# Patient Record
Sex: MALE | Race: ASIAN | NOT HISPANIC OR LATINO | Employment: OTHER | URBAN - METROPOLITAN AREA
[De-identification: names, ages, dates, MRNs, and addresses within clinical notes are randomized per-mention and may not be internally consistent; named-entity substitution may affect disease eponyms.]

---

## 2021-01-06 ENCOUNTER — HOSPITAL ENCOUNTER (OUTPATIENT)
Facility: HOSPITAL | Age: 56
Setting detail: OBSERVATION
Discharge: HOME | End: 2021-01-07
Attending: EMERGENCY MEDICINE | Admitting: INTERNAL MEDICINE
Payer: COMMERCIAL

## 2021-01-06 DIAGNOSIS — E13.10 DIABETIC KETOSIS (HCC): ICD-10-CM

## 2021-01-06 DIAGNOSIS — E11.9 NEW ONSET TYPE 2 DIABETES MELLITUS (HCC): ICD-10-CM

## 2021-01-06 DIAGNOSIS — E11.9 DIABETES MELLITUS, NEW ONSET (HCC): Primary | ICD-10-CM

## 2021-01-06 PROBLEM — R35.0 FREQUENCY OF URINATION AND POLYURIA: Status: ACTIVE | Noted: 2021-01-06

## 2021-01-06 PROBLEM — R35.89 FREQUENCY OF URINATION AND POLYURIA: Status: ACTIVE | Noted: 2021-01-06

## 2021-01-06 PROBLEM — F17.210 CIGARETTE NICOTINE DEPENDENCE WITHOUT COMPLICATION: Status: ACTIVE | Noted: 2021-01-06

## 2021-01-06 LAB
ALBUMIN SERPL BCP-MCNC: 4.3 G/DL (ref 3.4–4.8)
ALP SERPL-CCNC: 88.7 U/L (ref 10–129)
ALT SERPL W P-5'-P-CCNC: 23 U/L (ref 5–63)
ANION GAP SERPL CALCULATED.3IONS-SCNC: 7 MMOL/L (ref 4–13)
AST SERPL W P-5'-P-CCNC: 18 U/L (ref 15–41)
BASE EXCESS BLDA CALC-SCNC: 2 MMOL/L (ref -2–3)
BASOPHILS # BLD AUTO: 0.04 THOUSANDS/ΜL (ref 0–0.1)
BASOPHILS NFR BLD AUTO: 0 % (ref 0–1)
BETA-HYDROXYBUTYRATE: 0.6 MMOL/L
BILIRUB SERPL-MCNC: 0.89 MG/DL (ref 0.3–1.2)
BILIRUB UR QL STRIP: NEGATIVE
BUN SERPL-MCNC: 19 MG/DL (ref 6–20)
CA-I BLD-SCNC: 1.17 MMOL/L (ref 1.12–1.32)
CALCIUM SERPL-MCNC: 9.2 MG/DL (ref 8.4–10.2)
CHLORIDE SERPL-SCNC: 99 MMOL/L (ref 96–108)
CK MB SERPL-MCNC: 2.2 % (ref 0–2.5)
CK MB SERPL-MCNC: 4.4 NG/ML (ref 0.1–5.9)
CK SERPL-CCNC: 196.8 U/L (ref 49–397)
CLARITY UR: CLEAR
CO2 SERPL-SCNC: 29 MMOL/L (ref 22–33)
COLOR UR: YELLOW
CREAT SERPL-MCNC: 1.15 MG/DL (ref 0.5–1.2)
EOSINOPHIL # BLD AUTO: 0.19 THOUSAND/ΜL (ref 0–0.61)
EOSINOPHIL NFR BLD AUTO: 2 % (ref 0–6)
ERYTHROCYTE [DISTWIDTH] IN BLOOD BY AUTOMATED COUNT: 12.8 % (ref 11.6–15.1)
GFR SERPL CREATININE-BSD FRML MDRD: 71 ML/MIN/1.73SQ M
GLUCOSE SERPL-MCNC: 162 MG/DL (ref 65–140)
GLUCOSE SERPL-MCNC: 240 MG/DL (ref 65–140)
GLUCOSE SERPL-MCNC: 363 MG/DL (ref 65–140)
GLUCOSE SERPL-MCNC: 383 MG/DL (ref 65–140)
GLUCOSE UR STRIP-MCNC: ABNORMAL MG/DL
HCO3 BLDA-SCNC: 28.2 MMOL/L (ref 24–30)
HCT VFR BLD AUTO: 47.4 % (ref 36.5–49.3)
HCT VFR BLD CALC: 49 % (ref 36.5–49.3)
HGB BLD-MCNC: 16.6 G/DL (ref 12–17)
HGB BLDA-MCNC: 16.7 G/DL (ref 12–17)
HGB UR QL STRIP.AUTO: NEGATIVE
IMM GRANULOCYTES # BLD AUTO: 0.01 THOUSAND/UL (ref 0–0.2)
IMM GRANULOCYTES NFR BLD AUTO: 0 % (ref 0–2)
KETONES UR STRIP-MCNC: ABNORMAL MG/DL
LEUKOCYTE ESTERASE UR QL STRIP: NEGATIVE
LYMPHOCYTES # BLD AUTO: 3.32 THOUSANDS/ΜL (ref 0.6–4.47)
LYMPHOCYTES NFR BLD AUTO: 34 % (ref 14–44)
MCH RBC QN AUTO: 30.9 PG (ref 26.8–34.3)
MCHC RBC AUTO-ENTMCNC: 35 G/DL (ref 31.4–37.4)
MCV RBC AUTO: 88 FL (ref 82–98)
MONOCYTES # BLD AUTO: 0.94 THOUSAND/ΜL (ref 0.17–1.22)
MONOCYTES NFR BLD AUTO: 10 % (ref 4–12)
NEUTROPHILS # BLD AUTO: 5.34 THOUSANDS/ΜL (ref 1.85–7.62)
NEUTS SEG NFR BLD AUTO: 54 % (ref 43–75)
NITRITE UR QL STRIP: NEGATIVE
PCO2 BLD: 30 MMOL/L (ref 21–32)
PCO2 BLD: 49.2 MM HG (ref 42–50)
PH BLD: 7.37 [PH] (ref 7.3–7.4)
PH UR STRIP.AUTO: 6 [PH]
PLATELET # BLD AUTO: 295 THOUSANDS/UL (ref 149–390)
PMV BLD AUTO: 9.7 FL (ref 8.9–12.7)
PO2 BLD: 30 MM HG (ref 35–45)
POTASSIUM BLD-SCNC: 4 MMOL/L (ref 3.5–5.3)
POTASSIUM SERPL-SCNC: 4 MMOL/L (ref 3.5–5)
PROT SERPL-MCNC: 7.1 G/DL (ref 6.4–8.3)
PROT UR STRIP-MCNC: NEGATIVE MG/DL
RBC # BLD AUTO: 5.37 MILLION/UL (ref 3.88–5.62)
SAO2 % BLD FROM PO2: 55 % (ref 60–85)
SODIUM BLD-SCNC: 136 MMOL/L (ref 136–145)
SODIUM SERPL-SCNC: 135 MMOL/L (ref 133–145)
SP GR UR STRIP.AUTO: 1.01 (ref 1–1.03)
SPECIMEN SOURCE: ABNORMAL
TROPONIN I SERPL-MCNC: <0.03 NG/ML (ref 0–0.07)
UROBILINOGEN UR QL STRIP.AUTO: 0.2 E.U./DL
WBC # BLD AUTO: 9.84 THOUSAND/UL (ref 4.31–10.16)

## 2021-01-06 PROCEDURE — 96372 THER/PROPH/DIAG INJ SC/IM: CPT

## 2021-01-06 PROCEDURE — 36415 COLL VENOUS BLD VENIPUNCTURE: CPT | Performed by: EMERGENCY MEDICINE

## 2021-01-06 PROCEDURE — 82330 ASSAY OF CALCIUM: CPT

## 2021-01-06 PROCEDURE — 85014 HEMATOCRIT: CPT

## 2021-01-06 PROCEDURE — 84295 ASSAY OF SERUM SODIUM: CPT

## 2021-01-06 PROCEDURE — 99285 EMERGENCY DEPT VISIT HI MDM: CPT | Performed by: EMERGENCY MEDICINE

## 2021-01-06 PROCEDURE — 82010 KETONE BODYS QUAN: CPT | Performed by: EMERGENCY MEDICINE

## 2021-01-06 PROCEDURE — 99220 PR INITIAL OBSERVATION CARE/DAY 70 MINUTES: CPT | Performed by: INTERNAL MEDICINE

## 2021-01-06 PROCEDURE — 93005 ELECTROCARDIOGRAM TRACING: CPT

## 2021-01-06 PROCEDURE — 82803 BLOOD GASES ANY COMBINATION: CPT

## 2021-01-06 PROCEDURE — 82550 ASSAY OF CK (CPK): CPT | Performed by: EMERGENCY MEDICINE

## 2021-01-06 PROCEDURE — 84484 ASSAY OF TROPONIN QUANT: CPT | Performed by: EMERGENCY MEDICINE

## 2021-01-06 PROCEDURE — 80053 COMPREHEN METABOLIC PANEL: CPT | Performed by: EMERGENCY MEDICINE

## 2021-01-06 PROCEDURE — 82553 CREATINE MB FRACTION: CPT | Performed by: EMERGENCY MEDICINE

## 2021-01-06 PROCEDURE — 81003 URINALYSIS AUTO W/O SCOPE: CPT | Performed by: EMERGENCY MEDICINE

## 2021-01-06 PROCEDURE — 96360 HYDRATION IV INFUSION INIT: CPT

## 2021-01-06 PROCEDURE — 84132 ASSAY OF SERUM POTASSIUM: CPT

## 2021-01-06 PROCEDURE — 83036 HEMOGLOBIN GLYCOSYLATED A1C: CPT | Performed by: EMERGENCY MEDICINE

## 2021-01-06 PROCEDURE — 99284 EMERGENCY DEPT VISIT MOD MDM: CPT

## 2021-01-06 PROCEDURE — 82948 REAGENT STRIP/BLOOD GLUCOSE: CPT

## 2021-01-06 PROCEDURE — 82947 ASSAY GLUCOSE BLOOD QUANT: CPT

## 2021-01-06 PROCEDURE — 85025 COMPLETE CBC W/AUTO DIFF WBC: CPT | Performed by: EMERGENCY MEDICINE

## 2021-01-06 RX ORDER — SODIUM CHLORIDE 9 MG/ML
125 INJECTION, SOLUTION INTRAVENOUS CONTINUOUS
Status: DISCONTINUED | OUTPATIENT
Start: 2021-01-06 | End: 2021-01-07 | Stop reason: HOSPADM

## 2021-01-06 RX ORDER — INSULIN GLARGINE 100 [IU]/ML
10 INJECTION, SOLUTION SUBCUTANEOUS EVERY MORNING
Status: DISCONTINUED | OUTPATIENT
Start: 2021-01-06 | End: 2021-01-07 | Stop reason: HOSPADM

## 2021-01-06 RX ADMIN — INSULIN GLARGINE 10 UNITS: 100 INJECTION, SOLUTION SUBCUTANEOUS at 13:17

## 2021-01-06 RX ADMIN — SODIUM CHLORIDE 125 ML/HR: 0.9 INJECTION, SOLUTION INTRAVENOUS at 13:22

## 2021-01-06 RX ADMIN — INSULIN HUMAN 6 UNITS: 100 INJECTION, SOLUTION PARENTERAL at 11:08

## 2021-01-06 RX ADMIN — SODIUM CHLORIDE 1000 ML: 0.9 INJECTION, SOLUTION INTRAVENOUS at 11:57

## 2021-01-06 RX ADMIN — ENOXAPARIN SODIUM 40 MG: 40 INJECTION SUBCUTANEOUS at 13:18

## 2021-01-06 RX ADMIN — INSULIN LISPRO 1 UNITS: 100 INJECTION, SOLUTION INTRAVENOUS; SUBCUTANEOUS at 21:03

## 2021-01-06 RX ADMIN — SODIUM CHLORIDE 1000 ML: 0.9 INJECTION, SOLUTION INTRAVENOUS at 11:03

## 2021-01-06 RX ADMIN — INSULIN LISPRO 2 UNITS: 100 INJECTION, SOLUTION INTRAVENOUS; SUBCUTANEOUS at 16:14

## 2021-01-06 NOTE — H&P
H&P- Migdalia Aus 1965, 54 y o  male MRN: [id number]    Unit/Bed#: ED 09 Encounter: [id number]    Primary Care Provider: Pamela Hidalgo MD   Date and time admitted to hospital: 1/6/2021 10:36 AM        Cigarette nicotine dependence without complication  Assessment & Plan  Consult to quit smoking explained risks and benefits nicotine patch ordered    Frequency of urination and polyuria  Assessment & Plan  Likely secondary to uncontrolled diabetes mellitus    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[facility] )  Assessment & Plan  No results found for: HGBA1C    No results for input(s): POCGLU in the last 72 hours  Blood Sugar Average: Last 72 hrs:    Should improve with IV fluids, and insulin    * New onset type 2 diabetes mellitus ([facility] )  Assessment & Plan  No results found for: HGBA1C    No results for input(s): POCGLU in the last 72 hours  Blood Sugar Average: Last 72 hrs:    Patient diagnosed of new onset diabetes mellitus, check A1c which is pending  Received IV fluids in the emergency department, continue with the same  Anion gap is normal   Patient has some ketones in the urine likely due to ketosis  Will start the patient on subcu insulin, discussed with the patient regarding options of oral and subcu  Patient might be a good candidate for metformin 500 mg b i d  And Lantus 10 units at bedtime from tomorrow up on discharge  Continue diabetic Education, patient would require outpatient diabetic Education  Patient states he has upcoming PCP appointment end of January  Recommended to monitor Accu-Cheks can document and low carb diet                  VTE Prophylaxis: Enoxaparin (Lovenox)  / sequential compression device   Code Status:  Full code  POLST: There is no POLST form on file for this patient (pre-hospital)  Discussion with family:  Discussed with the patient's niece who was doing translation  from [facility] to [place]    Anticipated Length of Stay:  Patient will be admitted on an Observation basis with an anticipated length of stay of  less than 2 midnights  Justification for Hospital Stay:  New onset diabetes, hyperglycemia    Total Time for Visit, including Counseling / Coordination of Care: 1 hour  Greater than 50% of this total time spent on direct patient counseling and coordination of care  Chief Complaint:   Polyuria, polydipsia    History of Present Illness:    Rekha Ro is a 54 y o  male who presents with complaints of polyuria, polydipsia for about 2-3 weeks, gradually getting worse, complains of generalized weakness  Also noticed some change in vision  No prior history of diabetes mellitus  Patient went to urgent care where he was noticed to have hyperglycemia and sent to the emergency department here  Patient is awake alert and oriented, denies any headache, dizziness, chest pain, shortness of breath  Denies any nausea vomiting or abdominal pain  Denies any dysuria  Complains of frequency  Complains of  some white colored stuff on his glans penis  No pain, erythema    Patient's knees was at the bedside who was able to translate from [facility] to [place]    Review of Systems:    More than 10 system review of systems was performed, pertinent positive and negative findings mentioned as per history of present illness  Past Medical and Surgical History:     History reviewed  No pertinent past medical history  History reviewed  No pertinent surgical history  Meds/Allergies:    Prior to Admission medications    Not on File     I have reviewed home medications with patient personally  Patient currently does not take any medications at home      Allergies: No Known Allergies    Social History:       Social History     Substance and Sexual Activity   Alcohol Use Not Currently     Social History     Tobacco Use   Smoking Status Current Every Day Smoker    Packs/day: 0 50    Types: Cigarettes   Smokeless Tobacco Never Used     Social History     Substance and Sexual Activity   Drug Use Never       Family History:    non-contributory   Reviewed with the patient    Physical Exam:     Vitals:   Blood Pressure: 149/92 (01/06/21 1038)  Pulse: (!) 54 (01/06/21 1038)  Temperature: (!) 97 °F (36 1 °C) (01/06/21 1038)  Temp Source: Tympanic (01/06/21 1038)  Respirations: 20 (01/06/21 1038)  Height: 5' 6" (167 6 cm) (01/06/21 1038)  Weight - Scale: 72 1 kg (159 lb) (01/06/21 1038)  SpO2: 99 % (01/06/21 1038)    Physical Exam    (  General appearance:  Patient not in acute distress  Eyes:  Pupils equal reacting to light  ENT:  Moist oral mucous membranes  CVS:  S1-S2 heard, regular rate and rhythm, no pedal edema  Chest:  Bilateral air entry present, clear to auscultation  Abdomen:  Soft, nontender, bowel sounds present  CNS:  No focal neurological deficits  Genitourinary: deferred  Skin:  No acute rash   psychiatric:  No psychosis  Musculoskeletal:  No joint deformitie      Additional Data:     Lab Results: I have personally reviewed pertinent reports  Results from last 7 days   Lab Units 01/06/21  1123 01/06/21  1103   WBC Thousand/uL  --  9 84   HEMOGLOBIN g/dL  --  16 6   I STAT HEMOGLOBIN g/dl 16 7  --    HEMATOCRIT %  --  47 4   HEMATOCRIT, ISTAT % 49  --    PLATELETS Thousands/uL  --  295   NEUTROS PCT %  --  54   LYMPHS PCT %  --  34   MONOS PCT %  --  10   EOS PCT %  --  2     Results from last 7 days   Lab Units 01/06/21  1123 01/06/21  1103   SODIUM mmol/L  --  135   POTASSIUM mmol/L  --  4 0   CHLORIDE mmol/L  --  99   CO2 mmol/L  --  29   CO2, I-STAT mmol/L 30  --    BUN mg/dL  --  19   CREATININE mg/dL  --  1 15   ANION GAP mmol/L  --  7   CALCIUM mg/dL  --  9 2   ALBUMIN g/dL  --  4 3   TOTAL BILIRUBIN mg/dL  --  0 89   ALK PHOS U/L  --  88 7   ALT U/L  --  23   AST U/L  --  18   GLUCOSE RANDOM mg/dL  --  383*                           No orders to display         [employer] Records Reviewed: Yes     ** Please Note: This note has been constructed using a voice recognition system  **

## 2021-01-06 NOTE — ASSESSMENT & PLAN NOTE
No results found for: HGBA1C    No results for input(s): POCGLU in the last 72 hours  Blood Sugar Average: Last 72 hrs:    Patient diagnosed of new onset diabetes mellitus, check A1c which is pending  Received IV fluids in the emergency department, continue with the same  Anion gap is normal   Patient has some ketones in the urine likely due to ketosis  Will start the patient on subcu insulin, discussed with the patient regarding options of oral and subcu  Patient might be a good candidate for metformin 500 mg b i d  And Lantus 10 units at bedtime from tomorrow up on discharge  Continue diabetic Education, patient would require outpatient diabetic Education  Patient states he has upcoming PCP appointment end of January  Recommended to monitor Accu-Cheks can document and low carb diet

## 2021-01-06 NOTE — LETTER
[address] [employer]  [address] [place] [id number]  Dept: [phone]    January 7, 2021     Patient: Laura Rosario   YOB: 1965   Date of Visit: 1/6/2021       To Whom it May Concern:    Laura Rosario is under my professional care  He was seen in the hospital from 1/6/2021   to 01/07/21  He may return to work on 1/8/2021 without limitations  If you have any questions or concerns, please don't hesitate to call           Sincerely,          Selma Holt MD

## 2021-01-06 NOTE — ASSESSMENT & PLAN NOTE
No results found for: HGBA1C    No results for input(s): POCGLU in the last 72 hours      Blood Sugar Average: Last 72 hrs:    Should improve with IV fluids, and insulin

## 2021-01-06 NOTE — ED PROVIDER NOTES
History  Chief Complaint   Patient presents with    Abnormal Lab     [facility]     This is a 54-year-old male [facility] patient presenting with his niece for evaluation of new onset diabetic symptoms for the past 3 weeks  He admits to significant weight loss, polydipsia and polyuria  He admits to some blurred vision at times and just does not feel well  He went to a local [facility] and had some lab work done there which showed his blood sugar was over 400, concerning for new diabetes  He does not currently have a primary doctor and just recently made an appointment for visit in 3 weeks  He has no known past medical history and no prior surgeries  He does smoke cigarettes, denies alcohol  Hyperglycemia - no symptoms  Chronicity:  New  Diabetes status:  Non-diabetic  Context: new diabetes diagnosis    Relieved by:  Nothing  Ineffective treatments:  None tried  Associated symptoms: blurred vision, dehydration, fatigue, increased appetite, increased thirst, malaise and polyuria        None       History reviewed  No pertinent past medical history  History reviewed  No pertinent surgical history  History reviewed  No pertinent family history  I have reviewed and agree with the history as documented  E-Cigarette/Vaping    E-Cigarette Use Never User      E-Cigarette/Vaping Substances     Social History     Tobacco Use    Smoking status: Current Every Day Smoker     Packs/day: 0 50     Types: Cigarettes    Smokeless tobacco: Never Used   Substance Use Topics    Alcohol use: Not Currently    Drug use: Never       Review of Systems   Constitutional: Positive for fatigue  Eyes: Positive for blurred vision  Endocrine: Positive for polydipsia, polyphagia and polyuria  All other systems reviewed and are negative  Physical Exam  Physical Exam  Vitals signs and nursing note reviewed     Constitutional:       General: He is not in acute distress  Appearance: Normal appearance  He is normal weight  HENT:      Head: Normocephalic and atraumatic  Right Ear: External ear normal       Left Ear: External ear normal       Nose: Nose normal  No congestion or rhinorrhea  Mouth/Throat:      Mouth: Mucous membranes are dry  Pharynx: Oropharynx is clear  Eyes:      General: No scleral icterus  Right eye: No discharge  Left eye: No discharge  Extraocular Movements: Extraocular movements intact  Conjunctiva/sclera: Conjunctivae normal       Pupils: Pupils are equal, round, and reactive to light  Neck:      Musculoskeletal: Normal range of motion and neck supple  Cardiovascular:      Rate and Rhythm: Regular rhythm  Bradycardia present  Pulses: Normal pulses  Heart sounds: Normal heart sounds  Pulmonary:      Effort: Pulmonary effort is normal  No respiratory distress  Breath sounds: Normal breath sounds  Abdominal:      General: Abdomen is flat  Palpations: Abdomen is soft  Tenderness: There is no abdominal tenderness  Musculoskeletal: Normal range of motion  General: No swelling  Skin:     General: Skin is warm and dry  Capillary Refill: Capillary refill takes less than 2 seconds  Neurological:      General: No focal deficit present  Mental Status: He is alert and oriented to person, place, and time  Mental status is at baseline     Psychiatric:         Mood and Affect: Mood normal          Behavior: Behavior normal          Vital Signs  ED Triage Vitals   Temperature Pulse Respirations Blood Pressure SpO2   01/06/21 1038 01/06/21 1038 01/06/21 1038 01/06/21 1038 01/06/21 1038   (!) 97 °F (36 1 °C) (!) 54 20 149/92 99 %      Temp Source Heart Rate Source Patient Position - Orthostatic VS BP Location FiO2 (%)   01/06/21 1038 01/06/21 1841 01/06/21 1841 01/06/21 1841 --   Tympanic Monitor Sitting Right arm       Pain Score       --                  Vitals: 01/06/21 1038 01/06/21 1841   BP: 149/92 159/86   Pulse: (!) 54 105   Patient Position - Orthostatic VS:  Sitting         Visual Acuity      ED Medications  Medications   sodium chloride 0 9 % infusion (125 mL/hr Intravenous New Bag 1/6/21 1322)   nicotine (NICODERM CQ) 7 mg/24hr TD 24 hr patch 1 patch (1 patch Transdermal Not Given 1/6/21 1328)   enoxaparin (LOVENOX) subcutaneous injection 40 mg (40 mg Subcutaneous Given 1/6/21 1318)   insulin lispro (HumaLOG) 100 units/mL subcutaneous injection 1-5 Units (2 Units Subcutaneous Given 1/6/21 1614)   insulin lispro (HumaLOG) 100 units/mL subcutaneous injection 1-5 Units (has no administration in time range)   insulin glargine (LANTUS) subcutaneous injection 10 Units 0 1 mL (10 Units Subcutaneous Given 1/6/21 1317)   insulin lispro (HumaLOG) 100 units/mL subcutaneous injection 3 Units (3 Units Subcutaneous Not Given 1/6/21 1818)   sodium chloride 0 9 % bolus 1,000 mL (0 mL Intravenous Stopped 1/6/21 1149)   insulin regular (HumuLIN R,NovoLIN R) injection 6 Units (6 Units Subcutaneous Given 1/6/21 1108)   sodium chloride 0 9 % bolus 1,000 mL (0 mL Intravenous Stopped 1/6/21 1310)       Diagnostic Studies  Results Reviewed     Procedure Component Value Units Date/Time    Fingerstick Glucose (POCT) [[id number]]  (Abnormal) Collected: 01/06/21 1612    Lab Status: Final result Updated: 01/06/21 1613     POC Glucose 240 mg/dl     CKMB [[id number]]  (Normal) Collected: 01/06/21 1103    Lab Status: Final result Specimen: Blood from Arm, Left Updated: 01/06/21 1233     CK-MB Index 2 2 %      CK-MB 4 4 ng/mL     Hemoglobin A1C [[id number]] Collected: 01/06/21 1103    Lab Status:  In process Specimen: Blood from Arm, Left Updated: 01/06/21 1211    UA w Reflex to Microscopic w Reflex to Culture [[id number]]  (Abnormal) Collected: 01/06/21 1155    Lab Status: Final result Specimen: Urine, Clean Catch Updated: 01/06/21 1201     Color, UA Yellow     Clarity, UA Clear     Specific [place], UA 1 015     pH, UA 6 0     Leukocytes, UA Negative     Nitrite, UA Negative     Protein, UA Negative mg/dl      Glucose, UA 3+ mg/dl      Ketones, UA 15 (1+) mg/dl      Urobilinogen, UA 0 2 E U /dl      Bilirubin, UA Negative     Blood, UA Negative    Troponin I [[id number]]  (Normal) Collected: 01/06/21 1103    Lab Status: Final result Specimen: Blood from Arm, Left Updated: 01/06/21 1133     Troponin I <0 03 ng/mL     Comprehensive metabolic panel [[id number]]  (Abnormal) Collected: 01/06/21 1103    Lab Status: Final result Specimen: Blood from Arm, Left Updated: 01/06/21 1131     Sodium 135 mmol/L      Potassium 4 0 mmol/L      Chloride 99 mmol/L      CO2 29 mmol/L      ANION GAP 7 mmol/L      BUN 19 mg/dL      Creatinine 1 15 mg/dL      Glucose 383 mg/dL      Calcium 9 2 mg/dL      AST 18 U/L      ALT 23 U/L      Alkaline Phosphatase 88 7 U/L      Total Protein 7 1 g/dL      Albumin 4 3 g/dL      Total Bilirubin 0 89 mg/dL      eGFR 71 ml/min/1 73sq m     Narrative:      [facility] guidelines for Chronic Kidney Disease (CKD):     Stage 1 with normal or high GFR (GFR > 90 mL/min/1 73 square meters)    Stage 2 Mild CKD (GFR = 60-89 mL/min/1 73 square meters)    Stage 3A Moderate CKD (GFR = 45-59 mL/min/1 73 square meters)    Stage 3B Moderate CKD (GFR = 30-44 mL/min/1 73 square meters)    Stage 4 Severe CKD (GFR = 15-29 mL/min/1 73 square meters)    Stage 5 End Stage CKD (GFR <15 mL/min/1 73 square meters)  Note: GFR calculation is accurate only with a steady state creatinine    CK Total with Reflex CKMB [[id number]]  (Normal) Collected: 01/06/21 1103    Lab Status: Final result Specimen: Blood from Arm, Left Updated: 01/06/21 1131     Total  8 U/L     POCT Blood Gas (CG8+) [[id number]]  (Abnormal) Collected: 01/06/21 1123    Lab Status: Final result Specimen: Venous Updated: 01/06/21 1127     ph, Paresh ISTAT 7 366     pCO2, Paresh i-STAT 49 2 mm HG      pO2, Paresh i-STAT 30 0 mm HG BE, i-STAT 2 mmol/L      HCO3, Paresh i-STAT 28 2 mmol/L      CO2, i-STAT 30 mmol/L      O2 Sat, i-STAT 55 %      SODIUM, I-STAT 136 mmol/l      Potassium, i-STAT 4 0 mmol/L      Calcium, Ionized i-STAT 1 17 mmol/L      Hct, i-STAT 49 %      Hgb, i-STAT 16 7 g/dl      Glucose, i-STAT 363 mg/dl      Specimen Type VENOUS    Beta Hydroxybutyrate [[id number]]  (Abnormal) Collected: 01/06/21 1103    Lab Status: Final result Specimen: Blood from Arm, Left Updated: 01/06/21 1126     BETA-HYDROXYBUTYRATE 0 6 mmol/L     CBC and differential [[id number]]  (Normal) Collected: 01/06/21 1103    Lab Status: Final result Specimen: Blood from Arm, Left Updated: 01/06/21 1112     WBC 9 84 Thousand/uL      RBC 5 37 Million/uL      Hemoglobin 16 6 g/dL      Hematocrit 47 4 %      MCV 88 fL      MCH 30 9 pg      MCHC 35 0 g/dL      RDW 12 8 %      MPV 9 7 fL      Platelets 094 Thousands/uL      Neutrophils Relative 54 %      Immat GRANS % 0 %      Lymphocytes Relative 34 %      Monocytes Relative 10 %      Eosinophils Relative 2 %      Basophils Relative 0 %      Neutrophils Absolute 5 34 Thousands/µL      Immature Grans Absolute 0 01 Thousand/uL      Lymphocytes Absolute 3 32 Thousands/µL      Monocytes Absolute 0 94 Thousand/µL      Eosinophils Absolute 0 19 Thousand/µL      Basophils Absolute 0 04 Thousands/µL                  No orders to display              Procedures  ECG 12 Lead Documentation Only    Date/Time: 1/6/2021 11:29 AM  Performed by: Jeff Ceron DO  Authorized by: Jeff Ceron DO     Indications / Diagnosis:  Hyperglycemia  ECG reviewed by me, the ED Provider: yes    Patient location:  ED  Interpretation:     Interpretation: normal    Rate:     ECG rate:  58    ECG rate assessment: bradycardic    Rhythm:     Rhythm: sinus bradycardia    Ectopy:     Ectopy: none    QRS:     QRS axis:  Normal    QRS intervals:  Normal  Conduction:     Conduction: normal    ST segments:     ST segments:  Normal  T waves:     T waves: normal               ED Course  ED Course as of Jan 06 2044 Wed Jan 06, 2021   1206 D/w Dr Claudette Little, accepts to service  Observation, med surg  New onset diabetes, not in DKA - blood sugars in upper 300s here  Symptomatic w/ polydypsia, polyuria, weight loss, blurred vision  Will need to get him on a med regimen in the hospital and close outpt f/u  SBIRT 20yo+      Most Recent Value   SBIRT (22 yo +)   In order to provide better care to our patients, we are screening all of our patients for alcohol and drug use  Would it be okay to ask you these screening questions? Unable to answer at this time Filed at: 01/06/2021 1310                    MDM  Number of Diagnoses or Management Options  Diabetes mellitus, new onset [facility]): new and requires workup     Amount and/or Complexity of Data Reviewed  Clinical lab tests: ordered and reviewed  Tests in the radiology section of CPT®: ordered and reviewed  Discuss the patient with other providers: yes    Risk of Complications, Morbidity, and/or Mortality  Presenting problems: moderate  Diagnostic procedures: moderate  Management options: moderate    Patient Progress  Patient progress: stable      Disposition  Final diagnoses:   Diabetes mellitus, new onset ([facility] )     Time reflects when diagnosis was documented in both MDM as applicable and the Disposition within this note     Time User Action Codes Description Comment    1/6/2021 11:43 AM Jesus Felix Add [E11 [id number]] Diabetes mellitus, new onset [facility])       ED Disposition     ED Disposition Condition Date/Time Comment    Admit Stable Wed Jan 6, 2021 11:43 AM Case was discussed with Dr Claudette Little and the patient's admission status was agreed to be Admission Status: observation status to the service of Dr Claudette Little   Follow-up Information    None         Patient's Medications    No medications on file     No discharge procedures on file      PDMP Review     None          ED Provider  Electronically Signed by           Kj Welch DO  01/06/21 2048

## 2021-01-07 VITALS
WEIGHT: 159 LBS | OXYGEN SATURATION: 98 % | TEMPERATURE: 97.8 F | RESPIRATION RATE: 16 BRPM | BODY MASS INDEX: 25.55 KG/M2 | HEART RATE: 69 BPM | HEIGHT: 66 IN | DIASTOLIC BLOOD PRESSURE: 94 MMHG | SYSTOLIC BLOOD PRESSURE: 159 MMHG

## 2021-01-07 LAB
ANION GAP SERPL CALCULATED.3IONS-SCNC: 6 MMOL/L (ref 4–13)
BASOPHILS # BLD AUTO: 0.05 THOUSANDS/ΜL (ref 0–0.1)
BASOPHILS NFR BLD AUTO: 1 % (ref 0–1)
BUN SERPL-MCNC: 14 MG/DL (ref 6–20)
CALCIUM SERPL-MCNC: 8.2 MG/DL (ref 8.4–10.2)
CHLORIDE SERPL-SCNC: 107 MMOL/L (ref 96–108)
CHOLEST SERPL-MCNC: 107 MG/DL
CO2 SERPL-SCNC: 26 MMOL/L (ref 22–33)
CREAT SERPL-MCNC: 0.79 MG/DL (ref 0.5–1.2)
EOSINOPHIL # BLD AUTO: 0.2 THOUSAND/ΜL (ref 0–0.61)
EOSINOPHIL NFR BLD AUTO: 2 % (ref 0–6)
ERYTHROCYTE [DISTWIDTH] IN BLOOD BY AUTOMATED COUNT: 12.6 % (ref 11.6–15.1)
EST. AVERAGE GLUCOSE BLD GHB EST-MCNC: 278 MG/DL
GFR SERPL CREATININE-BSD FRML MDRD: 101 ML/MIN/1.73SQ M
GLUCOSE SERPL-MCNC: 85 MG/DL (ref 65–140)
GLUCOSE SERPL-MCNC: 87 MG/DL (ref 65–140)
HBA1C MFR BLD: 11.3 %
HCT VFR BLD AUTO: 41 % (ref 36.5–49.3)
HDLC SERPL-MCNC: 41 MG/DL
HGB BLD-MCNC: 14.4 G/DL (ref 12–17)
IMM GRANULOCYTES # BLD AUTO: 0.03 THOUSAND/UL (ref 0–0.2)
IMM GRANULOCYTES NFR BLD AUTO: 0 % (ref 0–2)
LDLC SERPL CALC-MCNC: 51 MG/DL (ref 0–100)
LYMPHOCYTES # BLD AUTO: 3.64 THOUSANDS/ΜL (ref 0.6–4.47)
LYMPHOCYTES NFR BLD AUTO: 33 % (ref 14–44)
MCH RBC QN AUTO: 31.4 PG (ref 26.8–34.3)
MCHC RBC AUTO-ENTMCNC: 35.1 G/DL (ref 31.4–37.4)
MCV RBC AUTO: 89 FL (ref 82–98)
MONOCYTES # BLD AUTO: 1.07 THOUSAND/ΜL (ref 0.17–1.22)
MONOCYTES NFR BLD AUTO: 10 % (ref 4–12)
NEUTROPHILS # BLD AUTO: 6.06 THOUSANDS/ΜL (ref 1.85–7.62)
NEUTS SEG NFR BLD AUTO: 54 % (ref 43–75)
NONHDLC SERPL-MCNC: 66 MG/DL
PLATELET # BLD AUTO: 272 THOUSANDS/UL (ref 149–390)
PMV BLD AUTO: 10.2 FL (ref 8.9–12.7)
POTASSIUM SERPL-SCNC: 3.3 MMOL/L (ref 3.5–5)
RBC # BLD AUTO: 4.59 MILLION/UL (ref 3.88–5.62)
SODIUM SERPL-SCNC: 139 MMOL/L (ref 133–145)
TRIGL SERPL-MCNC: 77.2 MG/DL
WBC # BLD AUTO: 11.05 THOUSAND/UL (ref 4.31–10.16)

## 2021-01-07 PROCEDURE — 80061 LIPID PANEL: CPT | Performed by: INTERNAL MEDICINE

## 2021-01-07 PROCEDURE — 36415 COLL VENOUS BLD VENIPUNCTURE: CPT | Performed by: INTERNAL MEDICINE

## 2021-01-07 PROCEDURE — 3046F HEMOGLOBIN A1C LEVEL >9.0%: CPT

## 2021-01-07 PROCEDURE — 82948 REAGENT STRIP/BLOOD GLUCOSE: CPT

## 2021-01-07 PROCEDURE — 85025 COMPLETE CBC W/AUTO DIFF WBC: CPT | Performed by: INTERNAL MEDICINE

## 2021-01-07 PROCEDURE — 99217 PR OBSERVATION CARE DISCHARGE MANAGEMENT: CPT | Performed by: INTERNAL MEDICINE

## 2021-01-07 PROCEDURE — 80048 BASIC METABOLIC PNL TOTAL CA: CPT | Performed by: INTERNAL MEDICINE

## 2021-01-07 RX ORDER — POTASSIUM CHLORIDE 20 MEQ/1
40 TABLET, EXTENDED RELEASE ORAL ONCE
Status: COMPLETED | OUTPATIENT
Start: 2021-01-07 | End: 2021-01-07

## 2021-01-07 RX ORDER — INSULIN GLARGINE 100 [IU]/ML
10 INJECTION, SOLUTION SUBCUTANEOUS EVERY MORNING
Qty: 10 ML | Refills: 0 | Status: SHIPPED | OUTPATIENT
Start: 2021-01-07 | End: 2021-11-17

## 2021-01-07 RX ADMIN — ENOXAPARIN SODIUM 40 MG: 40 INJECTION SUBCUTANEOUS at 09:32

## 2021-01-07 RX ADMIN — SODIUM CHLORIDE 125 ML/HR: 0.9 INJECTION, SOLUTION INTRAVENOUS at 04:28

## 2021-01-07 RX ADMIN — POTASSIUM CHLORIDE 40 MEQ: 1500 TABLET, EXTENDED RELEASE ORAL at 07:58

## 2021-01-07 RX ADMIN — INSULIN GLARGINE 10 UNITS: 100 INJECTION, SOLUTION SUBCUTANEOUS at 09:28

## 2021-01-07 NOTE — DISCHARGE SUMMARY
Discharge- Shanelle Levo 1965, 54 y o  male MRN: [id number]    Unit/Bed#: [facility] Encounter: [id number]    Primary Care Provider: Brennon Parrish MD   Date and time admitted to hospital: 1/6/2021 10:36 AM        Cigarette nicotine dependence without complication  Assessment & Plan  Consult to quit smoking explained risks and benefits nicotine patch ordered    Frequency of urination and polyuria  Assessment & Plan  Likely secondary to uncontrolled diabetes mellitus  Resolved  Diabetic ketosis [facility])  Assessment & Plan  Lab Results   Component Value Date    HGBA1C 11 3 (H) 01/06/2021       Recent Labs     01/06/21  1612 01/06/21  2100 01/07/21  0800   POCGLU 240* 162* 87       Blood Sugar Average: Last 72 hrs:  (P) 163  Treated with IV fluids, and insulin    * New onset type 2 diabetes mellitus ([facility] [address] )  Assessment & Plan  Lab Results   Component Value Date    HGBA1C 11 3 (H) 01/06/2021       Recent Labs     01/06/21  1612 01/06/21  2100 01/07/21  0800   POCGLU 240* 162* 87       Blood Sugar Average: Last 72 hrs:  (P) 163  Patient diagnosed of new onset diabetes mellitus,   HbA1C is 11 3  Received IV fluids in the emergency department, continue with the same  Anion gap is normal   Patient has some ketones in the urine likely due to ketosis  Will start the patient on subcu insulin, discussed with the patient regarding options of oral and subcu  Continue diabetic Education, patient would require outpatient diabetic Education  And low carb diet  Blood glucose is under controlled   Patient denies polyuria or polydipsia  Patient is stable for discharge today  Discharge patient with metformin 500 mg p o  B i d  and Lantus 10 units SC at bedtime  Medicated patient with insulin injection  Advised patient to follow-up with PCP in a week  Recommended to monitor Accu-Cheks               Discharging Resident Physician: Bossman Bruce MD  Attending: No att  providers found  PCP: Brennon Parrish MD  Admission Date: 1/6/2021  Discharge Date: 01/07/21    Disposition:     Home    Reason for Admission:  Polyuria and polydipsia    Consultations During Hospital Stay:  · None    Procedures Performed:     · None    Significant Findings / Test Results:     · HbA1C 11 3    Incidental Findings:   · none    Test Results Pending at Discharge (will require follow up):   none     Outpatient Tests Requested:  · none    Complications:  none    Hospital Course:     Vicente Burris is a 54 y o  male patient who originally presented to the hospital on 1/6/2021 due to polydipsia and polyuria  At presentation, blood glucose 383, no anion gap, UA showed ketones  Patient was diagnosed with new onset diabetic mellitus Patient was treated with IV fluids and insulin  Diabetic Education and insert knee injection at occasion Current patient blood glucose back to normal   Patient denies polydispsia or polyuria  No abdominal pain, nausea or vomiting  Patient is stable for discharge today  Will discharge with metformin 500 mg p o  B i d  And Lantus 10 units subcutaneously at bedtime  Advised patient to monitor blood glucose with Accu-checks  Advised patient to follow-up with PCP  Condition at Discharge: good     Discharge Day Visit / Exam:     Subjective:  Patient denies polydipsia or polyuria  No known abdominal pain, nausea or vomiting  No chest pain, fever or chills  Vitals: Blood Pressure: 159/94 (01/07/21 1130)  Pulse: 69 (01/07/21 1130)  Temperature: 97 8 °F (36 6 °C) (01/07/21 0424)  Temp Source: Tympanic (01/06/21 1841)  Respirations: 16 (01/07/21 1130)  Height: 5' 6" (167 6 cm) (01/06/21 1038)  Weight - Scale: 72 1 kg (159 lb) (01/06/21 1038)  SpO2: 98 % (01/07/21 1130)  Exam:   Physical Exam  Constitutional:       General: He is not in acute distress  Appearance: He is well-developed  He is not diaphoretic  HENT:      Head: Normocephalic  Mouth/Throat:      Pharynx: No oropharyngeal exudate     Eyes:      Pupils: Pupils are equal, round, and reactive to light  Neck:      Musculoskeletal: Normal range of motion  Vascular: No JVD  Cardiovascular:      Rate and Rhythm: Normal rate and regular rhythm  Heart sounds: No murmur  Pulmonary:      Effort: Pulmonary effort is normal  No respiratory distress  Breath sounds: No wheezing or rales  Abdominal:      General: Bowel sounds are normal       Palpations: Abdomen is soft  Tenderness: There is no abdominal tenderness  Musculoskeletal: Normal range of motion  General: No tenderness  Skin:     General: Skin is warm  Neurological:      Mental Status: He is alert and oriented to person, place, and time  Discussion with Family: no    Discharge instructions/Information to patient and family:   See after visit summary for information provided to patient and family  Provisions for Follow-Up Care:  See after visit summary for information related to follow-up care and any pertinent home health orders  Planned Readmission: no     Discharge Medications:  See after visit summary for reconciled discharge medications provided to patient and family        ** Please Note: This note has been constructed using a voice recognition system **

## 2021-01-07 NOTE — UTILIZATION REVIEW
Initial Clinical Review    Admission: Date/Time/Statement:   Admission Orders (From admission, onward)     Ordered        01/06/21 1144  Place in Observation  Once                Orders Placed This Encounter   Procedures    Place in Observation     Standing Status:   Standing     Number of Occurrences:   1     Order Specific Question:   Admitting Physician     Answer:   Susy Kerr     Order Specific Question:   Level of Care     Answer:   Med Surg [16]     ED Arrival Information     Expected Arrival Acuity Means of Arrival Escorted By Service Admission Type    1/6/2021 1/6/2021 10:25 Urgent Walk-In Family Member General Medicine Urgent    Arrival Complaint    abnormal labs - elevated Blood Sugar     Chief Complaint   Patient presents with    Abnormal Lab     came from [facility] because glucose is 421     History of Present Illness:  54year old male cigarette smoker  Presented to [facility] ED on 1/6/21 2nd 2-3 week history of worsening polyuria, polydipsia, generalized weakness and has noticed some change in vision  No prior history of diabetes mellitus  Patient went to urgent care where he was noticed to have hyperglycemia and sent to the emergency department here  Patient is awake alert and oriented, denies any headache, dizziness, chest pain, shortness of breath  Denies any nausea vomiting or abdominal pain  Denies any dysuria  Complains of frequency    Complains of  some white colored stuff on his glans penis        Patient's niece at bedside and able to translate from [facility] to [place]    Assessment/Plan:   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[facility] [address] )  Assessment & Plan  No results found for: HGBA1C     No results for input(s): POCGLU in the last 72 hours      Blood Sugar Average: Last 72 hrs:   Should improve with IV fluids, and insulin     * New onset type 2 diabetes mellitus ([facility] [address] )  Assessment & Plan  No results found for: HGBA1C     No results for input(s): POCGLU in the last 72 hours      Blood Sugar Average: Last 72 hrs:   Patient diagnosed of new onset diabetes mellitus, check A1c which is pending  Received IV fluids in the emergency department, continue with the same  Anion gap is normal   Patient has some ketones in the urine likely due to ketosis  Will start the patient on subcu insulin, discussed with the patient regarding options of oral and subcu  Patient might be a good candidate for metformin 500 mg b i d  And Lantus 10 units at bedtime from tomorrow up on discharge  Continue diabetic Education, patient would require outpatient diabetic Education  Patient states he has upcoming PCP appointment end of January  Recommended to monitor Accu-Cheks can document and low carb diet       Frequency of urination and polyuria  Assessment & Plan  Likely secondary to uncontrolled diabetes mellitus      Cigarette nicotine dependence without complication  Assessment & Plan  Consult to quit smoking explained risks and benefits nicotine patch ordered      VTE Prophylaxis: Enoxaparin (Lovenox)  / sequential compression device     Anticipated Length of Stay:  Patient will be admitted on an Observation basis with an anticipated length of stay of  less than 2 midnights     Justification for Hospital Stay:  New onset diabetes, hyperglycemia     ED Triage Vitals   Temperature Pulse Respirations Blood Pressure SpO2   01/06/21 1038 01/06/21 1038 01/06/21 1038 01/06/21 1038 01/06/21 1038   (!) 97 °F (36 1 °C) (!) 54 20 149/92 99 %      Temp Source Heart Rate Source Patient Position - Orthostatic VS BP Location FiO2 (%)   01/06/21 1038 01/06/21 1841 01/06/21 1841 01/06/21 1841 --   Tympanic Monitor Sitting Right arm       Wt Readings from Last 1 Encounters:   01/06/21 72 1 kg (159 lb)     Additional Vital Signs:   01/07/21 0424  97 8 °F (36 6 °C)  47Abnormal   16  116/76  98 %  None (Room air)  Lying   01/06/21 2249    95  18  137/80  98 %  None (Room air)  Lying   01/06/21 1841  98 6 °F (37 °C)  105 20  159/86  97 %  None (Room air       I/O 01/06 0701   01/07 0700   I V  (mL/kg) 1000 (13 9)   Total Intake(mL/kg) 1000 (13 9)   Net +1000     Pertinent Labs/Diagnostic Test Results:     Results from last 7 days   Lab Units 01/07/21  0423 01/06/21  1123 01/06/21  1103   WBC Thousand/uL 11 05*  --  9 84   HEMOGLOBIN g/dL 14 4  --  16 6   I STAT HEMOGLOBIN g/dl  --  16 7  --    HEMATOCRIT % 41 0  --  47 4   HEMATOCRIT, ISTAT %  --  49  --    PLATELETS Thousands/uL 272  --  295   NEUTROS ABS Thousands/µL 6 06  --  5 34     Results from last 7 days   Lab Units 01/07/21  0423 01/06/21  1123 01/06/21  1103   SODIUM mmol/L 139  --  135   POTASSIUM mmol/L 3 3*  --  4 0   CHLORIDE mmol/L 107  --  99   CO2 mmol/L 26  --  29   CO2, I-STAT mmol/L  --  30  --    ANION GAP mmol/L 6  --  7   BUN mg/dL 14  --  19   CREATININE mg/dL 0 79  --  1 15   EGFR ml/min/1 73sq m 101  --  71   CALCIUM mg/dL 8 2*  --  9 2   CALCIUM, IONIZED, ISTAT mmol/L  --  1 17  --      Results from last 7 days   Lab Units 01/06/21  1103   AST U/L 18   ALT U/L 23   ALK PHOS U/L 88 7   TOTAL PROTEIN g/dL 7 1   ALBUMIN g/dL 4 3   TOTAL BILIRUBIN mg/dL 0 89     Results from last 7 days   Lab Units 01/07/21  0800 01/06/21  2100 01/06/21  1612   POC GLUCOSE mg/dl 87 162* 240*     Results from last 7 days   Lab Units 01/07/21  0423 01/06/21  1103   GLUCOSE RANDOM mg/dL 85 383*     Results from last 7 days   Lab Units 01/06/21  1103   HEMOGLOBIN A1C % 11 3*   EAG mg/dl 278     BETA-HYDROXYBUTYRATE   Date Value Ref Range Status   01/06/2021 0 6 (H) <0 6 mmol/L Final      Results from last 7 days   Lab Units 01/06/21  1123   PH, JUDITH I-STAT  7 366   PCO2, JUDITH ISTAT mm HG 49 2   PO2, JUDITH ISTAT mm HG 30 0*   HCO3, JUDITH ISTAT mmol/L 28 2   I STAT BASE EXC mmol/L 2   I STAT O2 SAT % 55*     Results from last 7 days   Lab Units 01/06/21  1103   CK TOTAL U/L 196 8   CK MB INDEX % 2 2   CK MB ng/mL 4 4     Results from last 7 days   Lab Units 01/06/21  1103   TROPONIN I ng/mL <0 03     Results from last 7 days   Lab Units 01/06/21  1155   CLARITY UA  Clear   COLOR UA  Yellow   SPEC GRAV UA  1 015   PH UA  6 0   GLUCOSE UA mg/dl 3+*   KETONES UA mg/dl 15 (1+)*   BLOOD UA  Negative   PROTEIN UA mg/dl Negative   NITRITE UA  Negative   BILIRUBIN UA  Negative   UROBILINOGEN UA E U /dl 0 2   LEUKOCYTES UA  Negative     ED Treatment:   Medication Administration from 01/06/2021 1020 to 01/07/2021 1046       Date/Time Order Dose Route Action     01/06/2021 1149 sodium chloride 0 9 % bolus 1,000 mL 0 mL Intravenous Stopped     01/06/2021 1103 sodium chloride 0 9 % bolus 1,000 mL 1,000 mL Intravenous New Bag     01/06/2021 1108 insulin regular (HumuLIN R,NovoLIN R) injection 6 Units 6 Units Subcutaneous Given     01/06/2021 1310 sodium chloride 0 9 % bolus 1,000 mL 0 mL Intravenous Stopped     01/06/2021 1157 sodium chloride 0 9 % bolus 1,000 mL 1,000 mL Intravenous New Bag     01/06/2021 1322 sodium chloride 0 9 % infusion 125 mL/hr Intravenous New Bag     01/06/2021 1328 nicotine (NICODERM CQ) 7 mg/24hr TD 24 hr patch 1 patch 1 patch Transdermal Not Given     01/07/2021 0932 enoxaparin (LOVENOX) subcutaneous injection 40 mg 40 mg Subcutaneous Given     01/06/2021 1318 enoxaparin (LOVENOX) subcutaneous injection 40 mg 40 mg Subcutaneous Given     01/06/2021 1614 insulin lispro (HumaLOG) 100 units/mL subcutaneous injection 1-5 Units 2 Units Subcutaneous Given     01/06/2021 2103 insulin lispro (HumaLOG) 100 units/mL subcutaneous injection 1-5 Units 1 Units Subcutaneous Given     01/06/2021 1317 insulin glargine (LANTUS) subcutaneous injection 10 Units 0 1 mL 10 Units Subcutaneous Given     01/06/2021 1331 insulin lispro (HumaLOG) 100 units/mL subcutaneous injection 3 Units 0 Units Subcutaneous Hold     History reviewed  No pertinent past medical history      Admitting Diagnosis: Abnormal laboratory test result [R89 9]    Age/Sex: 54 y o  male    Admission Orders:  VS q4hrs  Up + OOB as tolerated  Diet Sukumar/CHO Controlled; Consistent Carbohydrate Diet Level 2 (5 carb servings/75 grams CHO/meal)   BBG qid before meals + BT    Scheduled Medications:  enoxaparin, 40 mg, Subcutaneous, Daily  insulin glargine, 10 Units, Subcutaneous, QAM  insulin lispro, 1-5 Units, Subcutaneous, TID AC  insulin lispro, 1-5 Units, Subcutaneous, HS  insulin lispro, 3 Units, Subcutaneous, TID With Meals  nicotine, 1 patch, Transdermal, Daily    Continuous IV Infusions:  IVF sodium chloride, 125 mL/hr, Intravenous, Continuous    PRN Meds:     IP CONSULT TO NUTRITION SERVICES    Network Utilization Review Department  ATTENTION: Please call with any questions or concerns to [phone] and carefully listen to the prompts so that you are directed to the right person  All voicemails are confidential   Latisha Muss all requests for admission clinical reviews, approved or denied determinations and any other requests to dedicated fax number below belonging to the campus where the patient is receiving treatment   List of dedicated fax numbers for the Facilities:  [address] DENIALS (Administrative/Medical Necessity) [phone]   [address] (Maternity/NICU/Pediatrics) [phone]   [address] [place] [address] Dr Charles Jean 3447 (Bassem Antoine "Giovanna" 103) [phone] [place] [address] [address]ult[address] [place] [address] [phone]

## 2021-01-07 NOTE — DISCHARGE INSTRUCTIONS
Tiê? u ? ? ??ng Marco?p 2 ?? Ng???i l??n: Ch?n ?oán M?i   ? I?U TR? REID? I TRÚ:   Ti?u ? ??ng tuýp 2 là bê?nh a?nh h? ??ng ?ê?n ca? ch c? th? s?? du?ng glucose (????ng)  Thông th??ng, khi l??ng ???ng елена?t t?ng, marco?n t?y s? s?n sinh ra tha?u insulin h?n  Insulin giúp v?n marybel?n ? ??ng ra kh?i máu ?? t? ?ó t?ng h?p thành n?ng l??ng  Tiê?u ? ? ??ng tuýp 2 phát tri?n do c? th? không có ?? insulin ho?c c? th? không th? s? d?ng insulin ? úng cách  Có th? ki?m soát ti?u ? ??ng tuýp 2 ?? ng?n ng?a t?n th??ng cho eddie, m?ch máu và các c? kory khác  Các tri?u ch?ng ph? bi?n philly g?m nh? madhuri:  · Ca?m shelia?c tê ? các ngón parker ho?c ngón chân    · M? m?t    · ?i ti?u th??ng xuyên    · ? ói ho?c khát h?n bình th??ng    Nh? ng??i khác g?i ??n s? ?i?n tho?i kh?n c?p t?i ?? a ph??ng ([address]?) n?u:  · B?n có b?t k? d?u hi?u ??t qu? nào madhuri ? ây:      ? Tê ho?c m?t bên m?t b? x? pretty?ng    ? Ca? nh parker ho??c c??ng chân bi? yê?u    ? Lú l?n ho?c khó nói    ? Chóng m?t, ?au ??u tr?m tr?ng ho?c m?t th? l?c    · B?n có b?t k? d?u hi?u ?au eddie nào madhuri ? ây:      ? ?au qu?n, t?c ng?c ho?c ?au ambrose ng?c    ? B?n c?ng có th?  g?p bâ?t ky? tr???ng h??p na?o madhuri ? â[place] ch?u ho?c ?au l?ng, c?, hàm, d? dày ho?c cánh parker    § Th? d?c    § Bu?n nôn ho?c nôn    § ??u choáng váng ho?c ?? m? hôi l?nh ??t ng?t    · B?n b? khó th?     G?i cho bác s? ho?c ?? i ng? ch?m sóc c?a b?n n?u:  · B?n ?au b?ng d? d?i     · B?n nôn ambrose h?n 2 gi??     · B?n g?p khó kh?n ambrose vi?c gi? t?nh táo ho?c t?p sandrita  · Ba?n run ho??c ?ô? mô? hôi     · B?n c?m th?y ?m y?u ho?c m?t m?i h?n bình th??ng     · M? ? t ba?n bi? m?? ho??c ba?n bi? ch??ng song thi?     · H?i th? c?a b?n có mùi ng?t, mùi jesus qu?      · Parker chân b?n b? s?ng  · B?n ?au b?ng và không th? ?n th?c ?n ambrose k? ho?ch ?n u?ng c?a mình  · Ba?n thâ?y cho?ng m??t, ?au ?â?u ho??c dê? nô?i giâ?n  · Da c?a b?n ??, ?m, khô ho?c s?ng t?y     · B?n có v?t th??ng không th? lành      · Ba?n bi? tê parker ho??c chân     · B?n g?p khó kh?n khi ??i phó v?i b?nh t?t ho?c b?n c?m th?y lo l?ng hay tr?m c?m     · B?n có th?c m?c ho?c lo ng?i v? b?nh lý ho?c vi?c ch?m sóc     ?iê?u tri? tiê?u ? ? ??ng sawyer?p 2 giúp ng?n ng?a ho?c trì hoãn các bi?n ch?ng, ambrose ?ó có b?nh eddie và b?nh th?n  B?n ph?i ?n u?ng lành m?nh và luciano shelia ho?t ??ng th? ch?t th??ng xuyên  B?n có th?  c?n m?t ambrose nh?ng thu?c madhuri ? ây:  · Thu?c ?i?u tr? ti?u ? ??ng ho?c insulin có th? ???c kê ?? gi?m l??ng ???ng ambrose máu  · Thuô?c ?i?u tr? huyê?t a?p có th? dùng ?? h? елена?t áp  · Thu?c gi?m cholesterol có th? ???c kê ?? ng?n ng?a b?nh eddie  · Thu?c h?y ti?u c?u , nh? aspirin, giúp ng?n ch?n ma?u bi? ?ông la?i lakia?nh cu?c  U?ng thu?c h?y ti?u c?u chính xác debbie h??ng d?n  Nh?ng thu?c này la?m cho ba?n dê? bi? esteban?y ma?u ho??c thâm ti?m h?n  N?u b?n ???c yêu c?u u?ng aspirin, không u?ng acetaminophen hay ibuprofen thay th?      · Dùng thuô?c debbie chi? dâ?n  Liên h? v?i nhà cung c?p d?ch v? ch?m sóc s?c kh?e c?a b?n n?u b?n cho r?ng thu?c không có tác d?ng ho?c n?u b?n có ph?n ?ng ph?   Cho h? bi?t n?u b?n b? d? ?ng v?i b?t k? thu?c nào  Gi? l?i tawana cleaning thu?c, vitamin và th?o d??c mà b?n dùng  Jose g?m c? s? l??ng và th?i haja c?ng nh? lý do vì isaiah b?n ph?i kristinag chúng  Maulik debbie tawana langley ho?c l? ??ng thu?c khi ??n cu?c h?n tái khám  Maulik debbie tawana cleaning thu?c phòng tr??ng h?p c?p c?u  Giáo d?c v? b?nh ti?u ? ??ng: B?n có th? yêu c?u nhà cung c?p ??n nhà và h? ?ng d?n thêm cho b?n v? b?nh ti?u ???ng  Ngoài ra, b?n có th? luciano shelia l?p h?c v?i nh?ng ng??i m?c b?nh ti?u ? ??ng khác  B?n s? ???c h??ng d?n nh?ng ?i?u madhuri:  · V? chung d??ng: Chuyên shelia chung d??ng s? giúp b?n l?p k? ho?ch ?n u?ng ? ? gi? ?n ?? nh m?c ? ??ng елена?t  B?n s? hi?u ? ??c tác ??ng c?a th?c ?n ? ?n m?c ? ??ng елена?t c?a mình  B?n c?ng s? h?c cách debbie dõi th?c ?n có ???ng và fátima b?t (hy?rat cacbon)  Không lucila? b??a  M?c ???ng елена?t c?a b?n có th? pretty?ng quá th?p n?u b?n u?ng thu?c ti?u ? ??ng và không ?n  B?n có th? ???c h??ng d?n s? d?ng ph??ng pháp ? ?a th?c ?n khi ?n  Ph??ng pháp này s? giúp b?n ki?m soát kh?u ph?n  V?i ph??ng pháp ? ?a th?c ?n, ½ ??a c?a b?n s? là nina  N?a còn l?i s? ???c nicholas ra làm rashid ph?n, m?t ph?n là protein ho?c th?t, ph?n mary s? là fátima b?t, ch?ng h?n nh? khoai tây  · Ho?t ??ng th? ch?t và b?nh ti?u ? ??ng: B?n s? tìm hi?u vì isaiah ho?t ??ng th? ch?t, nh? ?i b?, Othelia Bye tr?ng  B?n và nhà cung c?p ? ?i ng? ch?m sóc s? l?p k? ho?ch ho?t ??ng cho b?n  Nhà cung c?p ? ?i ng? ch?m sóc s? cho b?n bi?t m?c cân n?ng phù h?p v?i b?n  Nhà cung c?p s? giúp b?n l?p k? ho?ch ??t ???c và franklyn trì m?c cân n?ng ?ó  Franklyn trì cân n?ng h?p lý ?? giúp trì hoãn ho?c ng?n ng?a các bi?n ch?ng c?a b?nh ti?u ???ng     · Cách ki?m tra m?c ? ??ng елена?t c?a b?n: B?n s? bi?t ng??ng ch?p nh?n cho m?c ? ??ng елена?t c?a mình  B?n s? ???c cung c?p thông tin v? th?i ?i?m ki?m tra m?c ? ??ng елена?t  B?n s? bi?t ???c nh?ng vi?c c?n làm n?u m?c ? ??ng елена?t quá bower ho?c quá th?p  Ghi l?i th?i ?i?m ki?m tra và m?c ? ??ng елена?t c?a b?n  Maulik ghi chép này ? ?n t?t c? các cu?c h?n tái khám  · N?u b?n c?n insulin: B?n và các thành viên shelia ? ình s? ???c h??ng d?n cách rút insulin vào ?ng tiêm và tiêm  B?n s? bi?t ???c li?u l??ng insulin mình c?n và th?i ?i?m tiêm insulin  B?n s? ????c h??ng d?n v? th?i ?i?m không nên tiêm insulin  Nhóm h??ng d?n còn ch? cho b?n cách v?t b? chucho tiêm và ?ng tiêm  Các cách khác ?? giúp ki?m soát b?nh ti?u ? ??ng tuýp 2:  · Ki?m tra chân hàng ngày xem cho b? ?au không  ?i gi?y và t?t v?a c? chân  Không c?t móng chân  H?i nhà cung c?p ? ?i ng? ch?m sóc c?a b?n ?? bi?t thêm thông tin v? ch?m sóc chân          · Hi?u rõ các nguy c? n?u b?n ch?n u?ng r??u  R??u có th? khi?n l??ng ???ng ambrose máu h? th?p n?u b?n dùng insulin  R??u có th? khi?n l??ng ???ng ambrose máu t?ng bower và t?ng cân n?u b?n u?ng quá tha?u  Ph? n? 21 tu?i tr? lên và nam gi?i 65 tu?i tr? lên ch? nên u?ng 1 c?c r??u/chelsey Garrett gi?i t? 21 ??n 64 tu?i chi? nên uô?ng 2 cô?c r??u/imelda?y  M?t c?c r??u b?ng 12 ounce moshe, 5 ounce r??u au ho?c 1½ ounce r??u m?nh     · Không hu?t thuô?c  Nicôtin và các ch?t khác ambrose thu?c lá và xì gà có th? làm t?n th??ng ph?i và khi?n vi?c ki?m soát ti?u ? ??ng khó kh?n h?n  H?i nhà cung c?p ? ?i ng? ch?m sóc c?a b?n ?? bi?t thông tin n?u hi?n t?i b?n ?ang hút thu?c và c?n tr? giúp b? thu?c  Thu?c lá ?i?n t? ho?c thu?c lá không khói v?n ch?a nicôtin  Trao ??i v?i nhà cung c?p ? ?i ng? ch?m sóc c?a b?n tr??c khi s? d?ng nh?ng s?n ph?m này  · Ki?m tra prashant?t áp c?a b?n debbie ch? d?n  B?n có th? b? prashant?t áp bower khi m?c ti?u ? ??ng tuýp 2  Trao ??i v?i nhà cung c?p ? ?i ng? ch?m sóc v? m?c tiêu prashant?t áp c?a b?n  ??ng th?i b?n có th? l?p k? ho?ch gi?m prashant?t áp n?u c?n và gi? prashant?t áp ? bong?ng có l?i cho s?c kh?e  K? ho?ch có th? philly g?m vi?c thay ??i l?i s?ng ho?c thu?c  M?c prashant?t áp bình th??ng là t? 119/79 tr? pretty?ng  Prashant?t áp bình th??ng có th? giúp ng?n ng?a ho?c trì hoãn m?t s? bi?n ch?ng nh?t ?? nh c?a b?nh ti?u ???ng  Các ví d? philly g?m b?nh võng m?c (t?n th??ng m?t) và t?n th??ng th?n          · ?eo ? ? nh?n d?ng cho bi?t tình tr?ng s?c kh?e  Hãy ?eo ? ? frantz s?c cho bi?t tình tr?ng s?c kh?e ho?c ?eo th? cho bi?t b?n b? ti?u ???ng  H?i nhà cung c?p ? ?i ng? ch?m sóc c?a b?n n?i boris nh?ng món ?? này  · H?i v? các v?c yulia  B?n có nguy c? m?c b?nh nghiêm tr?ng bower h?n n?u b?n b? cúm, viêm ph?i ho?c viêm renetta  Ho?i nhà cung c?p ? ?i ng? ch?m sóc c?a b?n xem li?u ba?n có nên tiêm v?c yulia arnoldo?ng bê?nh cu?m, viêm phô?i ho?c viêm renetta B không và th?i ?i?m tiêm v?c yulia  Tái khám v?i nhà cung c?p ? ?i ng? ch?m sóc b?nh ti?u ? ??ng c?a b?n debbie ch? d?n: B?n có th? c?n tr? l?i ?? ki?m tra A1c 3 tháng m?t l?n  B?n s? c?n estevan l?i ít nh?t m?i n?m m?t l?n ?? ki?m tra chân  B?n s? c?n ki?m tra m?t m?i n?m m?t l?n ?? phát hi?n b?nh võng m?c  B?n c?ng s? c?n xét jolie?m n??c ti?u hàng n?m ?? ki?m tra các v?n ?? v? th?n   B?n có th? s? c?n th?c hi?n các ki?m tra ?? debbie dõi b?nh eddie nh? EKG, ki?m tra s? c?ng th?ng, debbie dõi елена?t áp và xét jolie?m máu  Ghi l?i các câu h?i ?? b?n nh? h?i ambrose cu?c th?m abdoulaye  © Copyright [address] Information is for End User's use only and may not be sold, redistributed or otherwise used for commercial purposes  All illustrations and images included in CareNotes® are the copyrighted property of [employer]  or [address] [place], [place] [address]sridhar   The above information is an  only  It is not intended as medical advice for individual conditions or treatments  Talk to your doctor, nurse or pharmacist before following any medical regimen to see if it is safe and effective for you

## 2021-01-07 NOTE — ASSESSMENT & PLAN NOTE
Lab Results   Component Value Date    HGBA1C 11 3 (H) 01/06/2021       Recent Labs     01/06/21  1612 01/06/21  2100 01/07/21  0800   POCGLU 240* 162* 87       Blood Sugar Average: Last 72 hrs:  (P) 163  Treated with IV fluids, and insulin

## 2021-01-07 NOTE — DISCHARGE INSTR - AVS FIRST PAGE
Please follow up your PCP for management of Blood sugar    Please check your blood glucose level at bedtime  If blood sugar level more than 150, please subcutaneous injection 10 units of Lantus at bedtime  Please take metformin 500 mg orally 2 times a day

## 2021-01-07 NOTE — ASSESSMENT & PLAN NOTE
Lab Results   Component Value Date    HGBA1C 11 3 (H) 01/06/2021       Recent Labs     01/06/21  1612 01/06/21  2100 01/07/21  0800   POCGLU 240* 162* 87       Blood Sugar Average: Last 72 hrs:  (P) 163  Patient diagnosed of new onset diabetes mellitus,   HbA1C is 11 3  Received IV fluids in the emergency department, continue with the same  Anion gap is normal   Patient has some ketones in the urine likely due to ketosis  Will start the patient on subcu insulin, discussed with the patient regarding options of oral and subcu  Continue diabetic Education, patient would require outpatient diabetic Education  And low carb diet  Blood glucose is under controlled   Patient denies polyuria or polydipsia  Patient is stable for discharge today  Discharge patient with metformin 500 mg p o  B i d  and Lantus 10 units SC at bedtime  Medicated patient with insulin injection  Advised patient to follow-up with PCP in a week  Recommended to monitor Accu-Cheks

## 2021-01-08 ENCOUNTER — TRANSITIONAL CARE MANAGEMENT (OUTPATIENT)
Dept: FAMILY MEDICINE CLINIC | Facility: CLINIC | Age: 56
End: 2021-01-08

## 2021-01-08 LAB
ATRIAL RATE: 56 BPM
P AXIS: 54 DEGREES
PR INTERVAL: 163 MS
QRS AXIS: 3 DEGREES
QRSD INTERVAL: 106 MS
QT INTERVAL: 445 MS
QTC INTERVAL: 438 MS
T WAVE AXIS: 35 DEGREES
VENTRICULAR RATE: 58 BPM

## 2021-01-08 PROCEDURE — 93010 ELECTROCARDIOGRAM REPORT: CPT | Performed by: INTERNAL MEDICINE

## 2021-01-28 ENCOUNTER — OFFICE VISIT (OUTPATIENT)
Dept: FAMILY MEDICINE CLINIC | Facility: CLINIC | Age: 56
End: 2021-01-28
Payer: COMMERCIAL

## 2021-01-28 VITALS
SYSTOLIC BLOOD PRESSURE: 120 MMHG | OXYGEN SATURATION: 98 % | BODY MASS INDEX: 25.49 KG/M2 | DIASTOLIC BLOOD PRESSURE: 84 MMHG | WEIGHT: 158.6 LBS | TEMPERATURE: 97.2 F | HEIGHT: 66 IN | RESPIRATION RATE: 14 BRPM | HEART RATE: 69 BPM

## 2021-01-28 DIAGNOSIS — E11.9 NEW ONSET TYPE 2 DIABETES MELLITUS (HCC): Primary | ICD-10-CM

## 2021-01-28 DIAGNOSIS — E53.8 CYANOCOBALAMIN DEFICIENCY: ICD-10-CM

## 2021-01-28 DIAGNOSIS — F17.210 CIGARETTE NICOTINE DEPENDENCE WITHOUT COMPLICATION: ICD-10-CM

## 2021-01-28 DIAGNOSIS — E55.9 VITAMIN D DEFICIENCY: ICD-10-CM

## 2021-01-28 DIAGNOSIS — R10.13 EPIGASTRIC DISCOMFORT: ICD-10-CM

## 2021-01-28 DIAGNOSIS — E54 ASCORBIC ACID DEFICIENCY: ICD-10-CM

## 2021-01-28 DIAGNOSIS — Z12.5 SCREENING PSA (PROSTATE SPECIFIC ANTIGEN): ICD-10-CM

## 2021-01-28 DIAGNOSIS — Z11.59 NEED FOR HEPATITIS C SCREENING TEST: ICD-10-CM

## 2021-01-28 DIAGNOSIS — R63.8 INCREASED BMI: ICD-10-CM

## 2021-01-28 DIAGNOSIS — Z28.39 IMMUNIZATION DEFICIENCY: ICD-10-CM

## 2021-01-28 DIAGNOSIS — Z11.4 SCREENING FOR HIV (HUMAN IMMUNODEFICIENCY VIRUS): ICD-10-CM

## 2021-01-28 PROCEDURE — 3008F BODY MASS INDEX DOCD: CPT

## 2021-01-28 PROCEDURE — 90472 IMMUNIZATION ADMIN EACH ADD: CPT

## 2021-01-28 PROCEDURE — 3725F SCREEN DEPRESSION PERFORMED: CPT

## 2021-01-28 PROCEDURE — 90682 RIV4 VACC RECOMBINANT DNA IM: CPT

## 2021-01-28 PROCEDURE — 90732 PPSV23 VACC 2 YRS+ SUBQ/IM: CPT

## 2021-01-28 PROCEDURE — 90471 IMMUNIZATION ADMIN: CPT

## 2021-01-28 PROCEDURE — 4004F PT TOBACCO SCREEN RCVD TLK: CPT

## 2021-01-28 PROCEDURE — 99205 OFFICE O/P NEW HI 60 MIN: CPT

## 2021-01-28 RX ORDER — METFORMIN HYDROCHLORIDE 500 MG/1
1000 TABLET, EXTENDED RELEASE ORAL
Qty: 180 TABLET | Refills: 1 | Status: SHIPPED | OUTPATIENT
Start: 2021-01-28 | End: 2021-05-13 | Stop reason: SDUPTHER

## 2021-01-28 RX ORDER — BLOOD-GLUCOSE METER
KIT MISCELLANEOUS
COMMUNITY
Start: 2021-01-08 | End: 2021-01-28 | Stop reason: SDUPTHER

## 2021-01-28 RX ORDER — BLOOD-GLUCOSE METER
KIT MISCELLANEOUS
Qty: 200 EACH | Refills: 2 | Status: SHIPPED | OUTPATIENT
Start: 2021-01-28 | End: 2021-11-17 | Stop reason: SDUPTHER

## 2021-01-28 RX ORDER — LANCETS 28 GAUGE
EACH MISCELLANEOUS
COMMUNITY
Start: 2021-01-07 | End: 2021-11-17 | Stop reason: SDUPTHER

## 2021-01-28 RX ORDER — BLOOD-GLUCOSE METER
KIT MISCELLANEOUS
Status: ON HOLD | COMMUNITY
Start: 2021-01-07

## 2021-01-28 RX ORDER — PEN NEEDLE, DIABETIC 32GX 5/32"
NEEDLE, DISPOSABLE MISCELLANEOUS
Status: ON HOLD | COMMUNITY
Start: 2021-01-07

## 2021-01-29 NOTE — PROGRESS NOTES
BMI Counseling: Body mass index is 25 6 kg/m²  The BMI is above normal  Nutrition recommendations include decreasing portion sizes, encouraging healthy choices of fruits and vegetables, limiting drinks that contain sugar and reducing intake of cholesterol  Exercise recommendations include exercising 3-5 times per week  No pharmacotherapy was ordered  Tobacco Cessation Counseling: Tobacco cessation counseling was provided  The patient is sincerely urged to quit consumption of tobacco  He is not ready to quit tobacco  Medication options and side effects of medication discussed  Patient refused medication  Assessment/Plan:    Flu and pneumonia vaccine given to him today  Advised for diabetic eye exam   Diabetic foot exam done today  Review blood sugar log with patient in great detail  Patient must be of glucose toxic but this time is much better  Will increase metformin to 1000 mg twice a day will stop Lantus  Recommendation for patient to do diabetic teaching but he refused due to language barrier  And also time constraint  Will have him follow-up in 6 weeks  He does complain of increased appetite with metformin he often found him some snacking next drug of choice will be Rybelsus  versus GLP1 to help suppress his appetite  Will need baseline blood work follow-up  Next visit will consider Shingrix vaccine for him  He can get close vaccine 1 supply available  I have spent 40 minutes with Patient  today in which greater than 50% of this time was spent in counseling/coordination of care regarding Risks and benefits of tx options    Reviewed hosptalizations records in details and discussed w pt    Problem List Items Addressed This Visit        Endocrine    New onset type 2 diabetes mellitus ([address] ) - Primary    Relevant Medications    FREESTYLE LITE test strip    metFORMIN (GLUCOPHAGE-XR) 500 mg 24 hr tablet    Other Relevant Orders    Lipid Panel with Direct LDL reflex    CBC and differential    UA w Reflex to Microscopic w Reflex to Culture    Microalbumin,Urine    TSH, 3rd generation with Free T4 reflex    Comprehensive metabolic panel    US abdomen complete    HEMOGLOBIN A1C W/ EAG ESTIMATION    PNEUMOCOCCAL POLYSACCHARIDE VACCINE 23-VALENT =>1YO SQ IM (Completed)    influenza vaccine, quadrivalent, recombinant, PF, 0 5 mL, for patients 18 yr+ (FLUBLOK) (Completed)       Other    Cigarette nicotine dependence without complication      Other Visit Diagnoses     Need for hepatitis C screening test        Relevant Orders    Hepatitis C Ab W/Refl To HCV RNA, Qn, PCR ([facility])    Cyanocobalamin deficiency        Relevant Orders    Vitamin B12    Immunization deficiency        Relevant Orders    Hepatitis A antibody, total    Hepatitis B surface antibody    Measles/Mumps/Rubella Immunity    Vitamin D deficiency        Relevant Orders    Vitamin D 25 hydroxy    Ascorbic acid deficiency        Relevant Orders    Vitamin C    Epigastric discomfort        Relevant Orders    US abdomen complete    Screening PSA (prostate specific antigen)        Relevant Orders    PSA, Total Screen    Screening for HIV (human immunodeficiency virus)        Relevant Orders    Rapid HIV 1/2 AB-AG Combo    Increased BMI                Subjective:      Patient ID: Royal Mckenzie is a 54 y o  male  78-year-old male for establish care  He last saw his PCP was 10 years ago  He had no medical problem then however 3 weeks before hospital evaluation in January 6 he developed excessive thirst craving for ice water and excessive urination  He then notice white substance powder  in his underwear after he urinates and that brought  Concerns, [facility] at that time his blood sugar was so high that he was referred to emergency room for evaluation  He was admitted for 2 days in the hospital from January 6 to January 8th for significant high blood sugar in the 400 with ketosis    He was started on metformin and Lantus in the hospital   Since he has been home he has been documented his blood sugar and if his blood sugar more than 150 fasting then he to take Lantus 10 units daily for the past 3 weeks he has been using Lantus only 3 times  He has been watching his diet and drinking lot of water and his blood sugar maintain less than 150  He had no side effect with metformin  He works night shift  8:00 p m  To 2:00 a m  In the morning  He knows lot of people with diabetes and he learn from them what not to eat  His father have significant  cardiac issues had a get bypass graft at 61years old and had to get gallbladder surgery removed subsequently he  from complication just 2 months ago  His mom has high blood pressure  His son also had gallbladder removed last year  Patient continued smokes and used to drink heavily but quit 5 years ago  He immigrated from [facility] in [facility]note 3[facility]  Currently he works at [employer]  He has a note to get COVID vaccine because he has essential worker  His last tetanus shot was 8 years ago  He has not get a flu shot or pneumonia shot yet  He is running out a freestyle Lite test strips  The following portions of the patient's history were reviewed and updated as appropriate:   Past Medical History:  He has no past medical history on file ,  _______________________________________________________________________  Medical Problems:  does not have any pertinent problems on file ,  _______________________________________________________________________  Past Surgical History:   has no past surgical history on file ,  _______________________________________________________________________  Family History:  family history is not on file ,  _______________________________________________________________________  Social History:   reports that he has been smoking cigarettes  He has been smoking about 0 50 packs per day   He has never used smokeless tobacco  He reports previous alcohol use  He reports that he does not use drugs  ,  _______________________________________________________________________  Allergies:  has No Known Allergies     _______________________________________________________________________  Current Outpatient Medications   Medication Sig Dispense Refill    BD Pen Needle Jeaneth 2nd Gen 32G X 4 MM MISC USE ONCE DAILY WITH LANTUS      Blood Glucose Monitoring Suppl (FreeStyle Lite) FIDELIA       FREESTYLE LITE test strip Check blood sugar twice daily 200 each 2    insulin glargine (LANTUS) 100 units/mL subcutaneous injection Inject 10 Units under the skin every morning 10 mL 0    Lancets (freestyle) lancets       metFORMIN (GLUCOPHAGE-XR) 500 mg 24 hr tablet Take 2 tablets (1,000 mg total) by mouth daily with dinner 180 tablet 1     No current facility-administered medications for this visit       _______________________________________________________________________  Review of Systems   Constitutional: Negative for activity change, appetite change, fatigue, fever and unexpected weight change  HENT: Negative for dental problem and trouble swallowing  Eyes: Negative for photophobia and visual disturbance  Respiratory: Negative for cough and chest tightness  Cardiovascular: Negative for chest pain, palpitations and leg swelling  Gastrointestinal: Negative for abdominal pain, constipation and vomiting  Endocrine: Negative for cold intolerance, polydipsia and polyuria  Genitourinary: Negative for difficulty urinating, frequency and urgency  Musculoskeletal: Negative for arthralgias, joint swelling, myalgias and neck pain  Skin: Negative for color change, rash and wound  Allergic/Immunologic: Negative for environmental allergies  Neurological: Negative for dizziness, weakness and numbness  Hematological: Does not bruise/bleed easily     Psychiatric/Behavioral: Negative for decreased concentration, dysphoric mood, self-injury, sleep disturbance and suicidal ideas          Objective:  Vitals:    01/28/21 1047   BP: 120/84   BP Location: Left arm   Patient Position: Sitting   Cuff Size: Standard   Pulse: 69   Resp: 14   Temp: (!) 97 2 °F (36 2 °C)   TempSrc: Temporal   SpO2: 98%   Weight: 71 9 kg (158 lb 9 6 oz)   Height: 5' 6" (1 676 m)     Body mass index is 25 6 kg/m²  Physical Exam  Vitals signs and nursing note reviewed  Constitutional:       Appearance: Normal appearance  He is well-developed and normal weight  HENT:      Head: Normocephalic and atraumatic  Right Ear: Tympanic membrane normal       Left Ear: Tympanic membrane normal       Nose: Nose normal       Mouth/Throat:      Mouth: Mucous membranes are dry  Eyes:      Pupils: Pupils are equal, round, and reactive to light  Neck:      Musculoskeletal: Normal range of motion and neck supple  Cardiovascular:      Rate and Rhythm: Normal rate and regular rhythm  Pulses:           Dorsalis pedis pulses are 2+ on the right side and 2+ on the left side  Posterior tibial pulses are 1+ on the right side and 1+ on the left side  Heart sounds: Normal heart sounds  Pulmonary:      Effort: Pulmonary effort is normal       Breath sounds: Normal breath sounds  Abdominal:      General: Bowel sounds are normal       Palpations: Abdomen is soft  Musculoskeletal: Normal range of motion  Feet:      Right foot:      Skin integrity: Callus present  No ulcer, skin breakdown, erythema, warmth or dry skin  Left foot:      Skin integrity: Callus present  No ulcer, skin breakdown, erythema, warmth or dry skin  Skin:     General: Skin is warm and dry  Capillary Refill: Capillary refill takes less than 2 seconds  Neurological:      General: No focal deficit present  Mental Status: He is alert and oriented to person, place, and time  Mental status is at baseline     Psychiatric:         Mood and Affect: Mood normal          Behavior: Behavior normal          Thought Content: Thought content normal          Judgment: Judgment normal        Patient's shoes and socks removed  Right Foot/Ankle   Right Foot Inspection  Skin Exam: skin normal, skin intact, callus and callus no dry skin, no warmth, no erythema, no maceration, no abnormal color, no pre-ulcer and no ulcer                          Toe Exam: ROM and strength within normal limits  Sensory   Vibration: intact    Monofilament testing: intact  Vascular  Capillary refills: < 3 seconds  The right DP pulse is 2+  The right PT pulse is 1+  Right Toe  - Comprehensive Exam  Ecchymosis: none  Arch: normal  Hammertoes: absent  Claw Toes: absent  Swelling: none   Tenderness: none         Left Foot/Ankle  Left Foot Inspection  Skin Exam: skin normal, skin intact and callusno dry skin, no warmth, no erythema, no maceration, normal color, no pre-ulcer and no ulcer                         Toe Exam: ROM and strength within normal limits                   Sensory   Vibration: intact    Monofilament: intact  Vascular  Capillary refills: < 3 seconds  The left DP pulse is 2+  The left PT pulse is 1+  Left Toe  - Comprehensive Exam  Ecchymosis: none  Arch: normal  Hammertoes: absent  Claw toes: absent  Swelling: none   Tenderness: none       Assign Risk Category:  No deformity present;  No loss of protective sensation;        Risk: 0

## 2021-02-03 LAB
25(OH)D3 SERPL-MCNC: 14 NG/ML (ref 30–100)
ALBUMIN SERPL-MCNC: 4.5 G/DL (ref 3.6–5.1)
ALBUMIN/GLOB SERPL: 1.8 (CALC) (ref 1–2.5)
ALP SERPL-CCNC: 66 U/L (ref 35–144)
ALT SERPL-CCNC: 30 U/L (ref 9–46)
APPEARANCE UR: CLEAR
AST SERPL-CCNC: 22 U/L (ref 10–35)
BACTERIA UR QL AUTO: ABNORMAL /HPF
BASOPHILS # BLD AUTO: 48 CELLS/UL (ref 0–200)
BASOPHILS NFR BLD AUTO: 0.7 %
BILIRUB SERPL-MCNC: 1.1 MG/DL (ref 0.2–1.2)
BILIRUB UR QL STRIP: NEGATIVE
BUN SERPL-MCNC: 21 MG/DL (ref 7–25)
BUN/CREAT SERPL: ABNORMAL (CALC) (ref 6–22)
CALCIUM SERPL-MCNC: 9.9 MG/DL (ref 8.6–10.3)
CHLORIDE SERPL-SCNC: 105 MMOL/L (ref 98–110)
CHOLEST SERPL-MCNC: 154 MG/DL
CHOLEST/HDLC SERPL: 2.3 (CALC)
CO2 SERPL-SCNC: 29 MMOL/L (ref 20–32)
COLOR UR: YELLOW
CREAT SERPL-MCNC: 1.03 MG/DL (ref 0.7–1.33)
EOSINOPHIL # BLD AUTO: 138 CELLS/UL (ref 15–500)
EOSINOPHIL NFR BLD AUTO: 2 %
ERYTHROCYTE [DISTWIDTH] IN BLOOD BY AUTOMATED COUNT: 12.6 % (ref 11–15)
EST. AVERAGE GLUCOSE BLD GHB EST-MCNC: 258 (CALC)
EST. AVERAGE GLUCOSE BLD GHB EST-SCNC: 14.3 (CALC)
GLOBULIN SER CALC-MCNC: 2.5 G/DL (CALC) (ref 1.9–3.7)
GLUCOSE SERPL-MCNC: 109 MG/DL (ref 65–99)
GLUCOSE UR QL STRIP: NEGATIVE
HAV AB SER QL IA: REACTIVE
HBA1C MFR BLD: 10.6 % OF TOTAL HGB
HBV SURFACE AB SERPL IA-ACNC: <5 MIU/ML
HCT VFR BLD AUTO: 49.8 % (ref 38.5–50)
HCV AB S/CO SERPL IA: 0.05
HCV AB SERPL QL IA: NORMAL
HDLC SERPL-MCNC: 68 MG/DL
HGB BLD-MCNC: 16.9 G/DL (ref 13.2–17.1)
HGB UR QL STRIP: NEGATIVE
HIV 1+2 AB+HIV1 P24 AG SERPL QL IA: NORMAL
HYALINE CASTS #/AREA URNS LPF: ABNORMAL /LPF
KETONES UR QL STRIP: ABNORMAL
LDLC SERPL CALC-MCNC: 73 MG/DL (CALC)
LEUKOCYTE ESTERASE UR QL STRIP: NEGATIVE
LYMPHOCYTES # BLD AUTO: 2132 CELLS/UL (ref 850–3900)
LYMPHOCYTES NFR BLD AUTO: 30.9 %
MCH RBC QN AUTO: 30.8 PG (ref 27–33)
MCHC RBC AUTO-ENTMCNC: 33.9 G/DL (ref 32–36)
MCV RBC AUTO: 90.9 FL (ref 80–100)
MEV IGG SER IA-ACNC: <13.5 AU/ML
MONOCYTES # BLD AUTO: 628 CELLS/UL (ref 200–950)
MONOCYTES NFR BLD AUTO: 9.1 %
MUV IGG SER IA-ACNC: 87.8 AU/ML
NEUTROPHILS # BLD AUTO: 3954 CELLS/UL (ref 1500–7800)
NEUTROPHILS NFR BLD AUTO: 57.3 %
NITRITE UR QL STRIP: NEGATIVE
NONHDLC SERPL-MCNC: 86 MG/DL (CALC)
PH UR STRIP: 5.5 [PH] (ref 5–8)
PLATELET # BLD AUTO: 322 THOUSAND/UL (ref 140–400)
PMV BLD REES-ECKER: 9.9 FL (ref 7.5–12.5)
POTASSIUM SERPL-SCNC: 4.4 MMOL/L (ref 3.5–5.3)
PROT SERPL-MCNC: 7 G/DL (ref 6.1–8.1)
PROT UR QL STRIP: NEGATIVE
PSA SERPL-MCNC: 4.4 NG/ML
RBC # BLD AUTO: 5.48 MILLION/UL (ref 4.2–5.8)
RBC #/AREA URNS HPF: ABNORMAL /HPF
RUBV IGG SERPL IA-ACNC: 2.11 INDEX
SL AMB EGFR AFRICAN AMERICAN: 94 ML/MIN/1.73M2
SL AMB EGFR NON AFRICAN AMERICAN: 81 ML/MIN/1.73M2
SODIUM SERPL-SCNC: 142 MMOL/L (ref 135–146)
SP GR UR STRIP: 1.02 (ref 1–1.03)
SQUAMOUS #/AREA URNS HPF: ABNORMAL /HPF
TRIGL SERPL-MCNC: 52 MG/DL
TSH SERPL-ACNC: 0.98 MIU/L (ref 0.4–4.5)
VIT B12 SERPL-MCNC: 422 PG/ML (ref 200–1100)
VIT C SERPL-MCNC: 0.8 MG/DL (ref 0.2–2.1)
WBC # BLD AUTO: 6.9 THOUSAND/UL (ref 3.8–10.8)
WBC #/AREA URNS HPF: ABNORMAL /HPF

## 2021-02-03 PROCEDURE — 3046F HEMOGLOBIN A1C LEVEL >9.0%: CPT | Performed by: FAMILY MEDICINE

## 2021-02-05 ENCOUNTER — HOSPITAL ENCOUNTER (OUTPATIENT)
Dept: ULTRASOUND IMAGING | Facility: HOSPITAL | Age: 56
Discharge: HOME | End: 2021-02-05
Attending: FAMILY MEDICINE
Payer: COMMERCIAL

## 2021-02-05 DIAGNOSIS — R10.13 EPIGASTRIC DISCOMFORT: ICD-10-CM

## 2021-02-05 DIAGNOSIS — E11.9 NEW ONSET TYPE 2 DIABETES MELLITUS (HCC): ICD-10-CM

## 2021-02-05 PROCEDURE — 76700 US EXAM ABDOM COMPLETE: CPT

## 2021-03-10 DIAGNOSIS — Z23 ENCOUNTER FOR IMMUNIZATION: ICD-10-CM

## 2021-03-11 ENCOUNTER — OFFICE VISIT (OUTPATIENT)
Dept: FAMILY MEDICINE CLINIC | Facility: CLINIC | Age: 56
End: 2021-03-11
Payer: COMMERCIAL

## 2021-03-11 VITALS
BODY MASS INDEX: 24.49 KG/M2 | OXYGEN SATURATION: 95 % | SYSTOLIC BLOOD PRESSURE: 136 MMHG | DIASTOLIC BLOOD PRESSURE: 82 MMHG | HEIGHT: 66 IN | WEIGHT: 152.4 LBS | HEART RATE: 55 BPM | TEMPERATURE: 96 F

## 2021-03-11 DIAGNOSIS — F17.210 CIGARETTE NICOTINE DEPENDENCE WITHOUT COMPLICATION: ICD-10-CM

## 2021-03-11 DIAGNOSIS — R35.89 FREQUENCY OF URINATION AND POLYURIA: ICD-10-CM

## 2021-03-11 DIAGNOSIS — R97.20 ELEVATED PSA: ICD-10-CM

## 2021-03-11 DIAGNOSIS — E11.9 NEW ONSET TYPE 2 DIABETES MELLITUS (HCC): Primary | ICD-10-CM

## 2021-03-11 DIAGNOSIS — Z78.9 NONIMMUNE TO HEPATITIS B VIRUS: ICD-10-CM

## 2021-03-11 DIAGNOSIS — R35.0 FREQUENCY OF URINATION AND POLYURIA: ICD-10-CM

## 2021-03-11 DIAGNOSIS — K76.0 FATTY LIVER: ICD-10-CM

## 2021-03-11 DIAGNOSIS — K82.4 GALL BLADDER POLYP: ICD-10-CM

## 2021-03-11 DIAGNOSIS — Z28.39 IMMUNIZATION DEFICIENCY: ICD-10-CM

## 2021-03-11 PROCEDURE — 90472 IMMUNIZATION ADMIN EACH ADD: CPT | Performed by: FAMILY MEDICINE

## 2021-03-11 PROCEDURE — 90707 MMR VACCINE SC: CPT | Performed by: FAMILY MEDICINE

## 2021-03-11 PROCEDURE — 99214 OFFICE O/P EST MOD 30 MIN: CPT | Performed by: FAMILY MEDICINE

## 2021-03-11 PROCEDURE — 4004F PT TOBACCO SCREEN RCVD TLK: CPT | Performed by: FAMILY MEDICINE

## 2021-03-11 PROCEDURE — 3008F BODY MASS INDEX DOCD: CPT | Performed by: FAMILY MEDICINE

## 2021-03-11 PROCEDURE — 90471 IMMUNIZATION ADMIN: CPT | Performed by: FAMILY MEDICINE

## 2021-03-11 PROCEDURE — 90746 HEPB VACCINE 3 DOSE ADULT IM: CPT | Performed by: FAMILY MEDICINE

## 2021-03-11 NOTE — PROGRESS NOTES
Assessment/Plan:    Patient has significant anxiety due to newly diagnosed diabetes  He is doing very well controlling his blood sugar with diet exercise and watching his sugar  At this point will continue him on metformin extended release 1000 mg once a day  Next option to add on Januvia or   Actos will be a good option for him 2  Will follow him up in 2 months with his blood sugar  Today gave him hep B in MMR vaccine  Next visit in 2 months when he had a 2nd MMR and 2nd hep B  Refer patient to Urology for elevated PSA level  Replace B12 and vitamin-D level  Close monitor needed  Consider evaluation for his mood is anxious due to his medical condition  He will need Tdap and shingles vaccine    I have spent 25 minutes with Patient  today in which greater than 50% of this time was spent in counseling/coordination of care regarding Diagnostic results  Problem List Items Addressed This Visit        Digestive    Fatty liver    Gall bladder polyp       Endocrine    New onset type 2 diabetes mellitus ([facility] [address] ) - Primary    Relevant Orders    Hemoglobin J5Z    Basic metabolic panel       Other    Frequency of urination and polyuria    Cigarette nicotine dependence without complication    Nonimmune to hepatitis B virus    Relevant Orders    HEPATITIS B VACCINE ADULT IM (Completed)    Elevated PSA    Relevant Orders    Ambulatory referral to Urology    Immunization deficiency    Relevant Orders    MMR VACCINE SQ (Completed)            Subjective:      Patient ID: Nancy Walden is a 54 y o  male  22-year-old male follow-up type 2 diabetes  Patient has newly diagnosed  Discussed blood test results with patient hemoglobin A1c 10 9  He has been on metformin 1000 mg once a day extended release has not been using Lantus because his blood sugar was range 80 to 120  He checked his blood sugar twice a day  Tolerated metformin well with no side effect    Discussed the rest of blood test the patient he is not immune to Larisa [place] and hepatitis B infection  B12 low at 422 vitamin-D is low at 14 prostate level is high at 4 4  Lipid is normal TSH is normal hepatitis C, HIV  normal   Ultrasound abdomen showed gallbladder polyps otherwise normal pancreas normal kidneys  It showed moderate fatty liver  The following portions of the patient's history were reviewed and updated as appropriate:   Past Medical History:  He has a past medical history of Elevated PSA (3/11/2021), Fatty liver (3/11/2021), Gall bladder polyp (3/11/2021), and Nonimmune to hepatitis B virus (3/11/2021)  ,  _______________________________________________________________________  Medical Problems:  does not have any pertinent problems on file ,  _______________________________________________________________________  Past Surgical History:   has no past surgical history on file ,  _______________________________________________________________________  Family History:  family history is not on file ,  _______________________________________________________________________  Social History:   reports that he has been smoking cigarettes  He has been smoking about 0 50 packs per day  He has never used smokeless tobacco  He reports previous alcohol use  He reports that he does not use drugs  ,  _______________________________________________________________________  Allergies:  has No Known Allergies     _______________________________________________________________________  Current Outpatient Medications   Medication Sig Dispense Refill    BD Pen Needle Jeaneth 2nd Gen 32G X 4 MM MISC USE ONCE DAILY WITH LANTUS      Blood Glucose Monitoring Suppl (FreeStyle Lite) FIDELIA       FREESTYLE LITE test strip Check blood sugar twice daily 200 each 2    insulin glargine (LANTUS) 100 units/mL subcutaneous injection Inject 10 Units under the skin every morning (Patient taking differently: Inject 10 Units under the skin every morning Only if he tests over 150 ) 10 mL 0  Lancets (freestyle) lancets       metFORMIN (GLUCOPHAGE-XR) 500 mg 24 hr tablet Take 2 tablets (1,000 mg total) by mouth daily with dinner 180 tablet 1     No current facility-administered medications for this visit       _______________________________________________________________________  Review of Systems   Constitutional: Negative for activity change, appetite change, fatigue, fever and unexpected weight change  HENT: Negative for dental problem and trouble swallowing  Eyes: Negative for photophobia and visual disturbance  Respiratory: Negative for cough and chest tightness  Cardiovascular: Negative for chest pain, palpitations and leg swelling  Gastrointestinal: Negative for abdominal pain, constipation and vomiting  Endocrine: Negative for cold intolerance, polydipsia and polyuria  Genitourinary: Negative for difficulty urinating, frequency and urgency  Musculoskeletal: Negative for arthralgias, joint swelling, myalgias and neck pain  Skin: Negative for color change, rash and wound  Allergic/Immunologic: Negative for environmental allergies  Neurological: Negative for dizziness, weakness and numbness  Hematological: Does not bruise/bleed easily  Psychiatric/Behavioral: Negative for decreased concentration, dysphoric mood, self-injury, sleep disturbance and suicidal ideas  The patient is nervous/anxious  Objective:  Vitals:    03/11/21 0856   BP: 136/82   BP Location: Left arm   Patient Position: Sitting   Cuff Size: Standard   Pulse: 55   Temp: (!) 96 °F (35 6 °C)   TempSrc: Tympanic   SpO2: 95%   Weight: 69 1 kg (152 lb 6 4 oz)   Height: 5' 6" (1 676 m)     Body mass index is 24 6 kg/m²  Physical Exam  Vitals signs and nursing note reviewed  Constitutional:       Appearance: Normal appearance  He is normal weight  Pulmonary:      Effort: Pulmonary effort is normal    Neurological:      General: No focal deficit present        Mental Status: He is alert and oriented to person, place, and time  Mental status is at baseline  Psychiatric:         Mood and Affect: Mood normal          Behavior: Behavior normal          Thought Content:  Thought content normal          Judgment: Judgment normal

## 2021-03-19 ENCOUNTER — IMMUNIZATIONS (OUTPATIENT)
Dept: FAMILY MEDICINE CLINIC | Facility: HOSPITAL | Age: 56
End: 2021-03-19

## 2021-03-19 DIAGNOSIS — Z23 ENCOUNTER FOR IMMUNIZATION: Primary | ICD-10-CM

## 2021-03-19 PROCEDURE — 91300 SARS-COV-2 / COVID-19 MRNA VACCINE (PFIZER-BIONTECH) 30 MCG: CPT

## 2021-03-19 PROCEDURE — 0001A SARS-COV-2 / COVID-19 MRNA VACCINE (PFIZER-BIONTECH) 30 MCG: CPT

## 2021-04-11 ENCOUNTER — IMMUNIZATIONS (OUTPATIENT)
Dept: FAMILY MEDICINE CLINIC | Facility: HOSPITAL | Age: 56
End: 2021-04-11

## 2021-04-11 DIAGNOSIS — Z23 ENCOUNTER FOR IMMUNIZATION: Primary | ICD-10-CM

## 2021-04-11 PROCEDURE — 0002A SARS-COV-2 / COVID-19 MRNA VACCINE (PFIZER-BIONTECH) 30 MCG: CPT

## 2021-04-11 PROCEDURE — 91300 SARS-COV-2 / COVID-19 MRNA VACCINE (PFIZER-BIONTECH) 30 MCG: CPT

## 2021-05-03 ENCOUNTER — APPOINTMENT (OUTPATIENT)
Dept: LAB | Facility: CLINIC | Age: 56
End: 2021-05-03
Payer: COMMERCIAL

## 2021-05-03 ENCOUNTER — TRANSCRIBE ORDERS (OUTPATIENT)
Dept: LAB | Facility: CLINIC | Age: 56
End: 2021-05-03

## 2021-05-03 DIAGNOSIS — E11.9 NEW ONSET TYPE 2 DIABETES MELLITUS (HCC): ICD-10-CM

## 2021-05-03 LAB
ANION GAP SERPL CALCULATED.3IONS-SCNC: 9 MMOL/L (ref 4–13)
BUN SERPL-MCNC: 25 MG/DL (ref 5–25)
CALCIUM SERPL-MCNC: 9.3 MG/DL (ref 8.3–10.1)
CHLORIDE SERPL-SCNC: 106 MMOL/L (ref 100–108)
CO2 SERPL-SCNC: 28 MMOL/L (ref 21–32)
CREAT SERPL-MCNC: 1.09 MG/DL (ref 0.6–1.3)
GFR SERPL CREATININE-BSD FRML MDRD: 76 ML/MIN/1.73SQ M
GLUCOSE P FAST SERPL-MCNC: 97 MG/DL (ref 65–99)
POTASSIUM SERPL-SCNC: 4 MMOL/L (ref 3.5–5.3)
SODIUM SERPL-SCNC: 143 MMOL/L (ref 136–145)

## 2021-05-03 PROCEDURE — 80048 BASIC METABOLIC PNL TOTAL CA: CPT

## 2021-05-03 PROCEDURE — 83036 HEMOGLOBIN GLYCOSYLATED A1C: CPT

## 2021-05-03 PROCEDURE — 36415 COLL VENOUS BLD VENIPUNCTURE: CPT

## 2021-05-04 LAB
EST. AVERAGE GLUCOSE BLD GHB EST-MCNC: 120 MG/DL
HBA1C MFR BLD: 5.8 %

## 2021-05-04 PROCEDURE — 3044F HG A1C LEVEL LT 7.0%: CPT | Performed by: PHYSICIAN ASSISTANT

## 2021-05-06 ENCOUNTER — RA CDI HCC (OUTPATIENT)
Dept: OTHER | Facility: HOSPITAL | Age: 56
End: 2021-05-06

## 2021-05-06 NOTE — PROGRESS NOTES
[facility]  coding opportunities          Chart reviewed, no opportunity found: CHART REVIEWED, NO OPPORTUNITY FOUND              Patients insurance company:  [email or web address] [facility] (Medicare Advantage and Commercial)

## 2021-05-13 ENCOUNTER — OFFICE VISIT (OUTPATIENT)
Dept: FAMILY MEDICINE CLINIC | Facility: CLINIC | Age: 56
End: 2021-05-13
Payer: COMMERCIAL

## 2021-05-13 VITALS
HEIGHT: 66 IN | WEIGHT: 145 LBS | SYSTOLIC BLOOD PRESSURE: 126 MMHG | BODY MASS INDEX: 23.3 KG/M2 | DIASTOLIC BLOOD PRESSURE: 84 MMHG | TEMPERATURE: 97.2 F | HEART RATE: 70 BPM | OXYGEN SATURATION: 97 %

## 2021-05-13 DIAGNOSIS — R97.20 ELEVATED PSA: ICD-10-CM

## 2021-05-13 DIAGNOSIS — Z78.9 NONIMMUNE TO HEPATITIS B VIRUS: Primary | ICD-10-CM

## 2021-05-13 DIAGNOSIS — E11.9 NEW ONSET TYPE 2 DIABETES MELLITUS (HCC): ICD-10-CM

## 2021-05-13 DIAGNOSIS — Z23 NEED FOR VACCINATION: ICD-10-CM

## 2021-05-13 DIAGNOSIS — F17.210 CIGARETTE NICOTINE DEPENDENCE WITHOUT COMPLICATION: ICD-10-CM

## 2021-05-13 DIAGNOSIS — E55.9 VITAMIN D DEFICIENCY: ICD-10-CM

## 2021-05-13 DIAGNOSIS — E53.8 CYANOCOBALAMIN DEFICIENCY: ICD-10-CM

## 2021-05-13 DIAGNOSIS — Z28.39 IMMUNIZATION DEFICIENCY: ICD-10-CM

## 2021-05-13 PROCEDURE — 90707 MMR VACCINE SC: CPT

## 2021-05-13 PROCEDURE — 99214 OFFICE O/P EST MOD 30 MIN: CPT | Performed by: FAMILY MEDICINE

## 2021-05-13 PROCEDURE — 90472 IMMUNIZATION ADMIN EACH ADD: CPT

## 2021-05-13 PROCEDURE — 90746 HEPB VACCINE 3 DOSE ADULT IM: CPT

## 2021-05-13 PROCEDURE — 90471 IMMUNIZATION ADMIN: CPT

## 2021-05-13 RX ORDER — METFORMIN HYDROCHLORIDE 500 MG/1
1000 TABLET, EXTENDED RELEASE ORAL
Qty: 180 TABLET | Refills: 1 | Status: SHIPPED | OUTPATIENT
Start: 2021-05-13 | End: 2021-11-10

## 2021-05-13 NOTE — PROGRESS NOTES
Assessment/Plan:    Patient's blood sugars at goal   Continue metformin  Stop Lantus  MMR hep B vaccine given to him today  He will need 3rd hep B in 6 months  That time with him Tdap and Shingrix  Next visit will be physical will recommend for colonoscopy and Pneumovax  Will advised for diabetic eye exam     I have spent 25 minutes with Patient  today in which greater than 50% of this time was spent in counseling/coordination of care regarding Diagnostic results  Problem List Items Addressed This Visit        Endocrine    New onset type 2 diabetes mellitus ([address] )    Relevant Medications    metFORMIN (GLUCOPHAGE-XR) 500 mg 24 hr tablet    Other Relevant Orders    CBC and differential    Comprehensive metabolic panel    Hemoglobin A1C       Other    Cigarette nicotine dependence without complication    Nonimmune to hepatitis B virus - Primary    Elevated PSA    Immunization deficiency    Vitamin D deficiency    Relevant Orders    Vitamin D 25 hydroxy    Cyanocobalamin deficiency    Relevant Orders    Vitamin B12    H  pylori antigen, stool            Subjective:      Patient ID: Anjana Longo is a 54 y o  male  55-year-old male follow-up type 2 diabetes  He is on metformin 1000 mg daily Lantus 10 units as needed for blood sugar more than 200  He tolerated medication well no side effects  Discussed blood test results with patient hemoglobin A1c is 5 8  Upon diagnosis it used to be more than 13  He has blood sugar check daily always range at goal   He is watching his diet very closely  He exercise every day  He has appointment with urologist upcoming for elevated PSA  He is not immune to MMR hep B  He needs 2nd round vaccine  He also due for tetanus shot and Shingrix vaccine  He does have his COVID vaccine done          The following portions of the patient's history were reviewed and updated as appropriate:   Past Medical History:  He has a past medical history of Elevated PSA (3/11/2021), Fatty liver (3/11/2021), Gall bladder polyp (3/11/2021), and Nonimmune to hepatitis B virus (3/11/2021)  ,  _______________________________________________________________________  Medical Problems:  does not have any pertinent problems on file ,  _______________________________________________________________________  Past Surgical History:   has no past surgical history on file ,  _______________________________________________________________________  Family History:  family history is not on file ,  _______________________________________________________________________  Social History:   reports that he has been smoking cigarettes  He has been smoking about 0 50 packs per day  He has never used smokeless tobacco  He reports previous alcohol use  He reports that he does not use drugs  ,  _______________________________________________________________________  Allergies:  has No Known Allergies     _______________________________________________________________________  Current Outpatient Medications   Medication Sig Dispense Refill    BD Pen Needle Jeaneth 2nd Gen 32G X 4 MM MISC USE ONCE DAILY WITH LANTUS      Blood Glucose Monitoring Suppl (FreeStyle Lite) FIDELIA       FREESTYLE LITE test strip Check blood sugar twice daily 200 each 2    insulin glargine (LANTUS) 100 units/mL subcutaneous injection Inject 10 Units under the skin every morning (Patient taking differently: Inject 10 Units under the skin every morning Only if he tests over 150 ) 10 mL 0    Lancets (freestyle) lancets       metFORMIN (GLUCOPHAGE-XR) 500 mg 24 hr tablet Take 2 tablets (1,000 mg total) by mouth daily with dinner 180 tablet 1     No current facility-administered medications for this visit       _______________________________________________________________________  Review of Systems   Constitutional: Negative for activity change, appetite change, fatigue, fever and unexpected weight change     HENT: Negative for dental problem and trouble swallowing  Eyes: Negative for photophobia and visual disturbance  Respiratory: Negative for cough and chest tightness  Cardiovascular: Negative for chest pain, palpitations and leg swelling  Gastrointestinal: Negative for abdominal pain, constipation and vomiting  Endocrine: Negative for cold intolerance, polydipsia and polyuria  Genitourinary: Negative for difficulty urinating, frequency and urgency  Musculoskeletal: Negative for arthralgias, joint swelling, myalgias and neck pain  Skin: Negative for color change, rash and wound  Allergic/Immunologic: Negative for environmental allergies  Neurological: Negative for dizziness, weakness and numbness  Hematological: Does not bruise/bleed easily  Psychiatric/Behavioral: Negative for decreased concentration, dysphoric mood, self-injury, sleep disturbance and suicidal ideas  Objective:  Vitals:    05/13/21 0826   BP: 126/84   BP Location: Left arm   Patient Position: Sitting   Cuff Size: Standard   Pulse: 70   Temp: (!) 97 2 °F (36 2 °C)   TempSrc: Temporal   SpO2: 97%   Weight: 65 8 kg (145 lb)   Height: 5' 6" (1 676 m)     Body mass index is 23 4 kg/m²  Physical Exam  Vitals signs and nursing note reviewed  Constitutional:       Appearance: Normal appearance  He is normal weight  Pulmonary:      Effort: Pulmonary effort is normal       Breath sounds: Normal breath sounds  Neurological:      Mental Status: He is alert and oriented to person, place, and time  Mental status is at baseline  Psychiatric:         Mood and Affect: Mood normal          Behavior: Behavior normal          Thought Content:  Thought content normal          Judgment: Judgment normal

## 2021-05-17 ENCOUNTER — TELEPHONE (OUTPATIENT)
Dept: UROLOGY | Facility: CLINIC | Age: 56
End: 2021-05-17

## 2021-05-17 ENCOUNTER — CONSULT (OUTPATIENT)
Dept: UROLOGY | Facility: CLINIC | Age: 56
End: 2021-05-17
Payer: COMMERCIAL

## 2021-05-17 VITALS
DIASTOLIC BLOOD PRESSURE: 76 MMHG | HEART RATE: 69 BPM | WEIGHT: 144.4 LBS | BODY MASS INDEX: 23.21 KG/M2 | SYSTOLIC BLOOD PRESSURE: 120 MMHG | HEIGHT: 66 IN

## 2021-05-17 DIAGNOSIS — R97.20 ELEVATED PSA: Primary | ICD-10-CM

## 2021-05-17 DIAGNOSIS — R97.20 ELEVATED PSA: ICD-10-CM

## 2021-05-17 LAB
POST-VOID RESIDUAL VOLUME, ML POC: 11 ML
SL AMB  POCT GLUCOSE, UA: NORMAL
SL AMB LEUKOCYTE ESTERASE,UA: NORMAL
SL AMB POCT BILIRUBIN,UA: NORMAL
SL AMB POCT BLOOD,UA: NORMAL
SL AMB POCT CLARITY,UA: CLEAR
SL AMB POCT COLOR,UA: YELLOW
SL AMB POCT KETONES,UA: NORMAL
SL AMB POCT NITRITE,UA: NORMAL
SL AMB POCT PH,UA: 5
SL AMB POCT SPECIFIC GRAVITY,UA: 1.03
SL AMB POCT URINE PROTEIN: NORMAL
SL AMB POCT UROBILINOGEN: NORMAL

## 2021-05-17 PROCEDURE — 3008F BODY MASS INDEX DOCD: CPT | Performed by: PHYSICIAN ASSISTANT

## 2021-05-17 PROCEDURE — 4004F PT TOBACCO SCREEN RCVD TLK: CPT | Performed by: PHYSICIAN ASSISTANT

## 2021-05-17 PROCEDURE — 81002 URINALYSIS NONAUTO W/O SCOPE: CPT | Performed by: PHYSICIAN ASSISTANT

## 2021-05-17 PROCEDURE — 51798 US URINE CAPACITY MEASURE: CPT | Performed by: PHYSICIAN ASSISTANT

## 2021-05-17 PROCEDURE — 3061F NEG MICROALBUMINURIA REV: CPT | Performed by: PHYSICIAN ASSISTANT

## 2021-05-17 PROCEDURE — 99203 OFFICE O/P NEW LOW 30 MIN: CPT | Performed by: PHYSICIAN ASSISTANT

## 2021-05-17 NOTE — PROGRESS NOTES
1  Elevated PSA  Ambulatory referral to Urology    POCT urine dip    POCT Measure PVR    PSA, total and free         Assessment and plan:       1  Elevated PSA  - patient's PSA was obtained approximately 2 weeks after admission for diabetic ketosis  He has since been initiated on a diabetes regimen with excellent response   -   Normal digital rectal examination in the office today  -  Patient will go for PSA in approximately 2 weeks  Should this remain elevated greater than 4, recommend proceeding with a transrectal ultrasound-guided prostate biopsy  If his PSA  Is less than 4, would recommend repeat diagnostic PSA and digital rectal examination in 6 months  Jarvis Vallecillo PA-C      Chief Complaint       Elevated PSA    History of Present Illness     Zandra Villegas is a 64 y o  Male presenting today as a new patient for elevated PSA  Patient was found to be have diabetes in January of 2020  Was admitted for ketosis  His PSA was obtained approximately 2 weeks after his hospital admission  He has since been initiated on an appropriate diabetic regimen with excellent response  His urinary symptoms have since improved  Denies any dysuria, gross hematuria, frequency, urgency, or incontinence         Patient denies any previous history of  surgical manipulation or family history of  malignancies  PSA 4 4 (1/29/2021)  UA with microscopy (1/29/2021) showed some ketones, otherwise leukocyte, nitrite, and blood negative  0-2 RBC/hof  Medical comorbidities include diabetes, tobacco use, hepatic steatosis  Patient was recently diagnosed with diabetes in January 2021 secondary to weight loss, blurred vision, polydipsia and polyuria, initial hgba1c 11 3  He was admitted for diabetic  Ketosis at that time  PSA was obtained 1-2 weeks thereafter  Patient immigrated from [place] in 200  Urine dip leukocyte, nitrite, and blood negative    PVR 11mL    AUA SYMPTOM SCORE      Most Recent Value   AUA SYMPTOM SCORE   How often have you had a sensation of not emptying your bladder completely after you finished urinating? 0   How often have you had to urinate again less than two hours after you finished urinating? 0   How often have you found you stopped and started again several times when you urinate?  0   How often have you found it difficult to postpone urination? 0   How often have you had a weak urinary stream?  0   How often have you had to push or strain to begin urination? 0   How many times did you most typically get up to urinate from the time you went to bed at night until the time you got up in the morning? 1   Quality of Life: If you were to spend the rest of your life with your urinary condition just the way it is now, how would you feel about that?  0   AUA SYMPTOM SCORE  1            Laboratory     Lab Results   Component Value Date    CREATININE 1 09 05/03/2021       Lab Results   Component Value Date    PSA 4 4 (H) 01/29/2021       Review of Systems     Review of Systems   Constitutional: Negative for activity change, appetite change, chills, diaphoresis, fatigue, fever and unexpected weight change  Respiratory: Negative for chest tightness and shortness of breath  Cardiovascular: Negative for chest pain, palpitations and leg swelling  Gastrointestinal: Negative for abdominal distention, abdominal pain, constipation, diarrhea, nausea and vomiting  Genitourinary: Negative for decreased urine volume, difficulty urinating, dysuria, enuresis, flank pain, frequency, genital sores, hematuria and urgency  Musculoskeletal: Negative for back pain, gait problem and myalgias  Skin: Negative for color change, pallor, rash and wound  Psychiatric/Behavioral: Negative for behavioral problems  The patient is not nervous/anxious  Allergies     No Known Allergies    Physical Exam     Physical Exam  Constitutional:       General: He is not in acute distress  Appearance: Normal appearance  He is normal weight  He is not ill-appearing, toxic-appearing or diaphoretic  HENT:      Head: Normocephalic and atraumatic  Eyes:      General:         Right eye: No discharge  Left eye: No discharge  Conjunctiva/sclera: Conjunctivae normal    Pulmonary:      Effort: Pulmonary effort is normal  No respiratory distress  Genitourinary:     Comments: Good rectal tone  Prostate 30g,  Smooth, symmetric, no palpable nodules  Musculoskeletal: Normal range of motion  Skin:     General: Skin is warm and dry  Coloration: Skin is not jaundiced or pale  Neurological:      General: No focal deficit present  Mental Status: He is alert and oriented to person, place, and time  Psychiatric:         Mood and Affect: Mood normal          Behavior: Behavior normal          Thought Content: Thought content normal            Vital Signs     There were no vitals filed for this visit        Current Medications       Current Outpatient Medications:     BD Pen Needle Jeaneth 2nd Gen 32G X 4 MM MISC, USE ONCE DAILY WITH LANTUS, Disp: , Rfl:     Blood Glucose Monitoring Suppl (FreeStyle Lite) FIDELIA, , Disp: , Rfl:     FREESTYLE LITE test strip, Check blood sugar twice daily, Disp: 200 each, Rfl: 2    insulin glargine (LANTUS) 100 units/mL subcutaneous injection, Inject 10 Units under the skin every morning (Patient taking differently: Inject 10 Units under the skin every morning Only if he tests over 150 ), Disp: 10 mL, Rfl: 0    Lancets (freestyle) lancets, , Disp: , Rfl:     metFORMIN (GLUCOPHAGE-XR) 500 mg 24 hr tablet, Take 2 tablets (1,000 mg total) by mouth daily with dinner, Disp: 180 tablet, Rfl: 1      Active Problems     Patient Active Problem List   Diagnosis    New onset type 2 diabetes mellitus ([facility] [address] )    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HCC)    Frequency of urination and polyuria    Cigarette nicotine dependence without complication    Nonimmune to hepatitis B virus    Elevated PSA    Immunization deficiency    Fatty liver    Gall bladder polyp    Vitamin D deficiency    Cyanocobalamin deficiency         Past Medical History     Past Medical History:   Diagnosis Date    Elevated PSA 3/11/2021    Fatty liver 3/11/2021    Rocky Alvarez bladder polyp 3/11/2021    Nonimmune to hepatitis B virus 3/11/2021         Surgical History     No past surgical history on file  Family History     No family history on file        Social History     Social History       Radiology

## 2021-05-17 NOTE — TELEPHONE ENCOUNTER
Patient will go for PSA in approximately 2 weeks  Should this remain elevated greater than 4, recommend proceeding with a transrectal ultrasound-guided prostate biopsy  If his PSA  Is less than 4, would recommend repeat diagnostic PSA and digital rectal examination in 6 months  Please monitor for PSA and schedule accordingly  Thank you

## 2021-05-29 ENCOUNTER — APPOINTMENT (OUTPATIENT)
Dept: LAB | Facility: CLINIC | Age: 56
End: 2021-05-29
Payer: COMMERCIAL

## 2021-05-29 DIAGNOSIS — E11.9 NEW ONSET TYPE 2 DIABETES MELLITUS (HCC): Primary | ICD-10-CM

## 2021-05-29 DIAGNOSIS — R97.20 ELEVATED PSA: ICD-10-CM

## 2021-05-29 PROCEDURE — 36415 COLL VENOUS BLD VENIPUNCTURE: CPT

## 2021-05-29 PROCEDURE — 84154 ASSAY OF PSA FREE: CPT

## 2021-05-29 PROCEDURE — 84153 ASSAY OF PSA TOTAL: CPT

## 2021-06-02 LAB
PSA FREE MFR SERPL: 11.3 %
PSA FREE SERPL-MCNC: 0.36 NG/ML
PSA SERPL-MCNC: 3.2 NG/ML (ref 0–4)

## 2021-06-03 NOTE — TELEPHONE ENCOUNTER
Called and spoke to patient at this time advised PSA has gone down to 3 2  advised per provider recommendations is for patient to have repeat diagnostic PSA and 6 month fu     Patient scheduled 12/2/2021 at 8am patient to have PSA done ptv    Patient verbalized understanding and is thankful for call

## 2021-11-07 ENCOUNTER — APPOINTMENT (OUTPATIENT)
Dept: LAB | Facility: CLINIC | Age: 56
End: 2021-11-07
Payer: COMMERCIAL

## 2021-11-07 DIAGNOSIS — E11.9 NEW ONSET TYPE 2 DIABETES MELLITUS (HCC): ICD-10-CM

## 2021-11-07 DIAGNOSIS — E53.8 CYANOCOBALAMIN DEFICIENCY: ICD-10-CM

## 2021-11-07 DIAGNOSIS — E53.8 CYANOCOBALAMINE DEFICIENCY (NON ANEMIC): ICD-10-CM

## 2021-11-07 DIAGNOSIS — E55.9 VITAMIN D DEFICIENCY: ICD-10-CM

## 2021-11-07 LAB
25(OH)D3 SERPL-MCNC: 24.5 NG/ML (ref 30–100)
ALBUMIN SERPL BCP-MCNC: 3.8 G/DL (ref 3.5–5)
ALP SERPL-CCNC: 63 U/L (ref 46–116)
ALT SERPL W P-5'-P-CCNC: 29 U/L (ref 12–78)
ANION GAP SERPL CALCULATED.3IONS-SCNC: 5 MMOL/L (ref 4–13)
AST SERPL W P-5'-P-CCNC: 17 U/L (ref 5–45)
BASOPHILS # BLD AUTO: 0.05 THOUSANDS/ΜL (ref 0–0.1)
BASOPHILS NFR BLD AUTO: 1 % (ref 0–1)
BILIRUB SERPL-MCNC: 0.69 MG/DL (ref 0.2–1)
BUN SERPL-MCNC: 20 MG/DL (ref 5–25)
CALCIUM SERPL-MCNC: 9.2 MG/DL (ref 8.3–10.1)
CHLORIDE SERPL-SCNC: 106 MMOL/L (ref 100–108)
CO2 SERPL-SCNC: 30 MMOL/L (ref 21–32)
CREAT SERPL-MCNC: 1.05 MG/DL (ref 0.6–1.3)
EOSINOPHIL # BLD AUTO: 0.18 THOUSAND/ΜL (ref 0–0.61)
EOSINOPHIL NFR BLD AUTO: 3 % (ref 0–6)
ERYTHROCYTE [DISTWIDTH] IN BLOOD BY AUTOMATED COUNT: 13.9 % (ref 11.6–15.1)
EST. AVERAGE GLUCOSE BLD GHB EST-MCNC: 117 MG/DL
GFR SERPL CREATININE-BSD FRML MDRD: 79 ML/MIN/1.73SQ M
GLUCOSE P FAST SERPL-MCNC: 97 MG/DL (ref 65–99)
HBA1C MFR BLD: 5.7 %
HCT VFR BLD AUTO: 51.3 % (ref 36.5–49.3)
HGB BLD-MCNC: 16.1 G/DL (ref 12–17)
IMM GRANULOCYTES # BLD AUTO: 0.01 THOUSAND/UL (ref 0–0.2)
IMM GRANULOCYTES NFR BLD AUTO: 0 % (ref 0–2)
LYMPHOCYTES # BLD AUTO: 2.43 THOUSANDS/ΜL (ref 0.6–4.47)
LYMPHOCYTES NFR BLD AUTO: 34 % (ref 14–44)
MCH RBC QN AUTO: 30.5 PG (ref 26.8–34.3)
MCHC RBC AUTO-ENTMCNC: 31.4 G/DL (ref 31.4–37.4)
MCV RBC AUTO: 97 FL (ref 82–98)
MONOCYTES # BLD AUTO: 0.56 THOUSAND/ΜL (ref 0.17–1.22)
MONOCYTES NFR BLD AUTO: 8 % (ref 4–12)
NEUTROPHILS # BLD AUTO: 3.98 THOUSANDS/ΜL (ref 1.85–7.62)
NEUTS SEG NFR BLD AUTO: 54 % (ref 43–75)
NRBC BLD AUTO-RTO: 0 /100 WBCS
PLATELET # BLD AUTO: 246 THOUSANDS/UL (ref 149–390)
PMV BLD AUTO: 10.4 FL (ref 8.9–12.7)
POTASSIUM SERPL-SCNC: 4.6 MMOL/L (ref 3.5–5.3)
PROT SERPL-MCNC: 7.3 G/DL (ref 6.4–8.2)
RBC # BLD AUTO: 5.28 MILLION/UL (ref 3.88–5.62)
SODIUM SERPL-SCNC: 141 MMOL/L (ref 136–145)
VIT B12 SERPL-MCNC: 768 PG/ML (ref 100–900)
WBC # BLD AUTO: 7.21 THOUSAND/UL (ref 4.31–10.16)

## 2021-11-07 PROCEDURE — 36415 COLL VENOUS BLD VENIPUNCTURE: CPT

## 2021-11-07 PROCEDURE — 87338 HPYLORI STOOL AG IA: CPT

## 2021-11-07 PROCEDURE — 80053 COMPREHEN METABOLIC PANEL: CPT

## 2021-11-07 PROCEDURE — 3044F HG A1C LEVEL LT 7.0%: CPT

## 2021-11-07 PROCEDURE — 85025 COMPLETE CBC W/AUTO DIFF WBC: CPT

## 2021-11-07 PROCEDURE — 83036 HEMOGLOBIN GLYCOSYLATED A1C: CPT

## 2021-11-07 PROCEDURE — 82607 VITAMIN B-12: CPT

## 2021-11-07 PROCEDURE — 82306 VITAMIN D 25 HYDROXY: CPT

## 2021-11-09 LAB — H PYLORI AG STL QL IA: NEGATIVE

## 2021-11-10 DIAGNOSIS — E11.9 NEW ONSET TYPE 2 DIABETES MELLITUS (HCC): ICD-10-CM

## 2021-11-10 RX ORDER — METFORMIN HYDROCHLORIDE 500 MG/1
TABLET, EXTENDED RELEASE ORAL
Qty: 180 TABLET | Refills: 1 | Status: SHIPPED | OUTPATIENT
Start: 2021-11-10 | End: 2021-11-17 | Stop reason: SDUPTHER

## 2021-11-17 ENCOUNTER — OFFICE VISIT (OUTPATIENT)
Dept: FAMILY MEDICINE CLINIC | Facility: CLINIC | Age: 56
End: 2021-11-17
Payer: COMMERCIAL

## 2021-11-17 VITALS
BODY MASS INDEX: 22.53 KG/M2 | OXYGEN SATURATION: 98 % | HEART RATE: 62 BPM | SYSTOLIC BLOOD PRESSURE: 122 MMHG | WEIGHT: 140.2 LBS | DIASTOLIC BLOOD PRESSURE: 76 MMHG | HEIGHT: 66 IN | TEMPERATURE: 97.6 F

## 2021-11-17 DIAGNOSIS — Z00.00 ANNUAL PHYSICAL EXAM: ICD-10-CM

## 2021-11-17 DIAGNOSIS — Z78.9 NONIMMUNE TO HEPATITIS B VIRUS: ICD-10-CM

## 2021-11-17 DIAGNOSIS — Z12.2 ENCOUNTER FOR SCREENING FOR LUNG CANCER: ICD-10-CM

## 2021-11-17 DIAGNOSIS — R97.20 ELEVATED PSA: ICD-10-CM

## 2021-11-17 DIAGNOSIS — E11.9 NEW ONSET TYPE 2 DIABETES MELLITUS (HCC): ICD-10-CM

## 2021-11-17 DIAGNOSIS — Z23 NEED FOR INFLUENZA VACCINATION: ICD-10-CM

## 2021-11-17 DIAGNOSIS — Z23 ENCOUNTER FOR IMMUNIZATION: ICD-10-CM

## 2021-11-17 DIAGNOSIS — F17.210 CIGARETTE NICOTINE DEPENDENCE WITHOUT COMPLICATION: Primary | ICD-10-CM

## 2021-11-17 DIAGNOSIS — R35.0 FREQUENCY OF URINATION AND POLYURIA: ICD-10-CM

## 2021-11-17 DIAGNOSIS — Z12.11 SCREENING FOR MALIGNANT NEOPLASM OF COLON: ICD-10-CM

## 2021-11-17 DIAGNOSIS — Z23 NEED FOR TDAP VACCINATION: ICD-10-CM

## 2021-11-17 DIAGNOSIS — R35.89 FREQUENCY OF URINATION AND POLYURIA: ICD-10-CM

## 2021-11-17 PROCEDURE — 99396 PREV VISIT EST AGE 40-64: CPT

## 2021-11-17 PROCEDURE — 3008F BODY MASS INDEX DOCD: CPT

## 2021-11-17 PROCEDURE — 90682 RIV4 VACC RECOMBINANT DNA IM: CPT

## 2021-11-17 PROCEDURE — 90471 IMMUNIZATION ADMIN: CPT

## 2021-11-17 PROCEDURE — 90715 TDAP VACCINE 7 YRS/> IM: CPT

## 2021-11-17 PROCEDURE — 90746 HEPB VACCINE 3 DOSE ADULT IM: CPT

## 2021-11-17 PROCEDURE — 4004F PT TOBACCO SCREEN RCVD TLK: CPT

## 2021-11-17 PROCEDURE — 90472 IMMUNIZATION ADMIN EACH ADD: CPT

## 2021-11-17 RX ORDER — LANCETS 28 GAUGE
EACH MISCELLANEOUS
Qty: 100 EACH | Refills: 3 | Status: ON HOLD | OUTPATIENT
Start: 2021-11-17

## 2021-11-17 RX ORDER — BLOOD-GLUCOSE METER
KIT MISCELLANEOUS
Qty: 100 EACH | Refills: 3 | Status: ON HOLD | OUTPATIENT
Start: 2021-11-17

## 2021-11-17 RX ORDER — METFORMIN HYDROCHLORIDE 500 MG/1
1000 TABLET, EXTENDED RELEASE ORAL
Qty: 180 TABLET | Refills: 2 | Status: SHIPPED | OUTPATIENT
Start: 2021-11-17 | End: 2022-03-28 | Stop reason: SDUPTHER

## 2021-11-17 RX ORDER — MAG HYDROX/ALUMINUM HYD/SIMETH 400-400-40
1 SUSPENSION, ORAL (FINAL DOSE FORM) ORAL DAILY
Qty: 90 CAPSULE | Refills: 2 | Status: ON HOLD | OUTPATIENT
Start: 2021-11-17 | End: 2022-08-05

## 2021-11-22 ENCOUNTER — VBI (OUTPATIENT)
Dept: ADMINISTRATIVE | Facility: OTHER | Age: 56
End: 2021-11-22

## 2021-12-02 ENCOUNTER — TELEPHONE (OUTPATIENT)
Dept: UROLOGY | Facility: CLINIC | Age: 56
End: 2021-12-02

## 2021-12-02 ENCOUNTER — OFFICE VISIT (OUTPATIENT)
Dept: UROLOGY | Facility: CLINIC | Age: 56
End: 2021-12-02
Payer: COMMERCIAL

## 2021-12-02 ENCOUNTER — APPOINTMENT (OUTPATIENT)
Dept: LAB | Facility: AMBULARY SURGERY CENTER | Age: 56
End: 2021-12-02
Payer: COMMERCIAL

## 2021-12-02 VITALS
BODY MASS INDEX: 22.98 KG/M2 | SYSTOLIC BLOOD PRESSURE: 122 MMHG | WEIGHT: 143 LBS | HEIGHT: 66 IN | DIASTOLIC BLOOD PRESSURE: 72 MMHG

## 2021-12-02 DIAGNOSIS — R97.20 ELEVATED PSA: ICD-10-CM

## 2021-12-02 DIAGNOSIS — R97.20 ELEVATED PSA: Primary | ICD-10-CM

## 2021-12-02 LAB — PSA SERPL-MCNC: 3.2 NG/ML (ref 0–4)

## 2021-12-02 PROCEDURE — 3008F BODY MASS INDEX DOCD: CPT | Performed by: PHYSICIAN ASSISTANT

## 2021-12-02 PROCEDURE — 84153 ASSAY OF PSA TOTAL: CPT

## 2021-12-02 PROCEDURE — 36415 COLL VENOUS BLD VENIPUNCTURE: CPT

## 2021-12-02 PROCEDURE — 99213 OFFICE O/P EST LOW 20 MIN: CPT | Performed by: PHYSICIAN ASSISTANT

## 2021-12-02 PROCEDURE — 4004F PT TOBACCO SCREEN RCVD TLK: CPT | Performed by: PHYSICIAN ASSISTANT

## 2021-12-06 LAB — COLOGUARD RESULT REPORTABLE: POSITIVE

## 2021-12-08 DIAGNOSIS — R19.5 POSITIVE COLORECTAL CANCER SCREENING USING COLOGUARD TEST: Primary | ICD-10-CM

## 2022-01-01 ENCOUNTER — HOME CARE VISIT (OUTPATIENT)
Dept: HOME HEALTH SERVICES | Facility: HOME HEALTHCARE | Age: 57
End: 2022-01-01
Payer: COMMERCIAL

## 2022-01-01 ENCOUNTER — HOSPITAL ENCOUNTER (OUTPATIENT)
Dept: INFUSION CENTER | Facility: CLINIC | Age: 57
Discharge: HOME | End: 2022-06-29
Payer: COMMERCIAL

## 2022-01-01 ENCOUNTER — TRANSITIONAL CARE MANAGEMENT (OUTPATIENT)
Dept: FAMILY MEDICINE CLINIC | Facility: CLINIC | Age: 57
End: 2022-01-01

## 2022-01-01 ENCOUNTER — APPOINTMENT (EMERGENCY)
Dept: RADIOLOGY | Facility: HOSPITAL | Age: 57
DRG: 177 | End: 2022-01-01
Payer: COMMERCIAL

## 2022-01-01 ENCOUNTER — TELEPHONE (OUTPATIENT)
Dept: SURGICAL ONCOLOGY | Facility: CLINIC | Age: 57
End: 2022-01-01

## 2022-01-01 ENCOUNTER — APPOINTMENT (EMERGENCY)
Dept: RADIOLOGY | Facility: HOSPITAL | Age: 57
End: 2022-01-01
Payer: COMMERCIAL

## 2022-01-01 ENCOUNTER — OFFICE VISIT (OUTPATIENT)
Dept: FAMILY MEDICINE CLINIC | Facility: CLINIC | Age: 57
End: 2022-01-01
Payer: COMMERCIAL

## 2022-01-01 ENCOUNTER — OFFICE VISIT (OUTPATIENT)
Dept: HEMATOLOGY ONCOLOGY | Facility: CLINIC | Age: 57
End: 2022-01-01
Payer: COMMERCIAL

## 2022-01-01 ENCOUNTER — APPOINTMENT (OUTPATIENT)
Dept: RADIATION ONCOLOGY | Facility: HOSPITAL | Age: 57
End: 2022-01-01
Attending: RADIOLOGY
Payer: COMMERCIAL

## 2022-01-01 ENCOUNTER — HOSPITAL ENCOUNTER (EMERGENCY)
Facility: HOSPITAL | Age: 57
End: 2022-08-10
Attending: EMERGENCY MEDICINE
Payer: COMMERCIAL

## 2022-01-01 ENCOUNTER — HOSPITAL ENCOUNTER (INPATIENT)
Facility: HOSPITAL | Age: 57
LOS: 2 days | Discharge: HOME | DRG: 177 | End: 2022-07-27
Attending: EMERGENCY MEDICINE | Admitting: INTERNAL MEDICINE
Payer: COMMERCIAL

## 2022-01-01 ENCOUNTER — HOSPITAL ENCOUNTER (OUTPATIENT)
Dept: INFUSION CENTER | Facility: CLINIC | Age: 57
Discharge: HOME | DRG: 177 | End: 2022-07-22
Payer: COMMERCIAL

## 2022-01-01 ENCOUNTER — TELEPHONE (OUTPATIENT)
Dept: HEMATOLOGY ONCOLOGY | Facility: MEDICAL CENTER | Age: 57
End: 2022-01-01

## 2022-01-01 ENCOUNTER — APPOINTMENT (OUTPATIENT)
Dept: LAB | Facility: CLINIC | Age: 57
End: 2022-01-01
Payer: COMMERCIAL

## 2022-01-01 ENCOUNTER — HOME CARE VISIT (OUTPATIENT)
Dept: HOME HOSPICE | Facility: HOSPICE | Age: 57
End: 2022-01-01
Payer: COMMERCIAL

## 2022-01-01 ENCOUNTER — APPOINTMENT (EMERGENCY)
Dept: CT IMAGING | Facility: HOSPITAL | Age: 57
DRG: 871 | End: 2022-01-01
Payer: COMMERCIAL

## 2022-01-01 ENCOUNTER — TELEPHONE (OUTPATIENT)
Dept: HEMATOLOGY ONCOLOGY | Facility: CLINIC | Age: 57
End: 2022-01-01

## 2022-01-01 ENCOUNTER — OFFICE VISIT (OUTPATIENT)
Dept: PALLIATIVE MEDICINE | Facility: CLINIC | Age: 57
End: 2022-01-01
Payer: COMMERCIAL

## 2022-01-01 ENCOUNTER — HOSPITAL ENCOUNTER (OUTPATIENT)
Dept: RADIOLOGY | Facility: HOSPITAL | Age: 57
Discharge: HOME | End: 2022-06-30
Attending: FAMILY MEDICINE
Payer: COMMERCIAL

## 2022-01-01 ENCOUNTER — NUTRITION (OUTPATIENT)
Dept: NUTRITION | Facility: CLINIC | Age: 57
End: 2022-01-01

## 2022-01-01 ENCOUNTER — HOSPICE ADMISSION (OUTPATIENT)
Dept: HOME HOSPICE | Facility: HOSPICE | Age: 57
End: 2022-01-01
Payer: COMMERCIAL

## 2022-01-01 ENCOUNTER — HOSPITAL ENCOUNTER (OUTPATIENT)
Dept: INFUSION CENTER | Facility: CLINIC | Age: 57
Discharge: HOME | End: 2022-06-30
Payer: COMMERCIAL

## 2022-01-01 ENCOUNTER — HOSPITAL ENCOUNTER (OUTPATIENT)
Dept: INFUSION CENTER | Facility: CLINIC | Age: 57
Discharge: HOME | End: 2022-07-21
Payer: COMMERCIAL

## 2022-01-01 ENCOUNTER — HOSPITAL ENCOUNTER (INPATIENT)
Facility: HOSPITAL | Age: 57
LOS: 2 days | Discharge: HOME | DRG: 871 | End: 2022-08-03
Attending: EMERGENCY MEDICINE | Admitting: INTERNAL MEDICINE
Payer: COMMERCIAL

## 2022-01-01 ENCOUNTER — HOSPITAL ENCOUNTER (INPATIENT)
Facility: HOSPITAL | Age: 57
LOS: 3 days | Discharge: HOSPICE | DRG: 377 | End: 2022-08-13
Attending: INTERNAL MEDICINE | Admitting: INTERNAL MEDICINE
Payer: COMMERCIAL

## 2022-01-01 ENCOUNTER — APPOINTMENT (EMERGENCY)
Dept: RADIOLOGY | Facility: HOSPITAL | Age: 57
DRG: 871 | End: 2022-01-01
Payer: COMMERCIAL

## 2022-01-01 ENCOUNTER — TELEPHONE (OUTPATIENT)
Dept: FAMILY MEDICINE CLINIC | Facility: CLINIC | Age: 57
End: 2022-01-01

## 2022-01-01 ENCOUNTER — APPOINTMENT (EMERGENCY)
Dept: CT IMAGING | Facility: HOSPITAL | Age: 57
End: 2022-01-01
Payer: COMMERCIAL

## 2022-01-01 VITALS
WEIGHT: 142 LBS | BODY MASS INDEX: 22.24 KG/M2 | TEMPERATURE: 97.3 F | OXYGEN SATURATION: 96 % | DIASTOLIC BLOOD PRESSURE: 70 MMHG | SYSTOLIC BLOOD PRESSURE: 120 MMHG | HEART RATE: 93 BPM

## 2022-01-01 VITALS
HEART RATE: 69 BPM | TEMPERATURE: 97.5 F | WEIGHT: 136 LBS | OXYGEN SATURATION: 93 % | BODY MASS INDEX: 21.35 KG/M2 | RESPIRATION RATE: 20 BRPM | DIASTOLIC BLOOD PRESSURE: 78 MMHG | SYSTOLIC BLOOD PRESSURE: 114 MMHG | HEIGHT: 67 IN

## 2022-01-01 VITALS
DIASTOLIC BLOOD PRESSURE: 64 MMHG | HEART RATE: 114 BPM | RESPIRATION RATE: 18 BRPM | SYSTOLIC BLOOD PRESSURE: 90 MMHG | TEMPERATURE: 99.1 F

## 2022-01-01 VITALS
WEIGHT: 137.6 LBS | OXYGEN SATURATION: 97 % | BODY MASS INDEX: 21.6 KG/M2 | HEART RATE: 72 BPM | SYSTOLIC BLOOD PRESSURE: 110 MMHG | RESPIRATION RATE: 16 BRPM | HEIGHT: 67 IN | DIASTOLIC BLOOD PRESSURE: 70 MMHG | TEMPERATURE: 96.9 F

## 2022-01-01 VITALS
BODY MASS INDEX: 22.76 KG/M2 | WEIGHT: 145 LBS | OXYGEN SATURATION: 97 % | TEMPERATURE: 98.3 F | SYSTOLIC BLOOD PRESSURE: 115 MMHG | HEIGHT: 67 IN | DIASTOLIC BLOOD PRESSURE: 75 MMHG | HEART RATE: 70 BPM | RESPIRATION RATE: 16 BRPM

## 2022-01-01 VITALS
OXYGEN SATURATION: 99 % | SYSTOLIC BLOOD PRESSURE: 120 MMHG | HEART RATE: 86 BPM | DIASTOLIC BLOOD PRESSURE: 78 MMHG | BODY MASS INDEX: 21.45 KG/M2 | WEIGHT: 136.69 LBS | RESPIRATION RATE: 18 BRPM | TEMPERATURE: 97.9 F | HEIGHT: 67 IN

## 2022-01-01 VITALS
TEMPERATURE: 98 F | HEART RATE: 78 BPM | HEIGHT: 67 IN | BODY MASS INDEX: 22.18 KG/M2 | SYSTOLIC BLOOD PRESSURE: 98 MMHG | DIASTOLIC BLOOD PRESSURE: 64 MMHG | RESPIRATION RATE: 16 BRPM | WEIGHT: 141.31 LBS | OXYGEN SATURATION: 95 %

## 2022-01-01 VITALS
BODY MASS INDEX: 21.38 KG/M2 | OXYGEN SATURATION: 98 % | HEART RATE: 90 BPM | WEIGHT: 136.2 LBS | SYSTOLIC BLOOD PRESSURE: 110 MMHG | RESPIRATION RATE: 16 BRPM | HEIGHT: 67 IN | DIASTOLIC BLOOD PRESSURE: 80 MMHG | TEMPERATURE: 97.8 F

## 2022-01-01 VITALS
TEMPERATURE: 96.4 F | WEIGHT: 138 LBS | RESPIRATION RATE: 16 BRPM | BODY MASS INDEX: 21.66 KG/M2 | HEART RATE: 63 BPM | DIASTOLIC BLOOD PRESSURE: 70 MMHG | HEIGHT: 67 IN | SYSTOLIC BLOOD PRESSURE: 120 MMHG | OXYGEN SATURATION: 97 %

## 2022-01-01 VITALS
BODY MASS INDEX: 22.79 KG/M2 | HEART RATE: 80 BPM | DIASTOLIC BLOOD PRESSURE: 77 MMHG | TEMPERATURE: 98.4 F | WEIGHT: 145.5 LBS | SYSTOLIC BLOOD PRESSURE: 121 MMHG | OXYGEN SATURATION: 96 % | RESPIRATION RATE: 17 BRPM

## 2022-01-01 VITALS
RESPIRATION RATE: 16 BRPM | OXYGEN SATURATION: 96 % | HEART RATE: 70 BPM | DIASTOLIC BLOOD PRESSURE: 62 MMHG | WEIGHT: 139 LBS | BODY MASS INDEX: 21.82 KG/M2 | HEIGHT: 67 IN | SYSTOLIC BLOOD PRESSURE: 112 MMHG | TEMPERATURE: 97.2 F

## 2022-01-01 VITALS
OXYGEN SATURATION: 95 % | SYSTOLIC BLOOD PRESSURE: 136 MMHG | HEART RATE: 90 BPM | RESPIRATION RATE: 18 BRPM | TEMPERATURE: 98.8 F | DIASTOLIC BLOOD PRESSURE: 88 MMHG

## 2022-01-01 VITALS
DIASTOLIC BLOOD PRESSURE: 74 MMHG | TEMPERATURE: 97.2 F | RESPIRATION RATE: 18 BRPM | OXYGEN SATURATION: 98 % | HEART RATE: 83 BPM | SYSTOLIC BLOOD PRESSURE: 117 MMHG

## 2022-01-01 DIAGNOSIS — R42 DIZZINESS: ICD-10-CM

## 2022-01-01 DIAGNOSIS — C34.90 LUNG CANCER METASTATIC TO BRAIN (HCC): ICD-10-CM

## 2022-01-01 DIAGNOSIS — F51.02 ADJUSTMENT INSOMNIA: ICD-10-CM

## 2022-01-01 DIAGNOSIS — C79.31 LUNG CANCER METASTATIC TO BRAIN (HCC): ICD-10-CM

## 2022-01-01 DIAGNOSIS — I82.C11 INTERNAL JUGULAR (IJ) VEIN THROMBOEMBOLISM, ACUTE, RIGHT (HCC): Primary | ICD-10-CM

## 2022-01-01 DIAGNOSIS — J15.0 PNEUMONIA DUE TO KLEBSIELLA PNEUMONIAE, UNSPECIFIED LATERALITY, UNSPECIFIED PART OF LUNG (HCC): ICD-10-CM

## 2022-01-01 DIAGNOSIS — C34.90 ADENOCARCINOMA OF LUNG, UNSPECIFIED LATERALITY (HCC): ICD-10-CM

## 2022-01-01 DIAGNOSIS — C34.91 ADENOCARCINOMA OF RIGHT LUNG (HCC): Primary | ICD-10-CM

## 2022-01-01 DIAGNOSIS — I82.409 DVT (DEEP VENOUS THROMBOSIS) (HCC): ICD-10-CM

## 2022-01-01 DIAGNOSIS — C34.91 ADENOCARCINOMA OF RIGHT LUNG (HCC): ICD-10-CM

## 2022-01-01 DIAGNOSIS — K92.2 GI BLEED: ICD-10-CM

## 2022-01-01 DIAGNOSIS — K92.0 HEMATEMESIS, UNSPECIFIED WHETHER NAUSEA PRESENT: Primary | ICD-10-CM

## 2022-01-01 DIAGNOSIS — R65.10 SIRS (SYSTEMIC INFLAMMATORY RESPONSE SYNDROME) (HCC): ICD-10-CM

## 2022-01-01 DIAGNOSIS — K59.03 THERAPEUTIC OPIOID-INDUCED CONSTIPATION (OIC): ICD-10-CM

## 2022-01-01 DIAGNOSIS — J18.9 PNEUMONIA DUE TO INFECTIOUS ORGANISM, UNSPECIFIED LATERALITY, UNSPECIFIED PART OF LUNG: ICD-10-CM

## 2022-01-01 DIAGNOSIS — T45.1X5A ANEMIA DUE TO ANTINEOPLASTIC CHEMOTHERAPY: ICD-10-CM

## 2022-01-01 DIAGNOSIS — C79.31 METASTASIS TO BRAIN (HCC): Primary | ICD-10-CM

## 2022-01-01 DIAGNOSIS — C78.7 LIVER METASTASES (HCC): ICD-10-CM

## 2022-01-01 DIAGNOSIS — Z51.5 PALLIATIVE CARE PATIENT: ICD-10-CM

## 2022-01-01 DIAGNOSIS — J18.9 PNEUMONIA OF RIGHT UPPER LOBE DUE TO INFECTIOUS ORGANISM: ICD-10-CM

## 2022-01-01 DIAGNOSIS — Z79.4 TYPE 2 DIABETES MELLITUS WITHOUT COMPLICATION, WITH LONG-TERM CURRENT USE OF INSULIN (HCC): Primary | ICD-10-CM

## 2022-01-01 DIAGNOSIS — F11.20 CONTINUOUS OPIOID DEPENDENCE (HCC): ICD-10-CM

## 2022-01-01 DIAGNOSIS — E53.8 CYANOCOBALAMIN DEFICIENCY: ICD-10-CM

## 2022-01-01 DIAGNOSIS — E11.9 TYPE 2 DIABETES MELLITUS WITHOUT COMPLICATION, WITH LONG-TERM CURRENT USE OF INSULIN (HCC): Primary | ICD-10-CM

## 2022-01-01 DIAGNOSIS — G89.3 CANCER ASSOCIATED PAIN: ICD-10-CM

## 2022-01-01 DIAGNOSIS — T45.1X5A CHEMOTHERAPY INDUCED NEUTROPENIA (HCC): ICD-10-CM

## 2022-01-01 DIAGNOSIS — A41.9 SEPSIS WITHOUT ACUTE ORGAN DYSFUNCTION, DUE TO UNSPECIFIED ORGANISM (HCC): ICD-10-CM

## 2022-01-01 DIAGNOSIS — T40.2X5A THERAPEUTIC OPIOID-INDUCED CONSTIPATION (OIC): ICD-10-CM

## 2022-01-01 DIAGNOSIS — J44.9 CHRONIC OBSTRUCTIVE PULMONARY DISEASE, UNSPECIFIED COPD TYPE (HCC): ICD-10-CM

## 2022-01-01 DIAGNOSIS — D70.1 CHEMOTHERAPY INDUCED NEUTROPENIA (HCC): ICD-10-CM

## 2022-01-01 DIAGNOSIS — E87.1 HYPONATREMIA: ICD-10-CM

## 2022-01-01 DIAGNOSIS — G93.6 CEREBRAL EDEMA (HCC): ICD-10-CM

## 2022-01-01 DIAGNOSIS — Z51.5 HOSPICE CARE: ICD-10-CM

## 2022-01-01 DIAGNOSIS — D64.81 ANEMIA DUE TO ANTINEOPLASTIC CHEMOTHERAPY: ICD-10-CM

## 2022-01-01 DIAGNOSIS — C79.51 BONE METASTASES (HCC): ICD-10-CM

## 2022-01-01 DIAGNOSIS — Z71.3 NUTRITIONAL COUNSELING: Primary | ICD-10-CM

## 2022-01-01 DIAGNOSIS — T45.1X5A ANEMIA DUE TO ANTINEOPLASTIC CHEMOTHERAPY: Chronic | ICD-10-CM

## 2022-01-01 DIAGNOSIS — C79.31 BRAIN METASTASIS (HCC): ICD-10-CM

## 2022-01-01 DIAGNOSIS — G93.89 BRAIN MASS: ICD-10-CM

## 2022-01-01 DIAGNOSIS — D64.81 ANEMIA DUE TO ANTINEOPLASTIC CHEMOTHERAPY: Chronic | ICD-10-CM

## 2022-01-01 DIAGNOSIS — J18.9 PNEUMONIA: Primary | ICD-10-CM

## 2022-01-01 DIAGNOSIS — D72.829 LEUKOCYTOSIS: ICD-10-CM

## 2022-01-01 DIAGNOSIS — R09.3 EXCESSIVE SPUTUM: ICD-10-CM

## 2022-01-01 DIAGNOSIS — G93.89 BRAIN MASS: Primary | ICD-10-CM

## 2022-01-01 DIAGNOSIS — I82.C11 INTERNAL JUGULAR (IJ) VEIN THROMBOEMBOLISM, ACUTE, RIGHT (HCC): ICD-10-CM

## 2022-01-01 DIAGNOSIS — C79.31 METASTASIS TO BRAIN (HCC): ICD-10-CM

## 2022-01-01 LAB
2HR DELTA HS TROPONIN: -1 NG/L
2HR DELTA HS TROPONIN: -1 NG/L
2HR DELTA HS TROPONIN: 0 NG/L
4HR DELTA HS TROPONIN: -2 NG/L
4HR DELTA HS TROPONIN: 3 NG/L
ABO GROUP BLD: NORMAL
ABO GROUP BLD: NORMAL
ALBUMIN SERPL BCP-MCNC: 3.3 G/DL (ref 3.5–5)
ALBUMIN SERPL BCP-MCNC: 3.4 G/DL (ref 3.5–5)
ALBUMIN SERPL BCP-MCNC: 3.8 G/DL (ref 3.5–5)
ALP SERPL-CCNC: 136 U/L (ref 34–104)
ALP SERPL-CCNC: 149 U/L (ref 34–104)
ALP SERPL-CCNC: 215 U/L (ref 34–104)
ALP SERPL-CCNC: 231 U/L (ref 34–104)
ALP SERPL-CCNC: 95 U/L (ref 34–104)
ALT SERPL W P-5'-P-CCNC: 10 U/L (ref 7–52)
ALT SERPL W P-5'-P-CCNC: 10 U/L (ref 7–52)
ALT SERPL W P-5'-P-CCNC: 11 U/L (ref 7–52)
ALT SERPL W P-5'-P-CCNC: 11 U/L (ref 7–52)
ALT SERPL W P-5'-P-CCNC: 12 U/L (ref 7–52)
ANION GAP SERPL CALCULATED.3IONS-SCNC: 10 MMOL/L (ref 4–13)
ANION GAP SERPL CALCULATED.3IONS-SCNC: 14 MMOL/L (ref 4–13)
ANION GAP SERPL CALCULATED.3IONS-SCNC: 6 MMOL/L (ref 4–13)
ANION GAP SERPL CALCULATED.3IONS-SCNC: 7 MMOL/L (ref 4–13)
ANION GAP SERPL CALCULATED.3IONS-SCNC: 7 MMOL/L (ref 4–13)
ANION GAP SERPL CALCULATED.3IONS-SCNC: 8 MMOL/L (ref 4–13)
ANION GAP SERPL CALCULATED.3IONS-SCNC: 9 MMOL/L (ref 4–13)
ANISOCYTOSIS BLD QL SMEAR: PRESENT
APTT PPP: 136 SECONDS (ref 23–37)
APTT PPP: 32 SECONDS (ref 23–37)
APTT PPP: 35 SECONDS (ref 23–37)
APTT PPP: 36 SECONDS (ref 23–37)
APTT PPP: 61 SECONDS (ref 23–37)
APTT PPP: 89 SECONDS (ref 23–37)
APTT PPP: 89 SECONDS (ref 23–37)
AST SERPL W P-5'-P-CCNC: 12 U/L (ref 13–39)
AST SERPL W P-5'-P-CCNC: 12 U/L (ref 13–39)
AST SERPL W P-5'-P-CCNC: 13 U/L (ref 13–39)
AST SERPL W P-5'-P-CCNC: 14 U/L (ref 13–39)
AST SERPL W P-5'-P-CCNC: 19 U/L (ref 13–39)
ATRIAL RATE: 75 BPM
ATRIAL RATE: 89 BPM
ATRIAL RATE: 92 BPM
BACTERIA BLD CULT: NORMAL
BACTERIA SPT RESP CULT: ABNORMAL
BACTERIA UR CULT: NORMAL
BASOPHILS # BLD AUTO: 0.08 THOUSANDS/ΜL (ref 0–0.1)
BASOPHILS # BLD AUTO: 0.15 THOUSANDS/ΜL (ref 0–0.1)
BASOPHILS # BLD MANUAL: 0 THOUSAND/UL (ref 0–0.1)
BASOPHILS NFR BLD AUTO: 0 % (ref 0–1)
BASOPHILS NFR BLD AUTO: 0 % (ref 0–1)
BASOPHILS NFR MAR MANUAL: 0 % (ref 0–1)
BILIRUB SERPL-MCNC: 0.31 MG/DL (ref 0.2–1)
BILIRUB SERPL-MCNC: 0.33 MG/DL (ref 0.2–1)
BILIRUB SERPL-MCNC: 0.44 MG/DL (ref 0.2–1)
BILIRUB SERPL-MCNC: 0.5 MG/DL (ref 0.2–1)
BILIRUB SERPL-MCNC: 0.65 MG/DL (ref 0.2–1)
BILIRUB UR QL STRIP: NEGATIVE
BILIRUB UR QL STRIP: NEGATIVE
BLD GP AB SCN SERPL QL: NEGATIVE
BLD GP AB SCN SERPL QL: NEGATIVE
BNP SERPL-MCNC: 139 PG/ML (ref 0–100)
BUN SERPL-MCNC: 12 MG/DL (ref 5–25)
BUN SERPL-MCNC: 13 MG/DL (ref 5–25)
BUN SERPL-MCNC: 14 MG/DL (ref 5–25)
BUN SERPL-MCNC: 16 MG/DL (ref 5–25)
BUN SERPL-MCNC: 20 MG/DL (ref 5–25)
BUN SERPL-MCNC: 23 MG/DL (ref 5–25)
BUN SERPL-MCNC: 24 MG/DL (ref 5–25)
BUN SERPL-MCNC: 24 MG/DL (ref 5–25)
BUN SERPL-MCNC: 25 MG/DL (ref 5–25)
BUN SERPL-MCNC: 36 MG/DL (ref 5–25)
CA-I BLD-SCNC: 1.15 MMOL/L (ref 1.12–1.32)
CALCIUM ALBUM COR SERPL-MCNC: 10.2 MG/DL (ref 8.3–10.1)
CALCIUM ALBUM COR SERPL-MCNC: 9.9 MG/DL (ref 8.3–10.1)
CALCIUM SERPL-MCNC: 9.3 MG/DL (ref 8.3–10.1)
CALCIUM SERPL-MCNC: 9.3 MG/DL (ref 8.4–10.2)
CALCIUM SERPL-MCNC: 9.3 MG/DL (ref 8.4–10.2)
CALCIUM SERPL-MCNC: 9.4 MG/DL (ref 8.4–10.2)
CALCIUM SERPL-MCNC: 9.6 MG/DL (ref 8.4–10.2)
CALCIUM SERPL-MCNC: 9.7 MG/DL (ref 8.4–10.2)
CALCIUM SERPL-MCNC: 9.7 MG/DL (ref 8.4–10.2)
CALCIUM SERPL-MCNC: 9.8 MG/DL (ref 8.4–10.2)
CARDIAC TROPONIN I PNL SERPL HS: 10 NG/L
CARDIAC TROPONIN I PNL SERPL HS: 10 NG/L
CARDIAC TROPONIN I PNL SERPL HS: 11 NG/L
CARDIAC TROPONIN I PNL SERPL HS: 13 NG/L
CARDIAC TROPONIN I PNL SERPL HS: 13 NG/L
CARDIAC TROPONIN I PNL SERPL HS: 6 NG/L
CARDIAC TROPONIN I PNL SERPL HS: 7 NG/L
CARDIAC TROPONIN I PNL SERPL HS: 9 NG/L
CHLORIDE SERPL-SCNC: 100 MMOL/L (ref 96–108)
CHLORIDE SERPL-SCNC: 93 MMOL/L (ref 96–108)
CHLORIDE SERPL-SCNC: 95 MMOL/L (ref 96–108)
CHLORIDE SERPL-SCNC: 96 MMOL/L (ref 96–108)
CHLORIDE SERPL-SCNC: 97 MMOL/L (ref 96–108)
CHLORIDE SERPL-SCNC: 98 MMOL/L (ref 96–108)
CHLORIDE SERPL-SCNC: 99 MMOL/L (ref 96–108)
CLARITY UR: CLEAR
CLARITY UR: CLEAR
CO2 SERPL-SCNC: 21 MMOL/L (ref 21–32)
CO2 SERPL-SCNC: 25 MMOL/L (ref 21–32)
CO2 SERPL-SCNC: 27 MMOL/L (ref 21–32)
CO2 SERPL-SCNC: 28 MMOL/L (ref 21–32)
CO2 SERPL-SCNC: 29 MMOL/L (ref 21–32)
CO2 SERPL-SCNC: 29 MMOL/L (ref 21–32)
CO2 SERPL-SCNC: 30 MMOL/L (ref 21–32)
CO2 SERPL-SCNC: 31 MMOL/L (ref 21–32)
COLOR UR: ABNORMAL
COLOR UR: NORMAL
CREAT SERPL-MCNC: 0.69 MG/DL (ref 0.6–1.3)
CREAT SERPL-MCNC: 0.73 MG/DL (ref 0.6–1.3)
CREAT SERPL-MCNC: 0.77 MG/DL (ref 0.6–1.3)
CREAT SERPL-MCNC: 0.82 MG/DL (ref 0.6–1.3)
CREAT SERPL-MCNC: 0.85 MG/DL (ref 0.6–1.3)
CREAT SERPL-MCNC: 0.86 MG/DL (ref 0.6–1.3)
CREAT SERPL-MCNC: 0.87 MG/DL (ref 0.6–1.3)
CREAT SERPL-MCNC: 0.92 MG/DL (ref 0.6–1.3)
CREAT SERPL-MCNC: 0.94 MG/DL (ref 0.6–1.3)
CREAT SERPL-MCNC: 1.32 MG/DL (ref 0.6–1.3)
CREAT UR-MCNC: 59.7 MG/DL
CREAT UR-MCNC: 61.9 MG/DL
EOSINOPHIL # BLD AUTO: 0.06 THOUSAND/ΜL (ref 0–0.61)
EOSINOPHIL # BLD AUTO: 0.15 THOUSAND/ΜL (ref 0–0.61)
EOSINOPHIL # BLD MANUAL: 0 THOUSAND/UL (ref 0–0.4)
EOSINOPHIL # BLD MANUAL: 0.41 THOUSAND/UL (ref 0–0.4)
EOSINOPHIL NFR BLD AUTO: 0 % (ref 0–6)
EOSINOPHIL NFR BLD AUTO: 0 % (ref 0–6)
EOSINOPHIL NFR BLD MANUAL: 0 % (ref 0–6)
EOSINOPHIL NFR BLD MANUAL: 1 % (ref 0–6)
ERYTHROCYTE [DISTWIDTH] IN BLOOD BY AUTOMATED COUNT: 15.2 % (ref 11.6–15.1)
ERYTHROCYTE [DISTWIDTH] IN BLOOD BY AUTOMATED COUNT: 15.4 % (ref 11.6–15.1)
ERYTHROCYTE [DISTWIDTH] IN BLOOD BY AUTOMATED COUNT: 15.6 % (ref 11.6–15.1)
ERYTHROCYTE [DISTWIDTH] IN BLOOD BY AUTOMATED COUNT: 15.7 % (ref 11.6–15.1)
ERYTHROCYTE [DISTWIDTH] IN BLOOD BY AUTOMATED COUNT: 16 % (ref 11.6–15.1)
ERYTHROCYTE [DISTWIDTH] IN BLOOD BY AUTOMATED COUNT: 16.7 % (ref 11.6–15.1)
FLUAV RNA RESP QL NAA+PROBE: NEGATIVE
FLUBV RNA RESP QL NAA+PROBE: NEGATIVE
GFR SERPL CREATININE-BSD FRML MDRD: 100 ML/MIN/1.73SQ M
GFR SERPL CREATININE-BSD FRML MDRD: 102 ML/MIN/1.73SQ M
GFR SERPL CREATININE-BSD FRML MDRD: 105 ML/MIN/1.73SQ M
GFR SERPL CREATININE-BSD FRML MDRD: 59 ML/MIN/1.73SQ M
GFR SERPL CREATININE-BSD FRML MDRD: 89 ML/MIN/1.73SQ M
GFR SERPL CREATININE-BSD FRML MDRD: 91 ML/MIN/1.73SQ M
GFR SERPL CREATININE-BSD FRML MDRD: 95 ML/MIN/1.73SQ M
GFR SERPL CREATININE-BSD FRML MDRD: 96 ML/MIN/1.73SQ M
GFR SERPL CREATININE-BSD FRML MDRD: 96 ML/MIN/1.73SQ M
GFR SERPL CREATININE-BSD FRML MDRD: 98 ML/MIN/1.73SQ M
GLUCOSE P FAST SERPL-MCNC: 101 MG/DL (ref 65–99)
GLUCOSE SERPL-MCNC: 100 MG/DL (ref 65–140)
GLUCOSE SERPL-MCNC: 101 MG/DL (ref 65–140)
GLUCOSE SERPL-MCNC: 102 MG/DL (ref 65–140)
GLUCOSE SERPL-MCNC: 110 MG/DL (ref 65–140)
GLUCOSE SERPL-MCNC: 111 MG/DL (ref 65–140)
GLUCOSE SERPL-MCNC: 113 MG/DL (ref 65–140)
GLUCOSE SERPL-MCNC: 114 MG/DL (ref 65–140)
GLUCOSE SERPL-MCNC: 120 MG/DL (ref 65–140)
GLUCOSE SERPL-MCNC: 120 MG/DL (ref 65–140)
GLUCOSE SERPL-MCNC: 127 MG/DL (ref 65–140)
GLUCOSE SERPL-MCNC: 133 MG/DL (ref 65–140)
GLUCOSE SERPL-MCNC: 134 MG/DL (ref 65–140)
GLUCOSE SERPL-MCNC: 136 MG/DL (ref 65–140)
GLUCOSE SERPL-MCNC: 138 MG/DL (ref 65–140)
GLUCOSE SERPL-MCNC: 148 MG/DL (ref 65–140)
GLUCOSE SERPL-MCNC: 155 MG/DL (ref 65–140)
GLUCOSE SERPL-MCNC: 160 MG/DL (ref 65–140)
GLUCOSE SERPL-MCNC: 161 MG/DL (ref 65–140)
GLUCOSE SERPL-MCNC: 213 MG/DL (ref 65–140)
GLUCOSE SERPL-MCNC: 82 MG/DL (ref 65–140)
GLUCOSE SERPL-MCNC: 83 MG/DL (ref 65–140)
GLUCOSE SERPL-MCNC: 86 MG/DL (ref 65–140)
GLUCOSE SERPL-MCNC: 90 MG/DL (ref 65–140)
GLUCOSE SERPL-MCNC: 98 MG/DL (ref 65–140)
GLUCOSE SERPL-MCNC: 99 MG/DL (ref 65–140)
GLUCOSE UR STRIP-MCNC: NEGATIVE MG/DL
GLUCOSE UR STRIP-MCNC: NEGATIVE MG/DL
GRAM STN SPEC: ABNORMAL
HCT VFR BLD AUTO: 26.7 % (ref 36.5–49.3)
HCT VFR BLD AUTO: 26.8 % (ref 36.5–49.3)
HCT VFR BLD AUTO: 28.4 % (ref 36.5–49.3)
HCT VFR BLD AUTO: 29.9 % (ref 36.5–49.3)
HCT VFR BLD AUTO: 30 % (ref 36.5–49.3)
HCT VFR BLD AUTO: 31.3 % (ref 36.5–49.3)
HCT VFR BLD AUTO: 31.5 % (ref 36.5–49.3)
HCT VFR BLD AUTO: 31.6 % (ref 36.5–49.3)
HCT VFR BLD AUTO: 31.8 % (ref 36.5–49.3)
HCT VFR BLD AUTO: 32.3 % (ref 36.5–49.3)
HCT VFR BLD AUTO: 34.4 % (ref 36.5–49.3)
HGB BLD-MCNC: 10.1 G/DL (ref 12–17)
HGB BLD-MCNC: 10.3 G/DL (ref 12–17)
HGB BLD-MCNC: 10.7 G/DL (ref 12–17)
HGB BLD-MCNC: 11.1 G/DL (ref 12–17)
HGB BLD-MCNC: 8.5 G/DL (ref 12–17)
HGB BLD-MCNC: 8.6 G/DL (ref 12–17)
HGB BLD-MCNC: 9.2 G/DL (ref 12–17)
HGB BLD-MCNC: 9.6 G/DL (ref 12–17)
HGB BLD-MCNC: 9.7 G/DL (ref 12–17)
HGB UR QL STRIP.AUTO: NEGATIVE
HGB UR QL STRIP.AUTO: NEGATIVE
IMM GRANULOCYTES # BLD AUTO: 0.4 THOUSAND/UL (ref 0–0.2)
IMM GRANULOCYTES # BLD AUTO: >0.5 THOUSAND/UL (ref 0–0.2)
IMM GRANULOCYTES NFR BLD AUTO: 10 % (ref 0–2)
IMM GRANULOCYTES NFR BLD AUTO: 2 % (ref 0–2)
INR PPP: 1.04 (ref 0.84–1.19)
INR PPP: 1.26 (ref 0.84–1.19)
INR PPP: 1.29 (ref 0.84–1.19)
INR PPP: 1.66 (ref 0.84–1.19)
KETONES UR STRIP-MCNC: ABNORMAL MG/DL
KETONES UR STRIP-MCNC: NEGATIVE MG/DL
L PNEUMO1 AG UR QL IA.RAPID: NEGATIVE
LACTATE SERPL-SCNC: 1 MMOL/L (ref 0.5–2)
LACTATE SERPL-SCNC: 1.2 MMOL/L (ref 0.5–2)
LACTATE SERPL-SCNC: 1.4 MMOL/L (ref 0.5–2)
LEUKOCYTE ESTERASE UR QL STRIP: NEGATIVE
LEUKOCYTE ESTERASE UR QL STRIP: NEGATIVE
LIPASE SERPL-CCNC: <6 U/L (ref 11–82)
LYMPHOCYTES # BLD AUTO: 1.09 THOUSAND/UL (ref 0.6–4.47)
LYMPHOCYTES # BLD AUTO: 1.41 THOUSAND/UL (ref 0.6–4.47)
LYMPHOCYTES # BLD AUTO: 10 % (ref 14–44)
LYMPHOCYTES # BLD AUTO: 10 % (ref 14–44)
LYMPHOCYTES # BLD AUTO: 18 % (ref 14–44)
LYMPHOCYTES # BLD AUTO: 2.13 THOUSAND/UL (ref 0.6–4.47)
LYMPHOCYTES # BLD AUTO: 2.27 THOUSANDS/ΜL (ref 0.6–4.47)
LYMPHOCYTES # BLD AUTO: 2.48 THOUSAND/UL (ref 0.6–4.47)
LYMPHOCYTES # BLD AUTO: 2.77 THOUSAND/UL (ref 0.6–4.47)
LYMPHOCYTES # BLD AUTO: 3.17 THOUSAND/UL (ref 0.6–4.47)
LYMPHOCYTES # BLD AUTO: 3.27 THOUSAND/UL (ref 0.6–4.47)
LYMPHOCYTES # BLD AUTO: 3.99 THOUSANDS/ΜL (ref 0.6–4.47)
LYMPHOCYTES # BLD AUTO: 4 % (ref 14–44)
LYMPHOCYTES # BLD AUTO: 5 % (ref 14–44)
LYMPHOCYTES # BLD AUTO: 8 % (ref 14–44)
LYMPHOCYTES # BLD AUTO: 9 % (ref 14–44)
LYMPHOCYTES NFR BLD AUTO: 12 % (ref 14–44)
LYMPHOCYTES NFR BLD AUTO: 9 % (ref 14–44)
MCH RBC QN AUTO: 30.8 PG (ref 26.8–34.3)
MCH RBC QN AUTO: 31.1 PG (ref 26.8–34.3)
MCH RBC QN AUTO: 31.3 PG (ref 26.8–34.3)
MCH RBC QN AUTO: 31.4 PG (ref 26.8–34.3)
MCH RBC QN AUTO: 31.7 PG (ref 26.8–34.3)
MCH RBC QN AUTO: 31.8 PG (ref 26.8–34.3)
MCH RBC QN AUTO: 32 PG (ref 26.8–34.3)
MCHC RBC AUTO-ENTMCNC: 31.8 G/DL (ref 31.4–37.4)
MCHC RBC AUTO-ENTMCNC: 31.8 G/DL (ref 31.4–37.4)
MCHC RBC AUTO-ENTMCNC: 32 G/DL (ref 31.4–37.4)
MCHC RBC AUTO-ENTMCNC: 32.1 G/DL (ref 31.4–37.4)
MCHC RBC AUTO-ENTMCNC: 32.3 G/DL (ref 31.4–37.4)
MCHC RBC AUTO-ENTMCNC: 32.4 G/DL (ref 31.4–37.4)
MCHC RBC AUTO-ENTMCNC: 32.7 G/DL (ref 31.4–37.4)
MCHC RBC AUTO-ENTMCNC: 33.1 G/DL (ref 31.4–37.4)
MCV RBC AUTO: 96 FL (ref 82–98)
MCV RBC AUTO: 97 FL (ref 82–98)
MCV RBC AUTO: 98 FL (ref 82–98)
MCV RBC AUTO: 98 FL (ref 82–98)
MCV RBC AUTO: 99 FL (ref 82–98)
METAMYELOCYTES NFR BLD MANUAL: 1 % (ref 0–1)
METAMYELOCYTES NFR BLD MANUAL: 4 % (ref 0–1)
METAMYELOCYTES NFR BLD MANUAL: 4 % (ref 0–1)
MICROCYTES BLD QL AUTO: PRESENT
MONOCYTES # BLD AUTO: 0 THOUSAND/UL (ref 0–1.22)
MONOCYTES # BLD AUTO: 0 THOUSAND/UL (ref 0–1.22)
MONOCYTES # BLD AUTO: 0.69 THOUSAND/UL (ref 0–1.22)
MONOCYTES # BLD AUTO: 1.54 THOUSAND/UL (ref 0–1.22)
MONOCYTES # BLD AUTO: 1.9 THOUSAND/UL (ref 0–1.22)
MONOCYTES # BLD AUTO: 2.24 THOUSAND/ΜL (ref 0.17–1.22)
MONOCYTES # BLD AUTO: 3.1 THOUSAND/UL (ref 0–1.22)
MONOCYTES # BLD AUTO: 3.33 THOUSAND/ΜL (ref 0.17–1.22)
MONOCYTES # BLD AUTO: 3.68 THOUSAND/UL (ref 0–1.22)
MONOCYTES NFR BLD AUTO: 10 % (ref 4–12)
MONOCYTES NFR BLD AUTO: 9 % (ref 4–12)
MONOCYTES NFR BLD: 0 % (ref 4–12)
MONOCYTES NFR BLD: 0 % (ref 4–12)
MONOCYTES NFR BLD: 11 % (ref 4–12)
MONOCYTES NFR BLD: 5 % (ref 4–12)
MONOCYTES NFR BLD: 5 % (ref 4–12)
MONOCYTES NFR BLD: 6 % (ref 4–12)
MONOCYTES NFR BLD: 9 % (ref 4–12)
MYELOCYTES NFR BLD MANUAL: 2 % (ref 0–1)
MYELOCYTES NFR BLD MANUAL: 3 % (ref 0–1)
MYELOCYTES NFR BLD MANUAL: 4 % (ref 0–1)
NEUTROPHILS # BLD AUTO: 19.46 THOUSANDS/ΜL (ref 1.85–7.62)
NEUTROPHILS # BLD AUTO: 23.46 THOUSANDS/ΜL (ref 1.85–7.62)
NEUTROPHILS # BLD MANUAL: 10.6 THOUSAND/UL (ref 1.85–7.62)
NEUTROPHILS # BLD MANUAL: 19.21 THOUSAND/UL (ref 1.85–7.62)
NEUTROPHILS # BLD MANUAL: 20.28 THOUSAND/UL (ref 1.85–7.62)
NEUTROPHILS # BLD MANUAL: 24.42 THOUSAND/UL (ref 1.85–7.62)
NEUTROPHILS # BLD MANUAL: 26.18 THOUSAND/UL (ref 1.85–7.62)
NEUTROPHILS # BLD MANUAL: 26.22 THOUSAND/UL (ref 1.85–7.62)
NEUTROPHILS # BLD MANUAL: 31.11 THOUSAND/UL (ref 1.85–7.62)
NEUTS BAND NFR BLD MANUAL: 11 % (ref 0–8)
NEUTS BAND NFR BLD MANUAL: 16 % (ref 0–8)
NEUTS BAND NFR BLD MANUAL: 3 % (ref 0–8)
NEUTS BAND NFR BLD MANUAL: 4 % (ref 0–8)
NEUTS BAND NFR BLD MANUAL: 4 % (ref 0–8)
NEUTS BAND NFR BLD MANUAL: 6 % (ref 0–8)
NEUTS BAND NFR BLD MANUAL: 9 % (ref 0–8)
NEUTS SEG NFR BLD AUTO: 61 % (ref 43–75)
NEUTS SEG NFR BLD AUTO: 65 % (ref 43–75)
NEUTS SEG NFR BLD AUTO: 68 % (ref 43–75)
NEUTS SEG NFR BLD AUTO: 69 % (ref 43–75)
NEUTS SEG NFR BLD AUTO: 71 % (ref 43–75)
NEUTS SEG NFR BLD AUTO: 80 % (ref 43–75)
NEUTS SEG NFR BLD AUTO: 81 % (ref 43–75)
NEUTS SEG NFR BLD AUTO: 86 % (ref 43–75)
NEUTS SEG NFR BLD AUTO: 87 % (ref 43–75)
NITRITE UR QL STRIP: NEGATIVE
NITRITE UR QL STRIP: NEGATIVE
NRBC BLD AUTO-RTO: 0 /100 WBCS
NRBC BLD AUTO-RTO: 0 /100 WBCS
NRBC BLD AUTO-RTO: 1 /100 WBC (ref 0–2)
P AXIS: 63 DEGREES
P AXIS: 63 DEGREES
P AXIS: 76 DEGREES
PH UR STRIP.AUTO: 5 [PH]
PH UR STRIP.AUTO: 7 [PH]
PHOSPHATE SERPL-MCNC: 4.2 MG/DL (ref 2.7–4.5)
PLATELET # BLD AUTO: 261 THOUSANDS/UL (ref 149–390)
PLATELET # BLD AUTO: 290 THOUSANDS/UL (ref 149–390)
PLATELET # BLD AUTO: 292 THOUSANDS/UL (ref 149–390)
PLATELET # BLD AUTO: 296 THOUSANDS/UL (ref 149–390)
PLATELET # BLD AUTO: 302 THOUSANDS/UL (ref 149–390)
PLATELET # BLD AUTO: 307 THOUSANDS/UL (ref 149–390)
PLATELET # BLD AUTO: 321 THOUSANDS/UL (ref 149–390)
PLATELET # BLD AUTO: 392 THOUSANDS/UL (ref 149–390)
PLATELET # BLD AUTO: 406 THOUSANDS/UL (ref 149–390)
PLATELET # BLD AUTO: 434 THOUSANDS/UL (ref 149–390)
PLATELET # BLD AUTO: 494 THOUSANDS/UL (ref 149–390)
PLATELET BLD QL SMEAR: ABNORMAL
PLATELET BLD QL SMEAR: ADEQUATE
PMV BLD AUTO: 7.8 FL (ref 8.9–12.7)
PMV BLD AUTO: 7.9 FL (ref 8.9–12.7)
PMV BLD AUTO: 8.1 FL (ref 8.9–12.7)
PMV BLD AUTO: 8.1 FL (ref 8.9–12.7)
PMV BLD AUTO: 8.2 FL (ref 8.9–12.7)
PMV BLD AUTO: 8.3 FL (ref 8.9–12.7)
PMV BLD AUTO: 8.6 FL (ref 8.9–12.7)
PMV BLD AUTO: 8.7 FL (ref 8.9–12.7)
PMV BLD AUTO: 8.9 FL (ref 8.9–12.7)
PMV BLD AUTO: 9.1 FL (ref 8.9–12.7)
PMV BLD AUTO: 9.1 FL (ref 8.9–12.7)
POIKILOCYTOSIS BLD QL SMEAR: PRESENT
POLYCHROMASIA BLD QL SMEAR: PRESENT
POTASSIUM SERPL-SCNC: 4 MMOL/L (ref 3.5–5.3)
POTASSIUM SERPL-SCNC: 4.1 MMOL/L (ref 3.5–5.3)
POTASSIUM SERPL-SCNC: 4.3 MMOL/L (ref 3.5–5.3)
POTASSIUM SERPL-SCNC: 4.3 MMOL/L (ref 3.5–5.3)
POTASSIUM SERPL-SCNC: 4.4 MMOL/L (ref 3.5–5.3)
POTASSIUM SERPL-SCNC: 4.4 MMOL/L (ref 3.5–5.3)
POTASSIUM SERPL-SCNC: 4.5 MMOL/L (ref 3.5–5.3)
POTASSIUM SERPL-SCNC: 4.9 MMOL/L (ref 3.5–5.3)
PR INTERVAL: 148 MS
PR INTERVAL: 162 MS
PR INTERVAL: 200 MS
PROCALCITONIN SERPL-MCNC: 0.32 NG/ML
PROCALCITONIN SERPL-MCNC: 0.38 NG/ML
PROCALCITONIN SERPL-MCNC: 0.56 NG/ML
PROCALCITONIN SERPL-MCNC: 0.68 NG/ML
PROCALCITONIN SERPL-MCNC: 0.8 NG/ML
PROT SERPL-MCNC: 6.6 G/DL (ref 6.4–8.4)
PROT SERPL-MCNC: 6.8 G/DL (ref 6.4–8.4)
PROT SERPL-MCNC: 7 G/DL (ref 6.4–8.4)
PROT SERPL-MCNC: 7.4 G/DL (ref 6.4–8.4)
PROT SERPL-MCNC: 7.4 G/DL (ref 6.4–8.4)
PROT UR STRIP-MCNC: NEGATIVE MG/DL
PROT UR STRIP-MCNC: NEGATIVE MG/DL
PROT UR-MCNC: 6 MG/DL
PROT UR-MCNC: 7 MG/DL
PROT/CREAT UR: 0.1 MG/G{CREAT} (ref 0–0.1)
PROT/CREAT UR: 0.11 MG/G{CREAT} (ref 0–0.1)
PROTHROMBIN TIME: 13.8 SECONDS (ref 11.6–14.5)
PROTHROMBIN TIME: 16 SECONDS (ref 11.6–14.5)
PROTHROMBIN TIME: 16.4 SECONDS (ref 11.6–14.5)
PROTHROMBIN TIME: 19.9 SECONDS (ref 11.6–14.5)
QRS AXIS: 59 DEGREES
QRS AXIS: 60 DEGREES
QRS AXIS: 76 DEGREES
QRSD INTERVAL: 88 MS
QRSD INTERVAL: 88 MS
QRSD INTERVAL: 98 MS
QT INTERVAL: 340 MS
QT INTERVAL: 344 MS
QT INTERVAL: 362 MS
QTC INTERVAL: 404 MS
QTC INTERVAL: 412 MS
QTC INTERVAL: 418 MS
RBC # BLD AUTO: 2.76 MILLION/UL (ref 3.88–5.62)
RBC # BLD AUTO: 2.94 MILLION/UL (ref 3.88–5.62)
RBC # BLD AUTO: 3.07 MILLION/UL (ref 3.88–5.62)
RBC # BLD AUTO: 3.1 MILLION/UL (ref 3.88–5.62)
RBC # BLD AUTO: 3.22 MILLION/UL (ref 3.88–5.62)
RBC # BLD AUTO: 3.23 MILLION/UL (ref 3.88–5.62)
RBC # BLD AUTO: 3.25 MILLION/UL (ref 3.88–5.62)
RBC # BLD AUTO: 3.25 MILLION/UL (ref 3.88–5.62)
RBC # BLD AUTO: 3.37 MILLION/UL (ref 3.88–5.62)
RBC # BLD AUTO: 3.47 MILLION/UL (ref 3.88–5.62)
RBC MORPH BLD: PRESENT
RH BLD: POSITIVE
RH BLD: POSITIVE
RSV RNA RESP QL NAA+PROBE: NEGATIVE
S PNEUM AG UR QL: NEGATIVE
SARS-COV-2 RNA RESP QL NAA+PROBE: NEGATIVE
SMUDGE CELLS BLD QL SMEAR: PRESENT
SODIUM SERPL-SCNC: 129 MMOL/L (ref 135–147)
SODIUM SERPL-SCNC: 130 MMOL/L (ref 135–147)
SODIUM SERPL-SCNC: 131 MMOL/L (ref 135–147)
SODIUM SERPL-SCNC: 133 MMOL/L (ref 135–147)
SODIUM SERPL-SCNC: 134 MMOL/L (ref 135–147)
SODIUM SERPL-SCNC: 134 MMOL/L (ref 135–147)
SODIUM SERPL-SCNC: 137 MMOL/L (ref 135–147)
SP GR UR STRIP.AUTO: 1.01 (ref 1–1.03)
SP GR UR STRIP.AUTO: 1.04 (ref 1–1.03)
SPECIMEN EXPIRATION DATE: NORMAL
SPECIMEN EXPIRATION DATE: NORMAL
T WAVE AXIS: 69 DEGREES
T WAVE AXIS: 69 DEGREES
T WAVE AXIS: 77 DEGREES
TSH SERPL DL<=0.05 MIU/L-ACNC: 1.17 UIU/ML (ref 0.45–4.5)
UROBILINOGEN UR STRIP-ACNC: <2 MG/DL
UROBILINOGEN UR STRIP-ACNC: <2 MG/DL
VARIANT LYMPHS # BLD AUTO: 1 %
VARIANT LYMPHS # BLD AUTO: 7 %
VENTRICULAR RATE: 75 BPM
VENTRICULAR RATE: 88 BPM
VENTRICULAR RATE: 89 BPM
WBC # BLD AUTO: 13.77 THOUSAND/UL (ref 4.31–10.16)
WBC # BLD AUTO: 21.34 THOUSAND/UL (ref 4.31–10.16)
WBC # BLD AUTO: 24.51 THOUSAND/UL (ref 4.31–10.16)
WBC # BLD AUTO: 27.06 THOUSAND/UL (ref 4.31–10.16)
WBC # BLD AUTO: 27.31 THOUSAND/UL (ref 4.31–10.16)
WBC # BLD AUTO: 28.17 THOUSAND/UL (ref 4.31–10.16)
WBC # BLD AUTO: 30.8 THOUSAND/UL (ref 4.31–10.16)
WBC # BLD AUTO: 31.72 THOUSAND/UL (ref 4.31–10.16)
WBC # BLD AUTO: 34.45 THOUSAND/UL (ref 4.31–10.16)
WBC # BLD AUTO: 40.93 THOUSAND/UL (ref 4.31–10.16)

## 2022-01-01 PROCEDURE — 84100 ASSAY OF PHOSPHORUS: CPT | Performed by: PHYSICIAN ASSISTANT

## 2022-01-01 PROCEDURE — 87205 SMEAR GRAM STAIN: CPT | Performed by: INTERNAL MEDICINE

## 2022-01-01 PROCEDURE — 99232 SBSQ HOSP IP/OBS MODERATE 35: CPT | Performed by: NURSE PRACTITIONER

## 2022-01-01 PROCEDURE — 85025 COMPLETE CBC W/AUTO DIFF WBC: CPT

## 2022-01-01 PROCEDURE — 96365 THER/PROPH/DIAG IV INF INIT: CPT

## 2022-01-01 PROCEDURE — 85007 BL SMEAR W/DIFF WBC COUNT: CPT | Performed by: PHYSICIAN ASSISTANT

## 2022-01-01 PROCEDURE — 96360 HYDRATION IV INFUSION INIT: CPT

## 2022-01-01 PROCEDURE — 85014 HEMATOCRIT: CPT | Performed by: PHYSICIAN ASSISTANT

## 2022-01-01 PROCEDURE — 96372 THER/PROPH/DIAG INJ SC/IM: CPT

## 2022-01-01 PROCEDURE — 99291 CRITICAL CARE FIRST HOUR: CPT | Performed by: EMERGENCY MEDICINE

## 2022-01-01 PROCEDURE — G0156 HHCP-SVS OF AIDE,EA 15 MIN: HCPCS

## 2022-01-01 PROCEDURE — 80048 BASIC METABOLIC PNL TOTAL CA: CPT | Performed by: PHYSICIAN ASSISTANT

## 2022-01-01 PROCEDURE — 10330064 UNDERPAD, REUSE 36X36 1DZ     BECKCL

## 2022-01-01 PROCEDURE — C9113 INJ PANTOPRAZOLE SODIUM, VIA: HCPCS | Performed by: PHYSICIAN ASSISTANT

## 2022-01-01 PROCEDURE — T2042 HOSPICE ROUTINE HOME CARE: HCPCS

## 2022-01-01 PROCEDURE — 96417 CHEMO IV INFUS EACH ADDL SEQ: CPT

## 2022-01-01 PROCEDURE — 85610 PROTHROMBIN TIME: CPT | Performed by: PHYSICIAN ASSISTANT

## 2022-01-01 PROCEDURE — 92610 EVALUATE SWALLOWING FUNCTION: CPT

## 2022-01-01 PROCEDURE — 83880 ASSAY OF NATRIURETIC PEPTIDE: CPT | Performed by: EMERGENCY MEDICINE

## 2022-01-01 PROCEDURE — 80048 BASIC METABOLIC PNL TOTAL CA: CPT

## 2022-01-01 PROCEDURE — 87186 SC STD MICRODIL/AGAR DIL: CPT | Performed by: INTERNAL MEDICINE

## 2022-01-01 PROCEDURE — 10330057 MEDICATION, GENERAL

## 2022-01-01 PROCEDURE — 99223 1ST HOSP IP/OBS HIGH 75: CPT | Performed by: INTERNAL MEDICINE

## 2022-01-01 PROCEDURE — 77336 RADIATION PHYSICS CONSULT: CPT | Performed by: RADIOLOGY

## 2022-01-01 PROCEDURE — 99285 EMERGENCY DEPT VISIT HI MDM: CPT

## 2022-01-01 PROCEDURE — 96375 TX/PRO/DX INJ NEW DRUG ADDON: CPT

## 2022-01-01 PROCEDURE — 77373 STRTCTC BDY RAD THER TX DLVR: CPT | Performed by: RADIOLOGY

## 2022-01-01 PROCEDURE — 84145 PROCALCITONIN (PCT): CPT | Performed by: FAMILY MEDICINE

## 2022-01-01 PROCEDURE — 85730 THROMBOPLASTIN TIME PARTIAL: CPT

## 2022-01-01 PROCEDURE — 77470 SPECIAL RADIATION TREATMENT: CPT | Performed by: RADIOLOGY

## 2022-01-01 PROCEDURE — 83605 ASSAY OF LACTIC ACID: CPT

## 2022-01-01 PROCEDURE — 71260 CT THORAX DX C+: CPT

## 2022-01-01 PROCEDURE — NC001 PR NO CHARGE: Performed by: EMERGENCY MEDICINE

## 2022-01-01 PROCEDURE — 10330064 BEDPAN, PONTOON GRAPHITE 55OZ (20/CS)

## 2022-01-01 PROCEDURE — 77338 DESIGN MLC DEVICE FOR IMRT: CPT | Performed by: STUDENT IN AN ORGANIZED HEALTH CARE EDUCATION/TRAINING PROGRAM

## 2022-01-01 PROCEDURE — 94640 AIRWAY INHALATION TREATMENT: CPT

## 2022-01-01 PROCEDURE — 84156 ASSAY OF PROTEIN URINE: CPT

## 2022-01-01 PROCEDURE — G0299 HHS/HOSPICE OF RN EA 15 MIN: HCPCS

## 2022-01-01 PROCEDURE — 84443 ASSAY THYROID STIM HORMONE: CPT | Performed by: PHYSICIAN ASSISTANT

## 2022-01-01 PROCEDURE — 80048 BASIC METABOLIC PNL TOTAL CA: CPT | Performed by: FAMILY MEDICINE

## 2022-01-01 PROCEDURE — G1004 CDSM NDSC: HCPCS

## 2022-01-01 PROCEDURE — 36415 COLL VENOUS BLD VENIPUNCTURE: CPT

## 2022-01-01 PROCEDURE — 99214 OFFICE O/P EST MOD 30 MIN: CPT | Performed by: INTERNAL MEDICINE

## 2022-01-01 PROCEDURE — 82330 ASSAY OF CALCIUM: CPT | Performed by: PHYSICIAN ASSISTANT

## 2022-01-01 PROCEDURE — 94760 N-INVAS EAR/PLS OXIMETRY 1: CPT

## 2022-01-01 PROCEDURE — 85007 BL SMEAR W/DIFF WBC COUNT: CPT | Performed by: INTERNAL MEDICINE

## 2022-01-01 PROCEDURE — 86850 RBC ANTIBODY SCREEN: CPT | Performed by: PHYSICIAN ASSISTANT

## 2022-01-01 PROCEDURE — 87040 BLOOD CULTURE FOR BACTERIA: CPT | Performed by: EMERGENCY MEDICINE

## 2022-01-01 PROCEDURE — 86901 BLOOD TYPING SEROLOGIC RH(D): CPT | Performed by: PHYSICIAN ASSISTANT

## 2022-01-01 PROCEDURE — 85027 COMPLETE CBC AUTOMATED: CPT | Performed by: PHYSICIAN ASSISTANT

## 2022-01-01 PROCEDURE — 0241U HB NFCT DS VIR RESP RNA 4 TRGT: CPT | Performed by: EMERGENCY MEDICINE

## 2022-01-01 PROCEDURE — 85027 COMPLETE CBC AUTOMATED: CPT

## 2022-01-01 PROCEDURE — 93005 ELECTROCARDIOGRAM TRACING: CPT

## 2022-01-01 PROCEDURE — 85730 THROMBOPLASTIN TIME PARTIAL: CPT | Performed by: NURSE PRACTITIONER

## 2022-01-01 PROCEDURE — 87040 BLOOD CULTURE FOR BACTERIA: CPT | Performed by: PHYSICIAN ASSISTANT

## 2022-01-01 PROCEDURE — 77334 RADIATION TREATMENT AID(S): CPT | Performed by: RADIOLOGY

## 2022-01-01 PROCEDURE — 93010 ELECTROCARDIOGRAM REPORT: CPT | Performed by: INTERNAL MEDICINE

## 2022-01-01 PROCEDURE — 97116 GAIT TRAINING THERAPY: CPT

## 2022-01-01 PROCEDURE — 77334 RADIATION TREATMENT AID(S): CPT | Performed by: STUDENT IN AN ORGANIZED HEALTH CARE EDUCATION/TRAINING PROGRAM

## 2022-01-01 PROCEDURE — 99285 EMERGENCY DEPT VISIT HI MDM: CPT | Performed by: EMERGENCY MEDICINE

## 2022-01-01 PROCEDURE — 97163 PT EVAL HIGH COMPLEX 45 MIN: CPT

## 2022-01-01 PROCEDURE — 84145 PROCALCITONIN (PCT): CPT

## 2022-01-01 PROCEDURE — 94664 DEMO&/EVAL PT USE INHALER: CPT

## 2022-01-01 PROCEDURE — 77301 RADIOTHERAPY DOSE PLAN IMRT: CPT | Performed by: STUDENT IN AN ORGANIZED HEALTH CARE EDUCATION/TRAINING PROGRAM

## 2022-01-01 PROCEDURE — 87086 URINE CULTURE/COLONY COUNT: CPT | Performed by: PHYSICIAN ASSISTANT

## 2022-01-01 PROCEDURE — 77300 RADIATION THERAPY DOSE PLAN: CPT | Performed by: STUDENT IN AN ORGANIZED HEALTH CARE EDUCATION/TRAINING PROGRAM

## 2022-01-01 PROCEDURE — 99233 SBSQ HOSP IP/OBS HIGH 50: CPT | Performed by: INTERNAL MEDICINE

## 2022-01-01 PROCEDURE — 80053 COMPREHEN METABOLIC PANEL: CPT

## 2022-01-01 PROCEDURE — 82948 REAGENT STRIP/BLOOD GLUCOSE: CPT

## 2022-01-01 PROCEDURE — 87077 CULTURE AEROBIC IDENTIFY: CPT | Performed by: INTERNAL MEDICINE

## 2022-01-01 PROCEDURE — 87449 NOS EACH ORGANISM AG IA: CPT | Performed by: INTERNAL MEDICINE

## 2022-01-01 PROCEDURE — 85007 BL SMEAR W/DIFF WBC COUNT: CPT

## 2022-01-01 PROCEDURE — 84484 ASSAY OF TROPONIN QUANT: CPT | Performed by: EMERGENCY MEDICINE

## 2022-01-01 PROCEDURE — 85610 PROTHROMBIN TIME: CPT | Performed by: FAMILY MEDICINE

## 2022-01-01 PROCEDURE — 71045 X-RAY EXAM CHEST 1 VIEW: CPT

## 2022-01-01 PROCEDURE — G0155 HHCP-SVS OF CSW,EA 15 MIN: HCPCS

## 2022-01-01 PROCEDURE — 97167 OT EVAL HIGH COMPLEX 60 MIN: CPT

## 2022-01-01 PROCEDURE — 99239 HOSP IP/OBS DSCHRG MGMT >30: CPT | Performed by: INTERNAL MEDICINE

## 2022-01-01 PROCEDURE — ND001 PR NO DOCUMENTATION: Performed by: NURSE PRACTITIONER

## 2022-01-01 PROCEDURE — 85018 HEMOGLOBIN: CPT | Performed by: PHYSICIAN ASSISTANT

## 2022-01-01 PROCEDURE — 85007 BL SMEAR W/DIFF WBC COUNT: CPT | Performed by: EMERGENCY MEDICINE

## 2022-01-01 PROCEDURE — 87040 BLOOD CULTURE FOR BACTERIA: CPT

## 2022-01-01 PROCEDURE — 74177 CT ABD & PELVIS W/CONTRAST: CPT

## 2022-01-01 PROCEDURE — 96366 THER/PROPH/DIAG IV INF ADDON: CPT

## 2022-01-01 PROCEDURE — 99232 SBSQ HOSP IP/OBS MODERATE 35: CPT | Performed by: PHYSICIAN ASSISTANT

## 2022-01-01 PROCEDURE — 99024 POSTOP FOLLOW-UP VISIT: CPT | Performed by: PHYSICIAN ASSISTANT

## 2022-01-01 PROCEDURE — 70491 CT SOFT TISSUE NECK W/DYE: CPT

## 2022-01-01 PROCEDURE — 99222 1ST HOSP IP/OBS MODERATE 55: CPT | Performed by: PHYSICIAN ASSISTANT

## 2022-01-01 PROCEDURE — 84484 ASSAY OF TROPONIN QUANT: CPT | Performed by: PHYSICIAN ASSISTANT

## 2022-01-01 PROCEDURE — 83605 ASSAY OF LACTIC ACID: CPT | Performed by: EMERGENCY MEDICINE

## 2022-01-01 PROCEDURE — 70450 CT HEAD/BRAIN W/O DYE: CPT

## 2022-01-01 PROCEDURE — 80048 BASIC METABOLIC PNL TOTAL CA: CPT | Performed by: INTERNAL MEDICINE

## 2022-01-01 PROCEDURE — 85730 THROMBOPLASTIN TIME PARTIAL: CPT | Performed by: PHYSICIAN ASSISTANT

## 2022-01-01 PROCEDURE — 99284 EMERGENCY DEPT VISIT MOD MDM: CPT

## 2022-01-01 PROCEDURE — 77399 UNLISTED PX MED RADJ PHYSICS: CPT | Performed by: RADIOLOGY

## 2022-01-01 PROCEDURE — 82570 ASSAY OF URINE CREATININE: CPT

## 2022-01-01 PROCEDURE — 84145 PROCALCITONIN (PCT): CPT | Performed by: INTERNAL MEDICINE

## 2022-01-01 PROCEDURE — 99496 TRANSJ CARE MGMT HIGH F2F 7D: CPT

## 2022-01-01 PROCEDURE — 99223 1ST HOSP IP/OBS HIGH 75: CPT | Performed by: NURSE PRACTITIONER

## 2022-01-01 PROCEDURE — 71046 X-RAY EXAM CHEST 2 VIEWS: CPT

## 2022-01-01 PROCEDURE — 85730 THROMBOPLASTIN TIME PARTIAL: CPT | Performed by: INTERNAL MEDICINE

## 2022-01-01 PROCEDURE — 85027 COMPLETE CBC AUTOMATED: CPT | Performed by: EMERGENCY MEDICINE

## 2022-01-01 PROCEDURE — 3061F NEG MICROALBUMINURIA REV: CPT | Performed by: INTERNAL MEDICINE

## 2022-01-01 PROCEDURE — 87070 CULTURE OTHR SPECIMN AEROBIC: CPT | Performed by: INTERNAL MEDICINE

## 2022-01-01 PROCEDURE — 83690 ASSAY OF LIPASE: CPT | Performed by: PHYSICIAN ASSISTANT

## 2022-01-01 PROCEDURE — 85610 PROTHROMBIN TIME: CPT

## 2022-01-01 PROCEDURE — 85027 COMPLETE CBC AUTOMATED: CPT | Performed by: INTERNAL MEDICINE

## 2022-01-01 PROCEDURE — 83605 ASSAY OF LACTIC ACID: CPT | Performed by: PHYSICIAN ASSISTANT

## 2022-01-01 PROCEDURE — 96367 TX/PROPH/DG ADDL SEQ IV INF: CPT

## 2022-01-01 PROCEDURE — 80053 COMPREHEN METABOLIC PANEL: CPT | Performed by: PHYSICIAN ASSISTANT

## 2022-01-01 PROCEDURE — 99215 OFFICE O/P EST HI 40 MIN: CPT

## 2022-01-01 PROCEDURE — 96415 CHEMO IV INFUSION ADDL HR: CPT

## 2022-01-01 PROCEDURE — 96413 CHEMO IV INFUSION 1 HR: CPT

## 2022-01-01 PROCEDURE — 81003 URINALYSIS AUTO W/O SCOPE: CPT | Performed by: PHYSICIAN ASSISTANT

## 2022-01-01 PROCEDURE — 80053 COMPREHEN METABOLIC PANEL: CPT | Performed by: EMERGENCY MEDICINE

## 2022-01-01 PROCEDURE — 86900 BLOOD TYPING SEROLOGIC ABO: CPT | Performed by: PHYSICIAN ASSISTANT

## 2022-01-01 PROCEDURE — 10330064 BRIEF, WINGS CHOICE+ QUILTED LG (18/BG 4

## 2022-01-01 PROCEDURE — 85049 AUTOMATED PLATELET COUNT: CPT | Performed by: INTERNAL MEDICINE

## 2022-01-01 PROCEDURE — 99222 1ST HOSP IP/OBS MODERATE 55: CPT | Performed by: INTERNAL MEDICINE

## 2022-01-01 PROCEDURE — 10330064 URINAL, W/TRANSPARENT LID (50/CS)

## 2022-01-01 PROCEDURE — 85025 COMPLETE CBC W/AUTO DIFF WBC: CPT | Performed by: FAMILY MEDICINE

## 2022-01-01 PROCEDURE — 77373 STRTCTC BDY RAD THER TX DLVR: CPT | Performed by: STUDENT IN AN ORGANIZED HEALTH CARE EDUCATION/TRAINING PROGRAM

## 2022-01-01 PROCEDURE — 99291 CRITICAL CARE FIRST HOUR: CPT | Performed by: PHYSICIAN ASSISTANT

## 2022-01-01 PROCEDURE — 81003 URINALYSIS AUTO W/O SCOPE: CPT | Performed by: EMERGENCY MEDICINE

## 2022-01-01 PROCEDURE — 84484 ASSAY OF TROPONIN QUANT: CPT | Performed by: FAMILY MEDICINE

## 2022-01-01 PROCEDURE — 36415 COLL VENOUS BLD VENIPUNCTURE: CPT | Performed by: PHYSICIAN ASSISTANT

## 2022-01-01 PROCEDURE — 36415 COLL VENOUS BLD VENIPUNCTURE: CPT | Performed by: EMERGENCY MEDICINE

## 2022-01-01 PROCEDURE — 99239 HOSP IP/OBS DSCHRG MGMT >30: CPT | Performed by: PHYSICIAN ASSISTANT

## 2022-01-01 RX ORDER — MUSCLE RUB CREAM 100; 150 MG/G; MG/G
CREAM TOPICAL 4 TIMES DAILY PRN
Status: DISCONTINUED | OUTPATIENT
Start: 2022-01-01 | End: 2022-01-01 | Stop reason: HOSPADM

## 2022-01-01 RX ORDER — INSULIN LISPRO 100 [IU]/ML
1-5 INJECTION, SOLUTION INTRAVENOUS; SUBCUTANEOUS EVERY 6 HOURS SCHEDULED
Status: DISCONTINUED | OUTPATIENT
Start: 2022-01-01 | End: 2022-01-01

## 2022-01-01 RX ORDER — SODIUM CHLORIDE 9 MG/ML
20 INJECTION, SOLUTION INTRAVENOUS ONCE
Status: COMPLETED | OUTPATIENT
Start: 2022-01-01 | End: 2022-01-01

## 2022-01-01 RX ORDER — LORAZEPAM 0.5 MG/1
0.5 TABLET ORAL DAILY PRN
Status: DISCONTINUED | OUTPATIENT
Start: 2022-01-01 | End: 2022-01-01 | Stop reason: HOSPADM

## 2022-01-01 RX ORDER — ENOXAPARIN SODIUM 100 MG/ML
40 INJECTION SUBCUTANEOUS DAILY
Status: DISCONTINUED | OUTPATIENT
Start: 2022-01-01 | End: 2022-01-01 | Stop reason: HOSPADM

## 2022-01-01 RX ORDER — POLYETHYLENE GLYCOL 3350 17 G/17G
17 POWDER, FOR SOLUTION ORAL DAILY
Status: DISCONTINUED | OUTPATIENT
Start: 2022-01-01 | End: 2022-01-01 | Stop reason: HOSPADM

## 2022-01-01 RX ORDER — OXYCODONE HYDROCHLORIDE 10 MG/1
10 TABLET ORAL EVERY 6 HOURS PRN
Status: DISCONTINUED | OUTPATIENT
Start: 2022-01-01 | End: 2022-01-01

## 2022-01-01 RX ORDER — HEPARIN SODIUM 1000 [USP'U]/ML
2600 INJECTION, SOLUTION INTRAVENOUS; SUBCUTANEOUS
Status: DISCONTINUED | OUTPATIENT
Start: 2022-01-01 | End: 2022-01-01

## 2022-01-01 RX ORDER — SODIUM CHLORIDE 9 MG/ML
75 INJECTION, SOLUTION INTRAVENOUS CONTINUOUS
Status: DISCONTINUED | OUTPATIENT
Start: 2022-01-01 | End: 2022-01-01

## 2022-01-01 RX ORDER — CYANOCOBALAMIN 1000 UG/ML
1000 INJECTION, SOLUTION INTRAMUSCULAR; SUBCUTANEOUS
Status: SHIPPED | OUTPATIENT
Start: 2022-01-01

## 2022-01-01 RX ORDER — GUAIFENESIN/DEXTROMETHORPHAN 100-10MG/5
10 SYRUP ORAL EVERY 4 HOURS PRN
Status: DISCONTINUED | OUTPATIENT
Start: 2022-01-01 | End: 2022-01-01 | Stop reason: HOSPADM

## 2022-01-01 RX ORDER — GLYCOPYRROLATE 0.2 MG/ML
0.1 INJECTION INTRAMUSCULAR; INTRAVENOUS EVERY 4 HOURS PRN
Status: DISCONTINUED | OUTPATIENT
Start: 2022-01-01 | End: 2022-01-01 | Stop reason: HOSPADM

## 2022-01-01 RX ORDER — IPRATROPIUM BROMIDE AND ALBUTEROL SULFATE 2.5; .5 MG/3ML; MG/3ML
3 SOLUTION RESPIRATORY (INHALATION) EVERY 6 HOURS PRN
Refills: 1 | Status: DISCONTINUED | OUTPATIENT
Start: 2022-01-01 | End: 2022-01-01

## 2022-01-01 RX ORDER — LORAZEPAM 0.5 MG/1
TABLET ORAL
Qty: 60 TABLET | Refills: 2 | Status: SHIPPED | OUTPATIENT
Start: 2022-01-01 | End: 2022-01-01

## 2022-01-01 RX ORDER — IPRATROPIUM BROMIDE AND ALBUTEROL SULFATE 2.5; .5 MG/3ML; MG/3ML
3 SOLUTION RESPIRATORY (INHALATION)
Refills: 1 | Status: DISCONTINUED | OUTPATIENT
Start: 2022-01-01 | End: 2022-01-01

## 2022-01-01 RX ORDER — HALOPERIDOL 2 MG/ML
2 SOLUTION ORAL EVERY 6 HOURS PRN
Qty: 30 ML | Refills: 0 | Status: SHIPPED | OUTPATIENT
Start: 2022-01-01

## 2022-01-01 RX ORDER — ONDANSETRON 8 MG/1
8 TABLET, ORALLY DISINTEGRATING ORAL EVERY 8 HOURS PRN
Qty: 40 TABLET | Refills: 3 | Status: SHIPPED | OUTPATIENT
Start: 2022-01-01 | End: 2022-01-01

## 2022-01-01 RX ORDER — NALOXONE HYDROCHLORIDE 4 MG/.1ML
4 SPRAY NASAL
Status: DISCONTINUED | OUTPATIENT
Start: 2022-01-01 | End: 2022-01-01

## 2022-01-01 RX ORDER — LEVETIRACETAM 750 MG/1
750 TABLET ORAL EVERY 12 HOURS SCHEDULED
Qty: 60 TABLET | Refills: 0 | Status: SHIPPED | OUTPATIENT
Start: 2022-01-01

## 2022-01-01 RX ORDER — ONDANSETRON 2 MG/ML
4 INJECTION INTRAMUSCULAR; INTRAVENOUS EVERY 6 HOURS PRN
Status: DISCONTINUED | OUTPATIENT
Start: 2022-01-01 | End: 2022-01-01 | Stop reason: HOSPADM

## 2022-01-01 RX ORDER — LORAZEPAM 2 MG/ML
1 INJECTION INTRAMUSCULAR
Status: DISCONTINUED | OUTPATIENT
Start: 2022-01-01 | End: 2022-01-01 | Stop reason: HOSPADM

## 2022-01-01 RX ORDER — HEPARIN SODIUM 1000 [USP'U]/ML
5200 INJECTION, SOLUTION INTRAVENOUS; SUBCUTANEOUS
Status: DISCONTINUED | OUTPATIENT
Start: 2022-01-01 | End: 2022-01-01

## 2022-01-01 RX ORDER — OXYCODONE HCL 20 MG/ML
6 CONCENTRATE, ORAL ORAL EVERY 2 HOUR PRN
Qty: 30 ML | Refills: 0 | Status: SHIPPED | OUTPATIENT
Start: 2022-01-01 | End: 2022-08-21

## 2022-01-01 RX ORDER — LEVETIRACETAM 500 MG/1
500 TABLET ORAL EVERY 12 HOURS SCHEDULED
Status: DISCONTINUED | OUTPATIENT
Start: 2022-01-01 | End: 2022-01-01 | Stop reason: HOSPADM

## 2022-01-01 RX ORDER — LEVETIRACETAM 250 MG/1
500 TABLET ORAL ONCE
Status: COMPLETED | OUTPATIENT
Start: 2022-01-01 | End: 2022-01-01

## 2022-01-01 RX ORDER — MUSCLE RUB CREAM 100; 150 MG/G; MG/G
CREAM TOPICAL 4 TIMES DAILY PRN
Qty: 35 G | Refills: 0 | Status: SHIPPED | OUTPATIENT
Start: 2022-01-01 | End: 2022-01-01

## 2022-01-01 RX ORDER — SODIUM CHLORIDE 9 MG/ML
20 INJECTION, SOLUTION INTRAVENOUS ONCE
Status: CANCELLED | OUTPATIENT
Start: 2022-01-01

## 2022-01-01 RX ORDER — HYDROMORPHONE HCL IN WATER/PF 6 MG/30 ML
0.2 PATIENT CONTROLLED ANALGESIA SYRINGE INTRAVENOUS EVERY 4 HOURS PRN
Status: DISCONTINUED | OUTPATIENT
Start: 2022-01-01 | End: 2022-01-01 | Stop reason: HOSPADM

## 2022-01-01 RX ORDER — DEXAMETHASONE 1 MG
TABLET ORAL
Qty: 28 TABLET | Refills: 0 | Status: SHIPPED | OUTPATIENT
Start: 2022-01-01 | End: 2022-01-01 | Stop reason: DRUGHIGH

## 2022-01-01 RX ORDER — PANTOPRAZOLE SODIUM 40 MG/1
40 TABLET, DELAYED RELEASE ORAL
Status: DISCONTINUED | OUTPATIENT
Start: 2022-01-01 | End: 2022-01-01 | Stop reason: HOSPADM

## 2022-01-01 RX ORDER — SENNOSIDES 8.6 MG
1 TABLET ORAL
Status: DISCONTINUED | OUTPATIENT
Start: 2022-01-01 | End: 2022-01-01 | Stop reason: HOSPADM

## 2022-01-01 RX ORDER — MECLIZINE HYDROCHLORIDE 25 MG/1
25 TABLET ORAL EVERY 8 HOURS PRN
Status: DISCONTINUED | OUTPATIENT
Start: 2022-01-01 | End: 2022-01-01 | Stop reason: HOSPADM

## 2022-01-01 RX ORDER — ACETAMINOPHEN 325 MG/1
975 TABLET ORAL EVERY 8 HOURS PRN
Status: DISCONTINUED | OUTPATIENT
Start: 2022-01-01 | End: 2022-01-01 | Stop reason: HOSPADM

## 2022-01-01 RX ORDER — DEXAMETHASONE SODIUM PHOSPHATE 4 MG/ML
2 INJECTION, SOLUTION INTRA-ARTICULAR; INTRALESIONAL; INTRAMUSCULAR; INTRAVENOUS; SOFT TISSUE EVERY 6 HOURS SCHEDULED
Status: DISCONTINUED | OUTPATIENT
Start: 2022-01-01 | End: 2022-01-01

## 2022-01-01 RX ORDER — BENZONATATE 100 MG/1
100 CAPSULE ORAL 3 TIMES DAILY PRN
Status: DISCONTINUED | OUTPATIENT
Start: 2022-01-01 | End: 2022-01-01

## 2022-01-01 RX ORDER — LANOLIN ALCOHOL/MO/W.PET/CERES
6 CREAM (GRAM) TOPICAL
Status: DISCONTINUED | OUTPATIENT
Start: 2022-01-01 | End: 2022-01-01 | Stop reason: HOSPADM

## 2022-01-01 RX ORDER — DEXAMETHASONE SODIUM PHOSPHATE 4 MG/ML
4 INJECTION, SOLUTION INTRA-ARTICULAR; INTRALESIONAL; INTRAMUSCULAR; INTRAVENOUS; SOFT TISSUE EVERY 6 HOURS SCHEDULED
Status: DISCONTINUED | OUTPATIENT
Start: 2022-01-01 | End: 2022-01-01

## 2022-01-01 RX ORDER — ACETAMINOPHEN 325 MG/1
650 TABLET ORAL EVERY 6 HOURS PRN
Status: DISCONTINUED | OUTPATIENT
Start: 2022-01-01 | End: 2022-01-01 | Stop reason: HOSPADM

## 2022-01-01 RX ORDER — INSULIN LISPRO 100 [IU]/ML
1-5 INJECTION, SOLUTION INTRAVENOUS; SUBCUTANEOUS
Status: DISCONTINUED | OUTPATIENT
Start: 2022-01-01 | End: 2022-01-01 | Stop reason: HOSPADM

## 2022-01-01 RX ORDER — SODIUM CHLORIDE 9 MG/ML
125 INJECTION, SOLUTION INTRAVENOUS CONTINUOUS
Status: DISCONTINUED | OUTPATIENT
Start: 2022-01-01 | End: 2022-01-01

## 2022-01-01 RX ORDER — PANTOPRAZOLE SODIUM 40 MG/10ML
40 INJECTION, POWDER, LYOPHILIZED, FOR SOLUTION INTRAVENOUS 2 TIMES DAILY
Status: DISCONTINUED | OUTPATIENT
Start: 2022-01-01 | End: 2022-01-01

## 2022-01-01 RX ORDER — LEVETIRACETAM 750 MG/1
750 TABLET ORAL EVERY 12 HOURS SCHEDULED
Status: DISCONTINUED | OUTPATIENT
Start: 2022-01-01 | End: 2022-01-01 | Stop reason: HOSPADM

## 2022-01-01 RX ORDER — OXYCODONE HYDROCHLORIDE 10 MG/1
10 TABLET ORAL EVERY 6 HOURS
Status: DISCONTINUED | OUTPATIENT
Start: 2022-01-01 | End: 2022-01-01 | Stop reason: HOSPADM

## 2022-01-01 RX ORDER — OXYCODONE HYDROCHLORIDE 10 MG/1
10 TABLET ORAL
Status: DISCONTINUED | OUTPATIENT
Start: 2022-01-01 | End: 2022-01-01 | Stop reason: HOSPADM

## 2022-01-01 RX ORDER — CYANOCOBALAMIN 1000 UG/ML
1000 INJECTION, SOLUTION INTRAMUSCULAR; SUBCUTANEOUS
Status: DISCONTINUED | OUTPATIENT
Start: 2022-01-01 | End: 2022-01-01

## 2022-01-01 RX ORDER — UREA 10 %
500 LOTION (ML) TOPICAL DAILY
Status: DISCONTINUED | OUTPATIENT
Start: 2022-01-01 | End: 2022-01-01 | Stop reason: HOSPADM

## 2022-01-01 RX ORDER — GUAIFENESIN/DEXTROMETHORPHAN 100-10MG/5
10 SYRUP ORAL EVERY 4 HOURS PRN
Qty: 354 ML | Refills: 0 | Status: SHIPPED | OUTPATIENT
Start: 2022-01-01 | End: 2022-01-01

## 2022-01-01 RX ORDER — IPRATROPIUM BROMIDE AND ALBUTEROL SULFATE 2.5; .5 MG/3ML; MG/3ML
3 SOLUTION RESPIRATORY (INHALATION) EVERY 4 HOURS PRN
Status: DISCONTINUED | OUTPATIENT
Start: 2022-01-01 | End: 2022-01-01 | Stop reason: HOSPADM

## 2022-01-01 RX ORDER — CEFTRIAXONE 1 G/50ML
1000 INJECTION, SOLUTION INTRAVENOUS ONCE
Status: COMPLETED | OUTPATIENT
Start: 2022-01-01 | End: 2022-01-01

## 2022-01-01 RX ORDER — HEPARIN SODIUM 1000 [USP'U]/ML
5200 INJECTION, SOLUTION INTRAVENOUS; SUBCUTANEOUS ONCE
Status: COMPLETED | OUTPATIENT
Start: 2022-01-01 | End: 2022-01-01

## 2022-01-01 RX ORDER — ALBUTEROL SULFATE 90 UG/1
2 AEROSOL, METERED RESPIRATORY (INHALATION) EVERY 4 HOURS PRN
Status: DISCONTINUED | OUTPATIENT
Start: 2022-01-01 | End: 2022-01-01 | Stop reason: HOSPADM

## 2022-01-01 RX ORDER — LEVETIRACETAM 100 MG/ML
750 SOLUTION ORAL EVERY 12 HOURS SCHEDULED
Status: DISCONTINUED | OUTPATIENT
Start: 2022-01-01 | End: 2022-01-01

## 2022-01-01 RX ORDER — CEPHALEXIN 500 MG/1
500 CAPSULE ORAL EVERY 6 HOURS SCHEDULED
Status: DISCONTINUED | OUTPATIENT
Start: 2022-01-01 | End: 2022-01-01 | Stop reason: HOSPADM

## 2022-01-01 RX ORDER — CEPHALEXIN 250 MG/1
250 CAPSULE ORAL EVERY 12 HOURS SCHEDULED
Qty: 60 CAPSULE | Refills: 2 | Status: ON HOLD | OUTPATIENT
Start: 2022-01-01 | End: 2022-01-01

## 2022-01-01 RX ORDER — MELATONIN
2000 DAILY
Refills: 2 | Status: DISCONTINUED | OUTPATIENT
Start: 2022-01-01 | End: 2022-01-01 | Stop reason: HOSPADM

## 2022-01-01 RX ORDER — LORAZEPAM 2 MG/ML
1 CONCENTRATE ORAL EVERY 2 HOUR PRN
Qty: 30 ML | Refills: 0 | Status: SHIPPED | OUTPATIENT
Start: 2022-01-01 | End: 2022-08-21

## 2022-01-01 RX ORDER — OXYCODONE HYDROCHLORIDE 10 MG/1
10 TABLET ORAL EVERY 6 HOURS PRN
Status: DISCONTINUED | OUTPATIENT
Start: 2022-01-01 | End: 2022-01-01 | Stop reason: HOSPADM

## 2022-01-01 RX ORDER — LANOLIN ALCOHOL/MO/W.PET/CERES
3 CREAM (GRAM) TOPICAL
Status: DISCONTINUED | OUTPATIENT
Start: 2022-01-01 | End: 2022-01-01 | Stop reason: HOSPADM

## 2022-01-01 RX ORDER — SODIUM CHLORIDE 9 MG/ML
75 INJECTION, SOLUTION INTRAVENOUS CONTINUOUS
Status: DISPENSED | OUTPATIENT
Start: 2022-01-01 | End: 2022-01-01

## 2022-01-01 RX ORDER — IPRATROPIUM BROMIDE AND ALBUTEROL SULFATE 2.5; .5 MG/3ML; MG/3ML
3 SOLUTION RESPIRATORY (INHALATION)
Status: DISCONTINUED | OUTPATIENT
Start: 2022-01-01 | End: 2022-01-01

## 2022-01-01 RX ORDER — FENTANYL CITRATE 50 UG/ML
50 INJECTION, SOLUTION INTRAMUSCULAR; INTRAVENOUS
Status: DISCONTINUED | OUTPATIENT
Start: 2022-01-01 | End: 2022-01-01

## 2022-01-01 RX ORDER — LEVALBUTEROL 1.25 MG/.5ML
1.25 SOLUTION, CONCENTRATE RESPIRATORY (INHALATION)
Status: DISCONTINUED | OUTPATIENT
Start: 2022-01-01 | End: 2022-01-01 | Stop reason: HOSPADM

## 2022-01-01 RX ORDER — OXYCODONE HYDROCHLORIDE 5 MG/1
5 TABLET ORAL EVERY 6 HOURS PRN
Status: DISCONTINUED | OUTPATIENT
Start: 2022-01-01 | End: 2022-01-01

## 2022-01-01 RX ORDER — CEPHALEXIN 500 MG/1
500 CAPSULE ORAL EVERY 6 HOURS SCHEDULED
Qty: 28 CAPSULE | Refills: 0 | Status: SHIPPED | OUTPATIENT
Start: 2022-01-01 | End: 2022-01-01

## 2022-01-01 RX ORDER — BISACODYL 10 MG
10 SUPPOSITORY, RECTAL RECTAL DAILY PRN
Qty: 12 SUPPOSITORY | Refills: 0 | Status: SHIPPED | OUTPATIENT
Start: 2022-01-01

## 2022-01-01 RX ORDER — CEFTRIAXONE 1 G/50ML
1000 INJECTION, SOLUTION INTRAVENOUS EVERY 24 HOURS
Status: DISCONTINUED | OUTPATIENT
Start: 2022-01-01 | End: 2022-01-01 | Stop reason: HOSPADM

## 2022-01-01 RX ORDER — HEPARIN SODIUM 10000 [USP'U]/100ML
3-30 INJECTION, SOLUTION INTRAVENOUS
Status: DISCONTINUED | OUTPATIENT
Start: 2022-01-01 | End: 2022-01-01

## 2022-01-01 RX ORDER — LORAZEPAM 0.5 MG/1
0.5 TABLET ORAL
Status: DISCONTINUED | OUTPATIENT
Start: 2022-01-01 | End: 2022-01-01 | Stop reason: HOSPADM

## 2022-01-01 RX ORDER — ONDANSETRON 4 MG/1
8 TABLET, ORALLY DISINTEGRATING ORAL EVERY 8 HOURS PRN
Status: DISCONTINUED | OUTPATIENT
Start: 2022-01-01 | End: 2022-01-01 | Stop reason: HOSPADM

## 2022-01-01 RX ORDER — HALOPERIDOL 5 MG/ML
0.5 INJECTION INTRAMUSCULAR EVERY 2 HOUR PRN
Status: DISCONTINUED | OUTPATIENT
Start: 2022-01-01 | End: 2022-01-01 | Stop reason: HOSPADM

## 2022-01-01 RX ORDER — GUAIFENESIN 600 MG/1
1200 TABLET, EXTENDED RELEASE ORAL EVERY 12 HOURS SCHEDULED
Status: DISCONTINUED | OUTPATIENT
Start: 2022-01-01 | End: 2022-01-01 | Stop reason: HOSPADM

## 2022-01-01 RX ORDER — OXYCODONE HYDROCHLORIDE 5 MG/1
5 TABLET ORAL
Status: DISCONTINUED | OUTPATIENT
Start: 2022-01-01 | End: 2022-01-01 | Stop reason: HOSPADM

## 2022-01-01 RX ORDER — BISACODYL 10 MG
10 SUPPOSITORY, RECTAL RECTAL DAILY PRN
Status: DISCONTINUED | OUTPATIENT
Start: 2022-01-01 | End: 2022-01-01 | Stop reason: HOSPADM

## 2022-01-01 RX ORDER — DEXAMETHASONE 2 MG/1
1 TABLET ORAL DAILY
Status: COMPLETED | OUTPATIENT
Start: 2022-01-01 | End: 2022-01-01

## 2022-01-01 RX ADMIN — Medication 10 MG: at 09:47

## 2022-01-01 RX ADMIN — GUAIFENESIN 1200 MG: 600 TABLET ORAL at 13:57

## 2022-01-01 RX ADMIN — PEGFILGRASTIM 3 MG: 6 INJECTION SUBCUTANEOUS at 14:07

## 2022-01-01 RX ADMIN — CEPHALEXIN 500 MG: 500 CAPSULE ORAL at 17:59

## 2022-01-01 RX ADMIN — MENTHOL, METHYL SALICYLATE: 10; 15 CREAM TOPICAL at 05:38

## 2022-01-01 RX ADMIN — UMECLIDINIUM 1 PUFF: 62.5 AEROSOL, POWDER ORAL at 09:20

## 2022-01-01 RX ADMIN — OXYCODONE HYDROCHLORIDE 10 MG: 10 TABLET ORAL at 03:18

## 2022-01-01 RX ADMIN — Medication 10 MG: at 22:07

## 2022-01-01 RX ADMIN — CEFTRIAXONE 1000 MG: 1 INJECTION, SOLUTION INTRAVENOUS at 08:37

## 2022-01-01 RX ADMIN — SODIUM CHLORIDE 20 ML/HR: 0.9 INJECTION, SOLUTION INTRAVENOUS at 08:25

## 2022-01-01 RX ADMIN — UMECLIDINIUM 1 PUFF: 62.5 AEROSOL, POWDER ORAL at 12:38

## 2022-01-01 RX ADMIN — CEFTRIAXONE 1000 MG: 10 INJECTION, POWDER, FOR SOLUTION INTRAVENOUS at 03:30

## 2022-01-01 RX ADMIN — Medication 500 MG: at 09:18

## 2022-01-01 RX ADMIN — Medication 6 MG: at 21:18

## 2022-01-01 RX ADMIN — FAMOTIDINE 20 MG: 10 INJECTION INTRAVENOUS at 09:12

## 2022-01-01 RX ADMIN — PANTOPRAZOLE SODIUM 40 MG: 40 TABLET, DELAYED RELEASE ORAL at 05:30

## 2022-01-01 RX ADMIN — MECLIZINE HYDROCHLORIDE 25 MG: 25 TABLET ORAL at 22:07

## 2022-01-01 RX ADMIN — SODIUM CHLORIDE 20 ML/HR: 0.9 INJECTION, SOLUTION INTRAVENOUS at 08:17

## 2022-01-01 RX ADMIN — GUAIFENESIN 1200 MG: 600 TABLET ORAL at 08:29

## 2022-01-01 RX ADMIN — OXYCODONE HYDROCHLORIDE 10 MG: 10 TABLET ORAL at 20:29

## 2022-01-01 RX ADMIN — ENOXAPARIN SODIUM 40 MG: 40 INJECTION SUBCUTANEOUS at 08:29

## 2022-01-01 RX ADMIN — DEXAMETHASONE SODIUM PHOSPHATE 6 MG: 10 INJECTION, SOLUTION INTRAMUSCULAR; INTRAVENOUS at 08:28

## 2022-01-01 RX ADMIN — CEPHALEXIN 500 MG: 500 CAPSULE ORAL at 05:26

## 2022-01-01 RX ADMIN — UMECLIDINIUM 1 PUFF: 62.5 AEROSOL, POWDER ORAL at 08:28

## 2022-01-01 RX ADMIN — DEXAMETHASONE SODIUM PHOSPHATE 6 MG: 10 INJECTION, SOLUTION INTRAMUSCULAR; INTRAVENOUS at 09:50

## 2022-01-01 RX ADMIN — DEXAMETHASONE SODIUM PHOSPHATE 2 MG: 4 INJECTION INTRA-ARTICULAR; INTRALESIONAL; INTRAMUSCULAR; INTRAVENOUS; SOFT TISSUE at 05:27

## 2022-01-01 RX ADMIN — PACLITAXEL 299.4 MG: 6 INJECTION, SOLUTION, CONCENTRATE INTRAVENOUS at 10:16

## 2022-01-01 RX ADMIN — LEVETIRACETAM 750 MG: 100 INJECTION, SOLUTION INTRAVENOUS at 15:26

## 2022-01-01 RX ADMIN — Medication 500 MG: at 08:29

## 2022-01-01 RX ADMIN — OXYCODONE HYDROCHLORIDE 10 MG: 10 TABLET ORAL at 00:24

## 2022-01-01 RX ADMIN — OXYCODONE HYDROCHLORIDE 10 MG: 10 TABLET ORAL at 10:21

## 2022-01-01 RX ADMIN — Medication 2000 UNITS: at 09:18

## 2022-01-01 RX ADMIN — FOSAPREPITANT 150 MG: 150 INJECTION, POWDER, LYOPHILIZED, FOR SOLUTION INTRAVENOUS at 09:24

## 2022-01-01 RX ADMIN — IOHEXOL 70 ML: 350 INJECTION, SOLUTION INTRAVENOUS at 20:40

## 2022-01-01 RX ADMIN — MORPHINE SULFATE 2 MG: 2 INJECTION, SOLUTION INTRAMUSCULAR; INTRAVENOUS at 04:38

## 2022-01-01 RX ADMIN — DEXAMETHASONE 1 MG: 2 TABLET ORAL at 13:58

## 2022-01-01 RX ADMIN — LEVETIRACETAM 500 MG: 500 TABLET, FILM COATED ORAL at 09:19

## 2022-01-01 RX ADMIN — OXYCODONE HYDROCHLORIDE 10 MG: 10 TABLET ORAL at 09:49

## 2022-01-01 RX ADMIN — Medication 2000 UNITS: at 08:28

## 2022-01-01 RX ADMIN — LEVALBUTEROL HYDROCHLORIDE 1.25 MG: 1.25 SOLUTION, CONCENTRATE RESPIRATORY (INHALATION) at 13:46

## 2022-01-01 RX ADMIN — OXYCODONE HYDROCHLORIDE 10 MG: 10 TABLET ORAL at 21:14

## 2022-01-01 RX ADMIN — ENOXAPARIN SODIUM 40 MG: 40 INJECTION SUBCUTANEOUS at 09:19

## 2022-01-01 RX ADMIN — DEXAMETHASONE SODIUM PHOSPHATE 20 MG: 10 INJECTION, SOLUTION INTRAMUSCULAR; INTRAVENOUS at 08:30

## 2022-01-01 RX ADMIN — Medication 10 MG: at 08:25

## 2022-01-01 RX ADMIN — OXYCODONE HYDROCHLORIDE 10 MG: 10 TABLET ORAL at 21:20

## 2022-01-01 RX ADMIN — ACETAMINOPHEN 975 MG: 325 TABLET ORAL at 08:31

## 2022-01-01 RX ADMIN — Medication 2000 UNITS: at 22:46

## 2022-01-01 RX ADMIN — LEVETIRACETAM 750 MG: 750 TABLET, FILM COATED ORAL at 22:07

## 2022-01-01 RX ADMIN — PANTOPRAZOLE SODIUM 40 MG: 40 TABLET, DELAYED RELEASE ORAL at 05:31

## 2022-01-01 RX ADMIN — OXYCODONE HYDROCHLORIDE 10 MG: 10 TABLET ORAL at 20:58

## 2022-01-01 RX ADMIN — UMECLIDINIUM 1 PUFF: 62.5 AEROSOL, POWDER ORAL at 10:15

## 2022-01-01 RX ADMIN — GUAIFENESIN 1200 MG: 600 TABLET ORAL at 20:58

## 2022-01-01 RX ADMIN — LEVETIRACETAM 500 MG: 500 TABLET, FILM COATED ORAL at 21:04

## 2022-01-01 RX ADMIN — DEXAMETHASONE 1 MG: 2 TABLET ORAL at 09:18

## 2022-01-01 RX ADMIN — OXYCODONE HYDROCHLORIDE 10 MG: 10 TABLET ORAL at 08:31

## 2022-01-01 RX ADMIN — OXYCODONE HYDROCHLORIDE 10 MG: 10 TABLET ORAL at 09:18

## 2022-01-01 RX ADMIN — CARBOPLATIN 438.35 MG: 10 INJECTION, SOLUTION INTRAVENOUS at 13:16

## 2022-01-01 RX ADMIN — PANTOPRAZOLE SODIUM 40 MG: 40 INJECTION, POWDER, FOR SOLUTION INTRAVENOUS at 02:30

## 2022-01-01 RX ADMIN — CYANOCOBALAMIN 1000 MCG: 1000 INJECTION, SOLUTION INTRAMUSCULAR; SUBCUTANEOUS at 09:20

## 2022-01-01 RX ADMIN — IPRATROPIUM BROMIDE 0.5 MG: 0.5 SOLUTION RESPIRATORY (INHALATION) at 13:46

## 2022-01-01 RX ADMIN — PANTOPRAZOLE SODIUM 40 MG: 40 TABLET, DELAYED RELEASE ORAL at 05:12

## 2022-01-01 RX ADMIN — SODIUM CHLORIDE 75 ML/HR: 0.9 INJECTION, SOLUTION INTRAVENOUS at 05:26

## 2022-01-01 RX ADMIN — DEXAMETHASONE SODIUM PHOSPHATE 4 MG: 4 INJECTION INTRA-ARTICULAR; INTRALESIONAL; INTRAMUSCULAR; INTRAVENOUS; SOFT TISSUE at 02:30

## 2022-01-01 RX ADMIN — OXYCODONE HYDROCHLORIDE 10 MG: 10 TABLET ORAL at 06:23

## 2022-01-01 RX ADMIN — LEVALBUTEROL HYDROCHLORIDE 1.25 MG: 1.25 SOLUTION, CONCENTRATE RESPIRATORY (INHALATION) at 19:53

## 2022-01-01 RX ADMIN — UMECLIDINIUM 1 PUFF: 62.5 AEROSOL, POWDER ORAL at 08:32

## 2022-01-01 RX ADMIN — LEVETIRACETAM 750 MG: 750 TABLET, FILM COATED ORAL at 10:11

## 2022-01-01 RX ADMIN — FAMOTIDINE 20 MG: 10 INJECTION INTRAVENOUS at 09:02

## 2022-01-01 RX ADMIN — ACETAMINOPHEN 975 MG: 325 TABLET ORAL at 21:04

## 2022-01-01 RX ADMIN — DEXAMETHASONE SODIUM PHOSPHATE 2 MG: 4 INJECTION INTRA-ARTICULAR; INTRALESIONAL; INTRAMUSCULAR; INTRAVENOUS; SOFT TISSUE at 12:38

## 2022-01-01 RX ADMIN — LEVETIRACETAM 750 MG: 750 TABLET, FILM COATED ORAL at 09:47

## 2022-01-01 RX ADMIN — ENOXAPARIN SODIUM 40 MG: 40 INJECTION SUBCUTANEOUS at 13:58

## 2022-01-01 RX ADMIN — UMECLIDINIUM 1 PUFF: 62.5 AEROSOL, POWDER ORAL at 17:03

## 2022-01-01 RX ADMIN — SODIUM CHLORIDE 8 MG/HR: 9 INJECTION, SOLUTION INTRAVENOUS at 21:07

## 2022-01-01 RX ADMIN — OXYCODONE HYDROCHLORIDE 10 MG: 10 TABLET ORAL at 14:49

## 2022-01-01 RX ADMIN — HYDROMORPHONE HYDROCHLORIDE 0.2 MG: 0.2 INJECTION, SOLUTION INTRAMUSCULAR; INTRAVENOUS; SUBCUTANEOUS at 14:21

## 2022-01-01 RX ADMIN — Medication 10 MG: at 00:45

## 2022-01-01 RX ADMIN — AZITHROMYCIN MONOHYDRATE 500 MG: 500 INJECTION, POWDER, LYOPHILIZED, FOR SOLUTION INTRAVENOUS at 10:09

## 2022-01-01 RX ADMIN — CEPHALEXIN 500 MG: 500 CAPSULE ORAL at 05:30

## 2022-01-01 RX ADMIN — OXYCODONE HYDROCHLORIDE 10 MG: 10 TABLET ORAL at 12:21

## 2022-01-01 RX ADMIN — OXYCODONE HYDROCHLORIDE 10 MG: 10 TABLET ORAL at 08:32

## 2022-01-01 RX ADMIN — SODIUM CHLORIDE 1000 ML: 0.9 INJECTION, SOLUTION INTRAVENOUS at 13:05

## 2022-01-01 RX ADMIN — LEVETIRACETAM 750 MG: 100 SOLUTION ORAL at 08:28

## 2022-01-01 RX ADMIN — DIPHENHYDRAMINE HYDROCHLORIDE 25 MG: 50 INJECTION, SOLUTION INTRAMUSCULAR; INTRAVENOUS at 08:52

## 2022-01-01 RX ADMIN — PANTOPRAZOLE SODIUM 40 MG: 40 TABLET, DELAYED RELEASE ORAL at 05:26

## 2022-01-01 RX ADMIN — HEPARIN SODIUM 5200 UNITS: 1000 INJECTION INTRAVENOUS; SUBCUTANEOUS at 22:34

## 2022-01-01 RX ADMIN — DEXAMETHASONE SODIUM PHOSPHATE 6 MG: 10 INJECTION, SOLUTION INTRAMUSCULAR; INTRAVENOUS at 10:11

## 2022-01-01 RX ADMIN — IPRATROPIUM BROMIDE 0.5 MG: 0.5 SOLUTION RESPIRATORY (INHALATION) at 07:30

## 2022-01-01 RX ADMIN — OXYCODONE HYDROCHLORIDE 10 MG: 10 TABLET ORAL at 06:28

## 2022-01-01 RX ADMIN — MORPHINE SULFATE 2 MG: 2 INJECTION, SOLUTION INTRAMUSCULAR; INTRAVENOUS at 22:07

## 2022-01-01 RX ADMIN — PEGFILGRASTIM 3 MG: 6 INJECTION SUBCUTANEOUS at 15:05

## 2022-01-01 RX ADMIN — BEVACIZUMAB 900 MG: 400 INJECTION, SOLUTION INTRAVENOUS at 14:14

## 2022-01-01 RX ADMIN — OXYCODONE HYDROCHLORIDE 5 MG: 5 TABLET ORAL at 08:25

## 2022-01-01 RX ADMIN — Medication 3 MG: at 21:20

## 2022-01-01 RX ADMIN — IOHEXOL 70 ML: 350 INJECTION, SOLUTION INTRAVENOUS at 21:22

## 2022-01-01 RX ADMIN — IPRATROPIUM BROMIDE 0.5 MG: 0.5 SOLUTION RESPIRATORY (INHALATION) at 14:07

## 2022-01-01 RX ADMIN — IPRATROPIUM BROMIDE 0.5 MG: 0.5 SOLUTION RESPIRATORY (INHALATION) at 07:26

## 2022-01-01 RX ADMIN — CEPHALEXIN 500 MG: 500 CAPSULE ORAL at 00:03

## 2022-01-01 RX ADMIN — LEVETIRACETAM 750 MG: 100 INJECTION, SOLUTION INTRAVENOUS at 04:00

## 2022-01-01 RX ADMIN — LEVALBUTEROL HYDROCHLORIDE 1.25 MG: 1.25 SOLUTION, CONCENTRATE RESPIRATORY (INHALATION) at 07:30

## 2022-01-01 RX ADMIN — SODIUM CHLORIDE 1000 ML: 0.9 INJECTION, SOLUTION INTRAVENOUS at 14:22

## 2022-01-01 RX ADMIN — SODIUM CHLORIDE 125 ML/HR: 0.9 INJECTION, SOLUTION INTRAVENOUS at 03:12

## 2022-01-01 RX ADMIN — OXYCODONE HYDROCHLORIDE 5 MG: 5 TABLET ORAL at 00:25

## 2022-01-01 RX ADMIN — Medication 3 MG: at 00:24

## 2022-01-01 RX ADMIN — PIPERACILLIN AND TAZOBACTAM 3.38 G: 36; 4.5 INJECTION, POWDER, FOR SOLUTION INTRAVENOUS at 21:19

## 2022-01-01 RX ADMIN — CARBOPLATIN 593.45 MG: 10 INJECTION, SOLUTION INTRAVENOUS at 13:10

## 2022-01-01 RX ADMIN — IPRATROPIUM BROMIDE 0.5 MG: 0.5 SOLUTION RESPIRATORY (INHALATION) at 19:53

## 2022-01-01 RX ADMIN — BEVACIZUMAB 900 MG: 400 INJECTION, SOLUTION INTRAVENOUS at 14:19

## 2022-01-01 RX ADMIN — DEXAMETHASONE 1 MG: 2 TABLET ORAL at 08:29

## 2022-01-01 RX ADMIN — INSULIN LISPRO 1 UNITS: 100 INJECTION, SOLUTION INTRAVENOUS; SUBCUTANEOUS at 16:51

## 2022-01-01 RX ADMIN — Medication 3 MG: at 21:46

## 2022-01-01 RX ADMIN — OXYCODONE HYDROCHLORIDE 10 MG: 10 TABLET ORAL at 13:58

## 2022-01-01 RX ADMIN — DEXAMETHASONE SODIUM PHOSPHATE 20 MG: 10 INJECTION, SOLUTION INTRAMUSCULAR; INTRAVENOUS at 08:18

## 2022-01-01 RX ADMIN — SODIUM CHLORIDE, SODIUM LACTATE, POTASSIUM CHLORIDE, AND CALCIUM CHLORIDE 1000 ML: .6; .31; .03; .02 INJECTION, SOLUTION INTRAVENOUS at 08:41

## 2022-01-01 RX ADMIN — LEVETIRACETAM 500 MG: 250 TABLET, FILM COATED ORAL at 21:20

## 2022-01-01 RX ADMIN — CEFTRIAXONE 1000 MG: 1 INJECTION, SOLUTION INTRAVENOUS at 12:31

## 2022-01-01 RX ADMIN — CEFTRIAXONE 1000 MG: 1 INJECTION, SOLUTION INTRAVENOUS at 09:20

## 2022-01-01 RX ADMIN — LEVETIRACETAM 500 MG: 500 TABLET, FILM COATED ORAL at 08:31

## 2022-01-01 RX ADMIN — LEVETIRACETAM 500 MG: 500 TABLET, FILM COATED ORAL at 21:20

## 2022-01-01 RX ADMIN — HEPARIN SODIUM 18 UNITS/KG/HR: 10000 INJECTION, SOLUTION INTRAVENOUS at 22:33

## 2022-01-01 RX ADMIN — INSULIN LISPRO 1 UNITS: 100 INJECTION, SOLUTION INTRAVENOUS; SUBCUTANEOUS at 17:03

## 2022-01-01 RX ADMIN — GUAIFENESIN 1200 MG: 600 TABLET ORAL at 09:19

## 2022-01-01 RX ADMIN — FOSAPREPITANT 150 MG: 150 INJECTION, POWDER, LYOPHILIZED, FOR SOLUTION INTRAVENOUS at 09:35

## 2022-01-01 RX ADMIN — CEPHALEXIN 500 MG: 500 CAPSULE ORAL at 11:56

## 2022-01-01 RX ADMIN — MENTHOL, METHYL SALICYLATE 1 APPLICATION: 10; 15 CREAM TOPICAL at 06:28

## 2022-01-01 RX ADMIN — HYDROMORPHONE HYDROCHLORIDE 0.2 MG: 0.2 INJECTION, SOLUTION INTRAMUSCULAR; INTRAVENOUS; SUBCUTANEOUS at 00:04

## 2022-01-01 RX ADMIN — LEVETIRACETAM 750 MG: 100 SOLUTION ORAL at 20:29

## 2022-01-01 RX ADMIN — LEVALBUTEROL HYDROCHLORIDE 1.25 MG: 1.25 SOLUTION, CONCENTRATE RESPIRATORY (INHALATION) at 14:07

## 2022-01-01 RX ADMIN — Medication 3 MG: at 21:04

## 2022-01-01 RX ADMIN — LEVETIRACETAM 500 MG: 500 TABLET, FILM COATED ORAL at 09:04

## 2022-01-01 RX ADMIN — LEVETIRACETAM 500 MG: 500 TABLET, FILM COATED ORAL at 08:29

## 2022-01-01 RX ADMIN — HYDROMORPHONE HYDROCHLORIDE 0.2 MG: 0.2 INJECTION, SOLUTION INTRAMUSCULAR; INTRAVENOUS; SUBCUTANEOUS at 11:48

## 2022-01-01 RX ADMIN — DIPHENHYDRAMINE HYDROCHLORIDE 25 MG: 50 INJECTION, SOLUTION INTRAMUSCULAR; INTRAVENOUS at 08:39

## 2022-01-01 RX ADMIN — CEPHALEXIN 500 MG: 500 CAPSULE ORAL at 12:21

## 2022-01-01 RX ADMIN — LEVETIRACETAM 500 MG: 500 TABLET, FILM COATED ORAL at 13:58

## 2022-01-01 RX ADMIN — HEPARIN SODIUM 15 UNITS/KG/HR: 10000 INJECTION, SOLUTION INTRAVENOUS at 07:15

## 2022-01-01 RX ADMIN — PACLITAXEL 299.4 MG: 6 INJECTION, SOLUTION, CONCENTRATE INTRAVENOUS at 10:06

## 2022-01-01 RX ADMIN — HEPARIN SODIUM 15 UNITS/KG/HR: 10000 INJECTION, SOLUTION INTRAVENOUS at 21:10

## 2022-01-01 RX ADMIN — HYDROMORPHONE HYDROCHLORIDE 0.2 MG: 0.2 INJECTION, SOLUTION INTRAMUSCULAR; INTRAVENOUS; SUBCUTANEOUS at 10:03

## 2022-01-01 RX ADMIN — GUAIFENESIN 1200 MG: 600 TABLET ORAL at 21:20

## 2022-01-01 RX ADMIN — Medication 500 MG: at 13:58

## 2022-01-01 RX ADMIN — LEVALBUTEROL HYDROCHLORIDE 1.25 MG: 1.25 SOLUTION, CONCENTRATE RESPIRATORY (INHALATION) at 07:26

## 2022-01-01 RX ADMIN — SODIUM CHLORIDE, SODIUM LACTATE, POTASSIUM CHLORIDE, AND CALCIUM CHLORIDE 1000 ML: .6; .31; .03; .02 INJECTION, SOLUTION INTRAVENOUS at 09:50

## 2022-01-01 RX ADMIN — INSULIN LISPRO 2 UNITS: 100 INJECTION, SOLUTION INTRAVENOUS; SUBCUTANEOUS at 12:50

## 2022-01-01 RX ADMIN — LEVETIRACETAM 500 MG: 500 TABLET, FILM COATED ORAL at 20:58

## 2022-01-01 RX ADMIN — CYANOCOBALAMIN 1000 MCG: 1000 INJECTION, SOLUTION INTRAMUSCULAR; SUBCUTANEOUS at 15:24

## 2022-01-01 RX ADMIN — Medication 6 MG: at 22:06

## 2022-01-01 RX ADMIN — Medication 10 MG: at 20:29

## 2022-01-01 RX ADMIN — SODIUM CHLORIDE 80 MG: 9 INJECTION, SOLUTION INTRAVENOUS at 20:49

## 2022-01-01 RX ADMIN — LEVETIRACETAM 750 MG: 100 SOLUTION ORAL at 00:45

## 2022-01-21 ENCOUNTER — VBI (OUTPATIENT)
Dept: ADMINISTRATIVE | Facility: OTHER | Age: 57
End: 2022-01-21

## 2022-01-30 ENCOUNTER — HOSPITAL ENCOUNTER (OUTPATIENT)
Dept: CT IMAGING | Facility: HOSPITAL | Age: 57
Discharge: HOME | End: 2022-01-30
Attending: FAMILY MEDICINE
Payer: COMMERCIAL

## 2022-01-30 DIAGNOSIS — Z12.2 ENCOUNTER FOR SCREENING FOR LUNG CANCER: ICD-10-CM

## 2022-01-30 PROCEDURE — 71271 CT THORAX LUNG CANCER SCR C-: CPT

## 2022-03-14 ENCOUNTER — HOSPITAL ENCOUNTER (INPATIENT)
Facility: HOSPITAL | Age: 57
LOS: 5 days | Discharge: HOME | DRG: 054 | End: 2022-03-19
Attending: INTERNAL MEDICINE | Admitting: FAMILY MEDICINE
Payer: COMMERCIAL

## 2022-03-14 ENCOUNTER — APPOINTMENT (EMERGENCY)
Dept: CT IMAGING | Facility: HOSPITAL | Age: 57
End: 2022-03-14
Payer: COMMERCIAL

## 2022-03-14 ENCOUNTER — HOSPITAL ENCOUNTER (EMERGENCY)
Facility: HOSPITAL | Age: 57
End: 2022-03-14
Attending: EMERGENCY MEDICINE | Admitting: EMERGENCY MEDICINE
Payer: COMMERCIAL

## 2022-03-14 ENCOUNTER — APPOINTMENT (EMERGENCY)
Dept: RADIOLOGY | Facility: HOSPITAL | Age: 57
End: 2022-03-14
Payer: COMMERCIAL

## 2022-03-14 VITALS
DIASTOLIC BLOOD PRESSURE: 80 MMHG | TEMPERATURE: 97.6 F | RESPIRATION RATE: 16 BRPM | OXYGEN SATURATION: 95 % | HEART RATE: 74 BPM | SYSTOLIC BLOOD PRESSURE: 127 MMHG

## 2022-03-14 DIAGNOSIS — G93.89 PNEUMOCEPHALUS: ICD-10-CM

## 2022-03-14 DIAGNOSIS — R91.8 LUNG MASS: Primary | ICD-10-CM

## 2022-03-14 DIAGNOSIS — G93.89 BRAIN MASS: Primary | ICD-10-CM

## 2022-03-14 DIAGNOSIS — G93.89 MASS OF BRAIN: ICD-10-CM

## 2022-03-14 DIAGNOSIS — H53.8 BLURRY VISION, BILATERAL: ICD-10-CM

## 2022-03-14 DIAGNOSIS — R91.8 MASS OF UPPER LOBE OF RIGHT LUNG: ICD-10-CM

## 2022-03-14 DIAGNOSIS — R51.9 HEADACHE: ICD-10-CM

## 2022-03-14 PROBLEM — G93.5 BRAIN COMPRESSION (HCC): Status: ACTIVE | Noted: 2022-01-01

## 2022-03-14 PROBLEM — G93.6 CEREBRAL EDEMA (HCC): Status: ACTIVE | Noted: 2022-01-01

## 2022-03-14 PROBLEM — Z87.891 HISTORY OF TOBACCO USE: Status: ACTIVE | Noted: 2022-03-14

## 2022-03-14 LAB
2HR DELTA HS TROPONIN: -1 NG/L
ALBUMIN SERPL BCP-MCNC: 3.7 G/DL (ref 3.5–5)
ALP SERPL-CCNC: 63 U/L (ref 46–116)
ALT SERPL W P-5'-P-CCNC: 20 U/L (ref 12–78)
ANION GAP SERPL CALCULATED.3IONS-SCNC: 8 MMOL/L (ref 4–13)
APTT PPP: 33 SECONDS (ref 23–37)
AST SERPL W P-5'-P-CCNC: 18 U/L (ref 5–45)
BASOPHILS # BLD AUTO: 0.07 THOUSANDS/ΜL (ref 0–0.1)
BASOPHILS NFR BLD AUTO: 1 % (ref 0–1)
BILIRUB SERPL-MCNC: 0.61 MG/DL (ref 0.2–1)
BILIRUB UR QL STRIP: NEGATIVE
BUN SERPL-MCNC: 20 MG/DL (ref 5–25)
CALCIUM SERPL-MCNC: 9.6 MG/DL (ref 8.3–10.1)
CARDIAC TROPONIN I PNL SERPL HS: 4 NG/L
CARDIAC TROPONIN I PNL SERPL HS: 5 NG/L
CHLORIDE SERPL-SCNC: 104 MMOL/L (ref 100–108)
CLARITY UR: CLEAR
CO2 SERPL-SCNC: 28 MMOL/L (ref 21–32)
COLOR UR: YELLOW
CREAT SERPL-MCNC: 1.07 MG/DL (ref 0.6–1.3)
EOSINOPHIL # BLD AUTO: 0.15 THOUSAND/ΜL (ref 0–0.61)
EOSINOPHIL NFR BLD AUTO: 1 % (ref 0–6)
ERYTHROCYTE [DISTWIDTH] IN BLOOD BY AUTOMATED COUNT: 13.6 % (ref 11.6–15.1)
GFR SERPL CREATININE-BSD FRML MDRD: 77 ML/MIN/1.73SQ M
GLUCOSE SERPL-MCNC: 108 MG/DL (ref 65–140)
GLUCOSE SERPL-MCNC: 117 MG/DL (ref 65–140)
GLUCOSE SERPL-MCNC: 138 MG/DL (ref 65–140)
GLUCOSE SERPL-MCNC: 185 MG/DL (ref 65–140)
GLUCOSE UR STRIP-MCNC: NEGATIVE MG/DL
HCT VFR BLD AUTO: 47.7 % (ref 36.5–49.3)
HGB BLD-MCNC: 16.2 G/DL (ref 12–17)
HGB UR QL STRIP.AUTO: NEGATIVE
IMM GRANULOCYTES # BLD AUTO: 0.05 THOUSAND/UL (ref 0–0.2)
IMM GRANULOCYTES NFR BLD AUTO: 0 % (ref 0–2)
INR PPP: 0.86 (ref 0.84–1.19)
KETONES UR STRIP-MCNC: NEGATIVE MG/DL
LEUKOCYTE ESTERASE UR QL STRIP: NEGATIVE
LIPASE SERPL-CCNC: 64 U/L (ref 73–393)
LYMPHOCYTES # BLD AUTO: 2.59 THOUSANDS/ΜL (ref 0.6–4.47)
LYMPHOCYTES NFR BLD AUTO: 23 % (ref 14–44)
MAGNESIUM SERPL-MCNC: 2 MG/DL (ref 1.6–2.6)
MCH RBC QN AUTO: 30.2 PG (ref 26.8–34.3)
MCHC RBC AUTO-ENTMCNC: 34 G/DL (ref 31.4–37.4)
MCV RBC AUTO: 89 FL (ref 82–98)
MONOCYTES # BLD AUTO: 0.9 THOUSAND/ΜL (ref 0.17–1.22)
MONOCYTES NFR BLD AUTO: 8 % (ref 4–12)
NEUTROPHILS # BLD AUTO: 7.48 THOUSANDS/ΜL (ref 1.85–7.62)
NEUTS SEG NFR BLD AUTO: 67 % (ref 43–75)
NITRITE UR QL STRIP: NEGATIVE
NRBC BLD AUTO-RTO: 0 /100 WBCS
PH UR STRIP.AUTO: 6 [PH]
PLATELET # BLD AUTO: 361 THOUSANDS/UL (ref 149–390)
PMV BLD AUTO: 8.9 FL (ref 8.9–12.7)
POTASSIUM SERPL-SCNC: 4.1 MMOL/L (ref 3.5–5.3)
PROT SERPL-MCNC: 7.7 G/DL (ref 6.4–8.2)
PROT UR STRIP-MCNC: NEGATIVE MG/DL
PROTHROMBIN TIME: 11.8 SECONDS (ref 11.6–14.5)
RBC # BLD AUTO: 5.36 MILLION/UL (ref 3.88–5.62)
SODIUM SERPL-SCNC: 140 MMOL/L (ref 136–145)
SP GR UR STRIP.AUTO: 1.01 (ref 1–1.03)
TSH SERPL DL<=0.05 MIU/L-ACNC: 1.71 UIU/ML (ref 0.36–3.74)
UROBILINOGEN UR QL STRIP.AUTO: 0.2 E.U./DL
WBC # BLD AUTO: 11.24 THOUSAND/UL (ref 4.31–10.16)

## 2022-03-14 PROCEDURE — 99285 EMERGENCY DEPT VISIT HI MDM: CPT

## 2022-03-14 PROCEDURE — 99223 1ST HOSP IP/OBS HIGH 75: CPT | Performed by: FAMILY MEDICINE

## 2022-03-14 PROCEDURE — 85610 PROTHROMBIN TIME: CPT | Performed by: PHYSICIAN ASSISTANT

## 2022-03-14 PROCEDURE — 71045 X-RAY EXAM CHEST 1 VIEW: CPT

## 2022-03-14 PROCEDURE — 96361 HYDRATE IV INFUSION ADD-ON: CPT

## 2022-03-14 PROCEDURE — 99285 EMERGENCY DEPT VISIT HI MDM: CPT | Performed by: PHYSICIAN ASSISTANT

## 2022-03-14 PROCEDURE — 96375 TX/PRO/DX INJ NEW DRUG ADDON: CPT

## 2022-03-14 PROCEDURE — 85025 COMPLETE CBC W/AUTO DIFF WBC: CPT | Performed by: PHYSICIAN ASSISTANT

## 2022-03-14 PROCEDURE — 80053 COMPREHEN METABOLIC PANEL: CPT | Performed by: PHYSICIAN ASSISTANT

## 2022-03-14 PROCEDURE — 96374 THER/PROPH/DIAG INJ IV PUSH: CPT

## 2022-03-14 PROCEDURE — 82948 REAGENT STRIP/BLOOD GLUCOSE: CPT

## 2022-03-14 PROCEDURE — 70450 CT HEAD/BRAIN W/O DYE: CPT

## 2022-03-14 PROCEDURE — G1004 CDSM NDSC: HCPCS

## 2022-03-14 PROCEDURE — 85730 THROMBOPLASTIN TIME PARTIAL: CPT | Performed by: PHYSICIAN ASSISTANT

## 2022-03-14 PROCEDURE — 84443 ASSAY THYROID STIM HORMONE: CPT | Performed by: PHYSICIAN ASSISTANT

## 2022-03-14 PROCEDURE — 99223 1ST HOSP IP/OBS HIGH 75: CPT | Performed by: INTERNAL MEDICINE

## 2022-03-14 PROCEDURE — 71260 CT THORAX DX C+: CPT

## 2022-03-14 PROCEDURE — 36415 COLL VENOUS BLD VENIPUNCTURE: CPT | Performed by: PHYSICIAN ASSISTANT

## 2022-03-14 PROCEDURE — 99223 1ST HOSP IP/OBS HIGH 75: CPT | Performed by: NEUROLOGICAL SURGERY

## 2022-03-14 PROCEDURE — 93005 ELECTROCARDIOGRAM TRACING: CPT

## 2022-03-14 PROCEDURE — 84484 ASSAY OF TROPONIN QUANT: CPT | Performed by: PHYSICIAN ASSISTANT

## 2022-03-14 PROCEDURE — 81003 URINALYSIS AUTO W/O SCOPE: CPT | Performed by: PHYSICIAN ASSISTANT

## 2022-03-14 PROCEDURE — 83735 ASSAY OF MAGNESIUM: CPT | Performed by: PHYSICIAN ASSISTANT

## 2022-03-14 PROCEDURE — 74177 CT ABD & PELVIS W/CONTRAST: CPT

## 2022-03-14 PROCEDURE — 83690 ASSAY OF LIPASE: CPT | Performed by: PHYSICIAN ASSISTANT

## 2022-03-14 RX ORDER — MAGNESIUM HYDROXIDE/ALUMINUM HYDROXICE/SIMETHICONE 120; 1200; 1200 MG/30ML; MG/30ML; MG/30ML
30 SUSPENSION ORAL EVERY 6 HOURS PRN
Status: DISCONTINUED | OUTPATIENT
Start: 2022-03-14 | End: 2022-03-19 | Stop reason: HOSPADM

## 2022-03-14 RX ORDER — MELATONIN
1000 DAILY
Status: DISCONTINUED | OUTPATIENT
Start: 2022-03-14 | End: 2022-03-19 | Stop reason: HOSPADM

## 2022-03-14 RX ORDER — DEXAMETHASONE SODIUM PHOSPHATE 4 MG/ML
4 INJECTION, SOLUTION INTRA-ARTICULAR; INTRALESIONAL; INTRAMUSCULAR; INTRAVENOUS; SOFT TISSUE EVERY 6 HOURS SCHEDULED
Status: DISCONTINUED | OUTPATIENT
Start: 2022-03-14 | End: 2022-03-19 | Stop reason: HOSPADM

## 2022-03-14 RX ORDER — ONDANSETRON 2 MG/ML
4 INJECTION INTRAMUSCULAR; INTRAVENOUS EVERY 6 HOURS PRN
Status: DISCONTINUED | OUTPATIENT
Start: 2022-03-14 | End: 2022-03-19 | Stop reason: HOSPADM

## 2022-03-14 RX ORDER — DEXAMETHASONE SODIUM PHOSPHATE 4 MG/ML
10 INJECTION, SOLUTION INTRA-ARTICULAR; INTRALESIONAL; INTRAMUSCULAR; INTRAVENOUS; SOFT TISSUE ONCE
Status: COMPLETED | OUTPATIENT
Start: 2022-03-14 | End: 2022-03-14

## 2022-03-14 RX ORDER — DOCUSATE SODIUM 100 MG/1
100 CAPSULE, LIQUID FILLED ORAL 2 TIMES DAILY
Status: DISCONTINUED | OUTPATIENT
Start: 2022-03-14 | End: 2022-03-19 | Stop reason: HOSPADM

## 2022-03-14 RX ORDER — ACETAMINOPHEN 325 MG/1
650 TABLET ORAL EVERY 6 HOURS PRN
Status: DISCONTINUED | OUTPATIENT
Start: 2022-03-14 | End: 2022-03-19 | Stop reason: HOSPADM

## 2022-03-14 RX ORDER — KETOROLAC TROMETHAMINE 30 MG/ML
15 INJECTION, SOLUTION INTRAMUSCULAR; INTRAVENOUS ONCE
Status: COMPLETED | OUTPATIENT
Start: 2022-03-14 | End: 2022-03-14

## 2022-03-14 RX ADMIN — LEVETIRACETAM 500 MG: 100 INJECTION INTRAVENOUS at 20:03

## 2022-03-14 RX ADMIN — Medication 1000 UNITS: at 15:38

## 2022-03-14 RX ADMIN — INSULIN LISPRO 1 UNITS: 100 INJECTION, SOLUTION INTRAVENOUS; SUBCUTANEOUS at 17:43

## 2022-03-14 RX ADMIN — DEXAMETHASONE SODIUM PHOSPHATE 10 MG: 4 INJECTION INTRA-ARTICULAR; INTRALESIONAL; INTRAMUSCULAR; INTRAVENOUS; SOFT TISSUE at 07:19

## 2022-03-14 RX ADMIN — SODIUM CHLORIDE 1000 ML: 0.9 INJECTION, SOLUTION INTRAVENOUS at 05:33

## 2022-03-14 RX ADMIN — KETOROLAC TROMETHAMINE 15 MG: 30 INJECTION, SOLUTION INTRAMUSCULAR at 05:32

## 2022-03-14 RX ADMIN — DOCUSATE SODIUM 100 MG: 100 CAPSULE ORAL at 17:43

## 2022-03-14 RX ADMIN — DEXAMETHASONE SODIUM PHOSPHATE 4 MG: 4 INJECTION INTRA-ARTICULAR; INTRALESIONAL; INTRAMUSCULAR; INTRAVENOUS; SOFT TISSUE at 17:43

## 2022-03-14 RX ADMIN — IOHEXOL 100 ML: 350 INJECTION, SOLUTION INTRAVENOUS at 07:04

## 2022-03-14 RX ADMIN — LEVETIRACETAM 750 MG: 100 INJECTION, SOLUTION INTRAVENOUS at 07:19

## 2022-03-14 NOTE — DISCHARGE INSTRUCTIONS
Estuardo Salinas MD  [phone] 3/14/2022 STAT     Narrative & Impression  CT BRAIN - WITHOUT CONTRAST     INDICATION:   Dull aching nonradiating frontal head pain and blurry vision  COMPARISON:  None  TECHNIQUE:  CT examination of the brain was performed  In addition to axial images, sagittal and coronal 2D reformatted images were created and submitted for interpretation  Radiation dose length product (DLP) for this visit:  938 mGy-cm   This examination, like all CT scans performed in the [facility], was performed utilizing techniques to minimize radiation dose exposure, including the use of iterative   reconstruction and automated exposure control  IMAGE QUALITY:  Diagnostic  FINDINGS:     PARENCHYMA:  There is a 2 9 cm hyperdense mass at the medial right occipital lobe with marked surrounding vasogenic edema  There is a hypodense lesion at the inferior left frontal lobe measuring 11 mm with moderate surrounding vasogenic edema  The   findings are suspicious for metastatic disease  No CT signs of acute infarction  No acute parenchymal hemorrhage  VENTRICLES AND EXTRA-AXIAL SPACES:  Normal for the patient's age  VISUALIZED ORBITS AND PARANASAL SINUSES:  Unremarkable  CALVARIUM AND EXTRACRANIAL SOFT TISSUES:  Normal      IMPRESSION:     Hyperdense masses noted at the right occipital lobe and inferior left frontal lobes with surrounding vasogenic edema, suspicious for hemorrhagic metastases  An enhanced MRI of the brain is recommended for further evaluation       Workstation performed: [id number]

## 2022-03-14 NOTE — ED PROVIDER NOTES
History  Chief Complaint   Patient presents with    Hypertension     Pt states he has had increased BP  blurry vision and a headache x1 week  Pt denies any CP / SOB or other sx at this time   Blurred Vision     Patient is a 59-year-old male [place] speaking only, history of diabetes mellitus currently on metformin 500 mg daily the presents of metformin with elevated blood pressure readings for 1 week  Patient has associated nausea total aching nonradiating frontal head pain and blurry vision began with representation of elevated blood pressures  Patient reports systolic blood pressure as high as 150s within the past week  Patient presents with his wife this evening that provides per patient history  Patient denies any medications  Patient has history of stroke, seizures, concussion  Patient states that he is compliant with all medications  The patient denies pain at this time  Patient denies palliative or provocative factors  Patient identification a profession as peers, chills, nausea, vomiting, diarrhea, constipation, urinary symptoms  Patient's recent fall or recent trauma  Patient denies vision loss  Patient denies no eye glasses or corrective lenses  Patient denies tinnitus, dizziness, meningeal, and vertiginous symptoms  Patient denies chest pain, shortness of breath, and abdominal pain  History provided by:  Patient   used: Yes (Cornelio)        Prior to Admission Medications   Prescriptions Last Dose Informant Patient Reported? Taking?    BD Pen Needle Jeaneth 2nd Gen 32G X 4 MM MISC  Child Yes No   Sig: USE ONCE DAILY WITH LANTUS   Blood Glucose Monitoring Suppl (FreeStyle Lite) FIDELIA  Child Yes No   Cholecalciferol (Vitamin D3) 125 MCG (5000 UT) CAPS  Child No No   Sig: Take 1 capsule (5,000 Units total) by mouth daily   FREESTYLE LITE test strip  Child No No   Sig: Check blood sugar  daily   Lancets (freestyle) lancets  Child No No   Sig: Check blood sugar daily metFORMIN (GLUCOPHAGE-XR) 500 mg 24 hr tablet  Child No No   Sig: Take 2 tablets (1,000 mg total) by mouth daily with dinner      Facility-Administered Medications: None       Past Medical History:   Diagnosis Date    Elevated PSA 3/11/2021    Fatty liver 3/11/2021    Gall bladder polyp 3/11/2021    Nonimmune to hepatitis B virus 3/11/2021       History reviewed  No pertinent surgical history  History reviewed  No pertinent family history  I have reviewed and agree with the history as documented  E-Cigarette/Vaping    E-Cigarette Use Never User      E-Cigarette/Vaping Substances     Social History     Tobacco Use    Smoking status: Former Smoker     Packs/day: 0 50     Types: Cigarettes     Quit date: 2022     Years since quittin 1    Smokeless tobacco: Never Used   Vaping Use    Vaping Use: Never used   Substance Use Topics    Alcohol use: Not Currently    Drug use: Never       Review of Systems   Constitutional: Negative for activity change, appetite change, chills and fever  HENT: Negative for congestion, postnasal drip, rhinorrhea, sinus pressure, sinus pain, sore throat and tinnitus  Eyes: Negative for photophobia and visual disturbance  Respiratory: Negative for cough, chest tightness and shortness of breath  Cardiovascular: Negative for chest pain and palpitations  Elevated blood pressure    Gastrointestinal: Negative for abdominal pain, constipation, diarrhea, nausea and vomiting  Genitourinary: Negative for difficulty urinating, dysuria, flank pain, frequency and urgency  Musculoskeletal: Negative for back pain, gait problem, neck pain and neck stiffness  Skin: Negative for pallor and rash  Allergic/Immunologic: Negative for environmental allergies and food allergies  Neurological: Positive for headaches  Negative for dizziness, weakness and numbness  Psychiatric/Behavioral: Negative for confusion     All other systems reviewed and are negative  Physical Exam  Physical Exam  Vitals and nursing note reviewed  Constitutional:       General: He is awake  Appearance: Normal appearance  He is well-developed  He is not ill-appearing, toxic-appearing or diaphoretic  Comments: /89 (BP Location: Right arm)   Pulse 64   Resp 18   SpO2 96%      HENT:      Head: Normocephalic and atraumatic  Right Ear: Hearing, tympanic membrane, ear canal and external ear normal  No decreased hearing noted  No drainage, swelling or tenderness  No mastoid tenderness  Left Ear: Hearing, tympanic membrane, ear canal and external ear normal  No decreased hearing noted  No drainage, swelling or tenderness  No mastoid tenderness  Nose: Nose normal       Mouth/Throat:      Lips: Pink  Mouth: Mucous membranes are moist       Pharynx: Oropharynx is clear  Uvula midline  Eyes:      General: Lids are normal  Vision grossly intact  Right eye: No discharge  Left eye: No discharge  Extraocular Movements: Extraocular movements intact  Conjunctiva/sclera: Conjunctivae normal       Pupils: Pupils are equal, round, and reactive to light  Neck:      Thyroid: No thyroid mass  Vascular: No JVD  Trachea: Trachea and phonation normal  No tracheal tenderness or tracheal deviation  Cardiovascular:      Rate and Rhythm: Normal rate and regular rhythm  Pulses: Normal pulses  Radial pulses are 2+ on the right side and 2+ on the left side  Posterior tibial pulses are 2+ on the right side and 2+ on the left side  Heart sounds: Normal heart sounds  Pulmonary:      Effort: Pulmonary effort is normal       Breath sounds: Normal breath sounds  No stridor  No decreased breath sounds, wheezing, rhonchi or rales  Chest:      Chest wall: No tenderness  Abdominal:      General: Abdomen is flat  Bowel sounds are normal  There is no distension  Palpations: Abdomen is soft   Abdomen is not rigid  Tenderness: There is no abdominal tenderness  There is no guarding or rebound  Musculoskeletal:         General: Normal range of motion  Cervical back: Full passive range of motion without pain, normal range of motion and neck supple  No rigidity  No spinous process tenderness or muscular tenderness  Normal range of motion  Comments: Passive ROM intact  Upper and lower extremity 4/5 bilaterally  Neurovascularly intact  No grinding or clicking of joints       Lymphadenopathy:      Head:      Right side of head: No submental, submandibular, tonsillar, preauricular, posterior auricular or occipital adenopathy  Left side of head: No submental, submandibular, tonsillar, preauricular, posterior auricular or occipital adenopathy  Cervical: No cervical adenopathy  Right cervical: No superficial, deep or posterior cervical adenopathy  Left cervical: No superficial, deep or posterior cervical adenopathy  Skin:     General: Skin is warm  Capillary Refill: Capillary refill takes less than 2 seconds  Neurological:      General: No focal deficit present  Mental Status: He is alert and oriented to person, place, and time  GCS: GCS eye subscore is 4  GCS verbal subscore is 5  GCS motor subscore is 6  Cranial Nerves: Cranial nerves are intact  Sensory: Sensation is intact  No sensory deficit  Motor: Motor function is intact  Coordination: Coordination is intact  Gait: Gait is intact  Deep Tendon Reflexes: Reflexes are normal and symmetric  Reflex Scores:       Patellar reflexes are 2+ on the right side and 2+ on the left side  Psychiatric:         Mood and Affect: Mood normal          Speech: Speech normal          Behavior: Behavior normal  Behavior is cooperative  Thought Content:  Thought content normal          Judgment: Judgment normal          Vital Signs  ED Triage Vitals   Temperature Pulse Respirations Blood Pressure SpO2   03/14/22 0531 03/14/22 0458 03/14/22 0458 03/14/22 0458 03/14/22 0458   97 6 °F (36 4 °C) 64 18 164/86 98 %      Temp Source Heart Rate Source Patient Position - Orthostatic VS BP Location FiO2 (%)   03/14/22 0531 03/14/22 0458 03/14/22 0458 03/14/22 0458 --   Oral Monitor Lying Right arm       Pain Score       03/14/22 0740       3           Vitals:    03/14/22 0600 03/14/22 0630 03/14/22 0730 03/14/22 0800   BP: 133/81 144/89 150/86 126/84   Pulse: 58 68 72 66   Patient Position - Orthostatic VS: Lying Lying           Visual Acuity      ED Medications  Medications   sodium chloride 0 9 % bolus 1,000 mL (0 mL Intravenous Stopped 3/14/22 0637)   ketorolac (TORADOL) injection 15 mg (15 mg Intravenous Given 3/14/22 0532)   dexamethasone (DECADRON) injection 10 mg (10 mg Intravenous Given 3/14/22 0719)   levETIRAcetam (KEPPRA) 750 mg in sodium chloride 0 9 % 100 mL IVPB (0 mg Intravenous Stopped 3/14/22 0734)   iohexol (OMNIPAQUE) 350 MG/ML injection (SINGLE-DOSE) 100 mL (100 mL Intravenous Given 3/14/22 0704)       Diagnostic Studies  Results Reviewed     Procedure Component Value Units Date/Time    HS Troponin I 2hr [[id number]]  (Normal) Collected: 03/14/22 0722    Lab Status: Final result Specimen: Blood from Arm, Left Updated: 03/14/22 0802     hs TnI 2hr 4 ng/L      Delta 2hr hsTnI -1 ng/L     HS Troponin I 4hr [[id number]]     Lab Status: No result Specimen: Blood     UA w Reflex to Microscopic w Reflex to Culture [[id number]] Collected: 03/14/22 0640    Lab Status: Final result Specimen: Urine, Clean Catch Updated: 03/14/22 0649     Color, UA Yellow     Clarity, UA Clear     Specific Gravity, UA 1 015     pH, UA 6 0     Leukocytes, UA Negative     Nitrite, UA Negative     Protein, UA Negative mg/dl      Glucose, UA Negative mg/dl      Ketones, UA Negative mg/dl      Urobilinogen, UA 0 2 E U /dl      Bilirubin, UA Negative     Blood, UA Negative    Magnesium [[id number]]  (Normal) Collected: 03/14/22 0542 Lab Status: Final result Specimen: Blood from Arm, Left Updated: 03/14/22 0613     Magnesium 2 0 mg/dL     Lipase [[id number]]  (Abnormal) Collected: 03/14/22 0529    Lab Status: Final result Specimen: Blood from Arm, Left Updated: 03/14/22 0613     Lipase 64 u/L     TSH, 3rd generation with Free T4 reflex [[id number]]  (Normal) Collected: 03/14/22 0529    Lab Status: Final result Specimen: Blood from Arm, Left Updated: 03/14/22 0613     TSH 3RD GENERATON 1 714 uIU/mL     Narrative:      Patients undergoing fluorescein dye angiography may retain small amounts of fluorescein in the body for 48-72 hours post procedure  Samples containing fluorescein can produce falsely depressed TSH values  If the patient had this procedure,a specimen should be resubmitted post fluorescein clearance        Protime-INR [[id number]]  (Normal) Collected: 03/14/22 0529    Lab Status: Final result Specimen: Blood from Arm, Left Updated: 03/14/22 0611     Protime 11 8 seconds      INR 0 86    APTT [[id number]]  (Normal) Collected: 03/14/22 0529    Lab Status: Final result Specimen: Blood from Arm, Left Updated: 03/14/22 0611     PTT 33 seconds     HS Troponin 0hr (reflex protocol) [[id number]]  (Normal) Collected: 03/14/22 0529    Lab Status: Final result Specimen: Blood from Arm, Left Updated: 03/14/22 0609     hs TnI 0hr 5 ng/L     Comprehensive metabolic panel [[id number]] Collected: 03/14/22 0529    Lab Status: Final result Specimen: Blood from Arm, Left Updated: 03/14/22 0604     Sodium 140 mmol/L      Potassium 4 1 mmol/L      Chloride 104 mmol/L      CO2 28 mmol/L      ANION GAP 8 mmol/L      BUN 20 mg/dL      Creatinine 1 07 mg/dL      Glucose 117 mg/dL      Calcium 9 6 mg/dL      AST 18 U/L      ALT 20 U/L      Alkaline Phosphatase 63 U/L      Total Protein 7 7 g/dL      Albumin 3 7 g/dL      Total Bilirubin 0 61 mg/dL      eGFR 77 ml/min/1 73sq m     Narrative:      [place] guidelines for Chronic Kidney Disease (CKD):   Stage 1 with normal or high GFR (GFR > 90 mL/min/1 73 square meters)    Stage 2 Mild CKD (GFR = 60-89 mL/min/1 73 square meters)    Stage 3A Moderate CKD (GFR = 45-59 mL/min/1 73 square meters)    Stage 3B Moderate CKD (GFR = 30-44 mL/min/1 73 square meters)    Stage 4 Severe CKD (GFR = 15-29 mL/min/1 73 square meters)    Stage 5 End Stage CKD (GFR <15 mL/min/1 73 square meters)  Note: GFR calculation is accurate only with a steady state creatinine    CBC and differential [[id number]]  (Abnormal) Collected: 03/14/22 0529    Lab Status: Final result Specimen: Blood from Arm, Left Updated: 03/14/22 0554     WBC 11 24 Thousand/uL      RBC 5 36 Million/uL      Hemoglobin 16 2 g/dL      Hematocrit 47 7 %      MCV 89 fL      MCH 30 2 pg      MCHC 34 0 g/dL      RDW 13 6 %      MPV 8 9 fL      Platelets 823 Thousands/uL      nRBC 0 /100 WBCs      Neutrophils Relative 67 %      Immat GRANS % 0 %      Lymphocytes Relative 23 %      Monocytes Relative 8 %      Eosinophils Relative 1 %      Basophils Relative 1 %      Neutrophils Absolute 7 48 Thousands/µL      Immature Grans Absolute 0 05 Thousand/uL      Lymphocytes Absolute 2 59 Thousands/µL      Monocytes Absolute 0 90 Thousand/µL      Eosinophils Absolute 0 15 Thousand/µL      Basophils Absolute 0 07 Thousands/µL     Fingerstick Glucose (POCT) [[id number]]  (Normal) Collected: 03/14/22 0532    Lab Status: Final result Updated: 03/14/22 0536     POC Glucose 108 mg/dl                  CT head without contrast   ED Interpretation by Earlis Sandhoff, PA-C (03/14 0559)   Arturo Murillo MD  [phone] 3/14/2022 STAT    Narrative & Impression  CT BRAIN - WITHOUT CONTRAST     INDICATION:   Dull aching nonradiating frontal head pain and blurry vision      COMPARISON:  None      TECHNIQUE:  CT examination of the brain was performed    In addition to axial images, sagittal and coronal 2D reformatted images were created and submitted for interpretation      Radiation dose length product (DLP) for this visit:  938 mGy-cm   This examination, like all CT scans performed in the [facility], was performed utilizing techniques to minimize radiation dose exposure, including the use of iterative   reconstruction and automated exposure control        IMAGE QUALITY:  Diagnostic      FINDINGS:     PARENCHYMA:  There is a 2 9 cm hyperdense mass at the medial right occipital lobe with marked surrounding vasogenic edema  There is a hypodense lesion at the inferior left frontal lobe measuring 11 mm with moderate surrounding vasogenic edema  The   f   indings are suspicious for metastatic disease  No CT signs of acute infarction  No acute parenchymal hemorrhage      VENTRICLES AND EXTRA-AXIAL SPACES:  Normal for the patient's age      VISUALIZED ORBITS AND PARANASAL SINUSES:  Unremarkable      CALVARIUM AND EXTRACRANIAL SOFT TISSUES:  Normal      IMPRESSION:     Hyperdense masses noted at the right occipital lobe and inferior left frontal lobes with surrounding vasogenic edema, suspicious for hemorrhagic metastases  An enhanced MRI of the brain is recommended for further evaluation                  Workstation performed: [id number]           Final Result by Deshawn Grady MD (06/32 3277)      Hyperdense masses noted at the right occipital lobe and inferior left frontal lobes with surrounding vasogenic edema, suspicious for hemorrhagic metastases  An enhanced MRI of the brain is recommended for further evaluation                    Workstation performed: [id number]         XR chest 1 view portable   ED Interpretation by Elissa Montague PA-C (03/14 0813)   Caridad Matias MD  [phone] 3/14/2022     Narrative & Impression  CHEST      INDICATION:   high blood pressure      COMPARISON:  Chest CT from 1/30/2022 and 3/14/2022      EXAM PERFORMED/VIEWS:  XR CHEST PORTABLE        FINDINGS:     Cardiomediastinal silhouette appears unremarkable      Redemonstration of 9 cm right upper lobe mass  No acute disease  No effusion or pneumothorax      Osseous structures appear within normal limits for patient age      IMPRESSION:     No acute cardiopulmonary disease      Redemonstration of 9 cm right upper lobe mass                  Workstation performed: [id number]           Final Result by Matthew Lee MD (03/14 6744)      No acute cardiopulmonary disease  Redemonstration of 9 cm right upper lobe mass  Workstation performed: [id number]         CT chest abdomen pelvis w contrast    (Results Pending)              Procedures  ECG 12 Lead Documentation Only    Date/Time: 3/14/2022 5:02 AM  Performed by: Avni Colon PA-C  Authorized by: Avni Colon PA-C     Indications / Diagnosis:  Elevated blood pressure; blurry vision, and dull achy nonradiating frontal head pain  ECG reviewed by me, the ED Provider: yes    Patient location:  ED  Previous ECG:     Previous ECG:  Compared to current    Comparison ECG info: When compared with ECG of January 6, 2021 no significant changes are noted    Similarity:  No change    Comparison to cardiac monitor: Yes    Interpretation:     Interpretation: normal    Rate:     ECG rate:  66    ECG rate assessment: normal    Rhythm:     Rhythm: sinus rhythm    Ectopy:     Ectopy: none    QRS:     QRS axis:  Normal    QRS intervals:  Normal  Conduction:     Conduction: normal    ST segments:     ST segments:  Normal  T waves:     T waves: normal               ED Course  ED Course as of 03/14/22 0824   Mon Mar 14, 2022   0544 Chest CT 01/20/2022-impressions -"IMPRESSION: 6 2 x 5 8 x 6 5 cm right upper lobe lobulated mass  Few scattered bilateral pulmonary nodules, 3 mm and smaller  Right paratracheal adenopathy "  With right upper lung mass noted again on chest x-ray  [facility] with Dr Vinod Modi, Neuro ICU with indication to be transferred to stepdown 2; IV Keppra 750 mg, Decadron 10 mg  PACS reported that [facility] service shanna Baltazar will contact me via land line phone with accepting physician    [id number] Re-evaluation of patient with patient's baseline mental status, alert and oriented x3, no neurological deficits, neurologically intact, no neurological decompensation noted                                             MDM  Number of Diagnoses or Management Options  Brain mass: new and requires workup     Amount and/or Complexity of Data Reviewed  Clinical lab tests: ordered and reviewed  Tests in the radiology section of CPT®: ordered and reviewed  Review and summarize past medical records: yes  Independent visualization of images, tracings, or specimens: yes    Risk of Complications, Morbidity, and/or Mortality  Presenting problems: moderate  Diagnostic procedures: moderate  Management options: moderate    Patient Progress  Patient progress: stable     Patient is a 80-year-old male [place] speaking only, history of diabetes mellitus currently on metformin 500 mg daily the presents of metformin with elevated blood pressure readings for 1 week  Patient has associated nausea total aching nonradiating frontal head pain and blurry vision began with representation of elevated blood pressures  Patient with rambling stable and afebrile  Chest x-ray with right upper lobe lung mass-CT lung screening program 01/30/2022-impression-6 2 x 5 8 x 6 5 cm right upper lobe lobulated mass  Few scattered bilateral pulmonary nodules, 3 mm and smaller   Right paratracheal adenopathy "  CT head without contrast-impression-Hyperdense masses noted at the right occipital lobe and inferior left frontal lobes with surrounding vasogenic edema, suspicious for hemorrhagic metastases   An enhanced MRI of the brain is recommended for further evaluation "  Discussed the patient case with Dr Bishop Osuna, Neuro ICU with indication to deliver 750 mg IV Keppra, 10 mg IV decadron, and perform the chest/abdomen/pelvis CT with contrast  Normal electrolytes, normal kidney function, normal glucose  Normal TSH  Slight leukocytosis, normal H&H, normal platelets, normal PT INR  Discussed patient's case with Dr Kuldeep Joyce, Internal Medicine  Discussed the case with PACS; patient to be transferred [facility] documentation completed and on chart  Patient verbal understanding the clinical laboratory imaging findings, transfer instructions and verbalized agreement with the patient current treatment plan with Cornelio to anguish translation    Disposition  Final diagnoses:   Brain mass   Headache   Blurry vision, bilateral   Mass of upper lobe of right lung     Time reflects when diagnosis was documented in both MDM as applicable and the Disposition within this note     Time User Action Codes Description Comment    3/14/2022  6:02 AM Mariela Pilling Add [G93 89] Brain mass     3/14/2022  7:05 AM Mariela Pilling Add [R51 9] Headache     3/14/2022  7:05 AM Mariela Pilling Add [H53 8] Blurry vision, bilateral     3/14/2022  7:07 AM Mariela Pilling Add [R91 8] Mass of upper lobe of right lung       ED Disposition     ED Disposition Condition Date/Time Comment    Transfer to Another Facility-In Network  Mon Mar 14, 2022  7:08 AM Zackery Geronimo should be transferred out to [facility] under the care of Dr Kuldeep Joyce, Internal Medicine  Follow-up Information    None         Patient's Medications   Discharge Prescriptions    No medications on file       No discharge procedures on file      PDMP Review       Value Time User    PDMP Reviewed  Yes 3/14/2022  4:44 AM Kia Mitchell MD          ED Provider  Electronically Signed by           David Arriaga PA-C  [phone]

## 2022-03-14 NOTE — CONSULTS
Consultation - Pulmonary Medicine   [facility] 64 y o  male MRN: [id number]  Unit/Bed#: [facility] 603-93 Encounter: [id number]      Physician Requesting Consult: Dr Mari Smith  Reason for Consult: lung mass    Assessment/Plan:    1  RUL lung mass - 9 cm, suspect malignancy, consider small cell  2  Multiple brain masses with vasogenic edema - suspected malignancy  3  Blurred vision  4  Nicotine dependence, cigarettes, uncomplicated  5  HTN  6  Type II DM    The patient likely has lung cancer with brain mets  - If neurosurgery does not plan to intervene, then can consider lung mass biopsy  Would start with bronchoscopy for airway inspection as tumor appears to block RUL airway and may have endobronchial lesion    - also can consider EBUS or IR biopsy of mass  All of these will likely be diagnostic  Will need to discuss with neurosurgery   - Recommend NPO after midnight for procedure tomorrow  - Decadron for vasogenic edema  - Keppra for new maude masses  - DVT Px - recommend but defer to neurosurgery if OK to use  Discussed with neurosurgery AP, planning for MRI brain and then consider intervention  Thank you for this consult, we will follow along with you!    ______________________________________________________________________    Chief complaint:  Blurry vision    HPI:    [facility] is a 64 y o  male PMH DM,  nicotine dependence, elevated PSA, who presented to ER with blurred vision  Also complains of head pain and nausea  Symptoms for the past week  In the ER, mild hypertension  Labs unremarkable  CT head showed hyperdense masses right occipital lobe and left frontal lobe with vasogenic edema  CT chest showed large RUL mass  Pt given Keppra, Decadron and admitted to floors  We are consulted for lung mass  Pt initially found to have lung mass in January  PCP informed pt of mass and recommended o/p pulmonary evaluation   Pt states he was scared and did not want something to be wrong, did not want to worry his wife  States he had no pain or SOB and was able to be active, so was hoping the problem went away  Hoped that if he stopped smoking it would go away  Found ambulating in room with wife at bedside  Pt reports he recently lost weight when he was started on Metformin and watched his diet  His weight loss stopped and was attributed to low sugar diet  States that for two weeks, he has been waking up at 4AM with a headache and pulsating in his right eye  Takes ASA or Motrin and it resolves  Then, it come back during the day and resolves with medications  Finally, today, it was bothering him more so he decided to come to the hospital     Chart and records reviewed  Patient's son provided additional history and interpretation  Pt and wife were able to understand English and provides most answers to my questions  Review of Systems:  Positive as above and negative otherwise      Historical Information   Past Medical History:   Diagnosis Date    Diabetes mellitus ([facility] [address] )     Elevated PSA 3/11/2021    Fatty liver 3/11/2021    Gall bladder polyp 3/11/2021    Nonimmune to hepatitis B virus 3/11/2021     PSH  Never had surgery      Social History   Social History     Substance and Sexual Activity   Alcohol Use Not Currently    Comment: quit drinking 7 years ago     Social History     Tobacco Use   Smoking Status Former Smoker    Packs/day: 0 50    Types: Cigarettes    Quit date: 2022    Years since quittin 1   Smokeless Tobacco Never Used   Tobacco Comment    quit 3 months ago     Smoked 1/2 ppd x 40 years, last cigarette 2022  Occupational history:  Works at [employer]    Family History:   Johana Danielle, lived in [place]  of lung cancer    Medications: The patient's active and prehospital medications were reviewed    Current Facility-Administered Medications   Medication Dose Route Frequency Provider Last Rate    acetaminophen  650 mg Oral Q6H PRN MD Camelia Jones aluminum-magnesium hydroxide-simethicone  30 mL Oral Q6H PRN Sunday MD Magy      cholecalciferol  1,000 Units Oral Daily Sunday MD Magy      dexamethasone  4 mg Intravenous [facility] Sunday MD Magy      docusate sodium  100 mg Oral BID Sunday MD Magy      insulin lispro  1-5 Units Subcutaneous TID [facility] Maynorgarrett Bhatti MD      insulin lispro  1-5 Units Subcutaneous HS Sunday MD Magy      levETIRAcetam  500 mg Intravenous Q12H [address] Sunday MD Magy      ondansetron  4 mg Intravenous Q6H PRN Sunday MD Magy           PhysicalExamination:  Vitals:   Vitals:    03/14/22 1411 03/14/22 1411   BP:  139/86   Pulse:  82   Resp:  19   Temp:  98 5 °F (36 9 °C)   SpO2:  94%   Weight: 64 2 kg (141 lb 8 6 oz)    Height: 5' 6" (1 676 m)      Body mass index is 22 84 kg/m²    Temp  Min: 97 6 °F (36 4 °C)  Max: 98 5 °F (36 9 °C)  IBW (Ideal Body Weight): 63 8 kg    SpO2: 94 %,    ,   O2 Device: None (Room air)    Gen: thin, no distress  HEENT: NCAT; EOMI; anicteric sclerra  Neck: supple  Chest: clear b/l bs  Cardiac: regular, no m/r/g  Abd: soft, nd, nt  Ext: no c/c/e  Neuro: nonfocal  Skin: warm, dry  Psych: cooperative    Diagnostic Data:  CBC:   Results from last 7 days   Lab Units 03/14/22  0529   WBC Thousand/uL 11 24*   HEMOGLOBIN g/dL 16 2   HEMATOCRIT % 47 7   PLATELETS Thousands/uL 361       CMP:   Results from last 7 days   Lab Units 03/14/22  0529   SODIUM mmol/L 140   POTASSIUM mmol/L 4 1   CHLORIDE mmol/L 104   CO2 mmol/L 28   BUN mg/dL 20   CREATININE mg/dL 1 07   CALCIUM mg/dL 9 6   ALK PHOS U/L 63   ALT U/L 20   AST U/L 18       Labs per my review reveal normal CMP, leukocytosis        Imaging:  CT chest per my review shows a 6 9x7 7x8 3 cm RUL mass with small RUL 0 7cm nodule; +mediastinal adenopathy        Cinthia Burleson, DO

## 2022-03-14 NOTE — PLAN OF CARE
Problem: Nutrition/Hydration-ADULT  Goal: Nutrient/Hydration intake appropriate for improving, restoring or maintaining nutritional needs  Description: Monitor and assess patient's nutrition/hydration status for malnutrition  Collaborate with interdisciplinary team and initiate plan and interventions as ordered  Monitor patient's weight and dietary intake as ordered or per policy  Utilize nutrition screening tool and intervene as necessary  Determine patient's food preferences and provide high-protein, high-caloric foods as appropriate       INTERVENTIONS:  - Monitor oral intake, urinary output, labs, and treatment plans  - Assess nutrition and hydration status and recommend course of action  - Evaluate amount of meals eaten  - Assist patient with eating if necessary   - Allow adequate time for meals  - Recommend/ encourage appropriate diets, oral nutritional supplements, and vitamin/mineral supplements  - Order, calculate, and assess calorie counts as needed  - Recommend, monitor, and adjust tube feedings and TPN/PPN based on assessed needs  - Assess need for intravenous fluids  - Provide specific nutrition/hydration education as appropriate  - Include patient/family/caregiver in decisions related to nutrition  3/14/2022 1630 by Mary Roberts RN  Outcome: Progressing  3/14/2022 1630 by Mary Roberts RN  Outcome: Progressing     Problem: Potential for Falls  Goal: Patient will remain free of falls  Description: INTERVENTIONS:  - Educate patient/family on patient safety including physical limitations  - Instruct patient to call for assistance with activity   - Consult OT/PT to assist with strengthening/mobility   - Keep Call bell within reach  - Keep bed low and locked with side rails adjusted as appropriate  - Keep care items and personal belongings within reach  - Initiate and maintain comfort rounds  - Make Fall Risk Sign visible to staff  - Offer Toileting every 2 Hours, in advance of need  - Apply yellow socks and bracelet for high fall risk patients  - Consider moving patient to room near nurses station  3/14/2022 1630 by Flaco Garcia RN  Outcome: Progressing  3/14/2022 1630 by Flaco Garcia RN  Outcome: Progressing     Problem: NEUROSENSORY - ADULT  Goal: Achieves stable or improved neurological status  Description: INTERVENTIONS  - Monitor and report changes in neurological status  - Monitor vital signs such as temperature, blood pressure, glucose, and any other labs ordered   - Initiate measures to prevent increased intracranial pressure  - Monitor for seizure activity and implement precautions if appropriate      3/14/2022 1630 by Flaco Garcia RN  Outcome: Progressing  3/14/2022 1630 by Flaco Garcia RN  Outcome: Progressing  Goal: Remains free of injury related to seizures activity  Description: INTERVENTIONS  - Maintain airway, patient safety  and administer oxygen as ordered  - Monitor patient for seizure activity, document and report duration and description of seizure to physician/advanced practitioner  - If seizure occurs,  ensure patient safety during seizure  - Reorient patient post seizure  - Seizure pads on all 4 side rails  - Instruct patient/family to notify RN of any seizure activity including if an aura is experienced  - Instruct patient/family to call for assistance with activity based on nursing assessment  - Administer anti-seizure medications if ordered    3/14/2022 1630 by Flaco Garcia RN  Outcome: Progressing  3/14/2022 1630 by Flaco Garcia RN  Outcome: Progressing  Goal: Achieves maximal functionality and self care  Description: INTERVENTIONS  - Monitor swallowing and airway patency with patient fatigue and changes in neurological status  - Encourage and assist patient to increase activity and self care     - Encourage visually impaired, hearing impaired and aphasic patients to use assistive/communication devices  3/14/2022 1630 by Flaco Garcia RN  Outcome: Progressing  3/14/2022 1630 by Darryl Samuel RN  Outcome: Progressing     Problem: RESPIRATORY - ADULT  Goal: Achieves optimal ventilation and oxygenation  Description: INTERVENTIONS:  - Assess for changes in respiratory status  - Assess for changes in mentation and behavior  - Position to facilitate oxygenation and minimize respiratory effort  - Oxygen administered by appropriate delivery if ordered  - Initiate smoking cessation education as indicated  - Encourage broncho-pulmonary hygiene including cough, deep breathe, Incentive Spirometry  - Assess the need for suctioning and aspirate as needed  - Assess and instruct to report SOB or any respiratory difficulty  - Respiratory Therapy support as indicated  3/14/2022 1630 by Darryl Samuel RN  Outcome: Progressing  3/14/2022 1630 by Darryl Samuel RN  Outcome: Progressing     Problem: PAIN - ADULT  Goal: Verbalizes/displays adequate comfort level or baseline comfort level  Description: Interventions:  - Encourage patient to monitor pain and request assistance  - Assess pain using appropriate pain scale  - Administer analgesics based on type and severity of pain and evaluate response  - Implement non-pharmacological measures as appropriate and evaluate response  - Consider cultural and social influences on pain and pain management  - Notify physician/advanced practitioner if interventions unsuccessful or patient reports new pain  3/14/2022 1630 by Darryl Samuel RN  Outcome: Progressing  3/14/2022 1630 by Darryl Samuel RN  Outcome: Progressing     Problem: SAFETY ADULT  Goal: Maintains/Returns to pre admission functional level  Description: INTERVENTIONS:  - Perform BMAT or MOVE assessment daily    - Set and communicate daily mobility goal to care team and patient/family/caregiver  - Collaborate with rehabilitation services on mobility goals if consulted  - Perform Range of Motion 3 times a day  - Reposition patient every 2 hours    - Dangle patient 3 times a day  - Stand patient 3 times a day  - Ambulate patient 3 times a day  - Out of bed to chair 3 times a day   - Out of bed for meals 3 times a day  - Out of bed for toileting  - Record patient progress and toleration of activity level   3/14/2022 1630 by Flako Combs RN  Outcome: Progressing  3/14/2022 1630 by Flako Combs RN  Outcome: Progressing     Problem: DISCHARGE PLANNING  Goal: Discharge to home or other facility with appropriate resources  Description: INTERVENTIONS:  - Identify barriers to discharge w/patient and caregiver  - Arrange for needed discharge resources and transportation as appropriate  - Identify discharge learning needs (meds, wound care, etc )  - Arrange for interpretive services to assist at discharge as needed  - Refer to Case Management Department for coordinating discharge planning if the patient needs post-hospital services based on physician/advanced practitioner order or complex needs related to functional status, cognitive ability, or social support system  3/14/2022 1630 by Flako Combs RN  Outcome: Progressing  3/14/2022 1630 by Flako Combs RN  Outcome: Progressing     Problem: Knowledge Deficit  Goal: Patient/family/caregiver demonstrates understanding of disease process, treatment plan, medications, and discharge instructions  Description: Complete learning assessment and assess knowledge base    Interventions:  - Provide teaching at level of understanding  - Provide teaching via preferred learning methods  3/14/2022 1630 by Flako Combs RN  Outcome: Progressing  3/14/2022 1630 by Flako Combs RN  Outcome: Progressing

## 2022-03-14 NOTE — Clinical Note
Kermit Svetlana should be transferred out to [facility][note 3] under the care of Dr Wilmer James, Internal Medicine

## 2022-03-14 NOTE — ASSESSMENT & PLAN NOTE
· Patient had a screening CT scan in January of this year and noted to have 6 cm pulmonary mass and was supposed to see a pulmonologist-also noted to have right paratracheal adenopathy and pulmonary nodules  · Concerning for lung cancer given his history of tobacco abuse  · Consult pulmonology  · Today on the CT chest abdomen and pelvis the mass is again redemonstrated but enlarging in size-since there is enlargement in size in a in a short time    Concerning for malignancy  · Will consult pulmonology for possible bronchoscopy and biopsy

## 2022-03-14 NOTE — EMTALA/ACUTE CARE TRANSFER
[address] [place] [address]  Dept: [phone]      EMTALA TRANSFER CONSENT    NAME Enrique Romero                                         1965                              MRN [id number]    I have been informed of my rights regarding examination, treatment, and transfer   by Dr Maile Phoenix, MD    Benefits:      Risks:        Transfer Request   I acknowledge that my medical condition has been evaluated and explained to me by the emergency department physician or other qualified medical person and/or my attending physician who has recommended and offered to me further medical examination and treatment  I understand the Hospital's obligation with respect to the treatment and stabilization of my emergency medical condition  I nevertheless request to be transferred  I release the Hospital, the doctor, and any other persons caring for me from all responsibility or liability for any injury or ill effects that may result from my transfer and agree to accept all responsibility for the consequences of my choice to transfer, rather than receive stabilizing treatment at the Hospital  I understand that because the transfer is my request, my insurance may not provide reimbursement for the services  The Hospital will assist and direct me and my family in how to make arrangements for transfer, but the hospital is not liable for any fees charged by the transport service  In spite of this understanding, I refuse to consent to further medical examination and treatment which has been offered to me, and request transfer to    I authorize the performance of emergency medical procedures and treatments upon me in both transit and upon arrival at the receiving facility  Additionally, I authorize the release of any and all medical records to the receiving facility and request they be transported with me, if possible      I authorize the performance of emergency medical procedures and treatments upon me in both transit and upon arrival at the receiving facility  Additionally, I authorize the release of any and all medical records to the receiving facility and request they be transported with me, if possible  I understand that the safest mode of transportation during a medical emergency is an ambulance and that the Hospital advocates the use of this mode of transport  Risks of traveling to the receiving facility by car, including absence of medical control, life sustaining equipment, such as oxygen, and medical personnel has been explained to me and I fully understand them  (FARHAT CORRECT BOX BELOW)  [  ]  I consent to the stated transfer and to be transported by ambulance/helicopter  [  ]  I consent to the stated transfer, but refuse transportation by ambulance and accept full responsibility for my transportation by car  I understand the risks of non-ambulance transfers and I exonerate the Hospital and its staff from any deterioration in my condition that results from this refusal     X___________________________________________    DATE  22  TIME________  Signature of patient or legally responsible individual signing on patient behalf           RELATIONSHIP TO PATIENT_________________________          Provider Certification    NAME Ingrid Mcduffie                                        [facility] 1965                              MRN [id number]    A medical screening exam was performed on the above named patient  Based on the examination:    Condition Necessitating Transfer The primary encounter diagnosis was Brain mass  Diagnoses of Headache, Blurry vision, bilateral, and Mass of upper lobe of right lung were also pertinent to this visit      Patient Condition:      Reason for Transfer:      Transfer Requirements: Facility     · Space available and qualified personnel available for treatment as acknowledged by    · Agreed to accept transfer and to provide appropriate medical treatment as acknowledged by          · Appropriate medical records of the examination and treatment of the patient are provided at the time of transfer   [address] [facility], [address] _______  · Transfer will be performed by qualified personnel from    and appropriate transfer equipment as required, including the use of necessary and appropriate life support measures  Provider Certification: I have examined the patient and explained the following risks and benefits of being transferred/refusing transfer to the patient/family:         Based on these reasonable risks and benefits to the patient and/or the unborn child(wilbert), and based upon the information available at the time of the patients examination, I certify that the medical benefits reasonably to be expected from the provision of appropriate medical treatments at another medical facility outweigh the increasing risks, if any, to the individuals medical condition, and in the case of labor to the unborn child, from effecting the transfer      X____________________________________________ DATE 03/14/22        TIME_______      ORIGINAL - SEND TO MEDICAL RECORDS   COPY - SEND WITH PATIENT DURING TRANSFER

## 2022-03-14 NOTE — ASSESSMENT & PLAN NOTE
· Patient reported that he has not used tobacco for the past 2 months    Gives history of 30 year tobacco use smokes around 1/2 -1 per day  · Patient noted to have lung mass on routine screening and was supposed to be seen by pulmonologist as outpatient

## 2022-03-14 NOTE — ASSESSMENT & PLAN NOTE
Lab Results   Component Value Date    HGBA1C 5 7 (H) 11/07/2021       Recent Labs     03/14/22  0532 03/14/22  1609   POCGLU 108 185*       Blood Sugar Average: Last 72 hrs:  (P) 185     · Continue medical management  · On ISS  Has been diet controlled in the outpatient setting

## 2022-03-14 NOTE — ASSESSMENT & PLAN NOTE
· Patient presented to the emergency room with headache and vision changes for about the past 1 week or so  · Patient reported that whenever he gets the headache he takes Aleve or Tylenol with improvement in his headache but it comes back  Sometimes he wakes up with a headache in the morning  · Also noted to have change in his eyesight-he reports it as blurry  · Had a CT scan-Hyperdense masses noted at the right occipital lobe and inferior left frontal lobes with surrounding vasogenic edema, suspicious for hemorrhagic metastases    · Consult neurosurgery  · Obtain MRI of the brain  · Oncology evaluation-may need radiation oncology evaluation but will wait for Heme-Onc and neurosurgery evaluation  · Keppra and Decadron for now  · No pharmacological DVT prophylaxis since the concern for hemorrhagic metastasis

## 2022-03-14 NOTE — ASSESSMENT & PLAN NOTE
Lab Results   Component Value Date    HGBA1C 5 7 (H) 11/07/2021       Recent Labs     03/14/22  0532   POCGLU 108       Blood Sugar Average: Last 72 hrs:   patient is maintained on metformin as an outpatient with last hemoglobin A1c of 5 7    Patient reported that he lost more than 40 lb since he was diagnosed with diabetes mellitus  Hold metformin  Insulin sliding scale for now  Patient is going to be on Decadron which may increase the blood sugar

## 2022-03-14 NOTE — H&P
[address] [facility] 1965, 64 y o  male MRN: [id number]  Unit/Bed#: [facility] 844-58 Encounter: [id number]  Primary Care Provider: Eda Hirsch MD   Date and time admitted to hospital: 3/14/2022  1:59 PM    * Brain mass  Assessment & Plan  · Patient presented to the emergency room with headache and vision changes for about the past 1 week or so  · Patient reported that whenever he gets the headache he takes Aleve or Tylenol with improvement in his headache but it comes back  Sometimes he wakes up with a headache in the morning  · Also noted to have change in his eyesight-he reports it as blurry  · Had a CT scan-Hyperdense masses noted at the right occipital lobe and inferior left frontal lobes with surrounding vasogenic edema, suspicious for hemorrhagic metastases  · Consult neurosurgery  · Obtain MRI of the brain  · Oncology evaluation-may need radiation oncology evaluation but will wait for Heme-Onc and neurosurgery evaluation  · Keppra and Decadron for now  · No pharmacological DVT prophylaxis since the concern for hemorrhagic metastasis    Lung mass  Assessment & Plan  · Patient had a screening CT scan in January of this year and noted to have 6 cm pulmonary mass and was supposed to see a pulmonologist-also noted to have right paratracheal adenopathy and pulmonary nodules  · Concerning for lung cancer given his history of tobacco abuse  · Consult pulmonology  · Today on the CT chest abdomen and pelvis the mass is again redemonstrated but enlarging in size-since there is enlargement in size in a in a short time  Concerning for malignancy  · Will consult pulmonology for possible bronchoscopy and biopsy      History of tobacco use  Assessment & Plan  · Patient reported that he has not used tobacco for the past 2 months    Gives history of 30 year tobacco use smokes around 1/2 -1 per day  · Patient noted to have lung mass on routine screening and was supposed to be seen by pulmonologist as outpatient    Vitamin D deficiency  Assessment & Plan  · Continue with the supplementation    Type 2 diabetes mellitus without complication, without long-term current use of insulin ([facility] [address] )  Assessment & Plan  Lab Results   Component Value Date    HGBA1C 5 7 (H) 11/07/2021       Recent Labs     03/14/22  0532   POCGLU 108       Blood Sugar Average: Last 72 hrs:   patient is maintained on metformin as an outpatient with last hemoglobin A1c of 5 7  Patient reported that he lost more than 40 lb since he was diagnosed with diabetes mellitus  Hold metformin  Insulin sliding scale for now  Patient is going to be on Decadron which may increase the blood sugar      VTE Prophylaxis:  Pharmacological DVT prophylaxis contraindicated due to hemorrhagic brain mass  / sequential compression device   Code Status:  Full code  POLST: POLST form is not discussed and not completed at this time  Discussion with family: wife    Anticipated Length of Stay:  Patient will be admitted on an Inpatient basis with an anticipated length of stay of  > 2 midnights  Justification for Hospital Stay:     Total Time for Visit, including Counseling / Coordination of Care: 70 minutes  Greater than 50% of this total time spent on direct patient counseling and coordination of care  Chief Complaint:   Headache and blurry vision    History of Present Illness:    Anjana Longo is a 64 y o  male who presents a transfer from [address] [place] [address] for neurosurgery evaluation  Patient initially presented to [address] [place] [address] with headache and blurry vision which has been going on for about a week  Patient also reported that his blood pressure was running high and that actually prompted the ED visit  Patient reported that his systolic blood pressure was in 150s felt that his symptoms is related to his blood pressure   Patient is [place] speaking and but was able to provide a reasonably good history  Patient was diagnosed with type 2 diabetes mellitus approximately an year ago and is on metformin  Patient reported that he lost about 40 lb in the last 1 year  Patient reported that his symptoms started approximately a week ago he and he woke up with the headache  Patient took Aleve and Tylenol with improvement in his headache  But patient continued to have persistent headache and also noted that his eyesight is blurry  Denies any nausea vomiting diarrhea  Denies any weakness of arms or legs  Denies any numbness or dizziness or lightheadedness  Denies any cough or shortness of breath  Patient with history of smoking reported that he stopped smoking 2 weeks ago  Patient reported that he has been smoking about 30 years of he used to smoke half to a pack per day  His only other review of system was weight loss 40 lb since the diagnosis of type 2 diabetes mellitus last year  Review of Systems:    Review of Systems   Constitutional: Positive for unexpected weight change  HENT: Negative  Eyes: Positive for visual disturbance  Respiratory: Negative  Cardiovascular: Negative  Gastrointestinal: Negative  Endocrine: Negative  Genitourinary: Negative  Musculoskeletal: Negative  Skin: Negative  Allergic/Immunologic: Negative  Neurological: Positive for headaches  Hematological: Negative  Psychiatric/Behavioral: Negative  Past Medical and Surgical History:     Past Medical History:   Diagnosis Date    Diabetes mellitus ([facility] [address] )     Elevated PSA 3/11/2021    Fatty liver 3/11/2021    Gall bladder polyp 3/11/2021    Nonimmune to hepatitis B virus 3/11/2021       No past surgical history on file  Meds/Allergies:    Prior to Admission medications    Medication Sig Start Date End Date Taking?  Authorizing Provider   BD Pen Needle Jeaneth 2nd Gen 32G X 4 MM MISC USE ONCE DAILY WITH LANTUS 1/7/21   Historical Provider, MD   Blood Glucose Monitoring Suppl (FreeStyle Lite) FIDELIA  21   Historical Provider, MD   Cholecalciferol (Vitamin D3) 125 MCG (5000 UT) CAPS Take 1 capsule (5,000 Units total) by mouth daily 11/17/21 2/15/22  Nilsa Iniguez MD   FREESTYLE LITE test strip Check blood sugar  daily 21   Nilsa Iniguez MD   Lancets (freestyle) lancets Check blood sugar daily 21   Nilsa Iniguez MD   metFORMIN (GLUCOPHAGE-XR) 500 mg 24 hr tablet Take 2 tablets (1,000 mg total) by mouth daily with dinner 11/17/21 2/15/22  Nilsa Iniguez MD     I have reviewed home medications with patient personally  Allergies: No Known Allergies    Social History:     Marital Status: /Civil Union   Occupation:  Works in [employer] products  Patient Pre-hospital Living Situation:  Lives at home with family  Patient Pre-hospital Level of Mobility:   Patient Pre-hospital Diet Restrictions:   Substance Use History:   Social History     Substance and Sexual Activity   Alcohol Use Not Currently    Comment: quit drinking 7 years ago     Social History     Tobacco Use   Smoking Status Former Smoker    Packs/day: 0 50    Types: Cigarettes    Quit date: 2022    Years since quittin 1   Smokeless Tobacco Never Used   Tobacco Comment    quit 3 months ago     Social History     Substance and Sexual Activity   Drug Use Never       Family History:     No family history on file  Physical Exam:     Vitals:   Blood Pressure: 139/86 (22 1411)  Pulse: 82 (22 141)  Temperature: 98 5 °F (36 9 °C) (22 141)  Respirations: 19 (22 1411)  Height: 5' 6" (167 6 cm) (22 141)  Weight - Scale: 64 2 kg (141 lb 8 6 oz) (22 141)  SpO2: 94 % (22 141)    Physical Exam  Constitutional:       General: He is not in acute distress  Appearance: Normal appearance  HENT:      Head: Normocephalic  Nose: Nose normal    Eyes:      General: No scleral icterus  Cardiovascular:      Rate and Rhythm: Normal rate and regular rhythm        Pulses: Normal pulses  Pulmonary:      Effort: Pulmonary effort is normal       Breath sounds: Normal breath sounds  Abdominal:      General: Abdomen is flat  There is no distension  Musculoskeletal:         General: Normal range of motion  Cervical back: Normal range of motion and neck supple  Skin:     General: Skin is warm  Neurological:      General: No focal deficit present  Mental Status: He is alert  Additional Data:     Lab Results: I have personally reviewed pertinent films in PACS    Results from last 7 days   Lab Units 03/14/22  0529   WBC Thousand/uL 11 24*   HEMOGLOBIN g/dL 16 2   HEMATOCRIT % 47 7   PLATELETS Thousands/uL 361   NEUTROS PCT % 67   LYMPHS PCT % 23   MONOS PCT % 8   EOS PCT % 1     Results from last 7 days   Lab Units 03/14/22  0529   SODIUM mmol/L 140   POTASSIUM mmol/L 4 1   CHLORIDE mmol/L 104   CO2 mmol/L 28   BUN mg/dL 20   CREATININE mg/dL 1 07   ANION GAP mmol/L 8   CALCIUM mg/dL 9 6   ALBUMIN g/dL 3 7   TOTAL BILIRUBIN mg/dL 0 61   ALK PHOS U/L 63   ALT U/L 20   AST U/L 18   GLUCOSE RANDOM mg/dL 117     Results from last 7 days   Lab Units 03/14/22  0529   INR  0 86     Results from last 7 days   Lab Units 03/14/22  0532   POC GLUCOSE mg/dl 108               Imaging: I have personally reviewed pertinent reports  MRI inpatient order    (Results Pending)       EKG, Pathology, and Other Studies Reviewed on Admission:   · EKG:     Allscripts / Epic Records Reviewed: Yes     ** Please Note: This note has been constructed using a voice recognition system   **

## 2022-03-14 NOTE — ASSESSMENT & PLAN NOTE
Imaging:  · CT chest abdomen pelvis w contrast 3/14/2022:  Increased size of a large right upper lobe mass compared to 1/30/2022  Chronic small satellite nodule versus intrapulmonary lymph node  Increased mediastinal lymphadenopathy  Tiny focus of abnormal attenuation in the adjacent superior vena cava as described above, questionable early vascular involvement  This could be volume averaging artifact  Indeterminate splenic nodule  Typically splenic nodules are benign, but metastasis cannot be excluded given the chest findings  No other findings of abdominopelvic metastases  No acute pathology  Other nonemergent findings above      · Plan  Pulmonology following, tentative plan for bronchoscopy tomorrow

## 2022-03-14 NOTE — LETTER
[address] [facility] [address]  [address] [facility] [address]  Dept: [phone]    March 19, 2022     Patient: Alan Officer   YOB: 1965   Date of Visit: 3/14/2022       To Whom it May Concern:    Alan Officer is under my professional care  He was seen in the hospital from 3/14/2022   to 03/19/22  He will need to be cleared by his primary care doctor/oncologist prior to returning to work  If you have any questions or concerns, please don't hesitate to call           Sincerely,          YOLI [facility] Internal [facility] Hospitalist Service

## 2022-03-14 NOTE — ASSESSMENT & PLAN NOTE
Few week history of headaches, visual changes, noted to have right occiptal brain mass in the setting of lung mass concerning for metastatic process  · History of smoking  · +family history of uncle who had lung cancer    Imaging reviewed personally, results are as follows:   CT head wo contrast 3/14/2022:  Hyperdense masses noted at the right occipital lobe and inferior left frontal lobes with surrounding vasogenic edema, suspicious for hemorrhagic metastases  An enhanced MRI of the brain is recommended for further evaluation  Plan:  · Ongoing discussion regarding plan of care with [address] [place] [address] team, may need surgical intervention however there is risk he may have permanent visual issues  · Continue to monitor neuro exam, GCS 15, left temporal VF cut  · MRI brain w wo contrast ordered and pending  · Continue decadron 4mg [id number] for cerebral edema  · Continue keppra 500mg BID for seizure ppx  · SBP < 160  · Hold full dose AC / AP therapy, no documented use   Medical management and pain control per primary team   DVT ppx:  SCDs, okay for pharm dvt ppx   Mobilize as tolerated with assistance, PT / OT pending surgical plan    Neurosurgery will continue to follow  Please call with questions or concerns

## 2022-03-14 NOTE — ED NOTES
Transfer Information:    Ambulance Squad: [id number]   Pickup Time: 1330   Destination: [facility]   Accepting Physician: Dr Jaycob Hammonds   Report Number: [id number] Bethany Singh RN  03/14/22 6934
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
## 2022-03-14 NOTE — ASSESSMENT & PLAN NOTE
· History of about 1/2 PPD for several years  · Quit recently when he was told he had mass on his lung

## 2022-03-15 ENCOUNTER — APPOINTMENT (INPATIENT)
Dept: RADIOLOGY | Facility: HOSPITAL | Age: 57
DRG: 054 | End: 2022-03-15
Payer: COMMERCIAL

## 2022-03-15 LAB
ANION GAP SERPL CALCULATED.3IONS-SCNC: 6 MMOL/L (ref 4–13)
ATRIAL RATE: 66 BPM
BUN SERPL-MCNC: 26 MG/DL (ref 5–25)
CALCIUM SERPL-MCNC: 9.1 MG/DL (ref 8.3–10.1)
CHLORIDE SERPL-SCNC: 107 MMOL/L (ref 100–108)
CO2 SERPL-SCNC: 26 MMOL/L (ref 21–32)
CREAT SERPL-MCNC: 0.98 MG/DL (ref 0.6–1.3)
ERYTHROCYTE [DISTWIDTH] IN BLOOD BY AUTOMATED COUNT: 13.3 % (ref 11.6–15.1)
GFR SERPL CREATININE-BSD FRML MDRD: 85 ML/MIN/1.73SQ M
GLUCOSE SERPL-MCNC: 128 MG/DL (ref 65–140)
GLUCOSE SERPL-MCNC: 136 MG/DL (ref 65–140)
GLUCOSE SERPL-MCNC: 138 MG/DL (ref 65–140)
GLUCOSE SERPL-MCNC: 153 MG/DL (ref 65–140)
GLUCOSE SERPL-MCNC: 165 MG/DL (ref 65–140)
HCT VFR BLD AUTO: 44.1 % (ref 36.5–49.3)
HGB BLD-MCNC: 15.3 G/DL (ref 12–17)
MCH RBC QN AUTO: 30.4 PG (ref 26.8–34.3)
MCHC RBC AUTO-ENTMCNC: 34.7 G/DL (ref 31.4–37.4)
MCV RBC AUTO: 88 FL (ref 82–98)
P AXIS: 82 DEGREES
PLATELET # BLD AUTO: 342 THOUSANDS/UL (ref 149–390)
PMV BLD AUTO: 8.7 FL (ref 8.9–12.7)
POTASSIUM SERPL-SCNC: 4.1 MMOL/L (ref 3.5–5.3)
PR INTERVAL: 166 MS
QRS AXIS: 73 DEGREES
QRSD INTERVAL: 88 MS
QT INTERVAL: 390 MS
QTC INTERVAL: 408 MS
RBC # BLD AUTO: 5.04 MILLION/UL (ref 3.88–5.62)
SODIUM SERPL-SCNC: 139 MMOL/L (ref 136–145)
T WAVE AXIS: 53 DEGREES
VENTRICULAR RATE: 66 BPM
WBC # BLD AUTO: 17.06 THOUSAND/UL (ref 4.31–10.16)

## 2022-03-15 PROCEDURE — 93010 ELECTROCARDIOGRAM REPORT: CPT | Performed by: INTERNAL MEDICINE

## 2022-03-15 PROCEDURE — 70553 MRI BRAIN STEM W/O & W/DYE: CPT

## 2022-03-15 PROCEDURE — 99233 SBSQ HOSP IP/OBS HIGH 50: CPT | Performed by: STUDENT IN AN ORGANIZED HEALTH CARE EDUCATION/TRAINING PROGRAM

## 2022-03-15 PROCEDURE — 80048 BASIC METABOLIC PNL TOTAL CA: CPT | Performed by: FAMILY MEDICINE

## 2022-03-15 PROCEDURE — 99221 1ST HOSP IP/OBS SF/LOW 40: CPT | Performed by: INTERNAL MEDICINE

## 2022-03-15 PROCEDURE — G1004 CDSM NDSC: HCPCS

## 2022-03-15 PROCEDURE — 82948 REAGENT STRIP/BLOOD GLUCOSE: CPT

## 2022-03-15 PROCEDURE — 85027 COMPLETE CBC AUTOMATED: CPT | Performed by: FAMILY MEDICINE

## 2022-03-15 PROCEDURE — A9585 GADOBUTROL INJECTION: HCPCS | Performed by: FAMILY MEDICINE

## 2022-03-15 PROCEDURE — 99232 SBSQ HOSP IP/OBS MODERATE 35: CPT | Performed by: NURSE PRACTITIONER

## 2022-03-15 PROCEDURE — 99232 SBSQ HOSP IP/OBS MODERATE 35: CPT | Performed by: INTERNAL MEDICINE

## 2022-03-15 RX ORDER — PANTOPRAZOLE SODIUM 40 MG/1
40 TABLET, DELAYED RELEASE ORAL
Status: DISCONTINUED | OUTPATIENT
Start: 2022-03-15 | End: 2022-03-19 | Stop reason: HOSPADM

## 2022-03-15 RX ORDER — LEVETIRACETAM 500 MG/1
500 TABLET ORAL EVERY 12 HOURS SCHEDULED
Status: DISCONTINUED | OUTPATIENT
Start: 2022-03-15 | End: 2022-03-19 | Stop reason: HOSPADM

## 2022-03-15 RX ADMIN — DOCUSATE SODIUM 100 MG: 100 CAPSULE ORAL at 09:32

## 2022-03-15 RX ADMIN — Medication 1000 UNITS: at 09:32

## 2022-03-15 RX ADMIN — DEXAMETHASONE SODIUM PHOSPHATE 4 MG: 4 INJECTION INTRA-ARTICULAR; INTRALESIONAL; INTRAMUSCULAR; INTRAVENOUS; SOFT TISSUE at 00:32

## 2022-03-15 RX ADMIN — DEXAMETHASONE SODIUM PHOSPHATE 4 MG: 4 INJECTION INTRA-ARTICULAR; INTRALESIONAL; INTRAMUSCULAR; INTRAVENOUS; SOFT TISSUE at 05:42

## 2022-03-15 RX ADMIN — DEXAMETHASONE SODIUM PHOSPHATE 4 MG: 4 INJECTION INTRA-ARTICULAR; INTRALESIONAL; INTRAMUSCULAR; INTRAVENOUS; SOFT TISSUE at 12:57

## 2022-03-15 RX ADMIN — DOCUSATE SODIUM 100 MG: 100 CAPSULE ORAL at 18:24

## 2022-03-15 RX ADMIN — LEVETIRACETAM 500 MG: 500 TABLET, FILM COATED ORAL at 22:15

## 2022-03-15 RX ADMIN — GADOBUTROL 7 ML: 604.72 INJECTION INTRAVENOUS at 08:46

## 2022-03-15 RX ADMIN — PANTOPRAZOLE SODIUM 40 MG: 40 TABLET, DELAYED RELEASE ORAL at 12:57

## 2022-03-15 RX ADMIN — LEVETIRACETAM 500 MG: 100 INJECTION INTRAVENOUS at 09:32

## 2022-03-15 RX ADMIN — DEXAMETHASONE SODIUM PHOSPHATE 4 MG: 4 INJECTION INTRA-ARTICULAR; INTRALESIONAL; INTRAMUSCULAR; INTRAVENOUS; SOFT TISSUE at 18:24

## 2022-03-15 RX ADMIN — INSULIN LISPRO 1 UNITS: 100 INJECTION, SOLUTION INTRAVENOUS; SUBCUTANEOUS at 22:15

## 2022-03-15 NOTE — UTILIZATION REVIEW
Initial Clinical Review    Admission: Date/Time/Statement:   Admission Orders (From admission, onward)     Ordered        03/14/22 1430  Inpatient Admission  Once                      Orders Placed This Encounter   Procedures    Inpatient Admission     Standing Status:   Standing     Number of Occurrences:   1     Order Specific Question:   Level of Care     Answer:   Level 2 Stepdown / HOT [14]     Order Specific Question:   Estimated length of stay     Answer:   More than 2 Midnights     Order Specific Question:   Certification     Answer:   I certify that inpatient services are medically necessary for this patient for a duration of greater than two midnights  See H&P and MD Progress Notes for additional information about the patient's course of treatment  ED Arrival Information     Patient not seen in ED                     Initial Presentation:   57y Male, transfer from [facility] ED for Neurosurgery evaluation  Initially presented with headache and blurry vision x1 wk with elevated BP  Noted his BP in the 150s and feels symptoms related to his elevated BP  States he had lost approx  40 lbs in 1 yrs  Headache was relieved with Aleve and Tylenol but persistent with blurry vision  PMH for T2DM on Metformin, Vit D deficiency on supplement and Tobacco use - last used 2 months ago  Admit Inpatient level of care for Brain mass and Lung mass  Neurosurgery consult  MRI Brain  Start Iv Steriod and Iv Keppra  Oncology evaluation-may need radiation oncology evaluation but will wait for Heme-Onc and neurosurgery evaluation  Pulmonology consult for concern of lung cancer  CT showed Hyperdense masses noted at the right occipital lobe and inferior left frontal lobes with surrounding vasogenic edema, suspicious for hemorrhagic metastases  3/14  Pulmonology cons; RUL, lung mass - 9 cm, suspect malignancy, consider small cell  Multiple brain masses with vasogenic edema - suspected malignancy   Pt likely with lung cancer with brain mets  If neurosurgery does not plan to intervene, then can consider lung mass biopsy  Would start with bronchoscopy for airway inspection as tumor appears to block RUL airway and may have endobronchial lesion  Also can consider EBUS or IR biopsy of mass  All of these will likely be diagnostic  NPO at [facility] for procedure tomorrow 3/15  Continue Iv Steriod and Iv Keppra  Neurosurgery cons; Few week history of headaches, visual changes, noted to have right occiptal brain mass in the setting of lung mass concerning for metastatic process  May need surgical intervention however there is risk he may have permanent visual issues  Continue to monitor neuro exam, GCS 15, left temporal VF cut  MRI Brain peindg  Continue Iv Steriod for cerebral edema and Iv Keppra  SBP <160  Hold full dose AC/ AP therapy  Date: 3/15   Day 2:   Progress notes; Continue neuro checks  Continue Iv steriod  Pt c/o blurred vision  Radiation Oncology consult  Per Neurosurgery; Plan for Bronchoscopy today per Pulmonology  Continue neuro exam closely  Continue Iv Steriod and Iv Keppra  SBP <160  Ongoing discussion regarding timing for craniotomy for mass resection - Tentatively 3/17  Medical Oncology cons; If patient has brain resection he wont need lung biopsy  He is being evaluated by Radiation Oncology to hear his options to make the final decision about brain surgery  Continue Iv Steriod q6h  Options for treatment surgery vs whole brain radiation  Radiation Oncology consult pending  MRI confirmed two lesions in the brain with peripheral edema  Radiation Oncology cons; Currently he is being considered for craniotomy later this week with Neurosurgery  Discussed that if this is pursued then would consider him for post-operative RT in the weeks ahead with either an SRT/SRS approach or HA-WBRT (pending histology)   If surgery is deferred, then would consider upfront SRT to both lesions if his pathology shows NSCLC or HA-WBRT if his pathology returns SCLC   With regard to his lung disease, he is currently asymptomatic and would therefore not anticipate need for palliative RT at this time  RT may be considered to this area as consolidation after initial systemic therapy or symptomatic progression  Plan to discuss tomorrow morning at [facility] to  arrive at a multidisciplinary consensus  If no surgical resection is planned, he should undergo prompt lung biopsy (as planned) to direct further therapy  Need pathology to guide further RT planning (stereotactic RT vs  HA-WBRT)  No role for thoracic RT at present; consider if symptomatic progression or as consolidation after systemic therapy  Vitals   Temperature Pulse Respirations Blood Pressure SpO2   03/14/22 1411 03/14/22 1411 03/14/22 1411 03/14/22 1411 03/14/22 1411   98 5 °F (36 9 °C) 82 19 139/86 94 %      Temp src Heart Rate Source Patient Position - Orthostatic VS BP Location FiO2 (%)   -- -- -- -- --             Pain Score       03/14/22 1430       No Pain          Wt Readings from Last 1 Encounters:   03/14/22 64 2 kg (141 lb 8 6 oz)     Additional Vital Signs:   03/15/22 1005 98 2 °F (36 8 °C) 74 18 105/70 -- 97 % --   03/15/22 07:05:45 98 °F (36 7 °C) 67 18 103/66 78 97 % --   03/15/22 02:32:56 97 8 °F (36 6 °C) 73 16 102/65 77 96 % --   03/14/22 22:41:11 98 2 °F (36 8 °C) 76 18 139/90 106 95 % --   03/14/22 19:01:49 98 °F (36 7 °C) 75 18 142/88 106 95 % None (Room air)       Pertinent Labs/Diagnostic Test Results:   MRI brain w wo contrast   Final Result by Vishal Blanco DO (03/15 4063)      2 separate hemorrhagic enhancing lesions are identified within the brain parenchyma, right parafalcine occipital lobe and left posterior inferior frontal lobe  Surrounding vasogenic edema and localized mass effect  Findings are consistent with    intracranial metastasis in this patient with a known right upper lobe mass        Workstation performed: [id number] 3/14  PCXR - No acute cardiopulmonary disease  Redemonstration of 9 cm right upper lobe mass  CT Head - Hyperdense masses noted at the right occipital lobe and inferior left frontal lobes with surrounding vasogenic edema, suspicious for hemorrhagic metastases   An enhanced MRI of the brain is recommended for further evaluation  CT chest/abd/pelvis -   Chest:  Increased size of a large right upper lobe mass compared to 1/30/2022  Chronic small satellite nodule versus intrapulmonary lymph node  Increased mediastinal lymphadenopathy  Tiny focus of abnormal attenuation in the adjacent superior vena cava as described above, questionable early vascular involvement  This could be volume averaging artifact  Abdomen and pelvis:  Indeterminate splenic nodule  Typically splenic nodules are benign, but metastasis cannot be excluded given the chest findings  No other findings of abdominopelvic metastases  No acute pathology    Other nonemergent findings             Results from last 7 days   Lab Units 03/15/22  0526 03/14/22  0529   WBC Thousand/uL 17 06* 11 24*   HEMOGLOBIN g/dL 15 3 16 2   HEMATOCRIT % 44 1 47 7   PLATELETS Thousands/uL 342 361   NEUTROS ABS Thousands/µL  --  7 48         Results from last 7 days   Lab Units 03/15/22  0526 03/14/22  0529   SODIUM mmol/L 139 140   POTASSIUM mmol/L 4 1 4 1   CHLORIDE mmol/L 107 104   CO2 mmol/L 26 28   ANION GAP mmol/L 6 8   BUN mg/dL 26* 20   CREATININE mg/dL 0 98 1 07   EGFR ml/min/1 73sq m 85 77   CALCIUM mg/dL 9 1 9 6   MAGNESIUM mg/dL  --  2 0     Results from last 7 days   Lab Units 03/14/22  0529   AST U/L 18   ALT U/L 20   ALK PHOS U/L 63   TOTAL PROTEIN g/dL 7 7   ALBUMIN g/dL 3 7   TOTAL BILIRUBIN mg/dL 0 61     Results from last 7 days   Lab Units 03/15/22  0706 03/14/22  2044 03/14/22  1609 03/14/22  0532   POC GLUCOSE mg/dl 128 138 185* 108     Results from last 7 days   Lab Units 03/15/22  0526 03/14/22  0529   GLUCOSE RANDOM mg/dL 153* 117 BETA-HYDROXYBUTYRATE   Date Value Ref Range Status   01/06/2021 0 6 (H) <0 6 mmol/L Final                      Results from last 7 days   Lab Units 03/14/22  0722 03/14/22  0529   HS TNI 0HR ng/L  --  5   HS TNI 2HR ng/L 4  --    HSTNI D2 ng/L -1  --          Results from last 7 days   Lab Units 03/14/22  0529   PROTIME seconds 11 8   INR  0 86   PTT seconds 33     Results from last 7 days   Lab Units 03/14/22  0529   TSH 3RD GENERATON uIU/mL 1 714       Results from last 7 days   Lab Units 03/14/22  0529   LIPASE u/L 64*       Results from last 7 days   Lab Units 03/14/22  0640   CLARITY UA  Clear   COLOR UA  Yellow   SPEC GRAV UA  1 015   PH UA  6 0   GLUCOSE UA mg/dl Negative   KETONES UA mg/dl Negative   BLOOD UA  Negative   PROTEIN UA mg/dl Negative   NITRITE UA  Negative   BILIRUBIN UA  Negative   UROBILINOGEN UA E U /dl 0 2   LEUKOCYTES UA  Negative       Past Medical History:   Diagnosis Date    Diabetes mellitus ([facility] [address] )     Elevated PSA 3/11/2021    Fatty liver 3/11/2021    Gall bladder polyp 3/11/2021    Nonimmune to hepatitis B virus 3/11/2021     Present on Admission:   Type 2 diabetes mellitus without complication, without long-term current use of insulin (HCC)   Vitamin D deficiency      Admitting Diagnosis: Brain mass [G93 89]  Age/Sex: 64 y o  male     Admission Orders:  Scheduled Medications:  cholecalciferol, 1,000 Units, Oral, Daily  dexamethasone, 4 mg, Intravenous, Q6H LISY  docusate sodium, 100 mg, Oral, BID  insulin lispro, 1-5 Units, Subcutaneous, TID AC  insulin lispro, 1-5 Units, Subcutaneous, HS  levETIRAcetam, 500 mg, Intravenous, Q12H LISY      Continuous IV Infusions:     PRN Meds:  acetaminophen, 650 mg, Oral, Q6H PRN  aluminum-magnesium hydroxide-simethicone, 30 mL, Oral, Q6H PRN  ondansetron, 4 mg, Intravenous, Q6H PRN      Neuro checks q4h  IP CONSULT TO PULMONOLOGY  IP CONSULT TO NEUROSURGERY  IP CONSULT TO ONCOLOGY    Network Utilization Review Department  ATTENTION: Please call with any questions or concerns to [phone] and carefully listen to the prompts so that you are directed to the right person  All voicemails are confidential   Donnie Armenta all requests for admission clinical reviews, approved or denied determinations and any other requests to dedicated fax number below belonging to the campus where the patient is receiving treatment   List of dedicated fax numbers for the Facilities:  [address] DENIALS (Administrative/Medical Necessity) [phone]   [address] (Maternity/NICU/Pediatrics) [phone]   [address]  [address] [facility] [place] [phone] [id number] [place] [address] [address] [place] [address] [phone]

## 2022-03-15 NOTE — ASSESSMENT & PLAN NOTE
Few week history of headaches, visual changes, noted to have right occiptal brain mass in the setting of lung mass concerning for metastatic process  · History of smoking, quit 2 months ago  · +family history of uncle who had lung cancer  · On current exam, complaining of occasional blurred vision  Left temporal visual field cut  Imaging reviewed personally, results are as follows:  · MRI brain w/wo, 3/14/2022: 2 separate hemorrhagic enhancing lesions are identified within the brain parenchyma, right parafalcine occipital lobe and left posterior inferior frontal lobe     Surrounding vasogenic edema and localized mass effect  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 in this patient with a known right upper lobe mass   CT head wo contrast 3/14/2022:  Hyperdense masses noted at the right occipital lobe and inferior left frontal lobes with surrounding vasogenic edema, suspicious for hemorrhagic metastases  An enhanced MRI of the brain is recommended for further evaluation  Plan:  · Continue to monitor neuro exam closely  · Continue decadron 4mg [id number] for cerebral edema  · Continue keppra 500mg BID for seizure ppx  · SBP < 160  · Hold full dose AC / AP therapy, no documented use   Medical management and pain control per primary team   Ongoing discussion regarding timing for craniotomy for mass resection - tentatively 3/17 with Dr De La Paz Life  Discussed extensively with patient's son Sam Guerrero and sister this morning   DVT ppx:  BERNARDOsrichy for pharm dvt ppx   Mobilize as tolerated with assistance, PT / OT pending surgical plan    Neurosurgery will continue to follow  Please call with questions or concerns

## 2022-03-15 NOTE — ASSESSMENT & PLAN NOTE
Patient presented to the ED with headache and vision changes for about 1 week  Also reported blurred vision  · CT scan-Hyperdense masses noted at the right occipital lobe and inferior left frontal lobes with surrounding vasogenic edema, suspicious for hemorrhagic metastases  Likely lung cancer with brain metastasis  · MRI brain shows 2 separate hemorrhagic enhancing lesions are identified within the brain parenchyma, right parafalcine occipital lobe and left posterior inferior frontal lobe     Surrounding vasogenic edema and localized mass effect  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 in this patient with a known right upper lobe mass  · Neurosurgery following, recommending resection  Ongoing discussions with patient/family, timing TBD     · Oncology and pulmonology following  · Continue neuro checks

## 2022-03-15 NOTE — PROGRESS NOTES
[address] [facility]  Progress Note Riddhi Covarrubias 1965, 64 y o  male MRN: [id number]  Unit/Bed#: [facility] 916-53 Encounter: [id number]  Primary Care Provider: Toribio Bird MD   Date and time admitted to hospital: 3/14/2022  1:59 PM    * Brain mass  Assessment & Plan  Patient presented to the ED with headache and vision changes for about 1 week  Also reported blurred vision  · CT scan-Hyperdense masses noted at the right occipital lobe and inferior left frontal lobes with surrounding vasogenic edema, suspicious for hemorrhagic metastases  Likely lung cancer with brain metastasis  · MRI brain shows 2 separate hemorrhagic enhancing lesions are identified within the brain parenchyma, right parafalcine occipital lobe and left posterior inferior frontal lobe     Surrounding vasogenic edema and localized mass effect  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 in this patient with a known right upper lobe mass  · Neurosurgery following, recommending resection  Ongoing discussions with patient/family, timing TBD  · Oncology and pulmonology following  · Continue neuro checks    Cerebral edema [facility])  Assessment & Plan  · Continue Decadron    Lung mass  Assessment & Plan  Patient had a screening CT scan in January of this year and noted to have 6 cm pulmonary mass and was supposed to see a pulmonologist-also noted to have right paratracheal adenopathy and pulmonary nodules  · CT chest abdomen and pelvis the mass is again redemonstrated but enlarging in size-since there is enlargement in size in a in a short time  Concerning for malignancy  · Pulmonary following, considering bronchoscopy versus EBUS versus IR biopsy if no plans for surgical resection        Type 2 diabetes mellitus without complication, without long-term current use of insulin [facility])  Assessment & Plan  Lab Results   Component Value Date    HGBA1C 5 7 (H) 11/07/2021       Recent Labs     03/14/22  0554 22  1609 22  2044 03/15/22  0706   POCGLU 108 185* 138 128       · Patient is maintained on metformin as an outpatient with last hemoglobin A1c of 5 7  Patient reported that he lost more than 40 lb since he was diagnosed with diabetes mellitus  · Hold metformin  · Sliding scale insulin for now  · Monitor Accu-Cheks and adjust regimen as needed    History of tobacco use  Assessment & Plan  · Patient reported that he has not used tobacco for the past 2 months  Gives history of 30 year tobacco use smokes around 1/2 -1 per day  · Patient noted to have lung mass on routine screening and was supposed to be seen by pulmonologist as outpatient    Vitamin D deficiency  Assessment & Plan  · Continue with the supplementation      VTE Pharmacologic Prophylaxis: VTE Score: 1 Moderate Risk (Score 3-4) - Pharmacological DVT Prophylaxis Contraindicated  Sequential Compression Devices Ordered  Patient Centered Rounds: I performed bedside rounds with nursing staff today  Discussions with Specialists or Other Care Team Provider: nursing, pulm, neurosurgery, case management     Education and Discussions with Family / Patient: Updated  (wife) at bedside  Time Spent for Care: 30 minutes  More than 50% of total time spent on counseling and coordination of care as described above  Current Length of Stay: 1 day(s)  Current Patient Status: Inpatient   Certification Statement: The patient will continue to require additional inpatient hospital stay due to Brain resection  Discharge Plan: Anticipate discharge in >72 hrs to discharge location to be determined pending rehab evaluations  Code Status: Level 1 - Full Code    Subjective:   Patient offers no new complaints  Continues to complain blurred vision      Objective:     Vitals:   Temp (24hrs), Av 1 °F (36 7 °C), Min:97 8 °F (36 6 °C), Max:98 5 °F (36 9 °C)    Temp:  [97 8 °F (36 6 °C)-98 5 °F (36 9 °C)] 98 2 °F (36 8 °C)  HR:  [67-82] 74  Resp: [16-19] 18  BP: (102-142)/(65-90) 105/70  SpO2:  [94 %-97 %] 97 %  Body mass index is 22 84 kg/m²  Input and Output Summary (last 24 hours): Intake/Output Summary (Last 24 hours) at 3/15/2022 1102  Last data filed at 3/15/2022 1005  Gross per 24 hour   Intake 340 ml   Output --   Net 340 ml       Physical Exam:   Physical Exam  Vitals and nursing note reviewed  Constitutional:       General: He is not in acute distress  Cardiovascular:      Rate and Rhythm: Normal rate  Pulmonary:      Breath sounds: Normal breath sounds  Abdominal:      Tenderness: There is no abdominal tenderness  Musculoskeletal:         General: No swelling  Skin:     General: Skin is warm  Neurological:      Mental Status: He is alert and oriented to person, place, and time  Mental status is at baseline  Psychiatric:         Mood and Affect: Mood normal           Additional Data:     Labs:  Results from last 7 days   Lab Units 03/15/22  0526 03/14/22  0529 03/14/22  0529   WBC Thousand/uL 17 06*   < > 11 24*   HEMOGLOBIN g/dL 15 3   < > 16 2   HEMATOCRIT % 44 1   < > 47 7   PLATELETS Thousands/uL 342   < > 361   NEUTROS PCT %  --   --  67   LYMPHS PCT %  --   --  23   MONOS PCT %  --   --  8   EOS PCT %  --   --  1    < > = values in this interval not displayed  Results from last 7 days   Lab Units 03/15/22  0526 03/14/22  0529 03/14/22  0529   SODIUM mmol/L 139   < > 140   POTASSIUM mmol/L 4 1   < > 4 1   CHLORIDE mmol/L 107   < > 104   CO2 mmol/L 26   < > 28   BUN mg/dL 26*   < > 20   CREATININE mg/dL 0 98   < > 1 07   ANION GAP mmol/L 6   < > 8   CALCIUM mg/dL 9 1   < > 9 6   ALBUMIN g/dL  --   --  3 7   TOTAL BILIRUBIN mg/dL  --   --  0 61   ALK PHOS U/L  --   --  63   ALT U/L  --   --  20   AST U/L  --   --  18   GLUCOSE RANDOM mg/dL 153*   < > 117    < > = values in this interval not displayed       Results from last 7 days   Lab Units 03/14/22  0529   INR  0 86     Results from last 7 days   Lab Units 03/15/22  0706 03/14/22  2044 03/14/22  1609 03/14/22  0532   POC GLUCOSE mg/dl 128 138 185* 108               Lines/Drains:  Invasive Devices  Report    Peripheral Intravenous Line            Peripheral IV 03/14/22 Left Antecubital 1 day                      Imaging: No pertinent imaging reviewed  Recent Cultures (last 7 days):         Last 24 Hours Medication List:   Current Facility-Administered Medications   Medication Dose Route Frequency Provider Last Rate    acetaminophen  650 mg Oral Q6H PRN Sade Bowman MD      aluminum-magnesium hydroxide-simethicone  30 mL Oral Q6H PRN Sade Bowman MD      cholecalciferol  1,000 Units Oral Daily Sade Bowman MD      dexamethasone  4 mg Intravenous Q6H Carol Shea MD      docusate sodium  100 mg Oral BID Sade Bowman MD      insulin lispro  1-5 Units Subcutaneous TID [facility] Sade Bowman MD      insulin lispro  1-5 Units Subcutaneous HS Sade Bowman MD      levETIRAcetam  500 mg Oral Q12H [facility] JONATHAN Quesada      ondansetron  4 mg Intravenous Q6H PRN Sade Bowman MD      pantoprazole  40 mg Oral Early Morning JONATHAN Quesada          Today, Patient Was Seen By: JONATHAN Zhou    **Please Note: This note may have been constructed using a voice recognition system  **

## 2022-03-15 NOTE — CONSULTS
Consultation - Jasbir [age] y o  male MRN: [id number]  [facility] Encounter: [id number]  03/15/22  8:45 AM    Assessment/ Plan:  1  Lung mass  CT chest 3/14/22 shows  large right upper lobe mass increasing in size from CT 1/30/22  Cut off of the right upper lobe bronchus  Increased in size 1 2->1 7cm periaortic hilar lymph node  Stable1 7 cm  right hilar lymph node  2 4 paraaortic lymph node   Chronic small satellite nodule, focus attenuation in SVC  Small nodule in spleen of unclear ethyology, otherwise no evidence of metastatic process in abdomen and pelvis  Pulmonology is on board     If patient has brain resection he wont need lung biopsy  Patient is being evaluated by radiation oncology to hear his options to make the final decision about brain surgery    - NPO after midnight    2  Brain masses  Ct head 3/14 showed hyperdence mass in the R occipital lobe and inferior left frontal lobe  Masses with surrounding vasogenic edema suspitious of hemorrhagic mets  Initially received Decadron 10 mg low dose  - continue decadrone 4mg q6h   - options for treatment surgery vs whole brain radiation   - neurosurgery is on board   - follow radiation oncology consult  MRI confirmed two lesions in the brain with peripheral edema  3  History of smoking  Patient smoked 1/2 pack per day for more then 10 years  Patient has been abstinent for 2 month   - encourage continue abstinence      History of Present Illness   Physician Requesting Consult: Judge Mani MD  Reason for Consult / Principal Problem: brain mass, lung mass  HPI: Ingrid Mcduffie is a 64y o  year old male with history of lung mass,  DM type 2 who initially presented to [facility] complaining of recurrent headache over the last 1 week  The headache was progressively getting worse, it was making him up night  Patient also complained of blurred vision    Patient has history of smoking more than 30 pack years he quit 2 months ago  He has family history of lung cancer in his uncle  Patient lost 40 lb of weight over the last year and attributed this to watching his diet after he was diagnosed with DM type 2  Of note he underwent lung screening on 01/30 that showed 6 cm lung masses in the right upper lobe  On admission his vital signs were stable, blood work showed hyperglycemia, was otherwise unremarkable  CT head showed masses in frontal and occipital areas surrounded by vasogenic edema  He was started on decadron  Patient was urgently transferred to [facility] for neurosurgery evaluation  Inpatient consult to Oncology     Performed by  Jorden Sanchez MD     Authorized by Antonia Venegas MD                  Review of Systems   Constitutional: Negative for activity change, appetite change, chills, fatigue, fever and unexpected weight change  HENT: Negative  Eyes: Negative  Respiratory: Negative for apnea, cough, choking, chest tightness, shortness of breath and wheezing  Cardiovascular: Negative for chest pain, palpitations and leg swelling  Genitourinary: Negative for difficulty urinating and dysuria  Skin: Negative for rash  Neurological: Positive for headaches  Negative for dizziness, syncope, light-headedness and numbness  Hematological: Negative for adenopathy  Psychiatric/Behavioral: Negative  Historical Information   Past Medical History:   Diagnosis Date    Diabetes mellitus ([facility] [address] )     Elevated PSA 3/11/2021    Fatty liver 3/11/2021    Gall bladder polyp 3/11/2021    Nonimmune to hepatitis B virus 3/11/2021     No past surgical history on file    Social History     Substance and Sexual Activity   Alcohol Use Not Currently    Comment: quit drinking 7 years ago     Social History     Substance and Sexual Activity   Drug Use Never     Social History     Tobacco Use   Smoking Status Former Smoker    Packs/day: 0 50    Types: Cigarettes    Quit date: 1/14/2022    Years since quittin 1   Smokeless Tobacco Never Used   Tobacco Comment    quit 3 months ago       Family History: No family history on file  Meds/Allergies   all current active meds have been reviewed  No Known Allergies    Objective   Vitals: Blood pressure 103/66, pulse 67, temperature 98 °F (36 7 °C), resp  rate 18, height 5' 6" (1 676 m), weight 64 2 kg (141 lb 8 6 oz), SpO2 97 %  , Body mass index is 22 84 kg/m² ,   Orthostatic Blood Pressures      Most Recent Value   Blood Pressure 103/66 filed at 03/15/2022 9128          Systolic (63OLD), JVO:454 , Min:102 , TIT:425     Diastolic (04PXH), NQN:76, Min:65, Max:90          Invasive Devices  Report    Peripheral Intravenous Line            Peripheral IV 22 Left Antecubital 1 day                Physical Exam  Constitutional:       General: He is not in acute distress  Appearance: He is not ill-appearing or toxic-appearing  HENT:      Mouth/Throat:      Mouth: Mucous membranes are moist    Cardiovascular:      Rate and Rhythm: Normal rate  Heart sounds: No murmur heard  No friction rub  No gallop  Pulmonary:      Effort: No respiratory distress  Breath sounds: No wheezing, rhonchi or rales  Comments: Decreased breath sound on the right  Musculoskeletal:      Right lower leg: No edema  Left lower leg: No edema  Skin:     General: Skin is dry  Neurological:      Mental Status: He is oriented to person, place, and time  Mental status is at baseline     Psychiatric:         Mood and Affect: Mood normal        Lab Results:     Troponins:       CBC with diff:   Results from last 7 days   Lab Units 03/15/22  0526 03/14/22  0529   WBC Thousand/uL 17 06* 11 24*   HEMOGLOBIN g/dL 15 3 16 2   HEMATOCRIT % 44 1 47 7   MCV fL 88 89   PLATELETS Thousands/uL 342 361   MCH pg 30 4 30 2   MCHC g/dL 34 7 34 0   RDW % 13 3 13 6   MPV fL 8 7* 8 9   NRBC AUTO /100 WBCs  --  0         CMP:   Results from last 7 days   Lab Units 03/15/22  0526 03/14/22  0529   POTASSIUM mmol/L 4 1 4 1   CHLORIDE mmol/L 107 104   CO2 mmol/L 26 28   BUN mg/dL 26* 20   CREATININE mg/dL 0 98 1 07   CALCIUM mg/dL 9 1 9 6   AST U/L  --  18   ALT U/L  --  20   ALK PHOS U/L  --  63   EGFR ml/min/1 73sq m 85 77       CT chest abdomen pelvis w contrast      Impression Chest: Increased size of a large right upper lobe mass compared to 1/30/2022  Chronic small satellite nodule versus intrapulmonary lymph node  Increased mediastinal lymphadenopathy  Tiny focus of abnormal attenuation in the adjacent superior vena cava as described above, questionable early vascular involvement  This could be volume averaging artifact  Abdomen and pelvis: Indeterminate splenic nodule  Typically splenic nodules are benign, but metastasis cannot be excluded given the chest findings  No other findings of abdominopelvic metastases  No acute pathology  Other nonemergent findings above  MRI brain 3/15  2 separate hemorrhagic enhancing lesions are identified within the brain parenchyma, right parafalcine occipital lobe and left posterior inferior frontal lobe     Surrounding vasogenic edema and localized mass effect  Findings are consistent with   intracranial metastasis in this patient with a known right upper lobe mass

## 2022-03-15 NOTE — PROGRESS NOTES
[facility]  Progress Note Elijah Prince 1965, 64 y o  male MRN: [id number]  Unit/Bed#: [facility] 841-21 Encounter: [id number]  Primary Care Provider: Israel Arciniega MD   Date and time admitted to hospital: 3/14/2022  1:59 PM    Cerebral edema [facility])  Assessment & Plan  See plan above    Lung mass  Assessment & Plan  Imaging:  · CT chest abdomen pelvis w contrast 3/14/2022:  Increased size of a large right upper lobe mass compared to 1/30/2022  Chronic small satellite nodule versus intrapulmonary lymph node  Increased mediastinal lymphadenopathy  Tiny focus of abnormal attenuation in the adjacent superior vena cava as described above, questionable early vascular involvement  This could be volume averaging artifact  Indeterminate splenic nodule  Typically splenic nodules are benign, but metastasis cannot be excluded given the chest findings  No other findings of abdominopelvic metastases  No acute pathology  Other nonemergent findings above      · Plan  Pulmonology following, plan for bronchoscopy today    History of tobacco use  Assessment & Plan  · History of about 1/2 PPD for several years  · Quit 2 months ago when told he had lung mass    Type 2 diabetes mellitus without complication, without long-term current use of insulin [facility])  Assessment & Plan  Lab Results   Component Value Date    HGBA1C 5 7 (H) 11/07/2021       Recent Labs     03/14/22  1609 03/14/22  2044 03/15/22  0706 03/15/22  1146   POCGLU 185* 138 128 138       Blood Sugar Average: Last 72 hrs:  (P) 147 25     · Continue medical management  · On ISS  Has been diet controlled in the outpatient setting    * Brain mass  Assessment & Plan  Few week history of headaches, visual changes, noted to have right occiptal brain mass in the setting of lung mass concerning for metastatic process  · History of smoking, quit 2 months ago  · +family history of uncle who had lung cancer  · On current exam, complaining of occasional blurred vision  Left temporal visual field cut  Imaging reviewed personally, results are as follows:  · MRI brain w/wo, 3/14/2022: 2 separate hemorrhagic enhancing lesions are identified within the brain parenchyma, right parafalcine occipital lobe and left posterior inferior frontal lobe     Surrounding vasogenic edema and localized mass effect  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 in this patient with a known right upper lobe mass   CT head wo contrast 3/14/2022:  Hyperdense masses noted at the right occipital lobe and inferior left frontal lobes with surrounding vasogenic edema, suspicious for hemorrhagic metastases  An enhanced MRI of the brain is recommended for further evaluation  Plan:  · Continue to monitor neuro exam closely  · Continue decadron 4mg [id number] for cerebral edema  · Continue keppra 500mg BID for seizure ppx  · SBP < 160  · Hold full dose AC / AP therapy, no documented use   Medical management and pain control per primary team   Ongoing discussion regarding timing for craniotomy for mass resection - tentatively 3/17 with Dr Fernando Marin  Discussed extensively with patient's son Shakir Wilkinson and sister this morning   DVT ppx:  SCDs, okay for pharm dvt ppx   Mobilize as tolerated with assistance, PT / OT pending surgical plan    Neurosurgery will continue to follow  Please call with questions or concerns  Subjective/Objective   Chief Complaint: "I'm ok "    Subjective: Complains of persistent blurred vision but states this resolves with medications  Denies headache although complained of HA yesterday  Denies numbness or weakness  Objective: NAD  Left temporal visual field cut  I/O       03/13 0701  03/14 0700 03/14 0701  03/15 0700 03/15 0701  03/16 0700    P  O   240 0    IV Piggyback  100     Total Intake(mL/kg)  340 (5 3) 0 (0)    Net  +340 0           Unmeasured Urine Occurrence  3 x           Invasive Devices  Report    Peripheral Intravenous Line Peripheral IV 03/14/22 Left Antecubital 1 day                Physical Exam:  Vitals: Blood pressure 105/70, pulse 74, temperature 98 2 °F (36 8 °C), resp  rate 18, height 5' 6" (1 676 m), weight 64 2 kg (141 lb 8 6 oz), SpO2 97 %  ,Body mass index is 22 84 kg/m²  General appearance: alert, appears stated age, cooperative and no distress  Head: Normocephalic, without obvious abnormality, atraumatic  Eyes: EOMI, PERRL  Neck: supple, symmetrical, trachea midline and NT  Back: no kyphosis present, no tenderness to percussion or palpation  Lungs: non labored breathing  Heart: regular heart rate  Neurologic:   Mental status: Alert, oriented x3, thought content appropriate  Cranial nerves: grossly intact (Cranial nerves II-XII), left visual field cut  Sensory: normal to LT  Motor: moving all extremities without focal weakness  Coordination: finger to nose normal bilaterally, no drift bilaterally      Lab Results:  Results from last 7 days   Lab Units 03/15/22  0526 03/14/22  0529   WBC Thousand/uL 17 06* 11 24*   HEMOGLOBIN g/dL 15 3 16 2   HEMATOCRIT % 44 1 47 7   PLATELETS Thousands/uL 342 361   NEUTROS PCT %  --  67   MONOS PCT %  --  8     Results from last 7 days   Lab Units 03/15/22  0526 03/14/22  0529   POTASSIUM mmol/L 4 1 4 1   CHLORIDE mmol/L 107 104   CO2 mmol/L 26 28   BUN mg/dL 26* 20   CREATININE mg/dL 0 98 1 07   CALCIUM mg/dL 9 1 9 6   ALK PHOS U/L  --  63   ALT U/L  --  20   AST U/L  --  18     Results from last 7 days   Lab Units 03/14/22  0529   MAGNESIUM mg/dL 2 0         Results from last 7 days   Lab Units 03/14/22  0529   INR  0 86   PTT seconds 33     No results found for: TROPONINT  ABG:No results found for: PHART, ANG4MHL, PO2ART, PXJ6FJJ, D8DVBAWU, BEART, SOURCE    Imaging Studies: I have personally reviewed pertinent reports  and I have personally reviewed pertinent films in PACS    XR chest 1 view portable    Result Date: 3/14/2022  Impression: No acute cardiopulmonary disease  Redemonstration of 9 cm right upper lobe mass  Workstation performed: [id number]     CT head without contrast    Result Date: 3/14/2022  Impression: Hyperdense masses noted at the right occipital lobe and inferior left frontal lobes with surrounding vasogenic edema, suspicious for hemorrhagic metastases  An enhanced MRI of the brain is recommended for further evaluation  Workstation performed: [id number]     MRI brain w wo contrast    Result Date: 3/15/2022  Impression: 2 separate hemorrhagic enhancing lesions are identified within the brain parenchyma, right parafalcine occipital lobe and left posterior inferior frontal lobe     Surrounding vasogenic edema and localized mass effect  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 in this patient with a known right upper lobe mass  Workstation performed: [id number]     CT chest abdomen pelvis w contrast    Result Date: 3/14/2022  Impression: Chest: Increased size of a large right upper lobe mass compared to 1/30/2022  Chronic small satellite nodule versus intrapulmonary lymph node  Increased mediastinal lymphadenopathy  Tiny focus of abnormal attenuation in the adjacent superior vena cava as described above, questionable early vascular involvement  This could be volume averaging artifact  Abdomen and pelvis: Indeterminate splenic nodule  Typically splenic nodules are benign, but metastasis cannot be excluded given the chest findings  No other findings of abdominopelvic metastases  No acute pathology  Other nonemergent findings above  Workstation performed: [id number]       EKG, Pathology, and Other Studies: I have personally reviewed pertinent reports        VTE Pharmacologic Prophylaxis: Sequential compression device (Venodyne)     VTE Mechanical Prophylaxis: sequential compression device

## 2022-03-15 NOTE — CASE MANAGEMENT
Case Management Assessment & Discharge Planning Note    Patient name Bertha Dominguez  Location [address] [place] [address] 618/PPHP [id number] MRN [id number]  : 1965 Date 3/15/2022       Current Admission Date: 3/14/2022  Current Admission Diagnosis:Brain mass   Patient Active Problem List    Diagnosis Date Noted    History of tobacco use 2022    Lung mass 2022    Brain mass 2022    Cerebral edema ([address] ) 2022    Brain compression ([address] ) 2022    Vitamin D deficiency 2021    Cyanocobalamin deficiency 2021    Nonimmune to hepatitis B virus 2021    Elevated PSA 2021    Immunization deficiency 2021    Fatty liver 2021    Gall bladder polyp 2021    Type 2 diabetes mellitus without complication, without long-term current use of insulin ([facility] ) 2021    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[facility] ) 2021    Frequency of urination and polyuria 2021    Cigarette nicotine dependence without complication       LOS (days): 1  Geometric Mean LOS (GMLOS) (days):   Days to GMLOS:     OBJECTIVE:    Risk of Unplanned Readmission Score: 9         Current admission status: Inpatient       Preferred Pharmacy:   [address] [place] [address], [address] [facility] [address]  [address] [facility] [address]  Phone: [phone] Fax: [phone]    Primary Care Provider: Tessa Oneal MD    Primary Insurance: [facility]  Secondary Insurance:     ASSESSMENT:  Active Health Care Proxies     [address] Representative - Son   Primary Phone: [phone] (Mobile)               Advance Directives  Does patient have a [address] [facility] [address]?: No  Was patient offered paperwork?: Yes  Does patient currently have a Health Care decision maker?: No  Does patient have Advance Directives?: No  Was patient offered paperwork?: Yes  Primary Contact: Peter Benedict       Patient Information  Admitted from[de-identified] Home  Mental Status: Alert  During Assessment patient was accompanied by: Not accompanied during assessment  Assessment information provided by[de-identified] Son  Primary Caregiver: Self  Support Systems: Spouse/significant other,Son  Home entry access options   Select all that apply : Stairs  Number of steps to enter home : One Flight  Do the steps have railings?: Yes  Type of Current Residence: 3 story home  Upon entering residence, is there a bedroom on the main floor (no further steps)?: No  A bedroom is located on the following floor levels of residence (select all that apply):: 2nd Floor  Upon entering residence, is there a bathroom on the main floor (no further steps)?: No  Indicate which floors of current residence have a bathroom (select all the apply):: 2nd Floor  Number of steps to 2nd floor from main floor: One Flight  In the last 12 months, was there a time when you were not able to pay the mortgage or rent on time?: No  In the last 12 months, how many places have you lived?: 1  In the last 12 months, was there a time when you did not have a steady place to sleep or slept in a shelter (including now)?: No  Homeless/housing insecurity resource given?: N/A  Living Arrangements: Lives w/ Spouse/significant other,Lives w/ Son  Is patient a ?: No    Activities of Daily Living Prior to Admission  Functional Status: Independent  Completes ADLs independently?: Yes  Ambulates independently?: Yes  Does patient use assisted devices?: No  Does patient currently own DME?: No  Does patient have a history of Outpatient Therapy (PT/OT)?: No  Does the patient have a history of Short-Term Rehab?: No  Does patient have a history of HHC?: No  Does patient currently have [facility]?: No         Patient Information Continued  Income Source: Employed  Does patient have prescription coverage?: Yes  Within the past 12 months, you worried that your food would run out before you got the money to buy more : Never true  Within the past 12 months, the food you bought just didnt last and you didnt have money to get more : Never true  Food insecurity resource given?: N/A  Does patient receive dialysis treatments?: No  Does patient have a history of substance abuse?: No  Does patient have a history of Mental Health Diagnosis?: No    PHQ 2/9 Screening   Reviewed PHQ 2/9 Depression Screening Score?: No    Means of Transportation  Means of Transport to Appts[de-identified] Drives Self  In the past 12 months, has lack of transportation kept you from medical appointments or from getting medications?: No  In the past 12 months, has lack of transportation kept you from meetings, work, or from getting things needed for daily living?: No  Was application for public transport provided?: N/A        DISCHARGE DETAILS:    Discharge planning discussed with[de-identified] Jayme Marion Fermín of Choice: Yes  Comments - Freedom of Choice: Discussed FOC  CM contacted family/caregiver?: Yes     Contacts  Patient Contacts: Srinivasa Benedict  Relationship to Patient[de-identified] Family  Contact Method: Phone  Phone Number: [phone]  Reason/Outcome: Continuity of [address] [place] [address]         Is the patient interested in [place] [address] at discharge?: No    DME Referral Provided  Referral made for DME?: No    CM reviewed d/c planning process including the following: identifying help at home, patient preference for d/c planning needs, Discharge L[facility], [facility] Meds to Bed program, availability of treatment team to discuss questions or concerns patient and/or family may have regarding understanding medications and recognizing signs and symptoms once discharged  CM also encouraged patient to follow up with all recommended appointments after discharge  Patient advised of importance for patient and family to participate in managing patients medical well being  Information obtained from sonMyriam  Patient had 2 Pfizer vaccines for COVID  Patient is currently employed by [employer]    Family enters home through the basement, 12 steps to get to level with patient's bathroom and BR are located  Owns no assistive devices

## 2022-03-15 NOTE — PLAN OF CARE
Problem: Nutrition/Hydration-ADULT  Goal: Nutrient/Hydration intake appropriate for improving, restoring or maintaining nutritional needs  Description: Monitor and assess patient's nutrition/hydration status for malnutrition  Collaborate with interdisciplinary team and initiate plan and interventions as ordered  Monitor patient's weight and dietary intake as ordered or per policy  Utilize nutrition screening tool and intervene as necessary  Determine patient's food preferences and provide high-protein, high-caloric foods as appropriate       INTERVENTIONS:  - Monitor oral intake, urinary output, labs, and treatment plans  - Assess nutrition and hydration status and recommend course of action  - Evaluate amount of meals eaten  - Assist patient with eating if necessary   - Allow adequate time for meals  - Recommend/ encourage appropriate diets, oral nutritional supplements, and vitamin/mineral supplements  - Order, calculate, and assess calorie counts as needed  - Recommend, monitor, and adjust tube feedings and TPN/PPN based on assessed needs  - Assess need for intravenous fluids  - Provide specific nutrition/hydration education as appropriate  - Include patient/family/caregiver in decisions related to nutrition  Outcome: Progressing     Problem: Potential for Falls  Goal: Patient will remain free of falls  Description: INTERVENTIONS:  - Educate patient/family on patient safety including physical limitations  - Instruct patient to call for assistance with activity   - Consult OT/PT to assist with strengthening/mobility   - Keep Call bell within reach  - Keep bed low and locked with side rails adjusted as appropriate  - Keep care items and personal belongings within reach  - Initiate and maintain comfort rounds  - Make Fall Risk Sign visible to staff  - Offer Toileting every 2 Hours, in advance of need  - Apply yellow socks and bracelet for high fall risk patients  - Consider moving patient to room near nurses station  Outcome: Progressing     Problem: NEUROSENSORY - ADULT  Goal: Achieves stable or improved neurological status  Description: INTERVENTIONS  - Monitor and report changes in neurological status  - Monitor vital signs such as temperature, blood pressure, glucose, and any other labs ordered   - Initiate measures to prevent increased intracranial pressure  - Monitor for seizure activity and implement precautions if appropriate      Outcome: Progressing  Goal: Remains free of injury related to seizures activity  Description: INTERVENTIONS  - Maintain airway, patient safety  and administer oxygen as ordered  - Monitor patient for seizure activity, document and report duration and description of seizure to physician/advanced practitioner  - If seizure occurs,  ensure patient safety during seizure  - Reorient patient post seizure  - Seizure pads on all 4 side rails  - Instruct patient/family to notify RN of any seizure activity including if an aura is experienced  - Instruct patient/family to call for assistance with activity based on nursing assessment  - Administer anti-seizure medications if ordered    Outcome: Progressing  Goal: Achieves maximal functionality and self care  Description: INTERVENTIONS  - Monitor swallowing and airway patency with patient fatigue and changes in neurological status  - Encourage and assist patient to increase activity and self care     - Encourage visually impaired, hearing impaired and aphasic patients to use assistive/communication devices  Outcome: Progressing     Problem: RESPIRATORY - ADULT  Goal: Achieves optimal ventilation and oxygenation  Description: INTERVENTIONS:  - Assess for changes in respiratory status  - Assess for changes in mentation and behavior  - Position to facilitate oxygenation and minimize respiratory effort  - Oxygen administered by appropriate delivery if ordered  - Initiate smoking cessation education as indicated  - Encourage broncho-pulmonary hygiene including cough, deep breathe, Incentive Spirometry  - Assess the need for suctioning and aspirate as needed  - Assess and instruct to report SOB or any respiratory difficulty  - Respiratory Therapy support as indicated  Outcome: Progressing     Problem: PAIN - ADULT  Goal: Verbalizes/displays adequate comfort level or baseline comfort level  Description: Interventions:  - Encourage patient to monitor pain and request assistance  - Assess pain using appropriate pain scale  - Administer analgesics based on type and severity of pain and evaluate response  - Implement non-pharmacological measures as appropriate and evaluate response  - Consider cultural and social influences on pain and pain management  - Notify physician/advanced practitioner if interventions unsuccessful or patient reports new pain  Outcome: Progressing     Problem: SAFETY ADULT  Goal: Maintains/Returns to pre admission functional level  Description: INTERVENTIONS:  - Perform BMAT or MOVE assessment daily    - Set and communicate daily mobility goal to care team and patient/family/caregiver  - Collaborate with rehabilitation services on mobility goals if consulted  - Perform Range of Motion 3 times a day  - Reposition patient every 2 hours    - Dangle patient 3 times a day  - Stand patient 3 times a day  - Ambulate patient 3 times a day  - Out of bed to chair 3 times a day   - Out of bed for meals 3 times a day  - Out of bed for toileting  - Record patient progress and toleration of activity level   Outcome: Progressing     Problem: DISCHARGE PLANNING  Goal: Discharge to home or other facility with appropriate resources  Description: INTERVENTIONS:  - Identify barriers to discharge w/patient and caregiver  - Arrange for needed discharge resources and transportation as appropriate  - Identify discharge learning needs (meds, wound care, etc )  - Arrange for interpretive services to assist at discharge as needed  - Refer to Case Management Department for coordinating discharge planning if the patient needs post-hospital services based on physician/advanced practitioner order or complex needs related to functional status, cognitive ability, or social support system  Outcome: Progressing     Problem: Knowledge Deficit  Goal: Patient/family/caregiver demonstrates understanding of disease process, treatment plan, medications, and discharge instructions  Description: Complete learning assessment and assess knowledge base    Interventions:  - Provide teaching at level of understanding  - Provide teaching via preferred learning methods  Outcome: Progressing

## 2022-03-15 NOTE — ASSESSMENT & PLAN NOTE
Patient had a screening CT scan in January of this year and noted to have 6 cm pulmonary mass and was supposed to see a pulmonologist-also noted to have right paratracheal adenopathy and pulmonary nodules  · CT chest abdomen and pelvis the mass is again redemonstrated but enlarging in size-since there is enlargement in size in a in a short time  Concerning for malignancy  · Pulmonary following, considering bronchoscopy versus EBUS versus IR biopsy if no plans for surgical resection

## 2022-03-15 NOTE — ASSESSMENT & PLAN NOTE
Lab Results   Component Value Date    HGBA1C 5 7 (H) 11/07/2021       Recent Labs     03/14/22  0532 03/14/22  1609 03/14/22 2044 03/15/22  0706   POCGLU 108 185* 138 128       · Patient is maintained on metformin as an outpatient with last hemoglobin A1c of 5 7    Patient reported that he lost more than 40 lb since he was diagnosed with diabetes mellitus  · Hold metformin  · Sliding scale insulin for now  · Monitor Accu-Cheks and adjust regimen as needed

## 2022-03-15 NOTE — PROGRESS NOTES
Progress Note - Pulmonary   Deejay Odom 64 y o  male MRN: [id number]  Unit/Bed#: [facility] [place] 618-01 Encounter: [id number]      Assessment and Plan:  RUL lung mass - 9 cm, suspect malignancy, consider small cell  Multiple brain masses with vasogenic edema - suspected malignancy  Blurred vision  Nicotine dependence, cigarettes, uncomplicated  HTN  Type II DM    The patient's pulmonary status is stable  - pt needs tissue diagnosis  Neurosurgery planning on surgical intervention later this week if pt and family are agreeable  Will need oncology input if this will be adequate tissue for diagnosis or do we need lung tissue biopsy as well?  - if lung tissue is needed, pt is on schedule for bronchoscopy with biopsy tomorrow  - Keep NPO after midnight tomorrow until plan is finalized  - Decadron for vasogenic edema  - Keppra for brain masses per neurosurgery  - Start Heparin prophylaxis, OK with neurosurgery    Plan of care discussed with pt, wife, and son who was on phone during my visit  Questions addressed  Chief complaint:  I'm okay  Subjective:   Found lying in bed  Wife at bedside  Pt no distress or complaints, asking appropriate questions about procedures and plans  Wants his brother and sister updated as they know more about medicine than he does  Objective:   Afebrile  Vitals: Blood pressure 105/70, pulse 74, temperature 98 2 °F (36 8 °C), resp  rate 18, height 5' 6" (1 676 m), weight 64 2 kg (141 lb 8 6 oz), SpO2 97 % , RA, Body mass index is 22 84 kg/m²  Intake/Output Summary (Last 24 hours) at 3/15/2022 1408  Last data filed at 3/15/2022 1301  Gross per 24 hour   Intake 580 ml   Output --   Net 580 ml       Physical Exam  GEN: thin, no distress  HEENT: NCAT, EOMI  CVS: Regular, no m/r/g  LUNGS: CTA b/l  ABD: soft  EXT: No c/c/e  NEURO: No focal deficits  MS: Moving all extremities  SKIN: warm, dry  PSYCH: calm, cooperative      Labs: I have personally reviewed pertinent lab results    Results from last 7 days   Lab Units 03/15/22  0526 03/14/22  0529   WBC Thousand/uL 17 06* 11 24*   HEMOGLOBIN g/dL 15 3 16 2   HEMATOCRIT % 44 1 47 7   PLATELETS Thousands/uL 342 361   NEUTROS PCT %  --  67   MONOS PCT %  --  8      Results from last 7 days   Lab Units 03/15/22  0526 03/14/22  0529   POTASSIUM mmol/L 4 1 4 1   CHLORIDE mmol/L 107 104   CO2 mmol/L 26 28   BUN mg/dL 26* 20   CREATININE mg/dL 0 98 1 07   CALCIUM mg/dL 9 1 9 6   ALK PHOS U/L  --  63   ALT U/L  --  20   AST U/L  --  18     Results from last 7 days   Lab Units 03/14/22  0529   MAGNESIUM mg/dL 2 0          Results from last 7 days   Lab Units 03/14/22  0529   INR  0 86   PTT seconds 33         0   Lab Value Date/Time    TROPONINI <0 03 01/06/2021 1103       Labs per my review reveal normal renal function; leukocytosis worse;     Imaging and other studies: I have personally reviewed pertinent reports  MRI brain 2 separate hemorrhagic lesions in the parenchyma - right parafalcine occipital lobe and left posterior inferior frontal lobe with vasogenic edema    KEILA Severino [facility]

## 2022-03-15 NOTE — CONSULTS
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  Will plan on SRT to both lesions to start on 3/30/22  Patient's family informed  Assessment and Plan:     [facility] is a 64year old male recent smoker who was found to have a RUL lung mass concerning for primary lung neoplasm and two intracranial lesions consistent with metastases  We reviewed his recent clinical history and imaging findings  While these are highly consistent with a primary lung malignancy, it is unclear at present whether this represents NSCLC or SCLC  Obtaining pathology confirmation will help determine this and guide further therapy  Currently he is being considered for craniotomy later this week under the care of Dr Darnell Aguilar  We discussed that if this is pursued we would consider him for post-operative RT in the weeks ahead with either an SRT/SRS approach or HA-WBRT (pending histology)  If surgery is deferred, we would consider upfront SRT to both lesions if his pathology shows NSCLC or HA-WBRT if his pathology returns SCLC  I discussed the risks and benefits of each approach including the anticipated side effects with the patient as well as his wife and son (who were present at bedside)  With regard to his lung disease, he is currently asymptomatic and I would therefore not anticipate need for palliative RT at this time  RT may be considered to this area as consolidation after initial systemic therapy or symptomatic progression  I present the patient tomorrow morning at [facility] to arrive at a multidisciplinary consensus   If no surgical resection is planned, he should undergo prompt lung biopsy (as planned) to direct further therapy  Recommendations:   - NOWG discussion of craniotomy + postoperative RT vs upfront RT  - Need pathology to guide further RT planning (stereotactic RT vs  HA-WBRT)  - No role for thoracic RT at present; consider if symptomatic progression or as consolidation after systemic therapy  HPI:  Mr Marilee Perales is a 64y o  year old male recent smoker (quit 2 weeks ago) who was in his usual state of health until yesterday when he presented to [facility] with recent headaches and associated visual changes present for approximately 1 week  Of note, CT lung screen (01/30/2022) demonstrated a 6 5 cm right upper lobe mass with scattered bilateral pulmonary nodules (3 mm and smaller) as well as right paratracheal adenopathy concerning for a primary lung neoplasm  This was discussed with the patient with recommendation for pulmonology follow-up  Upon admission, CT head (03/14/2022) demonstrated hyperdense masses in the right occipital lobe and left inferior lobes with surrounding vasogenic edema suspicious for hemorrhagic metastases  CT chest/abdomen/pelvis (03/14/2022) demonstrated an 8 3 cm right upper lobe mass, significantly increased in size from previously, with increased mediastinal lymphadenopathy, including a tiny focus of abnormal attenuation in the adjacent SVC (question early vascular involvement)  He was transferred to [facility] for neurosurgery consultation  MRI brain (03/15/2022) demonstrated 2 separate intracranial masses; a larger mass in the posterior right paramedian occipital lobe measuring 2 4 cm in greatest dimension, with a significant amount of hemorrhage and moderate surrounding vasogenic edema; a 2nd mass in the posterior inferior left frontal lobe measuring 1 4 cm with a small amount of hemorrhage and surrounding vasogenic edema      He was seen in consultation by Neurosurgery who discussed options for treatment including possible craniotomy with mass resection under the care of Dr Cuhcho Sorensen  The patient is seen today for consideration of upfront RT  Currently he is doing well overall  His headaches have improved since admission and initiation of dexamethasone  He does no recent change in vision, though believes this began approximately 1 year ago at the time of his diabetes diagnosis  No nausea/vomiting, no seizures, no focal weakness or numbness  He has no shortness of breath, no fevers, no cough, no hemoptysis  Review of Systems   Weight loss in recent months+  Prior peripheral neuropathy, now improved+   Otherwise a 10 point ROS was performed and negative except as above  Historical Information   Previous Oncology History:   Oncology History    No history exists  Past Medical History:   Diagnosis Date    Diabetes mellitus ([facility] [address] )     Elevated PSA 3/11/2021    Fatty liver 3/11/2021    Gall bladder polyp 3/11/2021    Nonimmune to hepatitis B virus 3/11/2021     No past surgical history on file  No family history on file  Social History   Social History     Substance and Sexual Activity   Alcohol Use Not Currently    Comment: quit drinking 7 years ago     Social History     Substance and Sexual Activity   Drug Use Never     Social History     Tobacco Use   Smoking Status Former Smoker    Packs/day: 0 50    Types: Cigarettes    Quit date: 2022    Years since quittin 1   Smokeless Tobacco Never Used   Tobacco Comment    quit 3 months ago       Meds/Allergies   all current active meds have been reviewed    No Known Allergies    Objective     Intake/Output Summary (Last 24 hours) at 3/15/2022 1619  Last data filed at 3/15/2022 1301  Gross per 24 hour   Intake 580 ml   Output --   Net 580 ml     Invasive Devices  Report    Peripheral Intravenous Line            Peripheral IV 22 Left Antecubital 1 day              Physical Exam   Well-appearing  NAD    No cervical or supraclavicular lymphadenopathy appreciated  Lungs clear to auscultation bilaterally  No decreased aeration appreciated  No increased work of breathing  Regular rate and rhythm, no murmurs appreciated  Pupils equal round reactive to light, extraocular movements intact  Cranial nerves intact but for left-sided field cut  Strength 5/5 in bilateral upper and lower extremities  Sensation intact to light touch  No rashes or lesions  Lab Results: I have personally reviewed pertinent reports  Imaging Studies: I have personally reviewed pertinent films in PACS  EKG, Pathology, and Other Studies: I have personally reviewed pertinent reports  Assessment/Plan     Code Status: Level 1 - Full Code  Advance Directive and Living Will:      Power of :    POLST:      Counseling / Coordination of Care  Total floor / unit time spent today 45 minutes  Greater than 50% of total time was spent with the patient and / or family counseling and / or coordination of care  A description of the counseling / coordination of care: As above

## 2022-03-15 NOTE — ASSESSMENT & PLAN NOTE
Lab Results   Component Value Date    HGBA1C 5 7 (H) 11/07/2021       Recent Labs     03/14/22  1609 03/14/22  2044 03/15/22  0706 03/15/22  1146   POCGLU 185* 138 128 138       Blood Sugar Average: Last 72 hrs:  (P) 147 25     · Continue medical management  · On ISS  Has been diet controlled in the outpatient setting

## 2022-03-15 NOTE — ASSESSMENT & PLAN NOTE
Imaging:  · CT chest abdomen pelvis w contrast 3/14/2022:  Increased size of a large right upper lobe mass compared to 1/30/2022  Chronic small satellite nodule versus intrapulmonary lymph node  Increased mediastinal lymphadenopathy  Tiny focus of abnormal attenuation in the adjacent superior vena cava as described above, questionable early vascular involvement  This could be volume averaging artifact  Indeterminate splenic nodule  Typically splenic nodules are benign, but metastasis cannot be excluded given the chest findings  No other findings of abdominopelvic metastases  No acute pathology  Other nonemergent findings above      · Plan  Pulmonology following, plan for bronchoscopy today

## 2022-03-16 ENCOUNTER — ANESTHESIA (INPATIENT)
Dept: GASTROENTEROLOGY | Facility: HOSPITAL | Age: 57
DRG: 054 | End: 2022-03-16
Payer: COMMERCIAL

## 2022-03-16 ENCOUNTER — APPOINTMENT (INPATIENT)
Dept: GASTROENTEROLOGY | Facility: HOSPITAL | Age: 57
DRG: 054 | End: 2022-03-16
Payer: COMMERCIAL

## 2022-03-16 ENCOUNTER — ANESTHESIA EVENT (INPATIENT)
Dept: GASTROENTEROLOGY | Facility: HOSPITAL | Age: 57
DRG: 054 | End: 2022-03-16
Payer: COMMERCIAL

## 2022-03-16 ENCOUNTER — DOCUMENTATION (OUTPATIENT)
Dept: NEUROSURGERY | Facility: CLINIC | Age: 57
End: 2022-03-16

## 2022-03-16 LAB
ANION GAP SERPL CALCULATED.3IONS-SCNC: 4 MMOL/L (ref 4–13)
BASOPHILS # BLD AUTO: 0.02 THOUSANDS/ΜL (ref 0–0.1)
BASOPHILS NFR BLD AUTO: 0 % (ref 0–1)
BUN SERPL-MCNC: 29 MG/DL (ref 5–25)
CALCIUM SERPL-MCNC: 9.3 MG/DL (ref 8.3–10.1)
CHLORIDE SERPL-SCNC: 107 MMOL/L (ref 100–108)
CO2 SERPL-SCNC: 28 MMOL/L (ref 21–32)
CREAT SERPL-MCNC: 1.02 MG/DL (ref 0.6–1.3)
EOSINOPHIL # BLD AUTO: 0 THOUSAND/ΜL (ref 0–0.61)
EOSINOPHIL NFR BLD AUTO: 0 % (ref 0–6)
ERYTHROCYTE [DISTWIDTH] IN BLOOD BY AUTOMATED COUNT: 13.6 % (ref 11.6–15.1)
GFR SERPL CREATININE-BSD FRML MDRD: 81 ML/MIN/1.73SQ M
GLUCOSE SERPL-MCNC: 128 MG/DL (ref 65–140)
GLUCOSE SERPL-MCNC: 152 MG/DL (ref 65–140)
GLUCOSE SERPL-MCNC: 155 MG/DL (ref 65–140)
GLUCOSE SERPL-MCNC: 170 MG/DL (ref 65–140)
HCT VFR BLD AUTO: 43.3 % (ref 36.5–49.3)
HGB BLD-MCNC: 14.6 G/DL (ref 12–17)
IMM GRANULOCYTES # BLD AUTO: 0.11 THOUSAND/UL (ref 0–0.2)
IMM GRANULOCYTES NFR BLD AUTO: 1 % (ref 0–2)
LYMPHOCYTES # BLD AUTO: 1.25 THOUSANDS/ΜL (ref 0.6–4.47)
LYMPHOCYTES NFR BLD AUTO: 7 % (ref 14–44)
MCH RBC QN AUTO: 30.5 PG (ref 26.8–34.3)
MCHC RBC AUTO-ENTMCNC: 33.7 G/DL (ref 31.4–37.4)
MCV RBC AUTO: 91 FL (ref 82–98)
MONOCYTES # BLD AUTO: 0.58 THOUSAND/ΜL (ref 0.17–1.22)
MONOCYTES NFR BLD AUTO: 3 % (ref 4–12)
NEUTROPHILS # BLD AUTO: 15.79 THOUSANDS/ΜL (ref 1.85–7.62)
NEUTS SEG NFR BLD AUTO: 89 % (ref 43–75)
NRBC BLD AUTO-RTO: 0 /100 WBCS
PLATELET # BLD AUTO: 352 THOUSANDS/UL (ref 149–390)
PMV BLD AUTO: 8.8 FL (ref 8.9–12.7)
POTASSIUM SERPL-SCNC: 4.2 MMOL/L (ref 3.5–5.3)
RBC # BLD AUTO: 4.78 MILLION/UL (ref 3.88–5.62)
SODIUM SERPL-SCNC: 139 MMOL/L (ref 136–145)
WBC # BLD AUTO: 17.75 THOUSAND/UL (ref 4.31–10.16)

## 2022-03-16 PROCEDURE — 87206 SMEAR FLUORESCENT/ACID STAI: CPT | Performed by: INTERNAL MEDICINE

## 2022-03-16 PROCEDURE — 31623 DX BRONCHOSCOPE/BRUSH: CPT | Performed by: INTERNAL MEDICINE

## 2022-03-16 PROCEDURE — 88112 CYTOPATH CELL ENHANCE TECH: CPT | Performed by: PATHOLOGY

## 2022-03-16 PROCEDURE — 88305 TISSUE EXAM BY PATHOLOGIST: CPT | Performed by: PATHOLOGY

## 2022-03-16 PROCEDURE — 82948 REAGENT STRIP/BLOOD GLUCOSE: CPT

## 2022-03-16 PROCEDURE — 31625 BRONCHOSCOPY W/BIOPSY(S): CPT | Performed by: INTERNAL MEDICINE

## 2022-03-16 PROCEDURE — 88341 IMHCHEM/IMCYTCHM EA ADD ANTB: CPT | Performed by: PATHOLOGY

## 2022-03-16 PROCEDURE — 87070 CULTURE OTHR SPECIMN AEROBIC: CPT | Performed by: INTERNAL MEDICINE

## 2022-03-16 PROCEDURE — 85025 COMPLETE CBC W/AUTO DIFF WBC: CPT | Performed by: NURSE PRACTITIONER

## 2022-03-16 PROCEDURE — 31629 BRONCHOSCOPY/NEEDLE BX EACH: CPT | Performed by: INTERNAL MEDICINE

## 2022-03-16 PROCEDURE — 0B948ZX DRAINAGE OF RIGHT UPPER LOBE BRONCHUS, VIA NATURAL OR ARTIFICIAL OPENING ENDOSCOPIC, DIAGNOSTIC: ICD-10-PCS | Performed by: INTERNAL MEDICINE

## 2022-03-16 PROCEDURE — 31624 DX BRONCHOSCOPE/LAVAGE: CPT | Performed by: INTERNAL MEDICINE

## 2022-03-16 PROCEDURE — 88342 IMHCHEM/IMCYTCHM 1ST ANTB: CPT | Performed by: PATHOLOGY

## 2022-03-16 PROCEDURE — 87102 FUNGUS ISOLATION CULTURE: CPT | Performed by: INTERNAL MEDICINE

## 2022-03-16 PROCEDURE — 99232 SBSQ HOSP IP/OBS MODERATE 35: CPT | Performed by: NURSE PRACTITIONER

## 2022-03-16 PROCEDURE — 99232 SBSQ HOSP IP/OBS MODERATE 35: CPT | Performed by: INTERNAL MEDICINE

## 2022-03-16 PROCEDURE — 80048 BASIC METABOLIC PNL TOTAL CA: CPT | Performed by: NURSE PRACTITIONER

## 2022-03-16 PROCEDURE — 87116 MYCOBACTERIA CULTURE: CPT | Performed by: INTERNAL MEDICINE

## 2022-03-16 PROCEDURE — 0BD48ZX EXTRACTION OF RIGHT UPPER LOBE BRONCHUS, VIA NATURAL OR ARTIFICIAL OPENING ENDOSCOPIC, DIAGNOSTIC: ICD-10-PCS | Performed by: INTERNAL MEDICINE

## 2022-03-16 RX ORDER — SODIUM CHLORIDE 9 MG/ML
INJECTION, SOLUTION INTRAVENOUS CONTINUOUS PRN
Status: DISCONTINUED | OUTPATIENT
Start: 2022-03-16 | End: 2022-03-16

## 2022-03-16 RX ORDER — PROPOFOL 10 MG/ML
INJECTION, EMULSION INTRAVENOUS CONTINUOUS PRN
Status: DISCONTINUED | OUTPATIENT
Start: 2022-03-16 | End: 2022-03-16

## 2022-03-16 RX ORDER — FENTANYL CITRATE 50 UG/ML
INJECTION, SOLUTION INTRAMUSCULAR; INTRAVENOUS AS NEEDED
Status: DISCONTINUED | OUTPATIENT
Start: 2022-03-16 | End: 2022-03-16

## 2022-03-16 RX ORDER — GLYCOPYRROLATE 0.2 MG/ML
INJECTION INTRAMUSCULAR; INTRAVENOUS AS NEEDED
Status: DISCONTINUED | OUTPATIENT
Start: 2022-03-16 | End: 2022-03-16

## 2022-03-16 RX ORDER — PROPOFOL 10 MG/ML
INJECTION, EMULSION INTRAVENOUS AS NEEDED
Status: DISCONTINUED | OUTPATIENT
Start: 2022-03-16 | End: 2022-03-16

## 2022-03-16 RX ORDER — EPHEDRINE SULFATE 50 MG/ML
INJECTION INTRAVENOUS AS NEEDED
Status: DISCONTINUED | OUTPATIENT
Start: 2022-03-16 | End: 2022-03-16

## 2022-03-16 RX ORDER — ONDANSETRON 2 MG/ML
INJECTION INTRAMUSCULAR; INTRAVENOUS AS NEEDED
Status: DISCONTINUED | OUTPATIENT
Start: 2022-03-16 | End: 2022-03-16

## 2022-03-16 RX ORDER — DEXAMETHASONE SODIUM PHOSPHATE 10 MG/ML
INJECTION, SOLUTION INTRAMUSCULAR; INTRAVENOUS AS NEEDED
Status: DISCONTINUED | OUTPATIENT
Start: 2022-03-16 | End: 2022-03-16

## 2022-03-16 RX ADMIN — DEXAMETHASONE SODIUM PHOSPHATE 4 MG: 4 INJECTION INTRA-ARTICULAR; INTRALESIONAL; INTRAMUSCULAR; INTRAVENOUS; SOFT TISSUE at 00:23

## 2022-03-16 RX ADMIN — FENTANYL CITRATE 50 MCG: 50 INJECTION INTRAMUSCULAR; INTRAVENOUS at 11:08

## 2022-03-16 RX ADMIN — DEXAMETHASONE SODIUM PHOSPHATE 4 MG: 4 INJECTION INTRA-ARTICULAR; INTRALESIONAL; INTRAMUSCULAR; INTRAVENOUS; SOFT TISSUE at 18:29

## 2022-03-16 RX ADMIN — EPHEDRINE SULFATE 5 MG: 50 INJECTION INTRAVENOUS at 11:38

## 2022-03-16 RX ADMIN — DOCUSATE SODIUM 100 MG: 100 CAPSULE ORAL at 18:29

## 2022-03-16 RX ADMIN — ONDANSETRON 4 MG: 2 INJECTION INTRAMUSCULAR; INTRAVENOUS at 11:08

## 2022-03-16 RX ADMIN — GLYCOPYRROLATE 0.1 MG: 0.2 INJECTION, SOLUTION INTRAMUSCULAR; INTRAVENOUS at 11:08

## 2022-03-16 RX ADMIN — SODIUM CHLORIDE: 0.9 INJECTION, SOLUTION INTRAVENOUS at 10:58

## 2022-03-16 RX ADMIN — PROPOFOL 10 MG: 10 INJECTION, EMULSION INTRAVENOUS at 11:37

## 2022-03-16 RX ADMIN — SODIUM CHLORIDE: 0.9 INJECTION, SOLUTION INTRAVENOUS at 11:39

## 2022-03-16 RX ADMIN — PROPOFOL 20 MG: 10 INJECTION, EMULSION INTRAVENOUS at 11:32

## 2022-03-16 RX ADMIN — DEXAMETHASONE SODIUM PHOSPHATE 4 MG: 4 INJECTION INTRA-ARTICULAR; INTRALESIONAL; INTRAMUSCULAR; INTRAVENOUS; SOFT TISSUE at 05:51

## 2022-03-16 RX ADMIN — LEVETIRACETAM 500 MG: 500 TABLET, FILM COATED ORAL at 21:14

## 2022-03-16 RX ADMIN — INSULIN LISPRO 1 UNITS: 100 INJECTION, SOLUTION INTRAVENOUS; SUBCUTANEOUS at 21:14

## 2022-03-16 RX ADMIN — PROPOFOL 50 MG: 10 INJECTION, EMULSION INTRAVENOUS at 11:02

## 2022-03-16 RX ADMIN — Medication 1000 UNITS: at 08:28

## 2022-03-16 RX ADMIN — PROPOFOL 100 MG: 10 INJECTION, EMULSION INTRAVENOUS at 11:51

## 2022-03-16 RX ADMIN — EPHEDRINE SULFATE 5 MG: 50 INJECTION INTRAVENOUS at 11:18

## 2022-03-16 RX ADMIN — LEVETIRACETAM 500 MG: 500 TABLET, FILM COATED ORAL at 08:28

## 2022-03-16 RX ADMIN — DOCUSATE SODIUM 100 MG: 100 CAPSULE ORAL at 08:28

## 2022-03-16 RX ADMIN — PROPOFOL 100 MCG/KG/MIN: 10 INJECTION, EMULSION INTRAVENOUS at 11:03

## 2022-03-16 RX ADMIN — FENTANYL CITRATE 25 MCG: 50 INJECTION INTRAMUSCULAR; INTRAVENOUS at 11:01

## 2022-03-16 RX ADMIN — INSULIN LISPRO 1 UNITS: 100 INJECTION, SOLUTION INTRAVENOUS; SUBCUTANEOUS at 16:08

## 2022-03-16 RX ADMIN — DEXAMETHASONE SODIUM PHOSPHATE 4 MG: 4 INJECTION INTRA-ARTICULAR; INTRALESIONAL; INTRAMUSCULAR; INTRAVENOUS; SOFT TISSUE at 12:54

## 2022-03-16 RX ADMIN — EPHEDRINE SULFATE 5 MG: 50 INJECTION INTRAVENOUS at 11:55

## 2022-03-16 RX ADMIN — DEXAMETHASONE SODIUM PHOSPHATE 10 MG: 10 INJECTION, SOLUTION INTRAMUSCULAR; INTRAVENOUS at 11:08

## 2022-03-16 RX ADMIN — PANTOPRAZOLE SODIUM 40 MG: 40 TABLET, DELAYED RELEASE ORAL at 05:51

## 2022-03-16 RX ADMIN — PROPOFOL 100 MG: 10 INJECTION, EMULSION INTRAVENOUS at 11:01

## 2022-03-16 RX ADMIN — FENTANYL CITRATE 25 MCG: 50 INJECTION INTRAMUSCULAR; INTRAVENOUS at 11:03

## 2022-03-16 RX ADMIN — PROPOFOL 30 MG: 10 INJECTION, EMULSION INTRAVENOUS at 11:43

## 2022-03-16 NOTE — NURSING NOTE
Patient arrived from GI lab asleep, but able to answer questions and drifts off to sleep again  Will continue with neuro checks when more alert  Will continue to monitor

## 2022-03-16 NOTE — TREATMENT PLAN
Patient is s/p bronchoscopy today  Imaging reviewed again thoroughly and discussed at [facility]  Defer management to hem/onc and rad/onc  No neurosurgical intervention recommended at this point  Will likely benefit most from systemic and radiation therapies  Neurosurgery will sign off  Please call with any questions or concerns

## 2022-03-16 NOTE — ASSESSMENT & PLAN NOTE
Patient had a screening CT scan in January of this year and noted to have 6 cm pulmonary mass and was supposed to see a pulmonologist-also noted to have right paratracheal adenopathy and pulmonary nodules  · CT chest abdomen and pelvis the mass is again redemonstrated but enlarging in size-since there is enlargement in size in a in a short time  Concerning for malignancy  · Pulmonary following, plan for bronchoscopy with biopsy today

## 2022-03-16 NOTE — ASSESSMENT & PLAN NOTE
Patient presented to the ED with headache and vision changes for about 1 week  Also reported blurred vision  · CT scan-Hyperdense masses noted at the right occipital lobe and inferior left frontal lobes with surrounding vasogenic edema, suspicious for hemorrhagic metastases  Likely lung cancer with brain metastasis  · MRI brain shows 2 separate hemorrhagic enhancing lesions are identified within the brain parenchyma, right parafalcine occipital lobe and left posterior inferior frontal lobe     Surrounding vasogenic edema and localized mass effect  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 in this patient with a known right upper lobe mass  · Neurosurgery following, initial consideration for resection however now recommending SRS  Radiation oncology consult is pending  · Continue Decadron and Keppra  · Oncology and pulmonology following  · Continue neuro checks, blurred vision improved with steroids

## 2022-03-16 NOTE — ANESTHESIA PREPROCEDURE EVALUATION
Procedure:  BRONCHOSCOPY    Relevant Problems   ENDO   (+) Type 2 diabetes mellitus without complication, without long-term current use of insulin (HCC)      GI/HEPATIC   (+) Fatty liver        Physical Exam    Airway    Mallampati score: I  TM Distance: >3 FB  Neck ROM: full     Dental   No notable dental hx     Cardiovascular  Cardiovascular exam normal    Pulmonary  Pulmonary exam normal     Other Findings        Anesthesia Plan  ASA Score- 3     Anesthesia Type- IV sedation with anesthesia with ASA Monitors  Additional Monitors:   Airway Plan: LMA  Plan Factors-Exercise tolerance (METS): >4 METS  Chart reviewed  Existing labs reviewed  Patient summary reviewed  Patient is not a current smoker  Induction- intravenous  Postoperative Plan-   Planned trial extubation    Informed Consent- Anesthetic plan and risks discussed with patient  I personally reviewed this patient with the CRNA  Discussed and agreed on the Anesthesia Plan with the CRNA  Kandis Roberts Primary language Slovenian, speaks little english to get by  Able to personally translate procedure and anesthestic plan  AQA  Family at bedside, Wili Mendez, son speaks both Slovenian and english and helped translate  Plan for LMA for biopsies

## 2022-03-16 NOTE — PROGRESS NOTES
PULMONOLOGY PROGRESS NOTE     Name: Jodi Chang   Age & Sex: 64 y o  male   MRN: [id number]  Unit/Bed#: [place] 511-72   Encounter: [id number]    PATIENT INFORMATION     Name: Jodi Chang   Age & Sex: 64 y o  male   MRN: [id number]  Hospital Stay Days: 2    ASSESSMENT/PLAN     Right upper lobe lung mass  - 9 cm, growing quickly from prior scan  Suspect primary pulmonary malignancy, consistent with small cell lung cancer with brain metastasis    Multiple brain masses with vasogenic edema  - Suspect metastatic disease    Blurred vision  - secondary to above    Smoker  HTN  DM2    Plan:  - Proceed with bronchoscopy today  Informed consent obtained via formal  via   [employer] with patient's wife and son in the room  All of their questions were answered in full  - Nurosurgery, radiation oncology following  Potentially planning on neurosurgical intervention later this week  Alternatively, may be able to assume stage IV lung cancer based on biopsy results today and start empiric chemo/radiation   - NPO, may eat by dinner time after bedside swallow exam after bronchoscopy from a pulmonary standpoint    - Decadron for vasogenic edema  - Keppra for seizure ppx    SUBJECTIVE     Patient seen and examined  No acute events overnight  Patient reports occasional changes in vision  Otherwise he feels generally well today  Denies SOB, cough, sputum production  ROS otherwise normal      OBJECTIVE     Vitals:    03/15/22 1005 03/15/22 1417 03/15/22 2331 22 0658   BP: 105/70 107/59 138/80 114/69   Pulse: 74 81 61 58   Resp: 18  17 17   Temp: 98 2 °F (36 8 °C) 98 1 °F (36 7 °C) 98 °F (36 7 °C) 98 °F (36 7 °C)   SpO2: 97% 92% 94% 95%   Weight:       Height:          Temperature:   Temp (24hrs), Av °F (36 7 °C), Min:98 °F (36 7 °C), Max:98 1 °F (36 7 °C)    Temperature: 98 °F (36 7 °C)  Intake & Output:  I/O        0701  /15 0700 03/15 0701  / 0700 / 0701   0700    P  O  240 465 0    IV Piggyback 100 Total Intake(mL/kg) 340 (5 3) 465 (7 2) 0 (0)    Net +340 +465 0           Unmeasured Urine Occurrence 3 x 1 x 1 x    Unmeasured Stool Occurrence   0 x        Weights:   IBW (Ideal Body Weight): 63 8 kg    Body mass index is 22 84 kg/m²  Weight (last 2 days)     Date/Time Weight    03/14/22 1411 64 2 (141 54)        Physical Exam  Vitals and nursing note reviewed  Constitutional:       General: He is not in acute distress  Appearance: He is well-developed  He is not ill-appearing  HENT:      Head: Normocephalic and atraumatic  Eyes:      General: No scleral icterus  Conjunctiva/sclera: Conjunctivae normal    Cardiovascular:      Rate and Rhythm: Normal rate and regular rhythm  Heart sounds: No murmur heard  No friction rub  No gallop  Pulmonary:      Effort: Pulmonary effort is normal  No respiratory distress  Breath sounds: No stridor  No wheezing, rhonchi or rales  Abdominal:      Palpations: Abdomen is soft  Tenderness: There is no abdominal tenderness  Musculoskeletal:      Cervical back: Neck supple  Right lower leg: No edema  Left lower leg: No edema  Skin:     General: Skin is warm and dry  Capillary Refill: Capillary refill takes less than 2 seconds  Neurological:      Mental Status: He is alert and oriented to person, place, and time  Mental status is at baseline  LABORATORY DATA     Labs: I have personally reviewed pertinent reports    Results from last 7 days   Lab Units 03/16/22  0507 03/15/22  0526 03/14/22  0529   WBC Thousand/uL 17 75* 17 06* 11 24*   HEMOGLOBIN g/dL 14 6 15 3 16 2   HEMATOCRIT % 43 3 44 1 47 7   PLATELETS Thousands/uL 352 342 361   NEUTROS PCT % 89*  --  67   MONOS PCT % 3*  --  8      Results from last 7 days   Lab Units 03/16/22  0507 03/15/22  0526 03/14/22  0529   POTASSIUM mmol/L 4 2 4 1 4 1   CHLORIDE mmol/L 107 107 104   CO2 mmol/L 28 26 28   BUN mg/dL 29* 26* 20   CREATININE mg/dL 1 02 0 98 1 07   CALCIUM mg/dL 9 3 9 1 9 6   ALK PHOS U/L  --   --  63   ALT U/L  --   --  20   AST U/L  --   --  18     Results from last 7 days   Lab Units 03/14/22  0529   MAGNESIUM mg/dL 2 0          Results from last 7 days   Lab Units 03/14/22  0529   INR  0 86   PTT seconds 33                 ABG:       IMAGING & DIAGNOSTIC TESTING     Radiology Results: I have personally reviewed pertinent reports  XR chest 1 view portable    Result Date: 3/14/2022  Impression: No acute cardiopulmonary disease  Redemonstration of 9 cm right upper lobe mass  Workstation performed: [id number]     CT head without contrast    Result Date: 3/14/2022  Impression: Hyperdense masses noted at the right occipital lobe and inferior left frontal lobes with surrounding vasogenic edema, suspicious for hemorrhagic metastases  An enhanced MRI of the brain is recommended for further evaluation  Workstation performed: [id number]     MRI brain w wo contrast    Result Date: 3/15/2022  Impression: 2 separate hemorrhagic enhancing lesions are identified within the brain parenchyma, right parafalcine occipital lobe and left posterior inferior frontal lobe     Surrounding vasogenic edema and localized mass effect  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 in this patient with a known right upper lobe mass  Workstation performed: [id number]     CT chest abdomen pelvis w contrast    Result Date: 3/14/2022  Impression: Chest: Increased size of a large right upper lobe mass compared to 1/30/2022  Chronic small satellite nodule versus intrapulmonary lymph node  Increased mediastinal lymphadenopathy  Tiny focus of abnormal attenuation in the adjacent superior vena cava as described above, questionable early vascular involvement  This could be volume averaging artifact  Abdomen and pelvis: Indeterminate splenic nodule  Typically splenic nodules are benign, but metastasis cannot be excluded given the chest findings  No other findings of abdominopelvic metastases    No acute pathology  Other nonemergent findings above  Workstation performed: [id number]     Other Diagnostic Testing: I have personally reviewed pertinent reports  ACTIVE MEDICATIONS     Current Facility-Administered Medications   Medication Dose Route Frequency    acetaminophen (TYLENOL) tablet 650 mg  650 mg Oral Q6H PRN    aluminum-magnesium hydroxide-simethicone (MYLANTA) oral suspension 30 mL  30 mL Oral Q6H PRN    cholecalciferol (VITAMIN D3) tablet 1,000 Units  1,000 Units Oral Daily    dexamethasone (DECADRON) injection 4 mg  4 mg Intravenous Q6H [facility]    docusate sodium (COLACE) capsule 100 mg  100 mg Oral BID    insulin lispro (HumaLOG) 100 units/mL subcutaneous injection 1-5 Units  1-5 Units Subcutaneous TID AC    insulin lispro (HumaLOG) 100 units/mL subcutaneous injection 1-5 Units  1-5 Units Subcutaneous HS    levETIRAcetam (KEPPRA) tablet 500 mg  500 mg Oral Q12H LISY    ondansetron (ZOFRAN) injection 4 mg  4 mg Intravenous Q6H PRN    pantoprazole (PROTONIX) EC tablet 40 mg  40 mg Oral Early Morning       VTE Pharmacologic Prophylaxis: Sequential compression device (Venodyne)   VTE Mechanical Prophylaxis: sequential compression device      Disclaimer: Portions of the record may have been created with voice recognition software  Occasional wrong word or "sound a like" substitutions may have occurred due to the inherent limitations of voice recognition software  Careful consideration should be taken to recognize, using context, where substitutions have occurred      Asad Hand DO  Pulmonary/Critical Care Fellowship PGY-IV  [facility]

## 2022-03-16 NOTE — PROGRESS NOTES
[address] [facility]  Progress Note Angela Ba 1965, 64 y o  male MRN: [id number]  Unit/Bed#: [facility] 688-67 Encounter: [id number]  Primary Care Provider: Cherry Pelaez MD   Date and time admitted to hospital: 3/14/2022  1:59 PM    * Brain mass  Assessment & Plan  Patient presented to the ED with headache and vision changes for about 1 week  Also reported blurred vision  · CT scan-Hyperdense masses noted at the right occipital lobe and inferior left frontal lobes with surrounding vasogenic edema, suspicious for hemorrhagic metastases  Likely lung cancer with brain metastasis  · MRI brain shows 2 separate hemorrhagic enhancing lesions are identified within the brain parenchyma, right parafalcine occipital lobe and left posterior inferior frontal lobe     Surrounding vasogenic edema and localized mass effect  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 in this patient with a known right upper lobe mass  · Neurosurgery following, initial consideration for resection however now recommending SRS  Radiation oncology consult is pending  · Continue Decadron and Keppra  · Oncology and pulmonology following  · Continue neuro checks, blurred vision improved with steroids  Cerebral edema [facility])  Assessment & Plan  · Continue Decadron    Lung mass  Assessment & Plan  Patient had a screening CT scan in January of this year and noted to have 6 cm pulmonary mass and was supposed to see a pulmonologist-also noted to have right paratracheal adenopathy and pulmonary nodules  · CT chest abdomen and pelvis the mass is again redemonstrated but enlarging in size-since there is enlargement in size in a in a short time  Concerning for malignancy  · Pulmonary following, plan for bronchoscopy with biopsy today      Type 2 diabetes mellitus without complication, without long-term current use of insulin [facility])  Assessment & Plan  Lab Results   Component Value Date    HGBA1C 5 7 (H) 11/07/2021       Recent Labs     03/15/22  1146 03/15/22  1644 03/15/22  2126 03/16/22  0658   POCGLU 138 136 165* 128       · Patient is maintained on metformin as an outpatient with last hemoglobin A1c of 5 7  Patient reported that he lost more than 40 lb since he was diagnosed with diabetes mellitus  · Hold metformin  · Sliding scale insulin for now  · Monitor Accu-Cheks and adjust regimen as needed    History of tobacco use  Assessment & Plan  · Patient reported that he has not used tobacco for the past 2 months  Gives history of 30 year tobacco use smokes around 1/2 -1 per day  · Patient noted to have lung mass on routine screening and was supposed to be seen by pulmonologist as outpatient    Vitamin D deficiency  Assessment & Plan  · Continue with the supplementation      VTE Pharmacologic Prophylaxis: VTE Score: 1 Moderate Risk (Score 3-4) - Pharmacological DVT Prophylaxis Contraindicated  Sequential Compression Devices Ordered  Patient Centered Rounds: I performed bedside rounds with nursing staff today  Discussions with Specialists or Other Care Team Provider: nursing, case management, neurosurgery, pulmonary    Education and Discussions with Family / Patient: Updated  (wife and son) at bedside  Time Spent for Care: 45 minutes  More than 50% of total time spent on counseling and coordination of care as described above  Current Length of Stay: 2 day(s)  Current Patient Status: Inpatient     Certification Statement: The patient will continue to require additional inpatient hospital stay due to bronch with biopsy today, radiation oncology consult, finalized plan with specialists      Discharge Plan: Anticipate discharge in 48-72 hrs to home  Code Status: Level 1 - Full Code    Subjective:   Patient offers no acute complaints  No focal weakness  No tingling, numbness  Blurred vision improved since being on steroids  Agreeable for lung biopsy       Objective:     Vitals:   Temp (24hrs), Av 1 °F (36 7 °C), Min:98 °F (36 7 °C), Max:98 2 °F (36 8 °C)    Temp:  [98 °F (36 7 °C)-98 2 °F (36 8 °C)] 98 °F (36 7 °C)  HR:  [58-81] 58  Resp:  [17-18] 17  BP: (105-138)/(59-80) 114/69  SpO2:  [92 %-97 %] 95 %  Body mass index is 22 84 kg/m²  Input and Output Summary (last 24 hours): Intake/Output Summary (Last 24 hours) at 3/16/2022 0959  Last data filed at 3/16/2022 0900  Gross per 24 hour   Intake 465 ml   Output --   Net 465 ml       Physical Exam:   Physical Exam  Vitals and nursing note reviewed  Constitutional:       General: He is not in acute distress  Cardiovascular:      Rate and Rhythm: Normal rate  Pulmonary:      Breath sounds: Normal breath sounds  Abdominal:      Tenderness: There is no abdominal tenderness  Musculoskeletal:         General: No swelling  Skin:     General: Skin is warm  Neurological:      General: No focal deficit present  Mental Status: He is alert and oriented to person, place, and time  Mental status is at baseline  Psychiatric:         Mood and Affect: Mood normal           Additional Data:     Labs:  Results from last 7 days   Lab Units 22  0507   WBC Thousand/uL 17 75*   HEMOGLOBIN g/dL 14 6   HEMATOCRIT % 43 3   PLATELETS Thousands/uL 352   NEUTROS PCT % 89*   LYMPHS PCT % 7*   MONOS PCT % 3*   EOS PCT % 0     Results from last 7 days   Lab Units 22  0507 03/15/22  0526 22  0529   SODIUM mmol/L 139   < > 140   POTASSIUM mmol/L 4 2   < > 4 1   CHLORIDE mmol/L 107   < > 104   CO2 mmol/L 28   < > 28   BUN mg/dL 29*   < > 20   CREATININE mg/dL 1 02   < > 1 07   ANION GAP mmol/L 4   < > 8   CALCIUM mg/dL 9 3   < > 9 6   ALBUMIN g/dL  --   --  3 7   TOTAL BILIRUBIN mg/dL  --   --  0 61   ALK PHOS U/L  --   --  63   ALT U/L  --   --  20   AST U/L  --   --  18   GLUCOSE RANDOM mg/dL 155*   < > 117    < > = values in this interval not displayed       Results from last 7 days   Lab Units 22  0529   INR  0 86 Results from last 7 days   Lab Units 03/16/22  0658 03/15/22  2126 03/15/22  1644 03/15/22  1146 03/15/22  0706 03/14/22  2044 03/14/22  1609 03/14/22  0532   POC GLUCOSE mg/dl 128 165* 136 138 128 138 185* 108               Lines/Drains:  Invasive Devices  Report    Peripheral Intravenous Line            Peripheral IV 03/14/22 Left Antecubital 2 days                      Imaging: No pertinent imaging reviewed  Recent Cultures (last 7 days):         Last 24 Hours Medication List:   Current Facility-Administered Medications   Medication Dose Route Frequency Provider Last Rate    acetaminophen  650 mg Oral Q6H PRN Mellody Nageotte, MD      aluminum-magnesium hydroxide-simethicone  30 mL Oral Q6H PRN Mellody Nageotte, MD      cholecalciferol  1,000 Units Oral Daily Mellody Nageotte, MD      dexamethasone  4 mg Intravenous Q6H Nae Amaro MD      docusate sodium  100 mg Oral BID Mellody Nageotte, MD      insulin lispro  1-5 Units Subcutaneous TID [facility] Mellody Nageotte, MD      insulin lispro  1-5 Units Subcutaneous HS Mellody Nageotte, MD      levETIRAcetam  500 mg Oral Q12H [address] JONATHAN Chaparro      ondansetron  4 mg Intravenous Q6H PRN Mellody Nageotte, MD      pantoprazole  40 mg Oral Early Morning JONATHAN Chaparro          Today, Patient Was Seen By: JONATHAN Esposito    **Please Note: This note may have been constructed using a voice recognition system  **

## 2022-03-16 NOTE — PROGRESS NOTES
Patient's case was discussed this morning at the Neuro Oncology Case Review as requested by Dr Agusto Mcqueen with [facility]  After review the group stated patient will be getting a bronchoscopy today for pathology  Pending if the pathology is small cell carcinoma the group suggested patient begin chemotherapy while in patient  After further review the clinical trial team also stated there would be a possible trial for patient if pathology is small cell carcinoma  Dr Agusto Mcqueen was present during the discussion and is aware of the above recommendations  The final treatment plan will be left at the discretion of the patient and the treating physician  DISCLAIMERS:  TO THE TREATING PHYSICIAN:  This conference is a meeting of clinicians from various specialty areas who evaluate and discuss patients for whom a multidisciplinary treatment approach is being considered  Please note that the above opinion was a consensus of the conference attendees and is intended only to assist in quality care of the discussed patient  The responsibility for follow up on the input given during the conference, along with any final decisions regarding plan of care, is that of the patient and the patient's provider  Accordingly, appointments have only been recommended based on this information; and have NOT been scheduled unless otherwise noted  TO THE PATIENT:  This summary is a brief record of major aspects of your cancer treatment  You may choose to can share a your copy with any of your doctors or nurses  However, this is not a detailed or comprehensive record of your care

## 2022-03-16 NOTE — ASSESSMENT & PLAN NOTE
Lab Results   Component Value Date    HGBA1C 5 7 (H) 11/07/2021       Recent Labs     03/15/22  1146 03/15/22  1644 03/15/22  2126 03/16/22  0658   POCGLU 138 136 165* 128       · Patient is maintained on metformin as an outpatient with last hemoglobin A1c of 5 7    Patient reported that he lost more than 40 lb since he was diagnosed with diabetes mellitus  · Hold metformin  · Sliding scale insulin for now  · Monitor Accu-Cheks and adjust regimen as needed

## 2022-03-16 NOTE — ANESTHESIA POSTPROCEDURE EVALUATION
Post-Op Assessment Note    CV Status:  Stable  Pain Score: 0    Pain management: adequate     Mental Status:  Alert and awake   Hydration Status:  Euvolemic and stable   PONV Controlled:  Controlled   Airway Patency:  Patent and adequate      Post Op Vitals Reviewed: Yes      Staff: CRNA         No complications documented      /59 (03/16/22 1210)    Temp (!) 97 3 °F (36 3 °C) (03/16/22 1210)    Pulse 76 (03/16/22 1210)   Resp 18 (03/16/22 1210)    SpO2 97 % (03/16/22 1210)

## 2022-03-17 ENCOUNTER — APPOINTMENT (INPATIENT)
Dept: RADIOLOGY | Facility: HOSPITAL | Age: 57
DRG: 054 | End: 2022-03-17
Attending: STUDENT IN AN ORGANIZED HEALTH CARE EDUCATION/TRAINING PROGRAM
Payer: COMMERCIAL

## 2022-03-17 ENCOUNTER — APPOINTMENT (OUTPATIENT)
Dept: RADIATION ONCOLOGY | Facility: HOSPITAL | Age: 57
End: 2022-03-17
Payer: COMMERCIAL

## 2022-03-17 ENCOUNTER — TRANSCRIBE ORDERS (OUTPATIENT)
Dept: RADIATION ONCOLOGY | Facility: HOSPITAL | Age: 57
End: 2022-03-17

## 2022-03-17 DIAGNOSIS — G93.89 PNEUMOCEPHALUS: Primary | ICD-10-CM

## 2022-03-17 LAB
ANION GAP SERPL CALCULATED.3IONS-SCNC: 4 MMOL/L (ref 4–13)
BASOPHILS # BLD AUTO: 0.01 THOUSANDS/ΜL (ref 0–0.1)
BASOPHILS NFR BLD AUTO: 0 % (ref 0–1)
BUN SERPL-MCNC: 32 MG/DL (ref 5–25)
CALCIUM SERPL-MCNC: 9.2 MG/DL (ref 8.3–10.1)
CHLORIDE SERPL-SCNC: 106 MMOL/L (ref 100–108)
CO2 SERPL-SCNC: 30 MMOL/L (ref 21–32)
CREAT SERPL-MCNC: 0.96 MG/DL (ref 0.6–1.3)
EOSINOPHIL # BLD AUTO: 0 THOUSAND/ΜL (ref 0–0.61)
EOSINOPHIL NFR BLD AUTO: 0 % (ref 0–6)
ERYTHROCYTE [DISTWIDTH] IN BLOOD BY AUTOMATED COUNT: 13.8 % (ref 11.6–15.1)
GFR SERPL CREATININE-BSD FRML MDRD: 88 ML/MIN/1.73SQ M
GLUCOSE SERPL-MCNC: 140 MG/DL (ref 65–140)
GLUCOSE SERPL-MCNC: 141 MG/DL (ref 65–140)
GLUCOSE SERPL-MCNC: 146 MG/DL (ref 65–140)
GLUCOSE SERPL-MCNC: 146 MG/DL (ref 65–140)
GLUCOSE SERPL-MCNC: 188 MG/DL (ref 65–140)
HCT VFR BLD AUTO: 43.6 % (ref 36.5–49.3)
HGB BLD-MCNC: 14.1 G/DL (ref 12–17)
IMM GRANULOCYTES # BLD AUTO: 0.11 THOUSAND/UL (ref 0–0.2)
IMM GRANULOCYTES NFR BLD AUTO: 1 % (ref 0–2)
LYMPHOCYTES # BLD AUTO: 1.1 THOUSANDS/ΜL (ref 0.6–4.47)
LYMPHOCYTES NFR BLD AUTO: 8 % (ref 14–44)
MCH RBC QN AUTO: 30.1 PG (ref 26.8–34.3)
MCHC RBC AUTO-ENTMCNC: 32.3 G/DL (ref 31.4–37.4)
MCV RBC AUTO: 93 FL (ref 82–98)
MONOCYTES # BLD AUTO: 0.79 THOUSAND/ΜL (ref 0.17–1.22)
MONOCYTES NFR BLD AUTO: 5 % (ref 4–12)
NEUTROPHILS # BLD AUTO: 12.59 THOUSANDS/ΜL (ref 1.85–7.62)
NEUTS SEG NFR BLD AUTO: 86 % (ref 43–75)
NRBC BLD AUTO-RTO: 0 /100 WBCS
PLATELET # BLD AUTO: 341 THOUSANDS/UL (ref 149–390)
PMV BLD AUTO: 9.4 FL (ref 8.9–12.7)
POTASSIUM SERPL-SCNC: 4.1 MMOL/L (ref 3.5–5.3)
RBC # BLD AUTO: 4.68 MILLION/UL (ref 3.88–5.62)
SODIUM SERPL-SCNC: 140 MMOL/L (ref 136–145)
WBC # BLD AUTO: 14.6 THOUSAND/UL (ref 4.31–10.16)

## 2022-03-17 PROCEDURE — 77263 THER RADIOLOGY TX PLNG CPLX: CPT | Performed by: STUDENT IN AN ORGANIZED HEALTH CARE EDUCATION/TRAINING PROGRAM

## 2022-03-17 PROCEDURE — 99232 SBSQ HOSP IP/OBS MODERATE 35: CPT | Performed by: INTERNAL MEDICINE

## 2022-03-17 PROCEDURE — 82948 REAGENT STRIP/BLOOD GLUCOSE: CPT

## 2022-03-17 PROCEDURE — 77014 HB CT SCAN FOR THERAPY GUIDE: CPT

## 2022-03-17 PROCEDURE — 85025 COMPLETE CBC W/AUTO DIFF WBC: CPT | Performed by: NURSE PRACTITIONER

## 2022-03-17 PROCEDURE — 77334 RADIATION TREATMENT AID(S): CPT | Performed by: STUDENT IN AN ORGANIZED HEALTH CARE EDUCATION/TRAINING PROGRAM

## 2022-03-17 PROCEDURE — 80048 BASIC METABOLIC PNL TOTAL CA: CPT | Performed by: NURSE PRACTITIONER

## 2022-03-17 PROCEDURE — 77399 UNLISTED PX MED RADJ PHYSICS: CPT | Performed by: STUDENT IN AN ORGANIZED HEALTH CARE EDUCATION/TRAINING PROGRAM

## 2022-03-17 RX ADMIN — DEXAMETHASONE SODIUM PHOSPHATE 4 MG: 4 INJECTION INTRA-ARTICULAR; INTRALESIONAL; INTRAMUSCULAR; INTRAVENOUS; SOFT TISSUE at 00:13

## 2022-03-17 RX ADMIN — DOCUSATE SODIUM 100 MG: 100 CAPSULE ORAL at 08:37

## 2022-03-17 RX ADMIN — PANTOPRAZOLE SODIUM 40 MG: 40 TABLET, DELAYED RELEASE ORAL at 05:50

## 2022-03-17 RX ADMIN — IOHEXOL 85 ML: 350 INJECTION, SOLUTION INTRAVENOUS at 15:03

## 2022-03-17 RX ADMIN — Medication 1000 UNITS: at 08:37

## 2022-03-17 RX ADMIN — LEVETIRACETAM 500 MG: 500 TABLET, FILM COATED ORAL at 22:08

## 2022-03-17 RX ADMIN — DEXAMETHASONE SODIUM PHOSPHATE 4 MG: 4 INJECTION INTRA-ARTICULAR; INTRALESIONAL; INTRAMUSCULAR; INTRAVENOUS; SOFT TISSUE at 17:44

## 2022-03-17 RX ADMIN — DOCUSATE SODIUM 100 MG: 100 CAPSULE ORAL at 17:44

## 2022-03-17 RX ADMIN — DEXAMETHASONE SODIUM PHOSPHATE 4 MG: 4 INJECTION INTRA-ARTICULAR; INTRALESIONAL; INTRAMUSCULAR; INTRAVENOUS; SOFT TISSUE at 12:30

## 2022-03-17 RX ADMIN — DEXAMETHASONE SODIUM PHOSPHATE 4 MG: 4 INJECTION INTRA-ARTICULAR; INTRALESIONAL; INTRAMUSCULAR; INTRAVENOUS; SOFT TISSUE at 23:59

## 2022-03-17 RX ADMIN — LEVETIRACETAM 500 MG: 500 TABLET, FILM COATED ORAL at 08:37

## 2022-03-17 RX ADMIN — DEXAMETHASONE SODIUM PHOSPHATE 4 MG: 4 INJECTION INTRA-ARTICULAR; INTRALESIONAL; INTRAMUSCULAR; INTRAVENOUS; SOFT TISSUE at 05:50

## 2022-03-17 RX ADMIN — INSULIN LISPRO 1 UNITS: 100 INJECTION, SOLUTION INTRAVENOUS; SUBCUTANEOUS at 12:30

## 2022-03-17 NOTE — PLAN OF CARE
Problem: Nutrition/Hydration-ADULT  Goal: Nutrient/Hydration intake appropriate for improving, restoring or maintaining nutritional needs  Description: Monitor and assess patient's nutrition/hydration status for malnutrition  Collaborate with interdisciplinary team and initiate plan and interventions as ordered  Monitor patient's weight and dietary intake as ordered or per policy  Utilize nutrition screening tool and intervene as necessary  Determine patient's food preferences and provide high-protein, high-caloric foods as appropriate       INTERVENTIONS:  - Monitor oral intake, urinary output, labs, and treatment plans  - Assess nutrition and hydration status and recommend course of action  - Evaluate amount of meals eaten  - Assist patient with eating if necessary   - Allow adequate time for meals  - Recommend/ encourage appropriate diets, oral nutritional supplements, and vitamin/mineral supplements  - Order, calculate, and assess calorie counts as needed  - Recommend, monitor, and adjust tube feedings and TPN/PPN based on assessed needs  - Assess need for intravenous fluids  - Provide specific nutrition/hydration education as appropriate  - Include patient/family/caregiver in decisions related to nutrition  Outcome: Progressing     Problem: Potential for Falls  Goal: Patient will remain free of falls  Description: INTERVENTIONS:  - Educate patient/family on patient safety including physical limitations  - Instruct patient to call for assistance with activity   - Consult OT/PT to assist with strengthening/mobility   - Keep Call bell within reach  - Keep bed low and locked with side rails adjusted as appropriate  - Keep care items and personal belongings within reach  - Initiate and maintain comfort rounds  - Make Fall Risk Sign visible to staff  - Offer Toileting every  Hours, in advance of need  - Initiate/Maintain alarm  - Obtain necessary fall risk management equipment  - Apply yellow socks and bracelet for high fall risk patients  - Consider moving patient to room near nurses station  Outcome: Progressing     Problem: NEUROSENSORY - ADULT  Goal: Achieves stable or improved neurological status  Description: INTERVENTIONS  - Monitor and report changes in neurological status  - Monitor vital signs such as temperature, blood pressure, glucose, and any other labs ordered   - Initiate measures to prevent increased intracranial pressure  - Monitor for seizure activity and implement precautions if appropriate      Outcome: Progressing  Goal: Remains free of injury related to seizures activity  Description: INTERVENTIONS  - Maintain airway, patient safety  and administer oxygen as ordered  - Monitor patient for seizure activity, document and report duration and description of seizure to physician/advanced practitioner  - If seizure occurs,  ensure patient safety during seizure  - Reorient patient post seizure  - Seizure pads on all 4 side rails  - Instruct patient/family to notify RN of any seizure activity including if an aura is experienced  - Instruct patient/family to call for assistance with activity based on nursing assessment  - Administer anti-seizure medications if ordered    Outcome: Progressing  Goal: Achieves maximal functionality and self care  Description: INTERVENTIONS  - Monitor swallowing and airway patency with patient fatigue and changes in neurological status  - Encourage and assist patient to increase activity and self care     - Encourage visually impaired, hearing impaired and aphasic patients to use assistive/communication devices  Outcome: Progressing     Problem: RESPIRATORY - ADULT  Goal: Achieves optimal ventilation and oxygenation  Description: INTERVENTIONS:  - Assess for changes in respiratory status  - Assess for changes in mentation and behavior  - Position to facilitate oxygenation and minimize respiratory effort  - Oxygen administered by appropriate delivery if ordered  - Initiate smoking cessation education as indicated  - Encourage broncho-pulmonary hygiene including cough, deep breathe, Incentive Spirometry  - Assess the need for suctioning and aspirate as needed  - Assess and instruct to report SOB or any respiratory difficulty  - Respiratory Therapy support as indicated  Outcome: Progressing     Problem: PAIN - ADULT  Goal: Verbalizes/displays adequate comfort level or baseline comfort level  Description: Interventions:  - Encourage patient to monitor pain and request assistance  - Assess pain using appropriate pain scale  - Administer analgesics based on type and severity of pain and evaluate response  - Implement non-pharmacological measures as appropriate and evaluate response  - Consider cultural and social influences on pain and pain management  - Notify physician/advanced practitioner if interventions unsuccessful or patient reports new pain  Outcome: Progressing     Problem: SAFETY ADULT  Goal: Maintains/Returns to pre admission functional level  Description: INTERVENTIONS:  - Perform BMAT or MOVE assessment daily    - Set and communicate daily mobility goal to care team and patient/family/caregiver  - Collaborate with rehabilitation services on mobility goals if consulted  - Perform Range of Motion  times a day  - Reposition patient every  hours    - Dangle patient  times a day  - Stand patient  times a day  - Ambulate patient  times a day  - Out of bed to chair  times a day   - Out of bed for meals  times a day  - Out of bed for toileting  - Record patient progress and toleration of activity level   Outcome: Progressing     Problem: DISCHARGE PLANNING  Goal: Discharge to home or other facility with appropriate resources  Description: INTERVENTIONS:  - Identify barriers to discharge w/patient and caregiver  - Arrange for needed discharge resources and transportation as appropriate  - Identify discharge learning needs (meds, wound care, etc )  - Arrange for interpretive services to assist at discharge as needed  - Refer to Case Management Department for coordinating discharge planning if the patient needs post-hospital services based on physician/advanced practitioner order or complex needs related to functional status, cognitive ability, or social support system  Outcome: Progressing     Problem: Knowledge Deficit  Goal: Patient/family/caregiver demonstrates understanding of disease process, treatment plan, medications, and discharge instructions  Description: Complete learning assessment and assess knowledge base    Interventions:  - Provide teaching at level of understanding  - Provide teaching via preferred learning methods  Outcome: Progressing

## 2022-03-17 NOTE — PROGRESS NOTES
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  - Bronchoscopy with TBNA, brush, forceps biopsies and BAL performed 3/16, awaiting results     Multiple brain masses with vasogenic edema  - Suspect metastatic disease     Blurred vision  - secondary to above     Smoker  HTN  DM2     Plan:  - Follow bronchoscopy cytology results  - Results will determine chemo and radiation therapy plan  - Okay to discharge home from a pulmonary standpoint with follow up with oncology and radiation oncology  Will call patient with results, though he should be following up in a very short interval  - Decadron for vasogenic edema and keppra for seizure ppx per neruosurgery       SUBJECTIVE     Patient seen and examined  No acute events overnight  Patient feels very well today  He has not noticed headaches or changes in his vision  He denies cough or fever after bronchoscopy  ROS otherwise normal     OBJECTIVE     Vitals:    22 2300 22 0032 22 0033 22 0616   BP:  104/60 104/60 103/60   Pulse: 64 57 59 (!) 50   Resp:  18 16 18   Temp:  (!) 97 1 °F (36 2 °C) (!) 97 1 °F (36 2 °C) 97 8 °F (36 6 °C)   TempSrc:       SpO2: 98% 95% 97% 97%   Weight:       Height:          Temperature:   Temp (24hrs), Av 4 °F (36 3 °C), Min:97 1 °F (36 2 °C), Max:97 8 °F (36 6 °C)    Temperature: 97 8 °F (36 6 °C)  Intake & Output:  I/O       03/15 0701   0700  0701   0700  0701   0700    P  O  465 240 240    I V  (mL/kg)  600 (9 3)     IV Piggyback       Total Intake(mL/kg) 465 (7 2) 840 (13 1) 240 (3 7)    Urine (mL/kg/hr)  100 (0 1)     Stool  0     Total Output  100     Net +465 +740 +240           Unmeasured Urine Occurrence 1 x 1 x     Unmeasured Stool Occurrence  0 x         Weights:   IBW (Ideal Body Weight): 63 8 kg    Body mass index is 22 84 kg/m²  Weight (last 2 days)     None        Physical Exam  LABORATORY DATA     Labs: I have personally reviewed pertinent reports  Results from last 7 days   Lab Units 03/17/22  0544 03/16/22  0507 03/15/22  0526 03/14/22  0529 03/14/22  0529   WBC Thousand/uL 14 60* 17 75* 17 06*   < > 11 24*   HEMOGLOBIN g/dL 14 1 14 6 15 3   < > 16 2   HEMATOCRIT % 43 6 43 3 44 1   < > 47 7   PLATELETS Thousands/uL 341 352 342   < > 361   NEUTROS PCT % 86* 89*  --   --  67   MONOS PCT % 5 3*  --   --  8    < > = values in this interval not displayed  Results from last 7 days   Lab Units 03/17/22 0544 03/16/22  0507 03/15/22  0526 03/14/22  0529 03/14/22  0529   POTASSIUM mmol/L 4 1 4 2 4 1   < > 4 1   CHLORIDE mmol/L 106 107 107   < > 104   CO2 mmol/L 30 28 26   < > 28   BUN mg/dL 32* 29* 26*   < > 20   CREATININE mg/dL 0 96 1 02 0 98   < > 1 07   CALCIUM mg/dL 9 2 9 3 9 1   < > 9 6   ALK PHOS U/L  --   --   --   --  63   ALT U/L  --   --   --   --  20   AST U/L  --   --   --   --  18    < > = values in this interval not displayed  Results from last 7 days   Lab Units 03/14/22  0529   MAGNESIUM mg/dL 2 0          Results from last 7 days   Lab Units 03/14/22  0529   INR  0 86   PTT seconds 33                 ABG:       IMAGING & DIAGNOSTIC TESTING     Radiology Results: I have personally reviewed pertinent reports  XR chest 1 view portable    Result Date: 3/14/2022  Impression: No acute cardiopulmonary disease  Redemonstration of 9 cm right upper lobe mass   Workstation performed: [id number]     CT head without contrast    Result Date: 3/14/2022  Impression: Hyperdense masses noted at the right occipital lobe and inferior left frontal lobes with surrounding vasogenic edema, suspicious for hemorrhagic metastases  An enhanced MRI of the brain is recommended for further evaluation  Workstation performed: [id number]     MRI brain w wo contrast    Result Date: 3/15/2022  Impression: 2 separate hemorrhagic enhancing lesions are identified within the brain parenchyma, right parafalcine occipital lobe and left posterior inferior frontal lobe     Surrounding vasogenic edema and localized mass effect  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 in this patient with a known right upper lobe mass  Workstation performed: [id number]     Bronchoscopy    Result Date: 3/16/2022  Impression: Circumferential tumor invasion seen in Rb1 segment  Forceps biopsies, cytology brushing, TBNA and BAL were performed and samples sent for testing as above  RECOMMENDATION: Continue therapies as documented in today's progress note  Follow cytology, gram stain and cultures from bronchoscopy  I was present for the entire procedure  Tolerated well  Bleeding expected and controlled  Cinthia HULL  Dostal DO     CT chest abdomen pelvis w contrast    Result Date: 3/14/2022  Impression: Chest: Increased size of a large right upper lobe mass compared to 1/30/2022  Chronic small satellite nodule versus intrapulmonary lymph node  Increased mediastinal lymphadenopathy  Tiny focus of abnormal attenuation in the adjacent superior vena cava as described above, questionable early vascular involvement  This could be volume averaging artifact  Abdomen and pelvis: Indeterminate splenic nodule  Typically splenic nodules are benign, but metastasis cannot be excluded given the chest findings  No other findings of abdominopelvic metastases  No acute pathology  Other nonemergent findings above  Workstation performed: [id number]     Other Diagnostic Testing: I have personally reviewed pertinent reports      ACTIVE MEDICATIONS     Current Facility-Administered Medications   Medication Dose Route Frequency    acetaminophen (TYLENOL) tablet 650 mg  650 mg Oral Q6H PRN    aluminum-magnesium hydroxide-simethicone (MYLANTA) oral suspension 30 mL  30 mL Oral Q6H PRN    cholecalciferol (VITAMIN D3) tablet 1,000 Units  1,000 Units Oral Daily    dexamethasone (DECADRON) injection 4 mg  4 mg Intravenous Q6H [address]    docusate sodium (COLACE) capsule 100 mg  100 mg Oral BID    insulin lispro (HumaLOG) 100 units/mL subcutaneous injection 1-5 Units  1-5 Units Subcutaneous TID AC    insulin lispro (HumaLOG) 100 units/mL subcutaneous injection 1-5 Units  1-5 Units Subcutaneous HS    levETIRAcetam (KEPPRA) tablet 500 mg  500 mg Oral Q12H LISY    ondansetron (ZOFRAN) injection 4 mg  4 mg Intravenous Q6H PRN    pantoprazole (PROTONIX) EC tablet 40 mg  40 mg Oral Early Morning       VTE Pharmacologic Prophylaxis: Sequential compression device (Venodyne)   VTE Mechanical Prophylaxis: sequential compression device      Disclaimer: Portions of the record may have been created with voice recognition software  Occasional wrong word or "sound a like" substitutions may have occurred due to the inherent limitations of voice recognition software  Careful consideration should be taken to recognize, using context, where substitutions have occurred      Kehinde Pop DO  Pulmonary/Critical Care Fellowship PGY-IV  [facility]

## 2022-03-17 NOTE — ASSESSMENT & PLAN NOTE
Patient presented to the ED with headache and vision changes for about 1 week  Also reported blurred vision  · CT scan-Hyperdense masses noted at the right occipital lobe and inferior left frontal lobes with surrounding vasogenic edema, suspicious for hemorrhagic metastases  Likely lung cancer with brain metastasis  · MRI brain shows 2 separate hemorrhagic enhancing lesions are identified within the brain parenchyma, right parafalcine occipital lobe and left posterior inferior frontal lobe     Surrounding vasogenic edema and localized mass effect  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 in this patient with a known right upper lobe mass  · Neurosurgery was following, initial consideration for resection however now recommending radiation, no anticipated neurosurgical intervention  · Radiation oncology consult noted, need pathology to guide further RT planning (stereotactic RT vs HA-WBRT)  · D/w medical oncology--prefer to keep patient in hospital pending results of biopsy as if SCLC, may require inpatient chemo vs clinical trial   · Continue Keppra and decadron  · Continue neuro checks, blurred vision improved with steroids

## 2022-03-17 NOTE — ASSESSMENT & PLAN NOTE
Lab Results   Component Value Date    HGBA1C 5 7 (H) 11/07/2021       Recent Labs     03/16/22  0658 03/16/22  1608 03/16/22  2108 03/17/22  0614   POCGLU 128 152* 170* 140     Patient is maintained on metformin as an outpatient with last hemoglobin A1c of 5 7    Patient reported that he lost more than 40 lb since he was diagnosed with diabetes mellitus  · Hold metformin, resume at discharge  · Sliding scale insulin for now while admitted   · Monitor Accu-Cheks and adjust regimen as needed

## 2022-03-17 NOTE — ASSESSMENT & PLAN NOTE
Patient had a screening CT scan in January of this year and noted to have 6 cm pulmonary mass and was supposed to see a pulmonologist-also noted to have right paratracheal adenopathy and pulmonary nodules  · CT chest abdomen and pelvis the mass is again redemonstrated but enlarging in size-since there is enlargement in size in a in a short time  Concerning for malignancy     · Pulmonary following, S/P bronchoscopy with biopsy 3/16--pathology pending

## 2022-03-17 NOTE — ASSESSMENT & PLAN NOTE
· Patient reported that he has not used tobacco for the past 2 months    Gives history of 30 year tobacco use smokes around 1/2 -1 per day  · Patient noted to have lung mass on routine screening and was supposed to be seen by pulmonologist as outpatient  · S/P biopsy 3/16

## 2022-03-17 NOTE — PROGRESS NOTES
[facility]  Progress Note Belgica Cruz 1965, 64 y o  male MRN: [id number]  Unit/Bed#: [facility] 764-54 Encounter: [id number]  Primary Care Provider: Attila Robert MD   Date and time admitted to hospital: 3/14/2022  1:59 PM    * Brain mass  Assessment & Plan  Patient presented to the ED with headache and vision changes for about 1 week  Also reported blurred vision  · CT scan-Hyperdense masses noted at the right occipital lobe and inferior left frontal lobes with surrounding vasogenic edema, suspicious for hemorrhagic metastases  Likely lung cancer with brain metastasis  · MRI brain shows 2 separate hemorrhagic enhancing lesions are identified within the brain parenchyma, right parafalcine occipital lobe and left posterior inferior frontal lobe     Surrounding vasogenic edema and localized mass effect  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 in this patient with a known right upper lobe mass  · Neurosurgery was following, initial consideration for resection however now recommending radiation, no anticipated neurosurgical intervention  · Radiation oncology consult noted, need pathology to guide further RT planning (stereotactic RT vs HA-WBRT)  · D/w medical oncology--prefer to keep patient in hospital pending results of biopsy as if SCLC, may require inpatient chemo vs clinical trial   · Continue Keppra and decadron  · Continue neuro checks, blurred vision improved with steroids  Cerebral edema [facility])  Assessment & Plan  · Continue Decadron    Lung mass  Assessment & Plan  Patient had a screening CT scan in January of this year and noted to have 6 cm pulmonary mass and was supposed to see a pulmonologist-also noted to have right paratracheal adenopathy and pulmonary nodules  · CT chest abdomen and pelvis the mass is again redemonstrated but enlarging in size-since there is enlargement in size in a in a short time  Concerning for malignancy  · Pulmonary following, S/P bronchoscopy with biopsy 3/16--pathology pending     History of tobacco use  Assessment & Plan  · Patient reported that he has not used tobacco for the past 2 months  Gives history of 30 year tobacco use smokes around 1/2 -1 per day  · Patient noted to have lung mass on routine screening and was supposed to be seen by pulmonologist as outpatient  · S/P biopsy 3/16    Vitamin D deficiency  Assessment & Plan  · Continue with the supplementation    Type 2 diabetes mellitus without complication, without long-term current use of insulin [facility])  Assessment & Plan  Lab Results   Component Value Date    HGBA1C 5 7 (H) 11/07/2021       Recent Labs     03/16/22  0658 03/16/22  1608 03/16/22  2108 03/17/22  0614   POCGLU 128 152* 170* 140     Patient is maintained on metformin as an outpatient with last hemoglobin A1c of 5 7  Patient reported that he lost more than 40 lb since he was diagnosed with diabetes mellitus  · Hold metformin, resume at discharge  · Sliding scale insulin for now while admitted   · Monitor Accu-Cheks and adjust regimen as needed        VTE Pharmacologic Prophylaxis: VTE Score: 1 Moderate Risk (Score 3-4) - Pharmacological DVT Prophylaxis Contraindicated  Sequential Compression Devices Ordered  Patient Centered Rounds: I performed bedside rounds with nursing staff today  Discussions with Specialists or Other Care Team Provider: many specialists    Education and Discussions with Family / Patient: multiple family members at bedside as well as with patient's son via phone (x2)  Time Spent for Care: 45 minutes  More than 50% of total time spent on counseling and coordination of care as described above      Current Length of Stay: 3 day(s)  Current Patient Status: Inpatient   Certification Statement: The patient will continue to require additional inpatient hospital stay due to pending biopsy results and possible initation of inpatient therapy   Discharge Plan: Anticipate discharge in >72 hrs to home  Code Status: Level 1 - Full Code    Subjective:   Doing okay  Family translates  Objective:     Vitals:   Temp (24hrs), Av 3 °F (36 3 °C), Min:97 1 °F (36 2 °C), Max:97 8 °F (36 6 °C)    Temp:  [97 1 °F (36 2 °C)-97 8 °F (36 6 °C)] 97 8 °F (36 6 °C)  HR:  [50-68] 50  Resp:  [16-18] 18  BP: (103-104)/(60) 103/60  SpO2:  [95 %-98 %] 97 %  Body mass index is 22 84 kg/m²  Input and Output Summary (last 24 hours): Intake/Output Summary (Last 24 hours) at 3/17/2022 1442  Last data filed at 3/17/2022 0845  Gross per 24 hour   Intake 480 ml   Output --   Net 480 ml       Physical Exam:   Physical Exam  Vitals and nursing note reviewed  Constitutional:       General: He is not in acute distress  Cardiovascular:      Rate and Rhythm: Normal rate and regular rhythm  Pulmonary:      Effort: No respiratory distress  Abdominal:      General: There is no distension  Psychiatric:         Mood and Affect: Mood normal           Additional Data:     Labs:  Results from last 7 days   Lab Units 22  0544   WBC Thousand/uL 14 60*   HEMOGLOBIN g/dL 14 1   HEMATOCRIT % 43 6   PLATELETS Thousands/uL 341   NEUTROS PCT % 86*   LYMPHS PCT % 8*   MONOS PCT % 5   EOS PCT % 0     Results from last 7 days   Lab Units 22  0544 03/15/22  0526 22  0529   SODIUM mmol/L 140   < > 140   POTASSIUM mmol/L 4 1   < > 4 1   CHLORIDE mmol/L 106   < > 104   CO2 mmol/L 30   < > 28   BUN mg/dL 32*   < > 20   CREATININE mg/dL 0 96   < > 1 07   ANION GAP mmol/L 4   < > 8   CALCIUM mg/dL 9 2   < > 9 6   ALBUMIN g/dL  --   --  3 7   TOTAL BILIRUBIN mg/dL  --   --  0 61   ALK PHOS U/L  --   --  63   ALT U/L  --   --  20   AST U/L  --   --  18   GLUCOSE RANDOM mg/dL 146*   < > 117    < > = values in this interval not displayed       Results from last 7 days   Lab Units 22  0529   INR  0 86     Results from last 7 days   Lab Units 22  1203 22  0614 22  2108 22  1608 03/16/22  0658 03/15/22  2126 03/15/22  1644 03/15/22  1146 03/15/22  0706 03/14/22  2044 03/14/22  1609 03/14/22  0532   POC GLUCOSE mg/dl 188* 140 170* 152* 128 165* 136 138 128 138 185* 108               Lines/Drains:  Invasive Devices  Report    Peripheral Intravenous Line            Peripheral IV 03/14/22 Left Antecubital 3 days                      Imaging: No pertinent imaging reviewed  Recent Cultures (last 7 days):   Results from last 7 days   Lab Units 03/16/22  1204   GRAM STAIN RESULT  No Polys or Bacteria seen       Last 24 Hours Medication List:   Current Facility-Administered Medications   Medication Dose Route Frequency Provider Last Rate    acetaminophen  650 mg Oral Q6H PRN Rosy Maldonado MD      aluminum-magnesium hydroxide-simethicone  30 mL Oral Q6H PRN Rosy Maldonado MD      cholecalciferol  1,000 Units Oral Daily Rosy Maldonado MD      dexamethasone  4 mg Intravenous Q6H Sirisha Lennon MD      docusate sodium  100 mg Oral BID Rosy Maldonado MD      insulin lispro  1-5 Units Subcutaneous TID [facility] Rosy Maldonado MD      insulin lispro  1-5 Units Subcutaneous HS Rosy Maldonado MD      levETIRAcetam  500 mg Oral Q12H [address] JONATHAN Prince      ondansetron  4 mg Intravenous Q6H PRN Rosy Maldonado MD      pantoprazole  40 mg Oral Early Morning JONATHAN Prince          Today, Patient Was Seen By: Cathryn Carpenter PA-C    **Please Note: This note may have been constructed using a voice recognition system  **

## 2022-03-18 ENCOUNTER — TELEPHONE (OUTPATIENT)
Dept: FAMILY MEDICINE CLINIC | Facility: CLINIC | Age: 57
End: 2022-03-18

## 2022-03-18 LAB
BACTERIA BRONCH AEROBE CULT: NORMAL
GLUCOSE SERPL-MCNC: 159 MG/DL (ref 65–140)
GLUCOSE SERPL-MCNC: 167 MG/DL (ref 65–140)
GLUCOSE SERPL-MCNC: 179 MG/DL (ref 65–140)
GLUCOSE SERPL-MCNC: 289 MG/DL (ref 65–140)
GRAM STN SPEC: NORMAL

## 2022-03-18 PROCEDURE — 99232 SBSQ HOSP IP/OBS MODERATE 35: CPT | Performed by: INTERNAL MEDICINE

## 2022-03-18 PROCEDURE — 82948 REAGENT STRIP/BLOOD GLUCOSE: CPT

## 2022-03-18 PROCEDURE — 99448 NTRPROF PH1/NTRNET/EHR 21-30: CPT | Performed by: NURSE PRACTITIONER

## 2022-03-18 RX ADMIN — INSULIN LISPRO 1 UNITS: 100 INJECTION, SOLUTION INTRAVENOUS; SUBCUTANEOUS at 09:03

## 2022-03-18 RX ADMIN — DEXAMETHASONE SODIUM PHOSPHATE 4 MG: 4 INJECTION INTRA-ARTICULAR; INTRALESIONAL; INTRAMUSCULAR; INTRAVENOUS; SOFT TISSUE at 23:14

## 2022-03-18 RX ADMIN — DEXAMETHASONE SODIUM PHOSPHATE 4 MG: 4 INJECTION INTRA-ARTICULAR; INTRALESIONAL; INTRAMUSCULAR; INTRAVENOUS; SOFT TISSUE at 05:10

## 2022-03-18 RX ADMIN — INSULIN LISPRO 1 UNITS: 100 INJECTION, SOLUTION INTRAVENOUS; SUBCUTANEOUS at 13:43

## 2022-03-18 RX ADMIN — ACETAMINOPHEN 650 MG: 325 TABLET ORAL at 22:29

## 2022-03-18 RX ADMIN — INSULIN LISPRO 1 UNITS: 100 INJECTION, SOLUTION INTRAVENOUS; SUBCUTANEOUS at 17:27

## 2022-03-18 RX ADMIN — DEXAMETHASONE SODIUM PHOSPHATE 4 MG: 4 INJECTION INTRA-ARTICULAR; INTRALESIONAL; INTRAMUSCULAR; INTRAVENOUS; SOFT TISSUE at 17:27

## 2022-03-18 RX ADMIN — INSULIN LISPRO 2 UNITS: 100 INJECTION, SOLUTION INTRAVENOUS; SUBCUTANEOUS at 22:31

## 2022-03-18 RX ADMIN — DEXAMETHASONE SODIUM PHOSPHATE 4 MG: 4 INJECTION INTRA-ARTICULAR; INTRALESIONAL; INTRAMUSCULAR; INTRAVENOUS; SOFT TISSUE at 13:42

## 2022-03-18 RX ADMIN — LEVETIRACETAM 500 MG: 500 TABLET, FILM COATED ORAL at 22:30

## 2022-03-18 RX ADMIN — Medication 1000 UNITS: at 09:03

## 2022-03-18 RX ADMIN — DOCUSATE SODIUM 100 MG: 100 CAPSULE ORAL at 17:27

## 2022-03-18 RX ADMIN — PANTOPRAZOLE SODIUM 40 MG: 40 TABLET, DELAYED RELEASE ORAL at 05:10

## 2022-03-18 RX ADMIN — LEVETIRACETAM 500 MG: 500 TABLET, FILM COATED ORAL at 09:03

## 2022-03-18 RX ADMIN — DOCUSATE SODIUM 100 MG: 100 CAPSULE ORAL at 09:02

## 2022-03-18 NOTE — PROGRESS NOTES
Oncology Progress Note - Anjana Longo 64 y o  male MRN: [id number]    Unit/Bed#: [facility] 618-01 Encounter: [id number]      Assessment:  1  Lung mass  CT chest 3/14/22 shows  large right upper lobe mass increasing in size from CT 1/30/22  Increased in size 1 2->1 7cm periaortic hilar lymph node  Stable1 7 cm  right hilar lymph node  2 4 paraaortic lymph node  Chronic small satellite nodule, focus attenuation in SVC  Small nodule in spleen of unclear ethyology, otherwise no evidence of metastatic process in abdomen and pelvis  S/p bronchoscopy with biopsy on 3/16 -  Pending result     - follow biopsy result  Depending on the type of Ca chemotherapy vs immunotherapy       2  Brain masses  CT head 3/14 shows hyperdence mass in the R occipital lobe and inferior left frontal lobe  Masses with surrounding vasogenic edema suspitious of hemorrhagic mets  MRI confirmed two lesions in the brain with peripheral edema  Patient presented with headache, blurred vision that has now resolved after patient was started on steroids  Neurosurgery signed off, no plan for surgery it is associated with high risk of blindness     - continue decadrone 4mg q6h   - plan for whole brain radiation in the hospital    3  History of smoking  Patient smoked 1/2 pack per day for more then 10 years  Patient has been abstinent for 2 month     - encouraged to continue abstinence      Subjective:   Patient seen and examined  No significant events overnight  Patient is I n good spirits  He denies any headache or blurred vision  He feels back to normal  He is able to ambulate normally  Objective:     Vitals: Blood pressure 135/90, pulse 66, temperature (!) 96 9 °F (36 1 °C), resp  rate 19, height 5' 6" (1 676 m), weight 64 2 kg (141 lb 8 6 oz), SpO2 97 %  , Body mass index is 22 84 kg/m² ,   Orthostatic Blood Pressures      Most Recent Value   Blood Pressure 135/90 filed at 03/18/2022 1117            Intake/Output Summary (Last 24 hours) at 3/18/2022 1512  Last data filed at 3/18/2022 1300  Gross per 24 hour   Intake 240 ml   Output --   Net 240 ml         Physical Exam:    GEN: Reinaldo Clay appears well, alert and oriented x 3, pleasant and cooperative   HEENT: pupils equal, round, and reactive to light; extraocular muscles intact  NECK: supple, no carotid bruits   HEART: regular rhythm, normal S1 and S2, no murmurs, clicks, gallops or rubs   LUNGS: clear to auscultation bilaterally; no wheezes, rales, or rhonchi   ABDOMEN: normal bowel sounds, soft, no tenderness, no distention  EXTREMITIES: peripheral pulses normal; no clubbing, cyanosis, or edema      Medications:      Current Facility-Administered Medications:     acetaminophen (TYLENOL) tablet 650 mg, 650 mg, Oral, Q6H PRN, Barrington Esquivel MD    aluminum-magnesium hydroxide-simethicone (MYLANTA) oral suspension 30 mL, 30 mL, Oral, Q6H PRN, Barrington Esquivel MD    cholecalciferol (VITAMIN D3) tablet 1,000 Units, 1,000 Units, Oral, Daily, Barrington Esquivel MD, 1,000 Units at 03/18/22 0903    dexamethasone (DECADRON) injection 4 mg, 4 mg, Intravenous, Q6H [address], Barrington Esquivel MD, 4 mg at 03/18/22 1342    docusate sodium (COLACE) capsule 100 mg, 100 mg, Oral, BID, Barrington Esquivel MD, 100 mg at 03/18/22 0902    insulin lispro (HumaLOG) 100 units/mL subcutaneous injection 1-5 Units, 1-5 Units, Subcutaneous, TID AC, 1 Units at 03/18/22 1343 **AND** Fingerstick Glucose (POCT), , , TID AC, Barrington Esquivel MD    insulin lispro (HumaLOG) 100 units/mL subcutaneous injection 1-5 Units, 1-5 Units, Subcutaneous, HS, Barrington Esquivel MD, 1 Units at 03/16/22 2114    levETIRAcetam (KEPPRA) tablet 500 mg, 500 mg, Oral, Q12H [address], JONATHAN Bello, 500 mg at 03/18/22 0903    ondansetron (ZOFRAN) injection 4 mg, 4 mg, Intravenous, Q6H PRN, Barrington Esquivel MD    pantoprazole (PROTONIX) EC tablet 40 mg, 40 mg, Oral, Early Morning, JONATHAN Bello, 40 mg at 03/18/22 0510     Labs & Results:        Results from last 7 days   Lab Units 03/17/22  0544 03/16/22  0507 03/15/22  0526   WBC Thousand/uL 14 60* 17 75* 17 06*   HEMOGLOBIN g/dL 14 1 14 6 15 3   HEMATOCRIT % 43 6 43 3 44 1   PLATELETS Thousands/uL 341 352 342         Results from last 7 days   Lab Units 03/17/22  0544 03/16/22  0507 03/15/22  0526 03/14/22  0529 03/14/22  0529   POTASSIUM mmol/L 4 1 4 2 4 1   < > 4 1   CHLORIDE mmol/L 106 107 107   < > 104   CO2 mmol/L 30 28 26   < > 28   BUN mg/dL 32* 29* 26*   < > 20   CREATININE mg/dL 0 96 1 02 0 98   < > 1 07   CALCIUM mg/dL 9 2 9 3 9 1   < > 9 6   ALK PHOS U/L  --   --   --   --  63   ALT U/L  --   --   --   --  20   AST U/L  --   --   --   --  18    < > = values in this interval not displayed  Results from last 7 days   Lab Units 03/14/22  0529   INR  0 86   PTT seconds 33     Results from last 7 days   Lab Units 03/14/22  0529   MAGNESIUM mg/dL 2 0     MRI of the brain:    2 separate hemorrhagic enhancing lesions are identified within the brain parenchyma, right parafalcine occipital lobe and left posterior inferior frontal lobe     Surrounding vasogenic edema and localized mass effect  Findings are consistent with   intracranial metastasis in this patient with a known right upper lobe mass  CT chest:    Increased size of a large right upper lobe mass compared to 1/30/2022  Chronic small satellite nodule versus intrapulmonary lymph node      Increased mediastinal lymphadenopathy  Tiny focus of abnormal attenuation in the adjacent superior vena cava as described above, questionable early vascular involvement  This could be volume averaging artifact      Abdomen and pelvis:  Indeterminate splenic nodule  Typically splenic nodules are benign, but metastasis cannot be excluded given the chest findings      No other findings of abdominopelvic metastases    No acute pathology

## 2022-03-18 NOTE — PROGRESS NOTES
[address] [facility]  Progress Note Di No 1965, 64 y o  male MRN: [id number]  Unit/Bed#: [facility] 705-33 Encounter: [id number]  Primary Care Provider: Eda Hirsch MD   Date and time admitted to hospital: 3/14/2022  1:59 PM    * Brain mass  Assessment & Plan  Patient presented to the ED with headache and vision changes for about 1 week  Also reported blurred vision  · CT scan-Hyperdense masses noted at the right occipital lobe and inferior left frontal lobes with surrounding vasogenic edema, suspicious for hemorrhagic metastases  Likely lung cancer with brain metastasis  · MRI brain shows 2 separate hemorrhagic enhancing lesions are identified within the brain parenchyma, right parafalcine occipital lobe and left posterior inferior frontal lobe     Surrounding vasogenic edema and localized mass effect  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 in this patient with a known right upper lobe mass  · Neurosurgery was following, initial consideration for resection however now recommending radiation, no anticipated neurosurgical intervention  · Radiation oncology consult noted, need pathology to guide further RT planning (stereotactic RT vs HA-WBRT)  · D/w medical oncology on 3/17--prefer to keep patient in hospital pending results of biopsy as if [address], may require inpatient chemo vs clinical trial   · Continue Keppra and decadron  · Continue neuro checks, blurred vision improved with steroids    Cerebral edema (HCC)  Assessment & Plan  · Continue Decadron    Lung mass  Assessment & Plan  Patient had a screening CT scan in January of this year and noted to have 6 cm pulmonary mass and was supposed to see a pulmonologist-also noted to have right paratracheal adenopathy and pulmonary nodules  · CT chest abdomen and pelvis the mass is again redemonstrated but enlarging in size-since there is enlargement in size in a in a short time  Concerning for malignancy  · Pulmonary following, S/P bronchoscopy with biopsy 3/16--pathology pending     History of tobacco use  Assessment & Plan  · Patient reported that he has not used tobacco for the past 2 months  Gives history of 30 year tobacco use smokes around 1/2 -1 per day  · Patient noted to have lung mass on routine screening and was supposed to be seen by pulmonologist as outpatient  · S/P biopsy 3/16    Vitamin D deficiency  Assessment & Plan  · Continue with the supplementation    Type 2 diabetes mellitus without complication, without long-term current use of insulin [facility])  Assessment & Plan  Lab Results   Component Value Date    HGBA1C 5 7 (H) 11/07/2021       Recent Labs     03/17/22  1203 03/17/22  1652 03/17/22  2104 03/18/22  0900   POCGLU 188* 141* 146* 179*     Patient is maintained on metformin as an outpatient with last hemoglobin A1c of 5 7  Patient reported that he lost more than 40 lb since he was diagnosed with diabetes mellitus  · Hold metformin, resume at discharge  · Sliding scale insulin for now while admitted, especially given steroid induced hyperglycemia   · Monitor Accu-Cheks and adjust regimen as needed        VTE Pharmacologic Prophylaxis: VTE Score: 1 Moderate Risk (Score 3-4) - Pharmacological DVT Prophylaxis Contraindicated  Sequential Compression Devices Ordered  Patient Centered Rounds: I performed bedside rounds with nursing staff today  Discussions with Specialists or Other Care Team Provider: Case management    Education and Discussions with Family / Patient: Updated  (son) via phone  Time Spent for Care: 30 minutes  More than 50% of total time spent on counseling and coordination of care as described above      Current Length of Stay: 4 day(s)  Current Patient Status: Inpatient   Certification Statement: The patient will continue to require additional inpatient hospital stay due to pending pathology from lung biopsy, determination of possible inpatient treatment Discharge Plan: Anticipate discharge in >72 hrs to home  Code Status: Level 1 - Full Code    Subjective:   Doing okay  No complaints  Objective:     Vitals:   Temp (24hrs), Av 8 °F (36 6 °C), Min:97 3 °F (36 3 °C), Max:98 3 °F (36 8 °C)    Temp:  [97 3 °F (36 3 °C)-98 3 °F (36 8 °C)] 97 4 °F (36 3 °C)  HR:  [56-67] 67  Resp:  [16-19] 19  BP: (115-142)/(67-79) 129/79  SpO2:  [95 %-97 %] 96 %  Body mass index is 22 84 kg/m²  Input and Output Summary (last 24 hours): Intake/Output Summary (Last 24 hours) at 3/18/2022 1019  Last data filed at 3/17/2022 1300  Gross per 24 hour   Intake 240 ml   Output --   Net 240 ml       Physical Exam:   Physical Exam  Vitals and nursing note reviewed  Constitutional:       General: He is not in acute distress  Cardiovascular:      Rate and Rhythm: Normal rate and regular rhythm  Pulmonary:      Effort: No respiratory distress  Abdominal:      General: There is no distension  Tenderness: There is no abdominal tenderness  Musculoskeletal:      Right lower leg: No edema  Left lower leg: No edema  Neurological:      Mental Status: He is oriented to person, place, and time     Psychiatric:         Mood and Affect: Mood normal           Additional Data:     Labs:  Results from last 7 days   Lab Units 22  0544   WBC Thousand/uL 14 60*   HEMOGLOBIN g/dL 14 1   HEMATOCRIT % 43 6   PLATELETS Thousands/uL 341   NEUTROS PCT % 86*   LYMPHS PCT % 8*   MONOS PCT % 5   EOS PCT % 0     Results from last 7 days   Lab Units 22  0544 03/15/22  0526 22  0529   SODIUM mmol/L 140   < > 140   POTASSIUM mmol/L 4 1   < > 4 1   CHLORIDE mmol/L 106   < > 104   CO2 mmol/L 30   < > 28   BUN mg/dL 32*   < > 20   CREATININE mg/dL 0 96   < > 1 07   ANION GAP mmol/L 4   < > 8   CALCIUM mg/dL 9 2   < > 9 6   ALBUMIN g/dL  --   --  3 7   TOTAL BILIRUBIN mg/dL  --   --  0 61   ALK PHOS U/L  --   --  63   ALT U/L  --   --  20   AST U/L  --   --  18   GLUCOSE RANDOM mg/dL 146*   < > 117    < > = values in this interval not displayed  Results from last 7 days   Lab Units 03/14/22  0529   INR  0 86     Results from last 7 days   Lab Units 03/18/22  0900 03/17/22  2104 03/17/22  1652 03/17/22  1203 03/17/22  0614 03/16/22  2108 03/16/22  1608 03/16/22  0658 03/15/22  2126 03/15/22  1644 03/15/22  1146 03/15/22  0706   POC GLUCOSE mg/dl 179* 146* 141* 188* 140 170* 152* 128 165* 136 138 128               Lines/Drains:  Invasive Devices  Report    Peripheral Intravenous Line            Peripheral IV 03/14/22 Left Antecubital 4 days                      Imaging: No pertinent imaging reviewed  Recent Cultures (last 7 days):   Results from last 7 days   Lab Units 03/16/22  1204   GRAM STAIN RESULT  No Polys or Bacteria seen       Last 24 Hours Medication List:   Current Facility-Administered Medications   Medication Dose Route Frequency Provider Last Rate    acetaminophen  650 mg Oral Q6H PRN Nir Lee MD      aluminum-magnesium hydroxide-simethicone  30 mL Oral Q6H PRN Nri Lee MD      cholecalciferol  1,000 Units Oral Daily Nir Lee MD      dexamethasone  4 mg Intravenous Q6H Mauro Russell MD      docusate sodium  100 mg Oral BID Nir Lee MD      insulin lispro  1-5 Units Subcutaneous TID [facility] Nir Lee MD      insulin lispro  1-5 Units Subcutaneous HS Nir Lee MD      levETIRAcetam  500 mg Oral Q12H [address] JONATHAN Jensen      ondansetron  4 mg Intravenous Q6H PRN Nir Lee MD      pantoprazole  40 mg Oral Early Morning JONATHAN Jensen          Today, Patient Was Seen By: Az Rodgers PA-C    **Please Note: This note may have been constructed using a voice recognition system  **

## 2022-03-18 NOTE — TELEMEDICINE
e-Consult (IPC)  - Interventional Radiology  Ashley Gerardo 64 y o  male MRN: [id number]  Unit/Bed#: [facility] 618-01 Encounter: [id number]    IR has been consulted to evaluate the patient, determine the appropriate procedure, and whether or not a procedure can and should be performed regarding the care of Ashley Gerardo  We were consulted by hematology/oncology concerning brain/lung mass, and to possibly perform a port placement if medically appropriate for the patient  IP Consult to IR  Consult performed by: JONATHAN Rock  Consult ordered by: Barby Mcdonald MD        03/18/22      Assessment/Recommendation:       64year old male with with brain mass and lung mass with pathology from 3/16 biopsy from bronchoscopy pending  Given presentation, likely lung CA with brain mets  If pathology comes back as SCLC, may require inpatient chem versus clinical trial  Due to infection risk of inpatient port placement, recommend PICC placement over port if to start inpatient versus outpatient  Will consider port placement last day of hospital admission  At this point, Ashley Gerardo does not require inpatient port placement, especially if this placement will delay discharge  Jama et  al  (CVIR 2019) showed in a multivariate retrospective analysis of nearly 6000 ports that inpatient status was the sole factor responsible for increased infection when accounting for many other variables, with close to a 5x increased risk of infection  Jair Lazo  (JVIR 2013) in a cohort of approximately 2000 port placements, found that there was nearly a 12x increased risk of port infections placed in the inpatient setting compared to the outpatient setting for all indications  Sam Stacy et  al  (JVIR 2014) in a cohort of approximately 4000 port placements, also showed an increased prevalence of port infections within the first thirty days of placement specifically within the inpatient population        For these reasons, we will make every attempt to expedite an outpatient port placement  - please place outpatient port order if chemotherapy is indicated treatment pending pathology      Total time spent in review of data, discussion with requesting provider and rendering advice was 30 minutes  Patient or appropriate family member was verbally informed by heme onc (Dr Johny Keyes) of this consultative service on their behalf to provide more timely access to specialty care in lieu of an in person consultation  Verbal consent was obtained  Thank you for allowing Interventional Radiology to participate in the care of [facility]  Please don't hesitate to call or TigerText us with any questions       [address] [place] [address] Rainer Dawkins

## 2022-03-18 NOTE — ASSESSMENT & PLAN NOTE
Lab Results   Component Value Date    HGBA1C 5 7 (H) 11/07/2021       Recent Labs     03/17/22  1203 03/17/22  1652 03/17/22  2104 03/18/22  0900   POCGLU 188* 141* 146* 179*     Patient is maintained on metformin as an outpatient with last hemoglobin A1c of 5 7    Patient reported that he lost more than 40 lb since he was diagnosed with diabetes mellitus  · Hold metformin, resume at discharge  · Sliding scale insulin for now while admitted, especially given steroid induced hyperglycemia   · Monitor Accu-Cheks and adjust regimen as needed

## 2022-03-18 NOTE — ASSESSMENT & PLAN NOTE
Patient presented to the ED with headache and vision changes for about 1 week  Also reported blurred vision  · CT scan-Hyperdense masses noted at the right occipital lobe and inferior left frontal lobes with surrounding vasogenic edema, suspicious for hemorrhagic metastases  Likely lung cancer with brain metastasis  · MRI brain shows 2 separate hemorrhagic enhancing lesions are identified within the brain parenchyma, right parafalcine occipital lobe and left posterior inferior frontal lobe     Surrounding vasogenic edema and localized mass effect  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 in this patient with a known right upper lobe mass  · Neurosurgery was following, initial consideration for resection however now recommending radiation, no anticipated neurosurgical intervention       · Radiation oncology consult noted, need pathology to guide further RT planning (stereotactic RT vs HA-WBRT)  · D/w medical oncology on 3/17--prefer to keep patient in hospital pending results of biopsy as if [address], may require inpatient chemo vs clinical trial   · Continue Keppra and decadron  · Continue neuro checks, blurred vision improved with steroids

## 2022-03-19 VITALS
HEART RATE: 59 BPM | SYSTOLIC BLOOD PRESSURE: 123 MMHG | DIASTOLIC BLOOD PRESSURE: 79 MMHG | RESPIRATION RATE: 18 BRPM | BODY MASS INDEX: 22.75 KG/M2 | HEIGHT: 66 IN | OXYGEN SATURATION: 96 % | WEIGHT: 141.54 LBS | TEMPERATURE: 97.2 F

## 2022-03-19 LAB
GLUCOSE SERPL-MCNC: 151 MG/DL (ref 65–140)
GLUCOSE SERPL-MCNC: 151 MG/DL (ref 65–140)

## 2022-03-19 PROCEDURE — NC001 PR NO CHARGE: Performed by: INTERNAL MEDICINE

## 2022-03-19 PROCEDURE — 99239 HOSP IP/OBS DSCHRG MGMT >30: CPT | Performed by: INTERNAL MEDICINE

## 2022-03-19 PROCEDURE — 82948 REAGENT STRIP/BLOOD GLUCOSE: CPT

## 2022-03-19 RX ORDER — DEXAMETHASONE 2 MG/1
TABLET ORAL
Qty: 74 TABLET | Refills: 0 | Status: SHIPPED | OUTPATIENT
Start: 2022-03-19 | End: 2022-03-28 | Stop reason: SDUPTHER

## 2022-03-19 RX ORDER — LEVETIRACETAM 500 MG/1
500 TABLET ORAL EVERY 12 HOURS SCHEDULED
Qty: 60 TABLET | Refills: 0 | Status: SHIPPED | OUTPATIENT
Start: 2022-03-19 | End: 2022-03-28 | Stop reason: SDUPTHER

## 2022-03-19 RX ORDER — PANTOPRAZOLE SODIUM 40 MG/1
40 TABLET, DELAYED RELEASE ORAL
Qty: 30 TABLET | Refills: 0 | Status: SHIPPED | OUTPATIENT
Start: 2022-03-19 | End: 2022-04-20 | Stop reason: SDUPTHER

## 2022-03-19 RX ADMIN — Medication 1000 UNITS: at 08:22

## 2022-03-19 RX ADMIN — DOCUSATE SODIUM 100 MG: 100 CAPSULE ORAL at 08:22

## 2022-03-19 RX ADMIN — DEXAMETHASONE SODIUM PHOSPHATE 4 MG: 4 INJECTION INTRA-ARTICULAR; INTRALESIONAL; INTRAMUSCULAR; INTRAVENOUS; SOFT TISSUE at 11:41

## 2022-03-19 RX ADMIN — LEVETIRACETAM 500 MG: 500 TABLET, FILM COATED ORAL at 08:22

## 2022-03-19 RX ADMIN — DEXAMETHASONE SODIUM PHOSPHATE 4 MG: 4 INJECTION INTRA-ARTICULAR; INTRALESIONAL; INTRAMUSCULAR; INTRAVENOUS; SOFT TISSUE at 05:40

## 2022-03-19 RX ADMIN — INSULIN LISPRO 1 UNITS: 100 INJECTION, SOLUTION INTRAVENOUS; SUBCUTANEOUS at 08:22

## 2022-03-19 RX ADMIN — INSULIN LISPRO 1 UNITS: 100 INJECTION, SOLUTION INTRAVENOUS; SUBCUTANEOUS at 11:53

## 2022-03-19 RX ADMIN — PANTOPRAZOLE SODIUM 40 MG: 40 TABLET, DELAYED RELEASE ORAL at 05:40

## 2022-03-19 NOTE — DISCHARGE SUMMARY
[facility]  Discharge- Marisa Silva 1965, 64 y o  male MRN: [id number]  [facility] Encounter: [id number]  Primary Care Provider: Сергей Huang MD   Date and time admitted to hospital: 3/14/2022  1:59 PM    Addendum 3/29: path was resulted after discharge and is "Non-small cell carcinoma consistent with adenocarcinoma, favor a pulmonary primary"    Will need outpatient f/u as previously established  * Brain mass  Assessment & Plan  Patient presented to the ED with headache and vision changes for about 1 week  Also reported blurred vision  · CT scan-Hyperdense masses noted at the right occipital lobe and inferior left frontal lobes with surrounding vasogenic edema, suspicious for hemorrhagic metastases  Likely lung cancer with brain metastasis  · MRI brain shows 2 separate hemorrhagic enhancing lesions are identified within the brain parenchyma, right parafalcine occipital lobe and left posterior inferior frontal lobe     Surrounding vasogenic edema and localized mass effect  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 in this patient with a known right upper lobe mass  · Neurosurgery was following, initial consideration for resection however now recommending radiation, no anticipated neurosurgical intervention  · Radiation oncology consult noted, need pathology to guide further RT planning (stereotactic RT vs HA-WBRT)  · D/w medical oncology on 3/17--initially recommended to keep patient in hospital pending results of biopsy however I personally spoke with Dr Opal Carranza of oncology on 3/19 and he is ok with discharge as there are no plans for inpatient chemo   Radiation already noted patient was stable for discharge on their end as of 3/17 (had mapping done on 3/17)  · Continue Keppra and decadron at discharge   · Continue neuro checks, blurred vision improved with steroids    Cerebral edema (HCC)  Assessment & Plan  · Continue Decadron    Lung mass  Assessment & Plan  Patient had a screening CT scan in January of this year and noted to have 6 cm pulmonary mass and was supposed to see a pulmonologist-also noted to have right paratracheal adenopathy and pulmonary nodules  · CT chest abdomen and pelvis the mass is again redemonstrated but enlarging in size-since there is enlargement in size in a in a short time  Concerning for malignancy  · Pulmonary following, S/P bronchoscopy with biopsy 3/16--pathology pending     History of tobacco use  Assessment & Plan  · Patient reported that he has not used tobacco for the past 2 months  Gives history of 30 year tobacco use smokes around 1/2 -1 per day  · Patient noted to have lung mass on routine screening and was supposed to be seen by pulmonologist as outpatient  · S/P biopsy 3/16    Vitamin D deficiency  Assessment & Plan  · Continue with the supplementation    Type 2 diabetes mellitus without complication, without long-term current use of insulin [facility])  Assessment & Plan  Lab Results   Component Value Date    HGBA1C 5 7 (H) 11/07/2021       Recent Labs     03/18/22  0900 03/18/22  1114 03/18/22  1547 03/18/22  2207   POCGLU 179* 159* 167* 289*     Patient is maintained on metformin as an outpatient with last hemoglobin A1c of 5 7    Patient reported that he lost more than 40 lb since he was diagnosed with diabetes mellitus  · Hold metformin, resume at discharge  · Sliding scale insulin for now while admitted, especially given steroid induced hyperglycemia   · Monitor Accu-Cheks and adjust regimen as needed      Medical Problems             Resolved Problems  Date Reviewed: 3/19/2022    None              Discharging Physician / Practitioner: Alla Colón PA-C  PCP: Lizz Bell MD  Admission Date:   Admission Orders (From admission, onward)     Ordered        03/14/22 1430  Inpatient Admission  Once                      Discharge Date: 03/19/22    Consultations During [facility] Stay:  · Radiation oncology  · Medical Oncology  · Neurosurgery  · Pulmonology    Procedures Performed:   · Lung biopsy 3/16    Significant Findings / Test Results:   · See above    Incidental Findings:   · See above     Test Results Pending at Discharge (will require follow up):   · pathology     Outpatient Tests Requested:  · F/u primary care physician  · Follow-up medical oncology  · Follow-up Radiation Oncology    Complications:  none    Reason for Admission: Brain mass    Hospital Course:   Ashley Gerardo is a 64 y o  male patient with past medical history of diabetes, tobacco abuse who originally presented to the hospital on 3/14/2022 due to headaches and visual issues x2 weeks  He was found to have a brain mass with lung mass concerning for metastatic disease  Patient was seen by specialists during hospital stay  Had lung biopsy on 3/16 with pathology pending  See details above  I personally spoke with medical oncologist on day of discharge and patient is cleared from oncology standpoint as there are no plans for any inpatient treatment at this time  They will await pathology results and coordinate with patient for outpatient follow-up  I spoke with radiation oncology on 03/17 who performed stimulation mapping for eventual outpatient radiation pending biopsy result  This was reviewed with patient's family  They have office numbers for all specialists  Please see above list of diagnoses and related plan for additional information  Condition at Discharge: stable    Discharge Day Visit / Exam:   Subjective:  Doing okay today  Has no complaints currently  Feels well     Vitals: Blood Pressure: 123/79 (03/19/22 0243)  Pulse: 59 (03/19/22 0810)  Temperature: (!) 97 2 °F (36 2 °C) (03/19/22 0243)  Temp Source: Tympanic (03/16/22 1210)  Respirations: 18 (03/19/22 0243)  Height: 5' 6" (167 6 cm) (03/14/22 1411)  Weight - Scale: 64 2 kg (141 lb 8 6 oz) (03/14/22 1411)  SpO2: 96 % (03/19/22 8590)  Exam:   Physical Exam  Vitals and nursing note reviewed  Constitutional:       General: He is not in acute distress  Cardiovascular:      Rate and Rhythm: Normal rate and regular rhythm  Pulmonary:      Effort: No respiratory distress  Abdominal:      General: There is no distension  Tenderness: There is no abdominal tenderness  Musculoskeletal:      Right lower leg: No edema  Left lower leg: No edema  Neurological:      Mental Status: He is oriented to person, place, and time  Psychiatric:         Mood and Affect: Mood normal           Discussion with Family: I spoke with wife, niece and brother with andre Gill Discharge instructions/Information to patient and family:   See after visit summary for information provided to patient and family  Provisions for Follow-Up Care:  See after visit summary for information related to follow-up care and any pertinent home health orders  Disposition:   Home    Planned Readmission: no     Discharge Statement:  I spent 34 minutes discharging the patient  This time was spent on the day of discharge  I had direct contact with the patient on the day of discharge  Greater than 50% of the total time was spent examining patient, answering all patient questions, arranging and discussing plan of care with patient as well as directly providing post-discharge instructions  Additional time then spent on discharge activities  Discharge Medications:  See after visit summary for reconciled discharge medications provided to patient and/or family        **Please Note: This note may have been constructed using a voice recognition system**

## 2022-03-19 NOTE — ASSESSMENT & PLAN NOTE
Patient presented to the ED with headache and vision changes for about 1 week  Also reported blurred vision  · CT scan-Hyperdense masses noted at the right occipital lobe and inferior left frontal lobes with surrounding vasogenic edema, suspicious for hemorrhagic metastases  Likely lung cancer with brain metastasis  · MRI brain shows 2 separate hemorrhagic enhancing lesions are identified within the brain parenchyma, right parafalcine occipital lobe and left posterior inferior frontal lobe     Surrounding vasogenic edema and localized mass effect  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 in this patient with a known right upper lobe mass  · Neurosurgery was following, initial consideration for resection however now recommending radiation, no anticipated neurosurgical intervention  · Radiation oncology consult noted, need pathology to guide further RT planning (stereotactic RT vs HA-WBRT)  · D/w medical oncology on 3/17--initially recommended to keep patient in hospital pending results of biopsy however I personally spoke with Dr Evelin Gutierrez of oncology on 3/19 and he is ok with discharge as there are no plans for inpatient chemo   Radiation already noted patient was stable for discharge on their end as of 3/17 (had mapping done on 3/17)  · Continue Keppra and decadron at discharge   · Continue neuro checks, blurred vision improved with steroids

## 2022-03-19 NOTE — ASSESSMENT & PLAN NOTE
Lab Results   Component Value Date    HGBA1C 5 7 (H) 11/07/2021       Recent Labs     03/18/22  0900 03/18/22  1114 03/18/22  1547 03/18/22  2207   POCGLU 179* 159* 167* 289*     Patient is maintained on metformin as an outpatient with last hemoglobin A1c of 5 7    Patient reported that he lost more than 40 lb since he was diagnosed with diabetes mellitus  · Hold metformin, resume at discharge  · Sliding scale insulin for now while admitted, especially given steroid induced hyperglycemia   · Monitor Accu-Cheks and adjust regimen as needed

## 2022-03-20 NOTE — UTILIZATION REVIEW
Notification of Discharge   This is a Notification of Discharge from our facility [address]  Please be advised that this patient has been discharge from our facility  Below you will find the admission and discharge date and time including the patients disposition  UTILIZATION REVIEW CONTACT:  Adryan Smith  Utilization   Network Utilization Review Department  Phone: [phone] x carefully listen to the prompts  All voicemails are confidential   Email: [email or web address]     PHYSICIAN ADVISORY SERVICES:  FOR BZEK-AN-GZLC REVIEW - MEDICAL NECESSITY DENIAL  Phone: [phone]  Fax: [phone]  Email: [email or web address]     PRESENTATION DATE: 3/14/2022  1:59 PM  OBERVATION ADMISSION DATE:   INPATIENT ADMISSION DATE: 3/14/22  1:59 PM   DISCHARGE DATE: 3/19/2022  1:05 PM  DISPOSITION: Home/Self Care Home/Self Care      IMPORTANT INFORMATION:  Send all requests for admission clinical reviews, approved or denied determinations and any other requests to dedicated fax number below belonging to the campus where the patient is receiving treatment   List of dedicated fax numbers:  [address] DENIALS (Administrative/Medical Necessity) [phone]   [address] (Maternity/NICU/Pediatrics) [phone]   Tessa Juan [phone]   [address] [place] [address] [phone]   [address] [place] [address] [phone]   2000 [facility] [address] [place] [address],[address] Floor [address] [place] [address] [phone]   [facility]  [phone]   2205 [facility]  2401 [facility] 1000 [address] [facility] [phone]

## 2022-03-21 ENCOUNTER — TELEPHONE (OUTPATIENT)
Dept: HEMATOLOGY ONCOLOGY | Facility: CLINIC | Age: 57
End: 2022-03-21

## 2022-03-21 ENCOUNTER — TELEPHONE (OUTPATIENT)
Dept: RADIATION ONCOLOGY | Facility: HOSPITAL | Age: 57
End: 2022-03-21

## 2022-03-21 ENCOUNTER — DOCUMENTATION (OUTPATIENT)
Dept: HEMATOLOGY ONCOLOGY | Facility: CLINIC | Age: 57
End: 2022-03-21

## 2022-03-21 ENCOUNTER — TRANSITIONAL CARE MANAGEMENT (OUTPATIENT)
Dept: FAMILY MEDICINE CLINIC | Facility: CLINIC | Age: 57
End: 2022-03-21

## 2022-03-21 ENCOUNTER — TELEPHONE (OUTPATIENT)
Dept: FAMILY MEDICINE CLINIC | Facility: CLINIC | Age: 57
End: 2022-03-21

## 2022-03-21 NOTE — PROGRESS NOTES
Intake received- chart reviewed    Outside records needed?no    Pathology complete? 3/16/22 Donie adenocarcinoma    Imaging complete? CTA 3/14/22, MRI Brain 3/15/22    Clinical trials aware?  Yes, included on the intake

## 2022-03-21 NOTE — TELEPHONE ENCOUNTER
PT family member wants a call back form nurse about his dad next step he was seen in the hospital by Jose Angel Quezada and was explained few things but is still unclear of what is next

## 2022-03-21 NOTE — TELEPHONE ENCOUNTER
Intake received- chart reviewed  Pathology and imaging completed at [facility]  No record retrieval needed

## 2022-03-21 NOTE — TELEPHONE ENCOUNTER
Intake received  Insurance education outreach to follow    03/25/22  Per RTE pt has an active [employer] plan that runs on a mehrdad year   Effective 06/02/19 plan has in & out of network benefits  Deduct $700 met -0-  Out of pocket $4000 met -0-

## 2022-03-21 NOTE — TELEPHONE ENCOUNTER
I phoned the patient's son, Shakir Wilkinson, as he has provided translation for his father while inpatient in [facility]  We discussed that his father has an appointment for a follow up with Hematology/Oncology with Dr Pancho Marinelli at the [facility] on 3/24 at 1120  I explained that Dr Sariah Espitia is a colleague of Dr Shanelle Cueva, whom Shakir Jaja and the patient spoke with on Friday  We reviewed the address and office location for Dr Sariah Espitia  [facility] expressed understanding and will be able to relay this information to his father

## 2022-03-21 NOTE — TELEPHONE ENCOUNTER
Lantus was stopped in 05/2021 as per Dr Ivana Gillespie office note and continued with Metformin  Patient only on Metformin at this time

## 2022-03-21 NOTE — TELEPHONE ENCOUNTER
Soft Intake Form   Patient Details   Charles Phoenix     1965     Reason For Appointment   Who is Calling? Andrey Holt   If not patient, Name? NA    Who is the Referring Doctor? Dr Sherri Morel   What is the diagnosis? Lung Mass/Brain Mass (? Lung primary per biopsy)   Has this diagnosis been confirmed by a biopsy/surgery? If yes, what is the date it was done? Yes   Biopsy done at [facility]? If not, where was it done? Yes, 3/16   Was imaging done, and was it done at [address] [place] [address]? If not, where was it done? Yes, [facility] [place]   Have you been seen by another Oncologist?  If so, who and where (name of facility, city and state) No   For 2nd Opinions Only: Are you currently undergoing treatment, or are you scheduled to start treatment? If yes, name of facility, city and state  NA   For "History Of" only: Have you completed treatment? NA    Have you had Genetic Testing done in the past?  If so, advise to bring test results to their visit NO   Record Gathering Information   Did you advise to have records faxed to [phone]? NA   Did you advise to have disks sent to the proper address with imaging? ("History of" Patients)  5 years of imaging for breast patients-Mammos, US etc NA   Scheduling Information   Did you send new patient paperwork? Email or mail? NA   What is the best call back number? (If the [facility] is calling, please use their number) NA   Miscellaneous Information      The patient is scheduled for a HF[facility] on [facility]/24 at 1120

## 2022-03-21 NOTE — Clinical Note
Ramon Booth,  This Pt was just added to Dr Maximiliano Nolan schedule for 3/24 for hospital FU  He had CTA and MRI Brain completed already  MRI brain was positive for mets  Do you need any further assistance such at PET/CT scheduling or will Dr Vicky Patrick be OK with the current imaging?

## 2022-03-21 NOTE — TELEPHONE ENCOUNTER
Appointment Confirmation (to confirm pre existing appointments - ONLY)     Appointment with  Dr Alvarado Parks   Appointment date & time 3/24/22 at 11:20   Location [facility]   Patient verbilized Understanding  Yes

## 2022-03-22 ENCOUNTER — APPOINTMENT (EMERGENCY)
Dept: CT IMAGING | Facility: HOSPITAL | Age: 57
DRG: 871 | End: 2022-03-22
Payer: COMMERCIAL

## 2022-03-22 ENCOUNTER — APPOINTMENT (EMERGENCY)
Dept: RADIOLOGY | Facility: HOSPITAL | Age: 57
DRG: 871 | End: 2022-03-22
Payer: COMMERCIAL

## 2022-03-22 ENCOUNTER — APPOINTMENT (INPATIENT)
Dept: RADIOLOGY | Facility: HOSPITAL | Age: 57
DRG: 871 | End: 2022-03-22
Payer: COMMERCIAL

## 2022-03-22 ENCOUNTER — HOSPITAL ENCOUNTER (INPATIENT)
Facility: HOSPITAL | Age: 57
LOS: 5 days | Discharge: HOME HEALTH | DRG: 871 | End: 2022-03-27
Attending: EMERGENCY MEDICINE | Admitting: INTERNAL MEDICINE
Payer: COMMERCIAL

## 2022-03-22 DIAGNOSIS — R65.21 SEPTIC SHOCK (HCC): ICD-10-CM

## 2022-03-22 DIAGNOSIS — A41.9 SEPTIC SHOCK (HCC): ICD-10-CM

## 2022-03-22 DIAGNOSIS — R91.8 LUNG MASS: ICD-10-CM

## 2022-03-22 DIAGNOSIS — J18.9 PNEUMONIA OF RIGHT UPPER LOBE DUE TO INFECTIOUS ORGANISM: Primary | ICD-10-CM

## 2022-03-22 DIAGNOSIS — A41.9 SEPSIS (HCC): ICD-10-CM

## 2022-03-22 DIAGNOSIS — G93.89 BRAIN MASS: ICD-10-CM

## 2022-03-22 DIAGNOSIS — E11.9 TYPE 2 DIABETES MELLITUS WITHOUT COMPLICATION, WITHOUT LONG-TERM CURRENT USE OF INSULIN (HCC): ICD-10-CM

## 2022-03-22 PROBLEM — R65.20 SEVERE SEPSIS WITH ACUTE ORGAN DYSFUNCTION (HCC): Status: ACTIVE | Noted: 2022-01-01

## 2022-03-22 PROBLEM — R06.89 ACUTE RESPIRATORY INSUFFICIENCY: Status: ACTIVE | Noted: 2022-01-01

## 2022-03-22 PROBLEM — N17.9 ACUTE NONTRAUMATIC KIDNEY INJURY (HCC): Status: ACTIVE | Noted: 2022-01-01

## 2022-03-22 LAB
2HR DELTA HS TROPONIN: 4 NG/L
4HR DELTA HS TROPONIN: 2 NG/L
ABO GROUP BLD: NORMAL
ABO GROUP BLD: NORMAL
ALBUMIN SERPL BCP-MCNC: 2.9 G/DL (ref 3.5–5)
ALP SERPL-CCNC: 64 U/L (ref 46–116)
ALT SERPL W P-5'-P-CCNC: 60 U/L (ref 12–78)
ANION GAP SERPL CALCULATED.3IONS-SCNC: 8 MMOL/L (ref 4–13)
APTT PPP: 26 SECONDS (ref 23–37)
AST SERPL W P-5'-P-CCNC: 21 U/L (ref 5–45)
BACTERIA UR QL AUTO: ABNORMAL /HPF
BASOPHILS # BLD MANUAL: 0 THOUSAND/UL (ref 0–0.1)
BASOPHILS NFR MAR MANUAL: 0 % (ref 0–1)
BILIRUB SERPL-MCNC: 1.29 MG/DL (ref 0.2–1)
BILIRUB UR QL STRIP: NEGATIVE
BLD GP AB SCN SERPL QL: NEGATIVE
BUN SERPL-MCNC: 29 MG/DL (ref 5–25)
CALCIUM ALBUM COR SERPL-MCNC: 9.7 MG/DL (ref 8.3–10.1)
CALCIUM SERPL-MCNC: 8.8 MG/DL (ref 8.3–10.1)
CARDIAC TROPONIN I PNL SERPL HS: 5 NG/L
CARDIAC TROPONIN I PNL SERPL HS: 7 NG/L
CARDIAC TROPONIN I PNL SERPL HS: 9 NG/L
CHLORIDE SERPL-SCNC: 97 MMOL/L (ref 100–108)
CLARITY UR: CLEAR
CO2 SERPL-SCNC: 30 MMOL/L (ref 21–32)
COLOR UR: YELLOW
CREAT SERPL-MCNC: 1.44 MG/DL (ref 0.6–1.3)
EOSINOPHIL # BLD MANUAL: 0 THOUSAND/UL (ref 0–0.4)
EOSINOPHIL NFR BLD MANUAL: 0 % (ref 0–6)
ERYTHROCYTE [DISTWIDTH] IN BLOOD BY AUTOMATED COUNT: 13.6 % (ref 11.6–15.1)
FLUAV RNA RESP QL NAA+PROBE: NEGATIVE
FLUBV RNA RESP QL NAA+PROBE: NEGATIVE
GFR SERPL CREATININE-BSD FRML MDRD: 53 ML/MIN/1.73SQ M
GLUCOSE SERPL-MCNC: 136 MG/DL (ref 65–140)
GLUCOSE SERPL-MCNC: 160 MG/DL (ref 65–140)
GLUCOSE SERPL-MCNC: 164 MG/DL (ref 65–140)
GLUCOSE UR STRIP-MCNC: NEGATIVE MG/DL
HCT VFR BLD AUTO: 41.7 % (ref 36.5–49.3)
HGB BLD-MCNC: 14.1 G/DL (ref 12–17)
HGB UR QL STRIP.AUTO: NEGATIVE
INR PPP: 0.97 (ref 0.84–1.19)
KETONES UR STRIP-MCNC: NEGATIVE MG/DL
LACTATE SERPL-SCNC: 2.4 MMOL/L (ref 0.5–2)
LACTATE SERPL-SCNC: 2.9 MMOL/L (ref 0.5–2)
LACTATE SERPL-SCNC: 3 MMOL/L (ref 0.5–2)
LACTATE SERPL-SCNC: 3.5 MMOL/L (ref 0.5–2)
LEUKOCYTE ESTERASE UR QL STRIP: NEGATIVE
LYMPHOCYTES # BLD AUTO: 11 % (ref 14–44)
LYMPHOCYTES # BLD AUTO: 3.05 THOUSAND/UL (ref 0.6–4.47)
MAGNESIUM SERPL-MCNC: 1.8 MG/DL (ref 1.6–2.6)
MCH RBC QN AUTO: 31.3 PG (ref 26.8–34.3)
MCHC RBC AUTO-ENTMCNC: 33.8 G/DL (ref 31.4–37.4)
MCV RBC AUTO: 93 FL (ref 82–98)
MONOCYTES # BLD AUTO: 2.22 THOUSAND/UL (ref 0–1.22)
MONOCYTES NFR BLD: 8 % (ref 4–12)
NEUTROPHILS # BLD MANUAL: 22.45 THOUSAND/UL (ref 1.85–7.62)
NEUTS BAND NFR BLD MANUAL: 6 % (ref 0–8)
NEUTS SEG NFR BLD AUTO: 75 % (ref 43–75)
NITRITE UR QL STRIP: NEGATIVE
NON-SQ EPI CELLS URNS QL MICRO: ABNORMAL /HPF
PH UR STRIP.AUTO: 5.5 [PH]
PHOSPHATE SERPL-MCNC: 3.6 MG/DL (ref 2.7–4.5)
PLATELET # BLD AUTO: 220 THOUSANDS/UL (ref 149–390)
PLATELET BLD QL SMEAR: ADEQUATE
PMV BLD AUTO: 9.2 FL (ref 8.9–12.7)
POTASSIUM SERPL-SCNC: 4.4 MMOL/L (ref 3.5–5.3)
PROCALCITONIN SERPL-MCNC: 29.27 NG/ML
PROT SERPL-MCNC: 6.4 G/DL (ref 6.4–8.2)
PROT UR STRIP-MCNC: NEGATIVE MG/DL
PROTHROMBIN TIME: 12.9 SECONDS (ref 11.6–14.5)
RBC # BLD AUTO: 4.51 MILLION/UL (ref 3.88–5.62)
RBC #/AREA URNS AUTO: ABNORMAL /HPF
RBC MORPH BLD: NORMAL
RH BLD: POSITIVE
RH BLD: POSITIVE
RSV RNA RESP QL NAA+PROBE: NEGATIVE
SARS-COV-2 RNA RESP QL NAA+PROBE: NEGATIVE
SODIUM SERPL-SCNC: 135 MMOL/L (ref 136–145)
SP GR UR STRIP.AUTO: 1.02 (ref 1–1.03)
SPECIMEN EXPIRATION DATE: NORMAL
UROBILINOGEN UR QL STRIP.AUTO: 0.2 E.U./DL
WBC # BLD AUTO: 27.71 THOUSAND/UL (ref 4.31–10.16)
WBC #/AREA URNS AUTO: ABNORMAL /HPF

## 2022-03-22 PROCEDURE — 93005 ELECTROCARDIOGRAM TRACING: CPT

## 2022-03-22 PROCEDURE — 99285 EMERGENCY DEPT VISIT HI MDM: CPT

## 2022-03-22 PROCEDURE — 73030 X-RAY EXAM OF SHOULDER: CPT

## 2022-03-22 PROCEDURE — 86850 RBC ANTIBODY SCREEN: CPT

## 2022-03-22 PROCEDURE — 84484 ASSAY OF TROPONIN QUANT: CPT

## 2022-03-22 PROCEDURE — 83605 ASSAY OF LACTIC ACID: CPT | Performed by: NURSE PRACTITIONER

## 2022-03-22 PROCEDURE — 87070 CULTURE OTHR SPECIMN AEROBIC: CPT | Performed by: NURSE PRACTITIONER

## 2022-03-22 PROCEDURE — 80053 COMPREHEN METABOLIC PANEL: CPT

## 2022-03-22 PROCEDURE — 87040 BLOOD CULTURE FOR BACTERIA: CPT

## 2022-03-22 PROCEDURE — 85610 PROTHROMBIN TIME: CPT

## 2022-03-22 PROCEDURE — 36415 COLL VENOUS BLD VENIPUNCTURE: CPT

## 2022-03-22 PROCEDURE — 96375 TX/PRO/DX INJ NEW DRUG ADDON: CPT

## 2022-03-22 PROCEDURE — 99223 1ST HOSP IP/OBS HIGH 75: CPT | Performed by: INTERNAL MEDICINE

## 2022-03-22 PROCEDURE — 82948 REAGENT STRIP/BLOOD GLUCOSE: CPT

## 2022-03-22 PROCEDURE — 85730 THROMBOPLASTIN TIME PARTIAL: CPT

## 2022-03-22 PROCEDURE — 94760 N-INVAS EAR/PLS OXIMETRY 1: CPT

## 2022-03-22 PROCEDURE — 96365 THER/PROPH/DIAG IV INF INIT: CPT

## 2022-03-22 PROCEDURE — 83605 ASSAY OF LACTIC ACID: CPT

## 2022-03-22 PROCEDURE — 0241U HB NFCT DS VIR RESP RNA 4 TRGT: CPT

## 2022-03-22 PROCEDURE — 85027 COMPLETE CBC AUTOMATED: CPT

## 2022-03-22 PROCEDURE — 94668 MNPJ CHEST WALL SBSQ: CPT

## 2022-03-22 PROCEDURE — 84100 ASSAY OF PHOSPHORUS: CPT

## 2022-03-22 PROCEDURE — 99285 EMERGENCY DEPT VISIT HI MDM: CPT | Performed by: EMERGENCY MEDICINE

## 2022-03-22 PROCEDURE — 86901 BLOOD TYPING SEROLOGIC RH(D): CPT

## 2022-03-22 PROCEDURE — 70450 CT HEAD/BRAIN W/O DYE: CPT

## 2022-03-22 PROCEDURE — 81001 URINALYSIS AUTO W/SCOPE: CPT | Performed by: NURSE PRACTITIONER

## 2022-03-22 PROCEDURE — 96361 HYDRATE IV INFUSION ADD-ON: CPT

## 2022-03-22 PROCEDURE — 83735 ASSAY OF MAGNESIUM: CPT

## 2022-03-22 PROCEDURE — 87081 CULTURE SCREEN ONLY: CPT | Performed by: NURSE PRACTITIONER

## 2022-03-22 PROCEDURE — 84145 PROCALCITONIN (PCT): CPT | Performed by: NURSE PRACTITIONER

## 2022-03-22 PROCEDURE — 96368 THER/DIAG CONCURRENT INF: CPT

## 2022-03-22 PROCEDURE — 85007 BL SMEAR W/DIFF WBC COUNT: CPT

## 2022-03-22 PROCEDURE — 87186 SC STD MICRODIL/AGAR DIL: CPT | Performed by: NURSE PRACTITIONER

## 2022-03-22 PROCEDURE — 71275 CT ANGIOGRAPHY CHEST: CPT

## 2022-03-22 PROCEDURE — 87205 SMEAR GRAM STAIN: CPT | Performed by: NURSE PRACTITIONER

## 2022-03-22 PROCEDURE — 96366 THER/PROPH/DIAG IV INF ADDON: CPT

## 2022-03-22 PROCEDURE — 96367 TX/PROPH/DG ADDL SEQ IV INF: CPT

## 2022-03-22 PROCEDURE — 71045 X-RAY EXAM CHEST 1 VIEW: CPT

## 2022-03-22 PROCEDURE — 94664 DEMO&/EVAL PT USE INHALER: CPT

## 2022-03-22 PROCEDURE — 86900 BLOOD TYPING SEROLOGIC ABO: CPT

## 2022-03-22 RX ORDER — ACETAMINOPHEN 325 MG/1
650 TABLET ORAL ONCE
Status: COMPLETED | OUTPATIENT
Start: 2022-03-22 | End: 2022-03-22

## 2022-03-22 RX ORDER — CALCIUM CARBONATE 200(500)MG
1000 TABLET,CHEWABLE ORAL DAILY PRN
Status: DISCONTINUED | OUTPATIENT
Start: 2022-03-22 | End: 2022-03-27 | Stop reason: HOSPADM

## 2022-03-22 RX ORDER — HYDROMORPHONE HCL/PF 1 MG/ML
0.2 SYRINGE (ML) INJECTION EVERY 4 HOURS PRN
Status: DISCONTINUED | OUTPATIENT
Start: 2022-03-22 | End: 2022-03-27 | Stop reason: HOSPADM

## 2022-03-22 RX ORDER — INSULIN GLARGINE 100 [IU]/ML
20 INJECTION, SOLUTION SUBCUTANEOUS
Status: DISCONTINUED | OUTPATIENT
Start: 2022-03-22 | End: 2022-03-23

## 2022-03-22 RX ORDER — DEXAMETHASONE 4 MG/1
4 TABLET ORAL EVERY 12 HOURS SCHEDULED
Status: DISCONTINUED | OUTPATIENT
Start: 2022-03-23 | End: 2022-03-27 | Stop reason: HOSPADM

## 2022-03-22 RX ORDER — LIDOCAINE 50 MG/G
1 PATCH TOPICAL DAILY
Status: COMPLETED | OUTPATIENT
Start: 2022-03-22 | End: 2022-03-23

## 2022-03-22 RX ORDER — HEPARIN SODIUM 5000 [USP'U]/ML
5000 INJECTION, SOLUTION INTRAVENOUS; SUBCUTANEOUS EVERY 8 HOURS SCHEDULED
Status: DISCONTINUED | OUTPATIENT
Start: 2022-03-22 | End: 2022-03-23

## 2022-03-22 RX ORDER — ONDANSETRON 2 MG/ML
4 INJECTION INTRAMUSCULAR; INTRAVENOUS EVERY 6 HOURS PRN
Status: DISCONTINUED | OUTPATIENT
Start: 2022-03-22 | End: 2022-03-27 | Stop reason: HOSPADM

## 2022-03-22 RX ORDER — ACETAMINOPHEN 325 MG/1
650 TABLET ORAL EVERY 6 HOURS PRN
Status: DISCONTINUED | OUTPATIENT
Start: 2022-03-22 | End: 2022-03-22

## 2022-03-22 RX ORDER — PANTOPRAZOLE SODIUM 40 MG/1
40 TABLET, DELAYED RELEASE ORAL
Status: DISCONTINUED | OUTPATIENT
Start: 2022-03-23 | End: 2022-03-27 | Stop reason: HOSPADM

## 2022-03-22 RX ORDER — AZITHROMYCIN 250 MG/1
500 TABLET, FILM COATED ORAL EVERY 24 HOURS
Status: DISCONTINUED | OUTPATIENT
Start: 2022-03-23 | End: 2022-03-22

## 2022-03-22 RX ORDER — OXYCODONE HYDROCHLORIDE 5 MG/1
5 TABLET ORAL EVERY 6 HOURS PRN
Status: DISCONTINUED | OUTPATIENT
Start: 2022-03-22 | End: 2022-03-23

## 2022-03-22 RX ORDER — VANCOMYCIN HYDROCHLORIDE 1 G/200ML
15 INJECTION, SOLUTION INTRAVENOUS EVERY 12 HOURS
Status: DISCONTINUED | OUTPATIENT
Start: 2022-03-22 | End: 2022-03-22 | Stop reason: SDUPTHER

## 2022-03-22 RX ORDER — SODIUM CHLORIDE, SODIUM LACTATE, POTASSIUM CHLORIDE, CALCIUM CHLORIDE 600; 310; 30; 20 MG/100ML; MG/100ML; MG/100ML; MG/100ML
150 INJECTION, SOLUTION INTRAVENOUS CONTINUOUS
Status: DISCONTINUED | OUTPATIENT
Start: 2022-03-22 | End: 2022-03-23

## 2022-03-22 RX ORDER — FENTANYL CITRATE 50 UG/ML
50 INJECTION, SOLUTION INTRAMUSCULAR; INTRAVENOUS ONCE
Status: COMPLETED | OUTPATIENT
Start: 2022-03-22 | End: 2022-03-22

## 2022-03-22 RX ORDER — MELATONIN
1000 DAILY
Status: DISCONTINUED | OUTPATIENT
Start: 2022-03-22 | End: 2022-03-27 | Stop reason: HOSPADM

## 2022-03-22 RX ORDER — POLYETHYLENE GLYCOL 3350 17 G/17G
17 POWDER, FOR SOLUTION ORAL DAILY PRN
Status: DISCONTINUED | OUTPATIENT
Start: 2022-03-22 | End: 2022-03-23

## 2022-03-22 RX ORDER — ACETAMINOPHEN 325 MG/1
650 TABLET ORAL EVERY 6 HOURS PRN
Status: DISCONTINUED | OUTPATIENT
Start: 2022-03-22 | End: 2022-03-23

## 2022-03-22 RX ORDER — LEVETIRACETAM 500 MG/1
500 TABLET ORAL EVERY 12 HOURS SCHEDULED
Status: DISCONTINUED | OUTPATIENT
Start: 2022-03-22 | End: 2022-03-27 | Stop reason: HOSPADM

## 2022-03-22 RX ORDER — OXYCODONE HYDROCHLORIDE 5 MG/1
2.5 TABLET ORAL EVERY 6 HOURS PRN
Status: DISCONTINUED | OUTPATIENT
Start: 2022-03-22 | End: 2022-03-23

## 2022-03-22 RX ORDER — VANCOMYCIN HYDROCHLORIDE 1 G/200ML
15 INJECTION, SOLUTION INTRAVENOUS DAILY PRN
Status: DISCONTINUED | OUTPATIENT
Start: 2022-03-23 | End: 2022-03-23

## 2022-03-22 RX ADMIN — INSULIN GLARGINE 20 UNITS: 100 INJECTION, SOLUTION SUBCUTANEOUS at 21:48

## 2022-03-22 RX ADMIN — FENTANYL CITRATE 50 MCG: 50 INJECTION INTRAMUSCULAR; INTRAVENOUS at 10:05

## 2022-03-22 RX ADMIN — SODIUM CHLORIDE, SODIUM LACTATE, POTASSIUM CHLORIDE, AND CALCIUM CHLORIDE 1000 ML: .6; .31; .03; .02 INJECTION, SOLUTION INTRAVENOUS at 11:17

## 2022-03-22 RX ADMIN — SODIUM CHLORIDE 500 ML: 9 INJECTION, SOLUTION INTRAVENOUS at 17:10

## 2022-03-22 RX ADMIN — CEFTRIAXONE SODIUM 2000 MG: 10 INJECTION, POWDER, FOR SOLUTION INTRAVENOUS at 10:33

## 2022-03-22 RX ADMIN — LEVETIRACETAM 500 MG: 250 TABLET, FILM COATED ORAL at 14:10

## 2022-03-22 RX ADMIN — CEFEPIME HYDROCHLORIDE 2000 MG: 2 INJECTION, POWDER, FOR SOLUTION INTRAVENOUS at 15:12

## 2022-03-22 RX ADMIN — SODIUM CHLORIDE, SODIUM LACTATE, POTASSIUM CHLORIDE, AND CALCIUM CHLORIDE 150 ML/HR: .6; .31; .03; .02 INJECTION, SOLUTION INTRAVENOUS at 21:49

## 2022-03-22 RX ADMIN — HEPARIN SODIUM 5000 UNITS: 5000 INJECTION INTRAVENOUS; SUBCUTANEOUS at 14:10

## 2022-03-22 RX ADMIN — IOHEXOL 85 ML: 350 INJECTION, SOLUTION INTRAVENOUS at 09:51

## 2022-03-22 RX ADMIN — SODIUM CHLORIDE, SODIUM LACTATE, POTASSIUM CHLORIDE, AND CALCIUM CHLORIDE 150 ML/HR: .6; .31; .03; .02 INJECTION, SOLUTION INTRAVENOUS at 15:04

## 2022-03-22 RX ADMIN — OXYCODONE HYDROCHLORIDE 5 MG: 5 TABLET ORAL at 14:10

## 2022-03-22 RX ADMIN — LIDOCAINE 5% 1 PATCH: 700 PATCH TOPICAL at 14:11

## 2022-03-22 RX ADMIN — HEPARIN SODIUM 5000 UNITS: 5000 INJECTION INTRAVENOUS; SUBCUTANEOUS at 21:48

## 2022-03-22 RX ADMIN — Medication 500 MG: at 11:00

## 2022-03-22 RX ADMIN — SODIUM CHLORIDE, SODIUM LACTATE, POTASSIUM CHLORIDE, AND CALCIUM CHLORIDE 1000 ML: .6; .31; .03; .02 INJECTION, SOLUTION INTRAVENOUS at 15:40

## 2022-03-22 RX ADMIN — HYDROMORPHONE HYDROCHLORIDE 0.2 MG: 1 INJECTION, SOLUTION INTRAMUSCULAR; INTRAVENOUS; SUBCUTANEOUS at 14:10

## 2022-03-22 RX ADMIN — ACETAMINOPHEN 650 MG: 325 TABLET, FILM COATED ORAL at 09:08

## 2022-03-22 RX ADMIN — SODIUM CHLORIDE, SODIUM LACTATE, POTASSIUM CHLORIDE, AND CALCIUM CHLORIDE 1000 ML: .6; .31; .03; .02 INJECTION, SOLUTION INTRAVENOUS at 09:12

## 2022-03-22 RX ADMIN — LEVETIRACETAM 500 MG: 250 TABLET, FILM COATED ORAL at 21:49

## 2022-03-22 RX ADMIN — SODIUM CHLORIDE 1000 ML: 0.9 INJECTION, SOLUTION INTRAVENOUS at 08:35

## 2022-03-22 RX ADMIN — VANCOMYCIN HYDROCHLORIDE 1250 MG: 5 INJECTION, POWDER, LYOPHILIZED, FOR SOLUTION INTRAVENOUS at 16:41

## 2022-03-22 NOTE — PROGRESS NOTES
The azithromycin has been converted to Oral per [facility] IV-to-PO Auto-Conversion Protocol for Adults as approved by the Pharmacy and Therapeutics Committee  The patient met all eligible criteria:  3 Age = 25years old   2) Received at least one dose of the IV form   3) Receiving at least one other scheduled oral/enteral medication   4) Tolerating an oral/enteral diet   and did not have any exclusions:   1) Critical care patient   2) Active GI bleed (IF assessing H2RAs or PPIs)   3) Continuous tube feeding (IF assessing cipro, doxycycline, levofloxacin, minocycline, rifampin, or voriconazole)   4) Receiving PO vancomycin (IF assessing metronidazole)   5) Persistent nausea and/or vomiting   6) Ileus or gastrointestinal obstruction   7) Denisha/nasogastric tube set for continuous suction   8) Specific order not to automatically convert to PO (in the order's comments or if discussed in the most recent Infectious Disease or primary team's progress notes)

## 2022-03-22 NOTE — ASSESSMENT & PLAN NOTE
Background: Presented to the ED with shoulder pain and SOB  Also endorsed sputum, congestion, chills, body aches, and weakness  o Thought to be secondary to obstructive pneumonia in the setting of lung mass   o Criteria met on admission: lactic acidosis, fever, hypotension, leukocytosis, and tachycardia   o With tissue hypoperfusion and acute organ dysfunction as evidence by lactic and ALBERT  o Of note WBC elevation likely secondary to decadron use   CXR "with increased right upper lobe airspace opacity peripheral to the right upper lobe mass "   CTA chest PE "with No pulmonary embolus  Unchanged large right upper lobe mass most compatible with carcinoma, with new extensive right upper lobe groundglass density which could represent postobstructive pneumonia, edema, hemorrhage or lymphangitic carcinomatosis   New groundglass infiltrates in the left upper and right lower lobes, presumably infectious/inflammatory   Unchanged mediastinal and right hilar adenopathy"   CT head "No acute findings   Unchanged metastatic lesions with surrounding edema in the right occipital and left frontal lobes"   WBC of 27 71   Lactic of 3 0; undergoing fluid resuscitation; continue to check Q2hr until <2   IV Fluids     IV antibiotics; Azithromycin and Rocephin initially given in the ED; will continue Cefepime and Vancomycin given recent hospitalization; follow up with cultures and deescalate as appropriate  o MRSA culture ordered    Blood cultures   UA with reflex to culture   Procalcitonin ordered; continue to trend    Pulmonary consulted; awaiting formal evaluation    CC made aware of patient status on admission

## 2022-03-22 NOTE — LETTER
[address] [address]  Dept: [phone]    March 27, 2022     Patient: Francisco Javier Gerard   YOB: 1965   Date of Visit: 3/22/2022       To Whom it May Concern:    Francisco Javier Gerard is under my professional care  He was seen in the hospital from 3/22/2022   to 03/27/22  He may return to work on 4/4 without limitations  If you have any questions or concerns, please don't hesitate to call           Sincerely,            Axel Cole PA-C

## 2022-03-22 NOTE — ASSESSMENT & PLAN NOTE
· With cerebral edema   · Per Neurosurgery documentation recommending radiation without plan for surgical intervention from prior admission   · Was supposed to have OP visit with Med/Onc to discuss radiation this week unfortunately   · Will continue decadron and keppra per recommendation

## 2022-03-22 NOTE — ASSESSMENT & PLAN NOTE
· Present on admission as evidence by creatinine of 1 44  · Baseline appears to be closer to 0 9  · In the setting of sepsis/hypovolemia  · IVF   · Renally dose medications as appropriate  · Avoid nephrotoxins, NSAIDS, and hypotension   · Goal for MAP>65   · Monitor with serial labs     Lab Results   Component Value Date    CREATININE 1 44 (H) 03/22/2022    CREATININE 0 96 03/17/2022    CREATININE 1 02 03/16/2022

## 2022-03-22 NOTE — H&P
Serjio Velvet 1965, 64 y o  male MRN: [id number]  Unit/Bed#: ED 28 Encounter: [id number]  Primary Care Provider: Сергей Huang MD   Date and time admitted to hospital: 3/22/2022  8:12 AM    * Severe sepsis with acute organ dysfunction [facility])  Assessment & Plan  Background: Presented to the ED with shoulder pain and SOB  Also endorsed sputum, congestion, chills, body aches, and weakness  o Thought to be secondary to obstructive pneumonia in the setting of lung mass   o Criteria met on admission: lactic acidosis, fever, hypotension, leukocytosis, and tachycardia   o With tissue hypoperfusion and acute organ dysfunction as evidence by lactic and ALBERT  o Of note WBC elevation likely secondary to decadron use   CXR "with increased right upper lobe airspace opacity peripheral to the right upper lobe mass "   CTA chest PE "with No pulmonary embolus  Unchanged large right upper lobe mass most compatible with carcinoma, with new extensive right upper lobe groundglass density which could represent postobstructive pneumonia, edema, hemorrhage or lymphangitic carcinomatosis   New groundglass infiltrates in the left upper and right lower lobes, presumably infectious/inflammatory   Unchanged mediastinal and right hilar adenopathy"   CT head "No acute findings   Unchanged metastatic lesions with surrounding edema in the right occipital and left frontal lobes"   WBC of 27 71   Lactic of 3 0; undergoing fluid resuscitation; continue to check Q2hr until <2   IV Fluids     IV antibiotics; Azithromycin and Rocephin initially given in the ED; will continue Cefepime and Vancomycin given recent hospitalization; follow up with cultures and deescalate as appropriate  o MRSA culture ordered    Blood cultures   UA with reflex to culture   Procalcitonin ordered; continue to trend    Pulmonary consulted; awaiting formal evaluation    CC made aware of patient status on admission     Lung mass  Assessment & Plan  · As noted on CT scan from January 2022; see present on repeat  · Consistent with adenocarcinoma from tissue exam on 3/16/2022  · Status post bronchoscopy with biopsy on 3/16/2022  · Pulmonology consulted; input appreciated     Obstructive pneumonia  Assessment & Plan  · In the setting of lung mass   · See plan under primary problem     Brain mass  Assessment & Plan  · With cerebral edema   · Per Neurosurgery documentation recommending radiation without plan for surgical intervention from prior admission   · Was supposed to have OP visit with Med/Onc to discuss radiation this week unfortunately   · Will continue decadron and keppra per recommendation     Type 2 diabetes mellitus without complication, without long-term current use of insulin ([facility] [address] )  Assessment & Plan  Lab Results   Component Value Date    HGBA1C 5 7 (H) 11/07/2021     Recent Labs     03/22/22  0815   POCGLU 164*     · With very good control as evidence by A1c as above on metformin as an OP  · Given acute infection and on decadron will order regimen as below:   · Glargine 20U HS  · Weight-based SSI TID w/meals AC  · Hypoglycemia protocol   · Carb controlled diet   · Monitor with POCT BG and titrate regimen as indicated     History of tobacco use  Assessment & Plan  · Reportedly quit in approximately November 2021; 30 year tobacco hx however     Acute nontraumatic kidney injury ([facility] [address] )  Assessment & Plan  · Present on admission as evidence by creatinine of 1 44  · Baseline appears to be closer to 0 9  · In the setting of sepsis/hypovolemia  · IVF   · Renally dose medications as appropriate  · Avoid nephrotoxins, NSAIDS, and hypotension   · Goal for MAP>65   · Monitor with serial labs     Lab Results   Component Value Date    CREATININE 1 44 (H) 03/22/2022    CREATININE 0 96 03/17/2022    CREATININE 1 02 03/16/2022         VTE Prophylaxis: Heparin  / sequential compression device   Code Status: Level 1 full code; confirmed with son, wife, and patient  Discussion with family: son wife and patient on admission at the bedside     Anticipated Length of Stay:  Patient will be admitted on an Inpatient basis with an anticipated length of stay of  > 2 midnights  Justification for Hospital Stay: severe sepsis    Total Time for Visit, including Counseling / Coordination of Care: 45 minutes  Greater than 50% of this total time spent on direct patient counseling and coordination of care  Past Medical History:    Past Medical History:   Diagnosis Date    Diabetes mellitus ([facility] [address] )     Elevated PSA 3/11/2021    Fatty liver 3/11/2021    Gall bladder polyp 3/11/2021    Lung cancer ([facility] [address] )     Nonimmune to hepatitis B virus 3/11/2021       Chief Complaint:   Shoulder Pain (pt c/o R shoulder pain that started at 0100 this morning, hx of lung cancer)      History of Present Illness:    Maida Wade is a 64 y o  male with past medical history of lung mass, brain mass with cerebral edema, T2DM, and  who presents with Shoulder Pain (pt c/o R shoulder pain that started at 0100 this morning, hx of lung cancer)    Initially presented to the ED for evaluation of shoulder pain and SOB  Found to be severely septic  Of note was recently admitted to [facility] and found to have brain and lung mass consistent with adenocarcinoma  Now with likely obstructive pneumonia  Undergoing sepsis protocol  See rest of plan as noted above  Review of Systems:    Review of Systems   Constitutional: Positive for activity change, chills, diaphoresis and fatigue  Negative for appetite change and fever  HENT: Positive for congestion  Respiratory: Positive for shortness of breath  Negative for cough, chest tightness and wheezing  Cardiovascular: Negative for chest pain and palpitations  Gastrointestinal: Negative for abdominal pain, diarrhea, nausea and vomiting  Genitourinary: Negative for difficulty urinating  Skin: Negative for color change     Neurological: Negative for dizziness  Psychiatric/Behavioral: Negative for confusion  Past Surgical History:     Past Medical History:   Diagnosis Date    Diabetes mellitus ([facility] )     Elevated PSA 3/11/2021    Fatty liver 3/11/2021    Gall bladder polyp 3/11/2021    Lung cancer ([facility] )     Nonimmune to hepatitis B virus 3/11/2021       History reviewed  No pertinent surgical history  Meds/Allergies:    Prior to Admission medications    Medication Sig Start Date End Date Taking? Authorizing Provider   BD Pen Needle Jeaneth 2nd Gen 32G X 4 MM MISC USE ONCE DAILY WITH LANTUS 1/7/21   Historical Provider, MD   Blood Glucose Monitoring Suppl (FreeStyle Lite) FIDELIA  1/7/21   Historical Provider, MD   Cholecalciferol (Vitamin D3) 125 MCG (5000 UT) CAPS Take 1 capsule (5,000 Units total) by mouth daily 11/17/21 2/15/22  Nilsa Iniguez MD   dexamethasone (DECADRON) 2 mg tablet Take 4 mg TID x3 days, then 4 mg BID until directed otherwise 3/19/22   Alla Colón PA-C   FREESTYLE LITE test strip Check blood sugar  daily 11/17/21   Nilsa Iniguez MD   Lancets (freestyle) lancets Check blood sugar daily 11/17/21   Nilsa Iniguez MD   levETIRAcetam (KEPPRA) 500 mg tablet Take 1 tablet (500 mg total) by mouth every 12 (twelve) hours 3/19/22   Alla Colón PA-C   metFORMIN (GLUCOPHAGE-XR) 500 mg 24 hr tablet Take 2 tablets (1,000 mg total) by mouth daily with dinner 11/17/21 2/15/22  Nilsa Iniguez MD   pantoprazole (PROTONIX) 40 mg tablet Take 1 tablet (40 mg total) by mouth daily in the early morning 3/19/22   Alla Colón PA-C     I have reviewed home medications with patient personally      Allergies: No Known Allergies    Social History:     Marital Status: /Civil Union   Patient Pre-hospital Living Situation: home with wife   Patient Pre-hospital Level of Mobility: without the use of ambulatory aid   Patient Pre-hospital Diet Restrictions: none   Substance Use History:   Social History     Substance and Sexual Activity   Alcohol Use Not Currently    Comment: quit drinking 7 years ago     Social History     Tobacco Use   Smoking Status Former Smoker    Packs/day: 0 50    Types: Cigarettes    Quit date: 2022    Years since quittin 1   Smokeless Tobacco Never Used   Tobacco Comment    quit 3 months ago     Social History     Substance and Sexual Activity   Drug Use Never       Family History:    History reviewed  No pertinent family history  Physical Exam:     Vitals:   Blood Pressure: 93/53 (22 1300)  Pulse: 86 (22 1300)  Temperature: 98 9 °F (37 2 °C) (22 1222)  Temp Source: Rectal (22 0837)  Respirations: 18 (22 1244)  SpO2: 94 % (22 1300)    Physical Exam  Vitals and nursing note reviewed  Constitutional:       Appearance: He is well-developed  He is ill-appearing and diaphoretic  HENT:      Head: Normocephalic and atraumatic  Eyes:      Conjunctiva/sclera: Conjunctivae normal    Cardiovascular:      Rate and Rhythm: Regular rhythm  Tachycardia present  Pulses: Normal pulses  Heart sounds: No murmur heard  Pulmonary:      Effort: Pulmonary effort is normal  No respiratory distress  Breath sounds: Normal breath sounds  Abdominal:      General: Abdomen is flat  Bowel sounds are normal  There is no distension  Palpations: Abdomen is soft  Tenderness: There is no abdominal tenderness  Musculoskeletal:         General: No swelling  Cervical back: Neck supple  Skin:     General: Skin is warm  Capillary Refill: Capillary refill takes less than 2 seconds  Coloration: Skin is not jaundiced  Findings: No bruising  Neurological:      Mental Status: He is oriented to person, place, and time and easily aroused  He is lethargic  Psychiatric:         Mood and Affect: Mood normal          Behavior: Behavior normal  Behavior is cooperative         Additional Data:     Lab Results: I have personally reviewed pertinent reports  Results from last 7 days   Lab Units 03/22/22  0849 03/17/22  0544 03/17/22  0544   WBC Thousand/uL 27 71*   < > 14 60*   HEMOGLOBIN g/dL 14 1   < > 14 1   HEMATOCRIT % 41 7   < > 43 6   PLATELETS Thousands/uL 220   < > 341   BANDS PCT % 6  --   --    NEUTROS PCT %  --   --  86*   LYMPHS PCT %  --   --  8*   LYMPHO PCT % 11*  --   --    MONOS PCT %  --   --  5   MONO PCT % 8  --   --    EOS PCT % 0  --  0    < > = values in this interval not displayed  Results from last 7 days   Lab Units 03/22/22  0849   SODIUM mmol/L 135*   POTASSIUM mmol/L 4 4   CHLORIDE mmol/L 97*   CO2 mmol/L 30   BUN mg/dL 29*   CREATININE mg/dL 1 44*   ANION GAP mmol/L 8   CALCIUM mg/dL 8 8   ALBUMIN g/dL 2 9*   TOTAL BILIRUBIN mg/dL 1 29*   ALK PHOS U/L 64   ALT U/L 60   AST U/L 21   GLUCOSE RANDOM mg/dL 160*     Results from last 7 days   Lab Units 03/22/22  0849   INR  0 97     Results from last 7 days   Lab Units 03/22/22  0815 03/19/22  1151 03/19/22  0821 03/18/22  2207 03/18/22  1547 03/18/22  1114 03/18/22  0900 03/17/22  2104 03/17/22  1652 03/17/22  1203 03/17/22  0614 03/16/22  2108   POC GLUCOSE mg/dl 164* 151* 151* 289* 167* 159* 179* 146* 141* 188* 140 170*         Results from last 7 days   Lab Units 03/22/22  1220 03/22/22  0849   LACTIC ACID mmol/L 3 5* 3 0*       Imaging: I have personally reviewed pertinent reports  CTA ED chest PE study   ED Interpretation by Jasmin Dumont MD (03/22 1689)   FINDINGS:     PULMONARY ARTERIAL TREE:  No pulmonary embolus is seen       LUNGS:  Large right upper lobe mass unchanged  New extensive surrounding groundglass density in the right upper lobe lobe, with more consolidative density at the apex  New small patchy groundglass glass densities in the left upper and right lower   lobes  Unchanged occlusion of the right upper lobe bronchus by the mass    No other tracheal or endobronchial lesion      PLEURA:  Unremarkable      HEART/GREAT VESSELS: Unremarkable for patient's age  No thoracic aortic aneurysm      MEDIASTINUM AND NAS:  Unchanged mediastinal and right hilar adenopathy      CHEST WALL AND LOWER NECK: Unchanged 1 5 cm right thyroid nodule      VISUALIZED STRUCTURES IN THE UPPER ABDOMEN:  Unremarkable      OSSEOUS STRUCTURES:  No acute fracture or destructive osseous lesion      IMPRESSION:     No pulmonary embolus        Unchanged large right upper lobe mass most compatible with carcinoma, with new extensive right upper lobe kerri   undglass density which could represent postobstructive pneumonia, edema, hemorrhage or lymphangitic carcinomatosis  New groundglass infiltrates   in the left upper and right lower lobes, presumably infectious/inflammatory      Unchanged mediastinal and right hilar adenopathy            Workstation performed: [id number]      Final Result by René Jean MD (03/22 1032)      No pulmonary embolus  Unchanged large right upper lobe mass most compatible with carcinoma, with new extensive right upper lobe groundglass density which could represent postobstructive pneumonia, edema, hemorrhage or lymphangitic carcinomatosis  New groundglass infiltrates    in the left upper and right lower lobes, presumably infectious/inflammatory  Unchanged mediastinal and right hilar adenopathy  Workstation performed: [id number]         CT head without contrast   ED Interpretation by Alexa Escobar MD (03/22 1027)   FINDINGS:     PARENCHYMA:  Unchanged 2 8 cm hyperdense mass in the right occipital lobe with extensive surrounding edema, and 1 0 cm hyperdense mass in the left inferior frontal lobe with surrounding edema  No CT signs of acute infarction  No acute parenchymal   hemorrhage      VENTRICLES AND EXTRA-AXIAL SPACES:  Normal for the patient's age      VISUALIZED ORBITS AND PARANASAL SINUSES:  No acute abnormality involving the orbits    Moderate sized polyp or mucous retention cyst in the left maxillary sinus  No fluid levels are seen      CALVARIUM AND EXTRACRANIAL SOFT TISSUES:  Normal      IMPRESSION:     No acute findings  Unchanged metastatic lesions with surrounding edema in the right occipital and left frontal lobes                  Workstation performed: [id number]      Final Result by Sonny Tripp MD (03/22 1015)      No acute findings  Unchanged metastatic lesions with surrounding edema in the right occipital and left frontal lobes  Workstation performed: [id number]         XR chest 1 view portable   ED Interpretation by Kristin Dugan MD (03/22 1111)   Increased opacity in the right upper lung  Increased mass vs post-obstructive pneumonia vs both  Final Result by Yael Alves MD (03/22 1125)      Increased right upper lobe airspace opacity peripheral to the right upper lobe mass  See subsequent CT for further details  Workstation performed: [id number]             EKG, Pathology, and Other Studies Reviewed on Admission:   · EKG: reviewed   · Outpatient/prior hospitalization documentation reviewed when available     Allscripts / Epic Records Reviewed: Yes     ** Please Note: This note has been constructed using a voice recognition system   **

## 2022-03-22 NOTE — ED ATTENDING ATTESTATION
3/22/2022  IMarion DO, saw and evaluated the patient  I have discussed the patient with the resident/non-physician practitioner and agree with the resident's/non-physician practitioner's findings, Plan of Care, and MDM as documented in the resident's/non-physician practitioner's note, except where noted  All available labs and Radiology studies were reviewed  I was present for key portions of any procedure(s) performed by the resident/non-physician practitioner and I was immediately available to provide assistance  At this point I agree with the current assessment done in the Emergency Department  I have conducted an independent evaluation of this patient a history and physical is as follows:    ED Course   64year old male with a history of metastatic lung cancer presents with his wife for evaluation of severe acute onset R shoulder pain and lethargy  Patient began to complain of severe right shoulder pain around 1:00 a m  Last night  This morning wife noted him to be uncomfortable and resisting movement due to pain in the right scapular area  Patient was recently diagnosed with lung cancer with metastases to the brain  He is in the process of having radiation staging, lung pathology pending  Patient presents to triage hypotensive with a systolic blood pressure 59  He is lethargic  He is skin is hot to touch  Wife reports decreased urinary output  No vomiting or diarrhea  No chest pain    Past medical history:  Diabetes    Physical exam:  Patient is drowsy but arousable  Pupils are equal round reactive to light  No scleral icterus noted  There is tenderness with palpation of the right scapular region  No deformity or erythema noted  Heart is regular rate and rhythm without ectopy  Lungs are diminished in the right upper lobe  Otherwise clear to auscultation  Abdomen is soft nontender nondistended with normoactive bowel sounds  Patient is able to move all 4 extremities equally    No focal motor sensory deficits noted  Plan:  70-year-old male with metastatic lung cancer presents with fever, right shoulder/scapular pain  Patient's x-rays demonstrates postobstructive pneumonia and a large right upper lobe mass, suspect that this is causing patient's pain  Will further characterize with CT scan, check PE study due to high risk criteria  Patient will be provided pain medication  Start IV fluids for hypotension  Patient did respond to IV fluids, sepsis alert called patient was provided with antibiotics  He will be admitted to the inpatient unit for further treatment        Critical Care Time  Procedures

## 2022-03-22 NOTE — PROGRESS NOTES
Vancomycin Assessment    Ashley Gerardo is a 64 y o  male who is currently receiving vancomycin 15mg/kg q12h for Pneumonia     Relevant clinical data and objective history reviewed:  Creatinine   Date Value Ref Range Status   03/22/2022 1 44 (H) 0 60 - 1 30 mg/dL Final     Comment:     Standardized to IDMS reference method   03/17/2022 0 96 0 60 - 1 30 mg/dL Final     Comment:     Standardized to IDMS reference method   03/16/2022 1 02 0 60 - 1 30 mg/dL Final     Comment:     Standardized to IDMS reference method     BP 93/53   Pulse 86   Temp 98 9 °F (37 2 °C)   Resp 18   SpO2 94%   No intake/output data recorded  Lab Results   Component Value Date/Time    BUN 29 (H) 03/22/2022 08:49 AM    BUN 21 01/29/2021 11:55 AM    WBC 27 71 (H) 03/22/2022 08:49 AM    HGB 14 1 03/22/2022 08:49 AM    HCT 41 7 03/22/2022 08:49 AM    MCV 93 03/22/2022 08:49 AM     03/22/2022 08:49 AM     Temp Readings from Last 3 Encounters:   03/22/22 98 9 °F (37 2 °C)   03/19/22 (!) 97 2 °F (36 2 °C)   03/14/22 97 6 °F (36 4 °C) (Oral)     Vancomycin Days of Therapy: 1    Assessment/Plan  The patient is currently on vancomycin utilizing pulse dosing based on actual body weight  Baseline risks associated with therapy include: pre-existing renal impairment and concomitant nephrotoxic medications  The patient is currently receiving 15mg/kg q12h and after clinical evaluation will be changed to 20mg/kg load followed by 15mg/kg per dose for random level less than 20  Pharmacy will also follow closely for s/sx of nephrotoxicity, infusion reactions, and appropriateness of therapy  BMP and CBC will be ordered per protocol  Plan for daily random levels  Vancomycin will be re-dosed when level is less than 20  Due to infection severity, will target a trough of 15-20 (appropriate for most indications)   Pharmacy will continue to follow the patients culture results and clinical progress daily      Suzanne Bautista, Pharmacist

## 2022-03-22 NOTE — RESPIRATORY THERAPY NOTE
Called by nurse to do nasal tracheal suctioning  Pt suctioned via L and R nares and moderate tan secretions obtained  Pt tolerated well   SPO2 94%

## 2022-03-22 NOTE — CONSULTS
Consultation - Pulmonary Medicine   [facility] 64 y o  male MRN: [id number]  Unit/Bed#: ED 28 Encounter: [id number]      Assessment/Plan:    1  Acute pulmonary insufficiency likely multifaceted as listed below       -  currently on 2 L-94%, patient does not wear home O2       -  continue saturations greater than 89%       -  pulmonary toileting:  Deep breathing cough, OOB as tolerated, IS Q 1 hr       -  will need ambulatory pulse ox    2  Sepsis likely secondary to postobstructive right-sided PNA       -  WBC- 27 71, procalcitonin/MRSA pending       -  Day # 2- cefepime/vancomycin       -  no indication for bronchoscopy at this time       -  may consider ID consult        3  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       -  patient scheduled for radiation next week       -  no clear plan whether to initiate chemotherapy at this time    4  Suspected COPD of unknown severity without acute exacerbation        -  Inpatient:  Xopenex Atrovent t i d         -  home regimen:  Ventolin 2 puffs q 6h p r n         -   no indication for steroids at this time        -   will have close pulmonary follow-up- recommend discharge on maintenance inhaler        History of Present Illness   Physician Requesting Consult: More Berg MD  Reason for Consult / Principal Problem:  Lung mass  Hx and PE limited by:  Nothing  Chief Complaint: "I am having so much pain"  HPI: [facility] is a 64 y o   male who presented to [address] [place] [address] with complaints of right pain  Patient's past medical history of diabetes, lung cancer and hepatitis-B  Patient was recently hospitalized from 3/14- 3/19 originally presented with headaches and visual dizziness and was noted to have large lung mass with brain metastasis  Patient underwent lung biopsy and bronchoscopy while hospitalized which showed adenocarcinoma  Patient returned to home overall and stable respiratory health    Patient reports the last 24 hours he developed significant right-sided shoulder pain  Upon ED admission patient met sepsis criteria with hypotension, afebrile, in increased respirations  Patient was administered Rocephin, azithromycin, and fluid resuscitation  Pulmonary was consulted for respiratory failure with right upper lobe  Today upon examination patient was having significant left pleuritic pain likely secondary to advancing lung mass  Patient is reporting that the pain is causing him some shortness of breath and dyspnea  Patient does report an approximate 10 lb weight loss  Patient currently denying any fevers, chills, hemoptysis, headaches, night sweats, pleuritic chest pain, palpitations  From a pulmonary standpoint, patient does not have a pulmonologist   Patient reports an approximate 1 pack per day 40 year smoking history  Patient reports he quit approximately 2 months ago  Patient has never been formally diagnosed with COPD or asthma  Patient is currently not maintained on inhaled or oxygen therapies  Patient reports some occupational exposures as he works at [employer]  Patient denies having any pets  Patient does report history of GERD in which he is maintained on Protonix  Patient denies any history dysphagia, ROBBIE, seasonal allergies, or postnasal drip  Patient denies any acute exposures to dust, mold, asbestos, or silica  Consults    Review of Systems   Constitutional: Positive for activity change  Negative for appetite change  HENT: Negative for congestion and nosebleeds  Respiratory: Positive for cough and shortness of breath  Negative for apnea, choking, chest tightness, wheezing and stridor  Cardiovascular: Negative for chest pain, palpitations and leg swelling  Gastrointestinal: Negative for abdominal distention and abdominal pain  Genitourinary: Negative for difficulty urinating and dysuria  Musculoskeletal: Negative for arthralgias and back pain  Skin: Negative for color change and pallor  Neurological: Negative for dizziness and facial asymmetry  Psychiatric/Behavioral: Negative for agitation and behavioral problems  Historical Information   Past Medical History:   Diagnosis Date    Diabetes mellitus ([facility] )     Elevated PSA 3/11/2021    Fatty liver 3/11/2021    Gall bladder polyp 3/11/2021    Lung cancer ([facility] )     Nonimmune to hepatitis B virus 3/11/2021     History reviewed  No pertinent surgical history  Social History   Social History     Substance and Sexual Activity   Alcohol Use Not Currently    Comment: quit drinking 7 years ago     Social History     Substance and Sexual Activity   Drug Use Never     Social History     Tobacco Use   Smoking Status Former Smoker    Packs/day: 0 50    Types: Cigarettes    Quit date: 2022    Years since quittin 1   Smokeless Tobacco Never Used   Tobacco Comment    quit 3 months ago     E-Cigarette/Vaping    E-Cigarette Use Never User      E-Cigarette/Vaping Substances     Occupational History:  Noncontributory    Family History: History reviewed  No pertinent family history  Meds/Allergies   pertinent pulmonary meds have been reviewed    No Known Allergies    Objective   Vitals: Blood pressure 93/53, pulse 86, temperature 98 9 °F (37 2 °C), resp  rate 18, SpO2 94 %  2L,There is no height or weight on file to calculate BMI  Intake/Output Summary (Last 24 hours) at 3/22/2022 1421  Last data filed at 3/22/2022 1058  Gross per 24 hour   Intake 204 67 ml   Output --   Net 2041 67 ml     Invasive Devices  Report    Peripheral Intravenous Line            Peripheral IV 22 Right Antecubital <1 day                Physical Exam  Constitutional:       General: He is not in acute distress  Appearance: Normal appearance  He is normal weight  He is not ill-appearing  HENT:      Head: Normocephalic and atraumatic  Nose: Nose normal  No congestion or rhinorrhea  Mouth/Throat:      Mouth: Mucous membranes are dry  Pharynx: No oropharyngeal exudate or posterior oropharyngeal erythema  Cardiovascular:      Rate and Rhythm: Normal rate and regular rhythm  Pulses: Normal pulses  Heart sounds: Normal heart sounds  No murmur heard  No friction rub  No gallop  Pulmonary:      Effort: No tachypnea, bradypnea, accessory muscle usage or respiratory distress  Breath sounds: Decreased air movement present  No stridor or transmitted upper airway sounds  Decreased breath sounds and rhonchi present  No wheezing or rales  Comments: Scattered upper lobe bronchi  Chest:      Chest wall: No tenderness  Abdominal:      General: Abdomen is flat  Bowel sounds are normal  There is no distension  Palpations: Abdomen is soft  There is no mass  Musculoskeletal:         General: No swelling or tenderness  Normal range of motion  Cervical back: Normal range of motion and neck supple  No rigidity or tenderness  Skin:     General: Skin is warm and dry  Coloration: Skin is not jaundiced or pale  Neurological:      General: No focal deficit present  Mental Status: He is alert and oriented to person, place, and time  Mental status is at baseline     Psychiatric:         Mood and Affect: Mood normal          Behavior: Behavior normal          Lab Results:   I have personally reviewed pertinent lab results CBC:   Lab Results   Component Value Date    WBC 27 71 (H) 03/22/2022    HGB 14 1 03/22/2022    HCT 41 7 03/22/2022    MCV 93 03/22/2022     03/22/2022    MCH 31 3 03/22/2022    MCHC 33 8 03/22/2022    RDW 13 6 03/22/2022    MPV 9 2 03/22/2022   , CMP:   Lab Results   Component Value Date    SODIUM 135 (L) 03/22/2022    K 4 4 03/22/2022    CL 97 (L) 03/22/2022    CO2 30 03/22/2022    BUN 29 (H) 03/22/2022    CREATININE 1 44 (H) 03/22/2022    CALCIUM 8 8 03/22/2022    AST 21 03/22/2022    ALT 60 03/22/2022    ALKPHOS 64 03/22/2022    EGFR 53 03/22/2022   , PT/INR:   Lab Results   Component Value Date INR 0 97 03/22/2022     Imaging Studies: I have personally reviewed pertinent films in PACS     Chest CT-unchanged large right upper lobe lung mass    EKG, Pathology, and Other Studies: I have personally reviewed pertinent films in PACS     EKG- NSR    Pulmonary Results (PFTs, PSG): I have personally reviewed pertinent films in PACS     No PFTs reported    VTE Prophylaxis: Sequential compression device (Venodyne)     Code Status: Level 1 - Full Code    None    Portions of the record may have been created with voice recognition software  Occasional wrong word or "sound a like" substitutions may have occurred due to the inherent limitations of voice recognition software  Read the chart carefully and recognize, using context, where substitutions have occurred

## 2022-03-22 NOTE — SEPSIS NOTE
Sepsis Note   Huseyin Gregory 64 y o  male MRN: [id number]  Unit/Bed#: ED 28 Encounter: [id number]       qSOFA     Row Name 03/22/22 1045 03/22/22 1000 03/22/22 0900 03/22/22 0830 03/22/22 0821    Altered mental status GCS < 15 -- -- -- 0 --    Respiratory Rate > / =22 -- -- -- -- --    Systolic BP < / =247 1 0 1 1 1    Q Sofa Score 1 0 1 1 1    Row Name 03/22/22 0802                Altered mental status GCS < 15 --        Respiratory Rate > / =47 0        Systolic BP < / =070 1        Q Sofa Score 1                   Initial Sepsis Screening     Row Name 03/22/22 1050                Is the patient's history suggestive of a new or worsening infection? Yes (Proceed)  -CK        Suspected source of infection pneumonia  -CK        Are two or more of the following signs & symptoms of infection both present and new to the patient? Yes (Proceed)  -CK        Indicate SIRS criteria Hyperthemia > 38 3C (100 9F); Tachycardia > 90 bpm;Leukocytosis (WBC > [address] IJL)  -CK        If the answer is yes to both questions, suspicion of sepsis is present --        If severe sepsis is present AND tissue hypoperfusion perists in the hour after fluid resuscitation or lactate > 4, the patient meets criteria for SEPTIC SHOCK --        Are any of the following organ dysfunction criteria present within 6 hours of suspected infection and SIRS criteria that are NOT considered to be chronic conditions?  Yes  -CK        Organ dysfunction Lactate > 2 0 mmol/L;SBP < 90 mmHg  -CK        Date of presentation of severe sepsis 03/22/22  -CK        Time of presentation of severe sepsis 1051  -CK        Tissue hypoperfusion persists in the hour after crystalloid fluid administration, evidenced, by either: --        Was hypotension present within one hour of the conclusion of crystalloid fluid administration? --        Date of presentation of septic shock --        Time of presentation of septic shock --              User Key  (r) = Recorded By, (t) = Taken By, (c) = Cosigned By    [address] Name Provider Bhavani Campuzano MD Resident

## 2022-03-22 NOTE — ED PROVIDER NOTES
History  Chief Complaint   Patient presents with    Shoulder Pain     pt c/o R shoulder pain that started at 0100 this morning, hx of lung cancer     Presents to the ED after decreased energy level, increasing right posterior shoulder pain, and hypotension  Patient has a history of lung cancer and has received radiation therapy  Patient was notably toxic appearing on presentation to the emergency department and was able to respond appropriately to questions (alert oriented x3) but was also notably tired, drowsy, and expressed significant discomfort in his right shoulder  The remainder of the history was provided by the spouse at the bedside  Spouse expressed that the patient has been receiving radiation therapy for his cancer diagnosis  Patient had been in his baseline health with no acute complaints prior to going to sleep in the evening  She describes that he awoke early in the morning with complaints of intense right-sided back pain located to his right shoulder  They did not utilize any medications at home for pain control and secondary to his diagnosis of cancer, decided to come the immediately to the emergency department for continued management of his symptoms  They deny any knowledge of fever, nausea, vomiting, abdominal pain, chest pain, difficulty catching his breath, changes in vision, changes in hearing, or loss of consciousness        History provided by:  Patient   used: No    Shoulder Pain  Location:  Shoulder  Shoulder location:  R shoulder  Injury: no    Pain details:     Quality:  Sharp    Radiates to:  Does not radiate    Severity:  Moderate    Onset quality:  Sudden    Timing:  Constant    Progression:  Unchanged  Dislocation: no    Foreign body present:  No foreign bodies  Prior injury to area:  Unable to specify  Relieved by:  Nothing  Worsened by:  Nothing  Ineffective treatments:  None tried  Associated symptoms: back pain and fever    Associated symptoms: no decreased range of motion, no fatigue, no muscle weakness, no neck pain, no numbness, no stiffness, no swelling and no tingling    Risk factors: recent illness        Prior to Admission Medications   Prescriptions Last Dose Informant Patient Reported? Taking? BD Pen Needle Jeaneth 2nd Gen 32G X 4 MM MISC  Child Yes No   Sig: USE ONCE DAILY WITH LANTUS   Blood Glucose Monitoring Suppl (FreeStyle Lite) FIDELIA  Child Yes No   Cholecalciferol (Vitamin D3) 125 MCG (5000 UT) CAPS  Child No No   Sig: Take 1 capsule (5,000 Units total) by mouth daily   FREESTYLE LITE test strip  Child No No   Sig: Check blood sugar  daily   Lancets (freestyle) lancets  Child No No   Sig: Check blood sugar daily   dexamethasone (DECADRON) 2 mg tablet   No No   Sig: Take 4 mg TID x3 days, then 4 mg BID until directed otherwise   levETIRAcetam (KEPPRA) 500 mg tablet   No No   Sig: Take 1 tablet (500 mg total) by mouth every 12 (twelve) hours   metFORMIN (GLUCOPHAGE-XR) 500 mg 24 hr tablet  Child No No   Sig: Take 2 tablets (1,000 mg total) by mouth daily with dinner   pantoprazole (PROTONIX) 40 mg tablet   No No   Sig: Take 1 tablet (40 mg total) by mouth daily in the early morning      Facility-Administered Medications: None       Past Medical History:   Diagnosis Date    Diabetes mellitus ([email or web address] 75 )     Elevated PSA 3/11/2021    Fatty liver 3/11/2021    Gall bladder polyp 3/11/2021    Lung cancer ([email or web address] 75 )     Nonimmune to hepatitis B virus 3/11/2021       History reviewed  No pertinent surgical history  History reviewed  No pertinent family history  I have reviewed and agree with the history as documented      E-Cigarette/Vaping    E-Cigarette Use Never User      E-Cigarette/Vaping Substances     Social History     Tobacco Use    Smoking status: Former Smoker     Packs/day: 0 50     Types: Cigarettes     Quit date: 2022     Years since quittin 1    Smokeless tobacco: Never Used    Tobacco comment: quit 3 months ago   Vaping Use  Vaping Use: Never used   Substance Use Topics    Alcohol use: Not Currently     Comment: quit drinking 7 years ago    Drug use: Never        Review of Systems   Constitutional: Positive for fever  Negative for chills and fatigue  HENT: Negative for ear pain and sore throat  Eyes: Negative for pain and visual disturbance  Respiratory: Negative for cough and shortness of breath  Cardiovascular: Negative for chest pain and palpitations  Gastrointestinal: Negative for abdominal pain and vomiting  Genitourinary: Negative for dysuria and hematuria  Musculoskeletal: Positive for back pain  Negative for arthralgias, neck pain and stiffness  Skin: Negative for color change and rash  Neurological: Negative for seizures and syncope  All other systems reviewed and are negative  Physical Exam  ED Triage Vitals [03/22/22 0802]   Temperature Pulse Respirations Blood Pressure SpO2   99 5 °F (37 5 °C) 74 20 (!) 59/40 94 %      Temp Source Heart Rate Source Patient Position - Orthostatic VS BP Location FiO2 (%)   Oral Monitor Sitting Left arm --      Pain Score       --             Orthostatic Vital Signs  Vitals:    03/22/22 1615 03/22/22 1630 03/22/22 1645 03/22/22 1700   BP: 167/92 113/66 95/54 (!) 87/52   Pulse: (!) 124 (!) 114 (!) 108 104   Patient Position - Orthostatic VS: Lying Lying Lying Lying       Physical Exam  Vitals and nursing note reviewed  Constitutional:       General: He is in acute distress  Appearance: He is well-developed  He is ill-appearing  HENT:      Head: Normocephalic and atraumatic  Right Ear: External ear normal       Left Ear: External ear normal       Nose: Nose normal  No congestion or rhinorrhea  Mouth/Throat:      Mouth: Mucous membranes are moist    Eyes:      General:         Right eye: No discharge  Left eye: No discharge        Conjunctiva/sclera: Conjunctivae normal    Cardiovascular:      Rate and Rhythm: Normal rate and regular rhythm  Chest Wall: No thrill  Pulses:           Carotid pulses are 2+ on the right side and 2+ on the left side  Radial pulses are 2+ on the right side and 2+ on the left side  Femoral pulses are 2+ on the right side and 2+ on the left side  Popliteal pulses are 2+ on the right side and 2+ on the left side  Dorsalis pedis pulses are 2+ on the right side and 2+ on the left side  Posterior tibial pulses are 2+ on the right side and 2+ on the left side  Heart sounds: Normal heart sounds, S1 normal and S2 normal  Heart sounds not distant  No murmur heard  No S3 or S4 sounds  Pulmonary:      Effort: Pulmonary effort is normal  No tachypnea or respiratory distress  Breath sounds: Normal breath sounds and air entry  No decreased breath sounds, wheezing, rhonchi or rales  Abdominal:      Palpations: Abdomen is soft  Tenderness: There is no abdominal tenderness  Musculoskeletal:         General: No deformity or signs of injury  Cervical back: Neck supple  Right lower leg: No edema  Left lower leg: No edema  Skin:     General: Skin is warm and dry  Capillary Refill: Capillary refill takes less than 2 seconds  Findings: No erythema, lesion or rash  Neurological:      General: No focal deficit present  Mental Status: He is alert and oriented to person, place, and time     Psychiatric:         Mood and Affect: Mood normal          ED Medications  Medications   dexamethasone (DECADRON) tablet 4 mg (has no administration in time range)   levETIRAcetam (KEPPRA) tablet 500 mg (500 mg Oral Given 3/22/22 1410)   pantoprazole (PROTONIX) EC tablet 40 mg (has no administration in time range)   cholecalciferol (VITAMIN D3) tablet 1,000 Units (1,000 Units Oral Not Given 3/22/22 1507)   acetaminophen (TYLENOL) tablet 650 mg (has no administration in time range)   polyethylene glycol (MIRALAX) packet 17 g (has no administration in time range) ondansetron (ZOFRAN) injection 4 mg (has no administration in time range)   calcium carbonate (TUMS) chewable tablet 1,000 mg (has no administration in time range)   heparin (porcine) subcutaneous injection 5,000 Units (5,000 Units Subcutaneous Given 3/22/22 1410)   lidocaine (LIDODERM) 5 % patch 1 patch (1 patch Topical Medication Applied 3/22/22 1411)   oxyCODONE (ROXICODONE) IR tablet 5 mg (5 mg Oral Given 3/22/22 1410)   HYDROmorphone (DILAUDID) injection 0 2 mg (0 2 mg Intravenous Given 3/22/22 1410)   oxyCODONE (ROXICODONE) IR tablet 2 5 mg (has no administration in time range)   naloxone (NARCAN) 0 04 mg/mL syringe 0 04 mg (has no administration in time range)   insulin glargine (LANTUS) subcutaneous injection 20 Units 0 2 mL (has no administration in time range)   insulin lispro (HumaLOG) 100 units/mL subcutaneous injection 1-5 Units (1 Units Subcutaneous Not Given 3/22/22 1712)   insulin lispro (HumaLOG) 100 units/mL subcutaneous injection 1-5 Units (has no administration in time range)   lactated ringers infusion (0 mL/hr Intravenous Stopped 3/22/22 1540)   cefepime (MAXIPIME) 2 g/50 mL dextrose IVPB (0 mg Intravenous Stopped 3/22/22 1542)   vancomycin (VANCOCIN) 1,250 mg in sodium chloride 0 9 % 250 mL IVPB (1,250 mg Intravenous New Bag 3/22/22 1641)   vancomycin (VANCOCIN) IVPB (premix in dextrose) 1,000 mg 200 mL (has no administration in time range)   sodium chloride 0 9 % bolus 500 mL (500 mL Intravenous New Bag 3/22/22 1710)   sodium chloride 0 9 % bolus 1,000 mL (0 mL Intravenous Stopped 3/22/22 0908)   acetaminophen (TYLENOL) tablet 650 mg (650 mg Oral Given 3/22/22 0908)   fentanyl citrate (PF) 100 MCG/2ML 50 mcg (50 mcg Intravenous Given 3/22/22 1005)   lactated ringers bolus 1,000 mL (0 mL Intravenous Stopped 3/22/22 1019)   iohexol (OMNIPAQUE) 350 MG/ML injection (SINGLE-DOSE) 85 mL (85 mL Intravenous Given 3/22/22 0951)   cefTRIAXone (ROCEPHIN) 2,000 mg in dextrose 5 % 50 mL IVPB (0 mg Intravenous Stopped 3/22/22 1058)   lactated ringers bolus 1,000 mL (0 mL Intravenous Stopped 3/22/22 1220)   lactated ringers bolus 1,000 mL (0 mL Intravenous Stopped 3/22/22 1655)       Diagnostic Studies  Results Reviewed     Procedure Component Value Units Date/Time    Fingerstick Glucose (POCT) [[id number]]  (Normal) Collected: 03/22/22 1654    Lab Status: Final result Updated: 03/22/22 1655     POC Glucose 136 mg/dl     Lactic Acid with Reflex STAT [[id number]]  (Abnormal) Collected: 03/22/22 1520    Lab Status: Final result Specimen: Blood from Arm, Right Updated: 03/22/22 1610     LACTIC ACID 2 4 mmol/L     Narrative:      Result may be elevated if tourniquet was used during collection  Lactic acid 2 Hours [[id number]]     Lab Status: No result Specimen: Blood     Procalcitonin [[id number]]  (Abnormal) Collected: 03/22/22 1520    Lab Status: Final result Specimen: Blood from Arm, Right Updated: 03/22/22 1603     Procalcitonin 29 27 ng/ml     Urinalysis with microscopic [[id number]]  (Abnormal) Collected: 03/22/22 1519    Lab Status: Final result Specimen: Urine, Clean Catch Updated: 03/22/22 1545     Clarity, UA Clear     Color, UA Yellow     Specific Gravity, UA 1 020     pH, UA 5 5     Glucose, UA Negative mg/dl      Ketones, UA Negative mg/dl      Blood, UA Negative     Protein, UA Negative mg/dl      Nitrite, UA Negative     Bilirubin, UA Negative     Urobilinogen, UA 0 2 E U /dl      Leukocytes, UA Negative     WBC, UA None Seen /hpf      RBC, UA 0-1 /hpf      Bacteria, UA Occasional /hpf      Epithelial Cells None Seen /hpf     MRSA culture [[id number]] Collected: 03/22/22 1520    Lab Status: In process Specimen: Nares from Nose Updated: 03/22/22 1533    Sputum culture and Gram stain [[id number]] Collected: 03/22/22 1520    Lab Status:  In process Specimen: Expectorated Sputum Updated: 03/22/22 1533    Blood culture #1 [[id number]] Collected: 03/22/22 0830    Lab Status: Preliminary result Specimen: Blood from Arm, Right Updated: 03/22/22 1401     Blood Culture Received in Microbiology Lab  Culture in Progress  Blood culture #2 [[id number]] Collected: 03/22/22 0850    Lab Status: Preliminary result Specimen: Blood from Arm, Right Updated: 03/22/22 1401     Blood Culture Received in Microbiology Lab  Culture in Progress  Hemoglobin A1c w/EAG Estimation (Orders if not completed within the last 90 days) [[id number]]     Lab Status: No result Specimen: Blood     Platelet count [[id number]]     Lab Status: No result Specimen: Blood     Lactic acid 2 Hours [[id number]]  (Abnormal) Collected: 03/22/22 1220    Lab Status: Final result Specimen: Blood from Arm, Right Updated: 03/22/22 1326     LACTIC ACID 3 5 mmol/L     Narrative:      Result may be elevated if tourniquet was used during collection  HS Troponin I 2hr [[id number]]  (Normal) Collected: 03/22/22 1244    Lab Status: Final result Specimen: Blood from Arm, Right Updated: 03/22/22 1319     hs TnI 2hr 9 ng/L      Delta 2hr hsTnI 4 ng/L     HS Troponin I 4hr [[id number]]  (Normal) Collected: 03/22/22 1057    Lab Status: Final result Specimen: Blood from Arm, Right Updated: 03/22/22 1130     hs TnI 4hr 7 ng/L      Delta 4hr hsTnI 2 ng/L     Lactic acid [[id number]]  (Abnormal) Collected: 03/22/22 0849    Lab Status: Final result Specimen: Blood from Arm, Right Updated: 03/22/22 1013     LACTIC ACID 3 0 mmol/L     Narrative:      Result may be elevated if tourniquet was used during collection  COVID/FLU/RSV - 2 hour TAT [[id number]]  (Normal) Collected: 03/22/22 0849    Lab Status: Final result Specimen: Nares from Nose Updated: 03/22/22 0949     SARS-CoV-2 Negative     INFLUENZA A PCR Negative     INFLUENZA B PCR Negative     RSV PCR Negative    Narrative:      FOR PEDIATRIC PATIENTS - copy/paste COVID Guidelines URL to browser: [email or web address]    SARS-CoV-2 assay is a Nucleic Acid Amplification assay intended for the  qualitative detection of nucleic acid from SARS-CoV-2 in nasopharyngeal  swabs  Results are for the presumptive identification of SARS-CoV-2 RNA  Positive results are indicative of infection with SARS-CoV-2, the virus  causing COVID-19, but do not rule out bacterial infection or co-infection  with other viruses  Laboratories within the [place] and its  territories are required to report all positive results to the appropriate  public health authorities  Negative results do not preclude SARS-CoV-2  infection and should not be used as the sole basis for treatment or other  patient management decisions  Negative results must be combined with  clinical observations, patient history, and epidemiological information  This test has not been FDA cleared or approved  This test has been authorized by FDA under an Emergency Use Authorization  (EUA)  This test is only authorized for the duration of time the  declaration that circumstances exist justifying the authorization of the  emergency use of an in vitro diagnostic tests for detection of SARS-CoV-2  virus and/or diagnosis of COVID-19 infection under section 564(b)(1) of  the Act, 21 U  S C  855MBE-3(G)(2), unless the authorization is terminated  or revoked sooner  The test has been validated but independent review by FDA  and CLIA is pending  Test performed using [employer] GeneXpert: This RT-PCR assay targets N2,  a region unique to SARS-CoV-2  A conserved region in the E-gene was chosen  for pan-Sarbecovirus detection which includes SARS-CoV-2      CBC and differential [[id number]]  (Abnormal) Collected: 03/22/22 0849    Lab Status: Final result Specimen: Blood from Arm, Right Updated: 03/22/22 0943     WBC 27 71 Thousand/uL      RBC 4 51 Million/uL      Hemoglobin 14 1 g/dL      Hematocrit 41 7 %      MCV 93 fL      MCH 31 3 pg      MCHC 33 8 g/dL      RDW 13 6 %      MPV 9 2 fL      Platelets 725 Thousands/uL     Manual Differential(PHLEBS Do Not Order) [[id number]]  (Abnormal) Collected: 03/22/22 0849    Lab Status: Final result Specimen: Blood from Arm, Right Updated: 03/22/22 0943     Segmented % 75 %      Bands % 6 %      Lymphocytes % 11 %      Monocytes % 8 %      Eosinophils, % 0 %      Basophils % 0 %      Absolute Neutrophils 22 45 Thousand/uL      Lymphocytes Absolute 3 05 Thousand/uL      Monocytes Absolute 2 22 Thousand/uL      Eosinophils Absolute 0 00 Thousand/uL      Basophils Absolute 0 00 Thousand/uL      Total Counted --     RBC Morphology Normal     Platelet Estimate Adequate    HS Troponin 0hr (reflex protocol) [[id number]]  (Normal) Collected: 03/22/22 0849    Lab Status: Final result Specimen: Blood from Arm, Right Updated: 03/22/22 0923     hs TnI 0hr 5 ng/L     Comprehensive metabolic panel [[id number]]  (Abnormal) Collected: 03/22/22 0849    Lab Status: Final result Specimen: Blood from Arm, Right Updated: 03/22/22 0916     Sodium 135 mmol/L      Potassium 4 4 mmol/L      Chloride 97 mmol/L      CO2 30 mmol/L      ANION GAP 8 mmol/L      BUN 29 mg/dL      Creatinine 1 44 mg/dL      Glucose 160 mg/dL      Calcium 8 8 mg/dL      Corrected Calcium 9 7 mg/dL      AST 21 U/L      ALT 60 U/L      Alkaline Phosphatase 64 U/L      Total Protein 6 4 g/dL      Albumin 2 9 g/dL      Total Bilirubin 1 29 mg/dL      eGFR 53 ml/min/1 73sq m     Narrative:      [place]oya guidelines for Chronic Kidney Disease (CKD):     Stage 1 with normal or high GFR (GFR > 90 mL/min/1 73 square meters)    Stage 2 Mild CKD (GFR = 60-89 mL/min/1 73 square meters)    Stage 3A Moderate CKD (GFR = 45-59 mL/min/1 73 square meters)    Stage 3B Moderate CKD (GFR = 30-44 mL/min/1 73 square meters)    Stage 4 Severe CKD (GFR = 15-29 mL/min/1 73 square meters)    Stage 5 End Stage CKD (GFR <15 mL/min/1 73 square meters)  Note: GFR calculation is accurate only with a steady state creatinine    Phosphorus [[id number]]  (Normal) Collected: 03/22/22 0849    Lab Status: Final result Specimen: Blood from Arm, Right Updated: 03/22/22 0916     Phosphorus 3 6 mg/dL     Magnesium [[id number]]  (Normal) Collected: 03/22/22 0849    Lab Status: Final result Specimen: Blood from Arm, Right Updated: 03/22/22 0916     Magnesium 1 8 mg/dL     Protime-INR [[id number]]  (Normal) Collected: 03/22/22 0849    Lab Status: Final result Specimen: Blood from Arm, Right Updated: 03/22/22 0910     Protime 12 9 seconds      INR 0 97    APTT [[id number]]  (Normal) Collected: 03/22/22 0849    Lab Status: Final result Specimen: Blood from Arm, Right Updated: 03/22/22 0910     PTT 26 seconds     Fingerstick Glucose (POCT) [[id number]]  (Abnormal) Collected: 03/22/22 0815    Lab Status: Final result Updated: 03/22/22 0819     POC Glucose 164 mg/dl                  CTA ED chest PE study   ED Interpretation by Mimi Mantilla MD (03/22 1049)   FINDINGS:     PULMONARY ARTERIAL TREE:  No pulmonary embolus is seen       LUNGS:  Large right upper lobe mass unchanged  New extensive surrounding groundglass density in the right upper lobe lobe, with more consolidative density at the apex  New small patchy groundglass glass densities in the left upper and right lower   lobes  Unchanged occlusion of the right upper lobe bronchus by the mass  No other tracheal or endobronchial lesion      PLEURA:  Unremarkable      HEART/GREAT VESSELS:  Unremarkable for patient's age   No thoracic aortic aneurysm      MEDIASTINUM AND NAS:  Unchanged mediastinal and right hilar adenopathy      CHEST WALL AND LOWER NECK: Unchanged 1 5 cm right thyroid nodule      VISUALIZED STRUCTURES IN THE UPPER ABDOMEN:  Unremarkable      OSSEOUS STRUCTURES:  No acute fracture or destructive osseous lesion      IMPRESSION:     No pulmonary embolus        Unchanged large right upper lobe mass most compatible with carcinoma, with new extensive right upper lobe kerri   undglass density which could represent postobstructive pneumonia, edema, hemorrhage or lymphangitic carcinomatosis  New groundglass infiltrates   in the left upper and right lower lobes, presumably infectious/inflammatory      Unchanged mediastinal and right hilar adenopathy            Workstation performed: [id number]      Final Result by Gulshan Barnett MD (03/22 1032)      No pulmonary embolus  Unchanged large right upper lobe mass most compatible with carcinoma, with new extensive right upper lobe groundglass density which could represent postobstructive pneumonia, edema, hemorrhage or lymphangitic carcinomatosis  New groundglass infiltrates    in the left upper and right lower lobes, presumably infectious/inflammatory  Unchanged mediastinal and right hilar adenopathy  Workstation performed: [id number]         CT head without contrast   ED Interpretation by Korina Mead MD (03/22 1027)   FINDINGS:     PARENCHYMA:  Unchanged 2 8 cm hyperdense mass in the right occipital lobe with extensive surrounding edema, and 1 0 cm hyperdense mass in the left inferior frontal lobe with surrounding edema  No CT signs of acute infarction  No acute parenchymal   hemorrhage      VENTRICLES AND EXTRA-AXIAL SPACES:  Normal for the patient's age      VISUALIZED ORBITS AND PARANASAL SINUSES:  No acute abnormality involving the orbits  Moderate sized polyp or mucous retention cyst in the left maxillary sinus  No fluid levels are seen      CALVARIUM AND EXTRACRANIAL SOFT TISSUES:  Normal      IMPRESSION:     No acute findings  Unchanged metastatic lesions with surrounding edema in the right occipital and left frontal lobes                  Workstation performed: [id number]      Final Result by Gulshan Barnett MD (03/22 1015)      No acute findings  Unchanged metastatic lesions with surrounding edema in the right occipital and left frontal lobes                    Workstation performed: [id number]         XR chest 1 view portable ED Interpretation by Sania Venegas MD (03/22 1111)   Increased opacity in the right upper lung  Increased mass vs post-obstructive pneumonia vs both  Final Result by Lloyd Francis MD (03/22 1125)      Increased right upper lobe airspace opacity peripheral to the right upper lobe mass  See subsequent CT for further details  Workstation performed: [id number]         XR shoulder 2+ vw right    (Results Pending)         Procedures  ECG 12 Lead Documentation Only    Date/Time: 3/22/2022 8:54 AM  Performed by: Sania Venegas MD  Authorized by: Sania Venegas MD     Patient location:  ED  Previous ECG:     Previous ECG:  Compared to current  Rate:     ECG rate:  88    ECG rate assessment: normal    Rhythm:     Rhythm: sinus rhythm    Ectopy:     Ectopy: none    QRS:     QRS axis:  Normal    QRS intervals:  Normal  Conduction:     Conduction: abnormal      Abnormal conduction: incomplete RBBB    ST segments:     ST segments:  Normal  T waves:     T waves: normal            ED Course                          Initial Sepsis Screening     Row Name 03/22/22 1050                Is the patient's history suggestive of a new or worsening infection? Yes (Proceed)  -CK        Suspected source of infection pneumonia  -CK        Are two or more of the following signs & symptoms of infection both present and new to the patient? Yes (Proceed)  -CK        Indicate SIRS criteria Hyperthemia > 38 3C (100 9F); Tachycardia > 90 bpm;Leukocytosis (WBC > 10160 IJL)  -CK        If the answer is yes to both questions, suspicion of sepsis is present --        If severe sepsis is present AND tissue hypoperfusion perists in the hour after fluid resuscitation or lactate > 4, the patient meets criteria for SEPTIC SHOCK --        Are any of the following organ dysfunction criteria present within 6 hours of suspected infection and SIRS criteria that are NOT considered to be chronic conditions? Yes  -CK        Organ dysfunction Lactate > 2 0 mmol/L;SBP < 90 mmHg  -CK        Date of presentation of severe sepsis 03/22/22  -CK        Time of presentation of severe sepsis 1051  -CK        Tissue hypoperfusion persists in the hour after crystalloid fluid administration, evidenced, by either: --        Was hypotension present within one hour of the conclusion of crystalloid fluid administration? --        Date of presentation of septic shock --        Time of presentation of septic shock --              User Key  (r) = Recorded By, (t) = Taken By, (c) = Cosigned By    [address] Name Provider Type    CK Amado Wheeler MD Resident              Default Flowsheet Data (last 720 hours)     Sepsis Reassess     Row Name 03/22/22 1447                   Repeat Volume Status and Tissue Perfusion Assessment Performed    Repeat Volume Status and Tissue Perfusion Assessment Performed --                  Volume Status and Tissue Perfusion Post Fluid Resuscitation * Must Document All *    Vital Signs Reviewed (HR, RR, BP, T) Yes  -MZ        Shock Index Reviewed Yes  -MZ        Arterial Oxygen Saturation Reviewed (POx, SaO2 or SpO2) --        Cardio Regular rate and rhythm; Tachycardia  -MZ        Pulmonary Normal effort; Other (comment)  diminshed  -MZ        Capillary Refill Brisk  -MZ        Peripheral Pulses Radial;Dorsalis Pedis  -MZ        Peripheral Pulse +2  -MZ        Dorsalis Pedis +2  -MZ        Skin Warm;Dry;Diaphoretic  -MZ        Urine output assessed Other (comment)  mildly hematuria  -MZ                  *OR*   Intensive Monitoring- Must Document One of the Following Four *:    Vital Signs Reviewed --        * Central Venous Pressure (CVP or RAP) --        * Central Venous Oxygen (SVO2, ScvO2 or Oxygen saturation via central catheter) --        * Bedside Cardiovascular US in IVC diameter and % collapse --        * Passive Leg Raise OR Crystalloid Challenge --              User Key  (r) = Recorded By, (t) = Taken By, (c) = Cosigned By    [address] Name Provider Type    [address]  JONATHAN Lyons Nurse Practitioner                  Corina Keenan' Criteria for PE      Most Recent Value   Wells' Criteria for PE    Clinical signs and symptoms of DVT 3 Filed at: 03/22/2022 8432   PE is primary diagnosis or equally likely 3 Filed at: 03/22/2022 0846   HR >100 0 Filed at: 03/22/2022 0846   Immobilization at least 3 days or Surgery in the previous 4 weeks 1 5 Filed at: 03/22/2022 0846   Previous, objectively diagnosed PE or DVT 0 Filed at: 03/22/2022 0846   Hemoptysis 0 Filed at: 03/22/2022 9835   Malignancy with treatment within 6 months or palliative 1 Filed at: 03/22/2022 6761   Wells' Criteria Total 8 5 Filed at: 03/22/2022 8201            MDM  Number of Diagnoses or Management Options  Pneumonia of right upper lobe due to infectious organism: new and requires workup  Sepsis [facility]): new and requires workup  Diagnosis management comments: Ahmet Sims comes to the emergency department with family after experiencing decreased levels of activity and increasing right shoulder pain  Based on his initial complaints, patient was immediately started on fluid boluses and initial laboratory studies were drawn  CBC was notable for an elevated white count to 27  CMP was notable for elevation in BUN and creatinine  Troponin was unremarkable  Initial lactate was recorded at 3  Repeat lactic was at 3 5  Blood cultures were drawn and sent  Urinalysis was unremarkable  Patient was noted to have an initial blood pressure in the 03C systolically  After administration of fluid boluses patient's blood pressure was noted to be in the  systolically  Patient's blood pressure was noted to be responsive to fluids  Chest x-ray notable for increased size of right sided mass  Secondary to further characterization of mass, CT PE ED protocol was ordered for continued evaluation of the intrathoracic pathology   CT demonstrated that the mass has not changed in size but rather that there was a postobstructive pneumonia in the area as specified in the chest x-ray  Secondary to the presence of the postobstructive pneumonia, patient was started on initial medications of ceftriaxone and azithromycin  Secondary to the presence of pneumonia, presence of fever, and hypotension that is responsive to fluids, patient was started on antibiotics and the case was discussed with the inpatient team for admission  Was decided at the time of conversation that the patient would be brought into the hospital for inpatient evaluation, continued antibiotic therapy, a continued management for postobstructive pneumonia  This plan was discussed with the patient and family at the bedside who expressed understanding  Disposition:  Inpatient admission for continued evaluation of pneumonia/sepsis and continued antibiotic therapy         Amount and/or Complexity of Data Reviewed  Clinical lab tests: ordered and reviewed  Tests in the radiology section of CPT®: ordered and reviewed  Obtain history from someone other than the patient: yes  Review and summarize past medical records: yes  Discuss the patient with other providers: yes  Independent visualization of images, tracings, or specimens: yes    Risk of Complications, Morbidity, and/or Mortality  Presenting problems: high  Diagnostic procedures: high  Management options: high    Patient Progress  Patient progress: stable      Disposition  Final diagnoses:   Pneumonia of right upper lobe due to infectious organism   Sepsis ([facility] [address] )     Time reflects when diagnosis was documented in both MDM as applicable and the Disposition within this note     Time User Action Codes Description Comment    3/22/2022  1:15 PM Lacho Wasserman Add [J18 9] Pneumonia of right upper lobe due to infectious organism     3/22/2022  1:15 PM Lacho Wasserman Add [A41 9] Sepsis [facility])       ED Disposition     ED Disposition Condition Date/Time Comment    Admit Stable Tue Mar 22, 2022  1:14 PM Case was discussed with Karson Ga and the patient's admission status was agreed to be Admission Status: inpatient status to the service of Dr Jay Clay  Follow-up Information    None         Patient's Medications   Discharge Prescriptions    No medications on file     No discharge procedures on file  PDMP Review       Value Time User    PDMP Reviewed  Yes 3/14/2022  4:44 AM Juventino Luis MD           ED Provider  Attending physically available and evaluated [facility]  I managed the patient along with the ED Attending      Electronically Signed by         Johana Edmonds MD  03/22/22 4961

## 2022-03-22 NOTE — ASSESSMENT & PLAN NOTE
· As noted on CT scan from January 2022; see present on repeat  · Consistent with adenocarcinoma from tissue exam on 3/16/2022  · Status post bronchoscopy with biopsy on 3/16/2022  · Pulmonology consulted; input appreciated

## 2022-03-22 NOTE — ASSESSMENT & PLAN NOTE
Lab Results   Component Value Date    HGBA1C 5 7 (H) 11/07/2021     Recent Labs     03/22/22  0815   POCGLU 164*     · With very good control as evidence by A1c as above on metformin as an OP  · Given acute infection and on decadron will order regimen as below:   · Glargine 20U HS  · Weight-based SSI TID w/meals AC  · Hypoglycemia protocol   · Carb controlled diet   · Monitor with POCT BG and titrate regimen as indicated

## 2022-03-22 NOTE — ASSESSMENT & PLAN NOTE
· Present shortly after admission as evidence by requirement of 2LNC  · Requiring IVF for sepsis protocol and hypotension  · In the setting of pneumonia   · See plan under sepsis

## 2022-03-23 ENCOUNTER — APPOINTMENT (INPATIENT)
Dept: NON INVASIVE DIAGNOSTICS | Facility: HOSPITAL | Age: 57
DRG: 871 | End: 2022-03-23
Payer: COMMERCIAL

## 2022-03-23 ENCOUNTER — TELEPHONE (OUTPATIENT)
Dept: HEMATOLOGY ONCOLOGY | Facility: CLINIC | Age: 57
End: 2022-03-23

## 2022-03-23 PROBLEM — J96.01 ACUTE RESPIRATORY FAILURE WITH HYPOXIA (HCC): Status: ACTIVE | Noted: 2022-01-01

## 2022-03-23 PROBLEM — R52 ACUTE PAIN: Status: ACTIVE | Noted: 2022-01-01

## 2022-03-23 PROBLEM — R65.21 SEPTIC SHOCK (HCC): Status: ACTIVE | Noted: 2022-01-01

## 2022-03-23 LAB
ALBUMIN SERPL BCP-MCNC: 1.8 G/DL (ref 3.5–5)
ALP SERPL-CCNC: 49 U/L (ref 46–116)
ALT SERPL W P-5'-P-CCNC: 33 U/L (ref 12–78)
ANION GAP SERPL CALCULATED.3IONS-SCNC: 5 MMOL/L (ref 4–13)
ANION GAP SERPL CALCULATED.3IONS-SCNC: 6 MMOL/L (ref 4–13)
ANISOCYTOSIS BLD QL SMEAR: PRESENT
AORTIC ROOT: 3.2 CM
APICAL FOUR CHAMBER EJECTION FRACTION: 64 %
ASCENDING AORTA: 3.2 CM (ref 1.9–2.85)
AST SERPL W P-5'-P-CCNC: 16 U/L (ref 5–45)
BASOPHILS # BLD MANUAL: 0 THOUSAND/UL (ref 0–0.1)
BASOPHILS # BLD MANUAL: 0 THOUSAND/UL (ref 0–0.1)
BASOPHILS NFR MAR MANUAL: 0 % (ref 0–1)
BASOPHILS NFR MAR MANUAL: 0 % (ref 0–1)
BILIRUB SERPL-MCNC: 0.67 MG/DL (ref 0.2–1)
BUN SERPL-MCNC: 22 MG/DL (ref 5–25)
BUN SERPL-MCNC: 25 MG/DL (ref 5–25)
CA-I BLD-SCNC: 1.07 MMOL/L (ref 1.12–1.32)
CALCIUM ALBUM COR SERPL-MCNC: 9.8 MG/DL (ref 8.3–10.1)
CALCIUM SERPL-MCNC: 7.5 MG/DL (ref 8.3–10.1)
CALCIUM SERPL-MCNC: 8 MG/DL (ref 8.3–10.1)
CHLORIDE SERPL-SCNC: 101 MMOL/L (ref 100–108)
CHLORIDE SERPL-SCNC: 101 MMOL/L (ref 100–108)
CO2 SERPL-SCNC: 28 MMOL/L (ref 21–32)
CO2 SERPL-SCNC: 29 MMOL/L (ref 21–32)
CREAT SERPL-MCNC: 1.04 MG/DL (ref 0.6–1.3)
CREAT SERPL-MCNC: 1.18 MG/DL (ref 0.6–1.3)
E WAVE DECELERATION TIME: 163 MS
EOSINOPHIL # BLD MANUAL: 0 THOUSAND/UL (ref 0–0.4)
EOSINOPHIL # BLD MANUAL: 0 THOUSAND/UL (ref 0–0.4)
EOSINOPHIL NFR BLD MANUAL: 0 % (ref 0–6)
EOSINOPHIL NFR BLD MANUAL: 0 % (ref 0–6)
ERYTHROCYTE [DISTWIDTH] IN BLOOD BY AUTOMATED COUNT: 13.8 % (ref 11.6–15.1)
ERYTHROCYTE [DISTWIDTH] IN BLOOD BY AUTOMATED COUNT: 13.9 % (ref 11.6–15.1)
FRACTIONAL SHORTENING: 37 % (ref 28–44)
GFR SERPL CREATININE-BSD FRML MDRD: 68 ML/MIN/1.73SQ M
GFR SERPL CREATININE-BSD FRML MDRD: 79 ML/MIN/1.73SQ M
GLUCOSE SERPL-MCNC: 112 MG/DL (ref 65–140)
GLUCOSE SERPL-MCNC: 118 MG/DL (ref 65–140)
GLUCOSE SERPL-MCNC: 65 MG/DL (ref 65–140)
GLUCOSE SERPL-MCNC: 69 MG/DL (ref 65–140)
GLUCOSE SERPL-MCNC: 73 MG/DL (ref 65–140)
GLUCOSE SERPL-MCNC: 81 MG/DL (ref 65–140)
HCT VFR BLD AUTO: 37.9 % (ref 36.5–49.3)
HCT VFR BLD AUTO: 40.5 % (ref 36.5–49.3)
HGB BLD-MCNC: 13.5 G/DL (ref 12–17)
HGB BLD-MCNC: 13.8 G/DL (ref 12–17)
INR PPP: 1.53 (ref 0.84–1.19)
INTERVENTRICULAR SEPTUM IN DIASTOLE (PARASTERNAL SHORT AXIS VIEW): 1.1 CM
INTERVENTRICULAR SEPTUM: 1.1 CM (ref 0.51–0.95)
LAAS-AP2: 12.8 CM2
LAAS-AP4: 13.2 CM2
LACTATE SERPL-SCNC: 1.6 MMOL/L (ref 0.5–2)
LACTATE SERPL-SCNC: 2.3 MMOL/L (ref 0.5–2)
LEFT ATRIUM AREA SYSTOLE SINGLE PLANE A4C: 13.1 CM2
LEFT ATRIUM SIZE: 3.1 CM
LEFT INTERNAL DIMENSION IN SYSTOLE: 3.3 CM (ref 2.45–3.7)
LEFT VENTRICULAR INTERNAL DIMENSION IN DIASTOLE: 5.2 CM (ref 3.99–5.94)
LEFT VENTRICULAR POSTERIOR WALL IN END DIASTOLE: 1 CM (ref 0.49–0.94)
LEFT VENTRICULAR STROKE VOLUME: 82 ML
LVSV (TEICH): 82 ML
LYMPHOCYTES # BLD AUTO: 0.9 THOUSAND/UL (ref 0.6–4.47)
LYMPHOCYTES # BLD AUTO: 1.62 THOUSAND/UL (ref 0.6–4.47)
LYMPHOCYTES # BLD AUTO: 4 % (ref 14–44)
LYMPHOCYTES # BLD AUTO: 9 % (ref 14–44)
MACROCYTES BLD QL AUTO: PRESENT
MAGNESIUM SERPL-MCNC: 1.6 MG/DL (ref 1.6–2.6)
MAGNESIUM SERPL-MCNC: 2 MG/DL (ref 1.6–2.6)
MCH RBC QN AUTO: 31 PG (ref 26.8–34.3)
MCH RBC QN AUTO: 31.1 PG (ref 26.8–34.3)
MCHC RBC AUTO-ENTMCNC: 34.1 G/DL (ref 31.4–37.4)
MCHC RBC AUTO-ENTMCNC: 35.6 G/DL (ref 31.4–37.4)
MCV RBC AUTO: 87 FL (ref 82–98)
MCV RBC AUTO: 91 FL (ref 82–98)
METAMYELOCYTES NFR BLD MANUAL: 12 % (ref 0–1)
METAMYELOCYTES NFR BLD MANUAL: 2 % (ref 0–1)
MONOCYTES # BLD AUTO: 0.36 THOUSAND/UL (ref 0–1.22)
MONOCYTES # BLD AUTO: 1.57 THOUSAND/UL (ref 0–1.22)
MONOCYTES NFR BLD: 2 % (ref 4–12)
MONOCYTES NFR BLD: 7 % (ref 4–12)
MV E'TISSUE VEL-SEP: 8 CM/S
MV PEAK A VEL: 0.83 M/S
MV PEAK E VEL: 61 CM/S
MV STENOSIS PRESSURE HALF TIME: 47 MS
MV VALVE AREA P 1/2 METHOD: 4.68 CM2
MYELOCYTES NFR BLD MANUAL: 4 % (ref 0–1)
NEUTROPHILS # BLD MANUAL: 12.21 THOUSAND/UL (ref 1.85–7.62)
NEUTROPHILS # BLD MANUAL: 15.49 THOUSAND/UL (ref 1.85–7.62)
NEUTS BAND NFR BLD MANUAL: 48 % (ref 0–8)
NEUTS BAND NFR BLD MANUAL: 58 % (ref 0–8)
NEUTS SEG NFR BLD AUTO: 11 % (ref 43–75)
NEUTS SEG NFR BLD AUTO: 20 % (ref 43–75)
NRBC BLD AUTO-RTO: 1 /100 WBC (ref 0–2)
PHOSPHATE SERPL-MCNC: 3.5 MG/DL (ref 2.7–4.5)
PHOSPHATE SERPL-MCNC: 3.6 MG/DL (ref 2.7–4.5)
PLATELET # BLD AUTO: 117 THOUSANDS/UL (ref 149–390)
PLATELET # BLD AUTO: 141 THOUSANDS/UL (ref 149–390)
PLATELET BLD QL SMEAR: ABNORMAL
PLATELET BLD QL SMEAR: ABNORMAL
PMV BLD AUTO: 10.3 FL (ref 8.9–12.7)
PMV BLD AUTO: 9.6 FL (ref 8.9–12.7)
POTASSIUM SERPL-SCNC: 3.6 MMOL/L (ref 3.5–5.3)
POTASSIUM SERPL-SCNC: 3.8 MMOL/L (ref 3.5–5.3)
PROCALCITONIN SERPL-MCNC: 89.19 NG/ML
PROT SERPL-MCNC: 5 G/DL (ref 6.4–8.2)
PROTHROMBIN TIME: 18.3 SECONDS (ref 11.6–14.5)
RBC # BLD AUTO: 4.36 MILLION/UL (ref 3.88–5.62)
RBC # BLD AUTO: 4.44 MILLION/UL (ref 3.88–5.62)
RBC MORPH BLD: NORMAL
RBC MORPH BLD: PRESENT
RIGHT ATRIUM AREA SYSTOLE A4C: 11 CM2
RIGHT VENTRICLE ID DIMENSION: 3.1 CM
SL CV LEFT ATRIUM LENGTH A2C: 4.1 CM
SL CV LV EF: 60
SL CV PED ECHO LEFT VENTRICLE DIASTOLIC VOLUME (MOD BIPLANE) 2D: 128 ML
SL CV PED ECHO LEFT VENTRICLE SYSTOLIC VOLUME (MOD BIPLANE) 2D: 45 ML
SMUDGE CELLS BLD QL SMEAR: PRESENT
SODIUM SERPL-SCNC: 135 MMOL/L (ref 136–145)
SODIUM SERPL-SCNC: 135 MMOL/L (ref 136–145)
TRICUSPID VALVE PEAK REGURGITATION VELOCITY: 2.61 M/S
VANCOMYCIN SERPL-MCNC: 6.7 UG/ML (ref 10–20)
VARIANT LYMPHS # BLD AUTO: 19 %
VARIANT LYMPHS # BLD AUTO: 4 %
WBC # BLD AUTO: 17.95 THOUSAND/UL (ref 4.31–10.16)
WBC # BLD AUTO: 22.45 THOUSAND/UL (ref 4.31–10.16)
WBC TOXIC VACUOLES BLD QL SMEAR: PRESENT
Z-SCORE OF ASCENDING AORTA: 3.46
Z-SCORE OF INTERVENTRICULAR SEPTUM IN END DIASTOLE: 3.3
Z-SCORE OF LEFT VENTRICULAR DIMENSION IN END DIASTOLE: 0.66
Z-SCORE OF LEFT VENTRICULAR DIMENSION IN END SYSTOLE: 0.73
Z-SCORE OF LEFT VENTRICULAR POSTERIOR WALL IN END DIASTOLE: 2.53

## 2022-03-23 PROCEDURE — 84100 ASSAY OF PHOSPHORUS: CPT | Performed by: NURSE PRACTITIONER

## 2022-03-23 PROCEDURE — 85007 BL SMEAR W/DIFF WBC COUNT: CPT | Performed by: NURSE PRACTITIONER

## 2022-03-23 PROCEDURE — 97116 GAIT TRAINING THERAPY: CPT

## 2022-03-23 PROCEDURE — 82948 REAGENT STRIP/BLOOD GLUCOSE: CPT

## 2022-03-23 PROCEDURE — 80048 BASIC METABOLIC PNL TOTAL CA: CPT | Performed by: NURSE PRACTITIONER

## 2022-03-23 PROCEDURE — 99292 CRITICAL CARE ADDL 30 MIN: CPT | Performed by: INTERNAL MEDICINE

## 2022-03-23 PROCEDURE — 4A133B1 MONITORING OF ARTERIAL PRESSURE, PERIPHERAL, PERCUTANEOUS APPROACH: ICD-10-PCS | Performed by: INTERNAL MEDICINE

## 2022-03-23 PROCEDURE — 4A133J1 MONITORING OF ARTERIAL PULSE, PERIPHERAL, PERCUTANEOUS APPROACH: ICD-10-PCS | Performed by: INTERNAL MEDICINE

## 2022-03-23 PROCEDURE — 93306 TTE W/DOPPLER COMPLETE: CPT

## 2022-03-23 PROCEDURE — 97167 OT EVAL HIGH COMPLEX 60 MIN: CPT

## 2022-03-23 PROCEDURE — 80202 ASSAY OF VANCOMYCIN: CPT | Performed by: NURSE PRACTITIONER

## 2022-03-23 PROCEDURE — 99291 CRITICAL CARE FIRST HOUR: CPT | Performed by: NURSE PRACTITIONER

## 2022-03-23 PROCEDURE — 83605 ASSAY OF LACTIC ACID: CPT | Performed by: NURSE PRACTITIONER

## 2022-03-23 PROCEDURE — 99223 1ST HOSP IP/OBS HIGH 75: CPT | Performed by: NURSE PRACTITIONER

## 2022-03-23 PROCEDURE — 82330 ASSAY OF CALCIUM: CPT | Performed by: NURSE PRACTITIONER

## 2022-03-23 PROCEDURE — 93306 TTE W/DOPPLER COMPLETE: CPT | Performed by: INTERNAL MEDICINE

## 2022-03-23 PROCEDURE — 85610 PROTHROMBIN TIME: CPT | Performed by: NURSE PRACTITIONER

## 2022-03-23 PROCEDURE — 80053 COMPREHEN METABOLIC PANEL: CPT | Performed by: NURSE PRACTITIONER

## 2022-03-23 PROCEDURE — 83735 ASSAY OF MAGNESIUM: CPT | Performed by: NURSE PRACTITIONER

## 2022-03-23 PROCEDURE — 84145 PROCALCITONIN (PCT): CPT | Performed by: NURSE PRACTITIONER

## 2022-03-23 PROCEDURE — 85027 COMPLETE CBC AUTOMATED: CPT | Performed by: NURSE PRACTITIONER

## 2022-03-23 PROCEDURE — NC001 PR NO CHARGE: Performed by: NURSE PRACTITIONER

## 2022-03-23 PROCEDURE — 76937 US GUIDE VASCULAR ACCESS: CPT | Performed by: NURSE PRACTITIONER

## 2022-03-23 PROCEDURE — 03HY32Z INSERTION OF MONITORING DEVICE INTO UPPER ARTERY, PERCUTANEOUS APPROACH: ICD-10-PCS | Performed by: INTERNAL MEDICINE

## 2022-03-23 PROCEDURE — 97163 PT EVAL HIGH COMPLEX 45 MIN: CPT

## 2022-03-23 PROCEDURE — 36620 INSERTION CATHETER ARTERY: CPT | Performed by: NURSE PRACTITIONER

## 2022-03-23 RX ORDER — POTASSIUM CHLORIDE 14.9 MG/ML
20 INJECTION INTRAVENOUS ONCE
Status: COMPLETED | OUTPATIENT
Start: 2022-03-23 | End: 2022-03-23

## 2022-03-23 RX ORDER — POLYETHYLENE GLYCOL 3350 17 G/17G
17 POWDER, FOR SOLUTION ORAL DAILY
Status: DISCONTINUED | OUTPATIENT
Start: 2022-03-24 | End: 2022-03-27 | Stop reason: HOSPADM

## 2022-03-23 RX ORDER — OXYCODONE HYDROCHLORIDE 5 MG/1
5 TABLET ORAL EVERY 6 HOURS PRN
Status: DISCONTINUED | OUTPATIENT
Start: 2022-03-23 | End: 2022-03-27 | Stop reason: HOSPADM

## 2022-03-23 RX ORDER — HEPARIN SODIUM 5000 [USP'U]/ML
5000 INJECTION, SOLUTION INTRAVENOUS; SUBCUTANEOUS EVERY 8 HOURS SCHEDULED
Status: DISCONTINUED | OUTPATIENT
Start: 2022-03-23 | End: 2022-03-27 | Stop reason: HOSPADM

## 2022-03-23 RX ORDER — POTASSIUM CHLORIDE 20 MEQ/1
20 TABLET, EXTENDED RELEASE ORAL ONCE
Status: COMPLETED | OUTPATIENT
Start: 2022-03-23 | End: 2022-03-23

## 2022-03-23 RX ORDER — OXYCODONE HYDROCHLORIDE 10 MG/1
10 TABLET ORAL EVERY 6 HOURS PRN
Status: DISCONTINUED | OUTPATIENT
Start: 2022-03-23 | End: 2022-03-27 | Stop reason: HOSPADM

## 2022-03-23 RX ORDER — LIDOCAINE HYDROCHLORIDE 10 MG/ML
1 INJECTION, SOLUTION EPIDURAL; INFILTRATION; INTRACAUDAL; PERINEURAL ONCE
Status: COMPLETED | OUTPATIENT
Start: 2022-03-23 | End: 2022-03-23

## 2022-03-23 RX ORDER — MAGNESIUM SULFATE HEPTAHYDRATE 40 MG/ML
2 INJECTION, SOLUTION INTRAVENOUS ONCE
Status: COMPLETED | OUTPATIENT
Start: 2022-03-23 | End: 2022-03-23

## 2022-03-23 RX ORDER — SENNOSIDES 8.6 MG
2 TABLET ORAL
Status: DISCONTINUED | OUTPATIENT
Start: 2022-03-23 | End: 2022-03-27 | Stop reason: HOSPADM

## 2022-03-23 RX ORDER — CALCIUM GLUCONATE 20 MG/ML
1 INJECTION, SOLUTION INTRAVENOUS ONCE
Status: COMPLETED | OUTPATIENT
Start: 2022-03-23 | End: 2022-03-23

## 2022-03-23 RX ORDER — ACETAMINOPHEN 325 MG/1
975 TABLET ORAL EVERY 8 HOURS SCHEDULED
Status: DISCONTINUED | OUTPATIENT
Start: 2022-03-23 | End: 2022-03-27 | Stop reason: HOSPADM

## 2022-03-23 RX ADMIN — OXYCODONE HYDROCHLORIDE 5 MG: 5 TABLET ORAL at 21:09

## 2022-03-23 RX ADMIN — MAGNESIUM SULFATE HEPTAHYDRATE 2 G: 40 INJECTION, SOLUTION INTRAVENOUS at 03:20

## 2022-03-23 RX ADMIN — POTASSIUM CHLORIDE 20 MEQ: 1500 TABLET, EXTENDED RELEASE ORAL at 18:22

## 2022-03-23 RX ADMIN — NOREPINEPHRINE BITARTRATE 4000 MCG: 1 INJECTION, SOLUTION, CONCENTRATE INTRAVENOUS at 19:43

## 2022-03-23 RX ADMIN — POTASSIUM CHLORIDE 20 MEQ: 14.9 INJECTION, SOLUTION INTRAVENOUS at 05:12

## 2022-03-23 RX ADMIN — OXYCODONE HYDROCHLORIDE 5 MG: 5 TABLET ORAL at 14:30

## 2022-03-23 RX ADMIN — HEPARIN SODIUM 5000 UNITS: 5000 INJECTION INTRAVENOUS; SUBCUTANEOUS at 13:46

## 2022-03-23 RX ADMIN — Medication 1000 UNITS: at 09:18

## 2022-03-23 RX ADMIN — STANDARDIZED SENNA CONCENTRATE 17.2 MG: 8.6 TABLET ORAL at 21:09

## 2022-03-23 RX ADMIN — HEPARIN SODIUM 5000 UNITS: 5000 INJECTION INTRAVENOUS; SUBCUTANEOUS at 05:22

## 2022-03-23 RX ADMIN — VANCOMYCIN HYDROCHLORIDE 750 MG: 750 INJECTION, SOLUTION INTRAVENOUS at 07:08

## 2022-03-23 RX ADMIN — CALCIUM GLUCONATE 1 G: 20 INJECTION, SOLUTION INTRAVENOUS at 03:23

## 2022-03-23 RX ADMIN — OXYCODONE HYDROCHLORIDE 5 MG: 5 TABLET ORAL at 01:48

## 2022-03-23 RX ADMIN — DEXAMETHASONE 4 MG: 4 TABLET ORAL at 21:10

## 2022-03-23 RX ADMIN — ACETAMINOPHEN 975 MG: 325 TABLET, FILM COATED ORAL at 16:15

## 2022-03-23 RX ADMIN — DEXAMETHASONE 4 MG: 4 TABLET ORAL at 09:18

## 2022-03-23 RX ADMIN — VANCOMYCIN HYDROCHLORIDE 750 MG: 750 INJECTION, SOLUTION INTRAVENOUS at 18:22

## 2022-03-23 RX ADMIN — HEPARIN SODIUM 5000 UNITS: 5000 INJECTION INTRAVENOUS; SUBCUTANEOUS at 21:10

## 2022-03-23 RX ADMIN — ACETAMINOPHEN 975 MG: 325 TABLET, FILM COATED ORAL at 21:10

## 2022-03-23 RX ADMIN — PANTOPRAZOLE SODIUM 40 MG: 40 TABLET, DELAYED RELEASE ORAL at 05:14

## 2022-03-23 RX ADMIN — LEVETIRACETAM 500 MG: 250 TABLET, FILM COATED ORAL at 09:18

## 2022-03-23 RX ADMIN — LIDOCAINE HYDROCHLORIDE 5 ML: 10 INJECTION, SOLUTION EPIDURAL; INFILTRATION; INTRACAUDAL; PERINEURAL at 02:27

## 2022-03-23 RX ADMIN — CEFEPIME HYDROCHLORIDE 2000 MG: 2 INJECTION, POWDER, FOR SOLUTION INTRAVENOUS at 13:41

## 2022-03-23 RX ADMIN — LEVETIRACETAM 500 MG: 250 TABLET, FILM COATED ORAL at 21:09

## 2022-03-23 RX ADMIN — CEFEPIME HYDROCHLORIDE 2000 MG: 2 INJECTION, POWDER, FOR SOLUTION INTRAVENOUS at 02:40

## 2022-03-23 RX ADMIN — NOREPINEPHRINE BITARTRATE 2 MCG/MIN: 1 INJECTION, SOLUTION, CONCENTRATE INTRAVENOUS at 01:39

## 2022-03-23 NOTE — QUICK NOTE
QUICK NOTE - Deterioration Index  Kermit Svetlana 64 y o  male MRN: [id number]  Unit/Bed#: S MS - Encounter: [id number]    Date Paged: 22  Time Paged:   Room #:   Arrival Time:   Deterioration index score at time of page: 67 3  %  Spoke with Marta Quintero PAC from primary team  Need to escalate level of care: no     PROBLEMS resulting in high DI score:   27% Respiratory rate 30   17% Supplemental oxygen Nasal cannula   14% Age 64   14% Neurological exam Lethargic      Severe sepsis likely 2/2 suspected post-obstructive pneumonia r/t lung mass   o On admission presented with hypotension SBP 80s  Received 30 mL/kg, and was initially volume responsive  Following IVF bolus his SBP again dropped to 80s and he was given additional IVF  Total received volume on admission 4L   Acute hypoxic respiratory failure 2/2 above -- currently on NC 5L    PLAN:     Patient received aggressive IVF resuscitation totaling 4L   BP does not currently support addition of vasopressors  If develops hypotension will transfer to ICU and plan for vasopressors   Titrate FiO2 for SpO2 > 92%   Trend lactic to clear   Remainder of plan as outlined by primary team      Please contact critical care via [employer] with any questions or concerns       Vitals:   Vitals:    22 1800 22 1846 22 1958 22   BP: 95/53 93/61 91/60    BP Location: Left arm      Pulse: (!) 106 104 105    Resp: (!) 30   (!) 30   Temp:  (!) 101 9 °F (38 8 °C)     TempSrc:       SpO2: 96% 96% 97%        Respiratory:  SpO2: SpO2: 97 %, SpO2 Activity: SpO2 Activity: At Rest, SpO2 Device: O2 Device: Nasal cannula  Nasal Cannula O2 Flow Rate (L/min): 5 L/min    Temperature: Temp (24hrs), Av 3 °F (37 9 °C), Min:98 9 °F (37 2 °C), Max:101 9 °F (38 8 °C)  Current: Temperature: (!) 101 9 °F (38 8 °C)    Labs:   Results from last 7 days   Lab Units 22  0849 22  0544 22  0507   WBC Thousand/uL 27 71* 14 60* 17 75*   HEMOGLOBIN g/dL 14 1 14 1 14 6   HEMATOCRIT % 41 7 43 6 43 3   PLATELETS Thousands/uL 220 341 352   NEUTROS PCT %  --  86* 89*   MONOS PCT %  --  5 3*   MONO PCT % 8  --   --      Results from last 7 days   Lab Units 03/22/22  0849 03/17/22  0544 03/16/22  0507   SODIUM mmol/L 135* 140 139   POTASSIUM mmol/L 4 4 4 1 4 2   CHLORIDE mmol/L 97* 106 107   CO2 mmol/L 30 30 28   BUN mg/dL 29* 32* 29*   CREATININE mg/dL 1 44* 0 96 1 02   CALCIUM mg/dL 8 8 9 2 9 3   ALK PHOS U/L 64  --   --    ALT U/L 60  --   --    AST U/L 21  --   --      Results from last 7 days   Lab Units 03/22/22  0849   MAGNESIUM mg/dL 1 8     Results from last 7 days   Lab Units 03/22/22  1819 03/22/22  1520 03/22/22  1220 03/22/22  0849   LACTIC ACID mmol/L 2 9* 2 4* 3 5* 3 0*         Results from last 7 days   Lab Units 03/22/22  1520   PROCALCITONIN ng/ml 29 27*       Code Status: Level 1 - Full Code

## 2022-03-23 NOTE — ED NOTES
Made Dr Dae Campbell aware that patient began to complain of increased difficulty breathing  SPO2 88%  Respirations 34  Nasal cannula increased to 5 Liters  Respiratory contacted and came to bedside to suction patient  Per provider stop the continuous fluids       Candy Anne RN  03/22/22 2015

## 2022-03-23 NOTE — ASSESSMENT & PLAN NOTE
· 3/14 CT head: Hyperdense masses noted at the right occipital lobe and inferior left frontal lobes with surrounding vasogenic edema, suspicious for hemorrhagic metastases  · 3/22 CT head: Unchanged metastatic lesions with surrounding edema in the right occipital and left frontal lobes    · Neurosurgery was consulted on previous admission 3/14-3/19  · Will continue decadron and keppra for vasogenic edema and seizure ppx  · No reported seizures  · Serial neuro checks  · If decrease in mental status will obtain stat CT head

## 2022-03-23 NOTE — CONSULTS
Consultation - Palliative and [address] [facility] [address] [place] 64 y o  male [id number]    Patient Active Problem List   Diagnosis    Type 2 diabetes mellitus without complication, without long-term current use of insulin (HCC)    Nonimmune to hepatitis B virus    Elevated PSA    Gall bladder polyp    Vitamin D deficiency    Cyanocobalamin deficiency    History of tobacco use    Lung mass    Brain mass    Cerebral edema (HCC)    Septic shock (HCC)    Obstructive pneumonia    Acute nontraumatic kidney [facility] ([facility] [address] )    Acute respiratory failure with hypoxia (HCC)    Acute pain of right scapular     Active issues specifically addressed today include:   Metastatic lung adenocarcinoma  Brain mets  Acute hypoxemic respiratory failure  Pneumonia  Cancer related pain  Anxiety related to health  Goals of care counseling    Plan:  1  Symptom management   Cancer related pain  · Continue Dexamethasone 4mg PO BID  · Increase Acetaminophen to 975mg PO TID scheduled  · Increase oxycodone to 5mg PO Q4H PRN for moderate pain  · Increase oxycodone to 10mg PO Q4H PRN for severe pain    Bowel regimen ordered to prevent OIC    2  Goals   · Disease focused care  Will continue discussions regarding [address] as patient's clinical presentation evolves  · Patient and his family do wish to attempt cancer directed treatments  Plans to start radiation on 3/30/22  Hem/Onc appointment for tomorrow will need to be rescheduled  · Encouraged follow up with Palliative Medicine on an outpatient basis after discharge for continued symptom management  Our office will contact patient to schedule a hospital follow up  Code Status: full code with no limits - Level 1   Decisional apparatus:  Patient is competent on my exam today  If competence is lost, patient's substitute decision maker would default to spouse by [place]     Advance Directive / Living Will / POLST:  None on file     I have reviewed the patient's controlled substance dispensing history in the Prescription Drug Monitoring Program in compliance with the [facility] regulations before prescribing any controlled substances  We appreciate the invitation to be involved in this patient's care  We will continue to follow  Please do not hesitate to reach our on call provider through our clinic answering service at  should you have acute symptom control concerns  FRITZ Albarran, JONATHAN, [place] [employer] [facility]  Palliative and Supportive Care  Clinic/Answering Service: [phone]  You can find me on [employer]! IDENTIFICATION:  Inpatient consult to Palliative Care  Consult performed by: JONATHAN Albarran  Consult ordered by: JONATHAN Cali    Inpatient consult to Palliative Care  Consult performed by: JONATHAN Albarran  Consult ordered by: Dragan Lopez MD        Physician Requesting Consult: Raimundo Wall DO  Reason for Consult / Principal Problem: goals of care, pain  Hx and PE limited by:  N/A    HISTORY OF PRESENT ILLNESS:     Deejay Odom is a 64 y o  male with a past medical history of diabetes, lung cancer and hepatitis B who presented on 3/22 with shortness of breath and shoulder pain  The patient had been admitted at [facility] from March 14 to March 19 previously  During that admission the patient presented with headache and blurred vision CT revealed hemorrhagic metastases in the right occipital lobe and the left frontal lobe which was found to be metastatic lung adenocarcinoma  He had an appointment to establish care with medical oncology scheduled for tomorrow and is scheduled to start radiation on 3/30/22  He was discharged home on 3/19 where he lives independently with his wife and son  He also has a brother and sister who are very active in his life  Sadly, Martha Cabrera is the only person in the home who is employed so not only is there stress regarding diagnosis but also the financial stability of his family      He met sepsis criteria upon admission and was started on antibiotics  CT revealed   Unchanged large right upper lobe mass most compatible with carcinoma, with new extensive right upper lobe groundglass density which could represent postobstructive pneumonia, edema, hemorrhage or lymphangitic carcinomatosis   New groundglass infiltrates in the left upper and right lower lobes, presumably infectious/inflammatory  Unchanged mediastinal and right hilar adenopathy"    He was initially admitted to [facility] however he became hypotensive requiring vasopressors despite aggressive fluid repletion and was subsequently transferred to the critical care unit with septic shock  Today patient is seem out of bed and in the chair with his brother present who wishes to act as  for patient  Patient is agreeable to this  Discussed fears regarding new diagnosis and the difficulties that treatment will bring  Patient patient says "as long as there is a chance I will try "  He at minimum wants to get to an appointment with Dr Potts Going to hear the treatment options and then chose what he believes will meet his goals  Currently, he reports his goal is to "live 5 more years "  Gently began to introduce the fact that this is unlikely with stage IV lung cancer but patient appears very optimistic regarding this goal   He does at one point say, if oncology tells me it is not possible then I will think about it" but then begans to discuss the 5 year esthela again  Patient reports pain is his biggest concern today  Pain is worse in his right upper back  This pain started suddenly on Sunday and prior to that he had been comfortable  X-ray of the right scapula negative today  Pain becomes worse with movement, deep breathing and coughing  He is using oxycodone which he is tolerating well but says it is not effective enough  Used 3 PRN doses of oxy yesterday with one IV dose of dilaudid          Review of Systems   Constitutional: Positive for decreased appetite and malaise/fatigue  Eyes: Positive for blurred vision  Cardiovascular: Positive for dyspnea on exertion  Respiratory: Positive for cough and shortness of breath  Musculoskeletal: Positive for back pain, muscle weakness and myalgias  Neurological: Positive for headaches and weakness  Psychiatric/Behavioral: The patient is nervous/anxious  All other systems reviewed and are negative  Past Medical History:   Diagnosis Date    Diabetes mellitus ([facility] )     Elevated PSA 3/11/2021    Fatty liver 3/11/2021    Gall bladder polyp 3/11/2021    Lung cancer ([facility] )     Nonimmune to hepatitis B virus 3/11/2021     History reviewed  No pertinent surgical history  Social History     Socioeconomic History    Marital status: /Civil Union     Spouse name: Not on file    Number of children: Not on file    Years of education: Not on file    Highest education level: Not on file   Occupational History    Not on file   Tobacco Use    Smoking status: Former Smoker     Packs/day: 0 50     Types: Cigarettes     Quit date: 2022     Years since quittin 1    Smokeless tobacco: Never Used    Tobacco comment: quit 3 months ago   Vaping Use    Vaping Use: Never used   Substance and Sexual Activity    Alcohol use: Not Currently     Comment: quit drinking 7 years ago    Drug use: Never    Sexual activity: Not on file   Other Topics Concern    Not on file   Social History Narrative    Not on file     Social Determinants of Health     Financial Resource Strain: Not on file   Food Insecurity: No Food Insecurity    Worried About Running Out of Food in the Last Year: Never true    Brittany of Food in the Last Year: Never true   Transportation Needs: No Transportation Needs    Lack of Transportation (Medical): No    Lack of Transportation (Non-Medical):  No   Physical Activity: Not on file   Stress: Not on file   Social Connections: Not on file   Intimate Partner Violence: Not on file   Housing Stability: Low Risk     Unable to Pay for Housing in the Last Year: No    Number of Places Lived in the Last Year: 1    Unstable Housing in the Last Year: No     History reviewed  No pertinent family history  MEDICATIONS / ALLERGIES:    all current active meds have been reviewed    No Known Allergies    OBJECTIVE:  /51   Pulse 105   Temp 97 8 °F (36 6 °C) (Oral)   Resp (!) 26   Ht 5' 6" (1 676 m)   Wt 66 2 kg (146 lb)   SpO2 96%   BMI 23 57 kg/m²     Physical Exam  Physical Exam  Vitals and nursing note reviewed  Constitutional:       Appearance: He is ill-appearing  Comments: Appears critically ill and frial   HENT:      Head: Normocephalic and atraumatic  Nose: Nose normal       Mouth/Throat:      Mouth: Mucous membranes are moist       Pharynx: Oropharynx is clear  Eyes:      Extraocular Movements: Extraocular movements intact  Conjunctiva/sclera: Conjunctivae normal       Pupils: Pupils are equal, round, and reactive to light  Cardiovascular:      Rate and Rhythm: Normal rate and regular rhythm  Pulses: Normal pulses  Pulmonary:      Effort: Pulmonary effort is normal  No respiratory distress  Comments: Wearing O2 via NC  Cough  Abdominal:      General: Abdomen is flat  There is no distension  Palpations: Abdomen is soft  Musculoskeletal:         General: Normal range of motion  Skin:     General: Skin is warm and dry  Neurological:      Mental Status: He is oriented to person, place, and time  Psychiatric:         Mood and Affect: Mood normal          Behavior: Behavior normal          Thought Content: Thought content normal          Judgment: Judgment normal          Lab Results:   I have personally reviewed pertinent labs  , CBC:   Lab Results   Component Value Date    WBC 22 45 (H) 03/23/2022    HGB 13 5 03/23/2022    HCT 37 9 03/23/2022    MCV 87 03/23/2022     (L) 03/23/2022    MCH 31 0 03/23/2022    MCHC 35 6 03/23/2022 RDW 13 8 03/23/2022    MPV 9 6 03/23/2022    NRBC 1 03/23/2022   , CMP:   Lab Results   Component Value Date    SODIUM 135 (L) 03/23/2022    K 3 6 03/23/2022     03/23/2022    CO2 28 03/23/2022    BUN 22 03/23/2022    CREATININE 1 04 03/23/2022    CALCIUM 8 0 (L) 03/23/2022    AST 16 03/23/2022    ALT 33 03/23/2022    ALKPHOS 49 03/23/2022    EGFR 79 03/23/2022   Imaging Studies: shoulder x-ray reviewed  EKG, Pathology, and Other Studies: reviewed     Counseling / Coordination of Care    Total floor / unit time spent today 90 minutes  Greater than 50% of total time was spent with the patient and / or family counseling and / or coordination of care  A description of the counseling / coordination of care: Chart reviewed  Introduced role of Palliative Medicine  Discussed symptom management  Reviewed expected disease trajectory, prognosis, introduced comfort care versus disease directed therapy  Spent time supportive listening regarding frustrations of diagnosis and repeat hospitalizations  Reviewed with ICU Rusty Schroeder and PT Andres Norton

## 2022-03-23 NOTE — PLAN OF CARE
Problem: Potential for Falls  Goal: Patient will remain free of falls  Description: INTERVENTIONS:  - Educate patient/family on patient safety including physical limitations  - Instruct patient to call for assistance with activity   - Consult OT/PT to assist with strengthening/mobility   - Keep Call bell within reach  - Keep bed low and locked with side rails adjusted as appropriate  - Keep care items and personal belongings within reach  - Initiate and maintain comfort rounds  - Apply yellow socks and bracelet for high fall risk patients  - Consider moving patient to room near nurses station  Outcome: Progressing     Problem: Nutrition/Hydration-ADULT  Goal: Nutrient/Hydration intake appropriate for improving, restoring or maintaining nutritional needs  Description: Monitor and assess patient's nutrition/hydration status for malnutrition  Collaborate with interdisciplinary team and initiate plan and interventions as ordered  Monitor patient's weight and dietary intake as ordered or per policy  Utilize nutrition screening tool and intervene as necessary  Determine patient's food preferences and provide high-protein, high-caloric foods as appropriate       INTERVENTIONS:  - Monitor oral intake, urinary output, labs, and treatment plans  - Assess nutrition and hydration status and recommend course of action  - Evaluate amount of meals eaten  - Assist patient with eating if necessary   - Allow adequate time for meals  - Recommend/ encourage appropriate diets, oral nutritional supplements, and vitamin/mineral supplements  - Order, calculate, and assess calorie counts as needed  - Recommend, monitor, and adjust tube feedings and TPN/PPN based on assessed needs  - Assess need for intravenous fluids  - Provide specific nutrition/hydration education as appropriate  - Include patient/family/caregiver in decisions related to nutrition  Outcome: Progressing

## 2022-03-23 NOTE — ASSESSMENT & PLAN NOTE
· 3/22 CTA chest: negative for PE  Unchanged large right upper lobe mass most compatible with carcinoma, with new extensive right upper lobe groundglass density which could represent postobstructive pneumonia, edema, hemorrhage or lymphangitic carcinomatosis  New groundglass infiltrates in the left upper and right lower lobes, presumably infectious/inflammatory    · Procal 29 on admission  · Continue cefepime and vancomycin  · Airway clearance protocol  · Treatment as above

## 2022-03-23 NOTE — PHYSICAL THERAPY NOTE
PHYSICAL THERAPY EVALUATION NOTE    Patient Name: Marcelina CHARLTON Date: 3/23/2022  AGE:   64 y o  Mrn:   [id number]  ADMIT DX:  Right shoulder pain [M25 511]  Sepsis ([facility] 75 ) [A41 9]  Pneumonia of right upper lobe due to infectious organism [J18 9]    Past Medical History:   Diagnosis Date    Diabetes mellitus ([place] [address] )     Elevated PSA 3/11/2021    Fatty liver 3/11/2021    Gall bladder polyp 3/11/2021    Lung cancer ([place] [address] )     Nonimmune to hepatitis B virus 3/11/2021     Length Of Stay: 1  PHYSICAL THERAPY EVALUATION :   03/23/22 1439   PT Last Visit   PT Visit Date 03/23/22   Pain Assessment   Pain Assessment Tool 0-10   Pain Score 8   Pain Location/Orientation Location: Abdomen; Location: Back   Hospital Pain Intervention(s) Repositioned; Other (Comment)  (Reta NSRIVKA notified)   Restrictions/Precautions   Other Precautions Chair Alarm; Bed Alarm;Multiple lines;Telemetry;O2;Fall Risk;Pain   Home Living   Type of [address] [place] [address] Two level;Bed/bath upstairs; Able to live on main level with bedroom/bathroom   Additional Comments lives w/ spouse  ambulates w/o assistive device  no DME  independent w/ ADLs and driving  no falls in last 6 months  no history of supplemental oxygen use  General   Additional Pertinent History blood pressure supine 115/73  3L oxygen via nasal  cannula  resting pulse ox 93% and 113 BPM, active 94% and 124 BPM    Family/Caregiver Present Yes   Cognition   Arousal/Participation Alert   Orientation Level Oriented to person; Other (Comment)  (pt was identified w/ full name, birth date)   Following Commands Follows one step commands without difficulty   Subjective   Subjective pt seen supine in bed w/ spouse present  agreed to participate in PT eval  reports having chest and back pain  pt is mostly [place] speaking  he was able to answer some questions and follow instuctions in [place] as well   pt stated wanting to have his son provide translation - he initially communicated on the phone then entered the room  RUE Assessment   RUE Assessment WFL   LUE Assessment   LUE Assessment WFL   RLE Assessment   RLE Assessment WFL  (3+ to 4-/5)   LLE Assessment   LLE Assessment WFL  (3+ to 4-/5)   Light Touch   RLE Light Touch Grossly intact   LLE Light Touch Grossly intact   Bed Mobility   Supine to Sit 5  Supervision  (+ dizzy for short period of time)   Additional items HOB elevated; Bedrails; Increased time required;Verbal cues  (for trunk/LE positioning)   Transfers   Sit to Stand 4  Minimal assistance  (+ dizzy for short period of time)   Additional items Assist x 1; Increased time required;Verbal cues  (for LE positioning, breathing technique)   Stand to Sit 5  Supervision   Additional items Increased time required;Verbal cues  (for body positioning)   Ambulation/Elevation   Gait pattern Foward flexed; Short stride; Excessively slow   Gait Assistance 4  Minimal assist   Additional items Assist x 1;Verbal cues  (for posture, breathing technique)   Assistive Device None   Distance 3 feet  (additional not possible due to fatigue)   Stair Management Assistance Not tested  (due to limited ambulation tolerance)   Balance   Static Sitting Fair +   Static Standing Fair -   Ambulatory Poor +   Activity Tolerance   Activity Tolerance Patient limited by fatigue   Nurse Made Aware spoke to Shantal Odom OT, Taylor SPT   Assessment   Prognosis Fair   Problem List Decreased strength;Decreased endurance; Impaired balance;Decreased mobility; Decreased safety awareness;Pain   Assessment Pt c/o right shoulder pain and shortness of breath  Dx: septic shock, ALBERT, acute hypoxic respiratory failure, obstructive pneumonia, DM, lung mass, brain mass, and acute right scapular pain  order placed for PT eval and tx, w/ activity order of up w/ assist and fall precautions   pt presents w/ comorbidities of lung mass, brain mass, and DM and personal factors of living in 2 story house and preferred language not [place] (language barrier)  pt presents w/ pain, weakness, decreased endurance, impaired balance, gait deviations, decreased safety awareness and fall risk  these impairments are evident in findings from physical examination (weakness), mobility assessment (need for standby to min assist w/ all phases of mobility when usually mobilizing independently, tolerance to only 3 feet of ambulation and need for cueing for mobility technique), and Barthel Index: 40/100  pt needed input for mobility and breathing technique  pt is at risk for falls due to physical and safety awareness deficits  pt's clinical presentation is unstable/unpredictable (evident in tachycardia, need for assist w/ all phases of mobility when usually mobilizing independently, tolerance to only 3 feet of ambulation, pain impacting overall mobility status, need for supplemental oxygen in order to maintain oxygen saturation and need for input for mobility technique/safety)  pt needs inpatient PT tx to improve mobility deficits and progress mobility training as appropriate  discharge recommendation is for home w/ family support and home PT to reduce fall risk and maximize level of functional independence  Goals   Patient Goals to walk  to return to driving  STG Expiration Date 04/02/22   Short Term Goal #1 pt will:  Increase bilateral LE strength 1/2 grade to facilitate independent mobility, Perform bed mobility independently to increase level of independence, Perform all transfers independently to improve independence, Ambulate 150 ft  with least restrictive assistive device independently w/o LOB to improve functional independence, Increase ambulatory balance 1 grade to decrease risk for falls, Tolerate 3 hr OOB to faciliate upright tolerance, Improve Barthel Index score to 65 or greater to facilitate independence and Complete Timed Up and Go or Comfortable Gait Speed to further assess mobility and monitor progress   PT Treatment Day 1   Plan   Treatment/Interventions Functional transfer training;LE strengthening/ROM; Elevations; Therapeutic exercise; Endurance training;Patient/family training;Equipment eval/education; Bed mobility;Gait training   PT Frequency 4-6x/wk   Recommendation   PT Discharge Recommendation Home with home health rehabilitation   [facility] Basic Mobility Inpatient   Turning in Bed Without Bedrails 4   Lying on Back to Sitting on Edge of Flat Bed 3   Moving Bed to Chair 3   Standing Up From Chair 3   Walk in Room 2   Climb 3-5 Stairs 1   Basic Mobility Inpatient Raw Score 16   Basic Mobility Standardized Score 38 32   Highest Level Of Mobility   [facility] Goal 5: Stand one or more mins   [facility] Highest Level of Mobility 5: Stand (1 or more minutes)   [facility] Goal Achieved Yes   Barthel Index   Feeding 5   Bathing 0   Grooming Score 0   Dressing Score 0   Bladder Score 10   Bowels Score 10   Toilet Use Score 5   Transfers (Bed/Chair) Score 10   Mobility (Level Surface) Score 0   Stairs Score 0   Barthel Index Score 40   Additional Treatment Session   Start Time 1439   End Time 1449   Treatment Assessment Therapist introduced roller walker use w/ mobility to address impairments noted during evaluation  Sit <---> stand transfer w/ supervision  ambulated 6 feet w/ roller walker w/ supervision  Pt was noted to have improvement in mobility status w/ use of walker w/ improved ambulation tolerance  Pt continues to require standby assist and verbal cues w/ mobility for proper technique/safety  Pt is at risk for falling  continued inpatient PT tx is indicated to reduce fall risk factors  Equipment Use roller walker   Additional Treatment Day 1   End of Consult   Patient Position at End of Consult Bedside chair;Bed/Chair alarm activated; All needs within reach     The patient's AM-[facility] Basic Mobility Inpatient Short Form Raw Score is 16   A Raw score of less than or equal to 16 suggests the patient may benefit from discharge to post-acute rehabilitation services  Please also refer to the recommendation of the Physical Therapist for safe discharge planning  Skilled PT recommended while in hospital and upon DC to progress pt toward treatment goals       Treva Rouse, PT

## 2022-03-23 NOTE — TELEPHONE ENCOUNTER
I called to schedule patient the woman who answered his phones said he is in the icu at [facility]

## 2022-03-23 NOTE — PROCEDURES
Arterial Line Insertion    Date/Time: 3/23/2022 2:30 AM  Performed by: JONATHAN Benjamin  Authorized by: JONATHAN Benjamin     Patient location:  Bedside and ICU  Consent:     Consent obtained:  Verbal    Consent given by:  Spouse and patient    Risks discussed:  Bleeding, pain, infection, repeat procedure and ischemia  Universal protocol:     Procedure explained and questions answered to patient or proxy's satisfaction: yes      Test results available and properly labeled: yes      Required blood products, implants, devices, and special equipment available: yes      Site/side marked: yes      Immediately prior to procedure a time out was called: yes      Patient identity confirmed:  Verbally with patient and arm band  Indications:     Indications: hemodynamic monitoring and frequent labs / infusion    Pre-procedure details:     Skin preparation:  Chlorhexidine    Preparation: Patient was prepped and draped in sterile fashion    Anesthesia (see MAR for exact dosages): Anesthesia method:  Local infiltration    Local anesthetic:  Lidocaine 1% w/o epi  Procedure details:     Location / Tip of Catheter:  Radial    Laterality:  Left    Kodak's test performed: yes      Kodak's test abnormal: no      Needle gauge:  20 G    Placement technique:  Seldinger    Number of attempts:  1    Successful placement: yes      Transducer: waveform confirmed    Post-procedure details:     Post-procedure:  Sterile dressing applied, sutured and wrist guard applied    CMS:  Normal    Patient tolerance of procedure:   Tolerated well, no immediate complications

## 2022-03-23 NOTE — ASSESSMENT & PLAN NOTE
Lab Results   Component Value Date    HGBA1C 5 7 (H) 11/07/2021       Recent Labs     03/22/22  0815 03/22/22  1654 03/22/22 2058   POCGLU 164* 136 112       Blood Sugar Average: Last 72 hrs:  (P) 150     · Holding home metformin for now  · NPO for now due to lethargy    If mental status improves, will allow CHO controlled diet  · Goal blood glucose 140-180  · q6h accu checks with [facility]  · Avoid hypoglycemia

## 2022-03-23 NOTE — PROGRESS NOTES
Vancomycin IV Pharmacy-to-Dose Consultation    Ingrid Mcduffie is a 64 y o  male who is currently receiving Vancomycin IV with management by the Pharmacy Consult service for the treatment of Pneumonia  Assessment/Plan:    The patient's chart was reviewed  Renal function is stable  There are no signs or symptoms of nephrotoxicity and/or infusion reactions documented  Random level this morning resulted at 6 7 so scheduled dosing will be started since level < 20 and renal function stable  Based on today's assessment, will change vancomycin (Day # 2) dosing to 750mg IV every 12 hours, with a plan for trough to be drawn at 1800 on 3/24  We will continue to follow the patient's culture results and clinical progress daily        Melissa Burnett, PharmD   Pharmacist

## 2022-03-23 NOTE — PLAN OF CARE
Problem: OCCUPATIONAL THERAPY ADULT  Goal: Performs self-care activities at highest level of function for planned discharge setting  See evaluation for individualized goals  Description: Treatment Interventions: ADL retraining,Functional transfer training,UE strengthening/ROM,Endurance training,Patient/family training,Equipment evaluation/education,Continued evaluation,Energy conservation,Activityengagement  Equipment Recommended: Shower/Tub chair with back ($)       See flowsheet documentation for full assessment, interventions and recommendations  Note: Limitation: Decreased ADL status,Decreased endurance,Decreased self-care trans,Decreased high-level ADLs,Decreased UE strength     Assessment: Pt is a 62yo male [facility] on 3/22/22  Pt presented w/ R shoulder pain and SOB  Significant PMH impacting his occupational performance includes DM, recently diagnosed w/ lung cancer w/ brain mets, tobacco abuse  Pt diagnosed w/ septic shock from a pulmonary source and transferred to ICU on 3/23/22  Pt w/ active OT orders and activity orders  Pt is primarily [place] speaking and lives w/ his wife  Pt reports having a first floor set- up and I w/ ADL/ IADL at baseline w/ out use of AD, DME or O2  Personal factors impacting performance includes increased pain and language barrier  Upon eval, pt alert and generally oriented  Able to communicate wants / needs in [place] w/ son translating  Pt required S to complete bed mobility  Pt required max A to complete LBD and S to complete UBD  Pt required S to complete sit <> stand and short distance functional mobility using RW  Pt required min A w/ out use of AD  O2 sats >90% throughout   Pt presents w/ decreased activity tolerance, decreased endurance, decreased UE AROM and strength, generalized weakness /deconditioning, decreased sitting tolerance, decreased standing tolerance, and increased pain impacting his I w/ dressing, bathing, oral hygiene, functional mobility, functional transfers, activity engagement, clothing mgmt, community mobility  Pt completing ADL below baseline level of I and would benefit from OT while in acute care to address deficits, and Home OT at [place]  Recommend DC home w/ family support/ assist when medically stable for discharge from acute care using RW   Will continue to follow     OT Discharge Recommendation: Home with home health rehabilitation (DC home w/ family support / assist and Home OT)

## 2022-03-23 NOTE — TELEPHONE ENCOUNTER
Appointment Cancellation Or Reschedule     Person calling in [facility]   Provider Dr Briana Vergara    Office Visit Date and Time 3/24 @ 11:20   [facility] Visit [facility]   Did patient want to reschedule their office appointment? If so, when was it scheduled to? No, patient admitted in ICU   Is this patient calling to reschedule an infusion appointment? NO   When is their next infusion appointment? N/A   Is this patient a Chemo patient? No    Reason for Cancellation or Reschedule Patient currently admitted in [facility] at [facility], patients son is requesting a nurse call him  He also would like to know if its possible patient is seen when provider doing rounding at hospital  Best number for son is [phone]     If the patient is a treatment patient, please route this to the office nurse  If the patient is not on treatment, please route to the office MA

## 2022-03-23 NOTE — ED NOTES
Blood pressure 95/53 MAP 69  Per Dr Donna lockhart to transport patient to [facility]       Duran Nazario RN  03/22/22 2021

## 2022-03-23 NOTE — PLAN OF CARE
Problem: Potential for Falls  Goal: Patient will remain free of falls  Description: INTERVENTIONS:  - Educate patient/family on patient safety including physical limitations  - Instruct patient to call for assistance with activity   - Consult OT/PT to assist with strengthening/mobility   - Keep Call bell within reach  - Keep bed low and locked with side rails adjusted as appropriate  - Keep care items and personal belongings within reach  - Initiate and maintain comfort rounds  - Make Fall Risk Sign visible to staff  - Offer Toileting every 2 Hours, in advance of need  - Initiate/Maintain bed alarm  - Apply yellow socks and bracelet for high fall risk patients  - Consider moving patient to room near nurses station  Outcome: Progressing     Problem: Nutrition/Hydration-ADULT  Goal: Nutrient/Hydration intake appropriate for improving, restoring or maintaining nutritional needs  Description: Monitor and assess patient's nutrition/hydration status for malnutrition  Collaborate with interdisciplinary team and initiate plan and interventions as ordered  Monitor patient's weight and dietary intake as ordered or per policy  Utilize nutrition screening tool and intervene as necessary  Determine patient's food preferences and provide high-protein, high-caloric foods as appropriate       INTERVENTIONS:  - Monitor oral intake, urinary output, labs, and treatment plans  - Assess nutrition and hydration status and recommend course of action  - Evaluate amount of meals eaten  - Assist patient with eating if necessary   - Allow adequate time for meals  - Recommend/ encourage appropriate diets, oral nutritional supplements, and vitamin/mineral supplements  - Order, calculate, and assess calorie counts as needed  - Recommend, monitor, and adjust tube feedings and TPN/PPN based on assessed needs  - Assess need for intravenous fluids  - Provide specific nutrition/hydration education as appropriate  - Include patient/family/caregiver in decisions related to nutrition  Outcome: Progressing     Problem: MOBILITY - ADULT  Goal: Maintain or return to baseline ADL function  Description: INTERVENTIONS:  -  Assess patient's ability to carry out ADLs; assess patient's baseline for ADL function and identify physical deficits which impact ability to perform ADLs (bathing, care of mouth/teeth, toileting, grooming, dressing, etc )  - Assess/evaluate cause of self-care deficits   - Assess range of motion  - Assess patient's mobility; develop plan if impaired  - Assess patient's need for assistive devices and provide as appropriate  - Encourage maximum independence but intervene and supervise when necessary  - Involve family in performance of ADLs  - Assess for home care needs following discharge   - Consider OT consult to assist with ADL evaluation and planning for discharge  - Provide patient education as appropriate  Outcome: Progressing  Goal: Maintains/Returns to pre admission functional level  Description: INTERVENTIONS:  - Perform BMAT or MOVE assessment daily    - Set and communicate daily mobility goal to care team and patient/family/caregiver  - Collaborate with rehabilitation services on mobility goals if consulted  - Perform Range of Motion 2 times a day  - Reposition patient every 2 hours    - Dangle patient 2 times a day  - Stand patient 2 times a day  - Ambulate patient 2 times a day  - Out of bed to chair 2 times a day   - Out of bed for meals 2 times a day  - Out of bed for toileting  - Record patient progress and toleration of activity level   Outcome: Progressing     Problem: INFECTION - ADULT  Goal: Absence or prevention of progression during hospitalization  Description: INTERVENTIONS:  - Assess and monitor for signs and symptoms of infection  - Monitor lab/diagnostic results  - Monitor all insertion sites, i e  indwelling lines, tubes, and drains  - Monitor endotracheal if appropriate and nasal secretions for changes in amount and color  - Landing appropriate cooling/warming therapies per order  - Administer medications as ordered  - Instruct and encourage patient and family to use good hand hygiene technique  - Identify and instruct in appropriate isolation precautions for identified infection/condition  Outcome: Progressing  Goal: Absence of fever/infection during neutropenic period  Description: INTERVENTIONS:  - Monitor WBC    Outcome: Progressing

## 2022-03-23 NOTE — ASSESSMENT & PLAN NOTE
· POA: tachycardia, tachypnea, lactic acidosis, leukocytosis, elevated Tbili and fever  · CT chest: Unchanged large right upper lobe mass most compatible with carcinoma, with new extensive right upper lobe groundglass density which could represent postobstructive pneumonia, edema, hemorrhage or lymphangitic carcinomatosis  New groundglass infiltrates in the left upper and right lower lobes, presumably infectious/inflammatory  · UA bland  · Blood cultures x2 drawn on admission -- will follow  · Sputum culture obtained -- will follow  · COVID/flu/RSV PCR negative  · 3/16 bronch cultures negative to date  · Was hypotensive on arrival with lactic acid 3  Received 30 mL/kg IVF for sepsis resuscitation with initial improvement in blood pressure  He subsequently developed hypotension with SBP 80s  He was given additional 2 1L IVF for total 4L    Following additional IVF his blood pressure was 90s SBP and he was admitted to [facility] Step Down 2     · Continue Vancomycin and cefepime -- narrow once culture data available  · Discontinue vanco if MRSA nares culture negative  · Follow WBC and fever curve  · Lactic 3, 3 5, 2 4, 2 9  · Will continue to trend  · procal 29 27  · Will continue to trend  · Critical care asked to see patient for recurrence of hypotension  · Transfer patient to ICU  · Start levophed   · Titrate for MAP > 65 mmHg

## 2022-03-23 NOTE — PLAN OF CARE
Problem: PHYSICAL THERAPY ADULT  Goal: Performs mobility at highest level of function for planned discharge setting  See evaluation for individualized goals  Description: Treatment/Interventions: Functional transfer training,LE strengthening/ROM,Elevations,Therapeutic exercise,Endurance training,Patient/family training,Equipment eval/education,Bed mobility,Gait training          See flowsheet documentation for full assessment, interventions and recommendations  Note: Prognosis: Fair  Problem List: Decreased strength,Decreased endurance,Impaired balance,Decreased mobility,Decreased safety awareness,Pain  Assessment: Pt c/o right shoulder pain and shortness of breath  Dx: septic shock, ALBERT, acute hypoxic respiratory failure, obstructive pneumonia, DM, lung mass, brain mass, and acute right scapular pain  order placed for PT eval and tx, w/ activity order of up w/ assist and fall precautions  pt presents w/ comorbidities of lung mass, brain mass, and DM and personal factors of living in 2 story house and preferred language not [place] (language barrier)  pt presents w/ pain, weakness, decreased endurance, impaired balance, gait deviations, decreased safety awareness and fall risk  these impairments are evident in findings from physical examination (weakness), mobility assessment (need for standby to min assist w/ all phases of mobility when usually mobilizing independently, tolerance to only 3 feet of ambulation and need for cueing for mobility technique), and Barthel Index: 40/100  pt needed input for mobility and breathing technique  pt is at risk for falls due to physical and safety awareness deficits   pt's clinical presentation is unstable/unpredictable (evident in tachycardia, need for assist w/ all phases of mobility when usually mobilizing independently, tolerance to only 3 feet of ambulation, pain impacting overall mobility status, need for supplemental oxygen in order to maintain oxygen saturation and need for input for mobility technique/safety)  pt needs inpatient PT tx to improve mobility deficits and progress mobility training as appropriate  discharge recommendation is for home w/ family support and home PT to reduce fall risk and maximize level of functional independence  PT Discharge Recommendation: Home with home health rehabilitation          See flowsheet documentation for full assessment

## 2022-03-23 NOTE — UTILIZATION REVIEW
Initial Clinical Review    Admission: Date/Time/Statement:   Admission Orders (From admission, onward)     Ordered        03/22/22 1316  Inpatient Admission  Once                      Orders Placed This Encounter   Procedures    Inpatient Admission     Standing Status:   Standing     Number of Occurrences:   1     Order Specific Question:   Level of Care     Answer:   Med Surg [16]     Order Specific Question:   Estimated length of stay     Answer:   More than 2 Midnights     Order Specific Question:   Certification     Answer:   I certify that inpatient services are medically necessary for this patient for a duration of greater than two midnights  See H&P and MD Progress Notes for additional information about the patient's course of treatment  ED Arrival Information     Expected Arrival Acuity    - 3/22/2022 07:47 Emergent         Means of arrival Escorted by Service Admission type    Wheelchair Family Member Critical Care/ICU Emergency         Arrival complaint    right shoulder pain        Chief Complaint   Patient presents with    Shoulder Pain     pt c/o R shoulder pain that started at 0100 this morning, hx of lung cancer       Initial Presentation: 64 y o male pt admitted to ED from home with c/o R shoulder pain and SOB  ED w/u revealed severe sepsis with acute organ dysfunction   PMH: lung mass consistent with adenocardinoma, brain mass, DM, acute kidney injury with creatinine of 1 44 with baseline of 0 9  Plan: Inpt admission for evaluation and treatment of severe sepsis, postobstructive pneumonia  IV fluids, IV antibiotics, Pulmonology consulted  3/22 Pulmonology Consult: + cough and SOB  Decreased breath sounds and rhonchi  O2 2L, does not wear at home, scheduled for radiation next week for adenocarcinoma, Xopenex atrovent TID    3/23: Critical Care:  Initially hypotensive and received sepsis resuscitation fluid and additional IVF  He was admitted to Step Down 2 with improved blood pressures  Overnight he again became hypotensive and was transferred to ICU for vasopressors  Cont abx, trend procalcitonin, titrate for MAP > 65  Close monitoring of UO and renal indices  Currently on 5L, titrate for SpO2 > 92%  PE:O x3/lethargic  L lobe rhonchi, RLL rales, tachypnea           ED Triage Vitals   Temperature Pulse Respirations Blood Pressure SpO2   03/22/22 0802 03/22/22 0802 03/22/22 0802 03/22/22 0802 03/22/22 0802   99 5 °F (37 5 °C) 74 20 (!) 59/40 94 %      Temp Source Heart Rate Source Patient Position - Orthostatic VS BP Location FiO2 (%)   03/22/22 0802 03/22/22 0802 03/22/22 0802 03/22/22 0802 --   Oral Monitor Sitting Left arm       Pain Score       03/22/22 2000       No Pain          Wt Readings from Last 1 Encounters:   03/23/22 66 2 kg (146 lb)     Additional Vital Signs:   Date/Time Temp Pulse Resp BP MAP (mmHg) Arterial Line BP MAP SpO2 Calculated FIO2 (%) - Nasal Cannula Nasal Cannula O2 Flow Rate (L/min) O2 Device   03/23/22 1051 98 6 °F (37 °C) 100 22 122/58 80 -- -- 96 % -- -- Nasal cannula   03/23/22 1038 -- 108 Abnormal  -- 108/65 -- -- -- -- -- -- --   03/23/22 0940 -- -- -- -- -- 122/56 76 mmHg -- -- -- --   03/23/22 0930 -- 100 30 Abnormal  -- -- 134/61 84 mmHg 96 % -- -- --   03/23/22 0925 -- -- -- -- -- 134/62 85 mmHg -- -- -- --   03/23/22 0915 -- 100 30 Abnormal  -- -- 120/54 75 mmHg 94 % 32 3 L/min Nasal cannula   03/23/22 0900 -- 100 29 Abnormal  -- -- 122/55 77 mmHg 97 % -- -- --   03/23/22 0845 -- 94 22 -- -- 100/49 65 mmHg 97 % -- -- --   03/23/22 0830 -- 95 22 -- -- 100/50 66 mmHg 97 % -- -- --   03/23/22 0815 -- 93 24 Abnormal  -- -- 95/47 64 mmHg 95 % -- -- --   03/23/22 0800 -- 94 24 Abnormal  104/60 77 96/47 64 mmHg 97 % -- -- --   03/23/22 0745 -- 93 22 -- -- 96/47 63 mmHg 95 % -- -- --   03/23/22 0730 -- 92 23 Abnormal  -- -- 98/46 62 mmHg 95 % -- -- --   03/23/22 0715 -- 98 31 Abnormal  -- -- 129/56 80 mmHg 96 % -- -- --   03/23/22 0700 99 1 °F (37 3 °C) 96 20 106/47 Abnormal  67 99/48 64 mmHg 94 % 36 4 L/min Nasal cannula   03/23/22 0500 -- 94 27 Abnormal  -- -- 106/45 62 mmHg 97 % -- -- --   03/23/22 0454 -- 93 23 Abnormal  -- -- 110/48 65 mmHg 96 % -- -- --   03/23/22 0453 -- 93 23 Abnormal  -- -- 111/49 66 mmHg 96 % -- -- --   03/23/22 0441 -- 93 23 Abnormal  -- -- 111/47 64 mmHg 96 % -- -- --   03/23/22 0400 -- 101 29 Abnormal  108/65 82 133/56 79 mmHg 96 % -- -- --   03/23/22 0338 -- 97 23 Abnormal  -- -- 107/49 67 mmHg 97 % -- -- --   03/23/22 0321 -- 96 22 -- -- 91/44 58 mmHg 98 % -- -- --   03/23/22 0317 -- 93 25 Abnormal  102/56 75 130/54 75 mmHg 98 % -- -- --   03/23/22 0300 -- 93 24 Abnormal  112/55 75 102/46 62 mmHg 98 % -- -- --   03/23/22 0250 -- 90 28 Abnormal  -- -- 87/40 54 mmHg 99 % -- -- --   03/23/22 0245 -- 91 28 Abnormal  -- -- 87/40 54 mmHg 99 % -- -- --   03/23/22 0238 -- 93 31 Abnormal  95/54 67 87/40 55 mmHg 97 % -- -- Nasal cannula   03/23/22 0230 -- 92 30 Abnormal  93/54 66 -- -- 97 % -- -- --   03/23/22 0215 -- 90 29 Abnormal  93/55 67 -- -- 97 % -- -- --   03/23/22 0200 -- 90 29 Abnormal  90/54 66 -- -- 98 % -- -- --   03/23/22 0145 -- 90 30 Abnormal  89/54 Abnormal  66 -- -- 97 % -- -- --   03/23/22 0130 -- 94 29 Abnormal  88/51 Abnormal  64 -- -- 97 % -- -- --   03/23/22 0123 100 2 °F (37 9 °C) 94 29 Abnormal  90/55 66 -- -- 98 % -- -- Nasal cannula   03/23/22 00:12:56 100 5 °F (38 1 °C) 95 -- 84/52 Abnormal  63 -- -- 96 % -- -- --   03/22/22 2300 -- 98 -- 82/55 Abnormal  64 -- -- 95 % -- -- --   03/22/22 2210 101 4 °F (38 6 °C) Abnormal  102 -- 94/59 71 -- -- 95 % -- -- --   03/22/22 22:09:55 101 4 °F (38 6 °C) Abnormal  102 -- 94/59 71 -- -- 95 % -- -- --   03/22/22 2027 -- -- 30 Abnormal  -- -- -- -- -- -- -- --   03/22/22 2000 -- -- -- -- -- -- -- 97 % 40 5 L/min Nasal cannula   03/22/22 19:58:50 -- 105 -- 91/60 70 -- -- 97 % -- -- --   03/22/22 18:46:48 101 9 °F (38 8 °C) Abnormal  104 -- 93/61 72 -- -- 96 % -- -- --   03/22/22 1800 -- 106 Abnormal  30 Abnormal  95/53 69 -- -- 96 % 40 5 L/min Nasal cannula   03/22/22 1730 -- 100 24 Abnormal  90/54 67 -- -- 96 % 40 5 L/min Nasal cannula   03/22/22 1715 -- 102 24 Abnormal  85/54 Abnormal  63 -- -- 94 % 40 5 L/min Nasal cannula   03/22/22 1700 -- 104 24 Abnormal  87/52 Abnormal  64 -- -- 93 % 40 5 L/min Nasal cannula   03/22/22 1645 -- 108 Abnormal  24 Abnormal  95/54 69 -- -- 95 % -- -- None (Room air)   03/22/22 1630 -- 114 Abnormal  34 Abnormal  113/66 82 -- -- 94 % 40 5 L/min Nasal cannula   03/22/22 1615 -- 124 Abnormal  34 Abnormal   167/92 119 -- -- 93 % 40 5 L/min Nasal cannula   03/22/22 1530 -- 96 24 Abnormal  81/52 Abnormal  62 -- -- 93 % 28 2 L/min Nasal cannula   03/22/22 1515 -- 94 24 Abnormal  82/53 Abnormal  63 -- -- 92 % 28 2 L/min Nasal cannula   03/22/22 1503 -- -- -- -- -- -- -- 94 % 28 2 L/min Nasal cannula   03/22/22 1500 -- 94 24 Abnormal  88/55 Abnormal  66 -- -- 90 % 28 2 L/min Nasal cannula   03/22/22 1459 -- -- 18 -- -- -- -- -- -- -- --   03/22/22 1400 -- 94 -- 108/60 77 -- -- 93 % -- -- --   03/22/22 1300 -- 86 -- 93/53 66 -- -- 94 % -- -- --   03/22/22 1244 -- 87 18 91/58 69 -- -- 94 % 28 2 L/min Nasal cannula   03/22/22 1222 98 9 °F (37 2 °C) -- -- -- -- -- -- -- -- -- --   03/22/22 1200 -- 96 -- 96/60 73 -- -- 94 % 28 2 L/min --   03/22/22 1100 -- 90 -- 95/56 70 -- -- 92 % 28 2 L/min Nasal cannula   03/22/22 1045 -- 92 -- 97/57 71 -- -- 92 % 28 2 L/min Nasal cannula   03/22/22 1020 -- 94 -- 95/59 72 -- -- 95 % -- -- Nasal cannula   03/22/22 1002 -- -- -- -- -- -- -- 95 % 28 2 L/min Nasal cannula   03/22/22 1000 -- 90 -- 101/60 75 -- -- 92 % -- -- None (Room air)   03/22/22 0900 -- 90 -- 93/55 68 -- -- 99 % -- -- None (Room air)   03/22/22 0850 -- 88 -- 88/51 Abnormal  65 -- -- 98 % -- -- --   03/22/22 0837 100 8 °F (38 2 °C) Abnormal  -- -- -- -- -- -- -- -- -- --   03/22/22 0830 -- 84 -- 81/51 Abnormal  61 -- -- 97 % -- -- --   03/22/22 5146 -- -- -- 81/50 Abnormal  -- -- -- -- -- -- --       Pertinent Labs/Diagnostic Test Results:     3/23 ECHO:  Left Ventricle: Left ventricular cavity size is normal  Wall thickness is normal  The left ventricular ejection fraction is 60%  Systolic function is normal  Wall motion is normal  Diastolic function is normal for age  There is concentric remodeling    Right Ventricle: Right ventricular cavity size is normal  Systolic function is normal     Pericardium: There is a trivial pericardial effusion circumferential to the heart  The fluid exhibits no internal echoes  XR shoulder 2+ vw right   Final Result by Rosanna Méndez MD (03/23 8704)      No acute osseous abnormality  Workstation performed: [id number]         CTA ED chest PE study   ED Interpretation by Rei Peres MD (03/22 1993)   FINDINGS:     PULMONARY ARTERIAL TREE:  No pulmonary embolus is seen       LUNGS:  Large right upper lobe mass unchanged  New extensive surrounding groundglass density in the right upper lobe lobe, with more consolidative density at the apex  New small patchy groundglass glass densities in the left upper and right lower   lobes  Unchanged occlusion of the right upper lobe bronchus by the mass  No other tracheal or endobronchial lesion      PLEURA:  Unremarkable      HEART/GREAT VESSELS:  Unremarkable for patient's age  No thoracic aortic aneurysm      MEDIASTINUM AND NAS:  Unchanged mediastinal and right hilar adenopathy      CHEST WALL AND LOWER NECK: Unchanged 1 5 cm right thyroid nodule      VISUALIZED STRUCTURES IN THE UPPER ABDOMEN:  Unremarkable      OSSEOUS STRUCTURES:  No acute fracture or destructive osseous lesion      IMPRESSION:     No pulmonary embolus        Unchanged large right upper lobe mass most compatible with carcinoma, with new extensive right upper lobe kerri   undglass density which could represent postobstructive pneumonia, edema, hemorrhage or lymphangitic carcinomatosis  New groundglass infiltrates   in the left upper and right lower lobes, presumably infectious/inflammatory      Unchanged mediastinal and right hilar adenopathy            Workstation performed: [id number]      Final Result by Lesley Tian MD (03/22 1032)      No pulmonary embolus  Unchanged large right upper lobe mass most compatible with carcinoma, with new extensive right upper lobe groundglass density which could represent postobstructive pneumonia, edema, hemorrhage or lymphangitic carcinomatosis  New groundglass infiltrates    in the left upper and right lower lobes, presumably infectious/inflammatory  Unchanged mediastinal and right hilar adenopathy  Workstation performed: [id number]         CT head without contrast   ED Interpretation by Sumeet Stephen MD (03/22 1027)   FINDINGS:     PARENCHYMA:  Unchanged 2 8 cm hyperdense mass in the right occipital lobe with extensive surrounding edema, and 1 0 cm hyperdense mass in the left inferior frontal lobe with surrounding edema  No CT signs of acute infarction  No acute parenchymal   hemorrhage      VENTRICLES AND EXTRA-AXIAL SPACES:  Normal for the patient's age      VISUALIZED ORBITS AND PARANASAL SINUSES:  No acute abnormality involving the orbits  Moderate sized polyp or mucous retention cyst in the left maxillary sinus  No fluid levels are seen      CALVARIUM AND EXTRACRANIAL SOFT TISSUES:  Normal      IMPRESSION:     No acute findings  Unchanged metastatic lesions with surrounding edema in the right occipital and left frontal lobes                  Workstation performed: [id number]      Final Result by Lesley Tian MD (03/22 1015)      No acute findings  Unchanged metastatic lesions with surrounding edema in the right occipital and left frontal lobes                    Workstation performed: [id number]         XR chest 1 view portable   ED Interpretation by Sumeet Stephen MD (03/22 1111)   Increased opacity in the right upper lung  Increased mass vs post-obstructive pneumonia vs both  Final Result by Owen Charles MD (03/22 1125)      Increased right upper lobe airspace opacity peripheral to the right upper lobe mass  See subsequent CT for further details  Workstation performed: [id number]           3/22 ED EKG  Previous ECG:  Compared to current   Rate:     ECG rate:  88     ECG rate assessment: normal     Rhythm:     Rhythm: sinus rhythm     Ectopy:     Ectopy: none     QRS:     QRS axis:  Normal     QRS intervals:  Normal   Conduction:     Conduction: abnormal       Abnormal conduction: incomplete RBBB     ST segments:     ST segments:  Normal   T waves:     T waves: normal        Results from last 7 days   Lab Units 03/22/22  0849   SARS-COV-2  Negative     Results from last 7 days   Lab Units 03/23/22  0520 03/23/22 0130 03/22/22  0849 03/17/22  0544 03/17/22  0544   WBC Thousand/uL 22 45* 17 95* 27 71*   < > 14 60*   HEMOGLOBIN g/dL 13 5 13 8 14 1  --  14 1   HEMATOCRIT % 37 9 40 5 41 7  --  43 6   PLATELETS Thousands/uL 117* 141* 220   < > 341   NEUTROS ABS Thousands/µL  --   --   --   --  12 59*   BANDS PCT % 58* 48* 6  --   --     < > = values in this interval not displayed           Results from last 7 days   Lab Units 03/23/22  0520 03/23/22 0130 03/22/22  0849 03/17/22  0544   SODIUM mmol/L 135* 135* 135* 140   POTASSIUM mmol/L 3 6 3 8 4 4 4 1   CHLORIDE mmol/L 101 101 97* 106   CO2 mmol/L 28 29 30 30   ANION GAP mmol/L 6 5 8 4   BUN mg/dL 22 25 29* 32*   CREATININE mg/dL 1 04 1 18 1 44* 0 96   EGFR ml/min/1 73sq m 79 68 53 88   CALCIUM mg/dL 8 0* 7 5* 8 8 9 2   CALCIUM, IONIZED mmol/L  --  1 07*  --   --    MAGNESIUM mg/dL 2 0 1 6 1 8  --    PHOSPHORUS mg/dL 3 5 3 6 3 6  --      Results from last 7 days   Lab Units 03/23/22  0520 03/22/22  0849   AST U/L 16 21   ALT U/L 33 60   ALK PHOS U/L 49 64   TOTAL PROTEIN g/dL 5 0* 6 4   ALBUMIN g/dL 1 8* 2 9*   TOTAL BILIRUBIN mg/dL 0 67 1 29*     Results from last 7 days   Lab Units 03/23/22  1159 03/23/22  0526 03/23/22  0517 03/22/22  2058 03/22/22  1654 03/22/22  0815 03/19/22  1151 03/19/22  0821 03/18/22  2207 03/18/22  1547 03/18/22  1114 03/18/22  0900   POC GLUCOSE mg/dl 85 69 65 112 136 164* 151* 151* 289* 167* 159* 179*     Results from last 7 days   Lab Units 03/23/22  0520 03/23/22  0130 03/22/22  0849 03/17/22  0544   GLUCOSE RANDOM mg/dL 73 81 160* 146*             BETA-HYDROXYBUTYRATE   Date Value Ref Range Status   01/06/2021 0 6 (H) <0 6 mmol/L Final        Results from last 7 days   Lab Units 03/22/22  1244 03/22/22  1057 03/22/22  0849   HS TNI 0HR ng/L  --   --  5   HS TNI 2HR ng/L 9  --   --    HSTNI D2 ng/L 4  --   --    HS TNI 4HR ng/L  --  7  --    HSTNI D4 ng/L  --  2  --          Results from last 7 days   Lab Units 03/23/22  0130 03/22/22  0849   PROTIME seconds 18 3* 12 9   INR  1 53* 0 97   PTT seconds  --  26         Results from last 7 days   Lab Units 03/23/22  0520 03/22/22  1520   PROCALCITONIN ng/ml 89 19* 29 27*     Results from last 7 days   Lab Units 03/23/22  0520 03/23/22  0130 03/22/22  1819 03/22/22  1520 03/22/22  1220 03/22/22  0849   LACTIC ACID mmol/L 1 6 2 3* 2 9* 2 4* 3 5* 3 0*       Results from last 7 days   Lab Units 03/22/22  1519   CLARITY UA  Clear   COLOR UA  Yellow   SPEC GRAV UA  1 020   PH UA  5 5   GLUCOSE UA mg/dl Negative   KETONES UA mg/dl Negative   BLOOD UA  Negative   PROTEIN UA mg/dl Negative   NITRITE UA  Negative   BILIRUBIN UA  Negative   UROBILINOGEN UA E U /dl 0 2   LEUKOCYTES UA  Negative   WBC UA /hpf None Seen   RBC UA /hpf 0-1*   BACTERIA UA /hpf Occasional   EPITHELIAL CELLS WET PREP /hpf None Seen     Results from last 7 days   Lab Units 03/22/22  0849   INFLUENZA A PCR  Negative   INFLUENZA B PCR  Negative   RSV PCR  Negative       Results from last 7 days   Lab Units 03/22/22  1520 03/22/22  0850 03/22/22  0849   BLOOD CULTURE   --  No Growth at 24 hrs   No Growth at 24 hrs     GRAM STAIN RESULT  2+ Epithelial cells per low power field*  1+ Polys*  2+ Gram positive cocci in pairs*  1+ Gram negative rods*  1+ Gram positive rods*  --   --                ED Treatment:   Medication Administration from 03/22/2022 0747 to 03/22/2022 1833       Date/Time Order Dose Route Action     03/22/2022 0908 sodium chloride 0 9 % bolus 1,000 mL 0 mL Intravenous Stopped     03/22/2022 0835 sodium chloride 0 9 % bolus 1,000 mL 1,000 mL Intravenous New Bag     03/22/2022 0908 acetaminophen (TYLENOL) tablet 650 mg 650 mg Oral Given     03/22/2022 1005 fentanyl citrate (PF) 100 MCG/2ML 50 mcg 50 mcg Intravenous Given     03/22/2022 1019 lactated ringers bolus 1,000 mL 0 mL Intravenous Stopped     03/22/2022 0912 lactated ringers bolus 1,000 mL 1,000 mL Intravenous New Bag     03/22/2022 0951 iohexol (OMNIPAQUE) 350 MG/ML injection (SINGLE-DOSE) 85 mL 85 mL Intravenous Given     03/22/2022 1058 cefTRIAXone (ROCEPHIN) 2,000 mg in dextrose 5 % 50 mL IVPB 0 mg Intravenous Stopped     03/22/2022 1033 cefTRIAXone (ROCEPHIN) 2,000 mg in dextrose 5 % 50 mL IVPB 2,000 mg Intravenous New Bag     03/22/2022 1235 azithromycin (ZITHROMAX) 500 mg in sodium chloride 0 9% 250mL IVPB 500 mg 0 mg Intravenous Stopped     03/22/2022 1204 azithromycin (ZITHROMAX) 500 mg in sodium chloride 0 9% 250mL IVPB 500 mg   Intravenous Canceled Entry     03/22/2022 1100 azithromycin (ZITHROMAX) 500 mg in sodium chloride 0 9% 250mL IVPB 500 mg 500 mg Intravenous New Bag     03/22/2022 1220 lactated ringers bolus 1,000 mL 0 mL Intravenous Stopped     03/22/2022 1117 lactated ringers bolus 1,000 mL 1,000 mL Intravenous New Bag     03/22/2022 1410 levETIRAcetam (KEPPRA) tablet 500 mg 500 mg Oral Given     03/22/2022 1507 cholecalciferol (VITAMIN D3) tablet 1,000 Units 1,000 Units Oral Not Given     03/22/2022 1410 heparin (porcine) subcutaneous injection 5,000 Units 5,000 Units Subcutaneous Given     03/22/2022 1411 lidocaine (LIDODERM) 5 % patch 1 patch 1 patch Topical Medication Applied     03/22/2022 1410 oxyCODONE (ROXICODONE) IR tablet 5 mg 5 mg Oral Given     03/22/2022 1410 HYDROmorphone (DILAUDID) injection 0 2 mg 0 2 mg Intravenous Given     03/22/2022 1712 insulin lispro (HumaLOG) 100 units/mL subcutaneous injection 1-5 Units 1 Units Subcutaneous Not Given     03/22/2022 1540 lactated ringers infusion 0 mL/hr Intravenous Stopped     03/22/2022 1504 lactated ringers infusion 150 mL/hr Intravenous New Bag     03/22/2022 1505 vancomycin (VANCOCIN) IVPB (premix in dextrose) 1,000 mg 200 mL 1,000 mg Intravenous Not Given     03/22/2022 1542 cefepime (MAXIPIME) 2 g/50 mL dextrose IVPB 0 mg Intravenous Stopped     03/22/2022 1512 cefepime (MAXIPIME) 2 g/50 mL dextrose IVPB 2,000 mg Intravenous New Bag     03/22/2022 1641 vancomycin (VANCOCIN) 1,250 mg in sodium chloride 0 9 % 250 mL IVPB 1,250 mg Intravenous New Bag     03/22/2022 1655 lactated ringers bolus 1,000 mL 0 mL Intravenous Stopped     03/22/2022 1540 lactated ringers bolus 1,000 mL 1,000 mL Intravenous New Bag     03/22/2022 1819 sodium chloride 0 9 % bolus 500 mL 0 mL Intravenous Stopped     03/22/2022 1710 sodium chloride 0 9 % bolus 500 mL 500 mL Intravenous New Bag        Past Medical History:   Diagnosis Date    Diabetes mellitus ([facility] [address] )     Elevated PSA 3/11/2021    Fatty liver 3/11/2021    Gall bladder polyp 3/11/2021    Lung cancer ([facility] [address] )     Nonimmune to hepatitis B virus 3/11/2021     Present on Admission:   Type 2 diabetes mellitus without complication, without long-term current use of insulin (HCC)   Lung mass   Brain mass   Acute respiratory failure with hypoxia (HCC)   Acute pain of right scapular   Cerebral edema (HCC)      Admitting Diagnosis: Right shoulder pain [M25 511]  Sepsis ([facility] [address] ) [A41 9]  Pneumonia of right upper lobe due to infectious organism [J18 9]  Age/Sex: 64 y o  male  Admission Orders: SCD, PT/OT, NC O2  Scheduled Medications:  cefepime, 2,000 mg, Intravenous, Q12H  cholecalciferol, 1,000 Units, Oral, Daily  dexamethasone, 4 mg, Oral, Q12H LISY  heparin (porcine), 5,000 Units, Subcutaneous, Q8H [address]  [MAR Hold] insulin lispro, 1-6 Units, Subcutaneous, Q6H LISY  levETIRAcetam, 500 mg, Oral, Q12H LISY  pantoprazole, 40 mg, Oral, Early Morning  vancomycin, 12 5 mg/kg, Intravenous, Q12H      Continuous IV Infusions:  norepinephrine, 1-30 mcg/min, Intravenous, Titrated      PRN Meds:  acetaminophen, 650 mg, Oral, Q6H PRN  calcium carbonate, 1,000 mg, Oral, Daily PRN  HYDROmorphone, 0 2 mg, Intravenous, Q4H PRN  naloxone, 0 04 mg, Intravenous, Q1MIN PRN  ondansetron, 4 mg, Intravenous, Q6H PRN  oxyCODONE, 2 5 mg, Oral, Q6H PRN  oxyCODONE, 5 mg, Oral, Q6H PRN x1 3/23 thus far  polyethylene glycol, 17 g, Oral, Daily PRN        IP CONSULT TO PULMONOLOGY  IP CONSULT TO PHARMACY  IP CONSULT TO CASE MANAGEMENT  IP CONSULT TO PALLIATIVE CARE  IP CONSULT TO PALLIATIVE CARE    Network Utilization Review Department  ATTENTION: Please call with any questions or concerns to [phone] and carefully listen to the prompts so that you are directed to the right person  All voicemails are confidential   Gianna Blair all requests for admission clinical reviews, approved or denied determinations and any other requests to dedicated fax number below belonging to the campus where the patient is receiving treatment   List of dedicated fax numbers for the Facilities:  [address] DENIALS (Administrative/Medical Necessity) [phone]   [address] [place] [address] (Maternity/NICU/Pediatrics) 261 [facility] [address] [facility] [place] [address] [phone] [place] [address] [phone]

## 2022-03-23 NOTE — ASSESSMENT & PLAN NOTE
· Noted on CT January 2022  · Consistent with adenocarcinoma from tissue exam 3/16/2022  · S/p bronchoscopy with Bx 3/16/2022  · Pulm following prior to transfer to ICU  · He was recently hospitalized 3/14-3/19 after initially presenting with headaches and visual dizziness and was noted to have large left lung mass with brain mets    On discharge he was not requiring supplemental O2

## 2022-03-23 NOTE — OCCUPATIONAL THERAPY NOTE
Occupational Therapy Evaluation     Patient Name: Georges HITCHCOCKI Date: 3/23/2022  Problem List  Principal Problem:    Septic shock ([facility] [address] )  Active Problems:    Type 2 diabetes mellitus without complication, without long-term current use of insulin (HCC)    Lung mass    Brain mass    Cerebral edema (HCC)    Obstructive pneumonia    Acute nontraumatic kidney injury ([facility] [address] )    Acute respiratory failure with hypoxia (HCC)    Acute pain of right scapular    Past Medical History  Past Medical History:   Diagnosis Date    Diabetes mellitus ([facility] [address] )     Elevated PSA 3/11/2021    Fatty liver 3/11/2021    Gall bladder polyp 3/11/2021    Lung cancer ([facility] [address] )     Nonimmune to hepatitis B virus 3/11/2021     Past Surgical History  History reviewed  No pertinent surgical history  03/23/22 1449   OT Last Visit   OT Visit Date 03/23/22  (Wednesday)   Note Type   Note type Evaluation   Restrictions/Precautions   Weight Bearing Precautions Per Order No   Other Precautions Bed Alarm; Chair Alarm;Multiple lines;Telemetry;O2;Fall Risk  (Language barrier, O2 via NC,L UE A-line, hardwired telemetry)   Pain Assessment   Pain Assessment Tool 0-10   Pain Score 8   Pain Location/Orientation Location: Back; Location: Abdomen   Pain Onset/Description   (w/ movement)   Effect of Pain on Daily Activities limits activity tolerance and I w/ ADLs   Patient's Stated Pain Goal No pain   Hospital Pain Intervention(s) Repositioned; Ambulation/increased activity; Emotional support  (RNReta medicated pt upon therapist arrival)   Home Living   Type of [place] [id number] to live on main level with bedroom/bathroom; Performs ADLs on one level; Two level   Bathroom Shower/Tub Tub/shower unit   Bathroom Toilet Standard   Bathroom Equipment Other (Comment)  (no DME)   2020 [employer] Other (Comment)  (no AD or DME)   Additional Comments Pt primarily [place] speaking and requested that son translate over phone  Pt lives w/ wife and has first floor set- up   Prior Function   Level of [place] Independent with ADLs and functional mobility   Lives With Spouse   Receives Help From   (supportive family; wife and son present post eval)   ADL Assistance Independent   IADLs Independent  (+ drive at baseline)   Falls in the last 6 months 0   Comments Pt reports I w/ ADL/ IADL at baseline w/ out use of AD, DME, or O2  Pt's son reports pt was I and active until few days prior to admission  Lifestyle   Autonomy Pt completed ADL and IADL w/ out assitsance or use of AD at baseline   Reciprocal Relationships Supportive wife and son present post eval   Intrinsic Gratification Pt reports enjoying gardening   Psychosocial   Patient Behaviors/Mood Flat affect   ADL   Where Assessed Edge of bed  (vs OOB in chair post eval)   Eating Assistance 6  Modified independent   Eating Deficit Setup   Grooming Assistance 4  Minimal Assistance  (seated OOB in chair to wipe eyes w/ tissue)   Grooming Deficit Setup;Supervision/safety; Increased time to complete   UB Bathing Assistance Unable to assess   LB Bathing Assistance Unable to assess   UB Dressing Assistance 5  Supervision/Setup   UB Dressing Deficit Setup; Increased time to complete;Verbal cueing;Supervision/safety  (due to multiple lines, fatigue, pain)   LB Dressing Assistance 2  Maximal Assistance   LB Dressing Deficit Don/doff R sock; Don/doff L sock; Setup  (due to multiple lines)   Toileting Assistance  Unable to assess   Additional Comments on O2 via NC  O2 sats 91-94% throughout session   Bed Mobility   Supine to Sit 5  Supervision   Additional items Assist x 1;HOB elevated; Bedrails; Increased time required   Sit to Supine Unable to assess   Additional Comments Pt seated OOB in chair post eval w/ needs met, call bell in reach and chair alarm activated   Care coordination w/ PT RJ due to decreased activity tolerance and multiple lines   Transfers   Sit to Stand 5  Supervision Additional items Assist x 1; Increased time required;Verbal cues   Stand to Sit 5  Supervision   Additional items Assist x 1; Increased time required;Verbal cues   Additional Comments Pt required min A (CG/ steadying) to complete steppage transfer from EOB to chair  Functional Mobility   Functional Mobility 5  Supervision  (close)   Additional Comments (close) S short distances w/ in room using RW w/ improved stability / balance   Additional items Rolling walker   Balance   Static Sitting Fair +   Static Standing Fair   Ambulatory Fair -   Activity Tolerance   Activity Tolerance Patient limited by fatigue;Patient limited by pain   Medical Staff Made Aware care coordination w/ PT, MIGUEL   Nurse Made Aware per RNJose appropriate to see pt   RUE Assessment   RUE Assessment   (limited AROM shoulder >90*)   RUE Strength   RUE Overall Strength Deficits  (able to participate in ADLs)   R Shoulder Flexion 3-/5   LUE Assessment   LUE Assessment X  (limited AROM shoulder, wrist/ hand due to lines)   LUE Strength   LUE Overall Strength Due to pain;Deficits  (L UE A-line)   Hand Function   Gross Motor Coordination   (appears WFL, Will continue to assess)   Fine Motor Coordination   (due to multiple lines)   Sensation   Light Touch No apparent deficits   Sharp/Dull Not tested   Vision-Basic Assessment   Patient Visual Report   ( pt reports eyes watery)   Cognition   Overall Cognitive Status Unable to assess  (appears appropriate and WFL w/ son translating)   Arousal/Participation Alert; Cooperative   Attention Attends with cues to redirect   Orientation Level Oriented to person;Oriented to place  (w/ son translating)   Memory   (appears [facility] w/ son translating)   Following Commands Follows one step commands with increased time or repetition   Comments Identified pt by full name and birthdate  Pt primarily [place] speaking and requested that son / family translate over phone  Generally oriented and able to follow directions  Assessment   Limitation Decreased ADL status; Decreased endurance;Decreased self-care trans;Decreased high-level ADLs; Decreased UE strength   Assessment Pt is a 62yo male [facility] 3[facility]  Pt presented w/ R shoulder pain and SOB  Significant PMH impacting his occupational performance includes DM, recently diagnosed w/ lung cancer w/ brain mets, tobacco abuse  Pt diagnosed w/ septic shock from a pulmonary source and transferred to ICU on 3/23/22  Pt w/ active OT orders and activity orders  Pt is primarily [place] speaking and lives w/ his wife  Pt reports having a first floor set- up and I w/ ADL/ IADL at baseline w/ out use of AD, DME or O2  Personal factors impacting performance includes increased pain and language barrier  Upon eval, pt alert and generally oriented  Able to communicate wants / needs in [place] w/ son translating  Pt required S to complete bed mobility  Pt required max A to complete LBD and S to complete UBD  Pt required S to complete sit <> stand and short distance functional mobility using RW  Pt required min A w/ out use of AD  O2 sats >90% throughout  Pt presents w/ decreased activity tolerance, decreased endurance, decreased UE AROM and strength, generalized weakness /deconditioning, decreased sitting tolerance, decreased standing tolerance, and increased pain impacting his I w/ dressing, bathing, oral hygiene, functional mobility, functional transfers, activity engagement, clothing mgmt, community mobility  Pt completing ADL below baseline level of I and would benefit from OT while in acute care to address deficits, and Home OT at [facility]  Recommend DC home w/ family support/ assist when medically stable for discharge from acute care using RW  Will continue to follow   Goals   Patient Goals Pt stated that he would like to return to baseline level of I and drive   Plan   Treatment Interventions ADL retraining;Functional transfer training;UE strengthening/ROM; Endurance training;Patient/family training;Equipment evaluation/education;Continued evaluation; Energy conservation; Activityengagement   Goal Expiration Date 04/04/22   OT Frequency 2-3x/wk   Recommendation   OT Discharge Recommendation Home with home health rehabilitation  (DC home w/ family support / assist and Home OT)   Equipment Recommended Shower/Tub chair with back ($)   Additional Comments  may benefit from use of AD to assist w/ balance and stability   AM-PAC Daily Activity Inpatient   Lower Body Dressing 2   Bathing 2   Toileting 3   Upper Body Dressing 3   Grooming 3   Eating 4   Daily Activity Raw Score 17   Daily Activity Standardized Score (Calc for Raw Score >=11) 37 26   AM-PAC Applied Cognition Inpatient   Following a Speech/Presentation 3   Understanding Ordinary Conversation 3   Taking Medications 3   Remembering Where Things Are Placed or Put Away 3   Remembering List of 4-5 Errands 3   Taking Care of Complicated Tasks 2   Applied Cognition Raw Score 17   Applied Cognition Standardized Score 36 52   Barthel Index   Feeding 5   Bathing 0   Grooming Score 0   Dressing Score 5   Bladder Score 10   Bowels Score 10   Toilet Use Score 5   Transfers (Bed/Chair) Score 10   Mobility (Level Surface) Score 0   Stairs Score 0   Barthel Index Score 45   Modified [place] Scale   Modified Bimal Scale 4   The patient's raw score on the AM-PAC Daily Activity inpatient short form is 17, standardized score is 37 26, less than 39 4  Patients at this level are likely to benefit from discharge to post-acute rehabilitation services  Please refer to the recommendation of the Occupational Therapist for safe discharge planning      Pt goals to be met by 4/4/22:  -Pt will demonstrate good attention and participation in ongoing eval of functional cognitive skills to assist in DC planning    -Pt will consistently follow multi - step directions during ADLs w/ good recall to max I and return home to his garden and driving as appropriate    -Pt will complete bed mobility supine <> sit w/ mod I to max I w/ ADLs    -Pt will complete functional transfers to all surfaces w/ mod I using AD, DME as needed to max I w/ ADLs    -Pt will consistently engage in functional mobility using AD as needed to / from bathroom w/ mod I to max I w/ ADLs    -Pt will complete LBD w/ min A using LHAE as needed to max I and minimize burden of care to return home    -Pt will complete UBD/ grooming w/ mod I seated to max I w/ ADLs    -Pt will demonstrate good attention and understanding EC tech to max I w/ ADLs and improve engagement    [facility], OTR/L

## 2022-03-23 NOTE — QUICK NOTE
Notified of patient's clinical status initially at 9:38pm  At that time he appeared to be in severe sepsis secondary to pneumonia  He had been febrile, tachycardic, tachypneic and hypotenisve which responded to IVF at that time  Additionally he had a very elevated procalcitonin and a lactate of 3 5 which also improved to 2 9 with IVF  He has a leukocytosis, though that may be partially due to dexamethasone he has been on since last week  6% bands  Last week he was admitted at [facility] for blurry vision and found to have occipital brain lesions with vasogenic edema consistent with metastatic malignancy  CT chest showed a very large right-sided pulmonary mass  Bronchoscopy with transbronchial needle biopsy yielded adenocarcinoma  His vision improved with dexamethasone and he was discharged with oncology follow up  Unfortunately he developed infectious symptoms and presented yesterday with what is now septic shock secondary to pneumonia with a potential post-obstructive component  Notified again of patient's persistent hypotension at 12:21 AM  Will accept patient to ICU to start peripheral vasopressors and obtain arterial line  May require CVC if pressor requirement is escalating or is not improving over the next few hours  He has been on broad spectrum antibiotics since admission  Sputum and blood cultures are pending  MRSA screen pending  Airway and respiratory protocols  Keppra for seizure ppx with recently diagnosed brain metastases with vasogenic edema  Case discussed with in-house critical care practitioner and attending physician        Glenna Rocha DO  Pulmonary & Critical Care Medicine Fellow

## 2022-03-23 NOTE — PROGRESS NOTES
[facility]  Progress Note Stephanie Fuel 1965, 64 y o  male MRN: [id number]  Unit/Bed#: ICU 07 Encounter: [id number]  Primary Care Provider: Shantanu Tay MD   Date and time admitted to hospital: 3/22/2022  8:12 AM    * Septic shock ([facility] )  Assessment & Plan  · POA: tachycardia, tachypnea, lactic acidosis, leukocytosis, elevated Tbili and fever  · CT chest: Unchanged large right upper lobe mass most compatible with carcinoma, with new extensive right upper lobe groundglass density which could represent postobstructive pneumonia, edema, hemorrhage or lymphangitic carcinomatosis  New groundglass infiltrates in the left upper and right lower lobes, presumably infectious/inflammatory  · UA bland  · Blood cultures x2 drawn on admission -- will follow  · Sputum culture obtained -- will follow  · COVID/flu/RSV PCR negative  · 3/16 bronch cultures negative to date  · Was hypotensive on arrival with lactic acid 3  Received 30 mL/kg IVF for sepsis resuscitation with initial improvement in blood pressure  He subsequently developed hypotension with SBP 80s  He was given additional 2 1L IVF for total 4L    Following additional IVF his blood pressure was 90s SBP and he was admitted to [address] [place] [address] Step Down 2     · Continue Vancomycin and cefepime -- narrow once culture data available  · Discontinue vanco if MRSA nares culture negative  · Follow WBC and fever curve  · Lactic 3, 3 5, 2 4, 2 9  · Will continue to trend  · procal 29 27  · Will continue to trend  · Critical care asked to see patient for recurrence of hypotension  · Transfer patient to ICU  · Start levophed   · Titrate for MAP > 65 mmHg    Acute nontraumatic kidney injury ([facility] )  Assessment & Plan  · 2/2 sepsis and poor po intake  · Baseline creatinine 0 9-1; on admission 1 44  · Received 4L IVF prior to transfer to ICU  · Check BMP now  · Renally dose medications as able  · Avoid hypotension/nephrotoxins  · Close monitoring of UO and renal indices  · Urinary retention protocol    Acute respiratory failure with hypoxia (HCC)  Assessment & Plan  · Not on supplemental O2 at home  · 2/2 obstructive pneumonia and possible degree of volume overload  · Currently on Nasal cannula 5L  · Titrate for SpO2 > 92%    Obstructive pneumonia  Assessment & Plan  · 3/22 CTA chest: negative for PE  Unchanged large right upper lobe mass most compatible with carcinoma, with new extensive right upper lobe groundglass density which could represent postobstructive pneumonia, edema, hemorrhage or lymphangitic carcinomatosis  New groundglass infiltrates in the left upper and right lower lobes, presumably infectious/inflammatory  · Procal 29 on admission  · Continue cefepime and vancomycin  · Airway clearance protocol  · Treatment as above    Type 2 diabetes mellitus without complication, without long-term current use of insulin [facility])  Assessment & Plan  Lab Results   Component Value Date    HGBA1C 5 7 (H) 11/07/2021       Recent Labs     03/22/22  0815 03/22/22  1654 03/22/22 2058   POCGLU 164* 136 112       Blood Sugar Average: Last 72 hrs:  (P) 150     · Holding home metformin for now  · NPO for now due to lethargy  If mental status improves, will allow CHO controlled diet  · Goal blood glucose 140-180  · q6h accu checks with SSI  · Avoid hypoglycemia    Lung mass  Assessment & Plan  · Noted on CT January 2022  · Consistent with adenocarcinoma from tissue exam 3/16/2022  · S/p bronchoscopy with Bx 3/16/2022  · Pulm following prior to transfer to ICU  · He was recently hospitalized 3/14-3/19 after initially presenting with headaches and visual dizziness and was noted to have large left lung mass with brain mets    On discharge he was not requiring supplemental O2    Brain mass  Assessment & Plan  · 3/14 CT head: Hyperdense masses noted at the right occipital lobe and inferior left frontal lobes with surrounding vasogenic edema, suspicious for hemorrhagic metastases  · 3/22 CT head: Unchanged metastatic lesions with surrounding edema in the right occipital and left frontal lobes  · Neurosurgery was consulted on previous admission 3/14-3/19  · Will continue decadron and keppra for vasogenic edema and seizure ppx  · No reported seizures  · Serial neuro checks  · If decrease in mental status will obtain stat CT head    History of tobacco use  Assessment & Plan  · Reportedly quit 11/2021    Acute pain of right scapular  Assessment & Plan  · States pain started 3/22 1am, and woke him from sleep and is worse with movement of right shoulder  · Right scapular XR pending  · Continue multimodal pain regimen        -------------------------------------------------------------------------------------------------------------  Chief Complaint: general malaise and right scapular pain    History of Present Illness     Francisco aJvier Gerard is a 64 y o  male who presents with PMHx vitamin D deficiency and previous tobacco abuse history  He was recently hospitalized 3/14-3/19 with c/o headaches and dizziness and was found to have a left lung mass and brain mets  During that hospitalization he underwent bronchoscopy with biopsy and results are consistent with adenocarcinoma  Earlier yesterday he developed acute right scapular pain and on evaluation in the ER was found to have sepsis with fever, lactic acidosis, leukocytosis  He was initially hypotensive and received sepsis resuscitation fluid and additional IVF  He was admitted to Step Down 2 with improved blood pressures  Overnight he again became hypotensive and was transferred to ICU for vasopressors        History obtained from spouse, chart review and the patient   -------------------------------------------------------------------------------------------------------------  Dispo: Transfer to Critical Care     Code Status: Level 1 - Full Code  --------------------------------------------------------------------------------------------------------------  Review of Systems   Constitutional: Positive for chills, fatigue and fever  HENT: Negative for trouble swallowing  Eyes: Negative  Respiratory: Positive for cough and shortness of breath  Negative for chest tightness  Cardiovascular: Negative for chest pain, palpitations and leg swelling  Gastrointestinal: Negative for abdominal distention, abdominal pain, diarrhea, nausea and vomiting  Endocrine: Negative  Genitourinary: Negative for difficulty urinating, frequency, hematuria and urgency  Musculoskeletal: Positive for arthralgias (right scapula)  Negative for myalgias  Skin: Negative for pallor, rash and wound  Allergic/Immunologic: Negative  Neurological: Negative for dizziness, seizures, weakness and headaches  Hematological: Negative  Psychiatric/Behavioral: Negative  A 12-point, complete review of systems was reviewed and negative except as stated above     Physical Exam  Vitals and nursing note reviewed  Constitutional:       General: He is not in acute distress  Appearance: He is normal weight  He is ill-appearing and toxic-appearing  Interventions: Nasal cannula in place  HENT:      Head: Normocephalic and atraumatic  Right Ear: External ear normal       Left Ear: External ear normal       Nose: Nose normal  No congestion or rhinorrhea  Mouth/Throat:      Mouth: Mucous membranes are moist       Pharynx: Oropharynx is clear  No oropharyngeal exudate or posterior oropharyngeal erythema  Eyes:      General: No scleral icterus  Extraocular Movements: Extraocular movements intact  Conjunctiva/sclera:      Right eye: Right conjunctiva is injected  Left eye: Left conjunctiva is injected  Pupils: Pupils are equal, round, and reactive to light  Neck:      Vascular: No carotid bruit     Cardiovascular:      Rate and Rhythm: Normal rate and regular rhythm  Pulses:           Radial pulses are 2+ on the right side and 2+ on the left side  Dorsalis pedis pulses are 2+ on the right side and 2+ on the left side  Heart sounds: S1 normal and S2 normal  No murmur heard  No friction rub  No gallop  Pulmonary:      Effort: Tachypnea present  No respiratory distress  Breath sounds: Examination of the left-upper field reveals rhonchi  Examination of the left-middle field reveals rhonchi  Examination of the right-lower field reveals rales  Examination of the left-lower field reveals rales  Rhonchi and rales present  No decreased breath sounds or wheezing  Abdominal:      General: Abdomen is flat  Bowel sounds are normal  There is no distension  Palpations: Abdomen is soft  Tenderness: There is no abdominal tenderness  Musculoskeletal:         General: Tenderness present  No swelling  Right shoulder: Decreased range of motion  Arms:       Cervical back: Normal range of motion and neck supple  Right lower leg: No edema  Left lower leg: No edema  Lymphadenopathy:      Cervical: No cervical adenopathy  Skin:     General: Skin is warm and dry  Capillary Refill: Capillary refill takes less than 2 seconds  Coloration: Skin is not pale  Findings: No bruising, erythema or rash  Neurological:      General: No focal deficit present  Mental Status: He is oriented to person, place, and time  He is lethargic  Cranial Nerves: No cranial nerve deficit  Sensory: No sensory deficit  Psychiatric:         Attention and Perception: Attention normal          Mood and Affect: Mood normal          Speech: Speech normal          Behavior: Behavior is cooperative  Thought Content:  Thought content normal          Cognition and Memory: Cognition normal          Judgment: Judgment normal  --------------------------------------------------------------------------------------------------------------  Vitals:   Vitals:    03/22/22 2210 03/22/22 2300 03/23/22 0012 03/23/22 0123   BP: 94/59 (!) 82/55 (!) 84/52 90/55   BP Location:    Right arm   Pulse: 102 98 95 94   Resp:    (!) 29   Temp: (!) 101 4 °F (38 6 °C)  100 5 °F (38 1 °C) 100 2 °F (37 9 °C)   TempSrc:    Oral   SpO2: 95% 95% 96% 98%   Weight:    66 5 kg (146 lb 9 7 oz)     Temp  Min: 98 9 °F (37 2 °C)  Max: 101 9 °F (38 8 °C)        Body mass index is 23 66 kg/m²      Laboratory and Diagnostics:  Results from last 7 days   Lab Units 03/22/22  0849 03/17/22  0544 03/16/22  0507   WBC Thousand/uL 27 71* 14 60* 17 75*   HEMOGLOBIN g/dL 14 1 14 1 14 6   HEMATOCRIT % 41 7 43 6 43 3   PLATELETS Thousands/uL 220 341 352   NEUTROS PCT %  --  86* 89*   BANDS PCT % 6  --   --    MONOS PCT %  --  5 3*   MONO PCT % 8  --   --      Results from last 7 days   Lab Units 03/22/22  0849 03/17/22  0544 03/16/22  0507   SODIUM mmol/L 135* 140 139   POTASSIUM mmol/L 4 4 4 1 4 2   CHLORIDE mmol/L 97* 106 107   CO2 mmol/L 30 30 28   ANION GAP mmol/L 8 4 4   BUN mg/dL 29* 32* 29*   CREATININE mg/dL 1 44* 0 96 1 02   CALCIUM mg/dL 8 8 9 2 9 3   GLUCOSE RANDOM mg/dL 160* 146* 155*   ALT U/L 60  --   --    AST U/L 21  --   --    ALK PHOS U/L 64  --   --    ALBUMIN g/dL 2 9*  --   --    TOTAL BILIRUBIN mg/dL 1 29*  --   --      Results from last 7 days   Lab Units 03/22/22  0849   MAGNESIUM mg/dL 1 8   PHOSPHORUS mg/dL 3 6      Results from last 7 days   Lab Units 03/22/22  0849   INR  0 97   PTT seconds 26          Results from last 7 days   Lab Units 03/22/22  1819 03/22/22  1520 03/22/22  1220 03/22/22  0849   LACTIC ACID mmol/L 2 9* 2 4* 3 5* 3 0*     ABG:    VBG:    Results from last 7 days   Lab Units 03/22/22  1520   PROCALCITONIN ng/ml 29 27*       Micro:  Results from last 7 days   Lab Units 03/22/22  0850 03/22/22  0849 03/16/22  1204   BLOOD CULTURE Received in Microbiology Lab  Culture in Progress  Received in Microbiology Lab  Culture in Progress  --    GRAM STAIN RESULT   --   --  No Polys or Bacteria seen       EKG: sinus rhythm  Imaging: I have personally reviewed pertinent reports  and I have personally reviewed pertinent films in PACS      Historical Information   Past Medical History:   Diagnosis Date    Diabetes mellitus ([facility] )     Elevated PSA 3/11/2021    Fatty liver 3/11/2021    Gall bladder polyp 3/11/2021    Lung cancer ([facility] )     Nonimmune to hepatitis B virus 3/11/2021     History reviewed  No pertinent surgical history  Social History   Social History     Substance and Sexual Activity   Alcohol Use Not Currently    Comment: quit drinking 7 years ago     Social History     Substance and Sexual Activity   Drug Use Never     Social History     Tobacco Use   Smoking Status Former Smoker    Packs/day: 0 50    Types: Cigarettes    Quit date: 2022    Years since quittin 1   Smokeless Tobacco Never Used   Tobacco Comment    quit 3 months ago     Exercise History: independent  Family History:   History reviewed  No pertinent family history    I have reviewed this patient's family history and commented on sigificant items within the HPI      Medications:  Current Facility-Administered Medications   Medication Dose Route Frequency    acetaminophen (TYLENOL) tablet 650 mg  650 mg Oral Q6H PRN    calcium carbonate (TUMS) chewable tablet 1,000 mg  1,000 mg Oral Daily PRN    cefepime (MAXIPIME) 2 g/50 mL dextrose IVPB  2,000 mg Intravenous Q12H    cholecalciferol (VITAMIN D3) tablet 1,000 Units  1,000 Units Oral Daily    dexamethasone (DECADRON) tablet 4 mg  4 mg Oral Q12H [facility]    heparin (porcine) subcutaneous injection 5,000 Units  5,000 Units Subcutaneous Q8H [facility]    HYDROmorphone (DILAUDID) injection 0 2 mg  0 2 mg Intravenous Q4H PRN    [MAR Hold] insulin lispro (HumaLOG) 100 units/mL subcutaneous injection 1-6 Units  1-6 Units Subcutaneous Q6H [facility]    levETIRAcetam (KEPPRA) tablet 500 mg  500 mg Oral Q12H [facility]    lidocaine (LIDODERM) 5 % patch 1 patch  1 patch Topical Daily    naloxone (NARCAN) 0 04 mg/mL syringe 0 04 mg  0 04 mg Intravenous Q1MIN PRN    norepinephrine (LEVOPHED) 4 mg (STANDARD CONCENTRATION) IV in sodium chloride 0 9% 250 mL  1-30 mcg/min Intravenous Titrated    ondansetron (ZOFRAN) injection 4 mg  4 mg Intravenous Q6H PRN    oxyCODONE (ROXICODONE) IR tablet 2 5 mg  2 5 mg Oral Q6H PRN    oxyCODONE (ROXICODONE) IR tablet 5 mg  5 mg Oral Q6H PRN    pantoprazole (PROTONIX) EC tablet 40 mg  40 mg Oral Early Morning    polyethylene glycol (MIRALAX) packet 17 g  17 g Oral Daily PRN    vancomycin (VANCOCIN) IVPB (premix in dextrose) 1,000 mg 200 mL  15 mg/kg Intravenous Daily PRN     Home medications:  Prior to Admission Medications   Prescriptions Last Dose Informant Patient Reported? Taking?    BD Pen Needle Jeaneth 2nd Gen 32G X 4 MM MISC  Child Yes No   Sig: USE ONCE DAILY WITH LANTUS   Blood Glucose Monitoring Suppl (FreeStyle Lite) FIDELIA  Child Yes No   Cholecalciferol (Vitamin D3) 125 MCG (5000 UT) CAPS  Child No No   Sig: Take 1 capsule (5,000 Units total) by mouth daily   FREESTYLE LITE test strip  Child No No   Sig: Check blood sugar  daily   Lancets (freestyle) lancets  Child No No   Sig: Check blood sugar daily   dexamethasone (DECADRON) 2 mg tablet   No No   Sig: Take 4 mg TID x3 days, then 4 mg BID until directed otherwise   levETIRAcetam (KEPPRA) 500 mg tablet   No No   Sig: Take 1 tablet (500 mg total) by mouth every 12 (twelve) hours   metFORMIN (GLUCOPHAGE-XR) 500 mg 24 hr tablet  Child No No   Sig: Take 2 tablets (1,000 mg total) by mouth daily with dinner   pantoprazole (PROTONIX) 40 mg tablet   No No   Sig: Take 1 tablet (40 mg total) by mouth daily in the early morning      Facility-Administered Medications: None     Allergies:  No Known Allergies  ------------------------------------------------------------------------------------------------------------  Advance Directive and Living Will:      Power of :    POLST:    ------------------------------------------------------------------------------------------------------------  Care Time Delivered:   Upon my evaluation, this patient had a high probability of imminent or life-threatening deterioration due to septic shock, ALBERT, acute hypoxic respiratory failure, which required my direct attention, intervention, and personal management  I have personally provided 40 minutes (0116 to 0156) of critical care time, exclusive of procedures, teaching, family meetings, and any prior time recorded by providers other than myself  Donzella Jeans, CRNP        Portions of the record may have been created with voice recognition software  Occasional wrong word or "sound a like" substitutions may have occurred due to the inherent limitations of voice recognition software  Read the chart carefully and recognize, using context, where substitutions have occurred

## 2022-03-23 NOTE — ASSESSMENT & PLAN NOTE
· 2/2 sepsis and poor po intake  · Baseline creatinine 0 9-1; on admission 1 44  · Received 4L IVF prior to transfer to ICU  · Check BMP now  · Renally dose medications as able  · Avoid hypotension/nephrotoxins  · Close monitoring of UO and renal indices  · Urinary retention protocol

## 2022-03-23 NOTE — TELEPHONE ENCOUNTER
Spoke with patient's son Phouc  Patient currently in ICU at [facility] informed that hospitalist will consult Med/Onc if their input required in this acute situation  Will update navigator team  Emotional support given  Patient's hospital f/u appt with Dr Tatiana Abbott for 3/24/2022 will be rescheduled once patient discharged

## 2022-03-23 NOTE — ED NOTES
Patient blood pressure resulted as 83/52 MAP 62  Respirations 24  Per Dr Suyapa Lazo give 500 Ml bolus of fluid wide open and postpone taking patient upstairs due to patient blood pressure        Sharon Gutierrez RN  03/22/22 2019

## 2022-03-23 NOTE — ASSESSMENT & PLAN NOTE
· States pain started 3/22 1am, and woke him from sleep and is worse with movement of right shoulder  · Right scapular XR pending  · Continue multimodal pain regimen

## 2022-03-23 NOTE — ASSESSMENT & PLAN NOTE
· Not on supplemental O2 at home  · 2/2 obstructive pneumonia and possible degree of volume overload  · Currently on Nasal cannula 5L  · Titrate for SpO2 > 92%

## 2022-03-23 NOTE — CASE MANAGEMENT
Case Management Assessment & Discharge Planning Note    Patient name Enrique Romero  Location ICU 07/ICU 07 MRN [id number]  : 1965 Date 3/23/2022       Current Admission Date: 3/22/2022  Current Admission Diagnosis:Septic shock [facility])   Patient Active Problem List    Diagnosis Date Noted    Acute pain of right scapular 2022    Septic shock ([facility] ) 2022    Obstructive pneumonia 2022    Acute nontraumatic kidney injury ([facility] ) 2022    Acute respiratory failure with hypoxia ([facility] ) 2022    History of tobacco use 2022    Lung mass 2022    Brain mass 2022    Cerebral edema ([facility] ) 2022    Vitamin D deficiency 2021    Cyanocobalamin deficiency 2021    Nonimmune to hepatitis B virus 2021    Elevated PSA 2021    Gall bladder polyp 2021    Type 2 diabetes mellitus without complication, without long-term current use of insulin ([facility] ) 2021      LOS (days): 1  Geometric Mean LOS (GMLOS) (days): 4 80  Days to GMLOS:3 7     OBJECTIVE:  PATIENT READMITTED TO HOSPITAL  Risk of Unplanned Readmission Score: 16         Current admission status: Inpatient  Referral Reason: Other ([place])    [facility]erred [facility]acy:   [facility] [address] [place] [id number]  Phone: [phone] Fax: [phone]    Primary Care Provider: Iglesia Grant MD    Primary Insurance: [address] [place] [address]  Secondary Insurance:     ASSESSMENT:  Active Health Care Proxies     [address] Representative - Son   Primary Phone: [phone] (Mobile)               Advance Directives  Does patient have a [address]?: No  Was patient offered paperwork?: Yes (Son declined)  Does patient currently have a Health Care decision maker?: No  Does patient have Advance Directives?: No  Was patient offered paperwork?: Yes (Son declined)  Primary Contact: Peter Benedict (Son) Readmission Root Cause  30 Day Readmission: Yes  Who directed you to return to the hospital?: Self  Did you understand whom to contact if you had questions or problems?: Yes  Did you get your prescriptions before you left the hospital?: No  Reason[de-identified] Declined service  Were you able to get your prescriptions filled when you left the hospital?: Yes  Did you take your medications as prescribed?: Yes  Were you able to get to your follow-up appointments?: Yes  During previous admission, was a post-acute recommendation made?: No  Patient was readmitted due to: decreased energy level, increasing right posterior shoulder pain, and hypotension  Action Plan: Palliative care consult    Patient Information  Admitted from[de-identified] [place]  Mental Status: Alert  During Assessment patient was accompanied by: Not accompanied during assessment (Patient was soundly sleeping when CM went into patient's room  CM called patient's emergency contact son Ricardo Edshawn)  Assessment information provided by[de-identified] Doni  Primary Caregiver: Self  Support Systems: Spouse/significant [address] of Residence: [address] [place] [address] do you live in?: [place] entry access options   Select all that apply : Stairs  Number of steps to enter home : One Flight  Do the steps have railings?: Yes  Type of Current Residence: [address] [place] home  Upon entering residence, is there a bedroom on the main floor (no further steps)?: No  A bedroom is located on the following floor levels of residence (select all that apply):: 2nd Floor  Upon entering residence, is there a bathroom on the main floor (no further steps)?: No  Indicate which floors of current residence have a bathroom (select all the apply):: 2nd Floor  Number of steps to 2nd floor from main floor: One Flight  In the last 12 months, was there a time when you were not able to pay the mortgage or rent on time?: No  In the last 12 months, how many places have you lived?: 1  In the last 12 months, was there a time when you did not have a steady place to sleep or slept in a shelter (including now)?: No  Living Arrangements: Lives w/ Spouse/significant other,Lives w/ Son  Is patient a ?: No    Activities of Daily Living Prior to Admission  Functional Status: Independent  Completes ADLs independently?: Yes  Ambulates independently?: Yes  Does patient use assisted devices?: No  Does patient currently own DME?: No  Does patient have a history of Outpatient Therapy (PT/OT)?: No  Does the patient have a history of Short-Term Rehab?: No  Does patient have a history of HHC?: No  Does patient currently have [address]?: No         Patient Information Continued  Income Source: Employed (Nichole Garduno works full time for [employer])  Does patient have prescription coverage?: Yes (No issues with getting or affording his medications)  Within the past 12 months, you worried that your food would run out before you got the money to buy more : Never true  Within the past 12 months, the food you bought just didnt last and you didnt have money to get more : Never true  Food insecurity resource given?: N/A  Does patient receive dialysis treatments?: No  Does patient have a history of substance abuse?: No  Does patient have a history of Mental Health Diagnosis?: No         Means of Transportation  Means of Transport to Appts[de-identified] Drives Self  In the past 12 months, has lack of transportation kept you from medical appointments or from getting medications?: No  In the past 12 months, has lack of transportation kept you from meetings, work, or from getting things needed for daily living?: No  Was application for public transport provided?: N/A        DISCHARGE DETAILS:    Discharge planning discussed with[de-identified] Son Madonna Handrocio Benedict  Freedom of Choice: Yes  Comments - Freedom of Choice: No current CM DC recommendations  CM contacted family/caregiver?: Yes             Contacts  Patient Contacts: Madonna Benedict  Relationship to Patient[de-identified] Family  Contact Method: Phone  Phone Number: [phone]  Reason/Outcome: Continuity of [address]            Patient is COVID vaccinated, not boosted  Patient's family brought in [facility] paperwork to be completed however the papers were not the correct ones  Family was instructed to reach out to HR at patient's employer to obtain the correct paperwork  CM reviewed discharge planning process including the following: identifying caregivers at home, preference for d/c planning needs, [facility] Meds to Bed program, availability of treatment team to discuss questions or concerns patient and/or family may have regarding diagnosis, plan of care, old or new medications and discharge planning  CM will continue to follow for care coordination and update assessment as necessary

## 2022-03-23 NOTE — TELEPHONE ENCOUNTER
Please have pt see me in office for post hospital follow up, he has brain mets and need to be on prednisone to decrease mass effect, that will increase his blood sugar  I would like to see him this week please

## 2022-03-24 LAB
ANION GAP SERPL CALCULATED.3IONS-SCNC: 5 MMOL/L (ref 4–13)
ATRIAL RATE: 88 BPM
BASOPHILS # BLD MANUAL: 0 THOUSAND/UL (ref 0–0.1)
BASOPHILS NFR MAR MANUAL: 0 % (ref 0–1)
BUN SERPL-MCNC: 21 MG/DL (ref 5–25)
CALCIUM SERPL-MCNC: 9.1 MG/DL (ref 8.3–10.1)
CHLORIDE SERPL-SCNC: 102 MMOL/L (ref 100–108)
CO2 SERPL-SCNC: 29 MMOL/L (ref 21–32)
CREAT SERPL-MCNC: 1.02 MG/DL (ref 0.6–1.3)
EOSINOPHIL # BLD MANUAL: 0 THOUSAND/UL (ref 0–0.4)
EOSINOPHIL NFR BLD MANUAL: 0 % (ref 0–6)
ERYTHROCYTE [DISTWIDTH] IN BLOOD BY AUTOMATED COUNT: 14 % (ref 11.6–15.1)
GFR SERPL CREATININE-BSD FRML MDRD: 81 ML/MIN/1.73SQ M
GLUCOSE SERPL-MCNC: 113 MG/DL (ref 65–140)
GLUCOSE SERPL-MCNC: 182 MG/DL (ref 65–140)
GLUCOSE SERPL-MCNC: 192 MG/DL (ref 65–140)
GLUCOSE SERPL-MCNC: 193 MG/DL (ref 65–140)
HCT VFR BLD AUTO: 38.1 % (ref 36.5–49.3)
HGB BLD-MCNC: 13.4 G/DL (ref 12–17)
LG PLATELETS BLD QL SMEAR: PRESENT
LYMPHOCYTES # BLD AUTO: 0.64 THOUSAND/UL (ref 0.6–4.47)
LYMPHOCYTES # BLD AUTO: 2 % (ref 14–44)
MAGNESIUM SERPL-MCNC: 2.3 MG/DL (ref 1.6–2.6)
MCH RBC QN AUTO: 30.9 PG (ref 26.8–34.3)
MCHC RBC AUTO-ENTMCNC: 35.2 G/DL (ref 31.4–37.4)
MCV RBC AUTO: 88 FL (ref 82–98)
MONOCYTES # BLD AUTO: 1.92 THOUSAND/UL (ref 0–1.22)
MONOCYTES NFR BLD: 6 % (ref 4–12)
MRSA NOSE QL CULT: NORMAL
NEUTROPHILS # BLD MANUAL: 29.48 THOUSAND/UL (ref 1.85–7.62)
NEUTS BAND NFR BLD MANUAL: 38 % (ref 0–8)
NEUTS SEG NFR BLD AUTO: 54 % (ref 43–75)
P AXIS: 80 DEGREES
PHOSPHATE SERPL-MCNC: 2.8 MG/DL (ref 2.7–4.5)
PLATELET # BLD AUTO: 98 THOUSANDS/UL (ref 149–390)
PLATELET BLD QL SMEAR: ABNORMAL
PMV BLD AUTO: 11.2 FL (ref 8.9–12.7)
POTASSIUM SERPL-SCNC: 4.6 MMOL/L (ref 3.5–5.3)
PR INTERVAL: 140 MS
QRS AXIS: 87 DEGREES
QRSD INTERVAL: 98 MS
QT INTERVAL: 338 MS
QTC INTERVAL: 408 MS
RBC # BLD AUTO: 4.33 MILLION/UL (ref 3.88–5.62)
RBC MORPH BLD: NORMAL
SODIUM SERPL-SCNC: 136 MMOL/L (ref 136–145)
T WAVE AXIS: 70 DEGREES
TOXIC GRANULES BLD QL SMEAR: PRESENT
VANCOMYCIN TROUGH SERPL-MCNC: 9.7 UG/ML (ref 10–20)
VENTRICULAR RATE: 88 BPM
WBC # BLD AUTO: 32.04 THOUSAND/UL (ref 4.31–10.16)

## 2022-03-24 PROCEDURE — 85007 BL SMEAR W/DIFF WBC COUNT: CPT | Performed by: PHYSICIAN ASSISTANT

## 2022-03-24 PROCEDURE — 84100 ASSAY OF PHOSPHORUS: CPT | Performed by: PHYSICIAN ASSISTANT

## 2022-03-24 PROCEDURE — 80202 ASSAY OF VANCOMYCIN: CPT | Performed by: PHYSICIAN ASSISTANT

## 2022-03-24 PROCEDURE — 99233 SBSQ HOSP IP/OBS HIGH 50: CPT | Performed by: INTERNAL MEDICINE

## 2022-03-24 PROCEDURE — 82948 REAGENT STRIP/BLOOD GLUCOSE: CPT

## 2022-03-24 PROCEDURE — 80048 BASIC METABOLIC PNL TOTAL CA: CPT | Performed by: PHYSICIAN ASSISTANT

## 2022-03-24 PROCEDURE — 83735 ASSAY OF MAGNESIUM: CPT | Performed by: PHYSICIAN ASSISTANT

## 2022-03-24 PROCEDURE — 93010 ELECTROCARDIOGRAM REPORT: CPT | Performed by: INTERNAL MEDICINE

## 2022-03-24 PROCEDURE — 85027 COMPLETE CBC AUTOMATED: CPT | Performed by: PHYSICIAN ASSISTANT

## 2022-03-24 RX ADMIN — CEFEPIME HYDROCHLORIDE 2000 MG: 2 INJECTION, POWDER, FOR SOLUTION INTRAVENOUS at 01:57

## 2022-03-24 RX ADMIN — INSULIN LISPRO 2 UNITS: 100 INJECTION, SOLUTION INTRAVENOUS; SUBCUTANEOUS at 13:04

## 2022-03-24 RX ADMIN — STANDARDIZED SENNA CONCENTRATE 17.2 MG: 8.6 TABLET ORAL at 21:58

## 2022-03-24 RX ADMIN — LEVETIRACETAM 500 MG: 250 TABLET, FILM COATED ORAL at 08:45

## 2022-03-24 RX ADMIN — OXYCODONE HYDROCHLORIDE 5 MG: 5 TABLET ORAL at 08:51

## 2022-03-24 RX ADMIN — POLYETHYLENE GLYCOL 3350 17 G: 17 POWDER, FOR SOLUTION ORAL at 08:45

## 2022-03-24 RX ADMIN — Medication 1000 UNITS: at 08:45

## 2022-03-24 RX ADMIN — HEPARIN SODIUM 5000 UNITS: 5000 INJECTION INTRAVENOUS; SUBCUTANEOUS at 13:05

## 2022-03-24 RX ADMIN — VANCOMYCIN HYDROCHLORIDE 750 MG: 750 INJECTION, SOLUTION INTRAVENOUS at 18:04

## 2022-03-24 RX ADMIN — ACETAMINOPHEN 975 MG: 325 TABLET, FILM COATED ORAL at 21:57

## 2022-03-24 RX ADMIN — LEVETIRACETAM 500 MG: 250 TABLET, FILM COATED ORAL at 21:58

## 2022-03-24 RX ADMIN — PANTOPRAZOLE SODIUM 40 MG: 40 TABLET, DELAYED RELEASE ORAL at 05:40

## 2022-03-24 RX ADMIN — DEXAMETHASONE 4 MG: 4 TABLET ORAL at 21:58

## 2022-03-24 RX ADMIN — NOREPINEPHRINE BITARTRATE 1 MCG/MIN: 1 INJECTION, SOLUTION, CONCENTRATE INTRAVENOUS at 13:50

## 2022-03-24 RX ADMIN — VANCOMYCIN HYDROCHLORIDE 750 MG: 750 INJECTION, SOLUTION INTRAVENOUS at 05:40

## 2022-03-24 RX ADMIN — ACETAMINOPHEN 975 MG: 325 TABLET, FILM COATED ORAL at 05:40

## 2022-03-24 RX ADMIN — HEPARIN SODIUM 5000 UNITS: 5000 INJECTION INTRAVENOUS; SUBCUTANEOUS at 05:40

## 2022-03-24 RX ADMIN — INSULIN LISPRO 1 UNITS: 100 INJECTION, SOLUTION INTRAVENOUS; SUBCUTANEOUS at 05:49

## 2022-03-24 RX ADMIN — ACETAMINOPHEN 975 MG: 325 TABLET, FILM COATED ORAL at 13:04

## 2022-03-24 RX ADMIN — INSULIN LISPRO 1 UNITS: 100 INJECTION, SOLUTION INTRAVENOUS; SUBCUTANEOUS at 18:14

## 2022-03-24 RX ADMIN — DEXAMETHASONE 4 MG: 4 TABLET ORAL at 08:45

## 2022-03-24 RX ADMIN — HEPARIN SODIUM 5000 UNITS: 5000 INJECTION INTRAVENOUS; SUBCUTANEOUS at 21:58

## 2022-03-24 RX ADMIN — CEFEPIME HYDROCHLORIDE 2000 MG: 2 INJECTION, POWDER, FOR SOLUTION INTRAVENOUS at 13:05

## 2022-03-24 NOTE — PROGRESS NOTES
[facility]  Progress Note Beryl Maynard 1965, 64 y o  male MRN: [id number]  Unit/Bed#: ICU 07 Encounter: [id number]  Primary Care Provider: Juan Clinton MD   Date and time admitted to hospital: 3/22/2022  8:12 AM    Acute pain of right scapular  Assessment & Plan  · States pain started 3/22 1am, and woke him from sleep and is worse with movement of right shoulder  · Right scapular XR NAD  · Continue multimodal pain regimen    Acute respiratory failure with hypoxia ([address] )  Assessment & Plan  · Not on supplemental O2 at home  · 2/2 obstructive pneumonia and possible degree of volume overload  · Currently on Nasal cannula 2L  · Titrate for SpO2 > 92%    Acute nontraumatic kidney injury ([address] )  Assessment & Plan  · 2/2 sepsis and poor po intake  · Baseline creatinine 0 9-1; on admission 1 44  · Received 4L IVF prior to transfer to ICU  · Renally dose medications as able  · Avoid hypotension/nephrotoxins  · Close monitoring of UO and renal indices  · Urinary retention protocol    Obstructive pneumonia  Assessment & Plan  · 3/22 CTA chest: negative for PE  Unchanged large right upper lobe mass most compatible with carcinoma, with new extensive right upper lobe groundglass density which could represent postobstructive pneumonia, edema, hemorrhage or lymphangitic carcinomatosis  New groundglass infiltrates in the left upper and right lower lobes, presumably infectious/inflammatory  · Procal 29 on admission  · Continue cefepime and vancomycin  · Airway clearance protocol  · Treatment as above    Cerebral edema (HCC)  Assessment & Plan  · 2/2 to brain mass  · See above    Brain mass  Assessment & Plan  · 3/14 CT head: Hyperdense masses noted at the right occipital lobe and inferior left frontal lobes with surrounding vasogenic edema, suspicious for hemorrhagic metastases    · 3/22 CT head: Unchanged metastatic lesions with surrounding edema in the right occipital and left frontal lobes   · Neurosurgery was consulted on previous admission 3/14-3/19  · Will continue decadron and keppra for vasogenic edema and seizure ppx  · No reported seizures  · Serial neuro checks  · If decrease in mental status will obtain stat CT head    Lung mass  Assessment & Plan  · Noted on CT January 2022  · Consistent with adenocarcinoma from tissue exam 3/16/2022  · S/p bronchoscopy with Bx 3/16/2022  · Pulm following prior to transfer to ICU  · He was recently hospitalized 3/14-3/19 after initially presenting with headaches and visual dizziness and was noted to have large left lung mass with brain mets  On discharge he was not requiring supplemental O2  · Currently on 2L NC 3/24    Type 2 diabetes mellitus without complication, without long-term current use of insulin [facility])  Assessment & Plan  Lab Results   Component Value Date    HGBA1C 5 7 (H) 11/07/2021       Recent Labs     03/23/22  1804 03/24/22  0008 03/24/22  0533 03/24/22  0546   POCGLU 118 113 183* 182*       Blood Sugar Average: Last 72 hrs:  (P) 119 875     · Holding home metformin for now  · Diet: Regular  · Goal blood glucose 140-180  · q6h accu checks with SSI  · Avoid hypoglycemia    * Septic shock (HCC)  Assessment & Plan  · POA: tachycardia, tachypnea, lactic acidosis, leukocytosis, elevated Tbili and fever  · CT chest: Unchanged large right upper lobe mass most compatible with carcinoma, with new extensive right upper lobe groundglass density which could represent postobstructive pneumonia, edema, hemorrhage or lymphangitic carcinomatosis  New groundglass infiltrates in the left upper and right lower lobes, presumably infectious/inflammatory  · UA bland  · Blood cultures x2 drawn on admission -- will follow  · Sputum culture obtained -- will follow  · COVID/flu/RSV PCR negative  · 3/16 bronch cultures negative to date  · Was hypotensive on arrival with lactic acid 3   Received 30 mL/kg IVF for sepsis resuscitation with initial improvement in blood pressure  He subsequently developed hypotension with SBP 80s  He was given additional 2 1L IVF for total 4L  Following additional IVF his blood pressure was 90s SBP and he was admitted to [facility] Step Down 2     · Continue Vancomycin and cefepime -- narrow once culture data available  · Discontinue vanco if MRSA nares culture negative  · Follow WBC and fever curve  · Lactic 3, 3 5, 2 4, 2 9  · Will continue to trend  · procal 29 27  · Will continue to trend  · Critical care asked to see patient for recurrence of hypotension  · Transfer patient to ICU  · On levophed; currently on 3 mcg/min  · Titrate for MAP > 65 mmHg    ----------------------------------------------------------------------------------------  HPI/24hr events: No acute events overnight  Code status discussed with family; would like to remain level 1  Patient appropriate for transfer out of the ICU today?: No  Disposition: Continue Critical Care   Code Status: Level 1 - Full Code  ---------------------------------------------------------------------------------------  SUBJECTIVE  Patient states he continues to have some right sided chest and back pain but that it is better today  Review of Systems   Constitutional: Negative for chills and fever  Eyes: Negative for redness  Respiratory: Positive for cough  Negative for chest tightness, shortness of breath and wheezing  Cardiovascular: Positive for chest pain  Negative for palpitations and leg swelling  Gastrointestinal: Negative for abdominal distention, abdominal pain, nausea and vomiting  Skin: Negative for color change  Neurological: Negative for dizziness, seizures, weakness and headaches       Review of systems was reviewed and negative unless stated above in HPI/24-hour events   ---------------------------------------------------------------------------------------  OBJECTIVE    Vitals   Vitals:    03/24/22 1030 03/24/22 1045 03/24/22 1100 03/24/22 1112   BP:    97/57 BP Location:    Right arm   Pulse: 77 73 77 72   Resp: 15 15 14 17   Temp:    97 5 °F (36 4 °C)   TempSrc:    Oral   SpO2: 96% 98% 98% 97%   Weight:       Height:         Temp (24hrs), Av 2 °F (36 8 °C), Min:97 4 °F (36 3 °C), Max:99 8 °F (37 7 °C)  Current: Temperature: 97 5 °F (36 4 °C)  Arterial Line BP: 109/59  Arterial Line MAP (mmHg): 78 mmHg    Respiratory:  SpO2: SpO2: 97 %, SpO2 Activity: SpO2 Activity: At Rest, SpO2 Device: O2 Device: Nasal cannula  Nasal Cannula O2 Flow Rate (L/min): 3 L/min    Invasive/non-invasive ventilation settings   Respiratory  Report   Lab Data (Last 4 hours)    None         O2/Vent Data (Last 4 hours)    None                Physical Exam  Constitutional:       General: He is not in acute distress  Appearance: Normal appearance  He is ill-appearing  He is not toxic-appearing  HENT:      Head: Normocephalic and atraumatic  Right Ear: External ear normal       Left Ear: External ear normal       Nose: Nose normal       Mouth/Throat:      Mouth: Mucous membranes are moist       Pharynx: Oropharynx is clear  Eyes:      Extraocular Movements: Extraocular movements intact  Conjunctiva/sclera: Conjunctivae normal    Cardiovascular:      Rate and Rhythm: Normal rate and regular rhythm  Pulses: Normal pulses  Heart sounds: Normal heart sounds  Pulmonary:      Effort: Pulmonary effort is normal       Breath sounds: No wheezing, rhonchi or rales  Abdominal:      General: Abdomen is flat  Palpations: Abdomen is soft  Musculoskeletal:      Right lower leg: No edema  Left lower leg: No edema  Skin:     General: Skin is warm and dry  Neurological:      Mental Status: He is alert and oriented to person, place, and time  Psychiatric:         Mood and Affect: Mood normal          Behavior: Behavior normal          Thought Content:  Thought content normal          Judgment: Judgment normal              Laboratory and Diagnostics:  Results from last 7 days   Lab Units 03/24/22  0553 03/23/22  0520 03/23/22  0130 03/22/22  0849   WBC Thousand/uL 32 04* 22 45* 17 95* 27 71*   HEMOGLOBIN g/dL 13 4 13 5 13 8 14 1   HEMATOCRIT % 38 1 37 9 40 5 41 7   PLATELETS Thousands/uL 98* 117* 141* 220   BANDS PCT % 38* 58* 48* 6   MONO PCT % 6 7 2* 8     Results from last 7 days   Lab Units 03/24/22  0553 03/23/22  0520 03/23/22  0130 03/22/22  0849   SODIUM mmol/L 136 135* 135* 135*   POTASSIUM mmol/L 4 6 3 6 3 8 4 4   CHLORIDE mmol/L 102 101 101 97*   CO2 mmol/L 29 28 29 30   ANION GAP mmol/L 5 6 5 8   BUN mg/dL 21 22 25 29*   CREATININE mg/dL 1 02 1 04 1 18 1 44*   CALCIUM mg/dL 9 1 8 0* 7 5* 8 8   GLUCOSE RANDOM mg/dL 193* 73 81 160*   ALT U/L  --  33  --  60   AST U/L  --  16  --  21   ALK PHOS U/L  --  49  --  64   ALBUMIN g/dL  --  1 8*  --  2 9*   TOTAL BILIRUBIN mg/dL  --  0 67  --  1 29*     Results from last 7 days   Lab Units 03/24/22  0553 03/23/22  0520 03/23/22  0130 03/22/22  0849   MAGNESIUM mg/dL 2 3 2 0 1 6 1 8   PHOSPHORUS mg/dL 2 8 3 5 3 6 3 6      Results from last 7 days   Lab Units 03/23/22  0130 03/22/22  0849   INR  1 53* 0 97   PTT seconds  --  26          Results from last 7 days   Lab Units 03/23/22  0520 03/23/22  0130 03/22/22  1819 03/22/22  1520 03/22/22  1220 03/22/22  0849   LACTIC ACID mmol/L 1 6 2 3* 2 9* 2 4* 3 5* 3 0*     ABG:    VBG:    Results from last 7 days   Lab Units 03/23/22  0520 03/22/22  1520   PROCALCITONIN ng/ml 89 19* 29 27*       Micro  Results from last 7 days   Lab Units 03/22/22  1520 03/22/22  0850 03/22/22  0849   BLOOD CULTURE   --  No Growth at 24 hrs  No Growth at 24 hrs  GRAM STAIN RESULT  2+ Epithelial cells per low power field*  1+ Polys*  2+ Gram positive cocci in pairs*  1+ Gram negative rods*  1+ Gram positive rods*  --   --        EKG: NS rhythm  Imaging: I have personally reviewed pertinent reports        Intake and Output  I/O       03/22 0701 03/23 0700 03/23 0701 03/24 0700 03/24 0701 03/25 0700    P  O   660     I V  (mL/kg) 694 6 (10 4) 542 3 (8 2)     IV Piggyback 2641  7 750     Total Intake(mL/kg) 3336 3 (50 2) 1952 3 (29 5)     Urine (mL/kg/hr) 750 975 (0 6)     Total Output 750 975     Net +2586 3 +977 3                  Height and Weights   Height: 5' 6" (167 6 cm)  IBW (Ideal Body Weight): 63 8 kg  Body mass index is 23 57 kg/m²  Weight (last 2 days)     Date/Time Weight    03/23/22 1038 66 2 (146)    03/23/22 0535 66 5 (146 61)    03/23/22 0123 66 5 (146 61)            Nutrition       Diet Orders   (From admission, onward)             Start     Ordered    03/23/22 1136  Diet Regular; Regular House  Diet effective now        References:    Nutrtion Support Algorithm Enteral vs  Parenteral   Question Answer Comment   Diet Type Regular    Regular Regular House    RD to adjust diet per protocol?  Yes        03/23/22 1136    03/22/22 1846  Room Service  Once        Question:  Type of Service  Answer:  Room Service - Appropriate with Assistance    03/22/22 1845                  Active Medications  Scheduled Meds:  Current Facility-Administered Medications   Medication Dose Route Frequency Provider Last Rate    acetaminophen  975 mg Oral [facility] Karen Patrick [place]      calcium carbonate  1,000 mg [facility]      cefepime  2,000 mg Intravenous Q12H Karen Patrick PA-C 2,000 mg (03/24/22 0157)    cholecalciferol  1,000 Units Oral Daily [facility]      dexamethasone  4 mg Oral Q12H [facility] Karen Patrick PA-C      heparin (porcine)  5,000 Units Subcutaneous Q8H [facility] [place]      HYDROmorphone  0 2 mg Intravenous Q4H PRN Karen Patrick PA-C      insulin lispro  1-6 Units Subcutaneous Q6H [facility] Karen Patrick PA-C      levETIRAcetam  500 mg Oral Q12H [facility] Karen Patrick PA-C      naloxone  0 04 mg Intravenous Q1MIN PRN [facility], PA-C      norepinephrine  1-30 mcg/min Intravenous Titrated [facility], PA- 3 mcg/min (03/24/22 7389)   Demian Rossi ondansetron  4 mg Intravenous Q6H PRN Rainer Rouse PA-C      oxyCODONE  10 mg Oral Q6H PRN [place] YOLI Rouse      oxyCODONE  5 mg Oral Q6H PRN Rainer Rouse PA-C      pantoprazole  40 mg Oral Early Morning Rainer Rouse PA-C      polyethylene glycol  17 g Oral Daily [place] [facility]      senna  2 tablet Oral HS Karen Patrick PA-C      vancomycin  12 5 mg/kg Intravenous Q12H Karen Pierre PA-C 750 mg (03/24/22 3006)     Continuous Infusions:  norepinephrine, 1-30 mcg/min, Last Rate: 3 mcg/min (03/24/22 6555)      PRN Meds:   calcium carbonate, 1,000 mg, Daily PRN  HYDROmorphone, 0 2 mg, Q4H PRN  naloxone, 0 04 mg, Q1MIN PRN  ondansetron, 4 mg, Q6H PRN  oxyCODONE, 10 mg, Q6H PRN  oxyCODONE, 5 mg, Q6H PRN        Invasive Devices Review  Invasive Devices  Report    Peripheral Intravenous Line            Peripheral IV 03/22/22 Right Antecubital 2 days    Peripheral IV 03/23/22 Left;Proximal;Ventral (anterior) Forearm 1 day          Arterial Line            Arterial Line 03/23/22 Radial 1 day                Rationale for remaining devices: To maintain oxygenation status   ---------------------------------------------------------------------------------------  Advance Directive and Living Will:      Power of :    POLST:    ---------------------------------------------------------------------------------------  Care Time Delivered:   No Critical Care time spent       [facility],       Portions of the record may have been created with voice recognition software  Occasional wrong word or "sound a like" substitutions may have occurred due to the inherent limitations of voice recognition software    Read the chart carefully and recognize, using context, where substitutions have occurred

## 2022-03-24 NOTE — ASSESSMENT & PLAN NOTE
· Not on supplemental O2 at home  · 2/2 obstructive pneumonia and possible degree of volume overload  · Currently on RA

## 2022-03-24 NOTE — ASSESSMENT & PLAN NOTE
· States pain started 3/22 1am, and woke him from sleep and is worse with movement of right shoulder  · Right scapular XR NAD  · Continue multimodal pain regimen  · Currently controlled

## 2022-03-24 NOTE — ASSESSMENT & PLAN NOTE
· 3/22 CTA chest: negative for PE  Unchanged large right upper lobe mass most compatible with carcinoma, with new extensive right upper lobe groundglass density which could represent postobstructive pneumonia, edema, hemorrhage or lymphangitic carcinomatosis  New groundglass infiltrates in the left upper and right lower lobes, presumably infectious/inflammatory    · Procal 29 on admission  · Continue cefepime  · MRSA cx negative; D/C vancomycin  · Airway clearance protocol  · Treatment as above

## 2022-03-24 NOTE — PLAN OF CARE
Problem: Potential for Falls  Goal: Patient will remain free of falls  Description: INTERVENTIONS:  - Educate patient/family on patient safety including physical limitations  - Instruct patient to call for assistance with activity   - Consult OT/PT to assist with strengthening/mobility   - Keep Call bell within reach  - Keep bed low and locked with side rails adjusted as appropriate  - Keep care items and personal belongings within reach  - Initiate and maintain comfort rounds  - Make Fall Risk Sign visible to staff  - Apply yellow socks and bracelet for high fall risk patients  - Consider moving patient to room near nurses station  Outcome: Progressing     Problem: Nutrition/Hydration-ADULT  Goal: Nutrient/Hydration intake appropriate for improving, restoring or maintaining nutritional needs  Description: Monitor and assess patient's nutrition/hydration status for malnutrition  Collaborate with interdisciplinary team and initiate plan and interventions as ordered  Monitor patient's weight and dietary intake as ordered or per policy  Utilize nutrition screening tool and intervene as necessary  Determine patient's food preferences and provide high-protein, high-caloric foods as appropriate       INTERVENTIONS:  - Monitor oral intake, urinary output, labs, and treatment plans  - Assess nutrition and hydration status and recommend course of action  - Evaluate amount of meals eaten  - Assist patient with eating if necessary   - Allow adequate time for meals  - Recommend/ encourage appropriate diets, oral nutritional supplements, and vitamin/mineral supplements  - Order, calculate, and assess calorie counts as needed  - Recommend, monitor, and adjust tube feedings and TPN/PPN based on assessed needs  - Assess need for intravenous fluids  - Provide specific nutrition/hydration education as appropriate  - Include patient/family/caregiver in decisions related to nutrition  Outcome: Progressing     Problem: MOBILITY - ADULT  Goal: Maintain or return to baseline ADL function  Description: INTERVENTIONS:  -  Assess patient's ability to carry out ADLs; assess patient's baseline for ADL function and identify physical deficits which impact ability to perform ADLs (bathing, care of mouth/teeth, toileting, grooming, dressing, etc )  - Assess/evaluate cause of self-care deficits   - Assess range of motion  - Assess patient's mobility; develop plan if impaired  - Assess patient's need for assistive devices and provide as appropriate  - Encourage maximum independence but intervene and supervise when necessary  - Involve family in performance of ADLs  - Assess for home care needs following discharge   - Consider OT consult to assist with ADL evaluation and planning for discharge  - Provide patient education as appropriate  Outcome: Progressing  Goal: Maintains/Returns to pre admission functional level  Description: INTERVENTIONS:  - Perform BMAT or MOVE assessment daily    - Set and communicate daily mobility goal to care team and patient/family/caregiver     - Collaborate with rehabilitation services on mobility goals if consulted  - Out of bed for toileting  - Record patient progress and toleration of activity level   Outcome: Progressing     Problem: INFECTION - ADULT  Goal: Absence or prevention of progression during hospitalization  Description: INTERVENTIONS:  - Assess and monitor for signs and symptoms of infection  - Monitor lab/diagnostic results  - Monitor all insertion sites, i e  indwelling lines, tubes, and drains  - Monitor endotracheal if appropriate and nasal secretions for changes in amount and color  - [place] appropriate cooling/warming therapies per order  - Administer medications as ordered  - Instruct and encourage patient and family to use good hand hygiene technique  - Identify and instruct in appropriate isolation precautions for identified infection/condition  Outcome: Progressing  Goal: Absence of fever/infection during neutropenic period  Description: INTERVENTIONS:  - Monitor WBC    Outcome: Progressing     Problem: Prexisting or High Potential for Compromised Skin Integrity  Goal: Skin integrity is maintained or improved  Description: INTERVENTIONS:  - Identify patients at risk for skin breakdown  - Assess and monitor skin integrity  - Assess and monitor nutrition and hydration status  - Monitor labs   - Assess for incontinence   - Turn and reposition patient  - Assist with mobility/ambulation  - Relieve pressure over bony prominences  - Avoid friction and shearing  - Provide appropriate hygiene as needed including keeping skin clean and dry  - Evaluate need for skin moisturizer/barrier cream  - Collaborate with interdisciplinary team   - Patient/family teaching  - Consider wound care consult   Outcome: Progressing

## 2022-03-24 NOTE — ASSESSMENT & PLAN NOTE
Lab Results   Component Value Date    HGBA1C 5 7 (H) 11/07/2021       Recent Labs     03/24/22  1246 03/24/22  1753 03/24/22  2345 03/25/22  0548   POCGLU 222* 274* 192* 250*       Blood Sugar Average: Last 72 hrs:  (P) 154 [id number]     · Holding home metformin for now  · Diet: Regular  · Goal blood glucose 140-180  · q6h accu checks with [facility]  · Avoid hypoglycemia

## 2022-03-24 NOTE — ASSESSMENT & PLAN NOTE
· Baseline creatinine 0 9-1; on admission 1 44  · Received 4L IVF prior to transfer to ICU  · Renally dose medications as able  · Avoid hypotension/nephrotoxins  · Close monitoring of UO and renal indices  · Urinary retention protocol

## 2022-03-24 NOTE — ASSESSMENT & PLAN NOTE
· POA: tachycardia, tachypnea, lactic acidosis, leukocytosis, elevated Tbili and fever  · CT chest: Unchanged large right upper lobe mass most compatible with carcinoma, with new extensive right upper lobe groundglass density which could represent postobstructive pneumonia, edema, hemorrhage or lymphangitic carcinomatosis  New groundglass infiltrates in the left upper and right lower lobes, presumably infectious/inflammatory  · UA bland  · Blood cultures x2 drawn on admission -- will follow  · Sputum culture obtained; polymicrobial   · COVID/flu/RSV PCR negative  · 3/16 bronch cultures negative to date  · Was hypotensive on arrival with lactic acid 3  Received 30 mL/kg IVF for sepsis resuscitation with initial improvement in blood pressure  He subsequently developed hypotension with SBP 80s  He was given additional 2 1L IVF for total 4L    Following additional IVF his blood pressure was 90s SBP and he was admitted to [facility] Step Down 2     · Continue Vancomycin and cefepime -- narrow once culture data available  · Discontinue vanco if MRSA nares culture negative  · Follow WBC and fever curve  · Lactic 3, 3 5, 2 4, 2 9  · Will continue to trend  · procal 89 19 3/23  · Will continue to trend  · Critical care asked to see patient for recurrence of hypotension  · Transfer patient to ICU  · On levophed; currently on 3 mcg/min; off levophed 3/24  · Titrate for MAP > 65 mmHg

## 2022-03-24 NOTE — PROGRESS NOTES
Vancomycin Assessment    Marcelina Nj is a 64 y o  male who is currently receiving vancomycin 750mg IV Q12H for Pneumonia     Relevant clinical data and objective history reviewed:  Creatinine   Date Value Ref Range Status   03/24/2022 1 02 0 60 - 1 30 mg/dL Final     Comment:     Standardized to IDMS reference method   03/23/2022 1 04 0 60 - 1 30 mg/dL Final     Comment:     Standardized to IDMS reference method   03/23/2022 1 18 0 60 - 1 30 mg/dL Final     Comment:     Standardized to IDMS reference method     Vancomycin Rm   Date Value Ref Range Status   03/23/2022 6 7 (L) 10 0 - 20 0 ug/mL Final     /62 (BP Location: Right arm)   Pulse 84   Temp 97 8 °F (36 6 °C) (Oral)   Resp (!) 23   Ht 5' 6" (1 676 m)   Wt 66 2 kg (146 lb)   SpO2 95%   BMI 23 57 kg/m²   I/O last 3 completed shifts: In: 3280 4 [P O :1680; I V :620 4; IV Piggyback:980]  Out: 7768 [Urine:2975]  Lab Results   Component Value Date/Time    BUN 21 03/24/2022 05:53 AM    BUN 21 01/29/2021 11:55 AM    WBC 32 04 (HH) 03/24/2022 05:53 AM    HGB 13 4 03/24/2022 05:53 AM    HCT 38 1 03/24/2022 05:53 AM    MCV 88 03/24/2022 05:53 AM    PLT 98 (L) 03/24/2022 05:53 AM     Temp Readings from Last 3 Encounters:   03/24/22 97 8 °F (36 6 °C) (Oral)   03/19/22 (!) 97 2 °F (36 2 °C)   03/14/22 97 6 °F (36 4 °C) (Oral)     Vancomycin Days of Therapy: Day 3    Assessment/Plan  The patient is currently on vancomycin utilizing scheduled dosing  Baseline risks associated with therapy include: pre-existing renal impairment and concomitant nephrotoxic medications  The patient is receiving 750mg IV Q12H with the most recent vancomycin level being not at steady-state and sub-therapeutic (trough at 9 7; extrapolated trough = 10 0) based on a goal of 15-20 (appropriate for most indications) ; therefore, after clinical evaluation will be changed to 750mg IV Q8H (per [facility] - predicted trough of 18 0 and predicted AUC of 579)     Pharmacy will continue to follow closely for s/sx of nephrotoxicity, infusion reactions, and appropriateness of therapy  BMP and CBC will be ordered per protocol  Plan for trough as patient approaches steady state, prior to the 4th  dose at approximately 1730 on 03/25/22  Pharmacy will continue to follow the patients culture results and clinical progress daily      Edilma Cheung, Pharmacist

## 2022-03-24 NOTE — ASSESSMENT & PLAN NOTE
· Noted on CT January 2022  · Consistent with adenocarcinoma from tissue exam 3/16/2022  · S/p bronchoscopy with Bx 3/16/2022  · Pulm following prior to transfer to ICU  · He was recently hospitalized 3/14-3/19 after initially presenting with headaches and visual dizziness and was noted to have large left lung mass with brain mets    On discharge he was not requiring supplemental O2  · Currently on RA, off NC 3/24

## 2022-03-24 NOTE — ASSESSMENT & PLAN NOTE
Lab Results   Component Value Date    HGBA1C 5 7 (H) 11/07/2021       Recent Labs     03/23/22  1804 03/24/22  0008 03/24/22  0533 03/24/22  0546   POCGLU 118 113 183* 182*       Blood Sugar Average: Last 72 hrs:  (P) 119 875     · Holding home metformin for now  · NPO for now due to lethargy    If mental status improves, will allow CHO controlled diet  · Goal blood glucose 140-180  · q6h accu checks with [facility]  · Avoid hypoglycemia

## 2022-03-25 LAB
ANION GAP SERPL CALCULATED.3IONS-SCNC: 8 MMOL/L (ref 4–13)
BASOPHILS # BLD MANUAL: 0 THOUSAND/UL (ref 0–0.1)
BASOPHILS NFR MAR MANUAL: 0 % (ref 0–1)
BUN SERPL-MCNC: 36 MG/DL (ref 5–25)
CALCIUM SERPL-MCNC: 9.3 MG/DL (ref 8.3–10.1)
CHLORIDE SERPL-SCNC: 99 MMOL/L (ref 100–108)
CO2 SERPL-SCNC: 27 MMOL/L (ref 21–32)
CREAT SERPL-MCNC: 1.08 MG/DL (ref 0.6–1.3)
EOSINOPHIL # BLD MANUAL: 0 THOUSAND/UL (ref 0–0.4)
EOSINOPHIL NFR BLD MANUAL: 0 % (ref 0–6)
ERYTHROCYTE [DISTWIDTH] IN BLOOD BY AUTOMATED COUNT: 13.7 % (ref 11.6–15.1)
GFR SERPL CREATININE-BSD FRML MDRD: 76 ML/MIN/1.73SQ M
GLUCOSE SERPL-MCNC: 183 MG/DL (ref 65–140)
GLUCOSE SERPL-MCNC: 222 MG/DL (ref 65–140)
GLUCOSE SERPL-MCNC: 250 MG/DL (ref 65–140)
GLUCOSE SERPL-MCNC: 272 MG/DL (ref 65–140)
GLUCOSE SERPL-MCNC: 274 MG/DL (ref 65–140)
GLUCOSE SERPL-MCNC: 286 MG/DL (ref 65–140)
GLUCOSE SERPL-MCNC: 307 MG/DL (ref 65–140)
GLUCOSE SERPL-MCNC: 85 MG/DL (ref 65–140)
HCT VFR BLD AUTO: 37.1 % (ref 36.5–49.3)
HGB BLD-MCNC: 13.2 G/DL (ref 12–17)
LG PLATELETS BLD QL SMEAR: PRESENT
LYMPHOCYTES # BLD AUTO: 0.35 THOUSAND/UL (ref 0.6–4.47)
LYMPHOCYTES # BLD AUTO: 1 % (ref 14–44)
MCH RBC QN AUTO: 30.9 PG (ref 26.8–34.3)
MCHC RBC AUTO-ENTMCNC: 35.6 G/DL (ref 31.4–37.4)
MCV RBC AUTO: 87 FL (ref 82–98)
MONOCYTES # BLD AUTO: 1.05 THOUSAND/UL (ref 0–1.22)
MONOCYTES NFR BLD: 3 % (ref 4–12)
NEUTROPHILS # BLD MANUAL: 33.64 THOUSAND/UL (ref 1.85–7.62)
NEUTS BAND NFR BLD MANUAL: 32 % (ref 0–8)
NEUTS SEG NFR BLD AUTO: 64 % (ref 43–75)
PLATELET # BLD AUTO: 99 THOUSANDS/UL (ref 149–390)
PLATELET BLD QL SMEAR: ABNORMAL
PMV BLD AUTO: 10.7 FL (ref 8.9–12.7)
POTASSIUM SERPL-SCNC: 4.4 MMOL/L (ref 3.5–5.3)
PROCALCITONIN SERPL-MCNC: 35.43 NG/ML
RBC # BLD AUTO: 4.27 MILLION/UL (ref 3.88–5.62)
RBC MORPH BLD: NORMAL
SODIUM SERPL-SCNC: 134 MMOL/L (ref 136–145)
TOXIC GRANULES BLD QL SMEAR: PRESENT
WBC # BLD AUTO: 35.04 THOUSAND/UL (ref 4.31–10.16)

## 2022-03-25 PROCEDURE — 80048 BASIC METABOLIC PNL TOTAL CA: CPT | Performed by: FAMILY MEDICINE

## 2022-03-25 PROCEDURE — 85027 COMPLETE CBC AUTOMATED: CPT | Performed by: FAMILY MEDICINE

## 2022-03-25 PROCEDURE — 82948 REAGENT STRIP/BLOOD GLUCOSE: CPT

## 2022-03-25 PROCEDURE — 85007 BL SMEAR W/DIFF WBC COUNT: CPT | Performed by: FAMILY MEDICINE

## 2022-03-25 PROCEDURE — 99232 SBSQ HOSP IP/OBS MODERATE 35: CPT | Performed by: INTERNAL MEDICINE

## 2022-03-25 PROCEDURE — 84145 PROCALCITONIN (PCT): CPT | Performed by: FAMILY MEDICINE

## 2022-03-25 RX ORDER — LANOLIN ALCOHOL/MO/W.PET/CERES
6 CREAM (GRAM) TOPICAL
Status: DISCONTINUED | OUTPATIENT
Start: 2022-03-25 | End: 2022-03-27 | Stop reason: HOSPADM

## 2022-03-25 RX ADMIN — HEPARIN SODIUM 5000 UNITS: 5000 INJECTION INTRAVENOUS; SUBCUTANEOUS at 15:24

## 2022-03-25 RX ADMIN — STANDARDIZED SENNA CONCENTRATE 17.2 MG: 8.6 TABLET ORAL at 22:23

## 2022-03-25 RX ADMIN — DEXAMETHASONE 4 MG: 4 TABLET ORAL at 20:39

## 2022-03-25 RX ADMIN — INSULIN LISPRO 3 UNITS: 100 INJECTION, SOLUTION INTRAVENOUS; SUBCUTANEOUS at 06:03

## 2022-03-25 RX ADMIN — DEXAMETHASONE 4 MG: 4 TABLET ORAL at 09:01

## 2022-03-25 RX ADMIN — HEPARIN SODIUM 5000 UNITS: 5000 INJECTION INTRAVENOUS; SUBCUTANEOUS at 05:10

## 2022-03-25 RX ADMIN — CEFEPIME HYDROCHLORIDE 2000 MG: 2 INJECTION, POWDER, FOR SOLUTION INTRAVENOUS at 01:30

## 2022-03-25 RX ADMIN — HEPARIN SODIUM 5000 UNITS: 5000 INJECTION INTRAVENOUS; SUBCUTANEOUS at 22:23

## 2022-03-25 RX ADMIN — ACETAMINOPHEN 975 MG: 325 TABLET, FILM COATED ORAL at 15:24

## 2022-03-25 RX ADMIN — VANCOMYCIN HYDROCHLORIDE 750 MG: 750 INJECTION, SOLUTION INTRAVENOUS at 02:30

## 2022-03-25 RX ADMIN — ACETAMINOPHEN 975 MG: 325 TABLET, FILM COATED ORAL at 22:23

## 2022-03-25 RX ADMIN — INSULIN LISPRO 4 UNITS: 100 INJECTION, SOLUTION INTRAVENOUS; SUBCUTANEOUS at 19:35

## 2022-03-25 RX ADMIN — LEVETIRACETAM 500 MG: 250 TABLET, FILM COATED ORAL at 09:01

## 2022-03-25 RX ADMIN — INSULIN LISPRO 4 UNITS: 100 INJECTION, SOLUTION INTRAVENOUS; SUBCUTANEOUS at 12:47

## 2022-03-25 RX ADMIN — PANTOPRAZOLE SODIUM 40 MG: 40 TABLET, DELAYED RELEASE ORAL at 05:10

## 2022-03-25 RX ADMIN — Medication 1000 UNITS: at 09:00

## 2022-03-25 RX ADMIN — ACETAMINOPHEN 975 MG: 325 TABLET, FILM COATED ORAL at 05:10

## 2022-03-25 RX ADMIN — LEVETIRACETAM 500 MG: 250 TABLET, FILM COATED ORAL at 20:39

## 2022-03-25 RX ADMIN — INSULIN LISPRO 2 UNITS: 100 INJECTION, SOLUTION INTRAVENOUS; SUBCUTANEOUS at 00:02

## 2022-03-25 RX ADMIN — CEFEPIME HYDROCHLORIDE 2000 MG: 2 INJECTION, POWDER, FOR SOLUTION INTRAVENOUS at 16:22

## 2022-03-25 RX ADMIN — Medication 6 MG: at 22:23

## 2022-03-25 NOTE — PROGRESS NOTES
[facility]  Progress Note Afsaneh Robbins 1965, 64 y o  male MRN: [id number]  Unit/Bed#: ICU 07 Encounter: [id number]  Primary Care Provider: Isabel Carlos MD   Date and time admitted to hospital: 3/22/2022  8:12 AM    Acute pain of right scapular  Assessment & Plan  · States pain started 3/22 1am, and woke him from sleep and is worse with movement of right shoulder  · Right scapular XR NAD  · Continue multimodal pain regimen  · Currently controlled     Acute respiratory failure with hypoxia ([facility] )  Assessment & Plan  · Not on supplemental O2 at home  · 2/2 obstructive pneumonia and possible degree of volume overload  · Currently on RA    Acute nontraumatic kidney injury ([facility] )  Assessment & Plan  · Baseline creatinine 0 9-1; on admission 1 44  · Received 4L IVF prior to transfer to ICU  · Renally dose medications as able  · Avoid hypotension/nephrotoxins  · Close monitoring of UO and renal indices  · Urinary retention protocol    Obstructive pneumonia  Assessment & Plan  · 3/22 CTA chest: negative for PE  Unchanged large right upper lobe mass most compatible with carcinoma, with new extensive right upper lobe groundglass density which could represent postobstructive pneumonia, edema, hemorrhage or lymphangitic carcinomatosis  New groundglass infiltrates in the left upper and right lower lobes, presumably infectious/inflammatory  · Procal 29 on admission  · Continue cefepime  · MRSA cx negative; D/C vancomycin  · Airway clearance protocol  · Treatment as above    Cerebral edema (HCC)  Assessment & Plan  · 2/2 to brain mass  · See above    Brain mass  Assessment & Plan  · 3/14 CT head: Hyperdense masses noted at the right occipital lobe and inferior left frontal lobes with surrounding vasogenic edema, suspicious for hemorrhagic metastases  · 3/22 CT head: Unchanged metastatic lesions with surrounding edema in the right occipital and left frontal lobes    · Neurosurgery was consulted on previous admission 3/14-3/19  · Will continue decadron and keppra for vasogenic edema and seizure ppx  · No reported seizures  · Serial neuro checks  · If decrease in mental status will obtain stat CT head    Lung mass  Assessment & Plan  · Noted on CT January 2022  · Consistent with adenocarcinoma from tissue exam 3/16/2022  · S/p bronchoscopy with Bx 3/16/2022  · Pulm following prior to transfer to ICU  · He was recently hospitalized 3/14-3/19 after initially presenting with headaches and visual dizziness and was noted to have large left lung mass with brain mets  On discharge he was not requiring supplemental O2  · Currently on RA, off NC 3/24    Type 2 diabetes mellitus without complication, without long-term current use of insulin [facility])  Assessment & Plan  Lab Results   Component Value Date    HGBA1C 5 7 (H) 11/07/2021       Recent Labs     03/24/22  1246 03/24/22  1753 03/24/22  2345 03/25/22  0548   POCGLU 222* 274* 192* 250*       Blood Sugar Average: Last 72 hrs:  (P) 154 [id number]     · Holding home metformin for now  · Diet: Regular  · Goal blood glucose 140-180  · q6h accu checks with SSI  · Avoid hypoglycemia    * Septic shock (HCC)  Assessment & Plan  · POA: tachycardia, tachypnea, lactic acidosis, leukocytosis, elevated Tbili and fever  · CT chest: Unchanged large right upper lobe mass most compatible with carcinoma, with new extensive right upper lobe groundglass density which could represent postobstructive pneumonia, edema, hemorrhage or lymphangitic carcinomatosis  New groundglass infiltrates in the left upper and right lower lobes, presumably infectious/inflammatory  · UA bland  · Blood cultures x2 drawn on admission -- will follow  · Sputum culture obtained; polymicrobial   · COVID/flu/RSV PCR negative  · 3/16 bronch cultures negative to date  · Was hypotensive on arrival with lactic acid 3  Received 30 mL/kg IVF for sepsis resuscitation with initial improvement in blood pressure  He subsequently developed hypotension with SBP 80s  He was given additional 2 1L IVF for total 4L  Following additional IVF his blood pressure was 90s SBP and he was admitted to [facility] Step Down 2     · Continue Vancomycin and cefepime -- narrow once culture data available  · Discontinue vanco if MRSA nares culture negative  · Follow WBC and fever curve  · Lactic 3, 3 5, 2 4, 2 9  · Will continue to trend  · procal 89 19 3/23  · Will continue to trend  · Critical care asked to see patient for recurrence of hypotension  · Transfer patient to ICU  · On levophed; currently on 3 mcg/min; off levophed 3/24  · Titrate for MAP > 65 mmHg      ----------------------------------------------------------------------------------------  HPI/24hr events: Vancomycin d/c given MRSA negativity  Blood cx show no growth at 48 hrs  Off blood pressure support  Patient appropriate for transfer out of the ICU today?: YES  Disposition: Transfer to Med-Surg   Code Status: Level 1 - Full Code  ---------------------------------------------------------------------------------------  SUBJECTIVE  Patient complains of being unable to sleep last night and that he was left in his chair and wanted to be in bed  States that neighboring patient was noisy and all the lights and beeping in the ICU are keeping him up  He states his pain is controlled but will get pain in the right side of his chest when moving a lot or coughing  Review of Systems   Constitutional: Negative for chills and fever  HENT: Negative for congestion  Eyes: Negative for discharge and redness  Respiratory: Negative for chest tightness, shortness of breath and wheezing  Cardiovascular: Positive for chest pain (ocassional with movement and coughing)  Negative for palpitations and leg swelling  Genitourinary: Negative for difficulty urinating  Musculoskeletal: Positive for back pain (ocassional with movement and coughing)  Skin: Negative for color change  Allergic/Immunologic: Negative for environmental allergies and food allergies  Neurological: Negative for dizziness, seizures, weakness, light-headedness and headaches  Psychiatric/Behavioral: Positive for sleep disturbance  Negative for confusion  Review of systems was reviewed and negative unless stated above in HPI/24-hour events   ---------------------------------------------------------------------------------------  OBJECTIVE    Vitals   Vitals:    22 0300 22 0400 22 0500 22 0722   BP: 108/63   117/71   BP Location:    Right arm   Pulse: 62 64 59 65   Resp: (!) 40 (!) 27 (!) 42 22   Temp:    (!) 97 3 °F (36 3 °C)   TempSrc:    Oral   SpO2: 96% 97% 97% 96%   Weight:       Height:         Temp (24hrs), Av 8 °F (36 6 °C), Min:97 3 °F (36 3 °C), Max:98 2 °F (36 8 °C)  Current: Temperature: (!) 97 3 °F (36 3 °C)  Arterial Line BP: 136/67  Arterial Line MAP (mmHg): 94 mmHg    Respiratory:  SpO2: SpO2: 96 %  Nasal Cannula O2 Flow Rate (L/min): 3 L/min    Invasive/non-invasive ventilation settings   Respiratory  Report   Lab Data (Last 4 hours)    None         O2/Vent Data (Last 4 hours)    None                Physical Exam  Constitutional:       General: He is not in acute distress  Appearance: Normal appearance  He is not ill-appearing, toxic-appearing or diaphoretic  HENT:      Head: Normocephalic and atraumatic  Right Ear: External ear normal       Left Ear: External ear normal       Nose: Nose normal  No congestion  Mouth/Throat:      Mouth: Mucous membranes are moist       Pharynx: Oropharynx is clear  Eyes:      Extraocular Movements: Extraocular movements intact  Conjunctiva/sclera: Conjunctivae normal    Cardiovascular:      Rate and Rhythm: Normal rate and regular rhythm  Pulses: Normal pulses  Heart sounds: Normal heart sounds  No murmur heard  No gallop      Pulmonary:      Effort: Pulmonary effort is normal       Breath sounds: Normal breath sounds  No wheezing, rhonchi or rales  Comments: Decreased breath sounds on right side   Abdominal:      General: Abdomen is flat  Palpations: Abdomen is soft  Tenderness: There is no abdominal tenderness  There is no guarding  Musculoskeletal:         General: No swelling  Cervical back: Normal range of motion and neck supple  Right lower leg: No edema  Left lower leg: No edema  Skin:     General: Skin is warm and dry  Neurological:      General: No focal deficit present  Mental Status: He is alert and oriented to person, place, and time  Psychiatric:         Mood and Affect: Mood normal          Behavior: Behavior normal          Thought Content:  Thought content normal          Judgment: Judgment normal              Laboratory and Diagnostics:  Results from last 7 days   Lab Units 03/25/22  0515 03/24/22  0553 03/23/22  0520 03/23/22  0130 03/22/22  0849   WBC Thousand/uL 35 04* 32 04* 22 45* 17 95* 27 71*   HEMOGLOBIN g/dL 13 2 13 4 13 5 13 8 14 1   HEMATOCRIT % 37 1 38 1 37 9 40 5 41 7   PLATELETS Thousands/uL 99* 98* 117* 141* 220   BANDS PCT % 32* 38* 58* 48* 6   MONO PCT % 3* 6 7 2* 8     Results from last 7 days   Lab Units 03/25/22  0515 03/24/22  0553 03/23/22  0520 03/23/22  0130 03/22/22  0849   SODIUM mmol/L 134* 136 135* 135* 135*   POTASSIUM mmol/L 4 4 4 6 3 6 3 8 4 4   CHLORIDE mmol/L 99* 102 101 101 97*   CO2 mmol/L 27 29 28 29 30   ANION GAP mmol/L 8 5 6 5 8   BUN mg/dL 36* 21 22 25 29*   CREATININE mg/dL 1 08 1 02 1 04 1 18 1 44*   CALCIUM mg/dL 9 3 9 1 8 0* 7 5* 8 8   GLUCOSE RANDOM mg/dL 272* 193* 73 81 160*   ALT U/L  --   --  33  --  60   AST U/L  --   --  16  --  21   ALK PHOS U/L  --   --  49  --  64   ALBUMIN g/dL  --   --  1 8*  --  2 9*   TOTAL BILIRUBIN mg/dL  --   --  0 67  --  1 29*     Results from last 7 days   Lab Units 03/24/22  0553 03/23/22  0520 03/23/22  0130 03/22/22  0849   MAGNESIUM mg/dL 2 3 2 0 1 6 1 8   PHOSPHORUS mg/dL 2 8 3 5 3 6 3 6      Results from last 7 days   Lab Units 03/23/22  0130 03/22/22  0849   INR  1 53* 0 97   PTT seconds  --  26          Results from last 7 days   Lab Units 03/23/22  0520 03/23/22  0130 03/22/22  1819 03/22/22  1520 03/22/22  1220 03/22/22  0849   LACTIC ACID mmol/L 1 6 2 3* 2 9* 2 4* 3 5* 3 0*     ABG:    VBG:    Results from last 7 days   Lab Units 03/23/22  0520 03/22/22  1520   PROCALCITONIN ng/ml 89 19* 29 27*       Micro  Results from last 7 days   Lab Units 03/22/22  1520 03/22/22  0850 03/22/22  0849   BLOOD CULTURE   --  No Growth at 48 hrs  No Growth at 48 hrs  SPUTUM CULTURE  Culture results to follow  --   --    GRAM STAIN RESULT  2+ Epithelial cells per low power field*  1+ Polys*  2+ Gram positive cocci in pairs*  1+ Gram negative rods*  1+ Gram positive rods*  --   --    MRSA CULTURE ONLY  No Methicillin Resistant Staphlyococcus aureus (MRSA) isolated  --   --        EKG: NS rhythm  Imaging: I have personally reviewed pertinent reports  Intake and Output  I/O       03/23 0701  03/24 0700 03/24 0701  03/25 0700 03/25 0701  03/26 0700    P  O  660 1020     I V  (mL/kg) 542 3 (8 2) 78 1 (1 2)     IV Piggyback 750 490     Total Intake(mL/kg) 1952 3 (29 5) 1588 1 (24)     Urine (mL/kg/hr) 975 (0 6) 2250 (1 4)     Stool  0     Total Output 975 2250     Net +977 3 -661 9            Unmeasured Urine Occurrence  2 x     Unmeasured Stool Occurrence  1 x           Height and Weights   Height: 5' 6" (167 6 cm)  IBW (Ideal Body Weight): 63 8 kg  Body mass index is 23 57 kg/m²    Weight (last 2 days)     Date/Time Weight    03/23/22 1038 66 2 (146)    03/23/22 0535 66 5 (146 61)    03/23/22 0123 66 5 (146 61)            Nutrition       Diet Orders   (From admission, onward)             Start     Ordered    03/24/22 1429  Dietary nutrition supplements  Once        Question Answer Comment   Select Supplement: [employer]    Frequency Breakfast, Dinner        03/24/22 1429    03/23/22 1136  Diet Regular; Regular House  Diet effective now        References:    Nutrtion Support Algorithm Enteral vs  Parenteral   Question Answer Comment   Diet Type Regular    Regular Regular House    RD to adjust diet per protocol?  Yes        03/23/22 1136    03/22/22 1846  Room Service  Once        Question:  Type of Service  Answer:  Room Service - Appropriate with Assistance    03/22/22 1845                  Active Medications  Scheduled Meds:  Current Facility-Administered Medications   Medication Dose Route Frequency Provider Last Rate    acetaminophen  975 mg Oral [facility] Karen Patrick [place]      calcium carbonate  1,000 mg Oral Daily PRN Lucy Mota PA-C      cefepime  2,000 mg Intravenous Q12H Karen Patrick PA-C 2,000 mg (03/25/22 0130)    cholecalciferol  1,000 Units Oral Daily Lucy Mtoa PA-C      dexamethasone  4 mg Oral Q12H [facility] Karen Patrick PA-C      heparin (porcine)  5,000 Units Subcutaneous [facility]8H [facility] [place]      HYDROmorphone  0 2 mg Intravenous Q4H PRN Lucy Mota PA-C      insulin lispro  1-6 Units Subcutaneous Q6H [facility] Karen Patrick PA-C      levETIRAcetam  500 mg Oral Q12H [facility] [place]      melatonin  6 mg Oral HS Alex Shakila tello DO      naloxone  0 04 mg Intravenous Q1MIN PRN Lucy Mota PA-C      ondansetron  4 mg Intravenous Q6H PRN Lucy Mota PA-C      oxyCODONE  10 mg Oral Q6H PRN Lucy Mota PA-C      oxyCODONE  5 mg Oral Q6H PRN Karen Patrick PA-C      pantoprazole  40 mg Oral Early Morning Karen Patrick PA-C      polyethylene glycol  17 g Oral Daily Karen Patrick PA-C      senna  2 tablet Oral HS Karen Pierre PA-C       Continuous Infusions:     PRN Meds:   calcium carbonate, 1,000 mg, Daily PRN  HYDROmorphone, 0 2 mg, Q4H PRN  naloxone, 0 04 mg, Q1MIN PRN  ondansetron, 4 mg, Q6H PRN  oxyCODONE, 10 mg, Q6H PRN  oxyCODONE, 5 mg, Q6H PRN        Invasive Devices Review  Invasive Devices  Report Peripheral Intravenous Line            Peripheral IV 03/22/22 Right Antecubital 3 days    Peripheral IV 03/23/22 Left;Proximal;Ventral (anterior) Forearm 2 days          Arterial Line            Arterial Line 03/23/22 Radial 2 days                Rationale for remaining devices: N/A  ---------------------------------------------------------------------------------------  Advance Directive and Living Will:      Power of :    POLST:    ---------------------------------------------------------------------------------------  Care Time Delivered:   No Critical Care time spent       DXY, DO      Portions of the record may have been created with voice recognition software  Occasional wrong word or "sound a like" substitutions may have occurred due to the inherent limitations of voice recognition software    Read the chart carefully and recognize, using context, where substitutions have occurred

## 2022-03-25 NOTE — PLAN OF CARE
Problem: Potential for Falls  Goal: Patient will remain free of falls  Description: INTERVENTIONS:  - Educate patient/family on patient safety including physical limitations  - Instruct patient to call for assistance with activity   - Consult OT/PT to assist with strengthening/mobility   - Keep Call bell within reach  - Keep bed low and locked with side rails adjusted as appropriate  - Keep care items and personal belongings within reach  - Initiate and maintain comfort rounds  - Make Fall Risk Sign visible to staff  - Apply yellow socks and bracelet for high fall risk patients  - Consider moving patient to room near nurses station  Outcome: Progressing     Problem: Nutrition/Hydration-ADULT  Goal: Nutrient/Hydration intake appropriate for improving, restoring or maintaining nutritional needs  Description: Monitor and assess patient's nutrition/hydration status for malnutrition  Collaborate with interdisciplinary team and initiate plan and interventions as ordered  Monitor patient's weight and dietary intake as ordered or per policy  Utilize nutrition screening tool and intervene as necessary  Determine patient's food preferences and provide high-protein, high-caloric foods as appropriate       INTERVENTIONS:  - Monitor oral intake, urinary output, labs, and treatment plans  - Assess nutrition and hydration status and recommend course of action  - Evaluate amount of meals eaten  - Assist patient with eating if necessary   - Allow adequate time for meals  - Recommend/ encourage appropriate diets, oral nutritional supplements, and vitamin/mineral supplements  - Order, calculate, and assess calorie counts as needed  - Recommend, monitor, and adjust tube feedings and TPN/PPN based on assessed needs  - Assess need for intravenous fluids  - Provide specific nutrition/hydration education as appropriate  - Include patient/family/caregiver in decisions related to nutrition  Outcome: Progressing     Problem: MOBILITY - ADULT  Goal: Maintain or return to baseline ADL function  Description: INTERVENTIONS:  -  Assess patient's ability to carry out ADLs; assess patient's baseline for ADL function and identify physical deficits which impact ability to perform ADLs (bathing, care of mouth/teeth, toileting, grooming, dressing, etc )  - Assess/evaluate cause of self-care deficits   - Assess range of motion  - Assess patient's mobility; develop plan if impaired  - Assess patient's need for assistive devices and provide as appropriate  - Encourage maximum independence but intervene and supervise when necessary  - Involve family in performance of ADLs  - Assess for home care needs following discharge   - Consider OT consult to assist with ADL evaluation and planning for discharge  - Provide patient education as appropriate  Outcome: Progressing  Goal: Maintains/Returns to pre admission functional level  Description: INTERVENTIONS:  - Perform BMAT or MOVE assessment daily    - Set and communicate daily mobility goal to care team and patient/family/caregiver     - Collaborate with rehabilitation services on mobility goals if consulted  - Out of bed for toileting  - Record patient progress and toleration of activity level   Outcome: Progressing     Problem: INFECTION - ADULT  Goal: Absence or prevention of progression during hospitalization  Description: INTERVENTIONS:  - Assess and monitor for signs and symptoms of infection  - Monitor lab/diagnostic results  - Monitor all insertion sites, i e  indwelling lines, tubes, and drains  - Monitor endotracheal if appropriate and nasal secretions for changes in amount and color  - [place] appropriate cooling/warming therapies per order  - Administer medications as ordered  - Instruct and encourage patient and family to use good hand hygiene technique  - Identify and instruct in appropriate isolation precautions for identified infection/condition  Outcome: Progressing  Goal: Absence of fever/infection during neutropenic period  Description: INTERVENTIONS:  - Monitor WBC    Outcome: Progressing     Problem: Prexisting or High Potential for Compromised Skin Integrity  Goal: Skin integrity is maintained or improved  Description: INTERVENTIONS:  - Identify patients at risk for skin breakdown  - Assess and monitor skin integrity  - Assess and monitor nutrition and hydration status  - Monitor labs   - Assess for incontinence   - Turn and reposition patient  - Assist with mobility/ambulation  - Relieve pressure over bony prominences  - Avoid friction and shearing  - Provide appropriate hygiene as needed including keeping skin clean and dry  - Evaluate need for skin moisturizer/barrier cream  - Collaborate with interdisciplinary team   - Patient/family teaching  - Consider wound care consult   Outcome: Progressing

## 2022-03-25 NOTE — ASSESSMENT & PLAN NOTE
· POA: tachycardia, tachypnea, lactic acidosis, leukocytosis, elevated Tbili and fever  · CT chest: Unchanged large right upper lobe mass most compatible with carcinoma, with new extensive right upper lobe groundglass density which could represent postobstructive pneumonia, edema, hemorrhage or lymphangitic carcinomatosis   New groundglass infiltrates in the left upper and right lower lobes, presumably infectious/inflammatory  · UA bland  · Blood cultures x2 drawn on admission -- will follow  · Sputum culture obtained; polymicrobial   · COVID/flu/RSV PCR negative  · 3/16 bronch cultures negative to date  · Was hypotensive on arrival with lactic acid 3  Received 30 mL/kg IVF for sepsis resuscitation with initial improvement in blood pressure  He subsequently developed hypotension with SBP 80s  He was given additional 2 1L IVF for total 4L  Following additional IVF his blood pressure was 90s SBP and he was admitted to [facility] Step Down 2     · Continue Vancomycin and cefepime -- narrow once culture data available  · Discontinue vanco if MRSA nares culture negative  · Follow WBC and fever curve  · Lactic 3, 3 5, 2 4, 2 9  ? Will continue to trend  · procal 89 19 3/23  ?  Will continue to trend  · Critical care asked to see patient for recurrence of hypotension  · Transfer patient to ICU  · On levophed; currently on 3 mcg/min; off levophed 3/24  · Titrate for MAP > 65 mmHg

## 2022-03-25 NOTE — ASSESSMENT & PLAN NOTE
Lab Results   Component Value Date    HGBA1C 5 7 (H) 11/07/2021       Recent Labs     03/24/22  1246 03/24/22  1753 03/24/22  2345 03/25/22  0548   POCGLU 222* 274* 192* 250*       Blood Sugar Average: Last 72 hrs:  (P) 154 [id number]     · Holding home metformin for now  · Diet: Regular  · Goal blood glucose 140-180  · q6h accu checks with [facility]SI  · Avoid hypoglycemia

## 2022-03-26 ENCOUNTER — APPOINTMENT (INPATIENT)
Dept: RADIOLOGY | Facility: HOSPITAL | Age: 57
DRG: 871 | End: 2022-03-26
Payer: COMMERCIAL

## 2022-03-26 LAB
BACTERIA SPT RESP CULT: ABNORMAL
BACTERIA SPT RESP CULT: ABNORMAL
BASOPHILS # BLD MANUAL: 0 THOUSAND/UL (ref 0–0.1)
BASOPHILS NFR MAR MANUAL: 0 % (ref 0–1)
EOSINOPHIL # BLD MANUAL: 0 THOUSAND/UL (ref 0–0.4)
EOSINOPHIL NFR BLD MANUAL: 0 % (ref 0–6)
ERYTHROCYTE [DISTWIDTH] IN BLOOD BY AUTOMATED COUNT: 13.7 % (ref 11.6–15.1)
GLUCOSE SERPL-MCNC: 202 MG/DL (ref 65–140)
GLUCOSE SERPL-MCNC: 245 MG/DL (ref 65–140)
GLUCOSE SERPL-MCNC: 264 MG/DL (ref 65–140)
GLUCOSE SERPL-MCNC: 362 MG/DL (ref 65–140)
GLUCOSE SERPL-MCNC: 397 MG/DL (ref 65–140)
GRAM STN SPEC: ABNORMAL
HCT VFR BLD AUTO: 38.3 % (ref 36.5–49.3)
HGB BLD-MCNC: 13.6 G/DL (ref 12–17)
LYMPHOCYTES # BLD AUTO: 1.59 THOUSAND/UL (ref 0.6–4.47)
LYMPHOCYTES # BLD AUTO: 10 % (ref 14–44)
MCH RBC QN AUTO: 30.5 PG (ref 26.8–34.3)
MCHC RBC AUTO-ENTMCNC: 35.5 G/DL (ref 31.4–37.4)
MCV RBC AUTO: 86 FL (ref 82–98)
MONOCYTES # BLD AUTO: 1.27 THOUSAND/UL (ref 0–1.22)
MONOCYTES NFR BLD: 8 % (ref 4–12)
NEUTROPHILS # BLD MANUAL: 13.05 THOUSAND/UL (ref 1.85–7.62)
NEUTS BAND NFR BLD MANUAL: 1 % (ref 0–8)
NEUTS SEG NFR BLD AUTO: 81 % (ref 43–75)
PLATELET # BLD AUTO: 131 THOUSANDS/UL (ref 149–390)
PLATELET BLD QL SMEAR: ABNORMAL
PMV BLD AUTO: 10.6 FL (ref 8.9–12.7)
RBC # BLD AUTO: 4.46 MILLION/UL (ref 3.88–5.62)
RBC MORPH BLD: NORMAL
WBC # BLD AUTO: 15.91 THOUSAND/UL (ref 4.31–10.16)

## 2022-03-26 PROCEDURE — 99232 SBSQ HOSP IP/OBS MODERATE 35: CPT | Performed by: PHYSICIAN ASSISTANT

## 2022-03-26 PROCEDURE — 71045 X-RAY EXAM CHEST 1 VIEW: CPT

## 2022-03-26 PROCEDURE — 82948 REAGENT STRIP/BLOOD GLUCOSE: CPT

## 2022-03-26 PROCEDURE — 85027 COMPLETE CBC AUTOMATED: CPT | Performed by: PHYSICIAN ASSISTANT

## 2022-03-26 PROCEDURE — 85007 BL SMEAR W/DIFF WBC COUNT: CPT | Performed by: PHYSICIAN ASSISTANT

## 2022-03-26 PROCEDURE — 99232 SBSQ HOSP IP/OBS MODERATE 35: CPT | Performed by: INTERNAL MEDICINE

## 2022-03-26 RX ORDER — CEFAZOLIN SODIUM 2 G/50ML
2000 SOLUTION INTRAVENOUS EVERY 8 HOURS
Status: DISCONTINUED | OUTPATIENT
Start: 2022-03-26 | End: 2022-03-27 | Stop reason: HOSPADM

## 2022-03-26 RX ORDER — INSULIN GLARGINE 100 [IU]/ML
10 INJECTION, SOLUTION SUBCUTANEOUS
Status: DISCONTINUED | OUTPATIENT
Start: 2022-03-26 | End: 2022-03-27 | Stop reason: HOSPADM

## 2022-03-26 RX ADMIN — LEVETIRACETAM 500 MG: 250 TABLET, FILM COATED ORAL at 20:20

## 2022-03-26 RX ADMIN — INSULIN LISPRO 5 UNITS: 100 INJECTION, SOLUTION INTRAVENOUS; SUBCUTANEOUS at 17:48

## 2022-03-26 RX ADMIN — ACETAMINOPHEN 975 MG: 325 TABLET, FILM COATED ORAL at 13:01

## 2022-03-26 RX ADMIN — Medication 1000 UNITS: at 09:08

## 2022-03-26 RX ADMIN — HEPARIN SODIUM 5000 UNITS: 5000 INJECTION INTRAVENOUS; SUBCUTANEOUS at 05:03

## 2022-03-26 RX ADMIN — OXYCODONE HYDROCHLORIDE 5 MG: 5 TABLET ORAL at 13:00

## 2022-03-26 RX ADMIN — CEFEPIME HYDROCHLORIDE 2000 MG: 2 INJECTION, POWDER, FOR SOLUTION INTRAVENOUS at 01:12

## 2022-03-26 RX ADMIN — DEXAMETHASONE 4 MG: 4 TABLET ORAL at 09:08

## 2022-03-26 RX ADMIN — INSULIN LISPRO 4 UNITS: 100 INJECTION, SOLUTION INTRAVENOUS; SUBCUTANEOUS at 21:31

## 2022-03-26 RX ADMIN — CEFAZOLIN SODIUM 2000 MG: 2 SOLUTION INTRAVENOUS at 12:57

## 2022-03-26 RX ADMIN — HEPARIN SODIUM 5000 UNITS: 5000 INJECTION INTRAVENOUS; SUBCUTANEOUS at 13:02

## 2022-03-26 RX ADMIN — HEPARIN SODIUM 5000 UNITS: 5000 INJECTION INTRAVENOUS; SUBCUTANEOUS at 21:31

## 2022-03-26 RX ADMIN — DEXAMETHASONE 4 MG: 4 TABLET ORAL at 20:20

## 2022-03-26 RX ADMIN — LEVETIRACETAM 500 MG: 250 TABLET, FILM COATED ORAL at 09:08

## 2022-03-26 RX ADMIN — ACETAMINOPHEN 975 MG: 325 TABLET, FILM COATED ORAL at 05:03

## 2022-03-26 RX ADMIN — OXYCODONE HYDROCHLORIDE 5 MG: 5 TABLET ORAL at 21:31

## 2022-03-26 RX ADMIN — INSULIN GLARGINE 10 UNITS: 100 INJECTION, SOLUTION SUBCUTANEOUS at 21:31

## 2022-03-26 RX ADMIN — STANDARDIZED SENNA CONCENTRATE 17.2 MG: 8.6 TABLET ORAL at 21:30

## 2022-03-26 RX ADMIN — CEFAZOLIN SODIUM 2000 MG: 2 SOLUTION INTRAVENOUS at 20:20

## 2022-03-26 RX ADMIN — Medication 6 MG: at 21:30

## 2022-03-26 RX ADMIN — PANTOPRAZOLE SODIUM 40 MG: 40 TABLET, DELAYED RELEASE ORAL at 05:03

## 2022-03-26 RX ADMIN — INSULIN LISPRO 4 UNITS: 100 INJECTION, SOLUTION INTRAVENOUS; SUBCUTANEOUS at 00:29

## 2022-03-26 RX ADMIN — ACETAMINOPHEN 975 MG: 325 TABLET, FILM COATED ORAL at 21:31

## 2022-03-26 RX ADMIN — INSULIN LISPRO 2 UNITS: 100 INJECTION, SOLUTION INTRAVENOUS; SUBCUTANEOUS at 13:02

## 2022-03-26 RX ADMIN — INSULIN LISPRO 2 UNITS: 100 INJECTION, SOLUTION INTRAVENOUS; SUBCUTANEOUS at 05:04

## 2022-03-26 NOTE — ASSESSMENT & PLAN NOTE
Background: Presented to the ED with shoulder pain and SOB  Also endorsed sputum, congestion, chills, body aches, and weakness  o Secondary to obstructive pneumonia in the setting of lung mass   o Criteria met on admission: lactic acidosis, fever, hypotension, leukocytosis, and tachycardia   o With tissue hypoperfusion and acute organ dysfunction as evidence by lactic and ALBERT   CXR "with increased right upper lobe airspace opacity peripheral to the right upper lobe mass "   CTA chest PE "with No pulmonary embolus  Unchanged large right upper lobe mass most compatible with carcinoma, with new extensive right upper lobe groundglass density which could represent postobstructive pneumonia, edema, hemorrhage or lymphangitic carcinomatosis   New groundglass infiltrates in the left upper and right lower lobes, presumably infectious/inflammatory  Unchanged mediastinal and right hilar adenopathy"   CT head "No acute findings   Unchanged metastatic lesions with surrounding edema in the right occipital and left frontal lobes"   Lactic of 3 0; now normalized s/p resuscitation    IV Fluids     Required stay in ICU given refractory hypotension requiring pressors  Now off pressors and BP stabilized    Blood cultures negative at 72 hrs    Procal elevated, peaked at 89 now trending down to 35   With noted leukocytosis, which is worsening, however pt is also on steroids so likely contributing as well    Continue IV Cefepime    IV Vanco d/c'd as MRSA screen negative  o Sputum culture reviewed with Klebsiella    Pulmonary following, f/u recs

## 2022-03-26 NOTE — PROGRESS NOTES
Progress Note - Pulmonary   Michelledia Body 64 y o  male MRN: [id number]  Unit/Bed#: W -01 Encounter: [id number]    Assessment/Plan:    1  Acute hypoxic respiratory failure likely multifaceted as listed below       -  currently room-93%, patient does not wear home O2       -  continue saturations greater than 89%       -  pulmonary toileting:  Deep breathing cough, OOB as tolerated, IS Q 1 hr       -  will need ambulatory pulse ox prior to discharge    2  Septic shock secondary to postobstructive Klebsiella PNA       -  Sputum- 2+ Klebsiella- will transition to Cefazolin       -  procalcitonin- 29 27-- 89 19-- 35 43       -  Day # 1 Cefazolin, total 5 days antibiotic-  can likely transition to Augmentin       -  consider minimum 7- 10 day course       -  will need repeat imaging in 4-6 weeks       -  will update chest x-ray       -  anticipate discharge tomorrow    3  Adenocarcinoma RUL w/ brain mets and cerebral edema       -  3/15/2022- bronchoscopy- adenocarcinoma       -  palliative care following       -  patient was scheduled for radiation next week       -  given acute infectious process if chemotherapy were planned will have to postpone    4  Suspected COPD of unknown severity without acute exacerbation       -  Inpatient:  Xopenex/Atrovent t i d        -  home regimen:  Ventolin 2 puffs q 6h p r n        -  no indication for steroids at this time       -  will set up pulmonary follow-up        Chief Complaint:    "I am feeling a little better"    Subjective:    Phoung was comfortably sitting in his bed  He reports he is feeling little bit better since admission  No significant overnight events reported  Patient currently denies any fever, chills hemoptysis, headaches, night sweats, pleuritic chest pain, or palpitations  Objective:    Vitals: Blood pressure 115/71, pulse 60, temperature 98 4 °F (36 9 °C), resp  rate 18, height 5' 6" (1 676 m), weight 66 2 kg (146 lb), SpO2 92 %  RA,Body mass index is 23 57 kg/m²  Intake/Output Summary (Last 24 hours) at 3/26/2022 1106  Last data filed at 3/26/2022 0900  Gross per 24 hour   Intake 240 ml   Output --   Net 240 ml       Invasive Devices  Report    Peripheral Intravenous Line            Peripheral IV 03/22/22 Right Antecubital 4 days    Peripheral IV 03/23/22 Left;Proximal;Ventral (anterior) Forearm 3 days          Arterial Line            Arterial Line 03/23/22 Radial 3 days                Physical Exam:   Physical Exam  Constitutional:       General: He is not in acute distress  Appearance: Normal appearance  He is normal weight  He is not ill-appearing  HENT:      Head: Normocephalic and atraumatic  Nose: Nose normal  No congestion or rhinorrhea  Mouth/Throat:      Mouth: Mucous membranes are dry  Pharynx: No oropharyngeal exudate or posterior oropharyngeal erythema  Cardiovascular:      Rate and Rhythm: Normal rate and regular rhythm  Pulses: Normal pulses  Heart sounds: Normal heart sounds  No murmur heard  No friction rub  No gallop  Pulmonary:      Effort: Pulmonary effort is normal  No tachypnea, bradypnea, accessory muscle usage or respiratory distress  Breath sounds: Decreased air movement present  No stridor or transmitted upper airway sounds  Decreased breath sounds present  No wheezing, rhonchi or rales  Comments: Significantly diminished breath sounds  Chest:      Chest wall: No tenderness  Abdominal:      General: Abdomen is flat  Bowel sounds are normal       Palpations: Abdomen is soft  Musculoskeletal:         General: No swelling or tenderness  Normal range of motion  Cervical back: Normal range of motion and neck supple  No rigidity or tenderness  Skin:     General: Skin is warm and dry  Coloration: Skin is not jaundiced or pale  Neurological:      General: No focal deficit present  Mental Status: He is alert and oriented to person, place, and time   Mental status is at baseline  Psychiatric:         Mood and Affect: Mood normal          Behavior: Behavior normal          Labs:  I have personally reviewed pertinent lab results 3/25/2022      Imaging and other studies: I have personally reviewed pertinent films in PACS     Chest CT-unchanged large right upper lobe lung mass

## 2022-03-26 NOTE — PLAN OF CARE
Problem: Potential for Falls  Goal: Patient will remain free of falls  Description: INTERVENTIONS:  - Educate patient/family on patient safety including physical limitations  - Instruct patient to call for assistance with activity   - Consult OT/PT to assist with strengthening/mobility   - Keep Call bell within reach  - Keep bed low and locked with side rails adjusted as appropriate  - Keep care items and personal belongings within reach  - Initiate and maintain comfort rounds  - Make Fall Risk Sign visible to staff  - Offer Toileting every  Hours, in advance of need  - Initiate/Maintain alarm  - Obtain necessary fall risk management equipment:   - Apply yellow socks and bracelet for high fall risk patients  - Consider moving patient to room near nurses station  Outcome: Progressing     Problem: Nutrition/Hydration-ADULT  Goal: Nutrient/Hydration intake appropriate for improving, restoring or maintaining nutritional needs  Description: Monitor and assess patient's nutrition/hydration status for malnutrition  Collaborate with interdisciplinary team and initiate plan and interventions as ordered  Monitor patient's weight and dietary intake as ordered or per policy  Utilize nutrition screening tool and intervene as necessary  Determine patient's food preferences and provide high-protein, high-caloric foods as appropriate       INTERVENTIONS:  - Monitor oral intake, urinary output, labs, and treatment plans  - Assess nutrition and hydration status and recommend course of action  - Evaluate amount of meals eaten  - Assist patient with eating if necessary   - Allow adequate time for meals  - Recommend/ encourage appropriate diets, oral nutritional supplements, and vitamin/mineral supplements  - Order, calculate, and assess calorie counts as needed  - Recommend, monitor, and adjust tube feedings and TPN/PPN based on assessed needs  - Assess need for intravenous fluids  - Provide specific nutrition/hydration education as appropriate  - Include patient/family/caregiver in decisions related to nutrition  Outcome: Progressing     Problem: MOBILITY - ADULT  Goal: Maintain or return to baseline ADL function  Description: INTERVENTIONS:  -  Assess patient's ability to carry out ADLs; assess patient's baseline for ADL function and identify physical deficits which impact ability to perform ADLs (bathing, care of mouth/teeth, toileting, grooming, dressing, etc )  - Assess/evaluate cause of self-care deficits   - Assess range of motion  - Assess patient's mobility; develop plan if impaired  - Assess patient's need for assistive devices and provide as appropriate  - Encourage maximum independence but intervene and supervise when necessary  - Involve family in performance of ADLs  - Assess for home care needs following discharge   - Consider OT consult to assist with ADL evaluation and planning for discharge  - Provide patient education as appropriate  Outcome: Progressing  Goal: Maintains/Returns to pre admission functional level  Description: INTERVENTIONS:  - Perform BMAT or MOVE assessment daily    - Set and communicate daily mobility goal to care team and patient/family/caregiver  - Collaborate with rehabilitation services on mobility goals if consulted  - Perform Range of Motion  times a day  - Reposition patient every  hours    - Dangle patient  times a day  - Stand patient  times a day  - Ambulate patient  times a day  - Out of bed to chair  times a day   - Out of bed for meals times a day  - Out of bed for toileting  - Record patient progress and toleration of activity level   Outcome: Progressing     Problem: INFECTION - ADULT  Goal: Absence or prevention of progression during hospitalization  Description: INTERVENTIONS:  - Assess and monitor for signs and symptoms of infection  - Monitor lab/diagnostic results  - Monitor all insertion sites, i e  indwelling lines, tubes, and drains  - Monitor endotracheal if appropriate and nasal secretions for changes in amount and color  - [place] appropriate cooling/warming therapies per order  - Administer medications as ordered  - Instruct and encourage patient and family to use good hand hygiene technique  - Identify and instruct in appropriate isolation precautions for identified infection/condition  Outcome: Progressing  Goal: Absence of fever/infection during neutropenic period  Description: INTERVENTIONS:  - Monitor WBC    Outcome: Progressing     Problem: Prexisting or High Potential for Compromised Skin Integrity  Goal: Skin integrity is maintained or improved  Description: INTERVENTIONS:  - Identify patients at risk for skin breakdown  - Assess and monitor skin integrity  - Assess and monitor nutrition and hydration status  - Monitor labs   - Assess for incontinence   - Turn and reposition patient  - Assist with mobility/ambulation  - Relieve pressure over bony prominences  - Avoid friction and shearing  - Provide appropriate hygiene as needed including keeping skin clean and dry  - Evaluate need for skin moisturizer/barrier cream  - Collaborate with interdisciplinary team   - Patient/family teaching  - Consider wound care consult   Outcome: Progressing

## 2022-03-26 NOTE — ASSESSMENT & PLAN NOTE
· With cerebral edema   · Per Neurosurgery documentation recommending radiation without plan for surgical intervention from prior admission   · Was supposed to have OP visit with Med/Onc to discuss radiation this week   · Will continue decadron and keppra per recommendation

## 2022-03-26 NOTE — PROGRESS NOTES
[facility]  Progress Note Stephanie Fuel 1965, 64 y o  male MRN: [id number]  Unit/Bed#: W -01 Encounter: [id number]  Primary Care Provider: Shantanu Tay MD   Date and time admitted to hospital: 3/22/2022  8:12 AM    * Septic shock [facility])  Assessment & Plan  Background: Presented to the ED with shoulder pain and SOB  Also endorsed sputum, congestion, chills, body aches, and weakness  o Secondary to obstructive pneumonia in the setting of lung mass   o Criteria met on admission: lactic acidosis, fever, hypotension, leukocytosis, and tachycardia   o With tissue hypoperfusion and acute organ dysfunction as evidence by lactic and ALBERT   CXR "with increased right upper lobe airspace opacity peripheral to the right upper lobe mass "   CTA chest PE "with No pulmonary embolus  Unchanged large right upper lobe mass most compatible with carcinoma, with new extensive right upper lobe groundglass density which could represent postobstructive pneumonia, edema, hemorrhage or lymphangitic carcinomatosis   New groundglass infiltrates in the left upper and right lower lobes, presumably infectious/inflammatory  Unchanged mediastinal and right hilar adenopathy"   CT head "No acute findings   Unchanged metastatic lesions with surrounding edema in the right occipital and left frontal lobes"   Lactic of 3 0; now normalized s/p resuscitation    IV Fluids     Required stay in ICU given refractory hypotension requiring pressors  Now off pressors and BP stabilized    Blood cultures negative at 72 hrs    Procal elevated, peaked at 89 now trending down to 35   With noted leukocytosis, which is worsening, however pt is also on steroids so likely contributing as well    Continue IV Cefepime    IV Vanco d/c'd as MRSA screen negative  o Sputum culture reviewed with Klebsiella    Pulmonary following, f/u recs     Acute respiratory failure with hypoxia ([facility] [address] )  Assessment & Plan  · Present shortly after admission as evidence by requirement of [facility]  · Requiring IVF for sepsis protocol and hypotension  · In the setting of pneumonia   · See plan under sepsis     Acute nontraumatic kidney injury [facility])  Assessment & Plan  · Present on admission as evidence by creatinine of 1 44  · Baseline appears to be closer to 0 9  · In the setting of sepsis/hypovolemia  · IVF   · Renally dose medications as appropriate  · Avoid nephrotoxins, NSAIDS, and hypotension   · Now resolved    Obstructive pneumonia  Assessment & Plan  · In the setting of lung mass   · See plan under primary problem     Cerebral edema ([address] )  Assessment & Plan  · Due to brain mass/mets    Continue decadron     Brain mass  Assessment & Plan  · With cerebral edema   · Per Neurosurgery documentation recommending radiation without plan for surgical intervention from prior admission   · Was supposed to have OP visit with Med/Onc to discuss radiation this week   · Will continue decadron and keppra per recommendation     Lung mass  Assessment & Plan  · As noted on CT scan from January 2022; see present on repeat  · Consistent with adenocarcinoma from tissue exam on 3/16/2022  · Status post bronchoscopy with biopsy on 3/16/2022  · Pulmonology consulted; input appreciated     Type 2 diabetes mellitus without complication, without long-term current use of insulin [facility])  Assessment & Plan  Lab Results   Component Value Date    HGBA1C 5 7 (H) 11/07/2021     Recent Labs     03/25/22  0548 03/25/22  1251 03/25/22  2343 03/26/22  0502   POCGLU 250* 286* 307* 202*     · With very good control as evidence by A1c as above on metformin as an OP -- metformin on hold  · With hyperglycemia, likely exacerbated by acute illness and steroid use  · Start Lantus 10 units QHS  · Continue SSI, accu checks   · Hypoglycemia protocol   · Carb controlled diet   · Monitor with POCT BG and titrate regimen as indicated         VTE Pharmacologic Prophylaxis: VTE Score: 3 Moderate Risk (Score 3-4) - Pharmacological DVT Prophylaxis Ordered: heparin  Patient Centered Rounds: I performed bedside rounds with nursing staff today  Discussions with Specialists or Other Care Team Provider: RN    Education and Discussions with Family / Patient: Updated  (son) via phone  Time Spent for Care: 30 minutes  More than 50% of total time spent on counseling and coordination of care as described above  Current Length of Stay: 4 day(s)  Current Patient Status: Inpatient   Certification Statement: The patient will continue to require additional inpatient hospital stay due to septic shock/pneumonia on IV abx  Discharge Plan: Anticipate discharge in 24-48 hrs to home with home services  would like therapy re-evals     Code Status: Level 1 - Full Code    Subjective:   Patient has no acute complaints at this time, feels well  Objective:     Vitals:   Temp (24hrs), Av 1 °F (36 7 °C), Min:97 9 °F (36 6 °C), Max:98 4 °F (36 9 °C)    Temp:  [97 9 °F (36 6 °C)-98 4 °F (36 9 °C)] 98 4 °F (36 9 °C)  HR:  [57-64] 60  Resp:  [16-21] 18  BP: (115-128)/(71-79) 115/71  SpO2:  [92 %-97 %] 92 %  Body mass index is 23 57 kg/m²  Input and Output Summary (last 24 hours): Intake/Output Summary (Last 24 hours) at 3/26/2022 1041  Last data filed at 3/26/2022 0900  Gross per 24 hour   Intake 240 ml   Output --   Net 240 ml       Physical Exam:   Physical Exam  Vitals reviewed  Constitutional:       General: He is not in acute distress  Appearance: He is not toxic-appearing  HENT:      Head: Normocephalic and atraumatic  Eyes:      Extraocular Movements: Extraocular movements intact  Cardiovascular:      Rate and Rhythm: Normal rate and regular rhythm  Pulmonary:      Effort: Pulmonary effort is normal       Breath sounds: Wheezing present  Comments: Decreased BS b/l  Abdominal:      General: Bowel sounds are normal  There is no distension  Palpations: Abdomen is soft        Tenderness: There is no abdominal tenderness  Musculoskeletal:         General: Normal range of motion  Cervical back: Normal range of motion  Neurological:      General: No focal deficit present  Mental Status: He is alert and oriented to person, place, and time  Psychiatric:         Mood and Affect: Mood normal          Behavior: Behavior normal          Thought Content: Thought content normal           Additional Data:     Labs:  Results from last 7 days   Lab Units 03/25/22  0515   WBC Thousand/uL 35 04*   HEMOGLOBIN g/dL 13 2   HEMATOCRIT % 37 1   PLATELETS Thousands/uL 99*   BANDS PCT % 32*   LYMPHO PCT % 1*   MONO PCT % 3*   EOS PCT % 0     Results from last 7 days   Lab Units 03/25/22  0515 03/24/22  0553 03/23/22  0520   SODIUM mmol/L 134*   < > 135*   POTASSIUM mmol/L 4 4   < > 3 6   CHLORIDE mmol/L 99*   < > 101   CO2 mmol/L 27   < > 28   BUN mg/dL 36*   < > 22   CREATININE mg/dL 1 08   < > 1 04   ANION GAP mmol/L 8   < > 6   CALCIUM mg/dL 9 3   < > 8 0*   ALBUMIN g/dL  --   --  1 8*   TOTAL BILIRUBIN mg/dL  --   --  0 67   ALK PHOS U/L  --   --  49   ALT U/L  --   --  33   AST U/L  --   --  16   GLUCOSE RANDOM mg/dL 272*   < > 73    < > = values in this interval not displayed       Results from last 7 days   Lab Units 03/23/22  0130   INR  1 53*     Results from last 7 days   Lab Units 03/26/22  0502 03/25/22  2343 03/25/22  1251 03/25/22  0548 03/24/22  2345 03/24/22  1753 03/24/22  1246 03/24/22  0546 03/24/22  0533 03/24/22  0008 03/23/22  1804 03/23/22  1159   POC GLUCOSE mg/dl 202* 307* 286* 250* 192* 274* 222* 182* 183* 113 118 85         Results from last 7 days   Lab Units 03/25/22  0904 03/23/22  0520 03/23/22  0130 03/22/22  1819 03/22/22  1520   LACTIC ACID mmol/L  --  1 6 2 3* 2 9* 2 4*   PROCALCITONIN ng/ml 35 43* 89 19*  --   --  29 27*       Lines/Drains:  Invasive Devices  Report    Peripheral Intravenous Line            Peripheral IV 03/22/22 Right Antecubital 4 days    Peripheral IV 03/23/22 Left;Proximal;Ventral (anterior) Forearm 3 days          Arterial Line            Arterial Line 03/23/22 Radial 3 days                      Imaging: No pertinent imaging reviewed  Recent Cultures (last 7 days):   Results from last 7 days   Lab Units 03/22/22  1520 03/22/22  0850 03/22/22  0849   BLOOD CULTURE   --  No Growth at 72 hrs  No Growth at 72 hrs     SPUTUM CULTURE  2+ Growth of Klebsiella pneumoniae*  2+ Growth of   --   --    GRAM STAIN RESULT  2+ Epithelial cells per low power field*  1+ Polys*  2+ Gram positive cocci in pairs*  1+ Gram negative rods*  1+ Gram positive rods*  --   --        Last 24 Hours Medication List:   Current Facility-Administered Medications   Medication Dose Route Frequency Provider Last Rate    acetaminophen  975 mg Oral [facility] [address] [place], DO      calcium carbonate  1,000 mg Oral Daily PRN [address] [place], DO      cefepime  2,000 mg Intravenous Q12H [address] [place], DO 2,000 mg (03/26/22 0112)    cholecalciferol  1,000 Units Oral Daily [address] [place], DO      dexamethasone  4 mg Oral Q12H [facility] [address] [place], [address]      heparin (porcine)  5,000 Units Subcutaneous [facility] [address] [place], [address]      HYDROmorphone  0 2 mg Intravenous Q4H PRN [address] [place], DO      insulin glargine  10 Units Subcutaneous HS Stacey Lindquist PA-C      insulin lispro  1-5 Units Subcutaneous TID AC Stacey Lindquist PA-C      insulin lispro  1-5 Units Subcutaneous HS Stacey Lindquist PA-C      levETIRAcetam  500 mg Oral Q12H [facility] [place], [address]      mel[facility]  6 mg Oral HS [facility] [address] [place], [address]      naloxone  0 04 mg Intravenous Q1MIN PRN [address] [place], DO      ondansetron  4 mg Intravenous Q6H PRN [address] [place], DO      oxyCODONE  10 mg Oral Q6H PRN [address] [place], DO      oxyCODONE  5 mg Oral Q6H PRN [address] [place], DO      pantoprazole 40 mg Oral Early Morning Kia Loja DO      polyethylene glycol  17 g Oral Daily Kia Loja, [place]      senna  2 tablet Oral HS Marce Giraldo DO          Today, Patient Was Seen By: Lisette Jeffery PA-C    **Please Note: This note may have been constructed using a voice recognition system  **

## 2022-03-26 NOTE — ASSESSMENT & PLAN NOTE
Lab Results   Component Value Date    HGBA1C 5 7 (H) 11/07/2021     Recent Labs     03/25/22  0548 03/25/22  1251 03/25/22  2343 03/26/22  0502   POCGLU 250* 286* 307* 202*     · With very good control as evidence by A1c as above on metformin as an OP -- metformin on hold  · With hyperglycemia, likely exacerbated by acute illness and steroid use  · Start Lantus 10 units QHS  · Continue SSI, accu checks   · Hypoglycemia protocol   · Carb controlled diet   · Monitor with POCT BG and titrate regimen as indicated

## 2022-03-26 NOTE — ASSESSMENT & PLAN NOTE
· Present on admission as evidence by creatinine of 1 44  · Baseline appears to be closer to 0 9  · In the setting of sepsis/hypovolemia  · IVF   · Renally dose medications as appropriate  · Avoid nephrotoxins, NSAIDS, and hypotension   · Now resolved

## 2022-03-27 VITALS
HEIGHT: 66 IN | HEART RATE: 56 BPM | RESPIRATION RATE: 18 BRPM | SYSTOLIC BLOOD PRESSURE: 117 MMHG | WEIGHT: 146 LBS | OXYGEN SATURATION: 94 % | TEMPERATURE: 97.7 F | DIASTOLIC BLOOD PRESSURE: 75 MMHG | BODY MASS INDEX: 23.46 KG/M2

## 2022-03-27 PROBLEM — N17.9 ACUTE NONTRAUMATIC KIDNEY INJURY (HCC): Status: RESOLVED | Noted: 2022-01-01 | Resolved: 2022-01-01

## 2022-03-27 PROBLEM — J96.01 ACUTE RESPIRATORY FAILURE WITH HYPOXIA (HCC): Status: RESOLVED | Noted: 2022-01-01 | Resolved: 2022-01-01

## 2022-03-27 LAB
ANION GAP SERPL CALCULATED.3IONS-SCNC: 8 MMOL/L (ref 4–13)
BACTERIA BLD CULT: NORMAL
BACTERIA BLD CULT: NORMAL
BASOPHILS # BLD MANUAL: 0 THOUSAND/UL (ref 0–0.1)
BASOPHILS NFR MAR MANUAL: 0 % (ref 0–1)
BUN SERPL-MCNC: 31 MG/DL (ref 5–25)
CALCIUM SERPL-MCNC: 8.8 MG/DL (ref 8.3–10.1)
CHLORIDE SERPL-SCNC: 100 MMOL/L (ref 100–108)
CO2 SERPL-SCNC: 23 MMOL/L (ref 21–32)
CREAT SERPL-MCNC: 1.04 MG/DL (ref 0.6–1.3)
EOSINOPHIL # BLD MANUAL: 0.44 THOUSAND/UL (ref 0–0.4)
EOSINOPHIL NFR BLD MANUAL: 3 % (ref 0–6)
ERYTHROCYTE [DISTWIDTH] IN BLOOD BY AUTOMATED COUNT: 14.6 % (ref 11.6–15.1)
GFR SERPL CREATININE-BSD FRML MDRD: 79 ML/MIN/1.73SQ M
GLUCOSE SERPL-MCNC: 186 MG/DL (ref 65–140)
GLUCOSE SERPL-MCNC: 218 MG/DL (ref 65–140)
GLUCOSE SERPL-MCNC: 346 MG/DL (ref 65–140)
HCT VFR BLD AUTO: 43.9 % (ref 36.5–49.3)
HGB BLD-MCNC: 14.2 G/DL (ref 12–17)
LYMPHOCYTES # BLD AUTO: 1.61 THOUSAND/UL (ref 0.6–4.47)
LYMPHOCYTES # BLD AUTO: 11 % (ref 14–44)
MCH RBC QN AUTO: 30.9 PG (ref 26.8–34.3)
MCHC RBC AUTO-ENTMCNC: 32.3 G/DL (ref 31.4–37.4)
MCV RBC AUTO: 96 FL (ref 82–98)
MONOCYTES # BLD AUTO: 1.46 THOUSAND/UL (ref 0–1.22)
MONOCYTES NFR BLD: 10 % (ref 4–12)
MYELOCYTES NFR BLD MANUAL: 3 % (ref 0–1)
NEUTROPHILS # BLD MANUAL: 10.66 THOUSAND/UL (ref 1.85–7.62)
NEUTS BAND NFR BLD MANUAL: 10 % (ref 0–8)
NEUTS SEG NFR BLD AUTO: 63 % (ref 43–75)
PLATELET # BLD AUTO: 140 THOUSANDS/UL (ref 149–390)
PLATELET BLD QL SMEAR: ABNORMAL
PMV BLD AUTO: 11.1 FL (ref 8.9–12.7)
POTASSIUM SERPL-SCNC: 4.5 MMOL/L (ref 3.5–5.3)
PROCALCITONIN SERPL-MCNC: 12.83 NG/ML
RBC # BLD AUTO: 4.59 MILLION/UL (ref 3.88–5.62)
RBC MORPH BLD: NORMAL
SODIUM SERPL-SCNC: 131 MMOL/L (ref 136–145)
WBC # BLD AUTO: 14.6 THOUSAND/UL (ref 4.31–10.16)

## 2022-03-27 PROCEDURE — 99239 HOSP IP/OBS DSCHRG MGMT >30: CPT | Performed by: PHYSICIAN ASSISTANT

## 2022-03-27 PROCEDURE — 99232 SBSQ HOSP IP/OBS MODERATE 35: CPT | Performed by: INTERNAL MEDICINE

## 2022-03-27 PROCEDURE — 82948 REAGENT STRIP/BLOOD GLUCOSE: CPT

## 2022-03-27 PROCEDURE — 94761 N-INVAS EAR/PLS OXIMETRY MLT: CPT

## 2022-03-27 PROCEDURE — 85007 BL SMEAR W/DIFF WBC COUNT: CPT | Performed by: PHYSICIAN ASSISTANT

## 2022-03-27 PROCEDURE — 80048 BASIC METABOLIC PNL TOTAL CA: CPT | Performed by: PHYSICIAN ASSISTANT

## 2022-03-27 PROCEDURE — 84145 PROCALCITONIN (PCT): CPT | Performed by: PHYSICIAN ASSISTANT

## 2022-03-27 PROCEDURE — 85027 COMPLETE CBC AUTOMATED: CPT | Performed by: PHYSICIAN ASSISTANT

## 2022-03-27 RX ORDER — CEPHALEXIN 500 MG/1
500 CAPSULE ORAL EVERY 6 HOURS SCHEDULED
Qty: 20 CAPSULE | Refills: 0 | Status: SHIPPED | OUTPATIENT
Start: 2022-03-27 | End: 2022-04-01

## 2022-03-27 RX ORDER — INSULIN GLARGINE 100 [IU]/ML
10 INJECTION, SOLUTION SUBCUTANEOUS DAILY
Qty: 10 ML | Refills: 0 | Status: SHIPPED | OUTPATIENT
Start: 2022-03-27 | End: 2022-03-28

## 2022-03-27 RX ORDER — OXYCODONE HYDROCHLORIDE 5 MG/1
5 TABLET ORAL EVERY 6 HOURS PRN
Qty: 20 TABLET | Refills: 0 | Status: SHIPPED | OUTPATIENT
Start: 2022-03-27 | End: 2022-03-28

## 2022-03-27 RX ADMIN — INSULIN LISPRO 1 UNITS: 100 INJECTION, SOLUTION INTRAVENOUS; SUBCUTANEOUS at 09:12

## 2022-03-27 RX ADMIN — HEPARIN SODIUM 5000 UNITS: 5000 INJECTION INTRAVENOUS; SUBCUTANEOUS at 05:34

## 2022-03-27 RX ADMIN — PANTOPRAZOLE SODIUM 40 MG: 40 TABLET, DELAYED RELEASE ORAL at 05:34

## 2022-03-27 RX ADMIN — CEFAZOLIN SODIUM 2000 MG: 2 SOLUTION INTRAVENOUS at 04:39

## 2022-03-27 RX ADMIN — LEVETIRACETAM 500 MG: 250 TABLET, FILM COATED ORAL at 09:14

## 2022-03-27 RX ADMIN — ACETAMINOPHEN 975 MG: 325 TABLET, FILM COATED ORAL at 05:34

## 2022-03-27 RX ADMIN — DEXAMETHASONE 4 MG: 4 TABLET ORAL at 09:14

## 2022-03-27 RX ADMIN — Medication 1000 UNITS: at 09:14

## 2022-03-27 RX ADMIN — INSULIN LISPRO 4 UNITS: 100 INJECTION, SOLUTION INTRAVENOUS; SUBCUTANEOUS at 13:00

## 2022-03-27 NOTE — ASSESSMENT & PLAN NOTE
· Present shortly after admission as evidence by requirement of [facility]  · Requiring IVF for sepsis protocol and hypotension  · In the setting of pneumonia   · Now resolved   · Continue oral antibiotics

## 2022-03-27 NOTE — PROGRESS NOTES
Progress Note - Pulmonary   Abel Lima 64 y o  male MRN: [id number]  Unit/Bed#: W -01 Encounter: [id number]    Assessment/Plan:    1  Acute hypoxic respiratory failure likely multifaceted as listed below       -   currently room air-94-  patient does not wear home O2       -  continue saturations greater than 89%       -  pulmonary toileting:  Deep breathing cough, OOB as tolerated, IS Q 1 hr       -  will order home O2 evaluation    2  Septic shock secondary to postobstructive Klebsiella PNA       -  Sputum- 2+ Klebsiella- will transition to Cefazolin       -  procalcitonin- 29 2--89 19--35 43       -  Day # 2- cefazolin- total 6 days antibiotic       -  please complete 10 days course of antibiotic- transition to Keflex       -  will repeat imaging in 4-6 weeks    3  Adenocarcinoma RUL w/ brain mets and cerebral edema       -  3/15/2022- bronchoscopy- adenocarcinoma       -  palliative care follow-up       -   patient scheduled for radiation next       -  given acute infectious process will likely to postpone chemotherapy    4  Suspected COPD of unknown severity without acute exacerbation        -  Inpatient:  Xopenex/Atrovent t i d         -  home regimen:  Ventolin 2 puffs q 6h p r n         -  no indication for steroids at this time        -  outpatient Pulmonary follow-up        -  pulmonary will sign off at this time  -  outpatient follow-up per discharge instructions             Chief Complaint:    "I feel a lot better"    Subjective:    Robe Florian was comfortably sitting in his bed  He reports he feels significantly better since admission  Patient is ready for discharge  No significant overnight events reported  Patient currently denies any fever, chills hemoptysis, headaches night sweats, pleuritic chest pain, or palpitations  Objective:    Vitals: Blood pressure 117/75, pulse 56, temperature 97 7 °F (36 5 °C), resp  rate 18, height 5' 6" (1 676 m), weight 66 2 kg (146 lb), SpO2 94 %  RA,Body mass index is 23 57 kg/m²  Intake/Output Summary (Last 24 hours) at 3/27/2022 0913  Last data filed at 3/27/2022 0901  Gross per 24 hour   Intake 858 ml   Output --   Net 858 ml       Invasive Devices  Report    Peripheral Intravenous Line            Peripheral IV 03/22/22 Right Antecubital 5 days    Peripheral IV 03/23/22 Left;Proximal;Ventral (anterior) Forearm 4 days          Arterial Line            Arterial Line 03/23/22 Radial 4 days                Physical Exam:   Physical Exam  Constitutional:       General: He is not in acute distress  Appearance: Normal appearance  He is normal weight  He is not ill-appearing  HENT:      Head: Normocephalic and atraumatic  Nose: Nose normal  No congestion or rhinorrhea  Mouth/Throat:      Mouth: Mucous membranes are dry  Pharynx: No oropharyngeal exudate or posterior oropharyngeal erythema  Cardiovascular:      Rate and Rhythm: Normal rate and regular rhythm  Pulses: Normal pulses  Heart sounds: Normal heart sounds  No murmur heard  No friction rub  No gallop  Pulmonary:      Effort: Pulmonary effort is normal  No tachypnea, bradypnea, accessory muscle usage or respiratory distress  Breath sounds: No stridor or decreased air movement  Decreased breath sounds present  No wheezing, rhonchi or rales  Comments: Improved aeration  Chest:      Chest wall: No tenderness  Abdominal:      General: Abdomen is flat  Bowel sounds are normal  There is no distension  Palpations: Abdomen is soft  There is no mass  Musculoskeletal:         General: No swelling or tenderness  Normal range of motion  Cervical back: Normal range of motion and neck supple  No rigidity or tenderness  Skin:     General: Skin is warm and dry  Coloration: Skin is not jaundiced or pale  Neurological:      General: No focal deficit present  Mental Status: He is alert and oriented to person, place, and time  Mental status is at baseline  Psychiatric:         Mood and Affect: Mood normal          Behavior: Behavior normal          Labs:    I have personally reviewed pertinent lab results CBC:   Lab Results   Component Value Date    WBC 14 60 (H) 03/27/2022    HGB 14 2 03/27/2022    HCT 43 9 03/27/2022    MCV 96 03/27/2022     (L) 03/27/2022    MCH 30 9 03/27/2022    MCHC 32 3 03/27/2022    RDW 14 6 03/27/2022    MPV 11 1 03/27/2022   , CMP:   Lab Results   Component Value Date    SODIUM 131 (L) 03/27/2022    K 4 5 03/27/2022     03/27/2022    CO2 23 03/27/2022    BUN 31 (H) 03/27/2022    CREATININE 1 04 03/27/2022    CALCIUM 8 8 03/27/2022    EGFR 79 03/27/2022       Imaging and other studies: I have personally reviewed pertinent films in PACS     Chest CT-unchanged large right upper lobe lung mass

## 2022-03-27 NOTE — RESPIRATORY THERAPY NOTE
Home Oxygen Qualifying Test     Patient name: Ashley Gerardo        : 1965   Date of Test:  2022  Diagnosis:    Home Oxygen Test:    **Medicare Guidelines require item(s) 1-5 on all ambulatory patients or 1 and 2 on non-ambulatory patients  1  Baseline SPO2 on Room Air at rest 97 %   a  If <= 88% on Room Air add O2 via NC to obtain SpO2 >=88%  If LPM needed, document LPM N/A needed to reach =>88%    2  SPO2 during exertion on Room Air 95  %  a  During exertion monitor SPO2  If SPO2 increases >=89%, do not add supplemental oxygen    3  SPO2 on Oxygen at Rest N/A% at N/A LPM    4  SPO2 during exertion on Oxygen N/A % at N/A LPM    5  Test performed during exertion activity  []  Supplemental Home Oxygen is indicated  [x]  Client does not qualify for home oxygen  Respiratory Additional Notes- Patient does not qualify for home O2      RT [place]

## 2022-03-27 NOTE — DISCHARGE INSTR - AVS FIRST PAGE
Dear Tiera Khan,     It was our pleasure to care for you here at [facility]  It is our hope that we were always able to exceed the expected standards for your care during your stay  You were hospitalized due to pneumonia  You were cared for on the [address] [place] [address] 4th floor by Lavelle Baeza PA-C under the service of Sarahi Isidro MD with the [facility] who covers for your primary care physician (PCP), Castro Baldwin MD, while you were hospitalized  If you have any questions or concerns related to this hospitalization, you may contact us at [phone]  For follow up as well as any medication refills, we recommend that you follow up with your primary care physician  A registered nurse will reach out to you by phone within a few days after your discharge to answer any additional questions that you may have after going home  However, at this time we provide for you here, the most important instructions / recommendations at discharge:     Notable Medication Adjustments -   START the following medications:  Cephalexin (Keflex) 500 mg capsule - Take 1 capsule by mouth 4 times daily for 5 days   Oxycodone 5 mg Tablets - Take 1 tablet by mouth every 6 hours as needed for cancer related pain   Testing Required after Discharge -   Your family doctor will order a chest x-ray in 4-6 weeks   Important follow up information -   Follow up with family doctor, lung doctor, and oncologist   Other Instructions -   None  Please review this entire after visit summary as additional general instructions including medication list, appointments, activity, diet, any pertinent wound care, and other additional recommendations from your care team that may be provided for you        Sincerely,     Lavelle Baeza PA-C

## 2022-03-27 NOTE — DISCHARGE SUMMARY
[facility]  Discharge- Joselin Koevan 1965, 64 y o  male MRN: [id number]  Unit/Bed#: W -01 Encounter: [id number]  Primary Care Provider: Madonna Radford MD   Date and time admitted to hospital: 3/22/2022  8:12 AM    * Septic shock [facility])  Assessment & Plan  Background: Presented to the ED with shoulder pain and SOB  Also endorsed sputum, congestion, chills, body aches, and weakness  o Secondary to obstructive pneumonia in the setting of lung mass   o Criteria met on admission: lactic acidosis, fever, hypotension, leukocytosis, and tachycardia   o With tissue hypoperfusion and acute organ dysfunction as evidence by lactic and ALBERT  o Shock criteria has since resolved    CXR "with increased right upper lobe airspace opacity peripheral to the right upper lobe mass "   CTA chest PE "with No pulmonary embolus  Unchanged large right upper lobe mass most compatible with carcinoma, with new extensive right upper lobe groundglass density which could represent postobstructive pneumonia, edema, hemorrhage or lymphangitic carcinomatosis   New groundglass infiltrates in the left upper and right lower lobes, presumably infectious/inflammatory  Unchanged mediastinal and right hilar adenopathy"   CT head "No acute findings   Unchanged metastatic lesions with surrounding edema in the right occipital and left frontal lobes"   Lactic of 3 0; now normalized s/p resuscitation    IV Fluids     Required stay in ICU given refractory hypotension requiring pressors  Now off pressors and BP stabilized    Blood cultures negative at 72 hrs    Procal elevated, peaked at 89 now trending down to 35   With noted leukocytosis, which is worsening, however pt is also on steroids so likely contributing as well    Stop IV Cefepime    IV Vanco d/c'd as MRSA screen negative  o Sputum culture reviewed with Klebsiella   o Continue Keflex for 5 more days to complete 10 day course    Pulmonary following, will need outpatient follow up   o Stable for discharge     Obstructive pneumonia  Assessment & Plan  · In the setting of lung mass   · See plan under primary problem   · Complete 10 days of antibiotics     Acute nontraumatic kidney injury (HCC)-resolved as of 3/27/2022  Assessment & Plan  · Present on admission as evidence by creatinine of 1 44  · Baseline appears to be closer to 0 9  · In the setting of sepsis/hypovolemia  · IVF   · Renally dose medications as appropriate  · Avoid nephrotoxins, NSAIDS, and hypotension   · Now resolved    Acute respiratory failure with hypoxia (HCC)-resolved as of 3/27/2022  Assessment & Plan  · Present shortly after admission as evidence by requirement of [facility]  · Requiring IVF for sepsis protocol and hypotension  · In the setting of pneumonia   · Now resolved   · Continue oral antibiotics     Lung mass  Assessment & Plan  · As noted on CT scan from January 2022; see present on repeat  · Consistent with adenocarcinoma from tissue exam on 3/16/2022  · Status post bronchoscopy with biopsy on 3/16/2022  · Pulmonology consulted  · Follow up with Pulmonary and Oncology     Brain mass  Assessment & Plan  · With cerebral edema   · Per Neurosurgery documentation recommending radiation without plan for surgical intervention from prior admission   · Was supposed to have OP visit with Med/Onc to discuss radiation this week   · Will continue decadron and keppra per recommendation     Type 2 diabetes mellitus without complication, without long-term current use of insulin [facility])  Assessment & Plan  Lab Results   Component Value Date    HGBA1C 5 7 (H) 11/07/2021     Recent Labs     03/26/22  1637 03/26/22  2124 03/26/22  2342 03/27/22  0717   POCGLU 397* 362* 264* 186*     · With very good control as evidence by A1c as above on metformin as an OP   Metformin on hold  · With hyperglycemia, likely exacerbated by acute illness and steroid use  · Restart home regimen     Cerebral edema [facility])  Assessment & Plan  · Due to brain mass/mets  · Continue decadron       Medical Problems             Resolved Problems  Date Reviewed: 3/27/2022          Resolved    Acute nontraumatic kidney injury ([facility] [address] ) 3/27/2022     Resolved by  Larisa Durham PA-C    Acute respiratory failure with hypoxia ([facility] [address] ) 3/27/2022     Resolved by  Larisa Durham PA-C              Discharging Physician / Practitioner: Larisa Durham PA-C  PCP: Raya Watkins MD  Admission Date:   Admission Orders (From admission, onward)     Ordered        03/22/22 1316  Inpatient Admission  Once                      Discharge Date: 03/27/22    Consultations During Hospital Stay:  · Pulmonary Disease   · Critical Care   · Palliative Care     Procedures Performed:   · CXR  · CTA PE Study   · CT Head  · XR Right Shoulder   · CXR    Significant Findings / Test Results:   · CXR: Increased right upper lobe airspace opacity peripheral to the right upper lobe mass  · CTA PE Study:  No pulmonary embolus  Unchanged large right upper lobe mass most compatible with carcinoma, with new extensive right upper lobe ground-glass density which could represent postobstructive pneumonia, edema, hemorrhage, or lymphangitic carcinomatosis  New ground-glass infiltrates in the left upper and right lower lobes, presumably infectious/inflammatory  Unchanged mediastinal and right hilar adenopathy  · CT head:  No acute findings  Unchanged metastatic lesions with surrounding edema in the right occipital and left frontal lobes  · XR right shoulder:  No acute osseous abnormality  · Portable chest x-ray:  Progressive right upper lobe consolidation obscures the known underlying mass    Incidental Findings:   · As above     Test Results Pending at Discharge (will require follow up):    · None     Outpatient Tests Requested:  · Chest x-ray in 4-6 weeks  · Further input per Oncology and Pulmonary teams as an outpatient    Complications:  None    Reason for Admission:  Right upper lobe postobstructive pneumonia, septic shock    Hospital Course:   Axel Boyd is a 64 y o  male patient who originally presented to the hospital on 3/22/2022 due to chest/shoulder pain  The patient is found to have a new right upper lobe pneumonia, likely postobstructive secondary to known lung cancer  Shortly after presentation the patient suffered from respiratory failure as well as septic shock  He was admitted, started on broad-spectrum IV antibiotics, fluid resuscitate as per sepsis protocol, and admitted to the ICU for pressor support  With these interventions the patient's shock criteria improved  Sputum culture was obtained and resulted with Klebsiella pneumoniae  MRSA screen was negative therefore IV vancomycin was discontinued  The patient remained on cefepime, which was then changed to IV Ancef secondary to sensitivities  His respiratory failure resolved and when he required no pressure support for 24 hours he was downgraded to medical/surgical floor  He continued to improve and was able to be discharged home with oral antibiotics  He will follow up with his family doctor, Pulmonary Team, and medical oncologist   Home health has been set up  He will have a repeat chest x-ray in 4-6 weeks  Please see above list of diagnoses and related plan for additional information  Condition at Discharge: stable    Discharge Day Visit / Exam:   Subjective:  Patient reports feeling well today  States he is able to get around well in his room  He denies chest pain or shortness of breath  Denies fevers or chills  Vitals: Blood Pressure: 117/75 (03/27/22 0718)  Pulse: 56 (03/27/22 0718)  Temperature: 97 7 °F (36 5 °C) (03/27/22 0718)  Temp Source: Oral (03/25/22 2152)  Respirations: 18 (03/27/22 0718)  Height: 5' 6" (167 6 cm) (03/23/22 1038)  Weight - Scale: 66 2 kg (146 lb) (03/23/22 1038)  SpO2: 94 % (03/27/22 0718)  Exam:   Physical Exam  Constitutional:       General: He is not in acute distress       Appearance: Normal appearance  He is normal weight  He is not ill-appearing or diaphoretic  HENT:      Head: Normocephalic and atraumatic  Mouth/Throat:      Mouth: Mucous membranes are moist    Eyes:      General: No scleral icterus  Pupils: Pupils are equal, round, and reactive to light  Cardiovascular:      Rate and Rhythm: Normal rate and regular rhythm  Pulses: Normal pulses  Heart sounds: Normal heart sounds, S1 normal and S2 normal  No murmur heard  No systolic murmur is present  No diastolic murmur is present  No gallop  No S3 or S4 sounds  Pulmonary:      Effort: Pulmonary effort is normal  No accessory muscle usage or respiratory distress  Breath sounds: No stridor  Examination of the right-upper field reveals decreased breath sounds  Decreased breath sounds present  No wheezing, rhonchi or rales  Chest:      Chest wall: No tenderness  Abdominal:      General: Bowel sounds are normal  There is no distension  Palpations: Abdomen is soft  Tenderness: There is no abdominal tenderness  There is no guarding  Musculoskeletal:      Right lower leg: No edema  Left lower leg: No edema  Skin:     General: Skin is warm and dry  Coloration: Skin is not jaundiced  Neurological:      General: No focal deficit present  Mental Status: He is alert  Mental status is at baseline  Motor: No tremor or seizure activity  Psychiatric:         Behavior: Behavior is cooperative  Discussion with Family: Updated  (brother) via phone  Discharge instructions/Information to patient and family:   See after visit summary for information provided to patient and family  Provisions for Follow-Up Care:  See after visit summary for information related to follow-up care and any pertinent home health orders         Disposition:   Home with [facility] (Reminder: Complete face to face encounter)    Planned Readmission: None     Discharge Statement:  I spent 45 minutes discharging the patient  This time was spent on the day of discharge  I had direct contact with the patient on the day of discharge  Greater than 50% of the total time was spent examining patient, answering all patient questions, arranging and discussing plan of care with patient as well as directly providing post-discharge instructions  Additional time then spent on discharge activities  Discharge Medications:  See after visit summary for reconciled discharge medications provided to patient and/or family        **Please Note: This note may have been constructed using a voice recognition system**

## 2022-03-27 NOTE — ASSESSMENT & PLAN NOTE
Background: Presented to the ED with shoulder pain and SOB  Also endorsed sputum, congestion, chills, body aches, and weakness  o Secondary to obstructive pneumonia in the setting of lung mass   o Criteria met on admission: lactic acidosis, fever, hypotension, leukocytosis, and tachycardia   o With tissue hypoperfusion and acute organ dysfunction as evidence by lactic and ALBERT  o Shock criteria has since resolved    CXR "with increased right upper lobe airspace opacity peripheral to the right upper lobe mass "   CTA chest PE "with No pulmonary embolus  Unchanged large right upper lobe mass most compatible with carcinoma, with new extensive right upper lobe groundglass density which could represent postobstructive pneumonia, edema, hemorrhage or lymphangitic carcinomatosis   New groundglass infiltrates in the left upper and right lower lobes, presumably infectious/inflammatory  Unchanged mediastinal and right hilar adenopathy"   CT head "No acute findings   Unchanged metastatic lesions with surrounding edema in the right occipital and left frontal lobes"   Lactic of 3 0; now normalized s/p resuscitation    IV Fluids     Required stay in ICU given refractory hypotension requiring pressors  Now off pressors and BP stabilized    Blood cultures negative at 72 hrs    Procal elevated, peaked at 89 now trending down to 35   With noted leukocytosis, which is worsening, however pt is also on steroids so likely contributing as well    Stop IV Cefepime    IV Vanco d/c'd as MRSA screen negative  o Sputum culture reviewed with Klebsiella   o Continue Keflex for 5 more days to complete 10 day course    Pulmonary following, will need outpatient follow up   o Stable for discharge

## 2022-03-27 NOTE — ASSESSMENT & PLAN NOTE
· As noted on CT scan from January 2022; see present on repeat  · Consistent with adenocarcinoma from tissue exam on 3/16/2022  · Status post bronchoscopy with biopsy on 3/16/2022  · Pulmonology consulted  · Follow up with Pulmonary and Oncology

## 2022-03-27 NOTE — ASSESSMENT & PLAN NOTE
· In the setting of lung mass   · See plan under primary problem   · Complete 10 days of antibiotics

## 2022-03-27 NOTE — PLAN OF CARE
Problem: Potential for Falls  Goal: Patient will remain free of falls  Description: INTERVENTIONS:  - Educate patient/family on patient safety including physical limitations  - Instruct patient to call for assistance with activity   - Consult OT/PT to assist with strengthening/mobility   - Keep Call bell within reach  - Keep bed low and locked with side rails adjusted as appropriate  - Keep care items and personal belongings within reach  - Initiate and maintain comfort rounds  - Make Fall Risk Sign visible to staff  - Offer Toileting every  Hours, in advance of need  - Initiate/Maintain alarm  - Obtain necessary fall risk management equipment:   - Apply yellow socks and bracelet for high fall risk patients  - Consider moving patient to room near nurses station  Outcome: Progressing     Problem: Nutrition/Hydration-ADULT  Goal: Nutrient/Hydration intake appropriate for improving, restoring or maintaining nutritional needs  Description: Monitor and assess patient's nutrition/hydration status for malnutrition  Collaborate with interdisciplinary team and initiate plan and interventions as ordered  Monitor patient's weight and dietary intake as ordered or per policy  Utilize nutrition screening tool and intervene as necessary  Determine patient's food preferences and provide high-protein, high-caloric foods as appropriate       INTERVENTIONS:  - Monitor oral intake, urinary output, labs, and treatment plans  - Assess nutrition and hydration status and recommend course of action  - Evaluate amount of meals eaten  - Assist patient with eating if necessary   - Allow adequate time for meals  - Recommend/ encourage appropriate diets, oral nutritional supplements, and vitamin/mineral supplements  - Order, calculate, and assess calorie counts as needed  - Recommend, monitor, and adjust tube feedings and TPN/PPN based on assessed needs  - Assess need for intravenous fluids  - Provide specific nutrition/hydration education as appropriate  - Include patient/family/caregiver in decisions related to nutrition  Outcome: Progressing     Problem: MOBILITY - ADULT  Goal: Maintain or return to baseline ADL function  Description: INTERVENTIONS:  -  Assess patient's ability to carry out ADLs; assess patient's baseline for ADL function and identify physical deficits which impact ability to perform ADLs (bathing, care of mouth/teeth, toileting, grooming, dressing, etc )  - Assess/evaluate cause of self-care deficits   - Assess range of motion  - Assess patient's mobility; develop plan if impaired  - Assess patient's need for assistive devices and provide as appropriate  - Encourage maximum independence but intervene and supervise when necessary  - Involve family in performance of ADLs  - Assess for home care needs following discharge   - Consider OT consult to assist with ADL evaluation and planning for discharge  - Provide patient education as appropriate  Outcome: Progressing  Goal: Maintains/Returns to pre admission functional level  Description: INTERVENTIONS:  - Perform BMAT or MOVE assessment daily    - Set and communicate daily mobility goal to care team and patient/family/caregiver  - Collaborate with rehabilitation services on mobility goals if consulted  - Perform Range of Motion  times a day  - Reposition patient every  hours    - Dangle patient  times a day  - Stand patient  times a day  - Ambulate patient  times a day  - Out of bed to chair  times a day   - Out of bed for meals times a day  - Out of bed for toileting  - Record patient progress and toleration of activity level   Outcome: Progressing     Problem: INFECTION - ADULT  Goal: Absence or prevention of progression during hospitalization  Description: INTERVENTIONS:  - Assess and monitor for signs and symptoms of infection  - Monitor lab/diagnostic results  - Monitor all insertion sites, i e  indwelling lines, tubes, and drains  - Monitor endotracheal if appropriate and nasal secretions for changes in amount and color  - [place] appropriate cooling/warming therapies per order  - Administer medications as ordered  - Instruct and encourage patient and family to use good hand hygiene technique  - Identify and instruct in appropriate isolation precautions for identified infection/condition  Outcome: Progressing  Goal: Absence of fever/infection during neutropenic period  Description: INTERVENTIONS:  - Monitor WBC    Outcome: Progressing     Problem: Prexisting or High Potential for Compromised Skin Integrity  Goal: Skin integrity is maintained or improved  Description: INTERVENTIONS:  - Identify patients at risk for skin breakdown  - Assess and monitor skin integrity  - Assess and monitor nutrition and hydration status  - Monitor labs   - Assess for incontinence   - Turn and reposition patient  - Assist with mobility/ambulation  - Relieve pressure over bony prominences  - Avoid friction and shearing  - Provide appropriate hygiene as needed including keeping skin clean and dry  - Evaluate need for skin moisturizer/barrier cream  - Collaborate with interdisciplinary team   - Patient/family teaching  - Consider wound care consult   Outcome: Progressing

## 2022-03-27 NOTE — PLAN OF CARE
Problem: Potential for Falls  Goal: Patient will remain free of falls  Description: INTERVENTIONS:  - Educate patient/family on patient safety including physical limitations  - Instruct patient to call for assistance with activity   - Consult OT/PT to assist with strengthening/mobility   - Keep Call bell within reach  - Keep bed low and locked with side rails adjusted as appropriate  - Keep care items and personal belongings within reach  - Initiate and maintain comfort rounds  - Make Fall Risk Sign visible to staff  - Offer Toileting every  Hours, in advance of need  - Initiate/Maintain alarm  - Obtain necessary fall risk management equipment:   - Apply yellow socks and bracelet for high fall risk patients  - Consider moving patient to room near nurses station  Outcome: Progressing     Problem: Nutrition/Hydration-ADULT  Goal: Nutrient/Hydration intake appropriate for improving, restoring or maintaining nutritional needs  Description: Monitor and assess patient's nutrition/hydration status for malnutrition  Collaborate with interdisciplinary team and initiate plan and interventions as ordered  Monitor patient's weight and dietary intake as ordered or per policy  Utilize nutrition screening tool and intervene as necessary  Determine patient's food preferences and provide high-protein, high-caloric foods as appropriate       INTERVENTIONS:  - Monitor oral intake, urinary output, labs, and treatment plans  - Assess nutrition and hydration status and recommend course of action  - Evaluate amount of meals eaten  - Assist patient with eating if necessary   - Allow adequate time for meals  - Recommend/ encourage appropriate diets, oral nutritional supplements, and vitamin/mineral supplements  - Order, calculate, and assess calorie counts as needed  - Recommend, monitor, and adjust tube feedings and TPN/PPN based on assessed needs  - Assess need for intravenous fluids  - Provide specific nutrition/hydration education as appropriate  - Include patient/family/caregiver in decisions related to nutrition  Outcome: Progressing     Problem: MOBILITY - ADULT  Goal: Maintain or return to baseline ADL function  Description: INTERVENTIONS:  -  Assess patient's ability to carry out ADLs; assess patient's baseline for ADL function and identify physical deficits which impact ability to perform ADLs (bathing, care of mouth/teeth, toileting, grooming, dressing, etc )  - Assess/evaluate cause of self-care deficits   - Assess range of motion  - Assess patient's mobility; develop plan if impaired  - Assess patient's need for assistive devices and provide as appropriate  - Encourage maximum independence but intervene and supervise when necessary  - Involve family in performance of ADLs  - Assess for home care needs following discharge   - Consider OT consult to assist with ADL evaluation and planning for discharge  - Provide patient education as appropriate  Outcome: Progressing  Goal: Maintains/Returns to pre admission functional level  Description: INTERVENTIONS:  - Perform BMAT or MOVE assessment daily    - Set and communicate daily mobility goal to care team and patient/family/caregiver  - Collaborate with rehabilitation services on mobility goals if consulted  - Perform Range of Motion  times a day  - Reposition patient every  hours    - Dangle patient  times a day  - Stand patient  times a day  - Ambulate patient  times a day  - Out of bed to chair  times a day   - Out of bed for meal times a day  - Out of bed for toileting  - Record patient progress and toleration of activity level   Outcome: Progressing     Problem: INFECTION - ADULT  Goal: Absence or prevention of progression during hospitalization  Description: INTERVENTIONS:  - Assess and monitor for signs and symptoms of infection  - Monitor lab/diagnostic results  - Monitor all insertion sites, i e  indwelling lines, tubes, and drains  - Monitor endotracheal if appropriate and nasal secretions for changes in amount and color  - [place] appropriate cooling/warming therapies per order  - Administer medications as ordered  - Instruct and encourage patient and family to use good hand hygiene technique  - Identify and instruct in appropriate isolation precautions for identified infection/condition  Outcome: Progressing  Goal: Absence of fever/infection during neutropenic period  Description: INTERVENTIONS:  - Monitor WBC    Outcome: Progressing     Problem: Prexisting or High Potential for Compromised Skin Integrity  Goal: Skin integrity is maintained or improved  Description: INTERVENTIONS:  - Identify patients at risk for skin breakdown  - Assess and monitor skin integrity  - Assess and monitor nutrition and hydration status  - Monitor labs   - Assess for incontinence   - Turn and reposition patient  - Assist with mobility/ambulation  - Relieve pressure over bony prominences  - Avoid friction and shearing  - Provide appropriate hygiene as needed including keeping skin clean and dry  - Evaluate need for skin moisturizer/barrier cream  - Collaborate with interdisciplinary team   - Patient/family teaching  - Consider wound care consult   Outcome: Progressing

## 2022-03-28 ENCOUNTER — OFFICE VISIT (OUTPATIENT)
Dept: FAMILY MEDICINE CLINIC | Facility: CLINIC | Age: 57
End: 2022-03-28
Payer: COMMERCIAL

## 2022-03-28 ENCOUNTER — TELEPHONE (OUTPATIENT)
Dept: NEUROSURGERY | Facility: CLINIC | Age: 57
End: 2022-03-28

## 2022-03-28 ENCOUNTER — TRANSITIONAL CARE MANAGEMENT (OUTPATIENT)
Dept: FAMILY MEDICINE CLINIC | Facility: CLINIC | Age: 57
End: 2022-03-28

## 2022-03-28 ENCOUNTER — TELEPHONE (OUTPATIENT)
Dept: PULMONOLOGY | Facility: CLINIC | Age: 57
End: 2022-03-28

## 2022-03-28 VITALS
OXYGEN SATURATION: 96 % | DIASTOLIC BLOOD PRESSURE: 72 MMHG | HEART RATE: 60 BPM | TEMPERATURE: 97.7 F | HEIGHT: 66 IN | WEIGHT: 139 LBS | SYSTOLIC BLOOD PRESSURE: 108 MMHG | BODY MASS INDEX: 22.34 KG/M2

## 2022-03-28 DIAGNOSIS — B37.0 THRUSH: ICD-10-CM

## 2022-03-28 DIAGNOSIS — J15.0 PNEUMONIA DUE TO KLEBSIELLA PNEUMONIAE, UNSPECIFIED LATERALITY, UNSPECIFIED PART OF LUNG (HCC): ICD-10-CM

## 2022-03-28 DIAGNOSIS — Z79.4 TYPE 2 DIABETES MELLITUS WITHOUT COMPLICATION, WITH LONG-TERM CURRENT USE OF INSULIN (HCC): ICD-10-CM

## 2022-03-28 DIAGNOSIS — A41.9 SEPTIC SHOCK (HCC): ICD-10-CM

## 2022-03-28 DIAGNOSIS — F11.20 CONTINUOUS OPIOID DEPENDENCE (HCC): ICD-10-CM

## 2022-03-28 DIAGNOSIS — F51.02 ADJUSTMENT INSOMNIA: ICD-10-CM

## 2022-03-28 DIAGNOSIS — E11.9 NEW ONSET TYPE 2 DIABETES MELLITUS (HCC): ICD-10-CM

## 2022-03-28 DIAGNOSIS — R65.21 SEPTIC SHOCK (HCC): ICD-10-CM

## 2022-03-28 DIAGNOSIS — G89.3 CANCER ASSOCIATED PAIN: ICD-10-CM

## 2022-03-28 DIAGNOSIS — C34.91 ADENOCARCINOMA OF RIGHT LUNG (HCC): ICD-10-CM

## 2022-03-28 DIAGNOSIS — G93.89 BRAIN MASS: Primary | ICD-10-CM

## 2022-03-28 DIAGNOSIS — E11.9 TYPE 2 DIABETES MELLITUS WITHOUT COMPLICATION, WITH LONG-TERM CURRENT USE OF INSULIN (HCC): ICD-10-CM

## 2022-03-28 DIAGNOSIS — G93.89 MASS OF BRAIN: ICD-10-CM

## 2022-03-28 PROCEDURE — 99496 TRANSJ CARE MGMT HIGH F2F 7D: CPT | Performed by: FAMILY MEDICINE

## 2022-03-28 PROCEDURE — 1111F DSCHRG MED/CURRENT MED MERGE: CPT | Performed by: FAMILY MEDICINE

## 2022-03-28 RX ORDER — OXYCODONE HYDROCHLORIDE 10 MG/1
10 TABLET ORAL EVERY 6 HOURS PRN
Qty: 40 TABLET | Refills: 0 | Status: SHIPPED | OUTPATIENT
Start: 2022-03-28 | End: 2022-04-06 | Stop reason: SDUPTHER

## 2022-03-28 RX ORDER — SENNOSIDES 8.6 MG
650 CAPSULE ORAL EVERY 8 HOURS PRN
Qty: 30 TABLET | Refills: 2 | Status: SHIPPED | OUTPATIENT
Start: 2022-03-28 | End: 2022-04-20 | Stop reason: SDUPTHER

## 2022-03-28 RX ORDER — DEXAMETHASONE 4 MG/1
TABLET ORAL
Qty: 60 TABLET | Refills: 2 | Status: SHIPPED | OUTPATIENT
Start: 2022-03-28 | End: 2022-04-22 | Stop reason: ALTCHOICE

## 2022-03-28 RX ORDER — LEVETIRACETAM 500 MG/1
500 TABLET ORAL EVERY 12 HOURS SCHEDULED
Qty: 60 TABLET | Refills: 3 | Status: ON HOLD | OUTPATIENT
Start: 2022-03-28 | End: 2022-08-10

## 2022-03-28 RX ORDER — PEN NEEDLE, DIABETIC 32GX 5/32"
NEEDLE, DISPOSABLE MISCELLANEOUS
Qty: 100 EACH | Refills: 3 | Status: ON HOLD | OUTPATIENT
Start: 2022-03-28

## 2022-03-28 RX ORDER — METFORMIN HYDROCHLORIDE 500 MG/1
1000 TABLET, EXTENDED RELEASE ORAL
Qty: 180 TABLET | Refills: 2 | Status: ON HOLD | OUTPATIENT
Start: 2022-03-28 | End: 2022-08-05

## 2022-03-28 RX ORDER — AMOXICILLIN AND CLAVULANATE POTASSIUM 875; 125 MG/1; MG/1
1 TABLET, FILM COATED ORAL EVERY 12 HOURS SCHEDULED
Qty: 20 TABLET | Refills: 0 | Status: SHIPPED | OUTPATIENT
Start: 2022-03-28 | End: 2022-04-07

## 2022-03-28 RX ORDER — LORAZEPAM 0.5 MG/1
0.5 TABLET ORAL
Qty: 30 TABLET | Refills: 0 | Status: SHIPPED | OUTPATIENT
Start: 2022-03-28 | End: 2022-04-15 | Stop reason: SDUPTHER

## 2022-03-28 RX ORDER — INSULIN GLARGINE 100 [IU]/ML
20 INJECTION, SOLUTION SUBCUTANEOUS DAILY
Qty: 15 ML | Refills: 3 | Status: SHIPPED | OUTPATIENT
Start: 2022-03-28 | End: 2022-06-07

## 2022-03-28 NOTE — LETTER
March 28, 2022     Patient: Joselin Luther   YOB: 1965   Date of Visit: 3/28/2022       To Whom it May Concern:    Joselin Luther is under my professional care  He was seen in my office on 3/28/2022  Please help Ru for long term disability leave due to current medical treatment will need on going for at least 6 months with radiation oncologist/oncologist/pulmonologist   If you have any questions or concerns, please don't hesitate to call           Sincerely,          Nilsa Iniguez MD        CC: No Recipients

## 2022-03-28 NOTE — UTILIZATION REVIEW
Notification of Discharge   This is a Notification of Discharge from our facility [address]  Please be advised that this patient has been discharge from our facility  Below you will find the admission and discharge date and time including the patients disposition  UTILIZATION REVIEW CONTACT:  Kusum Manuel MA  Utilization   Network Utilization Review Department  Phone: [phone] x carefully listen to the prompts  All voicemails are confidential   Email: [email or web address]     PHYSICIAN ADVISORY SERVICES:  FOR [facility] REVIEW - MEDICAL NECESSITY DENIAL  Phone: [phone]  Fax: [phone]  Email: [email or web address]     PRESENTATION DATE: 3/22/2022  8:12 AM  OBERVATION ADMISSION DATE:   INPATIENT ADMISSION DATE: 3/22/22  1:16 PM   DISCHARGE DATE: 3/27/2022  1:56 PM  DISPOSITION: Home with [address] [place] [address] with [address] INFORMATION:  Send all requests for admission clinical reviews, approved or denied determinations and any other requests to dedicated fax number below belonging to the campus where the patient is receiving treatment   List of dedicated fax numbers:  [address] DENIALS (Administrative/Medical Necessity) [phone]   [address] (Maternity/NICU/Pediatrics) [phone]   Bertha Fines [phone]   [facility] [phone]   [facility] [phone]   [address] [phone]   [facility]  [phone]   [address] [place] [address], [facility]  [address] [facility] 1000 [facility] [phone]

## 2022-03-28 NOTE — PROGRESS NOTES
Assessment/Plan:     Discussed with patient family realistic expectation for his cancer diagnosis stage IV lung cancer mets to the brain  Most seen is patient only has 6 months to live  With treatment it can prolong to 1-2 years max  Will refer patient to palliative care for help with cancer pain  Currently he is pain is not controlled will increase oxycodone to 10 mg 3 times a day along with Tylenol in between his liver functions normal   Will put him on lorazepam to help with sleep at night and help with anxiety and also decrease risk of seizures with brain Mets  Advised side effect with medication  Patient will get home health to help with physical therapy at home  Put him on Augmentin to cover for anaerobic postobstructive pneumonia when he finished Keflex  Will increase Lantus dose to 20 units daily continue metformin  Will need to follow up his chest x-ray and blood work  Will write a letter for work for him to go on long-term disability due to brain mets  from lung cancer  Close monitor needed  Of note patient use to smoke for 30 years quit smoking 2 months ago when he was told of the CT scan lung cancer screening showed 6 cm mass on the right upper lobe  At that time patient was recommend to see pulmonologist and further evaluation for possible lung cancer which he refused  Before that he has not been to the doctor until last year when he was diagnosed with diabetes in the hospital   No problem-specific Assessment & Plan notes found for this encounter  Diagnoses and all orders for this visit:    Brain mass    Adenocarcinoma of right lung ([address] )  -     XR chest pa & lateral; Future  -     Ambulatory Referral to Palliative Care; Future  -     Ambulatory Referral to Pulmonology;  Future    Type 2 diabetes mellitus without complication, with long-term current use of insulin (HCC)  -     Insulin Pen Needle (BD Pen Needle Jeaneth U/F) 32G X 4 MM MISC; Use daily as directed with insulin pen  - insulin glargine (Lantus SoloStar) 100 units/mL injection pen; Inject 20 Units under the skin daily    Pneumonia due to Klebsiella pneumoniae, unspecified laterality, unspecified part of lung (HCC)  -     amoxicillin-clavulanate (AUGMENTIN) 875-125 mg per tablet; Take 1 tablet by mouth every 12 (twelve) hours for 10 days    Septic shock (HCC)  -     CBC and differential; Future  -     Comprehensive metabolic panel; Future  -     UA w Reflex to Microscopic w Reflex to Culture    Cancer associated pain  -     oxyCODONE (ROXICODONE) 10 MG TABS; Take 1 tablet (10 mg total) by mouth every 6 (six) hours as needed for moderate pain for up to 10 days Max Daily Amount: 40 mg  -     acetaminophen (TYLENOL) 650 mg CR tablet; Take 1 tablet (650 mg total) by mouth every 8 (eight) hours as needed for mild pain or moderate pain    Adjustment insomnia  -     LORazepam (ATIVAN) 0 5 mg tablet; Take 1 tablet (0 5 mg total) by mouth daily at bedtime as needed for anxiety For sleep    Mass of brain  -     Ambulatory Referral to Palliative Care; Future  -     LORazepam (ATIVAN) 0 5 mg tablet; Take 1 tablet (0 5 mg total) by mouth daily at bedtime as needed for anxiety For sleep  -     levETIRAcetam (KEPPRA) 500 mg tablet; Take 1 tablet (500 mg total) by mouth every 12 (twelve) hours  -     dexamethasone (DECADRON) 4 mg tablet; 4 mg twice daily    New onset type 2 diabetes mellitus ([address] )  -     metFORMIN (GLUCOPHAGE-XR) 500 mg 24 hr tablet; Take 2 tablets (1,000 mg total) by mouth daily with dinner    Continuous opioid dependence ([facility])    Thrush  -     nystatin (MYCOSTATIN) 500,000 units/5 mL suspension; Apply 5 mL (500,000 Units total) to the mouth or throat 4 (four) times a day for 21 days         Subjective:     Patient ID: Zackery Geronimo is a 64 y o  male  60-year-old male for transition care  He was admitted to the hospital  to  for headaches blurry vision for 1 week    CT scan the brain showed right occipital mass inferior left frontal lobe mass with vasogenic edema hemorrhagic  metastasis  He also found to have a lung mass that has enlarged in size compared to the 1 done in January  Suspicious for lung cancer  He was placed on Decadron and Keppra to prevent seizure  He saw pulmonologist neurosurgery radiation oncologist   he underwent bronchoscopy  show adenocarcinoma  Decision then to have patient seen outpatient for radiation  He saw pulmonologist neurologist neurosurgeon and oncologist   He was discharged and went home however 2 days later he went back to the hospital and was admitted  to  for septic shock obstructive pneumonia lactic acidosis fever hypertension acute renal failure  He had to be on pressor and in the ICU for support  Was placed on Rocephin azithromycin and then change cefepime and vanco   Sputum culture grew Klebsiella pneumonia  He was sent home on Keflex for 10 days totaled  He will be seen with radiation oncologist in 2 days  He has significant amount of pain  He is on oxycodone 5 mg 4 times a day but that is not enough  He has trouble with sleeping at night constantly worry  He was seen with palliative care specialist he wants to fight the cancer he wants another 5 years of life to live  Has strong family history of cancer  Two of his brothers  from prostate cancer  He saw a urologist in the past for elevated PSA  He has a follow-up in 3 months from now  His blood sugar is elevated since on Decadron  He is on Lantus 10 units at night now along with metformin  He remain hopeful that he can fight this cancer and prevent from spreading  He needs paperwork filled out for work  Currently he cannot work full-time  Blood culture was negative for 5 days  Review of Systems   Constitutional: Positive for fatigue  Negative for activity change, appetite change, fever and unexpected weight change     HENT: Negative for dental problem and trouble swallowing  Eyes: Negative for photophobia and visual disturbance  Respiratory: Negative for cough and chest tightness  Cardiovascular: Negative for chest pain, palpitations and leg swelling  Gastrointestinal: Negative for abdominal pain, constipation and vomiting  Endocrine: Negative for cold intolerance, polydipsia and polyuria  Genitourinary: Negative for difficulty urinating, frequency and urgency  Musculoskeletal: Negative for arthralgias, joint swelling, myalgias and neck pain  Skin: Negative for color change, rash and wound  Allergic/Immunologic: Negative for environmental allergies  Neurological: Positive for weakness  Negative for dizziness and numbness  Hematological: Does not bruise/bleed easily  Psychiatric/Behavioral: Negative for decreased concentration, dysphoric mood, self-injury, sleep disturbance and suicidal ideas  Objective:     Physical Exam  Vitals and nursing note reviewed  Constitutional:       Appearance: He is well-developed  Comments: Thin frail male   HENT:      Head: Normocephalic and atraumatic  Right Ear: Tympanic membrane, ear canal and external ear normal       Left Ear: Tympanic membrane, ear canal and external ear normal       Nose: Nose normal       Mouth/Throat:      Comments: White patches inner oral mucosa  Eyes:      Extraocular Movements: Extraocular movements intact  Pupils: Pupils are equal, round, and reactive to light  Cardiovascular:      Rate and Rhythm: Normal rate and regular rhythm  Heart sounds: Normal heart sounds  Pulmonary:      Effort: Pulmonary effort is normal       Breath sounds: Normal breath sounds  Abdominal:      General: Bowel sounds are normal       Palpations: Abdomen is soft  Musculoskeletal:         General: Normal range of motion  Cervical back: Normal range of motion and neck supple  Skin:     General: Skin is warm and dry        Capillary Refill: Capillary refill takes less than 2 seconds  Neurological:      General: No focal deficit present  Mental Status: He is alert and oriented to person, place, and time  Mental status is at baseline  Psychiatric:         Mood and Affect: Mood normal          Behavior: Behavior normal          Thought Content: Thought content normal          Judgment: Judgment normal            Vitals:    03/28/22 0834   BP: 108/72   BP Location: Right arm   Patient Position: Sitting   Cuff Size: Standard   Pulse: 60   Temp: 97 7 °F (36 5 °C)   TempSrc: Temporal   SpO2: 96%   Weight: 63 kg (139 lb)   Height: 5' 6" (1 676 m)       Transitional Care Management Review:  Abel Lima is a 64 y o  male here for TCM follow up  During the TCM phone call patient stated:    TCM Call (since 2/25/2022)     Date and time call was made  3/28/2022  8:20 9 Skylar Barron care reviewed  Records reviewed        Patient was hospitialized at  [facility]        Date of Admission  03/22/22    Date of discharge  03/27/22    Diagnosis  Septic Shock    Disposition  Home      TCM Call (since 2/25/2022)     Scheduled for follow up?   Yes    Do you need help managing your prescriptions or medications  No    Is transportation to your appointment needed  No    I have advised the patient to call PCP with any new or worsening symptoms  ENIO Miguel MD

## 2022-03-28 NOTE — TELEPHONE ENCOUNTER
Received a call from patient's son with questions regarding upcoming appt on 3/30  Advised this is a radiation appt  Reviewed all information regarding this appt and informed him of the 4 treatments to follow  He was appreciative of the clarification

## 2022-03-29 ENCOUNTER — APPOINTMENT (OUTPATIENT)
Dept: RADIATION ONCOLOGY | Facility: HOSPITAL | Age: 57
End: 2022-03-29
Payer: COMMERCIAL

## 2022-03-29 ENCOUNTER — DOCUMENTATION (OUTPATIENT)
Dept: HEMATOLOGY ONCOLOGY | Facility: CLINIC | Age: 57
End: 2022-03-29

## 2022-03-29 PROCEDURE — 77301 RADIOTHERAPY DOSE PLAN IMRT: CPT | Performed by: STUDENT IN AN ORGANIZED HEALTH CARE EDUCATION/TRAINING PROGRAM

## 2022-03-29 PROCEDURE — 77300 RADIATION THERAPY DOSE PLAN: CPT | Performed by: STUDENT IN AN ORGANIZED HEALTH CARE EDUCATION/TRAINING PROGRAM

## 2022-03-29 PROCEDURE — 77338 DESIGN MLC DEVICE FOR IMRT: CPT | Performed by: STUDENT IN AN ORGANIZED HEALTH CARE EDUCATION/TRAINING PROGRAM

## 2022-03-29 PROCEDURE — 77334 RADIATION TREATMENT AID(S): CPT | Performed by: STUDENT IN AN ORGANIZED HEALTH CARE EDUCATION/TRAINING PROGRAM

## 2022-03-29 NOTE — PROGRESS NOTES
Lung cancer assessment for barriers; Disease Site Specific Education: Do you understand your diagnosis? Yes  Treatment/Side Effect Education:  Do you understand your treatment plan and side effects? "sort of" ria understands that he he has first radiation Tx tomorrow 3/30  I encouraged him and his son , Shakir Wilkinson, to ask questions while they are in the office so they can be active in his treatment  Patient Education: Do you know when and who to call for your needs? yes         Palliative Care:  Have you met with palliative and do you understand their role in your care? Not yet  Referral made? Referral in place from physician visit yesterday  Educated patient on the role of palliative care during his treatment  Made them aware that someone wll be calling from that office to assist them in scheduling  They were appreciative for the education  Smoking Cessation: current tabacco use? No  Intersted in quitting? Quit 2 months ago  Referal to smoking cessation porogram? not needed  Complex Care Coordination:  Do you know when your appointments are? Yes  Are there any appointments I can assist you in making? Not that this time  Physical Therapy: Have you fallen in the past year or are you worried about falling? No fall but feeling weak  Do you have difficulty or pain when or performing any physical activity? Yes  They are currently working with a home health team and anticipate some ain management form palliative  Food intake Barriers: are you eating/drinking normally? Yes but less of a appetite  Have you had any recent weight loss without trying? Yes  Staff message sent to RD  Cancer Support Community: Have you been provided with this info? Yes via e-mail  Transportation: Have your transportation needs changed?no  Do you need any assistance? No they have family who will be assisting in transportation  Spoke primarily with Ru's son Shakir Wilkinson   They were appreciative for the offer of assistance  I suggested they save my phone number should any of their needs change or if they need assistance in the future

## 2022-03-30 ENCOUNTER — PROCEDURE VISIT (OUTPATIENT)
Dept: NEUROSURGERY | Facility: CLINIC | Age: 57
End: 2022-03-30
Payer: COMMERCIAL

## 2022-03-30 ENCOUNTER — TELEPHONE (OUTPATIENT)
Dept: NUTRITION | Facility: CLINIC | Age: 57
End: 2022-03-30

## 2022-03-30 ENCOUNTER — APPOINTMENT (OUTPATIENT)
Dept: RADIATION ONCOLOGY | Facility: HOSPITAL | Age: 57
End: 2022-03-30
Attending: STUDENT IN AN ORGANIZED HEALTH CARE EDUCATION/TRAINING PROGRAM
Payer: COMMERCIAL

## 2022-03-30 DIAGNOSIS — C79.31 BRAIN METASTASES (HCC): Primary | ICD-10-CM

## 2022-03-30 DIAGNOSIS — C34.91 ADENOCARCINOMA OF RIGHT LUNG (HCC): Primary | ICD-10-CM

## 2022-03-30 PROCEDURE — 77373 STRTCTC BDY RAD THER TX DLVR: CPT | Performed by: STUDENT IN AN ORGANIZED HEALTH CARE EDUCATION/TRAINING PROGRAM

## 2022-03-30 PROCEDURE — 77435 SBRT MANAGEMENT: CPT | Performed by: STUDENT IN AN ORGANIZED HEALTH CARE EDUCATION/TRAINING PROGRAM

## 2022-03-30 PROCEDURE — 61796 SRS CRANIAL LESION SIMPLE: CPT | Performed by: NEUROLOGICAL SURGERY

## 2022-03-30 PROCEDURE — NC001 PR NO CHARGE: Performed by: NEUROLOGICAL SURGERY

## 2022-03-30 PROCEDURE — 61797 SRS CRAN LES SIMPLE ADDL: CPT | Performed by: NEUROLOGICAL SURGERY

## 2022-03-30 NOTE — TELEPHONE ENCOUNTER
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

## 2022-03-30 NOTE — TELEPHONE ENCOUNTER
Received notification by Gabe Nowak  on 3/29/21 that pt has triggered for oncology nutrition care (reason for referral: wt loss, lack of appetite)  Contacted Ru's son, Madonna Grande, today to establish care  No answer  Left voice message with the reason for today's call and this RDs contact information asking or a call back as able/desired

## 2022-03-30 NOTE — PROGRESS NOTES
PATIENT NAME: Emani Mosher  : 1965  MRN: [id number]  PROCEDURE DATE: 3/30/2022    Stereotactic Radiotherapy (SRT) Operative Note    Preop Diagnosis: brain metastases    Postop Diagnosis: same    Procedure Details: Frameless Stereotactic Radiotherapy for left frontotemporal/right occipital brain metastases    Surgeon: Jaleel Rey MD, PhD     Assistants: none    No qualified resident was available to assist with this case  Radiation Oncologist(s): Dr Huitron Neither    Estimated Blood Loss:  None           Specimens: None    Drains: None           Total IV Fluids: None              Findings: As above  Complications:  None    Anesthesia: None      DETAILS OF PROCEDURE    The patient presented to the outpatient area of the Department of Radiation Oncology where an open faced immobilization mask was created  The patient then underwent a stereotactic head CT while immobilized in the mask  The patient was then released from the Department  The patients stereotactic CT and previous stereotactic MRI scans were fused in the  [facility] which was used to develop the SRT plan  The SRT prescription for the left fronto-temporal lesion called for a dose of 30 Gray to be delivered to the PTV in 5 fractions  The PTV was created by expanding the GTV, as contoured by myself and the Radiation Oncologist       The SRT prescription for the right occipital lesion called for a dose of 30 Gray to be delivered to the PTV in 5 fractions  The PTV was created by expanding the GTV, as contoured by myself and the Radiation Oncologist     The SRT plans utilized the mini-multileaf columnator on the TrueBeam machine at [facility]  When the final treatment plan had been developed and approved by myself, the radiation oncologist and physicist, the patient returned to the Department for their first frameless SRT treatment  The patient was positioned on the treatment couch   The Optic Surface Monitoring System (OSMS) was used initially to align the patient  The patient was immobilized in their open faced mask  kV and then cone beam CT imaging was used to align the patient  Once the radiation oncologist, physicist and I agreed the patient was in correct position, the fields for the 2 isocenters were treated sequentially without complications  The OSMS was used during the treatment to assure correct positioning of the patient, and if it detected patient motion, interrupted the treatment beam until the patient was back in alignment  When the first SRT treatments had been delivered, the patient was recovered from the treatment room, scheduled for their remaining SRT treatments, and was discharged from the department  There was no blood loss and no specimen        SIGNATURE: Benedict Monson MD, PhD  DATE: 3/30/2022   TIME: 12:51 PM

## 2022-04-01 ENCOUNTER — APPOINTMENT (OUTPATIENT)
Dept: RADIATION ONCOLOGY | Facility: HOSPITAL | Age: 57
End: 2022-04-01
Attending: STUDENT IN AN ORGANIZED HEALTH CARE EDUCATION/TRAINING PROGRAM
Payer: COMMERCIAL

## 2022-04-01 PROCEDURE — 77373 STRTCTC BDY RAD THER TX DLVR: CPT | Performed by: STUDENT IN AN ORGANIZED HEALTH CARE EDUCATION/TRAINING PROGRAM

## 2022-04-01 NOTE — TELEPHONE ENCOUNTER
Second attempt made today to reach [facility] to establish care and discuss his nutrition  Spoke with son, Ricardo Hess, today  Discussed oncology nutrition services available (options for in-person and phone consultation) and the benefits of meeting for a consultation  Ricardo Hess reports that he will speak with his father and see if he wishes to make an appt  He has my contact info and will call me back if his dad wishes to proceed    Will remain available per pt/family request

## 2022-04-04 ENCOUNTER — TELEPHONE (OUTPATIENT)
Dept: HEMATOLOGY ONCOLOGY | Facility: CLINIC | Age: 57
End: 2022-04-04

## 2022-04-04 ENCOUNTER — APPOINTMENT (OUTPATIENT)
Dept: RADIATION ONCOLOGY | Facility: HOSPITAL | Age: 57
End: 2022-04-04
Attending: STUDENT IN AN ORGANIZED HEALTH CARE EDUCATION/TRAINING PROGRAM
Payer: COMMERCIAL

## 2022-04-04 ENCOUNTER — DOCUMENTATION (OUTPATIENT)
Dept: HEMATOLOGY ONCOLOGY | Facility: CLINIC | Age: 57
End: 2022-04-04

## 2022-04-04 DIAGNOSIS — C79.31 METASTASIS TO BRAIN (HCC): Primary | ICD-10-CM

## 2022-04-04 PROCEDURE — 77373 STRTCTC BDY RAD THER TX DLVR: CPT | Performed by: STUDENT IN AN ORGANIZED HEALTH CARE EDUCATION/TRAINING PROGRAM

## 2022-04-04 RX ORDER — DEXAMETHASONE 1 MG
1 TABLET ORAL
Qty: 4 TABLET | Refills: 0 | Status: SHIPPED | OUTPATIENT
Start: 2022-04-17 | End: 2022-04-24 | Stop reason: HOSPADM

## 2022-04-04 RX ORDER — DEXAMETHASONE 2 MG/1
TABLET ORAL
Qty: 12 TABLET | Refills: 0 | Status: SHIPPED | OUTPATIENT
Start: 2022-04-09 | End: 2022-04-05

## 2022-04-04 NOTE — TELEPHONE ENCOUNTER
Received a call from [facility] [place] by [facility] in radiation oncology  Willymarla Wilkinson is completing appnt currently  It was  noted that he had not rescheduled his hospital FU appnt which was cancelled from 3/24/22 due to in-pt status  I was able to reschedule that appnt for tomorrow 4/5/22 with Dr Curry Standing at the [facility] will inform the patient and family who are still in the office

## 2022-04-04 NOTE — PROGRESS NOTES
Received a call from Johanna Escobar in radiation oncology  Marisa Charlton is completing appnt currently  It was  noted that he had not rescheduled his hospital FU appnt which was cancelled from 3/24/22 due to in-pt status  I was able to reschedule that appnt for tomorrow 4/5/22 with Dr Mariama Whitney at the [facility]  Johanna Escobar will inform the patient and family who are still in the office

## 2022-04-05 ENCOUNTER — HOSPITAL ENCOUNTER (OUTPATIENT)
Dept: RADIOLOGY | Facility: HOSPITAL | Age: 57
Discharge: HOME | End: 2022-04-05
Attending: FAMILY MEDICINE
Payer: COMMERCIAL

## 2022-04-05 ENCOUNTER — OFFICE VISIT (OUTPATIENT)
Dept: HEMATOLOGY ONCOLOGY | Facility: CLINIC | Age: 57
End: 2022-04-05
Payer: COMMERCIAL

## 2022-04-05 VITALS
BODY MASS INDEX: 23.95 KG/M2 | DIASTOLIC BLOOD PRESSURE: 78 MMHG | SYSTOLIC BLOOD PRESSURE: 128 MMHG | HEART RATE: 76 BPM | HEIGHT: 66 IN | WEIGHT: 149 LBS | TEMPERATURE: 99.6 F | OXYGEN SATURATION: 97 %

## 2022-04-05 DIAGNOSIS — J18.9 OBSTRUCTIVE PNEUMONIA: ICD-10-CM

## 2022-04-05 DIAGNOSIS — C34.91 ADENOCARCINOMA OF RIGHT LUNG (HCC): ICD-10-CM

## 2022-04-05 DIAGNOSIS — G93.89 BRAIN MASS: ICD-10-CM

## 2022-04-05 DIAGNOSIS — C34.91 ADENOCARCINOMA OF RIGHT LUNG (HCC): Primary | ICD-10-CM

## 2022-04-05 DIAGNOSIS — R91.8 LUNG MASS: ICD-10-CM

## 2022-04-05 PROCEDURE — 99205 OFFICE O/P NEW HI 60 MIN: CPT | Performed by: INTERNAL MEDICINE

## 2022-04-05 PROCEDURE — 71046 X-RAY EXAM CHEST 2 VIEWS: CPT

## 2022-04-05 NOTE — PROGRESS NOTES
Ru Castle  1965  [address] [facility]  [address] [facility] [place] [address]  HEMATOLOGY/ONCOLOGY CONSULTATION REPORT    DISCUSSION/SUMMARY:    75-year-old male recently diagnosed with non-small cell lung carcinoma with brain metastases  Issues:    Brain metastases  Patient has been seen/evaluated by Dr Oracio Lazo, completing RT - tolerating these treatments well  Patient has diabetes, the Decadron can be tapered at Dr Zion Davis discretion  Non-small cell lung carcinoma  RUL lung biopsy demonstrated adenocarcinoma  Patient feels relatively well, clinically there are no concerning signs  At length, we discussed the recent scan and pathology results  Patient understands that he has metastatic lung cancer and that systemic treatments are also needed  Hopefully there is enough tissue to be sent for NextGen sequencing  Patient has a long tobacco history but the histology was adenocarcinoma and patient is from [facility]  PET-CT has been scheduled for approximately 1 week  Patient will eventually be referred to palliative care  Patient is to return in 3 weeks but this may change depending upon the above test results  Mr Carlton Marquez knows to call the hematology/oncology office if there are any other questions or concerns  Carefully review your medication list and verify that the list is accurate and up-to-date  Please call the hematology/oncology office if there are medications missing from the list, medications on the list that you are not currently taking or if there is a dosage or instruction that is different from how you're taking that medication      Patient goals and areas of care:  PET-CT, NextGen sequencing, complete RT  Barriers to care:  None  Patient is able to self-care   ______________________________________________________________________________________    Chief Complaint   Patient presents with    Follow-up Adenocarcinoma of right lung      History of Present Illness:  72-year-old male recently admitted to [facility]  Patient was treated for obstructive pneumonia, septic shock, acute kidney injury and respiratory failure  Workup demonstrated a lung mass as well as lesions in the brain consistent with metastatic disease  Patient eventually improved and was discharged  Mr Ameena Salvador was seen/evaluated by Radiation Oncology and has 2 fractions left  Mr Ameena Salvador presents with his wife; his brother was on the phone  Patient is [place], speaks only minimal amount of [place]  Wife and brother were very helpful in translating  Patient stated feeling okay, better than before  No fevers or signs of infection  No headaches, blurred vision or dizziness  Appetite is good, weight is stable  No GI or  issues  No shortness of breath or dyspnea on exertion  Patient's biggest complaint was the fatigue, Mr Ameena Salvador states that he was previously very active  Patient has a 40+ pack-year history of tobacco use, 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  Review of Systems   Constitutional: Positive for fatigue  HENT: Negative  Eyes: Negative  Respiratory: Negative  Cardiovascular: Negative  Gastrointestinal: Negative  Endocrine: Negative  Genitourinary: Negative  Musculoskeletal: Negative  Skin: Negative  Allergic/Immunologic: Negative  Neurological: Negative  Hematological: Negative  Psychiatric/Behavioral: Negative  All other systems reviewed and are negative      Patient Active Problem List   Diagnosis    Type 2 diabetes mellitus without complication, with long-term current use of insulin (HCC)    Nonimmune to hepatitis B virus    Elevated PSA    Gall bladder polyp    Vitamin D deficiency    Cyanocobalamin deficiency    History of tobacco use    Lung mass    Brain mass    Cerebral edema (HCC)    Septic shock (HCC)    Obstructive pneumonia    Adenocarcinoma of right lung ([place] [address] )    Pneumonia due to Klebsiella pneumoniae ([place] [address] )    Cancer associated pain    Adjustment insomnia    Continuous opioid dependence ([place] [address] )     Past Medical History:   Diagnosis Date    Diabetes mellitus ([place] [address] )     Elevated PSA 3/11/2021    Fatty liver 3/11/2021    Gall bladder polyp 3/11/2021    Lung cancer ([place] [address] )     Nonimmune to hepatitis B virus 3/11/2021     History reviewed  No pertinent surgical history  History reviewed  No pertinent family history  Social History     Socioeconomic History    Marital status: /Civil Union     Spouse name: Not on file    Number of children: Not on file    Years of education: Not on file    Highest education level: Not on file   Occupational History    Not on file   Tobacco Use    Smoking status: Former Smoker     Packs/day: 0 50     Types: Cigarettes     Quit date: 2022     Years since quittin 2    Smokeless tobacco: Never Used    Tobacco comment: quit 3 months ago   Vaping Use    Vaping Use: Never used   Substance and Sexual Activity    Alcohol use: Not Currently     Comment: quit drinking 7 years ago    Drug use: Never    Sexual activity: Not on file   Other Topics Concern    Not on file   Social History Narrative    Not on file     Social Determinants of Health     Financial Resource Strain: Not on file   Food Insecurity: No Food Insecurity    Worried About Running Out of Food in the Last Year: Never true    Brittany of Food in the Last Year: Never true   Transportation Needs: No Transportation Needs    Lack of Transportation (Medical): No    Lack of Transportation (Non-Medical):  No   Physical Activity: Not on file   Stress: Not on file   Social Connections: Not on file   Intimate Partner Violence: Not on file   Housing Stability: Low Risk     Unable to Pay for Housing in the Last Year: No    Number of Places Lived in the Last Year: 1    Unstable Housing in the Last Year: No       Current Outpatient Medications:    acetaminophen (TYLENOL) 650 mg CR tablet, Take 1 tablet (650 mg total) by mouth every 8 (eight) hours as needed for mild pain or moderate pain, Disp: 30 tablet, Rfl: 2    amoxicillin-clavulanate (AUGMENTIN) 875-125 mg per tablet, Take 1 tablet by mouth every 12 (twelve) hours for 10 days, Disp: 20 tablet, Rfl: 0    BD Pen Needle Jeaneth 2nd Gen 32G X 4 MM MISC, USE ONCE DAILY WITH LANTUS, Disp: , Rfl:     Blood Glucose Monitoring Suppl (FreeStyle Lite) FIDELIA, , Disp: , Rfl:     [START ON 4/17/2022] dexamethasone (DECADRON) 1 mg tablet, Take 1 tablet (1 mg total) by mouth daily with breakfast for 4 days, Disp: 4 tablet, Rfl: 0    dexamethasone (DECADRON) 4 mg tablet, 4 mg twice daily, Disp: 60 tablet, Rfl: 2    FREESTYLE LITE test strip, Check blood sugar  daily, Disp: 100 each, Rfl: 3    insulin glargine (Lantus SoloStar) 100 units/mL injection pen, Inject 20 Units under the skin daily, Disp: 15 mL, Rfl: 3    Insulin Pen Needle (BD Pen Needle Jeaneth U/F) 32G X 4 MM MISC, Use daily as directed with insulin pen, Disp: 100 each, Rfl: 3    Lancets (freestyle) lancets, Check blood sugar daily, Disp: 100 each, Rfl: 3    levETIRAcetam (KEPPRA) 500 mg tablet, Take 1 tablet (500 mg total) by mouth every 12 (twelve) hours, Disp: 60 tablet, Rfl: 3    LORazepam (ATIVAN) 0 5 mg tablet, Take 1 tablet (0 5 mg total) by mouth daily at bedtime as needed for anxiety For sleep, Disp: 30 tablet, Rfl: 0    metFORMIN (GLUCOPHAGE-XR) 500 mg 24 hr tablet, Take 2 tablets (1,000 mg total) by mouth daily with dinner, Disp: 180 tablet, Rfl: 2    nystatin (MYCOSTATIN) 500,000 units/5 mL suspension, Apply 5 mL (500,000 Units total) to the mouth or throat 4 (four) times a day for 21 days, Disp: 473 mL, Rfl: 2    oxyCODONE (ROXICODONE) 10 MG TABS, Take 1 tablet (10 mg total) by mouth every 6 (six) hours as needed for moderate pain for up to 10 days Max Daily Amount: 40 mg, Disp: 40 tablet, Rfl: 0    pantoprazole (PROTONIX) 40 mg tablet, Take 1 tablet (40 mg total) by mouth daily in the early morning, Disp: 30 tablet, Rfl: 0    Cholecalciferol (Vitamin D3) 125 MCG (5000 UT) CAPS, Take 1 capsule (5,000 Units total) by mouth daily, Disp: 90 capsule, Rfl: 2    No Known Allergies    Vitals:    04/05/22 1000   BP: 128/78   Pulse: 76   Temp: 99 6 °F (37 6 °C)   SpO2: 97%     Physical Exam  Constitutional:       Appearance: He is well-developed  Comments: Thin but well-nourished middle-aged male, no respiratory distress, no signs of pain   HENT:      Head: Normocephalic and atraumatic  Right Ear: External ear normal       Left Ear: External ear normal       Mouth/Throat:      Comments: Oral mucosa moist and pink, no exudate, upper and lower plates  Eyes:      Conjunctiva/sclera: Conjunctivae normal       Pupils: Pupils are equal, round, and reactive to light  Cardiovascular:      Rate and Rhythm: Normal rate and regular rhythm  Heart sounds: Normal heart sounds  Pulmonary:      Effort: Pulmonary effort is normal       Breath sounds: Normal breath sounds  Comments: Good air entry bilaterally, clear  Abdominal:      General: Bowel sounds are normal       Palpations: Abdomen is soft  Comments: +bowel sounds, nontender, soft   Musculoskeletal:         General: Normal range of motion  Cervical back: Normal range of motion and neck supple  Skin:     General: Skin is warm  Comments: Warm, moist, good color, no petechiae, resolving upper extremity ecchymotic lesions (from hospitalization)   Neurological:      Mental Status: He is alert and oriented to person, place, and time  Deep Tendon Reflexes: Reflexes are normal and symmetric  Psychiatric:         Behavior: Behavior normal          Thought Content:  Thought content normal          Judgment: Judgment normal      Extremities:  No lower extremity edema bilaterally, no cords, pulses are 1+   Lymphatics:  No adenopathy in the neck, supraclavicular region, axilla and groin bilaterally    Labs    03/27/2022 WBC = 14 6 hemoglobin = 14 2 hematocrit = 44 MCV = 96 platelet = 239 neutrophil = 63% bands = 10% lymphocytes = 11% monocyte = 10% eosinophil = 3%        03/23/2022 BUN = 22 creatinine = 1 04 calcium = 8 0 AST = 16 ALT = 33 alkaline phosphatase = 49 total protein = 5 0 albumin = 1 8 total bilirubin = 0 67    Imaging    03/22/2022 CT head without contrast   Impression stated no acute findings, unchanged metastatic lesions with surrounding edema in the right occipital and left frontal lobes  03/26/2022 chest x-ray portable impression stated progressive right upper lobe consolidation of score is the known underlying mass  03/22/2022 CTA chest PE study    PULMONARY ARTERIAL TREE:  No pulmonary embolus is seen        LUNGS:  Large right upper lobe mass unchanged   New extensive surrounding groundglass density in the right upper lobe lobe, with more consolidative density at the apex   New small patchy groundglass glass densities in the left upper and right lower   lobes   Unchanged occlusion of the right upper lobe bronchus by the mass   No other tracheal or endobronchial lesion        PLEURA:  Unremarkable        HEART/GREAT VESSELS:  Unremarkable for patient's age  No thoracic aortic aneurysm        MEDIASTINUM AND NAS:  Unchanged mediastinal and right hilar adenopathy  No pulmonary embolus          Impression:  Unchanged large right upper lobe mass most compatible with carcinoma, with new extensive right upper lobe kerri   undglass density which could represent postobstructive pneumonia, edema, hemorrhage or lymphangitic carcinomatosis   New groundglass infiltrates   in the left upper and right lower lobes, presumably infectious/inflammatory        Unchanged mediastinal and right hilar adenopathy       Pathology    Case Report   Non-gynecologic Cytology                          Case: [id number]                                   Authorizing Provider: Roma Nicole DO      Collected:           03/16/2022 1203               Ordering Location:     [facility]      Received:            03/16/2022 [address] [place] [address] 6                                                               Pathologist:           Jordyn Diane MD                                                                  Specimens:   A) - Lung, Right Upper Lobe Bronchoalveolar Lavage                                                   B) - Lung, Right Upper Lobe Bronchoalveolar Lavage, cell block                                       C) - Lung, Right Upper Lobe Bronchial Brushing, with brush                                           D) - Lung, Right Upper Lobe, FNA                                                           Final Diagnosis   A & B  Lung, Right Upper Lobe Bronchoalveolar Lavage, : (Thin-Prep and cell block)  Negative for malignancy  Rare benign bronchial cells  Abundant macrophages and mixed inflammatory cells      Satisfactory for evaluation  C  Lung, Right Upper Lobe Bronchial Brushing, with brush: Atypical cellular changes seen  Atypical epithelioid cells, scant cellularity      Satisfactory for evaluation      D   Lung, Right Upper Lobe, FNA:  Positive for malignancy      The concomitant biopsy ([id number]) demonstrates Non-small cell carcinoma consistent with adenocarcinoma      Intradepartmental consultation is in agreement      Satisfactory for evaluation       Electronically signed by Jordyn Diane MD on 3/21/2022 at 10:37 AM

## 2022-04-06 ENCOUNTER — APPOINTMENT (OUTPATIENT)
Dept: RADIATION ONCOLOGY | Facility: HOSPITAL | Age: 57
End: 2022-04-06
Attending: STUDENT IN AN ORGANIZED HEALTH CARE EDUCATION/TRAINING PROGRAM
Payer: COMMERCIAL

## 2022-04-06 ENCOUNTER — TELEPHONE (OUTPATIENT)
Dept: FAMILY MEDICINE CLINIC | Facility: CLINIC | Age: 57
End: 2022-04-06

## 2022-04-06 ENCOUNTER — TELEPHONE (OUTPATIENT)
Dept: HEMATOLOGY ONCOLOGY | Facility: MEDICAL CENTER | Age: 57
End: 2022-04-06

## 2022-04-06 DIAGNOSIS — G89.3 CANCER ASSOCIATED PAIN: ICD-10-CM

## 2022-04-06 LAB
GLUCOSE SERPL-MCNC: 294 MG/DL (ref 65–140)
GLUCOSE SERPL-MCNC: 298 MG/DL (ref 65–140)

## 2022-04-06 PROCEDURE — 77373 STRTCTC BDY RAD THER TX DLVR: CPT | Performed by: STUDENT IN AN ORGANIZED HEALTH CARE EDUCATION/TRAINING PROGRAM

## 2022-04-06 RX ORDER — OXYCODONE HYDROCHLORIDE 10 MG/1
10 TABLET ORAL EVERY 6 HOURS PRN
Qty: 40 TABLET | Refills: 0 | Status: SHIPPED | OUTPATIENT
Start: 2022-04-06 | End: 2022-04-15 | Stop reason: SDUPTHER

## 2022-04-06 NOTE — TELEPHONE ENCOUNTER
Son called patient needs refill on oxycodone    [facility] [address]

## 2022-04-08 ENCOUNTER — APPOINTMENT (OUTPATIENT)
Dept: RADIATION ONCOLOGY | Facility: HOSPITAL | Age: 57
End: 2022-04-08
Attending: STUDENT IN AN ORGANIZED HEALTH CARE EDUCATION/TRAINING PROGRAM
Payer: COMMERCIAL

## 2022-04-08 ENCOUNTER — APPOINTMENT (OUTPATIENT)
Dept: RADIATION ONCOLOGY | Facility: HOSPITAL | Age: 57
End: 2022-04-08
Payer: COMMERCIAL

## 2022-04-08 PROCEDURE — 77373 STRTCTC BDY RAD THER TX DLVR: CPT | Performed by: RADIOLOGY

## 2022-04-08 PROCEDURE — 77336 RADIATION PHYSICS CONSULT: CPT | Performed by: STUDENT IN AN ORGANIZED HEALTH CARE EDUCATION/TRAINING PROGRAM

## 2022-04-10 ENCOUNTER — APPOINTMENT (OUTPATIENT)
Dept: LAB | Facility: CLINIC | Age: 57
End: 2022-04-10
Payer: COMMERCIAL

## 2022-04-10 DIAGNOSIS — R65.21 SEPTIC SHOCK (HCC): ICD-10-CM

## 2022-04-10 DIAGNOSIS — E11.9 NEW ONSET TYPE 2 DIABETES MELLITUS (HCC): Primary | ICD-10-CM

## 2022-04-10 DIAGNOSIS — A41.9 SEPTIC SHOCK (HCC): ICD-10-CM

## 2022-04-10 LAB
ALBUMIN SERPL BCP-MCNC: 2.4 G/DL (ref 3.5–5)
ALP SERPL-CCNC: 91 U/L (ref 46–116)
ALT SERPL W P-5'-P-CCNC: 30 U/L (ref 12–78)
ANION GAP SERPL CALCULATED.3IONS-SCNC: 7 MMOL/L (ref 4–13)
AST SERPL W P-5'-P-CCNC: 14 U/L (ref 5–45)
BASOPHILS # BLD MANUAL: 0 THOUSAND/UL (ref 0–0.1)
BASOPHILS NFR MAR MANUAL: 0 % (ref 0–1)
BILIRUB SERPL-MCNC: 0.58 MG/DL (ref 0.2–1)
BILIRUB UR QL STRIP: NEGATIVE
BUN SERPL-MCNC: 31 MG/DL (ref 5–25)
CALCIUM ALBUM COR SERPL-MCNC: 9.9 MG/DL (ref 8.3–10.1)
CALCIUM SERPL-MCNC: 8.6 MG/DL (ref 8.3–10.1)
CHLORIDE SERPL-SCNC: 102 MMOL/L (ref 100–108)
CLARITY UR: CLEAR
CO2 SERPL-SCNC: 29 MMOL/L (ref 21–32)
COLOR UR: NORMAL
CREAT SERPL-MCNC: 0.72 MG/DL (ref 0.6–1.3)
CREAT UR-MCNC: 41 MG/DL
EOSINOPHIL # BLD MANUAL: 0 THOUSAND/UL (ref 0–0.4)
EOSINOPHIL NFR BLD MANUAL: 0 % (ref 0–6)
ERYTHROCYTE [DISTWIDTH] IN BLOOD BY AUTOMATED COUNT: 16.3 % (ref 11.6–15.1)
GFR SERPL CREATININE-BSD FRML MDRD: 104 ML/MIN/1.73SQ M
GLUCOSE P FAST SERPL-MCNC: 64 MG/DL (ref 65–99)
GLUCOSE UR STRIP-MCNC: NEGATIVE MG/DL
HCT VFR BLD AUTO: 40.8 % (ref 36.5–49.3)
HGB BLD-MCNC: 13.9 G/DL (ref 12–17)
HGB UR QL STRIP.AUTO: NEGATIVE
KETONES UR STRIP-MCNC: NEGATIVE MG/DL
LEUKOCYTE ESTERASE UR QL STRIP: NEGATIVE
LYMPHOCYTES # BLD AUTO: 11 % (ref 14–44)
LYMPHOCYTES # BLD AUTO: 2.06 THOUSAND/UL (ref 0.6–4.47)
MCH RBC QN AUTO: 31.9 PG (ref 26.8–34.3)
MCHC RBC AUTO-ENTMCNC: 34.1 G/DL (ref 31.4–37.4)
MCV RBC AUTO: 94 FL (ref 82–98)
METAMYELOCYTES NFR BLD MANUAL: 1 % (ref 0–1)
MICROALBUMIN UR-MCNC: <5 MG/L (ref 0–20)
MICROALBUMIN/CREAT 24H UR: <12 MG/G CREATININE (ref 0–30)
MONOCYTES # BLD AUTO: 3.56 THOUSAND/UL (ref 0–1.22)
MONOCYTES NFR BLD: 19 % (ref 4–12)
NEUTROPHILS # BLD MANUAL: 12.76 THOUSAND/UL (ref 1.85–7.62)
NEUTS BAND NFR BLD MANUAL: 3 % (ref 0–8)
NEUTS SEG NFR BLD AUTO: 65 % (ref 43–75)
NITRITE UR QL STRIP: NEGATIVE
PH UR STRIP.AUTO: 7 [PH]
PLATELET # BLD AUTO: 332 THOUSANDS/UL (ref 149–390)
PLATELET BLD QL SMEAR: ADEQUATE
PMV BLD AUTO: 9.4 FL (ref 8.9–12.7)
POTASSIUM SERPL-SCNC: 4 MMOL/L (ref 3.5–5.3)
PROT SERPL-MCNC: 5.9 G/DL (ref 6.4–8.2)
PROT UR STRIP-MCNC: NEGATIVE MG/DL
RBC # BLD AUTO: 4.36 MILLION/UL (ref 3.88–5.62)
RBC MORPH BLD: NORMAL
SODIUM SERPL-SCNC: 138 MMOL/L (ref 136–145)
SP GR UR STRIP.AUTO: 1.02 (ref 1–1.03)
UROBILINOGEN UR QL STRIP.AUTO: 0.2 E.U./DL
VARIANT LYMPHS # BLD AUTO: 1 %
WBC # BLD AUTO: 18.76 THOUSAND/UL (ref 4.31–10.16)

## 2022-04-10 PROCEDURE — 36415 COLL VENOUS BLD VENIPUNCTURE: CPT

## 2022-04-10 PROCEDURE — 80053 COMPREHEN METABOLIC PANEL: CPT

## 2022-04-10 PROCEDURE — 82570 ASSAY OF URINE CREATININE: CPT

## 2022-04-10 PROCEDURE — 81003 URINALYSIS AUTO W/O SCOPE: CPT | Performed by: FAMILY MEDICINE

## 2022-04-10 PROCEDURE — 85027 COMPLETE CBC AUTOMATED: CPT

## 2022-04-10 PROCEDURE — 82043 UR ALBUMIN QUANTITATIVE: CPT

## 2022-04-10 PROCEDURE — 85007 BL SMEAR W/DIFF WBC COUNT: CPT

## 2022-04-11 ENCOUNTER — TELEPHONE (OUTPATIENT)
Dept: HEMATOLOGY ONCOLOGY | Facility: MEDICAL CENTER | Age: 57
End: 2022-04-11

## 2022-04-11 ENCOUNTER — OFFICE VISIT (OUTPATIENT)
Dept: FAMILY MEDICINE CLINIC | Facility: CLINIC | Age: 57
End: 2022-04-11
Payer: COMMERCIAL

## 2022-04-11 VITALS
WEIGHT: 148.6 LBS | HEART RATE: 77 BPM | HEIGHT: 66 IN | TEMPERATURE: 98 F | DIASTOLIC BLOOD PRESSURE: 70 MMHG | RESPIRATION RATE: 17 BRPM | OXYGEN SATURATION: 97 % | BODY MASS INDEX: 23.88 KG/M2 | SYSTOLIC BLOOD PRESSURE: 126 MMHG

## 2022-04-11 DIAGNOSIS — R05.9 COUGH: Primary | ICD-10-CM

## 2022-04-11 DIAGNOSIS — R09.81 NASAL CONGESTION: ICD-10-CM

## 2022-04-11 LAB
FLUAV RNA RESP QL NAA+PROBE: NEGATIVE
FLUBV RNA RESP QL NAA+PROBE: NEGATIVE
SARS-COV-2 RNA RESP QL NAA+PROBE: NEGATIVE

## 2022-04-11 PROCEDURE — 1111F DSCHRG MED/CURRENT MED MERGE: CPT | Performed by: NURSE PRACTITIONER

## 2022-04-11 PROCEDURE — 87636 SARSCOV2 & INF A&B AMP PRB: CPT | Performed by: NURSE PRACTITIONER

## 2022-04-11 PROCEDURE — 99214 OFFICE O/P EST MOD 30 MIN: CPT | Performed by: NURSE PRACTITIONER

## 2022-04-11 RX ORDER — AMOXICILLIN AND CLAVULANATE POTASSIUM 875; 125 MG/1; MG/1
1 TABLET, FILM COATED ORAL EVERY 12 HOURS SCHEDULED
Qty: 14 TABLET | Refills: 1 | Status: SHIPPED | OUTPATIENT
Start: 2022-04-11 | End: 2022-04-18

## 2022-04-11 RX ORDER — AMOXICILLIN AND CLAVULANATE POTASSIUM 875; 125 MG/1; MG/1
1 TABLET, FILM COATED ORAL EVERY 12 HOURS SCHEDULED
Qty: 14 TABLET | Refills: 0 | Status: SHIPPED | OUTPATIENT
Start: 2022-04-11 | End: 2022-04-11 | Stop reason: SDUPTHER

## 2022-04-11 RX ORDER — GUAIFENESIN 600 MG
1200 TABLET, EXTENDED RELEASE 12 HR ORAL EVERY 12 HOURS SCHEDULED
Qty: 20 TABLET | Refills: 0 | Status: SHIPPED | OUTPATIENT
Start: 2022-04-11 | End: 2022-04-16

## 2022-04-11 NOTE — PROGRESS NOTES
Assessment/Plan:    Pt presents in office for follow up cough and sinus congestion   Currently under care of Hem/Onc for adenocarcinoma with recent history of pneumonia - brother translates for pt   Started with sore throat and congestion for 2 days   Does have slight cough   Discussed viral pathophysiology- we will check for flu / covid in office   We will start and treat with Augmentin for now due to diminished immune status due to treatments   He has baseline elevated WBC  - Follow up with us in 1 week     He has clear lungs today   Discussed importance of hydrate and symptoms management monitor for fevers  He is also diabetic - discussed monitoring- glucose readings   If any worening of symptoms despite treatment --> ER        Problem List Items Addressed This Visit     None      Visit Diagnoses     Cough    -  Primary    Relevant Medications    guaiFENesin (MUCINEX) 600 mg 12 hr tablet    amoxicillin-clavulanate (Augmentin) 875-125 mg per tablet    Other Relevant Orders    Covid/Flu- Office Collect    Nasal congestion        Relevant Medications    guaiFENesin (MUCINEX) 600 mg 12 hr tablet    amoxicillin-clavulanate (Augmentin) 875-125 mg per tablet    Other Relevant Orders    Covid/Flu- Office Collect            Subjective:      Patient ID: Serge Griffin is a 64 y o  male  HPI    The following portions of the patient's history were reviewed and updated as appropriate:   Past Medical History:  He has a past medical history of Diabetes mellitus ([facility] [address] ), Elevated PSA (3/11/2021), Fatty liver (3/11/2021), Gall bladder polyp (3/11/2021), Lung cancer ([facility] [address] ), and Nonimmune to hepatitis B virus (3/11/2021)  ,  _______________________________________________________________________  Medical Problems:  does not have any pertinent problems on file ,  _______________________________________________________________________  Past Surgical History:   has no past surgical history on file ,  _______________________________________________________________________  Family History:  family history is not on file ,  _______________________________________________________________________  Social History:   reports that he quit smoking about 2 months ago  His smoking use included cigarettes  He smoked 0 50 packs per day  He has never used smokeless tobacco  He reports previous alcohol use  He reports that he does not use drugs  ,  _______________________________________________________________________  Allergies:  has No Known Allergies     _______________________________________________________________________  Current Outpatient Medications   Medication Sig Dispense Refill    acetaminophen (TYLENOL) 650 mg CR tablet Take 1 tablet (650 mg total) by mouth every 8 (eight) hours as needed for mild pain or moderate pain 30 tablet 2    amoxicillin-clavulanate (Augmentin) 875-125 mg per tablet Take 1 tablet by mouth every 12 (twelve) hours for 7 days 14 tablet 1    BD Pen Needle Jeaneth 2nd Gen 32G X 4 MM MISC USE ONCE DAILY WITH LANTUS      Blood Glucose Monitoring Suppl (FreeStyle Lite) FIDELIA       Cholecalciferol (Vitamin D3) 125 MCG (5000 UT) CAPS Take 1 capsule (5,000 Units total) by mouth daily 90 capsule 2    [START ON 4/17/2022] dexamethasone (DECADRON) 1 mg tablet Take 1 tablet (1 mg total) by mouth daily with breakfast for 4 days 4 tablet 0    dexamethasone (DECADRON) 4 mg tablet 4 mg twice daily 60 tablet 2    FREESTYLE LITE test strip Check blood sugar  daily 100 each 3    guaiFENesin (MUCINEX) 600 mg 12 hr tablet Take 2 tablets (1,200 mg total) by mouth every 12 (twelve) hours for 5 days 20 tablet 0    insulin glargine (Lantus SoloStar) 100 units/mL injection pen Inject 20 Units under the skin daily 15 mL 3    Insulin Pen Needle (BD Pen Needle Jeaneth U/F) 32G X 4 MM MISC Use daily as directed with insulin pen 100 each 3    Lancets (freestyle) lancets Check blood sugar daily 100 each 3    levETIRAcetam (KEPPRA) 500 mg tablet Take 1 tablet (500 mg total) by mouth every 12 (twelve) hours 60 tablet 3    LORazepam (ATIVAN) 0 5 mg tablet Take 1 tablet (0 5 mg total) by mouth daily at bedtime as needed for anxiety For sleep 30 tablet 0    metFORMIN (GLUCOPHAGE-XR) 500 mg 24 hr tablet Take 2 tablets (1,000 mg total) by mouth daily with dinner 180 tablet 2    nystatin (MYCOSTATIN) 500,000 units/5 mL suspension Apply 5 mL (500,000 Units total) to the mouth or throat 4 (four) times a day for 21 days 473 mL 2    oxyCODONE (ROXICODONE) 10 MG TABS Take 1 tablet (10 mg total) by mouth every 6 (six) hours as needed for moderate pain for up to 10 days Max Daily Amount: 40 mg 40 tablet 0    pantoprazole (PROTONIX) 40 mg tablet Take 1 tablet (40 mg total) by mouth daily in the early morning 30 tablet 0     No current facility-administered medications for this visit      _______________________________________________________________________  Review of Systems   Constitutional: Positive for chills and fatigue  Negative for fever  HENT: Positive for congestion, postnasal drip, sinus pain and sore throat  Eyes: Negative  Respiratory: Positive for cough  Negative for shortness of breath and wheezing  Currently under treatment for lung cancer    Cardiovascular: Negative for chest pain and palpitations  Gastrointestinal: Negative for abdominal distention, abdominal pain, nausea and vomiting  Genitourinary: Negative for difficulty urinating and flank pain  Musculoskeletal: Positive for myalgias  Skin: Negative for rash  Allergic/Immunologic: Positive for environmental allergies  Neurological: Negative for headaches  Psychiatric/Behavioral: Positive for sleep disturbance  Negative for suicidal ideas  The patient is nervous/anxious            Objective:  Vitals:    04/11/22 1514   BP: 126/70   BP Location: Left arm   Patient Position: Sitting   Cuff Size: Standard   Pulse: 77   Resp: 17 Temp: 98 °F (36 7 °C)   TempSrc: Temporal   SpO2: 97%   Weight: 67 4 kg (148 lb 9 6 oz)   Height: 5' 6" (1 676 m)     Body mass index is 23 98 kg/m²  Physical Exam  Vitals and nursing note reviewed  Constitutional:       Comments: BMI 23 98    HENT:      Head: Normocephalic and atraumatic  Nose: Congestion and rhinorrhea present  Mouth/Throat:      Pharynx: Oropharyngeal exudate and posterior oropharyngeal erythema present  Cardiovascular:      Rate and Rhythm: Regular rhythm  Pulses: Normal pulses  Heart sounds: Normal heart sounds  Pulmonary:      Effort: Pulmonary effort is normal       Breath sounds: Normal breath sounds  Abdominal:      Palpations: Abdomen is soft  Musculoskeletal:         General: Normal range of motion  Cervical back: Normal range of motion  Skin:     General: Skin is warm  Capillary Refill: Capillary refill takes less than 2 seconds  Neurological:      Mental Status: He is alert     Psychiatric:         Mood and Affect: Mood normal          Behavior: Behavior normal

## 2022-04-11 NOTE — PATIENT INSTRUCTIONS
Cold Symptoms   WHAT YOU NEED TO KNOW:   A cold is an infection caused by a virus  The infection causes your upper respiratory system to become inflamed  Common symptoms of a cold include sneezing, dry throat, a stuffy nose, headache, watery eyes, and a cough  Your cough may be dry, or you may cough up mucus  You may also have muscle aches, joint pain, and tiredness  Rarely, you may have a fever  Most colds go away without treatment  DISCHARGE INSTRUCTIONS:   Return to the emergency department if:   · You have increased tiredness and weakness  · You are unable to eat  · Your heart is beating much faster than usual for you  · You see white spots in the back of your throat and your neck is swollen and sore to the touch  · You see pinpoint or larger reddish-purple dots on your skin  Contact your healthcare provider if:   · You have a fever higher than 102°F (38 9°C)  · You have new or worsening shortness of breath  · You have thick nasal drainage for more than 2 days  · Your symptoms do not improve or get worse within 5 days  · You have questions or concerns about your condition or care  Medicines: The following medicines may be suggested by your healthcare provider to decrease your cold symptoms  These medicines are available without a doctor's order  Ask which medicines to take and when to take them  Follow directions  · NSAIDs or acetaminophen  help to bring down a fever or decrease pain  · Decongestants  help decrease nasal stuffiness  · Antihistamines  help decrease sneezing and a runny nose  · Cough suppressants  help decrease how much you cough  · Expectorants  help loosen mucus so you can cough it up  · Take your medicine as directed  Contact your healthcare provider if you think your medicine is not helping or if you have side effects  Tell him of her if you are allergic to any medicine  Keep a list of the medicines, vitamins, and herbs you take   Include the amounts, and when and why you take them  Bring the list or the pill bottles to follow-up visits  Carry your medicine list with you in case of an emergency  Symptom relief: The following may help relieve cold symptoms, such as a dry throat and congestion:  · Gargle with mouthwash or warm salt water as directed  · Suck on throat lozenges or hard candy  · Use a cold or warm vaporizer or humidifier to ease your breathing  · Rest for at least 2 days and then as needed to decrease tiredness and weakness  · Use petroleum based jelly around your nostrils to decrease irritation from blowing your nose  Drink liquids:  Liquids will help thin and loosen thick mucus so you can cough it up  Liquids will also keep you hydrated  Ask your healthcare provider which liquids are best for you and how much to drink each day  Prevent the spread of germs: You can spread your cold germs to others for at least 3 days after your symptoms start  Wash your hands often  Do not share items, such as eating utensils  Cover your nose and mouth when you cough or sneeze using the crook of your elbow instead of your hands  Throw used tissues in the garbage  Do not smoke:  Smoking may worsen your symptoms and increase the length of time you feel sick  Talk with your healthcare provider if you need help to stop smoking  Follow up with your doctor as directed:  Write down your questions so you remember to ask them during your visits  © Copyright [employer] 2022 Information is for End User's use only and may not be sold, redistributed or otherwise used for commercial purposes  All illustrations and images included in CareNotes® are the copyrighted property of A D A M , Inc  or Theresa Perez   The above information is an  only  It is not intended as medical advice for individual conditions or treatments   Talk to your doctor, nurse or pharmacist before following any medical regimen to see if it is safe and effective for you

## 2022-04-11 NOTE — TELEPHONE ENCOUNTER
Call from patient's son Ben Chadwick  Patient has productive cough for clear mucous  Patient is afebrile  Recommend mucinex DM per package insert  Son will call with worsening symptoms

## 2022-04-12 ENCOUNTER — TELEPHONE (OUTPATIENT)
Dept: FAMILY MEDICINE CLINIC | Facility: CLINIC | Age: 57
End: 2022-04-12

## 2022-04-12 ENCOUNTER — TELEPHONE (OUTPATIENT)
Dept: HEMATOLOGY ONCOLOGY | Facility: CLINIC | Age: 57
End: 2022-04-12

## 2022-04-12 NOTE — TELEPHONE ENCOUNTER
Email received from [facility]    We have received the specimen for [facility], and have initiated testing  Thank you for your continued use of [facility] for your molecular profiling

## 2022-04-12 NOTE — TELEPHONE ENCOUNTER
Left message on patients sons voicemail with negative result  Advised to call the office with any questions he may have

## 2022-04-13 ENCOUNTER — HOSPITAL ENCOUNTER (OUTPATIENT)
Dept: RADIOLOGY | Age: 57
Discharge: HOME | End: 2022-04-13
Payer: COMMERCIAL

## 2022-04-13 DIAGNOSIS — C34.91 ADENOCARCINOMA OF RIGHT LUNG (HCC): ICD-10-CM

## 2022-04-13 LAB — GLUCOSE SERPL-MCNC: 59 MG/DL (ref 65–140)

## 2022-04-13 PROCEDURE — G1004 CDSM NDSC: HCPCS

## 2022-04-13 PROCEDURE — 82948 REAGENT STRIP/BLOOD GLUCOSE: CPT

## 2022-04-13 PROCEDURE — 78815 PET IMAGE W/CT SKULL-THIGH: CPT

## 2022-04-13 PROCEDURE — A9552 F18 FDG: HCPCS

## 2022-04-14 ENCOUNTER — TELEPHONE (OUTPATIENT)
Dept: HEMATOLOGY ONCOLOGY | Facility: CLINIC | Age: 57
End: 2022-04-14

## 2022-04-14 ENCOUNTER — TELEPHONE (OUTPATIENT)
Dept: FAMILY MEDICINE CLINIC | Facility: CLINIC | Age: 57
End: 2022-04-14

## 2022-04-14 NOTE — TELEPHONE ENCOUNTER
Call from brother, Josefina Salazar  Patient has increased cough    Patient was seen by Mounika Delvalle on 4/11/2022:  Pt presents in office for follow up cough and sinus congestion   Currently under care of Hem/Onc for adenocarcinoma with recent history of pneumonia - brother translates for pt   Started with sore throat and congestion for 2 days   Does have slight cough   Discussed viral pathophysiology- we will check for flu / covid in office   We will start and treat with Augmentin for now due to diminished immune status due to treatments   He has baseline elevated WBC  - Follow up with us in 1 week      He has clear lungs today   Discussed importance of hydrate and symptoms management monitor for fevers  He is also diabetic - discussed monitoring- glucose readings    If any worening of symptoms despite treatment --> ER       Brother referred back to Mounika Delvalle managing care  Will apprise Dr Will Griffin

## 2022-04-14 NOTE — TELEPHONE ENCOUNTER
Son called patient has a lot of phlem  It feels like it did when he had pneumonia   ----he is using mucinex  And the antibiotic  Amoxacillin  ----son said he does spit something out after he coughs but he doesn't know if there is a color   --patient is not short of breath   ----no fever no sore throat   ---[facility] [address]

## 2022-04-15 ENCOUNTER — TELEPHONE (OUTPATIENT)
Dept: HEMATOLOGY ONCOLOGY | Facility: MEDICAL CENTER | Age: 57
End: 2022-04-15

## 2022-04-15 ENCOUNTER — OFFICE VISIT (OUTPATIENT)
Dept: FAMILY MEDICINE CLINIC | Facility: CLINIC | Age: 57
End: 2022-04-15
Payer: COMMERCIAL

## 2022-04-15 VITALS
RESPIRATION RATE: 18 BRPM | SYSTOLIC BLOOD PRESSURE: 102 MMHG | OXYGEN SATURATION: 97 % | BODY MASS INDEX: 23.4 KG/M2 | TEMPERATURE: 97.2 F | DIASTOLIC BLOOD PRESSURE: 62 MMHG | WEIGHT: 145.6 LBS | HEIGHT: 66 IN | HEART RATE: 68 BPM

## 2022-04-15 DIAGNOSIS — R09.3 EXCESSIVE SPUTUM: ICD-10-CM

## 2022-04-15 DIAGNOSIS — Z79.4 TYPE 2 DIABETES MELLITUS WITHOUT COMPLICATION, WITH LONG-TERM CURRENT USE OF INSULIN (HCC): ICD-10-CM

## 2022-04-15 DIAGNOSIS — J18.9 OBSTRUCTIVE PNEUMONIA: ICD-10-CM

## 2022-04-15 DIAGNOSIS — E11.9 TYPE 2 DIABETES MELLITUS WITHOUT COMPLICATION, WITH LONG-TERM CURRENT USE OF INSULIN (HCC): ICD-10-CM

## 2022-04-15 DIAGNOSIS — G89.3 CANCER ASSOCIATED PAIN: ICD-10-CM

## 2022-04-15 DIAGNOSIS — F51.02 ADJUSTMENT INSOMNIA: ICD-10-CM

## 2022-04-15 DIAGNOSIS — R91.8 LUNG MASS: Primary | ICD-10-CM

## 2022-04-15 DIAGNOSIS — C34.91 MALIGNANT NEOPLASM OF RIGHT LUNG, UNSPECIFIED PART OF LUNG (HCC): ICD-10-CM

## 2022-04-15 DIAGNOSIS — G93.89 BRAIN MASS: ICD-10-CM

## 2022-04-15 DIAGNOSIS — C34.91 ADENOCARCINOMA OF RIGHT LUNG (HCC): ICD-10-CM

## 2022-04-15 DIAGNOSIS — G93.89 MASS OF BRAIN: ICD-10-CM

## 2022-04-15 PROCEDURE — 3725F SCREEN DEPRESSION PERFORMED: CPT | Performed by: FAMILY MEDICINE

## 2022-04-15 PROCEDURE — 99215 OFFICE O/P EST HI 40 MIN: CPT | Performed by: FAMILY MEDICINE

## 2022-04-15 PROCEDURE — 1111F DSCHRG MED/CURRENT MED MERGE: CPT | Performed by: FAMILY MEDICINE

## 2022-04-15 RX ORDER — BENZONATATE 100 MG/1
100 CAPSULE ORAL 3 TIMES DAILY PRN
Qty: 30 CAPSULE | Refills: 2 | Status: SHIPPED | OUTPATIENT
Start: 2022-04-15 | End: 2022-07-27

## 2022-04-15 RX ORDER — IPRATROPIUM/ALBUTEROL SULFATE 20-100 MCG
1 MIST INHALER (GRAM) INHALATION 4 TIMES DAILY
Qty: 4 G | Refills: 1 | Status: ON HOLD | OUTPATIENT
Start: 2022-04-15

## 2022-04-15 RX ORDER — LORAZEPAM 0.5 MG/1
0.5 TABLET ORAL
Qty: 30 TABLET | Refills: 0 | Status: SHIPPED | OUTPATIENT
Start: 2022-04-15 | End: 2022-04-26 | Stop reason: SDUPTHER

## 2022-04-15 RX ORDER — OXYCODONE HYDROCHLORIDE 10 MG/1
10 TABLET ORAL EVERY 6 HOURS PRN
Qty: 40 TABLET | Refills: 0 | Status: SHIPPED | OUTPATIENT
Start: 2022-04-15 | End: 2022-04-26 | Stop reason: SDUPTHER

## 2022-04-15 NOTE — PROGRESS NOTES
Assessment/Plan:    Patient with excessive production of sputum suspect this is due to enlarging lung mass from cancer  His to have postobstructive symptom even though oxygenation is normal   Will give him inhaler with open up his lungs and hopefully that will decrease production of sputum  Patient advised this is due to the lung cancer and mass effect  Continue Augmentin for now  No fever  Will put him on Tessalon Perle hopefully givehim some comfort  He is on lorazepam for sleep and seizure prevention and oxycodone for pain from brain mets advised for constipation symptom  Will notify patient oncologist and pulmonologist of this  He may need nebulizer treatment to bronchodilator and hopefully that will suppress to production of sputum  Advised to [facility] palliative care  Will prescribed naloxone for him next visit since he will need long term pain meds  I have spent 40 minutes with Patient and family today in which greater than 50% of this time was spent in counseling/coordination of care regarding Diagnostic results, Prognosis, Risks and benefits of tx options, Intructions for management, Patient and family education and Importance of tx compliance           Problem List Items Addressed This Visit        Endocrine    Type 2 diabetes mellitus without complication, with long-term current use of insulin (HCC)       Respiratory    Obstructive pneumonia    Relevant Medications    ipratropium-albuterol (Combivent Respimat) inhaler    benzonatate (TESSALON PERLES) 100 mg capsule    Adenocarcinoma of right lung (HCC)    Relevant Medications    ipratropium-albuterol (Combivent Respimat) inhaler    benzonatate (TESSALON PERLES) 100 mg capsule       Other    Lung mass - Primary    Relevant Medications    ipratropium-albuterol (Combivent Respimat) inhaler    benzonatate (TESSALON PERLES) 100 mg capsule    Brain mass    Cancer associated pain    Relevant Medications    oxyCODONE (ROXICODONE) 10 MG TABS    Adjustment insomnia    Relevant Medications    LORazepam (ATIVAN) 0 5 mg tablet      Other Visit Diagnoses     Malignant neoplasm of right lung, unspecified part of lung (HCC)        Relevant Medications    ipratropium-albuterol (Combivent Respimat) inhaler    benzonatate (TESSALON PERLES) 100 mg capsule    Excessive sputum        Relevant Medications    ipratropium-albuterol (Combivent Respimat) inhaler    benzonatate (TESSALON PERLES) 100 mg capsule    Mass of brain        Relevant Medications    LORazepam (ATIVAN) 0 5 mg tablet            Subjective:      Patient ID: Simmie Fabry is a 64 y o  male  63-year-old male presenting complaint excessive sputum production  He has known lung cancer in the right lung has increased in size it was 6 cm now to 11 cm with mass effect to the trachea  Patient on Augmentin for postobstructive pneumonia  He saw oncologist   No she fever no chills  At night he would have excessive sputum that make him cough and can not breathe then he would have pain  He is on oxycodone for pain and Mucinex was given with did not help  In the hospital he was given [employer] for cough and that helped he has known brain Mets from resume lung cancer  He is on Decadron and Keppra for seizure prophylaxis  He is on Lantus 20 units daily and that helped with his blood sugar under controlled  He has not seen palliative care for pain control  He does had PET scan done yesterday that showed 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  He has an appointment come up with oncologist discuss this  Quit smoking 3 months ago  He is up-to-date COVID vaccine has not had COVID booster  Patient's son who live with him had COVID infection 2 weeks ago  Four days ago patient get tested for COVID and came back negative        The following portions of the patient's history were reviewed and updated as appropriate:   Past Medical History:  He has a past medical history of Diabetes mellitus ([facility] [address] ), Elevated PSA (3/11/2021), Fatty liver (3/11/2021), Gall bladder polyp (3/11/2021), Lung cancer ([facility] ), and Nonimmune to hepatitis B virus (3/11/2021)  ,  _______________________________________________________________________  Medical Problems:  does not have any pertinent problems on file ,  _______________________________________________________________________  Past Surgical History:   has no past surgical history on file ,  _______________________________________________________________________  Family History:  family history is not on file ,  _______________________________________________________________________  Social History:   reports that he quit smoking about 2 months ago  His smoking use included cigarettes  He smoked 0 50 packs per day  He has never used smokeless tobacco  He reports previous alcohol use  He reports that he does not use drugs  ,  _______________________________________________________________________  Allergies:  has No Known Allergies     _______________________________________________________________________  Current Outpatient Medications   Medication Sig Dispense Refill    acetaminophen (TYLENOL) 650 mg CR tablet Take 1 tablet (650 mg total) by mouth every 8 (eight) hours as needed for mild pain or moderate pain 30 tablet 2    amoxicillin-clavulanate (Augmentin) 875-125 mg per tablet Take 1 tablet by mouth every 12 (twelve) hours for 7 days 14 tablet 1    BD Pen Needle Jeaneth 2nd Gen 32G X 4 MM MISC USE ONCE DAILY WITH LANTUS      benzonatate (TESSALON PERLES) 100 mg capsule Take 1 capsule (100 mg total) by mouth 3 (three) times a day as needed for cough 30 capsule 2    Blood Glucose Monitoring Suppl (FreeStyle Lite) FIDELIA       Cholecalciferol (Vitamin D3) 125 MCG (5000 UT) CAPS Take 1 capsule (5,000 Units total) by mouth daily 90 capsule 2    [START ON 4/17/2022] dexamethasone (DECADRON) 1 mg tablet Take 1 tablet (1 mg total) by mouth daily with breakfast for 4 days 4 tablet 0  dexamethasone (DECADRON) 4 mg tablet 4 mg twice daily 60 tablet 2    FREESTYLE LITE test strip Check blood sugar  daily 100 each 3    guaiFENesin (MUCINEX) 600 mg 12 hr tablet Take 2 tablets (1,200 mg total) by mouth every 12 (twelve) hours for 5 days 20 tablet 0    insulin glargine (Lantus SoloStar) 100 units/mL injection pen Inject 20 Units under the skin daily 15 mL 3    Insulin Pen Needle (BD Pen Needle Jeaneth U/F) 32G X 4 MM MISC Use daily as directed with insulin pen 100 each 3    ipratropium-albuterol (Combivent Respimat) inhaler Inhale 1 puff 4 (four) times a day 4 g 1    Lancets (freestyle) lancets Check blood sugar daily 100 each 3    levETIRAcetam (KEPPRA) 500 mg tablet Take 1 tablet (500 mg total) by mouth every 12 (twelve) hours 60 tablet 3    LORazepam (ATIVAN) 0 5 mg tablet Take 1 tablet (0 5 mg total) by mouth daily at bedtime as needed for anxiety For sleep 30 tablet 0    metFORMIN (GLUCOPHAGE-XR) 500 mg 24 hr tablet Take 2 tablets (1,000 mg total) by mouth daily with dinner 180 tablet 2    nystatin (MYCOSTATIN) 500,000 units/5 mL suspension Apply 5 mL (500,000 Units total) to the mouth or throat 4 (four) times a day for 21 days 473 mL 2    oxyCODONE (ROXICODONE) 10 MG TABS Take 1 tablet (10 mg total) by mouth every 6 (six) hours as needed for moderate pain for up to 10 days Max Daily Amount: 40 mg 40 tablet 0    pantoprazole (PROTONIX) 40 mg tablet Take 1 tablet (40 mg total) by mouth daily in the early morning 30 tablet 0     No current facility-administered medications for this visit      _______________________________________________________________________  Review of Systems   Constitutional: Negative for activity change, appetite change, fatigue, fever and unexpected weight change  HENT: Negative for dental problem and trouble swallowing  Eyes: Negative for photophobia and visual disturbance  Respiratory: Negative for cough and chest tightness      Cardiovascular: Negative for chest pain, palpitations and leg swelling  Gastrointestinal: Negative for abdominal pain, constipation and vomiting  Endocrine: Negative for cold intolerance, polydipsia and polyuria  Genitourinary: Negative for difficulty urinating, frequency and urgency  Musculoskeletal: Negative for arthralgias, joint swelling, myalgias and neck pain  Skin: Negative for color change, rash and wound  Allergic/Immunologic: Negative for environmental allergies  Neurological: Negative for dizziness, weakness and numbness  Hematological: Does not bruise/bleed easily  Psychiatric/Behavioral: Negative for decreased concentration, dysphoric mood, self-injury, sleep disturbance and suicidal ideas  Objective:  Vitals:    04/15/22 1103   BP: 102/62   Pulse: 68   Resp: 18   Temp: (!) 97 2 °F (36 2 °C)   SpO2: 97%   Weight: 66 kg (145 lb 9 6 oz)   Height: 5' 6" (1 676 m)     Body mass index is 23 5 kg/m²  Physical Exam  Vitals and nursing note reviewed  Constitutional:       Appearance: Normal appearance  He is well-developed  HENT:      Head: Normocephalic and atraumatic  Right Ear: Tympanic membrane, ear canal and external ear normal       Left Ear: Tympanic membrane, ear canal and external ear normal       Nose: Nose normal       Mouth/Throat:      Mouth: Mucous membranes are dry  Eyes:      Pupils: Pupils are equal, round, and reactive to light  Cardiovascular:      Rate and Rhythm: Normal rate and regular rhythm  Pulses: Normal pulses  Heart sounds: Normal heart sounds  Pulmonary:      Effort: Pulmonary effort is normal       Breath sounds: Normal breath sounds  Abdominal:      General: Bowel sounds are normal       Palpations: Abdomen is soft  Musculoskeletal:         General: Normal range of motion  Cervical back: Normal range of motion and neck supple  Skin:     General: Skin is warm and dry  Capillary Refill: Capillary refill takes less than 2 seconds  Neurological:      General: No focal deficit present  Mental Status: He is alert and oriented to person, place, and time  Mental status is at baseline  Psychiatric:         Mood and Affect: Mood normal          Behavior: Behavior normal          Thought Content:  Thought content normal          Judgment: Judgment normal

## 2022-04-15 NOTE — LETTER
April 15, 2022     Patient: Susana Rodriguez  YOB: 1965  Date of Visit: 4/15/2022      To Whom it May Concern:    Susana Rodriguez is under my professional care  Evangelina Thomas was seen in my office on 4/15/2022  Evangelinaruma Thomas is unable to return to work due to on going illness and medical treatment for at least 6 months from now  Please excuse Ru from work until at least 10/15/2022  If you have any questions or concerns, please don't hesitate to call           Sincerely,          Isidro Reyes MD

## 2022-04-15 NOTE — TELEPHONE ENCOUNTER
Status of molecular testing: This case just activated on 4/11, and the initial 25 slides didnt contain enough malignancy for complete analysis, therefore we had to request additional slides (which is our standard protocol, but creates a delay)  We are currently awaiting receipt and once we receive them I can give you a better estimate  Corina Grijalva   - N   [facility]

## 2022-04-18 ENCOUNTER — TELEPHONE (OUTPATIENT)
Dept: HEMATOLOGY ONCOLOGY | Facility: MEDICAL CENTER | Age: 57
End: 2022-04-18

## 2022-04-18 LAB — FUNGUS SPEC CULT: NORMAL

## 2022-04-18 NOTE — TELEPHONE ENCOUNTER
Patient's family concerned about delay in treatment  Son Girish aware that molecular testing is pending   See previous note about status of [facility] testing   Son verbalizes understanding

## 2022-04-20 DIAGNOSIS — G89.3 CANCER ASSOCIATED PAIN: ICD-10-CM

## 2022-04-20 DIAGNOSIS — G93.89 MASS OF BRAIN: ICD-10-CM

## 2022-04-20 RX ORDER — SENNOSIDES 8.6 MG
650 CAPSULE ORAL EVERY 8 HOURS PRN
Qty: 90 TABLET | Refills: 2 | Status: SHIPPED | OUTPATIENT
Start: 2022-04-20 | End: 2022-06-05

## 2022-04-20 RX ORDER — PANTOPRAZOLE SODIUM 40 MG/1
40 TABLET, DELAYED RELEASE ORAL
Qty: 90 TABLET | Refills: 1 | Status: ON HOLD | OUTPATIENT
Start: 2022-04-20

## 2022-04-22 ENCOUNTER — HOSPITAL ENCOUNTER (INPATIENT)
Facility: HOSPITAL | Age: 57
LOS: 2 days | Discharge: HOME | DRG: 872 | End: 2022-04-24
Attending: EMERGENCY MEDICINE | Admitting: INTERNAL MEDICINE
Payer: COMMERCIAL

## 2022-04-22 ENCOUNTER — APPOINTMENT (EMERGENCY)
Dept: CT IMAGING | Facility: HOSPITAL | Age: 57
DRG: 872 | End: 2022-04-22
Payer: COMMERCIAL

## 2022-04-22 ENCOUNTER — APPOINTMENT (EMERGENCY)
Dept: RADIOLOGY | Facility: HOSPITAL | Age: 57
DRG: 872 | End: 2022-04-22
Payer: COMMERCIAL

## 2022-04-22 ENCOUNTER — OFFICE VISIT (OUTPATIENT)
Dept: PULMONOLOGY | Facility: CLINIC | Age: 57
End: 2022-04-22
Payer: COMMERCIAL

## 2022-04-22 VITALS
RESPIRATION RATE: 18 BRPM | BODY MASS INDEX: 22.73 KG/M2 | HEIGHT: 66 IN | HEART RATE: 88 BPM | WEIGHT: 141.4 LBS | SYSTOLIC BLOOD PRESSURE: 125 MMHG | OXYGEN SATURATION: 93 % | DIASTOLIC BLOOD PRESSURE: 65 MMHG | TEMPERATURE: 100.7 F

## 2022-04-22 DIAGNOSIS — J44.9 CHRONIC OBSTRUCTIVE PULMONARY DISEASE, UNSPECIFIED COPD TYPE (HCC): ICD-10-CM

## 2022-04-22 DIAGNOSIS — J18.9 OBSTRUCTIVE PNEUMONIA: Primary | ICD-10-CM

## 2022-04-22 DIAGNOSIS — C34.91 ADENOCARCINOMA OF RIGHT LUNG (HCC): ICD-10-CM

## 2022-04-22 DIAGNOSIS — J18.9 PNEUMONIA: Primary | ICD-10-CM

## 2022-04-22 LAB
2HR DELTA HS TROPONIN: 6 NG/L
4HR DELTA HS TROPONIN: 7 NG/L
ALBUMIN SERPL BCP-MCNC: 2.9 G/DL (ref 3.5–5)
ALP SERPL-CCNC: 107 U/L (ref 46–116)
ALT SERPL W P-5'-P-CCNC: 32 U/L (ref 12–78)
ANION GAP SERPL CALCULATED.3IONS-SCNC: 10 MMOL/L (ref 4–13)
AST SERPL W P-5'-P-CCNC: 17 U/L (ref 5–45)
ATRIAL RATE: 86 BPM
BASOPHILS # BLD MANUAL: 0 THOUSAND/UL (ref 0–0.1)
BASOPHILS NFR MAR MANUAL: 0 % (ref 0–1)
BILIRUB SERPL-MCNC: 0.44 MG/DL (ref 0.2–1)
BILIRUB UR QL STRIP: NEGATIVE
BUN SERPL-MCNC: 32 MG/DL (ref 5–25)
CALCIUM ALBUM COR SERPL-MCNC: 10 MG/DL (ref 8.3–10.1)
CALCIUM SERPL-MCNC: 9.1 MG/DL (ref 8.3–10.1)
CARDIAC TROPONIN I PNL SERPL HS: 11 NG/L
CARDIAC TROPONIN I PNL SERPL HS: 17 NG/L
CARDIAC TROPONIN I PNL SERPL HS: 18 NG/L
CHLORIDE SERPL-SCNC: 102 MMOL/L (ref 100–108)
CLARITY UR: CLEAR
CO2 SERPL-SCNC: 29 MMOL/L (ref 21–32)
COLOR UR: YELLOW
CREAT SERPL-MCNC: 1.06 MG/DL (ref 0.6–1.3)
EOSINOPHIL # BLD MANUAL: 0 THOUSAND/UL (ref 0–0.4)
EOSINOPHIL NFR BLD MANUAL: 0 % (ref 0–6)
ERYTHROCYTE [DISTWIDTH] IN BLOOD BY AUTOMATED COUNT: 16.9 % (ref 11.6–15.1)
FLUAV RNA RESP QL NAA+PROBE: NEGATIVE
FLUBV RNA RESP QL NAA+PROBE: NEGATIVE
GFR SERPL CREATININE-BSD FRML MDRD: 78 ML/MIN/1.73SQ M
GLUCOSE SERPL-MCNC: 128 MG/DL (ref 65–140)
GLUCOSE SERPL-MCNC: 193 MG/DL (ref 65–140)
GLUCOSE SERPL-MCNC: 61 MG/DL (ref 65–140)
GLUCOSE UR STRIP-MCNC: NEGATIVE MG/DL
HCT VFR BLD AUTO: 42.9 % (ref 36.5–49.3)
HGB BLD-MCNC: 14 G/DL (ref 12–17)
HGB UR QL STRIP.AUTO: NEGATIVE
INR PPP: 0.93 (ref 0.84–1.19)
KETONES UR STRIP-MCNC: NEGATIVE MG/DL
LACTATE SERPL-SCNC: 0.7 MMOL/L (ref 0.5–2)
LEUKOCYTE ESTERASE UR QL STRIP: NEGATIVE
LYMPHOCYTES # BLD AUTO: 23 % (ref 14–44)
LYMPHOCYTES # BLD AUTO: 4.78 THOUSAND/UL (ref 0.6–4.47)
MCH RBC QN AUTO: 30.7 PG (ref 26.8–34.3)
MCHC RBC AUTO-ENTMCNC: 32.6 G/DL (ref 31.4–37.4)
MCV RBC AUTO: 94 FL (ref 82–98)
METAMYELOCYTES NFR BLD MANUAL: 4 % (ref 0–1)
MONOCYTES # BLD AUTO: 1.66 THOUSAND/UL (ref 0–1.22)
MONOCYTES NFR BLD: 8 % (ref 4–12)
MYELOCYTES NFR BLD MANUAL: 4 % (ref 0–1)
NEUTROPHILS # BLD MANUAL: 12.25 THOUSAND/UL (ref 1.85–7.62)
NEUTS BAND NFR BLD MANUAL: 1 % (ref 0–8)
NEUTS SEG NFR BLD AUTO: 58 % (ref 43–75)
NITRITE UR QL STRIP: NEGATIVE
P AXIS: 55 DEGREES
PH UR STRIP.AUTO: 5 [PH] (ref 4.5–8)
PLATELET # BLD AUTO: 254 THOUSANDS/UL (ref 149–390)
PLATELET BLD QL SMEAR: ADEQUATE
PMV BLD AUTO: 8.7 FL (ref 8.9–12.7)
POTASSIUM SERPL-SCNC: 3.9 MMOL/L (ref 3.5–5.3)
PR INTERVAL: 150 MS
PROCALCITONIN SERPL-MCNC: 0.38 NG/ML
PROT SERPL-MCNC: 6.5 G/DL (ref 6.4–8.2)
PROT UR STRIP-MCNC: NEGATIVE MG/DL
PROTHROMBIN TIME: 12.5 SECONDS (ref 11.6–14.5)
QRS AXIS: 70 DEGREES
QRSD INTERVAL: 86 MS
QT INTERVAL: 342 MS
QTC INTERVAL: 400 MS
RBC # BLD AUTO: 4.56 MILLION/UL (ref 3.88–5.62)
RBC MORPH BLD: NORMAL
RSV RNA RESP QL NAA+PROBE: NEGATIVE
SARS-COV-2 RNA RESP QL NAA+PROBE: NEGATIVE
SODIUM SERPL-SCNC: 141 MMOL/L (ref 136–145)
SP GR UR STRIP.AUTO: 1.01 (ref 1–1.03)
T WAVE AXIS: 43 DEGREES
UROBILINOGEN UR QL STRIP.AUTO: 0.2 E.U./DL
VARIANT LYMPHS # BLD AUTO: 2 %
VENTRICULAR RATE: 82 BPM
WBC # BLD AUTO: 20.77 THOUSAND/UL (ref 4.31–10.16)

## 2022-04-22 PROCEDURE — 85610 PROTHROMBIN TIME: CPT | Performed by: PHYSICIAN ASSISTANT

## 2022-04-22 PROCEDURE — 85007 BL SMEAR W/DIFF WBC COUNT: CPT | Performed by: PHYSICIAN ASSISTANT

## 2022-04-22 PROCEDURE — 87040 BLOOD CULTURE FOR BACTERIA: CPT | Performed by: PHYSICIAN ASSISTANT

## 2022-04-22 PROCEDURE — 80053 COMPREHEN METABOLIC PANEL: CPT | Performed by: PHYSICIAN ASSISTANT

## 2022-04-22 PROCEDURE — 85027 COMPLETE CBC AUTOMATED: CPT | Performed by: PHYSICIAN ASSISTANT

## 2022-04-22 PROCEDURE — 87449 NOS EACH ORGANISM AG IA: CPT | Performed by: INTERNAL MEDICINE

## 2022-04-22 PROCEDURE — 84145 PROCALCITONIN (PCT): CPT | Performed by: PHYSICIAN ASSISTANT

## 2022-04-22 PROCEDURE — 36415 COLL VENOUS BLD VENIPUNCTURE: CPT | Performed by: PHYSICIAN ASSISTANT

## 2022-04-22 PROCEDURE — 83605 ASSAY OF LACTIC ACID: CPT | Performed by: PHYSICIAN ASSISTANT

## 2022-04-22 PROCEDURE — 94640 AIRWAY INHALATION TREATMENT: CPT

## 2022-04-22 PROCEDURE — 96361 HYDRATE IV INFUSION ADD-ON: CPT

## 2022-04-22 PROCEDURE — 94762 N-INVAS EAR/PLS OXIMTRY CONT: CPT

## 2022-04-22 PROCEDURE — 82948 REAGENT STRIP/BLOOD GLUCOSE: CPT

## 2022-04-22 PROCEDURE — 93010 ELECTROCARDIOGRAM REPORT: CPT | Performed by: INTERNAL MEDICINE

## 2022-04-22 PROCEDURE — 94760 N-INVAS EAR/PLS OXIMETRY 1: CPT

## 2022-04-22 PROCEDURE — 87081 CULTURE SCREEN ONLY: CPT | Performed by: INTERNAL MEDICINE

## 2022-04-22 PROCEDURE — 1036F TOBACCO NON-USER: CPT | Performed by: NURSE PRACTITIONER

## 2022-04-22 PROCEDURE — G1004 CDSM NDSC: HCPCS

## 2022-04-22 PROCEDURE — 71045 X-RAY EXAM CHEST 1 VIEW: CPT

## 2022-04-22 PROCEDURE — 84484 ASSAY OF TROPONIN QUANT: CPT | Performed by: PHYSICIAN ASSISTANT

## 2022-04-22 PROCEDURE — 71260 CT THORAX DX C+: CPT

## 2022-04-22 PROCEDURE — 99285 EMERGENCY DEPT VISIT HI MDM: CPT

## 2022-04-22 PROCEDURE — 0241U HB NFCT DS VIR RESP RNA 4 TRGT: CPT | Performed by: PHYSICIAN ASSISTANT

## 2022-04-22 PROCEDURE — 81003 URINALYSIS AUTO W/O SCOPE: CPT

## 2022-04-22 PROCEDURE — 99285 EMERGENCY DEPT VISIT HI MDM: CPT | Performed by: PHYSICIAN ASSISTANT

## 2022-04-22 PROCEDURE — 96365 THER/PROPH/DIAG IV INF INIT: CPT

## 2022-04-22 PROCEDURE — 74177 CT ABD & PELVIS W/CONTRAST: CPT

## 2022-04-22 PROCEDURE — 93005 ELECTROCARDIOGRAM TRACING: CPT

## 2022-04-22 PROCEDURE — 99215 OFFICE O/P EST HI 40 MIN: CPT | Performed by: NURSE PRACTITIONER

## 2022-04-22 PROCEDURE — 99223 1ST HOSP IP/OBS HIGH 75: CPT | Performed by: INTERNAL MEDICINE

## 2022-04-22 RX ORDER — ONDANSETRON 2 MG/ML
4 INJECTION INTRAMUSCULAR; INTRAVENOUS EVERY 6 HOURS PRN
Status: DISCONTINUED | OUTPATIENT
Start: 2022-04-22 | End: 2022-04-24 | Stop reason: HOSPADM

## 2022-04-22 RX ORDER — PANTOPRAZOLE SODIUM 40 MG/1
40 TABLET, DELAYED RELEASE ORAL
Status: DISCONTINUED | OUTPATIENT
Start: 2022-04-23 | End: 2022-04-24 | Stop reason: HOSPADM

## 2022-04-22 RX ORDER — AZITHROMYCIN 250 MG/1
500 TABLET, FILM COATED ORAL EVERY 24 HOURS
Status: DISCONTINUED | OUTPATIENT
Start: 2022-04-22 | End: 2022-04-23

## 2022-04-22 RX ORDER — IPRATROPIUM BROMIDE AND ALBUTEROL SULFATE 2.5; .5 MG/3ML; MG/3ML
3 SOLUTION RESPIRATORY (INHALATION)
Status: DISCONTINUED | OUTPATIENT
Start: 2022-04-22 | End: 2022-04-22

## 2022-04-22 RX ORDER — OXYCODONE HYDROCHLORIDE 10 MG/1
10 TABLET ORAL EVERY 6 HOURS PRN
Status: DISCONTINUED | OUTPATIENT
Start: 2022-04-22 | End: 2022-04-24 | Stop reason: HOSPADM

## 2022-04-22 RX ORDER — LORAZEPAM 0.5 MG/1
0.5 TABLET ORAL
Status: DISCONTINUED | OUTPATIENT
Start: 2022-04-22 | End: 2022-04-24 | Stop reason: HOSPADM

## 2022-04-22 RX ORDER — SODIUM CHLORIDE, SODIUM GLUCONATE, SODIUM ACETATE, POTASSIUM CHLORIDE, MAGNESIUM CHLORIDE, SODIUM PHOSPHATE, DIBASIC, AND POTASSIUM PHOSPHATE .53; .5; .37; .037; .03; .012; .00082 G/100ML; G/100ML; G/100ML; G/100ML; G/100ML; G/100ML; G/100ML
75 INJECTION, SOLUTION INTRAVENOUS CONTINUOUS
Status: DISCONTINUED | OUTPATIENT
Start: 2022-04-22 | End: 2022-04-24 | Stop reason: HOSPADM

## 2022-04-22 RX ORDER — GUAIFENESIN/DEXTROMETHORPHAN 100-10MG/5
10 SYRUP ORAL EVERY 4 HOURS PRN
Status: DISCONTINUED | OUTPATIENT
Start: 2022-04-22 | End: 2022-04-24 | Stop reason: HOSPADM

## 2022-04-22 RX ORDER — LEVALBUTEROL INHALATION SOLUTION 1.25 MG/3ML
1.25 SOLUTION RESPIRATORY (INHALATION)
Status: DISCONTINUED | OUTPATIENT
Start: 2022-04-22 | End: 2022-04-24 | Stop reason: HOSPADM

## 2022-04-22 RX ORDER — VANCOMYCIN HYDROCHLORIDE 1 G/200ML
15 INJECTION, SOLUTION INTRAVENOUS EVERY 12 HOURS
Status: DISCONTINUED | OUTPATIENT
Start: 2022-04-23 | End: 2022-04-24

## 2022-04-22 RX ORDER — INSULIN GLARGINE 100 [IU]/ML
10 INJECTION, SOLUTION SUBCUTANEOUS
Status: DISCONTINUED | OUTPATIENT
Start: 2022-04-22 | End: 2022-04-22

## 2022-04-22 RX ORDER — LEVALBUTEROL 1.25 MG/.5ML
1.25 SOLUTION, CONCENTRATE RESPIRATORY (INHALATION)
Status: DISCONTINUED | OUTPATIENT
Start: 2022-04-22 | End: 2022-04-22

## 2022-04-22 RX ORDER — LEVETIRACETAM 500 MG/1
500 TABLET ORAL EVERY 12 HOURS SCHEDULED
Status: DISCONTINUED | OUTPATIENT
Start: 2022-04-22 | End: 2022-04-24 | Stop reason: HOSPADM

## 2022-04-22 RX ORDER — ACETAMINOPHEN 325 MG/1
650 TABLET ORAL EVERY 6 HOURS PRN
Status: DISCONTINUED | OUTPATIENT
Start: 2022-04-22 | End: 2022-04-24 | Stop reason: HOSPADM

## 2022-04-22 RX ADMIN — CEFEPIME HYDROCHLORIDE 2000 MG: 2 INJECTION, POWDER, FOR SOLUTION INTRAVENOUS at 11:23

## 2022-04-22 RX ADMIN — AZITHROMYCIN MONOHYDRATE 500 MG: 250 TABLET ORAL at 17:56

## 2022-04-22 RX ADMIN — SODIUM CHLORIDE, SODIUM GLUCONATE, SODIUM ACETATE, POTASSIUM CHLORIDE, MAGNESIUM CHLORIDE, SODIUM PHOSPHATE, DIBASIC, AND POTASSIUM PHOSPHATE 75 ML/HR: .53; .5; .37; .037; .03; .012; .00082 INJECTION, SOLUTION INTRAVENOUS at 17:56

## 2022-04-22 RX ADMIN — IPRATROPIUM BROMIDE 0.5 MG: 0.5 SOLUTION RESPIRATORY (INHALATION) at 19:50

## 2022-04-22 RX ADMIN — LEVETIRACETAM 500 MG: 500 TABLET, FILM COATED ORAL at 21:06

## 2022-04-22 RX ADMIN — CEFEPIME HYDROCHLORIDE 2000 MG: 2 INJECTION, POWDER, FOR SOLUTION INTRAVENOUS at 17:56

## 2022-04-22 RX ADMIN — SODIUM CHLORIDE 1000 ML: 0.9 INJECTION, SOLUTION INTRAVENOUS at 10:40

## 2022-04-22 RX ADMIN — LEVALBUTEROL HYDROCHLORIDE 1.25 MG: 1.25 SOLUTION RESPIRATORY (INHALATION) at 19:50

## 2022-04-22 RX ADMIN — IOHEXOL 100 ML: 350 INJECTION, SOLUTION INTRAVENOUS at 12:51

## 2022-04-22 RX ADMIN — VANCOMYCIN HYDROCHLORIDE 1250 MG: 5 INJECTION, POWDER, LYOPHILIZED, FOR SOLUTION INTRAVENOUS at 18:51

## 2022-04-22 RX ADMIN — SODIUM CHLORIDE 1000 ML: 0.9 INJECTION, SOLUTION INTRAVENOUS at 09:43

## 2022-04-22 NOTE — ASSESSMENT & PLAN NOTE
-  patient reporting fatigue, fever, increased sputum production  -  completed 7 day course of Augmentin outpatient with no improvement  -  H/O Klebsiella  -  given severity of symptoms worsening lung mass and extremity concern for postobstructive pneumonia  -  would recommend initiating broad-spectrum antibiotics, chest CT, MRSA swab, sputum sample

## 2022-04-22 NOTE — ASSESSMENT & PLAN NOTE
Lab Results   Component Value Date    HGBA1C 5 7 (H) 11/07/2021     Recent Labs     04/22/22  1708   POCGLU 61*     Blood Sugar Average: Last 72 hrs:  (P) 61   Will hold oral hypoglycemics and Lantus given patient has had poor appetite and low blood sugars  Continue with SSI and adjust as per Accu-Cheks

## 2022-04-22 NOTE — ED NOTES
Attempts made to assist pt with urinating d/t bladder scan result   Pt refusing assistance and wants to wait until his wife returns for her to help him with urinal      Belgica Singleton RN  04/22/22 0864

## 2022-04-22 NOTE — ASSESSMENT & PLAN NOTE
POA, as evidence by fever, tachycardia, tachypnea and leukocytosis  Received IV fluids and antibiotics in ED  Pneumonia is likely source  Will continue IV fluids and antibiotics

## 2022-04-22 NOTE — ED ATTENDING ATTESTATION
4/22/2022  IKassy DO, saw and evaluated the patient  I have discussed the patient with the resident/non-physician practitioner and agree with the resident's/non-physician practitioner's findings, Plan of Care, and MDM as documented in the resident's/non-physician practitioner's note, except where noted  All available labs and Radiology studies were reviewed  I was present for key portions of any procedure(s) performed by the resident/non-physician practitioner and I was immediately available to provide assistance  At this point I agree with the current assessment done in the Emergency Department  I have conducted an independent evaluation of this patient a history and physical is as follows:         58-year-old male chronically ill from lung cancer, presents with worsening shortness of breath cough, feeling generally ill , decreased activity decreased in her aunts, sleeping more  On exam, patient ill in appearance but no respiratory distress  Meeting sepsis criteria, CT chest abdomen pelvis showing necrotizing pneumonia versus necrotizing mass  , admitted to Internal Medicine Service with broad-spectrum antibiotics, wife and patient agree to plan

## 2022-04-22 NOTE — ASSESSMENT & PLAN NOTE
-  3/15/2022- bronchoscopy adenocarcinoma  -  metastatic disease to the brain  -  tumor appears to be very aggressive increasing in size rapidly  -  palliative following, may need to consider hospice

## 2022-04-22 NOTE — H&P
Serjio Verdin 1965, 64 y o  male MRN: [id number]  Unit/Bed#: S -01 Encounter: [id number]  Primary Care Provider: Pamela Hidalgo MD   Date and time admitted to hospital: 4/22/2022  9:21 AM    Chronic obstructive pulmonary disease ([facility] [address] )  Assessment & Plan  Not in acute exacerbation  Continue home bronchodilators    Adenocarcinoma of right lung [facility])  Assessment & Plan  Patient currently undergoing radiation  Outpatient follow-up with Oncology    Sepsis [facility])  Assessment & Plan  POA, as evidence by fever, tachycardia, tachypnea and leukocytosis  Received IV fluids and antibiotics in ED  Pneumonia is likely source  Will continue IV fluids and antibiotics    Brain mass  Assessment & Plan  Currently undergoing radiation  Outpatient follow-up with Oncology  Continue Keppra    Type 2 diabetes mellitus without complication, with long-term current use of insulin ([facility]  Assessment & Plan  Lab Results   Component Value Date    HGBA1C 5 7 (H) 11/07/2021     Recent Labs     04/22/22  1708   POCGLU 61*     Blood Sugar Average: Last 72 hrs:  (P) 61   Will hold oral hypoglycemics and Lantus given patient has had poor appetite and low blood sugars  Continue with SSI and adjust as per Accu-Cheks    * Obstructive pneumonia  Assessment & Plan  Patient presents with complaints of increased sputum production, fatigue and fevers  CT chest shows large right upper lobe loss with increasing cavitation, concerning for necrotizing pneumonia  Will continue on broad-spectrum antibiotics-cefepime, vancomycin, azithromycin  Urine Legionella and strep are pending, sputum cultures pending  Elevated procalcitonin, repeat in a m  Monitor fever curve  Consider pulmonology evaluation should patient not improve    VTE Pharmacologic Prophylaxis: VTE Score: 3 Moderate Risk (Score 3-4) - Pharmacological DVT Prophylaxis Ordered: enoxaparin (Lovenox)    Code Status: Level 1 - Full Code   Discussion with family: Updated  (wife and brother in law) at bedside  Anticipated Length of Stay: Patient will be admitted on an inpatient basis with an anticipated length of stay of greater than 2 midnights secondary to Requiring IV fluids and IV antibiotics  Total Time for Visit, including Counseling / Coordination of Care: 60 minutes Greater than 50% of this total time spent on direct patient counseling and coordination of care  Chief Complaint:  Weakness and fatigue    History of Present Illness:  Dante Mcwilliams is a 64 y o  male with a PMH of lung cancer with brain Mets, type 2 diabetes mellitus who presents with weakness and fatigue for the last 2 weeks  Wife and brother provide translating services as patient is [place] speaking  Patient has been very weak and fatigued over the last 2 weeks with productive cough  She denies any hemoptysis  She also notes he has been febrile at home  He recently finished oral antibiotics without much improvement  He has a history of right upper lobe cancer with 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 for which she is currently undergoing radiation therapy  He was evaluated at his pulmonologist's office today and recommended to present to the ED  In the ED CT scan showed possible necrotizing pneumonia  He was started on IV fluids and IV antibiotics  Review of Systems:  Review of Systems   Constitutional: Positive for fatigue and fever  Negative for activity change, appetite change, chills and diaphoresis  HENT: Positive for congestion  Negative for rhinorrhea, sinus pressure, sinus pain and sore throat  Eyes: Negative  Respiratory: Positive for cough  Negative for chest tightness and shortness of breath  Cardiovascular: Negative for chest pain, palpitations and leg swelling  Gastrointestinal: Negative for abdominal distention, abdominal pain, constipation, diarrhea, nausea and vomiting  Endocrine: Negative      Genitourinary: Negative for difficulty urinating, dysuria, flank pain, frequency, hematuria and urgency  Musculoskeletal: Negative for back pain, gait problem and neck pain  Skin: Negative  Allergic/Immunologic: Negative  Neurological: Positive for weakness  Negative for dizziness, syncope, speech difficulty, light-headedness and headaches  Hematological: Negative  Psychiatric/Behavioral: Negative  All other systems reviewed and are negative  Past Medical and Surgical History:   Past Medical History:   Diagnosis Date    Diabetes mellitus ([facility] )     Elevated PSA 3/11/2021    Fatty liver 3/11/2021    Gall bladder polyp 3/11/2021    Lung cancer ([facility] )     Nonimmune to hepatitis B virus 3/11/2021       History reviewed  No pertinent surgical history  Meds/Allergies:  Prior to Admission medications    Medication Sig Start Date End Date Taking?  Authorizing Provider   acetaminophen (TYLENOL) 650 mg CR tablet Take 1 tablet (650 mg total) by mouth every 8 (eight) hours as needed for mild pain or moderate pain 4/20/22  Yes Nilsa Iniguez MD   BD Pen Needle Jeaneth 2nd Gen 32G X 4 MM MISC USE ONCE DAILY WITH LANTUS 1/7/21  Yes Historical Provider, MD   benzonatate (TESSALON PERLES) 100 mg capsule Take 1 capsule (100 mg total) by mouth 3 (three) times a day as needed for cough 4/15/22  Yes Nilsa Iniguez MD   Blood Glucose Monitoring Suppl (FreeStyle Lite) FIDELIA  1/7/21  Yes Historical Provider, MD   FREESTYLE LITE test strip Check blood sugar  daily 11/17/21  Yes Nilsa Iniguez MD   insulin glargine (Lantus SoloStar) 100 units/mL injection pen Inject 20 Units under the skin daily 3/28/22  Yes Nilsa Iniguez MD   Insulin Pen Needle (BD Pen Needle Jeaneth U/F) 32G X 4 MM MISC Use daily as directed with insulin pen 3/28/22  Yes Nilsa Iniguez MD   ipratropium-albuterol (Combivent Respimat) inhaler Inhale 1 puff 4 (four) times a day 4/15/22  Yes Nilsa Iniguez MD   Lancets (freestyle) lancets Check blood sugar daily 11/17/21  Yes Farooq Harris MD levETIRAcetam (KEPPRA) 500 mg tablet Take 1 tablet (500 mg total) by mouth every 12 (twelve) hours 3/28/22  Yes Nilsa Iniguez MD   LORazepam (ATIVAN) 0 5 mg tablet Take 1 tablet (0 5 mg total) by mouth daily at bedtime as needed for anxiety For sleep 4/15/22  Yes Nilsa Iniguez MD   metFORMIN (GLUCOPHAGE-XR) 500 mg 24 hr tablet Take 2 tablets (1,000 mg total) by mouth daily with dinner 3/28/22 6/26/22 Yes Nilsa Iniguez MD   oxyCODONE (ROXICODONE) 10 MG TABS Take 1 tablet (10 mg total) by mouth every 6 (six) hours as needed for moderate pain for up to 10 days Max Daily Amount: 40 mg 4/15/22 4/25/22 Yes Nilsa Iniguez MD   pantoprazole (PROTONIX) 40 mg tablet Take 1 tablet (40 mg total) by mouth daily in the early morning 22  Yes Nilsa Iniguez MD   Cholecalciferol (Vitamin D3) 125 MCG (5000 UT) CAPS Take 1 capsule (5,000 Units total) by mouth daily 11/17/21 3/28/22  Nilsa Iniguez MD   dexamethasone (DECADRON) 1 mg tablet Take 1 tablet (1 mg total) by mouth daily with breakfast for 4 days 22  Kwan Barksdale MD   dexamethasone (DECADRON) 4 mg tablet 4 mg twice daily 3/28/22 4/22/22  Nilsa Iniguez MD     I have reviewed home medications with patient family member      Allergies: No Known Allergies    Social History:  Marital Status: /Civil Union   Occupation:  Does not work  Patient Pre-hospital Living Situation: Home  Patient Pre-hospital Level of Mobility: walks  Patient Pre-hospital Diet Restrictions:  Diabetic  Substance Use History:   Social History     Substance and Sexual Activity   Alcohol Use Not Currently    Comment: quit drinking 7 years ago     Social History     Tobacco Use   Smoking Status Former Smoker    Packs/day: 0 50    Types: Cigarettes    Quit date: 2022    Years since quittin 2   Smokeless Tobacco Never Used   Tobacco Comment    quit 3 months ago     Social History     Substance and Sexual Activity   Drug Use Never       Family History:  Family History   Problem Relation Age of Onset    No Known Problems Mother     No Known Problems Father        Physical Exam:     Vitals:   Blood Pressure: 92/50 (04/22/22 1608)  Pulse: 81 (04/22/22 1608)  Temperature: 99 °F (37 2 °C) (04/22/22 1608)  Temp Source: Oral (04/22/22 1608)  Respirations: 16 (04/22/22 1608)  SpO2: 94 % (04/22/22 1608)    Physical Exam  Vitals and nursing note reviewed  Constitutional:       Appearance: He is normal weight  He is ill-appearing  Comments: Lethargic   HENT:      Head: Normocephalic and atraumatic  Right Ear: External ear normal       Left Ear: External ear normal       Nose: Nose normal       Mouth/Throat:      Mouth: Mucous membranes are moist       Pharynx: Oropharynx is clear  Eyes:      Conjunctiva/sclera: Conjunctivae normal       Pupils: Pupils are equal, round, and reactive to light  Cardiovascular:      Rate and Rhythm: Normal rate and regular rhythm  Pulses: Normal pulses  Heart sounds: Normal heart sounds  Pulmonary:      Effort: Pulmonary effort is normal       Breath sounds: Rhonchi present  Abdominal:      General: Abdomen is flat  Bowel sounds are normal       Palpations: Abdomen is soft  Musculoskeletal:         General: No swelling or tenderness  Cervical back: Neck supple  No muscular tenderness  Skin:     General: Skin is warm and dry  Capillary Refill: Capillary refill takes less than 2 seconds  Neurological:      General: No focal deficit present  Mental Status: He is alert and oriented to person, place, and time  Mental status is at baseline  Psychiatric:         Mood and Affect: Mood normal          Behavior: Behavior normal          Thought Content:  Thought content normal          Judgment: Judgment normal           Additional Data:     Lab Results:  Results from last 7 days   Lab Units 04/22/22  0938   WBC Thousand/uL 20 77*   HEMOGLOBIN g/dL 14 0   HEMATOCRIT % 42 9   PLATELETS Thousands/uL 254   BANDS PCT % 1   LYMPHO PCT % 23   MONO PCT % 8   EOS PCT % 0     Results from last 7 days   Lab Units 04/22/22  0938   SODIUM mmol/L 141   POTASSIUM mmol/L 3 9   CHLORIDE mmol/L 102   CO2 mmol/L 29   BUN mg/dL 32*   CREATININE mg/dL 1 06   ANION GAP mmol/L 10   CALCIUM mg/dL 9 1   ALBUMIN g/dL 2 9*   TOTAL BILIRUBIN mg/dL 0 44   ALK PHOS U/L 107   ALT U/L 32   AST U/L 17   GLUCOSE RANDOM mg/dL 128     Results from last 7 days   Lab Units 04/22/22  1010   INR  0 93             Results from last 7 days   Lab Units 04/22/22  0938   LACTIC ACID mmol/L 0 7   PROCALCITONIN ng/ml 0 38*       Imaging: Reviewed radiology reports from this admission including: chest xray, chest CT scan and abdominal/pelvic CT  CT chest abdomen pelvis w contrast   Final Result by Garrick Seals MD (04/22 5253)      CHEST:   1   Redemonstrated large right upper lobe mass extending to the right hilum/mediastinum with mediastinal adenopathy in keeping with known malignancy  Compared to prior PET/CT there are slightly increased areas of cavitation confluent with the mass in    the right upper lobe in the areas of previously seen consolidation, which may be due to necrotizing pneumonia as well as necrotic tumor  2  1 6 cm right thyroid nodule can be evaluated with nonemergent thyroid ultrasound  ABDOMEN/PELVIS:   1   No acute findings in the abdomen or pelvis  2   Splenic nodule concerning for metastasis slightly enlarged since CT 3/22/22  3   Right adrenal metastatic nodule grossly unchanged from prior PET/CT 4/13/22  The study was marked in [facility] for immediate notification  Workstation performed: [id number]         XR chest 1 view portable   Final Result by Delores Gonzalez MD (04/22 7515)      Stable large right upper lobe mass  Resolved/resolving peritumoral opacities  Workstation performed: [id number]             EKG and Other Studies Reviewed on Admission:   · EKG: No EKG obtained      ** Please Note: This note has been constructed using a voice recognition system   **

## 2022-04-22 NOTE — ASSESSMENT & PLAN NOTE
-  patient has noted emphysematous changes throughout lung fields likely secondary to smoking history  -  currently maintained on Xopenex q 6h p r n   -  indication for PFTs at this point will consider advancing therapy to triple therapy versus all nebulized therapy

## 2022-04-22 NOTE — ASSESSMENT & PLAN NOTE
Patient presents with complaints of increased sputum production, fatigue and fevers  CT chest shows large right upper lobe loss with increasing cavitation, concerning for necrotizing pneumonia  Will continue on broad-spectrum antibiotics-cefepime, vancomycin, azithromycin  Urine Legionella and strep are pending, sputum cultures pending  Elevated procalcitonin, repeat in a m    Monitor fever curve  Consider pulmonology evaluation should patient not improve

## 2022-04-22 NOTE — PROGRESS NOTES
Vancomycin Assessment    Catalino Pizano is a 64 y o  male who is currently receiving vancomycin 20mg/kg for pneumonia   Relevant clinical data and objective history reviewed:  Creatinine   Date Value Ref Range Status   04/22/2022 1 06 0 60 - 1 30 mg/dL Final     Comment:     Standardized to IDMS reference method   04/10/2022 0 72 0 60 - 1 30 mg/dL Final     Comment:     Standardized to IDMS reference method   03/27/2022 1 04 0 60 - 1 30 mg/dL Final     Comment:     Standardized to IDMS reference method     Vancomycin Rm   Date Value Ref Range Status   03/23/2022 6 7 (L) 10 0 - 20 0 ug/mL Final     BP 92/50 (BP Location: Right arm)   Pulse 81   Temp 99 °F (37 2 °C) (Oral)   Resp 16   SpO2 94%   No intake/output data recorded  Lab Results   Component Value Date/Time    BUN 32 (H) 04/22/2022 09:38 AM    BUN 21 01/29/2021 11:55 AM    WBC 20 77 (H) 04/22/2022 09:38 AM    HGB 14 0 04/22/2022 09:38 AM    HCT 42 9 04/22/2022 09:38 AM    MCV 94 04/22/2022 09:38 AM     04/22/2022 09:38 AM     Temp Readings from Last 3 Encounters:   04/22/22 99 °F (37 2 °C) (Oral)   04/22/22 (!) 100 7 °F (38 2 °C) (Tympanic)   04/15/22 (!) 97 2 °F (36 2 °C)     Vancomycin Days of Therapy: 1    Assessment/Plan  The patient is currently on vancomycin utilizing scheduled dosing  The patient is receiving 20 mg/kg loading dose and will receive 15mg/kg q12h thereafter  Pharmacy will continue to follow closely for s/sx of nephrotoxicity, infusion reactions, and appropriateness of therapy  BMP and CBC will be ordered per protocol  Plan for trough as patient approaches steady state, prior to the 4th dose at approximately 0530 on 4/24  Pharmacy will continue to follow the patients culture results and clinical progress daily      Manjinder Ron, Pharmacist

## 2022-04-22 NOTE — PROGRESS NOTES
Pulmonary Follow-Up Note   Greta Davis 64 y o  male MRN: [id number]  4/22/2022      Assessment/Plan:    Problem List Items Addressed This Visit        Respiratory    Obstructive pneumonia - Primary     -  patient reporting fatigue, fever, increased sputum production  -  completed 7 day course of Augmentin outpatient with no improvement  -  H/O Klebsiella  -  given severity of symptoms worsening lung mass and extremity concern for postobstructive pneumonia  -  would recommend initiating broad-spectrum antibiotics, chest CT, MRSA swab, sputum sample         Adenocarcinoma of right lung ([facility] )     -  3/15/2022- bronchoscopy adenocarcinoma  -  metastatic disease to the brain  -  tumor appears to be very aggressive increasing in size rapidly  -  palliative following, may need to consider hospice         Chronic obstructive pulmonary disease ([facility] )     -  patient has noted emphysematous changes throughout lung fields likely secondary to smoking history  -  currently maintained on Xopenex q 6h p r n   -  indication for PFTs at this point will consider advancing therapy to triple therapy versus all nebulized therapy               Education provided at this visit:   Need for Vaccination:  Up-to-date   Pulmonary Rehab:  Not indicated   Smoking Cessation:  Quit 2 months ago   Lung Cancer Screening:  Per oncology   Inhaler Use:  Albuterol 2 puffs q 6h p r n  No follow-ups on file  All of Ru questions were answered prior to leaving the office today  Bessy Wong will follow-up with Dr Matt Light in 3 months or sooner should the need arise  Bessy Wong is aware to call our office with any further questions or concerns  History of Present Illness   Reason for Visit:  Hospital follow-up  Chief Complaint: "I dont feel good"  HPI: Greta Davis is a 64 y o  male who presents to the office today for hospital follow-up  Patient presents to the office today with reported fevers at started at 1:00 a m  Roxy Avila   Patient appeared very fatigued and lethargic  Patient was sleeping mostly through the exam   Upon examination patient had significantly diminished aeration  He was seen by PCP last week with similar symptoms  Patient was initiated on 7 day course of Augmentin  Patient had no significant improvement in fatigue, fever, or sputum production upon completing antibiotics  From a pulmonary standpoint, patient does not have a pulmonologist   Patient reports an approximate 1 pack per day 40 year smoking history  Patient reports he quit approximately 2 months ago  Patient has never been formally diagnosed with COPD or asthma  Patient is currently not maintained on inhaled or oxygen therapies  Patient reports some occupational exposures as he works at [employer]  Patient denies having any pets  Patient does report history of GERD in which he is maintained on Protonix  Patient denies any history dysphagia, ROBBIE, seasonal allergies, or postnasal drip  Patient denies any acute exposures to dust, mold, asbestos, or silica  Review of Systems   Constitutional: Positive for appetite change, chills, fatigue and fever  Negative for activity change  HENT: Negative for ear pain and sore throat  Eyes: Negative for pain and visual disturbance  Respiratory: Positive for cough and shortness of breath  Negative for apnea, choking, chest tightness, wheezing and stridor  Cardiovascular: Negative for chest pain and palpitations  Gastrointestinal: Negative for abdominal pain and vomiting  Genitourinary: Negative for dysuria and hematuria  Musculoskeletal: Negative for arthralgias and back pain  Skin: Negative for color change and rash  Neurological: Negative for seizures and syncope  Psychiatric/Behavioral: Negative for agitation and behavioral problems  All other systems reviewed and are negative        Historical Information   Past Medical History:   Diagnosis Date    Diabetes mellitus [facility]     Elevated PSA 3/11/2021    Fatty liver 3/11/2021    Gall bladder polyp 3/11/2021    Lung cancer [facility]     Nonimmune to hepatitis B virus 3/11/2021     History reviewed  No pertinent surgical history  History reviewed  No pertinent family history    Social History   Social History     Substance and Sexual Activity   Alcohol Use Not Currently    Comment: quit drinking 7 years ago     Social History     Substance and Sexual Activity   Drug Use Never     Social History     Tobacco Use   Smoking Status Former Smoker    Packs/day: 0 50    Types: Cigarettes    Quit date: 2022    Years since quittin 2   Smokeless Tobacco Never Used   Tobacco Comment    quit 3 months ago     E-Cigarette/Vaping    E-Cigarette Use Never User      E-Cigarette/Vaping Substances    Nicotine No     THC No     CBD No     Flavoring No     Other No     Unknown No        Meds/Allergies     Current Outpatient Medications:     acetaminophen (TYLENOL) 650 mg CR tablet, Take 1 tablet (650 mg total) by mouth every 8 (eight) hours as needed for mild pain or moderate pain, Disp: 90 tablet, Rfl: 2    BD Pen Needle Jeaneth 2nd Gen 32G X 4 MM MISC, USE ONCE DAILY WITH LANTUS, Disp: , Rfl:     benzonatate (TESSALON PERLES) 100 mg capsule, Take 1 capsule (100 mg total) by mouth 3 (three) times a day as needed for cough, Disp: 30 capsule, Rfl: 2    Blood Glucose Monitoring Suppl (FreeStyle Lite) FIDELIA, , Disp: , Rfl:     dexamethasone (DECADRON) 4 mg tablet, 4 mg twice daily, Disp: 60 tablet, Rfl: 2    FREESTYLE LITE test strip, Check blood sugar  daily, Disp: 100 each, Rfl: 3    insulin glargine (Lantus SoloStar) 100 units/mL injection pen, Inject 20 Units under the skin daily, Disp: 15 mL, Rfl: 3    Insulin Pen Needle (BD Pen Needle Jeaneth U/F) 32G X 4 MM MISC, Use daily as directed with insulin pen, Disp: 100 each, Rfl: 3    ipratropium-albuterol (Combivent Respimat) inhaler, Inhale 1 puff 4 (four) times a day, Disp: 4 g, Rfl: 1    Lancets (freestyle) lancets, Check blood sugar daily, Disp: 100 each, Rfl: 3    levETIRAcetam (KEPPRA) 500 mg tablet, Take 1 tablet (500 mg total) by mouth every 12 (twelve) hours, Disp: 60 tablet, Rfl: 3    LORazepam (ATIVAN) 0 5 mg tablet, Take 1 tablet (0 5 mg total) by mouth daily at bedtime as needed for anxiety For sleep, Disp: 30 tablet, Rfl: 0    metFORMIN (GLUCOPHAGE-XR) 500 mg 24 hr tablet, Take 2 tablets (1,000 mg total) by mouth daily with dinner, Disp: 180 tablet, Rfl: 2    oxyCODONE (ROXICODONE) 10 MG TABS, Take 1 tablet (10 mg total) by mouth every 6 (six) hours as needed for moderate pain for up to 10 days Max Daily Amount: 40 mg, Disp: 40 tablet, Rfl: 0    pantoprazole (PROTONIX) 40 mg tablet, Take 1 tablet (40 mg total) by mouth daily in the early morning, Disp: 90 tablet, Rfl: 1    Cholecalciferol (Vitamin D3) 125 MCG (5000 UT) CAPS, Take 1 capsule (5,000 Units total) by mouth daily, Disp: 90 capsule, Rfl: 2  No Known Allergies    Vitals: Blood pressure 125/65, pulse 88, temperature (!) 100 7 °F (38 2 °C), temperature source Tympanic, resp  rate 18, height 5' 6" (1 676 m), weight 64 1 kg (141 lb 6 4 oz), SpO2 93 %  Body mass index is 22 82 kg/m²  Oxygen Therapy  SpO2: 93 %  Oxygen Therapy: None (Room air)    Physical Exam:  Physical Exam  Constitutional:       General: He is not in acute distress  Appearance: Normal appearance  He is normal weight  He is not ill-appearing  HENT:      Head: Normocephalic and atraumatic  Nose: No congestion or rhinorrhea  Mouth/Throat:      Mouth: Mucous membranes are dry  Pharynx: No oropharyngeal exudate or posterior oropharyngeal erythema  Cardiovascular:      Rate and Rhythm: Normal rate and regular rhythm  Pulses: Normal pulses  Heart sounds: Normal heart sounds  No murmur heard  No friction rub  No gallop  Pulmonary:      Effort: Pulmonary effort is normal  No tachypnea, bradypnea, accessory muscle usage or respiratory distress        Breath sounds: Decreased air movement present  No stridor or transmitted upper airway sounds  Decreased breath sounds present  No wheezing, rhonchi or rales  Comments: Significantly diminished aeration  Chest:      Chest wall: No tenderness  Abdominal:      General: Abdomen is flat  Bowel sounds are normal  There is no distension  Palpations: Abdomen is soft  There is no mass  Musculoskeletal:         General: No swelling or tenderness  Normal range of motion  Cervical back: Normal range of motion  No rigidity or tenderness  Skin:     General: Skin is warm and dry  Coloration: Skin is not jaundiced or pale  Neurological:      General: No focal deficit present  Mental Status: He is alert and oriented to person, place, and time  Mental status is at baseline  Psychiatric:         Mood and Affect: Mood normal          Behavior: Behavior normal              Imaging and other studies: I have personally reviewed pertinent films in PACS     3/22/2022- unchanged large right upper lobe mass    Pulmonary Results (PFTs, PSG): I have personally reviewed pertinent films in PACS     nO PFTs recorded        JONATHAN Mancilla  [facility]        Portions of the record may have been created with voice recognition software  Occasional wrong word or "sound a like" substitutions may have occurred due to the inherent limitations of voice recognition software  Read the chart carefully and recognize, using context, where substitutions have occurred or contact the dictating provider

## 2022-04-22 NOTE — ED PROVIDER NOTES
History  Chief Complaint   Patient presents with    Weakness - Generalized     Generalized weakness and fatigue ongoing with cough x 2 weeks  Fever yesterday  The patient is a 14-year-old male with history of diabetes who is currently undergoing treatment for lung cancer with radiation therapy who presents to the emergency department for evaluation of generalized weakness and cough over the last 2 weeks and fever that started early this morning  Per the patient's wife, the patient spiked a fever around 1:00 a m  He was not given anything for fever  She states he has also been extremely weak and fatigued  Patient is currently undergoing radiation therapy for lung cancer  He has a history of sepsis and pneumonia  She states no one else at home is sick with similar symptoms at this time  The patient's wife states the patient got a dose of oxycodone this morning as well as vitamin-D and pantoprazole  He did not take any other medications this morning  The patient is following commands, but he is not providing any additional history at this time  History provided by:  Significant other  History limited by:  Acuity of condition   used: No        Prior to Admission Medications   Prescriptions Last Dose Informant Patient Reported? Taking?    BD Pen Needle Jeaneth 2nd Gen 32G X 4 MM MISC Past Week at Unknown time Spouse/Significant Other Yes Yes   Sig: USE ONCE DAILY WITH LANTUS   Blood Glucose Monitoring Suppl (FreeStyle Lite) FIDELIA Past Week at Unknown time Spouse/Significant Other Yes Yes   Cholecalciferol (Vitamin D3) 125 MCG (5000 UT) CAPS  Child No No   Sig: Take 1 capsule (5,000 Units total) by mouth daily   FREESTYLE LITE test strip Past Week at Unknown time Spouse/Significant Other No Yes   Sig: Check blood sugar  daily   Insulin Pen Needle (BD Pen Needle Jeaneth U/F) 32G X 4 MM MISC Past Week at Unknown time Spouse/Significant Other No Yes   Sig: Use daily as directed with insulin pen LORazepam (ATIVAN) 0 5 mg tablet Past Week at Unknown time Spouse/Significant Other No Yes   Sig: Take 1 tablet (0 5 mg total) by mouth daily at bedtime as needed for anxiety For sleep   Lancets (freestyle) lancets Past Week at Unknown time Spouse/Significant Other No Yes   Sig: Check blood sugar daily   acetaminophen (TYLENOL) 650 mg CR tablet Past Week at Unknown time Spouse/Significant Other No Yes   Sig: Take 1 tablet (650 mg total) by mouth every 8 (eight) hours as needed for mild pain or moderate pain   benzonatate (TESSALON PERLES) 100 mg capsule Past Week at Unknown time Spouse/Significant Other No Yes   Sig: Take 1 capsule (100 mg total) by mouth 3 (three) times a day as needed for cough   dexamethasone (DECADRON) 1 mg tablet   No No   Sig: Take 1 tablet (1 mg total) by mouth daily with breakfast for 4 days   insulin glargine (Lantus SoloStar) 100 units/mL injection pen Past Week at Unknown time Spouse/Significant Other No Yes   Sig: Inject 20 Units under the skin daily   ipratropium-albuterol (Combivent Respimat) inhaler 4/21/2022 at Unknown time Spouse/Significant Other No Yes   Sig: Inhale 1 puff 4 (four) times a day   levETIRAcetam (KEPPRA) 500 mg tablet 4/21/2022 at Unknown time Spouse/Significant Other No Yes   Sig: Take 1 tablet (500 mg total) by mouth every 12 (twelve) hours   metFORMIN (GLUCOPHAGE-XR) 500 mg 24 hr tablet 4/21/2022 at Unknown time Spouse/Significant Other No Yes   Sig: Take 2 tablets (1,000 mg total) by mouth daily with dinner   oxyCODONE (ROXICODONE) 10 MG TABS 4/22/2022 at Unknown time Spouse/Significant Other No Yes   Sig: Take 1 tablet (10 mg total) by mouth every 6 (six) hours as needed for moderate pain for up to 10 days Max Daily Amount: 40 mg   pantoprazole (PROTONIX) 40 mg tablet 4/22/2022 at Unknown time Spouse/Significant Other No Yes   Sig: Take 1 tablet (40 mg total) by mouth daily in the early morning      Facility-Administered Medications: None       Past Medical History:   Diagnosis Date    Diabetes mellitus ([facility] )     Elevated PSA 3/11/2021    Fatty liver 3/11/2021    Gall bladder polyp 3/11/2021    Lung cancer ([facility] )     Nonimmune to hepatitis B virus 3/11/2021       History reviewed  No pertinent surgical history  History reviewed  No pertinent family history  I have reviewed and agree with the history as documented  E-Cigarette/Vaping    E-Cigarette Use Never User      E-Cigarette/Vaping Substances    Nicotine No     THC No     CBD No     Flavoring No     Other No     Unknown No      Social History     Tobacco Use    Smoking status: Former Smoker     Packs/day: 0 50     Types: Cigarettes     Quit date: 2022     Years since quittin 2    Smokeless tobacco: Never Used    Tobacco comment: quit 3 months ago   Vaping Use    Vaping Use: Never used   Substance Use Topics    Alcohol use: Not Currently     Comment: quit drinking 7 years ago    Drug use: Never       Review of Systems   Constitutional: Positive for fatigue and fever  Negative for chills  HENT: Negative for ear pain and sore throat  Eyes: Negative for redness and visual disturbance  Respiratory: Positive for cough  Negative for shortness of breath and wheezing  Cardiovascular: Negative for chest pain  Gastrointestinal: Negative for abdominal pain, diarrhea, nausea and vomiting  Genitourinary: Negative for dysuria and hematuria  Musculoskeletal: Negative for back pain, neck pain and neck stiffness  Skin: Negative for color change and rash  Neurological: Positive for weakness  Negative for dizziness, light-headedness and headaches  All other systems reviewed and are negative  Physical Exam  Physical Exam  Vitals and nursing note reviewed  Constitutional:       General: He is not in acute distress  Appearance: He is well-developed  He is ill-appearing  HENT:      Head: Normocephalic and atraumatic     Eyes:      Pupils: Pupils are equal, round, and reactive to light  Cardiovascular:      Rate and Rhythm: Normal rate and regular rhythm  Heart sounds: Normal heart sounds  Pulmonary:      Effort: Pulmonary effort is normal       Breath sounds: Normal breath sounds  Abdominal:      General: There is no distension  Palpations: Abdomen is soft  Tenderness: There is no abdominal tenderness  There is no guarding or rebound  Musculoskeletal:      Cervical back: Normal range of motion and neck supple  Skin:     General: Skin is warm and dry  Neurological:      Mental Status: He is alert and oriented to person, place, and time           Vital Signs  ED Triage Vitals [04/22/22 0920]   Temperature Pulse Respirations Blood Pressure SpO2   99 4 °F (37 4 °C) 85 18 90/56 96 %      Temp Source Heart Rate Source Patient Position - Orthostatic VS BP Location FiO2 (%)   Oral Monitor Sitting Left arm --      Pain Score       No Pain           Vitals:    04/22/22 1415 04/22/22 1500 04/22/22 1521 04/22/22 1608   BP: 108/67 104/67 108/67 92/50   Pulse: 84 80 82 81   Patient Position - Orthostatic VS:  Lying Lying Sitting         Visual Acuity      ED Medications  Medications   sodium chloride 0 9 % bolus 1,000 mL (0 mL Intravenous Stopped 4/22/22 1039)   cefepime (MAXIPIME) 2 g/50 mL dextrose IVPB (0 mg Intravenous Stopped 4/22/22 1155)   sodium chloride 0 9 % bolus 1,000 mL (0 mL Intravenous Stopped 4/22/22 1155)   iohexol (OMNIPAQUE) 350 MG/ML injection (SINGLE-DOSE) 100 mL (100 mL Intravenous Given 4/22/22 1251)       Diagnostic Studies  Results Reviewed     Procedure Component Value Units Date/Time    HS Troponin I 4hr [[id number]]  (Normal) Collected: 04/22/22 1332    Lab Status: Final result Specimen: Blood from Arm, Right Updated: 04/22/22 1405     hs TnI 4hr 18 ng/L      Delta 4hr hsTnI 7 ng/L     Blood culture #1 [[id number]] Collected: 04/22/22 0938    Lab Status: Preliminary result Specimen: Blood from Arm, Left Updated: 04/22/22 1301     Blood Culture Received in Microbiology Lab  Culture in Progress  Blood culture #2 [[id number]] Collected: 04/22/22 0938    Lab Status: Preliminary result Specimen: Blood from Arm, Right Updated: 04/22/22 1301     Blood Culture Received in Microbiology Lab  Culture in Progress  HS Troponin I 2hr [[id number]]  (Normal) Collected: 04/22/22 1128    Lab Status: Final result Specimen: Blood from Arm, Left Updated: 04/22/22 1212     hs TnI 2hr 17 ng/L      Delta 2hr hsTnI 6 ng/L     Urine Macroscopic, POC [[id number]] Collected: 04/22/22 1109    Lab Status: Final result Specimen: Urine Updated: 04/22/22 1110     Color, UA Yellow     Clarity, UA Clear     pH, UA 5 0     Leukocytes, UA Negative     Nitrite, UA Negative     Protein, UA Negative mg/dl      Glucose, UA Negative mg/dl      Ketones, UA Negative mg/dl      Urobilinogen, UA 0 2 E U /dl      Bilirubin, UA Negative     Blood, UA Negative     Specific Gravity, UA 1 015    Narrative:      CLINITEK RESULT    Procalcitonin [[id number]]  (Abnormal) Collected: 04/22/22 0938    Lab Status: Final result Specimen: Blood from Arm, Left Updated: 04/22/22 1109     Procalcitonin 0 38 ng/ml     Protime-INR [[id number]]  (Normal) Collected: 04/22/22 1010    Lab Status: Final result Specimen: Blood from Arm, Left Updated: 04/22/22 1041     Protime 12 5 seconds      INR 0 93    COVID/FLU/RSV - 2 hour TAT [[id number]]  (Normal) Collected: 04/22/22 0938    Lab Status: Final result Specimen: Nares from Nose Updated: 04/22/22 1027     SARS-CoV-2 Negative     INFLUENZA A PCR Negative     INFLUENZA B PCR Negative     RSV PCR Negative    Narrative:      FOR PEDIATRIC PATIENTS - copy/paste COVID Guidelines URL to browser: [email or web address]    SARS-CoV-2 assay is a Nucleic Acid Amplification assay intended for the  qualitative detection of nucleic acid from SARS-CoV-2 in nasopharyngeal  swabs   Results are for the presumptive identification of SARS-CoV-2 RNA  Positive results are indicative of infection with SARS-CoV-2, the virus  causing COVID-19, but do not rule out bacterial infection or co-infection  with other viruses  Laboratories within the [place] and its  territories are required to report all positive results to the appropriate  public health authorities  Negative results do not preclude SARS-CoV-2  infection and should not be used as the sole basis for treatment or other  patient management decisions  Negative results must be combined with  clinical observations, patient history, and epidemiological information  This test has not been FDA cleared or approved  This test has been authorized by FDA under an Emergency Use Authorization  (EUA)  This test is only authorized for the duration of time the  declaration that circumstances exist justifying the authorization of the  emergency use of an in vitro diagnostic tests for detection of SARS-CoV-2  virus and/or diagnosis of COVID-19 infection under section 564(b)(1) of  the Act, 21 U  S C  094HDV-3(G)(9), unless the authorization is terminated  or revoked sooner  The test has been validated but independent review by FDA  and CLIA is pending  Test performed using [place] Gene[employer]pert: This RT-PCR assay targets N2,  a region unique to SARS-CoV-2  A conserved region in the E-gene was chosen  for pan-Sarbecovirus detection which includes SARS-CoV-2      HS Troponin 0hr (reflex protocol) [[id number]]  (Normal) Collected: 04/22/22 0938    Lab Status: Final result Specimen: Blood from Arm, Left Updated: 04/22/22 1021     hs TnI 0hr 11 ng/L     CBC and differential [[id number]]  (Abnormal) Collected: 04/22/22 0938    Lab Status: Final result Specimen: Blood from Arm, Left Updated: 04/22/22 1019     WBC 20 77 Thousand/uL      RBC 4 56 Million/uL      Hemoglobin 14 0 g/dL      Hematocrit 42 9 %      MCV 94 fL      MCH 30 7 pg      MCHC 32 6 g/dL      RDW 16 9 %      MPV 8 7 fL      Platelets 967 Thousands/uL     Narrative: This is an appended report  These results have been appended to a previously verified report  Manual Differential(PHLEBS Do Not Order) [[id number]]  (Abnormal) Collected: 04/22/22 0938    Lab Status: Final result Specimen: Blood from Arm, Left Updated: 04/22/22 1019     Segmented % 58 %      Bands % 1 %      Lymphocytes % 23 %      Monocytes % 8 %      Eosinophils, % 0 %      Basophils % 0 %      Metamyelocytes% 4 %      Myelocytes % 4 %      Atypical Lymphocytes % 2 %      Absolute Neutrophils 12 25 Thousand/uL      Lymphocytes Absolute 4 78 Thousand/uL      Monocytes Absolute 1 66 Thousand/uL      Eosinophils Absolute 0 00 Thousand/uL      Basophils Absolute 0 00 Thousand/uL      Total Counted --     RBC Morphology Normal     Platelet Estimate Adequate    Lactic acid [[id number]]  (Normal) Collected: 04/22/22 0938    Lab Status: Final result Specimen: Blood from Arm, Left Updated: 04/22/22 1017     LACTIC ACID 0 7 mmol/L     Narrative:      Result may be elevated if tourniquet was used during collection      Comprehensive metabolic panel [[id number]]  (Abnormal) Collected: 04/22/22 0938    Lab Status: Final result Specimen: Blood from Arm, Left Updated: 04/22/22 1010     Sodium 141 mmol/L      Potassium 3 9 mmol/L      Chloride 102 mmol/L      CO2 29 mmol/L      ANION GAP 10 mmol/L      BUN 32 mg/dL      Creatinine 1 06 mg/dL      Glucose 128 mg/dL      Calcium 9 1 mg/dL      Corrected Calcium 10 0 mg/dL      AST 17 U/L      ALT 32 U/L      Alkaline Phosphatase 107 U/L      Total Protein 6 5 g/dL      Albumin 2 9 g/dL      Total Bilirubin 0 44 mg/dL      eGFR 78 ml/min/1 73sq m     Narrative:      [place] guidelines for Chronic Kidney Disease (CKD):     Stage 1 with normal or high GFR (GFR > 90 mL/min/1 73 square meters)    Stage 2 Mild CKD (GFR = 60-89 mL/min/1 73 square meters)    Stage 3A Moderate CKD (GFR = 45-59 mL/min/1 73 square meters)   Stage 3B Moderate CKD (GFR = 30-44 mL/min/1 73 square meters)    Stage 4 Severe CKD (GFR = 15-29 mL/min/1 73 square meters)    Stage 5 End Stage CKD (GFR <15 mL/min/1 73 square meters)  Note: GFR calculation is accurate only with a steady state creatinine                 CT chest abdomen pelvis w contrast   Final Result by Maira Sinha MD (04/22 7890)      CHEST:   1   Redemonstrated large right upper lobe mass extending to the right hilum/mediastinum with mediastinal adenopathy in keeping with known malignancy  Compared to prior PET/CT there are slightly increased areas of cavitation confluent with the mass in    the right upper lobe in the areas of previously seen consolidation, which may be due to necrotizing pneumonia as well as necrotic tumor  2  1 6 cm right thyroid nodule can be evaluated with nonemergent thyroid ultrasound  ABDOMEN/PELVIS:   1   No acute findings in the abdomen or pelvis  2   Splenic nodule concerning for metastasis slightly enlarged since CT 3/22/22  3   Right adrenal metastatic nodule grossly unchanged from prior PET/CT 4/13/22  The study was marked in [facility] for immediate notification  Workstation performed: [id number]         XR chest 1 view portable   Final Result by Brittany Srinivasan MD (04/22 1050)      Stable large right upper lobe mass  Resolved/resolving peritumoral opacities  Workstation performed: [id number]                    Procedures  ECG 12 Lead Documentation Only    Date/Time: 4/22/2022 4:35 PM  Performed by: Matthias Jeffery PA-C  Authorized by: Bettina De Santiago PA-C     Comments:      Normal sinus rhythm at 82  Normal axis  No acute ST-T changes  No significant change noted from previous done March 22, 2022             ED Course  ED Course as of 04/22/22 1639   Fri Apr 22, 2022   0953 WBC(!): 20 77   0954 2 L IV fluids ordered  Cefepime ordered     1019 LACTIC ACID: 0 7   1033 Patient is resting comfortably  Blood pressure has improved to 100 over 59  Discussed results I have thus far with patient's wife  1124 Unclear etiology for fever leukocytosis and bandemia at this time  Will scan chest, abdomen and pelvis  Na Gonzales  28  hsTnI: 6   1333 Bladder scan shows 500 cc of urine in the patient's bladder  He reports that he feels like he can go, however he is refusing to try until his wife returns from lunch  1421 Blood Pressure: 108/67                                             MDM  Number of Diagnoses or Management Options  Pneumonia: new and requires workup  Diagnosis management comments: Patient presents for evaluation of generalized weakness, cough and fever  Patient is currently undergoing treatment for lung cancer  Patient was hospitalized just about 1 month ago for sepsis  Differential includes but is not limited to pneumonia versus viral syndrome versus UTI versus intra-abdominal infection versus cellulitis  High suspicion is for pneumonia at this time  Labs, imaging and EKG were ordered  Labs reviewed  Notable for elevated white blood cell count 20,000  Lactic acid is within normal limits  Blood cultures were sent  Chest x-ray showed patient has known mass but no obvious signs of pneumonia  The decision was ultimately made to scan the patient's chest and abdomen  CT of chest is concerning for necrotic pneumonia at this time  Patient had previously been given 2 L of fluids in addition to cefepime  Bladder appeared very large on CT scan  Patient had a bladder scan that showed 500 mL of urine in his bladder, however he then was able to urinate 425 mL of urine  UA is not consistent with UTI at this time  All results were discussed with the patient and his wife  Admission was recommended today, and they are in agreement  Case was discussed with SUR[facility] who agreed to accept the patient under the service of Dr Alise Mahmood  Patient is stable for admission  Amount and/or Complexity of Data Reviewed  Clinical lab tests: ordered and reviewed  Tests in the radiology section of CPT®: ordered and reviewed  Decide to obtain previous medical records or to obtain history from someone other than the patient: yes  Obtain history from someone other than the patient: yes  Review and summarize past medical records: yes  Discuss the patient with other providers: yes (Dr Bret Bermeo)    Risk of Complications, Morbidity, and/or Mortality  Presenting problems: high  Diagnostic procedures: high  Management options: high    Patient Progress  Patient progress: stable      Disposition  Final diagnoses:   Pneumonia     Time reflects when diagnosis was documented in both MDM as applicable and the Disposition within this note     Time User Action Codes Description Comment    4/22/2022  2:25 PM Cody Levi Add [[id number]] Pneumonia       ED Disposition     ED Disposition Condition Date/Time Comment    Admit Stable Fri Apr 22, 2022  2:25 PM Case was discussed with SURAJ and the patient's admission status was agreed to be Admission Status: inpatient status to the service of Dr Guerra Distance   Follow-up Information    None         Current Discharge Medication List      CONTINUE these medications which have NOT CHANGED    Details   acetaminophen (TYLENOL) 650 mg CR tablet Take 1 tablet (650 mg total) by mouth every 8 (eight) hours as needed for mild pain or moderate pain  Qty: 90 tablet, Refills: 2    Associated Diagnoses: Cancer associated pain      !! BD Pen Needle Jeaneth 2nd Gen 32G X 4 MM MISC USE ONCE DAILY WITH LANTUS      benzonatate (TESSALON PERLES) 100 mg capsule Take 1 capsule (100 mg total) by mouth 3 (three) times a day as needed for cough  Qty: 30 capsule, Refills: 2    Associated Diagnoses: Lung mass; Malignant neoplasm of right lung, unspecified part of lung ([facility] );  Excessive sputum      Blood Glucose Monitoring Suppl (FreeStyle Lite) FIDELIA       FREESTYLE LITE test strip Check blood sugar  daily  Qty: 100 each, Refills: 3    Associated Diagnoses: New onset type 2 diabetes mellitus (HCC)      insulin glargine (Lantus SoloStar) 100 units/mL injection pen Inject 20 Units under the skin daily  Qty: 15 mL, Refills: 3    Associated Diagnoses: Type 2 diabetes mellitus without complication, with long-term current use of insulin ([facility] )      ! ! Insulin Pen Needle (BD Pen Needle Jeaneth U/F) 32G X 4 MM MISC Use daily as directed with insulin pen  Qty: 100 each, Refills: 3    Associated Diagnoses: Type 2 diabetes mellitus without complication, with long-term current use of insulin ([facility])      ipratropium-albuterol (Combivent Respimat) inhaler Inhale 1 puff 4 (four) times a day  Qty: 4 g, Refills: 1    Associated Diagnoses: Lung mass; Malignant neoplasm of right lung, unspecified part of lung ([facility] ); Excessive sputum      Lancets (freestyle) lancets Check blood sugar daily  Qty: 100 each, Refills: 3    Associated Diagnoses: New onset type 2 diabetes mellitus (HCC)      levETIRAcetam (KEPPRA) 500 mg tablet Take 1 tablet (500 mg total) by mouth every 12 (twelve) hours  Qty: 60 tablet, Refills: 3    Associated Diagnoses: Mass of brain      LORazepam (ATIVAN) 0 5 mg tablet Take 1 tablet (0 5 mg total) by mouth daily at bedtime as needed for anxiety For sleep  Qty: 30 tablet, Refills: 0    Associated Diagnoses: Adjustment insomnia;  Mass of brain      metFORMIN (GLUCOPHAGE-XR) 500 mg 24 hr tablet Take 2 tablets (1,000 mg total) by mouth daily with dinner  Qty: 180 tablet, Refills: 2    Associated Diagnoses: New onset type 2 diabetes mellitus (HCC)      oxyCODONE (ROXICODONE) 10 MG TABS Take 1 tablet (10 mg total) by mouth every 6 (six) hours as needed for moderate pain for up to 10 days Max Daily Amount: 40 mg  Qty: 40 tablet, Refills: 0    Associated Diagnoses: Cancer associated pain      pantoprazole (PROTONIX) 40 mg tablet Take 1 tablet (40 mg total) by mouth daily in the early morning  Qty: 90 tablet, Refills: 1 Associated Diagnoses: Mass of brain      Cholecalciferol (Vitamin D3) 125 MCG (5000 UT) CAPS Take 1 capsule (5,000 Units total) by mouth daily  Qty: 90 capsule, Refills: 2    Associated Diagnoses: New onset type 2 diabetes mellitus ([facility] )       ! ! - Potential duplicate medications found  Please discuss with provider  STOP taking these medications       dexamethasone (DECADRON) 1 mg tablet Comments:   Reason for Stopping:               No discharge procedures on file      PDMP Review       Value Time User    PDMP Reviewed  Yes 3/14/2022  4:44 AM Matt Lal MD          ED Provider  Electronically Signed by           Attila Kaplan PA-C  04/22/22 2494

## 2022-04-22 NOTE — CASE MANAGEMENT
Case Management Assessment & Discharge Planning Note    Patient name David Hernandez  Location ED 08/ED 08 MRN [id number]  : 1965 Date 2022       Current Admission Date: 2022  Current Admission Diagnosis:Weakness   Patient Active Problem List    Diagnosis Date Noted    Chronic obstructive pulmonary disease ([facility] ) 2022    Adenocarcinoma of right lung ([facility] ) 2022    Pneumonia due to Klebsiella pneumoniae ([facility] ) 2022    Cancer associated pain 2022    Adjustment insomnia 2022    Continuous opioid dependence ([facility] ) 2022    Septic shock ([facility] ) 2022    Obstructive pneumonia 2022    History of tobacco use 2022    Lung mass 2022    Brain mass 2022    Cerebral edema ([facility] ) 2022    Vitamin D deficiency 2021    Cyanocobalamin deficiency 2021    Nonimmune to hepatitis B virus 2021    Elevated PSA 2021    Gall bladder polyp 2021    Type 2 diabetes mellitus without complication, with long-term current use of insulin ([facility] ) 2021      LOS (days): 0  Geometric Mean LOS (GMLOS) (days):   Days to GMLOS:     OBJECTIVE:  PATIENT READMITTED TO HOSPITAL            Current admission status: Inpatient       Preferred Pharmacy:   [facility] [address]  [place], [address] [address] [address]  Phone: [phone] Fax: [phone]    [id number] [place], [address] [address]  Phone: [phone] Fax: [phone]    Primary Care Provider: Endy Coppola MD    Primary Insurance: [address] [place] [address],[facility] [employer]  Secondary Insurance:     ASSESSMENT:  Active Health Care Proxies     [address] Representative - Son   Primary Phone: [phone] (Mobile)                 Readmission Root Cause  30 Day Readmission: Yes  Who directed you to return to the hospital?: Self  Did you understand whom to contact if you had questions or problems?: Yes  Did you get your prescriptions before you left the hospital?: Yes  Were you able to get your prescriptions filled when you left the hospital?: Yes  Did you take your medications as prescribed?: Yes  Were you able to get to your follow-up appointments?: Yes  During previous admission, was a post-acute recommendation made?: No  Patient was readmitted due to: patient has lung cancer with brain mets  Presents with worsening SOB, fever, fatigue  Likely pneumonia again, failed outpatient abx for prior pna  Action Plan: patient is chronically ill   recommend palliative care involvement  Patient Information  Admitted from[de-identified] Home  Mental Status: Alert (assessment conducted with son over the telephone while patient still in ED)  During Assessment patient was accompanied by: Other-Comment (assessment competed via telephone with son while patient and spouse are still in the ED)  Assessment information provided by[de-identified] Son  Primary Caregiver: Self  Support Systems: Spouse/significant other,Self,Son,Family members  [place] of Residence: [address] [place] [address] do you live in?: [place] entry access options   Select all that apply : No steps to enter home,Stairs  Type of Current Residence: [address] home  A bedroom is located on the following floor levels of residence (select all that apply):: 2nd Floor  In the last 12 months, was there a time when you were not able to pay the mortgage or rent on time?: No  In the last 12 months, how many places have you lived?: 1  In the last 12 months, was there a time when you did not have a steady place to sleep or slept in a shelter (including now)?: No  Homeless/housing insecurity resource given?: N/A  Living Arrangements: Lives w/ Son,Lives w/ Spouse/significant other  Is patient a ?: No    Activities of Daily Living Prior to Admission  Completes ADLs independently?: Yes  Ambulates independently?: Yes  Does patient use assisted devices?: No  Does patient currently own DME?: No  Does patient have a history of Outpatient Therapy (PT/OT)?: No  Does the patient have a history of Short-Term Rehab?: No  Does patient have a history of HHC?: Yes  Does patient currently have Thelma Parker?: Yes    Current Home Health Care  Type of Current Home Care Services: Home PT,Nurse visit  Current Home Health Agency[de-identified] [facility] Provider[de-identified] PCP    Patient Information Continued  Income Source: SSI/SSD  Does patient have prescription coverage?: Yes  Within the past 12 months, you worried that your food would run out before you got the money to buy more : Never true  Within the past 12 months, the food you bought just didnt last and you didnt have money to get more : Never true  Food insecurity resource given?: N/A  Does patient receive dialysis treatments?: No  Does patient have a history of substance abuse?: No  Does patient have a history of Mental Health Diagnosis?: No    PHQ 2/9 Screening   Reviewed PHQ 2/9 Depression Screening Score?: No    Means of Transportation  Means of Transport to Appts[de-identified] Family transport  In the past 12 months, has lack of transportation kept you from medical appointments or from getting medications?: No  In the past 12 months, has lack of transportation kept you from meetings, work, or from getting things needed for daily living?: No  Was application for public transport provided?: N/A        DISCHARGE DETAILS:    Discharge planning discussed with[de-identified] patient's son Chintan Galarza of Choice: Yes  Comments - Freedom of Choice: CM spoke with patient's son Joe Batista at [phone]  CM name and role introduced  Son was at work  Son reports the following: patient lives with son and spouse Ngoc Marte, is independent with his ADLs with no use of DME, family transports  Patient follows with Dr Ronnie Chatterjee in Heme/Onc for lung cancer  Denies hx of STR, hx with [facility], denies mental health hx, denies D&A  Family to transport   Patient is still in the ED at this time  CM will continue to follow  CM contacted family/caregiver?: Yes  Were Treatment Team discharge recommendations reviewed with patient/caregiver?: Yes  Did patient/caregiver verbalize understanding of patient care needs?: Yes  Were patient/caregiver advised of the risks associated with not following Treatment Team discharge recommendations?: Yes    Contacts  Patient Contacts: Patient's son Anjelica Benedict  Relationship to Patient[de-identified] Family  Contact Method: Phone  Phone Number: [phone]  Reason/Outcome: Continuity of [address] [place] [address]         Is the patient interested in [place] [address] at discharge?:  (TBD)    DME Referral Provided  Referral made for DME?: No    Other Referral/Resources/Interventions Provided:  Referral Comments: patient has [facility] flag on his chart  Referral placed for [facility] to verify which discliplines patient was active   Son unsure whether patient will need [facility] upon this discharge; patient still in the ED at this time

## 2022-04-22 NOTE — ED NOTES
Wife is aware of need for urine sample from patient  Wife will assist patient       Jani Sutton RN  04/22/22 [id number]

## 2022-04-23 LAB
BASOPHILS # BLD MANUAL: 0 THOUSAND/UL (ref 0–0.1)
BASOPHILS NFR MAR MANUAL: 0 % (ref 0–1)
EOSINOPHIL # BLD MANUAL: 0.26 THOUSAND/UL (ref 0–0.4)
EOSINOPHIL NFR BLD MANUAL: 2 % (ref 0–6)
ERYTHROCYTE [DISTWIDTH] IN BLOOD BY AUTOMATED COUNT: 16.6 % (ref 11.6–15.1)
GLUCOSE SERPL-MCNC: 117 MG/DL (ref 65–140)
GLUCOSE SERPL-MCNC: 126 MG/DL (ref 65–140)
GLUCOSE SERPL-MCNC: 178 MG/DL (ref 65–140)
GLUCOSE SERPL-MCNC: 189 MG/DL (ref 65–140)
HCT VFR BLD AUTO: 38.1 % (ref 36.5–49.3)
HGB BLD-MCNC: 12.5 G/DL (ref 12–17)
L PNEUMO1 AG UR QL IA.RAPID: NEGATIVE
LYMPHOCYTES # BLD AUTO: 2.81 THOUSAND/UL (ref 0.6–4.47)
LYMPHOCYTES # BLD AUTO: 22 % (ref 14–44)
MCH RBC QN AUTO: 30.8 PG (ref 26.8–34.3)
MCHC RBC AUTO-ENTMCNC: 32.8 G/DL (ref 31.4–37.4)
MCV RBC AUTO: 94 FL (ref 82–98)
MONOCYTES # BLD AUTO: 1.91 THOUSAND/UL (ref 0–1.22)
MONOCYTES NFR BLD: 15 % (ref 4–12)
NEUTROPHILS # BLD MANUAL: 7.78 THOUSAND/UL (ref 1.85–7.62)
NEUTS BAND NFR BLD MANUAL: 2 % (ref 0–8)
NEUTS SEG NFR BLD AUTO: 59 % (ref 43–75)
PLATELET # BLD AUTO: 229 THOUSANDS/UL (ref 149–390)
PLATELET BLD QL SMEAR: ADEQUATE
PMV BLD AUTO: 8.5 FL (ref 8.9–12.7)
PROCALCITONIN SERPL-MCNC: 0.53 NG/ML
RBC # BLD AUTO: 4.06 MILLION/UL (ref 3.88–5.62)
RBC MORPH BLD: NORMAL
S PNEUM AG UR QL: NEGATIVE
WBC # BLD AUTO: 12.76 THOUSAND/UL (ref 4.31–10.16)

## 2022-04-23 PROCEDURE — 94640 AIRWAY INHALATION TREATMENT: CPT

## 2022-04-23 PROCEDURE — 82948 REAGENT STRIP/BLOOD GLUCOSE: CPT

## 2022-04-23 PROCEDURE — 85027 COMPLETE CBC AUTOMATED: CPT | Performed by: INTERNAL MEDICINE

## 2022-04-23 PROCEDURE — 99223 1ST HOSP IP/OBS HIGH 75: CPT | Performed by: INTERNAL MEDICINE

## 2022-04-23 PROCEDURE — 85007 BL SMEAR W/DIFF WBC COUNT: CPT | Performed by: INTERNAL MEDICINE

## 2022-04-23 PROCEDURE — 94760 N-INVAS EAR/PLS OXIMETRY 1: CPT

## 2022-04-23 PROCEDURE — 84145 PROCALCITONIN (PCT): CPT | Performed by: INTERNAL MEDICINE

## 2022-04-23 PROCEDURE — 99232 SBSQ HOSP IP/OBS MODERATE 35: CPT | Performed by: HOSPITALIST

## 2022-04-23 RX ADMIN — LEVETIRACETAM 500 MG: 500 TABLET, FILM COATED ORAL at 08:15

## 2022-04-23 RX ADMIN — CEFEPIME HYDROCHLORIDE 2000 MG: 2 INJECTION, POWDER, FOR SOLUTION INTRAVENOUS at 01:53

## 2022-04-23 RX ADMIN — LEVALBUTEROL HYDROCHLORIDE 1.25 MG: 1.25 SOLUTION RESPIRATORY (INHALATION) at 07:10

## 2022-04-23 RX ADMIN — INSULIN LISPRO 1 UNITS: 100 INJECTION, SOLUTION INTRAVENOUS; SUBCUTANEOUS at 12:01

## 2022-04-23 RX ADMIN — INSULIN LISPRO 1 UNITS: 100 INJECTION, SOLUTION INTRAVENOUS; SUBCUTANEOUS at 08:15

## 2022-04-23 RX ADMIN — VANCOMYCIN HYDROCHLORIDE 1000 MG: 1 INJECTION, SOLUTION INTRAVENOUS at 05:44

## 2022-04-23 RX ADMIN — LEVALBUTEROL HYDROCHLORIDE 1.25 MG: 1.25 SOLUTION RESPIRATORY (INHALATION) at 13:29

## 2022-04-23 RX ADMIN — CEFEPIME HYDROCHLORIDE 2000 MG: 2 INJECTION, POWDER, FOR SOLUTION INTRAVENOUS at 08:15

## 2022-04-23 RX ADMIN — LEVETIRACETAM 500 MG: 500 TABLET, FILM COATED ORAL at 20:25

## 2022-04-23 RX ADMIN — CEFEPIME HYDROCHLORIDE 2000 MG: 2 INJECTION, POWDER, FOR SOLUTION INTRAVENOUS at 17:33

## 2022-04-23 RX ADMIN — IPRATROPIUM BROMIDE 0.5 MG: 0.5 SOLUTION RESPIRATORY (INHALATION) at 07:10

## 2022-04-23 RX ADMIN — IPRATROPIUM BROMIDE 0.5 MG: 0.5 SOLUTION RESPIRATORY (INHALATION) at 13:29

## 2022-04-23 RX ADMIN — IPRATROPIUM BROMIDE 0.5 MG: 0.5 SOLUTION RESPIRATORY (INHALATION) at 19:43

## 2022-04-23 RX ADMIN — VANCOMYCIN HYDROCHLORIDE 1000 MG: 1 INJECTION, SOLUTION INTRAVENOUS at 18:24

## 2022-04-23 RX ADMIN — ENOXAPARIN SODIUM 40 MG: 40 INJECTION SUBCUTANEOUS at 08:15

## 2022-04-23 RX ADMIN — LEVALBUTEROL HYDROCHLORIDE 1.25 MG: 1.25 SOLUTION RESPIRATORY (INHALATION) at 19:43

## 2022-04-23 RX ADMIN — PANTOPRAZOLE SODIUM 40 MG: 40 TABLET, DELAYED RELEASE ORAL at 05:43

## 2022-04-23 NOTE — ASSESSMENT & PLAN NOTE
Patient presents with complaints of increased sputum production, fatigue and fevers  CT chest shows large right upper lobe loss with increasing cavitation, concerning for necrotizing pneumonia  Urine antigens negative  procal continues to trend up    Plan:  · Will continue on broad-spectrum antibiotics-cefepime, vancomycin; dc azithromycin   · sputum cultures pending  · Monitor fever curve  · Pulmonary team consulted; recs pending

## 2022-04-23 NOTE — PLAN OF CARE
Problem: MOBILITY - ADULT  Goal: Maintain or return to baseline ADL function  Description: INTERVENTIONS:  -  Assess patient's ability to carry out ADLs; assess patient's baseline for ADL function and identify physical deficits which impact ability to perform ADLs (bathing, care of mouth/teeth, toileting, grooming, dressing, etc )  - Assess/evaluate cause of self-care deficits   - Assess range of motion  - Assess patient's mobility; develop plan if impaired  - Assess patient's need for assistive devices and provide as appropriate  - Encourage maximum independence but intervene and supervise when necessary  - Involve family in performance of ADLs  - Assess for home care needs following discharge   - Consider OT consult to assist with ADL evaluation and planning for discharge  - Provide patient education as appropriate  Outcome: Progressing  Goal: Maintains/Returns to pre admission functional level  Description: INTERVENTIONS:  - Perform BMAT or MOVE assessment daily    - Set and communicate daily mobility goal to care team and patient/family/caregiver  - Collaborate with rehabilitation services on mobility goals if consulted  - Perform Range of Motion  times a day  - Reposition patient every  hours    - Dangle patient  times a day  - Stand patient  times a day  - Ambulate patient  times a day  - Out of bed to chair  times a day   - Out of bed for meals  times a day  - Out of bed for toileting  - Record patient progress and toleration of activity level   Outcome: Progressing     Problem: Potential for Falls  Goal: Patient will remain free of falls  Description: INTERVENTIONS:  - Educate patient/family on patient safety including physical limitations  - Instruct patient to call for assistance with activity   - Consult OT/PT to assist with strengthening/mobility   - Keep Call bell within reach  - Keep bed low and locked with side rails adjusted as appropriate  - Keep care items and personal belongings within reach  - Initiate and maintain comfort rounds  - Make Fall Risk Sign visible to staff  - Offer Toileting every  Hours, in advance of need  - Initiate/Maintain alarm  - Obtain necessary fall risk management equipment:   - Apply yellow socks and bracelet for high fall risk patients  - Consider moving patient to room near nurses station  Outcome: Progressing     Problem: RESPIRATORY - ADULT  Goal: Achieves optimal ventilation and oxygenation  Description: INTERVENTIONS:  - Assess for changes in respiratory status  - Assess for changes in mentation and behavior  - Position to facilitate oxygenation and minimize respiratory effort  - Oxygen administered by appropriate delivery if ordered  - Initiate smoking cessation education as indicated  - Encourage broncho-pulmonary hygiene including cough, deep breathe, Incentive Spirometry  - Assess the need for suctioning and aspirate as needed  - Assess and instruct to report SOB or any respiratory difficulty  - Respiratory Therapy support as indicated  Outcome: Progressing     Problem: METABOLIC, FLUID AND ELECTROLYTES - ADULT  Goal: Electrolytes maintained within normal limits  Description: INTERVENTIONS:  - Monitor labs and assess patient for signs and symptoms of electrolyte imbalances  - Administer electrolyte replacement as ordered  - Monitor response to electrolyte replacements, including repeat lab results as appropriate  - Instruct patient on fluid and nutrition as appropriate  Outcome: Progressing  Goal: Fluid balance maintained  Description: INTERVENTIONS:  - Monitor labs   - Monitor I/O and WT  - Instruct patient on fluid and nutrition as appropriate  - Assess for signs & symptoms of volume excess or deficit  Outcome: Progressing  Goal: Glucose maintained within target range  Description: INTERVENTIONS:  - Monitor Blood Glucose as ordered  - Assess for signs and symptoms of hyperglycemia and hypoglycemia  - Administer ordered medications to maintain glucose within target range  - Assess nutritional intake and initiate nutrition service referral as needed  Outcome: Progressing     Problem: SKIN/TISSUE INTEGRITY - ADULT  Goal: Skin Integrity remains intact(Skin Breakdown Prevention)  Description: Assess:  -Perform [place] assessment every   -Clean and moisturize skin every   -Inspect skin when repositioning, toileting, and assisting with ADLS  -Assess under medical devices such as  every   -Assess extremities for adequate circulation and sensation     Bed Management:  -Have minimal linens on bed & keep smooth, unwrinkled  -Change linens as needed when moist or perspiring  -Avoid sitting or lying in one position for more than  hours while in bed  -Keep HOB at degrees     Toileting:  -Offer bedside commode  -Assess for incontinence every   -Use incontinent care products after each incontinent episode such as     Activity:  -Mobilize patient  times a day  -Encourage activity and walks on unit  -Encourage or provide ROM exercises   -Turn and reposition patient every  Hours  -Use appropriate equipment to lift or move patient in bed  -Instruct/ Assist with weight shifting every  when out of bed in chair  -Consider limitation of chair time  hour intervals    Skin Care:  -Avoid use of baby powder, tape, friction and shearing, hot water or constrictive clothing  -Relieve pressure over bony prominences using   -Do not massage red bony areas    Next Steps:  -Teach patient strategies to minimize risks such as    -Consider consults to  interdisciplinary teams such as   Outcome: Progressing  Goal: Incision(s), wounds(s) or drain site(s) healing without S/S of infection  Description: INTERVENTIONS  - Assess and document dressing, incision, wound bed, drain sites and surrounding tissue  - Provide patient and family education  - Perform skin care/dressing changes every   Outcome: Progressing  Goal: Pressure injury heals and does not worsen  Description: Interventions:  - Implement low air loss mattress or specialty surface (Criteria met)  - Apply silicone foam dressing  - Instruct/assist with weight shifting every  minutes when in chair   - Limit chair time to  hour intervals  - Use special pressure reducing interventions such as  when in chair   - Apply fecal or urinary incontinence containment device   - Perform passive or active ROM every   - Turn and reposition patient & offload bony prominences every  hours   - Utilize friction reducing device or surface for transfers   - Consider consults to  interdisciplinary teams such as   - Use incontinent care products after each incontinent episode such a  - Consider nutrition services referral as needed  Outcome: Progressing

## 2022-04-23 NOTE — UTILIZATION REVIEW
Initial Clinical Review    Admission: Date/Time/Statement:   Admission Orders (From admission, onward)     Ordered        04/22/22 1426  INPATIENT ADMISSION  Once                      Orders Placed This Encounter   Procedures    INPATIENT ADMISSION     Standing Status:   Standing     Number of Occurrences:   1     Order Specific Question:   Level of Care     Answer:   Med Surg [16]     Order Specific Question:   Estimated length of stay     Answer:   More than 2 Midnights     Order Specific Question:   Certification     Answer:   I certify that inpatient services are medically necessary for this patient for a duration of greater than two midnights  See H&P and MD Progress Notes for additional information about the patient's course of treatment  ED Arrival Information     Expected Arrival Acuity    - 4/22/2022 09:13 Emergent         Means of arrival Escorted by Service Admission type    Wheelchair Family Member Hospitalist Emergency         Arrival complaint    SOB, AMS        Chief Complaint   Patient presents with    Weakness - Generalized     Generalized weakness and fatigue ongoing with cough x 2 weeks  Fever yesterday  Initial Presentation: 64 y o  male, PMH of lung cancer with brain mets, type 2 diabetes mellitus,  presents with weakness and fatigue for the last 2 weeks, productive cough and fever  Recently completed oral antibiotics without improvement Pt was seen in pulmonology office today and advised to go to ER  CT scan shows possible necrotizing pneumonia  WBC elevated 20 77, procalcitonin elevated 0 38,  BUN 32, creatinine 1 06IV fluids and IV antibiotics initiated in ED   Exam notes patient is ill appearing, breath sounds with rhonchi Admitted as inpatient to med surg for continued evaluation and treatment of obstructive pneumonia, sepsis, in setting of COPD, adenocarcinoma of lung with brain metastasis, T2DM Plan continue broad spectrum antibiotics, follow fever curve, procalcitonin, culture results, consult pulmonology if no improvement, monitor accu check glucose, hold oral hypoglycemics, lantus given poor appetite and low blood glucose  Continue Keppra, home bronchodilators     Date: 4/23  Day 2:    Exam  Normal pulmonary effort, no respiratory distress, normal breath sounds  procalcitonin continues to trend up  Pulmonology consulted   Gross per 24 hour   Intake --   Output 2100 ml   Net -2100 ml        Pulmonology Consult 4/23 patient completed a course of Augmentin with persistent symptoms raises possibility of resistant organism  Findings may represent postobstructive pneumonia must also consider progression of underlying malignancy  Continue antibiotics for now pending culture data and trending of procalcitonin  From a cancer perspective, patient undergoing radiation therapy for brain metastases with plans to initiate systemic treatment upon genetic testing of the cancer  Patient also completed a steroid taper per radiation oncology  Last visit with medical oncology was on 4/5/22   Pt denies hx of asthma and COPD      ED Triage Vitals [04/22/22 0920]   Temperature Pulse Respirations Blood Pressure SpO2   99 4 °F (37 4 °C) 85 18 90/56 96 %      Temp Source Heart Rate Source Patient Position - Orthostatic VS BP Location FiO2 (%)   Oral Monitor Sitting Left arm --      Pain Score       No Pain          Wt Readings from Last 1 Encounters:   04/22/22 64 1 kg (141 lb 6 4 oz)     Additional Vital Signs:     Date/Time Temp Pulse Resp BP MAP (mmHg) SpO2 O2 Device Patient Position - Orthostatic VS   04/24/22 07:02:41 98 6 °F (37 °C) 70 16 102/60 74 94 % -- --   04/23/22 21:00:37 98 4 °F (36 9 °C) -- -- 100/65 77 -- -- --   04/23/22 15:38:38 98 7 °F (37 1 °C) -- -- 104/66 79 95 % -- --   04/23/22 07:24:21 97 4 °F (36 3 °C) Abnormal  68 18 91/56 68 99 % -- --   04/23/22 0500 98 °F (36 7 °C) -- -- -- -- -- -- --   04/22/22 21:38:37 99 7 °F (37 6 °C) 80 18 105/59 74 94 % None (Room air) Lying 04/22/22 20:06:23 -- -- -- 98/58 71 -- -- --   04/22/22 1608 99 °F (37 2 °C) 81 16 92/50 66 94 % -- Sitting   04/22/22 1521 -- 82 16 108/67 -- 95 % None (Room air) Lying   04/22/22 1400 -- 84 16 108/66 82 95 % None (Room air) Lying           Pertinent Labs/Diagnostic Test Results:   CT chest abdomen pelvis w contrast   Final Result  (04/22 1348)      CHEST:   1   Redemonstrated large right upper lobe mass extending to the right hilum/mediastinum with mediastinal adenopathy in keeping with known malignancy  Compared to prior PET/CT there are slightly increased areas of cavitation confluent with the mass in    the right upper lobe in the areas of previously seen consolidation, which may be due to necrotizing pneumonia as well as necrotic tumor  2  1 6 cm right thyroid nodule can be evaluated with nonemergent thyroid ultrasound  ABDOMEN/PELVIS:   1   No acute findings in the abdomen or pelvis  2   Splenic nodule concerning for metastasis slightly enlarged since CT 3/22/22  3   Right adrenal metastatic nodule grossly unchanged from prior PET/CT 4/13/22  XR chest 1 view portable   Final Result by Kaylyn Hayward MD (04/22 1057)      Stable large right upper lobe mass  Resolved/resolving peritumoral opacities                               Results from last 7 days   Lab Units 04/22/22  0938   SARS-COV-2  Negative     Results from last 7 days   Lab Units 04/23/22  0551 04/22/22  0938   WBC Thousand/uL 12 76* 20 77*   HEMOGLOBIN g/dL 12 5 14 0   HEMATOCRIT % 38 1 42 9   PLATELETS Thousands/uL 229 254   BANDS PCT % 2 1         Results from last 7 days   Lab Units 04/22/22  0938   SODIUM mmol/L 141   POTASSIUM mmol/L 3 9   CHLORIDE mmol/L 102   CO2 mmol/L 29   ANION GAP mmol/L 10   BUN mg/dL 32*   CREATININE mg/dL 1 06   EGFR ml/min/1 73sq m 78   CALCIUM mg/dL 9 1     Results from last 7 days   Lab Units 04/22/22  0938   AST U/L 17   ALT U/L 32   ALK PHOS U/L 107   TOTAL PROTEIN g/dL 6 5 ALBUMIN g/dL 2 9*   TOTAL BILIRUBIN mg/dL 0 44     Results from last 7 days   Lab Units 04/23/22  1201 04/23/22  0723 04/22/22  2101 04/22/22  1708   POC GLUCOSE mg/dl 189* 178* 193* 61*     Results from last 7 days   Lab Units 04/22/22  0938   GLUCOSE RANDOM mg/dL 128             BETA-HYDROXYBUTYRATE   Date Value Ref Range Status   01/06/2021 0 6 (H) <0 6 mmol/L Final                      Results from last 7 days   Lab Units 04/22/22  1332 04/22/22  1128 04/22/22  0938   HS TNI 0HR ng/L  --   --  11   HS TNI 2HR ng/L  --  17  --    HSTNI D2 ng/L  --  6  --    HS TNI 4HR ng/L 18  --   --    HSTNI D4 ng/L 7  --   --          Results from last 7 days   Lab Units 04/22/22  1010   PROTIME seconds 12 5   INR  0 93         Results from last 7 days   Lab Units 04/23/22  0551 04/22/22  0938   PROCALCITONIN ng/ml 0 53* 0 38*     Results from last 7 days   Lab Units 04/22/22  0938   LACTIC ACID mmol/L 0 7         Results from last 7 days   Lab Units 04/22/22  1109   CLARITY UA  Clear   COLOR UA  Yellow   SPEC GRAV UA  1 015   PH UA  5 0   GLUCOSE UA mg/dl Negative   KETONES UA mg/dl Negative   BLOOD UA  Negative   PROTEIN UA mg/dl Negative   NITRITE UA  Negative   BILIRUBIN UA  Negative   UROBILINOGEN UA E U /dl 0 2   LEUKOCYTES UA  Negative     Results from last 7 days   Lab Units 04/22/22  2104 04/22/22 2103 04/22/22  0938   STREP PNEUMONIAE ANTIGEN, URINE  Negative  --   --    LEGIONELLA URINARY ANTIGEN   --  Negative  --    INFLUENZA A PCR   --   --  Negative   INFLUENZA B PCR   --   --  Negative   RSV PCR   --   --  Negative                             Results from last 7 days   Lab Units 04/22/22  0938   BLOOD CULTURE  No Growth at 24 hrs  No Growth at 24 hrs                 ED Treatment:   Medication Administration from 04/22/2022 0913 to 04/22/2022 1540       Date/Time Order Dose Route Action     04/22/2022 0943 sodium chloride 0 9 % bolus 1,000 mL 1,000 mL Intravenous New Bag     04/22/2022 1123 cefepime (MAXIPIME) 2 g/50 mL dextrose IVPB 2,000 mg Intravenous New Bag     04/22/2022 1040 sodium chloride 0 9 % bolus 1,000 mL 1,000 mL Intravenous New Bag     04/22/2022 1251 iohexol (OMNIPAQUE) 350 MG/ML injection (SINGLE-DOSE) 100 mL 100 mL Intravenous Given        Past Medical History:   Diagnosis Date    Diabetes mellitus ([facility] )     Elevated PSA 3/11/2021    Fatty liver 3/11/2021    Gall bladder polyp 3/11/2021    Lung cancer ([place] [address] )     Nonimmune to hepatitis B virus 3/11/2021     Present on Admission:   Adenocarcinoma of right lung ([facility] )   Chronic obstructive pulmonary disease ([place] [address] )   Obstructive pneumonia   Brain mass      Admitting Diagnosis: Pneumonia [J18 9]  Weakness [R53 1]  Age/Sex: 64 y o  male  Admission Orders:  Scheduled Medications:  cefepime, 2,000 mg, Intravenous, Q8H  enoxaparin, 40 mg, Subcutaneous, Daily  insulin lispro, 1-5 Units, Subcutaneous, TID AC  ipratropium, 0 5 mg, Nebulization, TID  levalbuterol, 1 25 mg, Nebulization, TID  levETIRAcetam, 500 mg, Oral, Q12H LISY  pantoprazole, 40 mg, Oral, Early Morning  vancomycin, 15 mg/kg, Intravenous, Q12H      Continuous IV Infusions:  multi-electrolyte, 75 mL/hr, Intravenous, Continuous      PRN Meds:  acetaminophen, 650 mg, Oral, Q6H PRN  dextromethorphan-guaiFENesin, 10 mL, Oral, Q4H PRN  LORazepam, 0 5 mg, Oral, HS PRN  ondansetron, 4 mg, Intravenous, Q6H PRN  oxyCODONE, 10 mg, Oral, Q6H PRN    POC glucose qac and HS    Aspiration precautions   OOB as jocelyn    Incentive spirometry    MRSA culture  vanco trough 4/24   SCD    ELEVATE HOB     IP CONSULT TO PHARMACY  IP CONSULT TO PULMONOLOGY    Network Utilization Review Department  ATTENTION: Please call with any questions or concerns to [phone] and carefully listen to the prompts so that you are directed to the right person   All voicemails are confidential   Melba Mayo all requests for admission clinical reviews, approved or denied determinations and any other requests to dedicated fax number below belonging to the campus where the patient is receiving treatment   List of dedicated fax numbers for the Facilities:  [address] [place] [address] DENIALS (Administrative/Medical Necessity) [phone]   [address]  [facility] (Maternity/NICU/Pediatrics) [phone]   [address]  [address] [facility] [place] [phone] [address] [facility] [place] [address] [phone]

## 2022-04-23 NOTE — PROGRESS NOTES
[facility]  Progress Note Espinal Better 1965, 64 y o  male MRN: [id number]  Unit/Bed#: S -01 Encounter: [id number]  Primary Care Provider: Dave Helm MD   Date and time admitted to hospital: 4/22/2022  9:21 AM    * Obstructive pneumonia  Assessment & Plan  Patient presents with complaints of increased sputum production, fatigue and fevers  CT chest shows large right upper lobe loss with increasing cavitation, concerning for necrotizing pneumonia  Urine antigens negative  procal continues to trend up    Plan:  · Will continue on broad-spectrum antibiotics-cefepime, vancomycin; dc azithromycin   · sputum cultures pending  · Monitor fever curve  · Pulmonary team consulted; recs pending       Sepsis ([address] )  Assessment & Plan  POA, as evidence by fever, tachycardia, tachypnea and leukocytosis  Received IV fluids and antibiotics in ED  Pneumonia is likely source  Will continue IV fluids and antibiotics    Chronic obstructive pulmonary disease (HCC)  Assessment & Plan  Not in acute exacerbation  Continue home bronchodilators    Adenocarcinoma of right lung [facility])  Assessment & Plan  Patient currently undergoing radiation  Outpatient follow-up with Oncology    Brain mass  Assessment & Plan  Currently undergoing radiation  Outpatient follow-up with Oncology  Continue Keppra    Type 2 diabetes mellitus without complication, with long-term current use of insulin [facility])  Assessment & Plan  Lab Results   Component Value Date    HGBA1C 5 7 (H) 11/07/2021     Recent Labs     04/22/22  1708 04/22/22  2101 04/23/22  0723 04/23/22  1201   POCGLU 61* 193* 178* 189*     Blood Sugar Average: Last 72 hrs:  (P) 155 25   Will hold oral hypoglycemics and Lantus given patient has had poor appetite and low blood sugars; consider restarting half of home lantus tonight  Continue with SSI and adjust as per Accu-Cheks      VTE Pharmacologic Prophylaxis: VTE Score: 3 Moderate Risk (Score 3-4) - Pharmacological DVT Prophylaxis Ordered: enoxaparin (Lovenox)  Patient Centered Rounds: I performed bedside rounds with nursing staff today  Discussions with Specialists or Other Care Team Provider: Pulmonology    Education and Discussions with Family / Patient: Updated  (brother) via phone  Current Length of Stay: 1 day(s)  Current Patient Status: Inpatient   Discharge Plan: Anticipate discharge in 48-72 hrs to home with home services  Code Status: Level 1 - Full Code    Subjective: This morning, Mr Jill Merino is seen lying in bed, awake, alert, engaged with exam, in no acute distress  Pt speaks some English but primarily speaks Ethiopian--translation is provided by pt's brother  Afebrile today, no acute complaints, no new or worsening symptoms  Objective:     Vitals:   Temp (24hrs), Av 1 °F (36 7 °C), Min:97 4 °F (36 3 °C), Max:98 7 °F (37 1 °C)    Temp:  [97 4 °F (36 3 °C)-98 7 °F (37 1 °C)] 98 4 °F (36 9 °C)  HR:  [68] 68  Resp:  [18] 18  BP: ()/(56-66) 100/65  SpO2:  [95 %-99 %] 95 %  There is no height or weight on file to calculate BMI  Input and Output Summary (last 24 hours): Intake/Output Summary (Last 24 hours) at 2022 2359  Last data filed at 2022 1401  Gross per 24 hour   Intake --   Output 2100 ml   Net -2100 ml       Physical Exam:   Physical Exam  Vitals and nursing note reviewed  Constitutional:       General: He is not in acute distress  Appearance: Normal appearance  He is normal weight  He is not ill-appearing, toxic-appearing or diaphoretic  HENT:      Head: Normocephalic and atraumatic  Eyes:      General: No scleral icterus  Right eye: No discharge  Left eye: No discharge  Neck:      Vascular: No JVD  Cardiovascular:      Rate and Rhythm: Normal rate and regular rhythm  Pulses: Normal pulses  Heart sounds: Normal heart sounds  No murmur heard  No friction rub  No gallop      Pulmonary:      Effort: Pulmonary effort is normal  No respiratory distress  Breath sounds: Normal breath sounds  No stridor  No wheezing, rhonchi or rales  Chest:      Chest wall: No tenderness  Abdominal:      General: Bowel sounds are normal  There is no distension  Palpations: Abdomen is soft  Tenderness: There is no abdominal tenderness  There is no guarding or rebound  Musculoskeletal:         General: No swelling or tenderness  Right lower leg: No edema  Left lower leg: No edema  Skin:     General: Skin is warm and dry  Coloration: Skin is not jaundiced or pale  Neurological:      Mental Status: He is alert     Psychiatric:         Mood and Affect: Mood normal          Behavior: Behavior normal         Additional Data:     Labs:  Results from last 7 days   Lab Units 04/23/22  0551   WBC Thousand/uL 12 76*   HEMOGLOBIN g/dL 12 5   HEMATOCRIT % 38 1   PLATELETS Thousands/uL 229   BANDS PCT % 2   LYMPHO PCT % 22   MONO PCT % 15*   EOS PCT % 2     Results from last 7 days   Lab Units 04/22/22  0938   SODIUM mmol/L 141   POTASSIUM mmol/L 3 9   CHLORIDE mmol/L 102   CO2 mmol/L 29   BUN mg/dL 32*   CREATININE mg/dL 1 06   ANION GAP mmol/L 10   CALCIUM mg/dL 9 1   ALBUMIN g/dL 2 9*   TOTAL BILIRUBIN mg/dL 0 44   ALK PHOS U/L 107   ALT U/L 32   AST U/L 17   GLUCOSE RANDOM mg/dL 128     Results from last 7 days   Lab Units 04/22/22  1010   INR  0 93     Results from last 7 days   Lab Units 04/23/22  2100 04/23/22  1538 04/23/22  1201 04/23/22  0723 04/22/22  2101 04/22/22  1708   POC GLUCOSE mg/dl 126 117 189* 178* 193* 61*         Results from last 7 days   Lab Units 04/23/22  0551 04/22/22  0938   LACTIC ACID mmol/L  --  0 7   PROCALCITONIN ng/ml 0 53* 0 38*       Lines/Drains:  Invasive Devices  Report    Peripheral Intravenous Line            Peripheral IV 04/22/22 Left Antecubital 1 day    Peripheral IV 04/22/22 Right;Ventral (anterior) Arm 1 day                      Imaging: Reviewed radiology reports from this admission including: chest xray and chest CT scan and Personally reviewed the following imaging: chest CT scan    Recent Cultures (last 7 days):   Results from last 7 days   Lab Units 04/22/22 2103 04/22/22  0938   BLOOD CULTURE   --  No Growth at 24 hrs  No Growth at 24 hrs  LEGIONELLA URINARY ANTIGEN  Negative  --        Last 24 Hours Medication List:   Current Facility-Administered Medications   Medication Dose Route Frequency Provider Last Rate    acetaminophen  650 mg Oral Q6H PRN Army Dominique MD      cefepime  2,000 mg Intravenous Doron Durant MD 2,000 mg (04/23/22 1733)    dextromethorphan-guaiFENesin  10 mL Oral Q4H PRN Army Dominique MD      enoxaparin  40 mg Subcutaneous Daily Army Dominique MD      insulin lispro  1-5 Units Subcutaneous TID [facility] Army Dominique MD      ipratropium  0 5 mg Nebulization TID Army Dominique MD      levalbuterol  1 25 mg Nebulization TID Army Dominique MD      levETIRAcetam  500 mg Oral Q12H [address] Army Dominique MD      LORazepam  0 5 mg Oral HS PRN Army Dominique MD      multi-electrolyte  75 mL/hr Intravenous Continuous Army Dominique MD 75 mL/hr (04/22/22 1756)    ondansetron  4 mg Intravenous Q6H PRN Army Dominique MD      oxyCODONE  10 mg Oral Q6H PRN Army Dominique MD      pantoprazole  40 mg Oral Early Morning Army Dominique MD      vancomycin  15 mg/kg Intravenous Q12H Army Dominique MD 1,000 mg (04/23/22 1824)        Today, Patient Was Seen By: Doylene Aase, MD    **Please Note: This note may have been constructed using a voice recognition system  **

## 2022-04-23 NOTE — CONSULTS
Pulmonary Consultation   [facility] 64 y o  male MRN: [id number]  Unit/Bed#: S -47 Encounter: [id number]      Reason for consultation:  Pneumonia, lung cancer    Requesting physician: Jonathan Nash MD    Impressions/Recommendations:     · Abnormal chest CT findings in patient with known metastatic non-small cell lung cancer -patient recently completed a course of Augmentin with persistent symptoms  This raises the possibility of resistant organism  While the findings could certainly represent postobstructive pneumonia, must also consider progression of the underlying malignancy  It is reasonable to continue antibiotics for now, pending culture data and trending of procalcitonin  History of Present Illness   HPI:  [facility] is a 64 y o  male who has history of metastatic lung cancer who was recently treated with Augmentin secondary to concern for postobstructive pneumonia  Patient continued to have worsening cough, shortness of breath and fever, prompting ED evaluation  Patient reports that cough is not productive  No hemoptysis  No associated chills or sweats  Denies history of asthma or COPD  He does not use inhalers as an outpatient  No PFTs on file  From a cancer perspective, patient undergoing radiation therapy for brain metastases with plans to initiate systemic treatment upon genetic testing of the cancer  Patient also completed a steroid taper per radiation oncology  Last visit with medical oncology was on 4/5/22    Family is at bedside, helping to translate as the patient is [place] speaking    Review of systems:   Review of Systems   Constitutional: Positive for fever and unexpected weight change  Negative for chills  HENT: Negative for postnasal drip and sore throat  Eyes: Negative for visual disturbance  Respiratory:        As noted in HPI   Cardiovascular: Negative for chest pain  Gastrointestinal: Negative for abdominal pain, diarrhea and vomiting  Musculoskeletal: Negative for arthralgias  Skin: Negative for rash  Neurological: Negative for headaches  Hematological: Negative for adenopathy  Psychiatric/Behavioral: Negative  All other systems reviewed and are negative  All other 12-point review of systems are negative  Historical Information   Past Medical History:   Diagnosis Date    Diabetes mellitus ([facility] [place] [address] )     Elevated PSA 3/11/2021    Fatty liver 3/11/2021    Gall bladder polyp 3/11/2021    [facility] cancer ([facility]     Nonimmune to hepatitis B virus 3/11/2021     History reviewed  No pertinent surgical history    Family History   Problem Relation Age of Onset    No Known Problems Mother     No Known Problems Father        Tobacco history:  Patient smokes half pack per day for many years, quit 3 months ago    Family history:  Negative from pulmonary perspective    Meds/Allergies   Current Facility-Administered Medications   Medication Dose Route Frequency    acetaminophen (TYLENOL) tablet 650 mg  650 mg Oral Q6H PRN    cefepime (MAXIPIME) 2,000 mg in dextrose 5 % 50 mL IVPB  2,000 mg Intravenous Q8H    dextromethorphan-guaiFENesin (ROBITUSSIN DM) oral syrup 10 mL  10 mL Oral Q4H PRN    enoxaparin (LOVENOX) subcutaneous injection 40 mg  40 mg Subcutaneous Daily    insulin lispro (HumaLOG) 100 units/mL subcutaneous injection 1-5 Units  1-5 Units Subcutaneous TID AC    ipratropium (ATROVENT) 0 02 % inhalation solution 0 5 mg  0 5 mg Nebulization TID    levalbuterol (XOPENEX) inhalation solution 1 25 mg  1 25 mg Nebulization TID    levETIRAcetam (KEPPRA) tablet 500 mg  500 mg Oral Q12H LISY    LORazepam (ATIVAN) tablet 0 5 mg  0 5 mg Oral HS PRN    multi-electrolyte (PLASMALYTE-A/ISOLYTE-S PH 7 4) IV solution  75 mL/hr Intravenous Continuous    ondansetron (ZOFRAN) injection 4 mg  4 mg Intravenous Q6H PRN    oxyCODONE (ROXICODONE) immediate release tablet 10 mg  10 mg Oral Q6H PRN    pantoprazole (PROTONIX) EC tablet 40 mg  40 mg Oral Early Morning    vancomycin (VANCOCIN) IVPB (premix in dextrose) 1,000 mg 200 mL  15 mg/kg Intravenous Q12H     No Known Allergies    Vitals: Blood pressure 91/56, pulse 68, temperature (!) 97 4 °F (36 3 °C), resp  rate 18, SpO2 99 % , room air, There is no height or weight on file to calculate BMI  Intake/Output Summary (Last 24 hours) at 4/23/2022 1257  Last data filed at 4/23/2022 1203  Gross per 24 hour   Intake --   Output 3500 ml   Net -3500 ml         Physical Exam  Constitutional:       General: He is not in acute distress  HENT:      Head: Normocephalic  Mouth/Throat:      Pharynx: No oropharyngeal exudate  Eyes:      General: No scleral icterus  Pupils: Pupils are equal, round, and reactive to light  Neck:      Vascular: No JVD  Cardiovascular:      Rate and Rhythm: Normal rate and regular rhythm  Pulmonary:      Breath sounds: No wheezing, rhonchi or rales  Abdominal:      Palpations: Abdomen is soft  Tenderness: There is no abdominal tenderness  Musculoskeletal:         General: No swelling  Cervical back: Neck supple  Lymphadenopathy:      Cervical: No cervical adenopathy  Skin:     General: Skin is warm and dry  Neurological:      Mental Status: He is alert and oriented to person, place, and time  Psychiatric:         Mood and Affect: Mood normal          Labs: I have personally reviewed pertinent lab results      Results from last 7 days   Lab Units 04/23/22  0551 04/22/22  0938   WBC Thousand/uL 12 76* 20 77*   HEMOGLOBIN g/dL 12 5 14 0   HEMATOCRIT % 38 1 42 9   PLATELETS Thousands/uL 229 254         Results from last 7 days   Lab Units 04/22/22  0938   POTASSIUM mmol/L 3 9   CHLORIDE mmol/L 102   CO2 mmol/L 29   BUN mg/dL 32*   CREATININE mg/dL 1 06   CALCIUM mg/dL 9 1   ALK PHOS U/L 107   ALT U/L 32   AST U/L 17     Results from last 7 days   Lab Units 04/22/22  1010   INR  0 93     Procalcitonin 0 53    Imaging and other studies: I have personally reviewed pertinent reports  and I have personally reviewed pertinent films in PACS PET scan performed on 4/15/22 shows large hypermetabolic mass in the right upper lobe with an SUV of 19 8  There are metastases in the right adrenal gland and spleen  Patient also has known brain metastases  CT of the chest performed yesterday shows large right upper lobe mass extending to the right hilum and mediastinum with mediastinal adenopathy  Compared with recent PET scan there are areas of cavitation confluent with the mass    Code Status: Level 1 - Full Code    Thank you for allowing us to participate in the care of your patient      Ladell Blind, DO

## 2022-04-23 NOTE — ASSESSMENT & PLAN NOTE
Lab Results   Component Value Date    HGBA1C 5 7 (H) 11/07/2021     Recent Labs     04/22/22  1708 04/22/22  2101 04/23/22  0723 04/23/22  1201   POCGLU 61* 193* 178* 189*     Blood Sugar Average: Last 72 hrs:  (P) 155 25   Will hold oral hypoglycemics and Lantus given patient has had poor appetite and low blood sugars; consider restarting half of home lantus tonight  Continue with SSI and adjust as per Accu-Cheks

## 2022-04-23 NOTE — PROGRESS NOTES
Vancomycin IV Pharmacy-to-Dose Consultation    Johana Barnes is a 64 y o  male who is currently receiving Vancomycin IV with management by the Pharmacy Consult service  Assessment/Plan:  The patient was reviewed  Renal function is stable and no signs or symptoms of nephrotoxicity and/or infusion reactions were documented in the chart  Based on todays assessment, continue current vancomycin (day # 2) dosing of 1000 mg IV q12h , with a plan for trough to be drawn at 0530 on 04/24  We will continue to follow the patients culture results and clinical progress daily      Megha Emmanuel, Pharmacist

## 2022-04-24 LAB
GLUCOSE SERPL-MCNC: 141 MG/DL (ref 65–140)
GLUCOSE SERPL-MCNC: 199 MG/DL (ref 65–140)
MRSA NOSE QL CULT: NORMAL
PROCALCITONIN SERPL-MCNC: 0.37 NG/ML
VANCOMYCIN TROUGH SERPL-MCNC: 9.4 UG/ML (ref 10–20)

## 2022-04-24 PROCEDURE — 99239 HOSP IP/OBS DSCHRG MGMT >30: CPT | Performed by: HOSPITALIST

## 2022-04-24 PROCEDURE — 84145 PROCALCITONIN (PCT): CPT

## 2022-04-24 PROCEDURE — 99232 SBSQ HOSP IP/OBS MODERATE 35: CPT | Performed by: NURSE PRACTITIONER

## 2022-04-24 PROCEDURE — 94760 N-INVAS EAR/PLS OXIMETRY 1: CPT

## 2022-04-24 PROCEDURE — 80202 ASSAY OF VANCOMYCIN: CPT | Performed by: INTERNAL MEDICINE

## 2022-04-24 PROCEDURE — 82948 REAGENT STRIP/BLOOD GLUCOSE: CPT

## 2022-04-24 PROCEDURE — 94640 AIRWAY INHALATION TREATMENT: CPT

## 2022-04-24 RX ADMIN — CEFEPIME HYDROCHLORIDE 2000 MG: 2 INJECTION, POWDER, FOR SOLUTION INTRAVENOUS at 09:01

## 2022-04-24 RX ADMIN — ENOXAPARIN SODIUM 40 MG: 40 INJECTION SUBCUTANEOUS at 09:02

## 2022-04-24 RX ADMIN — VANCOMYCIN HYDROCHLORIDE 1000 MG: 1 INJECTION, SOLUTION INTRAVENOUS at 06:44

## 2022-04-24 RX ADMIN — PANTOPRAZOLE SODIUM 40 MG: 40 TABLET, DELAYED RELEASE ORAL at 06:45

## 2022-04-24 RX ADMIN — LEVALBUTEROL HYDROCHLORIDE 1.25 MG: 1.25 SOLUTION RESPIRATORY (INHALATION) at 07:17

## 2022-04-24 RX ADMIN — CEFEPIME HYDROCHLORIDE 2000 MG: 2 INJECTION, POWDER, FOR SOLUTION INTRAVENOUS at 01:03

## 2022-04-24 RX ADMIN — LEVETIRACETAM 500 MG: 500 TABLET, FILM COATED ORAL at 09:02

## 2022-04-24 RX ADMIN — IPRATROPIUM BROMIDE 0.5 MG: 0.5 SOLUTION RESPIRATORY (INHALATION) at 07:17

## 2022-04-24 NOTE — ASSESSMENT & PLAN NOTE
Lab Results   Component Value Date    HGBA1C 5 7 (H) 11/07/2021     Recent Labs     04/23/22  1538 04/23/22  2100 04/24/22  0705 04/24/22  1103   POCGLU 117 126 141* 199*     Blood Sugar Average: Last 72 hrs:  (P) 150 5   Continue home medications

## 2022-04-24 NOTE — ASSESSMENT & PLAN NOTE
POA, as evidence by fever, tachycardia, tachypnea and leukocytosis  Received IV fluids and antibiotics in ED  Pneumonia was deemed as a likely source and the patient was started on broad-spectrum antibiotics, but with reassessment today, discussed during rounding and looks like this might have not been bacterial pneumonia and might have been progression of the lung mass

## 2022-04-24 NOTE — DISCHARGE INSTR - AVS FIRST PAGE
Dear Selena Chapin,     It was our pleasure to care for you here at [facility]  It is our hope that we were always able to exceed the expected standards for your care during your stay  You were hospitalized due to respiratory symptoms  You were cared for on the 4th floor by Mario Salcedo MD under the service of Batsheva Glover MD with the [facility] who covers for your primary care physician (PCP), Diane Perez MD, while you were hospitalized  If you have any questions or concerns related to this hospitalization, you may contact us at [phone]  For follow up as well as any medication refills, we recommend that you follow up with your primary care physician  A registered nurse will reach out to you by phone within a few days after your discharge to answer any additional questions that you may have after going home  However, at this time we provide for you here, the most important instructions / recommendations at discharge:     Notable Medication Adjustments -   Stop taking Decadron  Continue your other home medications  Testing Required after Discharge -   CBC in 1 week (blood test)  Important follow up information -   Follow-up with your PCP  Continue following up with your Oncology and care team  Other Instructions -   Come back to the hospital if you start having worsening shortness of breath or persistent fever or concerning general worsening of symptoms  Please review this entire after visit summary as additional general instructions including medication list, appointments, activity, diet, any pertinent wound care, and other additional recommendations from your care team that may be provided for you        Sincerely,     Mario Salcedo MD

## 2022-04-24 NOTE — DISCHARGE SUMMARY
[facility]  Discharge- Junita Devoid 1965, 64 y o  male MRN: [id number]  Unit/Bed#: S -01 Encounter: [id number]  Primary Care Provider: Sonny Swanson MD   Date and time admitted to hospital: 4/22/2022  9:21 AM    * Obstructive pneumonia  Assessment & Plan  Patient presents with complaints of increased sputum production, fatigue and fevers  CT chest shows large right upper lobe loss with increasing cavitation, concerning for necrotizing pneumonia  Urine antigens negative  procal was trended 0 38-0 53  Patient was on broad-spectrum antibiotics, and was on azithromycin before  After discussion during rounding, pneumonia is less likely and looks like this is a progression of the patient's lung mass  Likely the fever episode before was because of the lung cancer    Plan:  · Continue off antibiotics for now  · sputum cultures pending  · Pulmonary team consulted; recs appreciated  · Patient will continue outpatient follow-up      Adenocarcinoma of right lung [facility])  Assessment & Plan  Patient currently undergoing radiation  Outpatient follow-up with Oncology    Chronic obstructive pulmonary disease ([facility] [address] )  Assessment & Plan  Not in acute exacerbation  Continue home bronchodilators    Sepsis ([facility] [address] )  Assessment & Plan  POA, as evidence by fever, tachycardia, tachypnea and leukocytosis  Received IV fluids and antibiotics in ED  Pneumonia was deemed as a likely source and the patient was started on broad-spectrum antibiotics, but with reassessment today, discussed during rounding and looks like this might have not been bacterial pneumonia and might have been progression of the lung mass    Brain mass  Assessment & Plan  Currently undergoing radiation  Outpatient follow-up with Oncology  Continue Keppra    Type 2 diabetes mellitus without complication, with long-term current use of insulin [facility])  Assessment & Plan  Lab Results   Component Value Date    HGBA1C 5 7 (H) 11/07/2021     Recent Labs 04/23/22  1538 04/23/22  2100 04/24/22  0705 04/24/22  1103   POCGLU 117 126 141* 199*     Blood Sugar Average: Last 72 hrs:  (P) 150 5   Continue home medications      Medical Problems             Resolved Problems  Date Reviewed: 4/24/2022    None              Discharging Resident: Rommel Thompson MD  Discharging Attending: No att  providers found  PCP: Jluis Gordon MD  Admission Date:   Admission Orders (From admission, onward)     Ordered        04/22/22 1426  INPATIENT ADMISSION  Once                      Discharge Date: 04/24/22    Consultations During Hospital Stay:  · Pulmonology    Procedures Performed:   · None    Significant Findings / Test Results:   CT chest abdomen pelvis w contrast   Final Result by Hellen Curtis MD (04/22 1348)      CHEST:   1   Redemonstrated large right upper lobe mass extending to the right hilum/mediastinum with mediastinal adenopathy in keeping with known malignancy  Compared to prior PET/CT there are slightly increased areas of cavitation confluent with the mass in    the right upper lobe in the areas of previously seen consolidation, which may be due to necrotizing pneumonia as well as necrotic tumor  2  1 6 cm right thyroid nodule can be evaluated with nonemergent thyroid ultrasound  ABDOMEN/PELVIS:   1   No acute findings in the abdomen or pelvis  2   Splenic nodule concerning for metastasis slightly enlarged since CT 3/22/22  3   Right adrenal metastatic nodule grossly unchanged from prior PET/CT 4/13/22  The study was marked in [facility] for immediate notification  Workstation performed: [id number]         XR chest 1 view portable   Final Result by Iva Mcmahon MD (04/22 8130)      Stable large right upper lobe mass  Resolved/resolving peritumoral opacities                    Workstation performed: [id number]             Incidental Findings:   · None     Test Results Pending at Discharge (will require follow up):  · None     Outpatient Tests Requested:  · Cbc    Complications:  None    Reason for Admission:  Weakness and fatigue, concern for obstructive pneumonia    Hospital Course:   Alfonzo Forbes is a 64 y o  male patient with a PMH of lung cancer with brain Mets, type 2 diabetes mellitus who originally presented to the hospital on 4/22/2022 due to weakness and fatigue for the last 2 weeks  Wife and brother provide translating services as patient is [place] speaking  Patient has been very weak and fatigued over the last 2 weeks with productive cough  She denies any hemoptysis  She also notes he has been febrile at home  He recently finished oral antibiotics without much improvement  He has a history of right upper lobe cancer with 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 for which she is currently undergoing radiation therapy  He was evaluated at his pulmonologist's office today and recommended to present to the ED  In the ED CT scan showed possible necrotizing pneumonia  He was started on IV fluids and IV antibiotics  Lactic acid was normal, blood cultures have been negative, troponins were done and they were negative as well  MRSA culture was negative so vancomycin was discontinued, procalcitonin was trended  The patient did not have any acute symptoms on the day of the discharge  On rounding this case was discussed and looked more like patient's lung cancer progression and then pneumonia  This might explain his previous fever episodes as well, since cancer itself can be causing fever  The patient is getting discharged and will follow up with PCP, and will continue outpatient follow-up with Oncology  Please see above list of diagnoses and related plan for additional information  Condition at Discharge: good    Discharge Day Visit / Exam:   Subjective: The patient did not have any complaints and said that he is feeling well  Denies any cough at the time and denied feeling feverish     Vitals: Blood Pressure: 102/60 (04/24/22 2228)  Pulse: 70 (04/24/22 0702)  Temperature: 98 6 °F (37 °C) (04/24/22 0702)  Temp Source: Oral (04/23/22 0500)  Respirations: 16 (04/24/22 0702)  SpO2: 94 % (04/24/22 0717)  Exam:   Physical Exam  Vitals and nursing note reviewed  Constitutional:       General: He is not in acute distress  Appearance: Normal appearance  He is normal weight  He is not ill-appearing, toxic-appearing or diaphoretic  HENT:      Head: Normocephalic and atraumatic  Eyes:      General: No scleral icterus  Right eye: No discharge  Left eye: No discharge  Neck:      Vascular: No JVD  Cardiovascular:      Rate and Rhythm: Normal rate and regular rhythm  Pulses: Normal pulses  Heart sounds: Normal heart sounds  No murmur heard  No friction rub  No gallop  Pulmonary:      Effort: Pulmonary effort is normal  No respiratory distress  Breath sounds: Normal breath sounds  No stridor  No wheezing, rhonchi or rales  Chest:      Chest wall: No tenderness  Abdominal:      General: Bowel sounds are normal  There is no distension  Palpations: Abdomen is soft  Tenderness: There is no abdominal tenderness  There is no guarding or rebound  Musculoskeletal:         General: No swelling or tenderness  Right lower leg: No edema  Left lower leg: No edema  Skin:     General: Skin is warm and dry  Coloration: Skin is not jaundiced or pale  Neurological:      Mental Status: He is alert  Psychiatric:         Mood and Affect: Mood normal          Behavior: Behavior normal           Discussion with Family: My attending updated patient's brother on phone  Mary Lou Given Discharge instructions/Information to patient and family:   See after visit summary for information provided to patient and family  Provisions for Follow-Up Care:  See after visit summary for information related to follow-up care and any pertinent home health orders         Disposition:   Home    Planned Readmission:  None    Discharge Medications:  See after visit summary for reconciled discharge medications provided to patient and/or family        **Please Note: This note may have been constructed using a voice recognition system**

## 2022-04-24 NOTE — PLAN OF CARE
Problem: MOBILITY - ADULT  Goal: Maintain or return to baseline ADL function  Description: INTERVENTIONS:  -  Assess patient's ability to carry out ADLs; assess patient's baseline for ADL function and identify physical deficits which impact ability to perform ADLs (bathing, care of mouth/teeth, toileting, grooming, dressing, etc )  - Assess/evaluate cause of self-care deficits   - Assess range of motion  - Assess patient's mobility; develop plan if impaired  - Assess patient's need for assistive devices and provide as appropriate  - Encourage maximum independence but intervene and supervise when necessary  - Involve family in performance of ADLs  - Assess for home care needs following discharge   - Consider OT consult to assist with ADL evaluation and planning for discharge  - Provide patient education as appropriate  Outcome: Progressing  Goal: Maintains/Returns to pre admission functional level  Description: INTERVENTIONS:  - Perform BMAT or MOVE assessment daily    - Set and communicate daily mobility goal to care team and patient/family/caregiver  - Collaborate with rehabilitation services on mobility goals if consulted  - Perform Range of Motion  times a day  - Reposition patient every  hours    - Dangle patient  times a day  - Stand patient  times a day  - Ambulate patient  times a day  - Out of bed to chair  times a day   - Out of bed for meals  times a day  - Out of bed for toileting  - Record patient progress and toleration of activity level   Outcome: Progressing     Problem: Potential for Falls  Goal: Patient will remain free of falls  Description: INTERVENTIONS:  - Educate patient/family on patient safety including physical limitations  - Instruct patient to call for assistance with activity   - Consult OT/PT to assist with strengthening/mobility   - Keep Call bell within reach  - Keep bed low and locked with side rails adjusted as appropriate  - Keep care items and personal belongings within reach  - Initiate and maintain comfort rounds  - Make Fall Risk Sign visible to staff  - Offer Toileting every  Hours, in advance of need  - Initiate/Maintain alarm  - Obtain necessary fall risk management equipment:   - Apply yellow socks and bracelet for high fall risk patients  - Consider moving patient to room near nurses station  Outcome: Progressing     Problem: RESPIRATORY - ADULT  Goal: Achieves optimal ventilation and oxygenation  Description: INTERVENTIONS:  - Assess for changes in respiratory status  - Assess for changes in mentation and behavior  - Position to facilitate oxygenation and minimize respiratory effort  - Oxygen administered by appropriate delivery if ordered  - Initiate smoking cessation education as indicated  - Encourage broncho-pulmonary hygiene including cough, deep breathe, Incentive Spirometry  - Assess the need for suctioning and aspirate as needed  - Assess and instruct to report SOB or any respiratory difficulty  - Respiratory Therapy support as indicated  Outcome: Progressing     Problem: METABOLIC, FLUID AND ELECTROLYTES - ADULT  Goal: Electrolytes maintained within normal limits  Description: INTERVENTIONS:  - Monitor labs and assess patient for signs and symptoms of electrolyte imbalances  - Administer electrolyte replacement as ordered  - Monitor response to electrolyte replacements, including repeat lab results as appropriate  - Instruct patient on fluid and nutrition as appropriate  Outcome: Progressing  Goal: Fluid balance maintained  Description: INTERVENTIONS:  - Monitor labs   - Monitor I/O and WT  - Instruct patient on fluid and nutrition as appropriate  - Assess for signs & symptoms of volume excess or deficit  Outcome: Progressing  Goal: Glucose maintained within target range  Description: INTERVENTIONS:  - Monitor Blood Glucose as ordered  - Assess for signs and symptoms of hyperglycemia and hypoglycemia  - Administer ordered medications to maintain glucose within target range  - Assess nutritional intake and initiate nutrition service referral as needed  Outcome: Progressing     Problem: SKIN/TISSUE INTEGRITY - ADULT  Goal: Skin Integrity remains intact(Skin Breakdown Prevention)  Description: Assess:  -Perform [place] assessment every   -Clean and moisturize skin every   -Inspect skin when repositioning, toileting, and assisting with ADLS  -Assess under medical devices such as  every   -Assess extremities for adequate circulation and sensation     Bed Management:  -Have minimal linens on bed & keep smooth, unwrinkled  -Change linens as needed when moist or perspiring  -Avoid sitting or lying in one position for more than  hours while in bed  -Keep HOB at degrees     Toileting:  -Offer bedside commode  -Assess for incontinence every   -Use incontinent care products after each incontinent episode such as     Activity:  -Mobilize patient  times a day  -Encourage activity and walks on unit  -Encourage or provide ROM exercises   -Turn and reposition patient every  Hours  -Use appropriate equipment to lift or move patient in bed  -Instruct/ Assist with weight shifting every  when out of bed in chair  -Consider limitation of chair time  hour intervals    Skin Care:  -Avoid use of baby powder, tape, friction and shearing, hot water or constrictive clothing  -Relieve pressure over bony prominences using   -Do not massage red bony areas    Next Steps:  -Teach patient strategies to minimize risks such as    -Consider consults to  interdisciplinary teams such as   Outcome: Progressing  Goal: Incision(s), wounds(s) or drain site(s) healing without S/S of infection  Description: INTERVENTIONS  - Assess and document dressing, incision, wound bed, drain sites and surrounding tissue  - Provide patient and family education  - Perform skin care/dressing changes every   Outcome: Progressing  Goal: Pressure injury heals and does not worsen  Description: Interventions:  - Implement low air loss mattress or specialty surface (Criteria met)  - Apply silicone foam dressing  - Instruct/assist with weight shifting every  minutes when in chair   - Limit chair time to  hour intervals  - Use special pressure reducing interventions such as  when in chair   - Apply fecal or urinary incontinence containment device   - Perform passive or active ROM every   - Turn and reposition patient & offload bony prominences every  hours   - Utilize friction reducing device or surface for transfers   - Consider consults to  interdisciplinary teams such as   - Use incontinent care products after each incontinent episode such as  - Consider nutrition services referral as needed  Outcome: Progressing

## 2022-04-24 NOTE — ASSESSMENT & PLAN NOTE
Patient presents with complaints of increased sputum production, fatigue and fevers  CT chest shows large right upper lobe loss with increasing cavitation, concerning for necrotizing pneumonia  Urine antigens negative  procal was trended 0 38-0 53  Patient was on broad-spectrum antibiotics, and was on azithromycin before  After discussion during rounding, pneumonia is less likely and looks like this is a progression of the patient's lung mass  Likely the fever episode before was because of the lung cancer    Plan:  · Continue off antibiotics for now  · sputum cultures pending  · Pulmonary team consulted; recs appreciated  · Patient will continue outpatient follow-up

## 2022-04-24 NOTE — PROGRESS NOTES
Progress Note - Pulmonary   Ofelia Canchola 64 y o  male MRN: [id number]  Unit/Bed#: S -01 Encounter: [id number]    Assessment/Plan:    Abnormal CT chest with known metastatic NSCLCa and suspected postobstructive pneumonia, at risk for resistant GNRs and MRSA, versus progression of malignancy   Continue cefepime and vancomycin with pharmacy consult to dose vancomycin  If MRSA culture negative, would D/C vancomycin  Monitor temperatures/WBCs and culture data  Procalcitonin is slightly elevated and with some improvement since yesterday  Continue Xopenex/Atrovent for pulmonary hygiene for now  Repeat imaging in 4 to 6 weeks  Metastatic NSCLCa   Follows with Oncology and is undergoing xRT to brain mets  Chief Complaint:    I feel better      Subjective:    Patient is sitting up in bed  He reports he feels better  Less cough  No shortness of breath  Objective:    Vitals: Blood pressure 102/60, pulse 70, temperature 98 6 °F (37 °C), resp  rate 16, SpO2 94 %  RA,There is no height or weight on file to calculate BMI  Intake/Output Summary (Last 24 hours) at 4/24/2022 1020  Last data filed at 4/24/2022 0644  Gross per 24 hour   Intake --   Output 1750 ml   Net -1750 ml       Invasive Devices  Report    Peripheral Intravenous Line            Peripheral IV 04/22/22 Left Antecubital 2 days    Peripheral IV 04/22/22 Right;Ventral (anterior) Arm 2 days                Physical Exam:     Physical Exam  Vitals reviewed  Constitutional:       General: He is not in acute distress  Appearance: He is well-developed  He is not toxic-appearing or diaphoretic  HENT:      Head: Normocephalic and atraumatic  Eyes:      General: No scleral icterus  Neck:      Trachea: No tracheal deviation  Cardiovascular:      Rate and Rhythm: Normal rate and regular rhythm  Heart sounds: S1 normal and S2 normal  No murmur heard  No friction rub  No gallop      Pulmonary:      Effort: Pulmonary effort is normal  No tachypnea, accessory muscle usage or respiratory distress  Breath sounds: Normal breath sounds  No stridor  No decreased breath sounds, wheezing, rhonchi or rales  Chest:      Chest wall: No tenderness  Abdominal:      General: Bowel sounds are normal  There is no distension  Palpations: Abdomen is soft  Tenderness: There is no abdominal tenderness  Musculoskeletal:         General: No tenderness  Cervical back: Neck supple  Skin:     General: Skin is warm and dry  Findings: No rash  Neurological:      Mental Status: He is alert and oriented to person, place, and time  GCS: GCS eye subscore is 4  GCS verbal subscore is 5  GCS motor subscore is 6  Psychiatric:         Speech: Speech normal          Behavior: Behavior is cooperative         Labs: CBC: No results found for: WBC, HGB, HCT, MCV, PLT, ADJUSTEDWBC, MCH, MCHC, RDW, MPV, NRBC    Procalcitonin 0 37, 0 53    Urine strep and Legionella antigens negative    MRSA culture pending    Imaging and other studies: None new

## 2022-04-25 ENCOUNTER — TRANSITIONAL CARE MANAGEMENT (OUTPATIENT)
Dept: FAMILY MEDICINE CLINIC | Facility: CLINIC | Age: 57
End: 2022-04-25

## 2022-04-25 VITALS
OXYGEN SATURATION: 94 % | RESPIRATION RATE: 16 BRPM | DIASTOLIC BLOOD PRESSURE: 102 MMHG | HEART RATE: 70 BPM | SYSTOLIC BLOOD PRESSURE: 157 MMHG | TEMPERATURE: 99.4 F

## 2022-04-25 NOTE — UTILIZATION REVIEW
Notification of Discharge   This is a Notification of Discharge from our facility [address]  Please be advised that this patient has been discharge from our facility  Below you will find the admission and discharge date and time including the patients disposition  UTILIZATION REVIEW CONTACT:  Hamlet Alejandra  Utilization   Network Utilization Review Department  Phone: [phone] x carefully listen to the prompts  All voicemails are confidential   Email: [email or web address]     PHYSICIAN ADVISORY SERVICES:  FOR [id number] REVIEW - MEDICAL NECESSITY DENIAL  Phone: [phone]  Fax: [phone]  Email: [email or web address]     PRESENTATION DATE: 4/22/2022  9:21 AM  OBERVATION ADMISSION DATE:   INPATIENT ADMISSION DATE: 4/22/22  2:26 PM   DISCHARGE DATE: 4/24/2022  1:09 PM  DISPOSITION: Home/Self Care Home/Self Care      IMPORTANT INFORMATION:  Send all requests for admission clinical reviews, approved or denied determinations and any other requests to dedicated fax number below belonging to the campus where the patient is receiving treatment   List of dedicated fax numbers:  [address] DENIALS (Administrative/Medical Necessity) [phone]   [address] (Maternity/NICU/Pediatrics) [phone]   Saira Fuentes [phone]   130 [facility] [phone]   [address] [place] [address] [phone]   2000 [facility] [address] [place] [address],[address] [phone]   [facility]  [phone]   2205 [facility] [address]  2401 [facility] 1000 [facility] [phone]

## 2022-04-26 ENCOUNTER — TELEPHONE (OUTPATIENT)
Dept: FAMILY MEDICINE CLINIC | Facility: CLINIC | Age: 57
End: 2022-04-26

## 2022-04-26 ENCOUNTER — TELEPHONE (OUTPATIENT)
Dept: HEMATOLOGY ONCOLOGY | Facility: MEDICAL CENTER | Age: 57
End: 2022-04-26

## 2022-04-26 ENCOUNTER — OFFICE VISIT (OUTPATIENT)
Dept: FAMILY MEDICINE CLINIC | Facility: CLINIC | Age: 57
End: 2022-04-26
Payer: COMMERCIAL

## 2022-04-26 VITALS
DIASTOLIC BLOOD PRESSURE: 70 MMHG | BODY MASS INDEX: 22.47 KG/M2 | RESPIRATION RATE: 18 BRPM | OXYGEN SATURATION: 97 % | HEIGHT: 66 IN | HEART RATE: 73 BPM | TEMPERATURE: 98.1 F | SYSTOLIC BLOOD PRESSURE: 122 MMHG | WEIGHT: 139.8 LBS

## 2022-04-26 DIAGNOSIS — E11.9 TYPE 2 DIABETES MELLITUS WITHOUT COMPLICATION, WITH LONG-TERM CURRENT USE OF INSULIN (HCC): ICD-10-CM

## 2022-04-26 DIAGNOSIS — C34.91 ADENOCARCINOMA OF RIGHT LUNG (HCC): Primary | ICD-10-CM

## 2022-04-26 DIAGNOSIS — J44.9 CHRONIC OBSTRUCTIVE PULMONARY DISEASE, UNSPECIFIED COPD TYPE (HCC): ICD-10-CM

## 2022-04-26 DIAGNOSIS — G93.89 MASS OF BRAIN: ICD-10-CM

## 2022-04-26 DIAGNOSIS — F51.02 ADJUSTMENT INSOMNIA: ICD-10-CM

## 2022-04-26 DIAGNOSIS — J18.9 OBSTRUCTIVE PNEUMONIA: ICD-10-CM

## 2022-04-26 DIAGNOSIS — Z79.4 TYPE 2 DIABETES MELLITUS WITHOUT COMPLICATION, WITH LONG-TERM CURRENT USE OF INSULIN (HCC): ICD-10-CM

## 2022-04-26 DIAGNOSIS — G89.3 CANCER ASSOCIATED PAIN: ICD-10-CM

## 2022-04-26 PROCEDURE — 99496 TRANSJ CARE MGMT HIGH F2F 7D: CPT | Performed by: NURSE PRACTITIONER

## 2022-04-26 PROCEDURE — 1111F DSCHRG MED/CURRENT MED MERGE: CPT | Performed by: NURSE PRACTITIONER

## 2022-04-26 RX ORDER — LORAZEPAM 0.5 MG/1
TABLET ORAL
Qty: 10 TABLET | Refills: 0 | Status: SHIPPED | OUTPATIENT
Start: 2022-04-26 | End: 2022-04-27 | Stop reason: SDUPTHER

## 2022-04-26 RX ORDER — OXYCODONE HYDROCHLORIDE 10 MG/1
10 TABLET ORAL EVERY 6 HOURS PRN
Qty: 20 TABLET | Refills: 0 | Status: SHIPPED | OUTPATIENT
Start: 2022-04-26 | End: 2022-04-27 | Stop reason: SDUPTHER

## 2022-04-26 RX ORDER — NALOXONE HYDROCHLORIDE 4 MG/.1ML
SPRAY NASAL
Qty: 1 EACH | Refills: 1 | Status: ON HOLD | OUTPATIENT
Start: 2022-04-26

## 2022-04-26 NOTE — PROGRESS NOTES
Assessment/Plan:     Pt is a 64 yr old male   Pt of Dr Nadine Iglesias in office for transition of care  Post hospital for obstruction pneumonia ( questionable necrotizing pneumonia ) - underlying history of metastatic  lung cancer  He is 64 yr old and accompanied by wife today and son the the phone  He sees PULM on 5/3  He sees Dr Mariama Whitney ( radiation oncology ) next week  Cough has resolved   Right posterior lungs diminished   Denies any excessive shortness of breath   Spoke to mom , pt and son about expectations and physical activities   Pt wants to be more active, maybe work in the yard , plants etc small chores however his family will not let him and feels like everytime he steps out catches a cold and ends up in the hospital and they tell him he is not listening   He is very discouraged and upset - and feels like he is going craxy doing nothing all day long besides walking  Will discuss with Dr Mariama Whitney and will discuss with PCP - do's and don't at this point and comfort   He is seeing palliative care tomorrow - I have for now refilled his Oxycodone and Ativan - does have pain and sleeping issues   Increased anxiety  Emotional support provided   Will have Dr Eda Syed give him a call upon her return to further discuss concerns     No problem-specific Assessment & Plan notes found for this encounter  Diagnoses and all orders for this visit:    Adenocarcinoma of right lung ([facility] [address] )  -     CBC and differential; Future  -     Comprehensive metabolic panel; Future  -     naloxone (NARCAN) 4 mg/0 1 mL nasal spray; Administer 1 spray into a nostril  If no response after 2-3 minutes, give another dose in the other nostril using a new spray  It has to be on stand by use with opioid use - in case of overdose    Chronic obstructive pulmonary disease, unspecified COPD type ([facility] [address] )  -     CBC and differential; Future  -     Comprehensive metabolic panel;  Future  -     naloxone (NARCAN) 4 mg/0 1 mL nasal spray; Administer 1 spray into a nostril  If no response after 2-3 minutes, give another dose in the other nostril using a new spray  It has to be on stand by use with opioid use - in case of overdose    Obstructive pneumonia  -     CBC and differential; Future  -     Comprehensive metabolic panel; Future  -     naloxone (NARCAN) 4 mg/0 1 mL nasal spray; Administer 1 spray into a nostril  If no response after 2-3 minutes, give another dose in the other nostril using a new spray  It has to be on stand by use with opioid use - in case of overdose    Type 2 diabetes mellitus without complication, with long-term current use of insulin (HCC)  -     CBC and differential; Future  -     Comprehensive metabolic panel; Future  -     naloxone (NARCAN) 4 mg/0 1 mL nasal spray; Administer 1 spray into a nostril  If no response after 2-3 minutes, give another dose in the other nostril using a new spray  It has to be on stand by use with opioid use - in case of overdose    Cancer associated pain  -     oxyCODONE (ROXICODONE) 10 MG TABS; Take 1 tablet (10 mg total) by mouth every 6 (six) hours as needed for moderate pain for up to 5 days Max Daily Amount: 40 mg  -     naloxone (NARCAN) 4 mg/0 1 mL nasal spray; Administer 1 spray into a nostril  If no response after 2-3 minutes, give another dose in the other nostril using a new spray  It has to be on stand by use with opioid use - in case of overdose    Adjustment insomnia  -     LORazepam (ATIVAN) 0 5 mg tablet; For sleep 1-2 tablets at bed time (Max 1 mg )  -     naloxone (NARCAN) 4 mg/0 1 mL nasal spray; Administer 1 spray into a nostril  If no response after 2-3 minutes, give another dose in the other nostril using a new spray  It has to be on stand by use with opioid use - in case of overdose    Mass of brain  -     LORazepam (ATIVAN) 0 5 mg tablet; For sleep 1-2 tablets at bed time (Max 1 mg )  -     naloxone (NARCAN) 4 mg/0 1 mL nasal spray; Administer 1 spray into a nostril   If no response after 2-3 minutes, give another dose in the other nostril using a new spray   It has to be on stand by use with opioid use - in case of overdose      [facility]  Discharge- [address], 64 y o  male MRN: [id number]  Unit/Bed#: S -01 Encounter: [id number]  Primary Care Provider: Sherryle Devon, MD   Date and time admitted to hospital: 4/22/2022  9:21 AM     * Obstructive pneumonia  Assessment & Plan  Patient presents with complaints of increased sputum production, fatigue and fevers  CT chest shows large right upper lobe loss with increasing cavitation, concerning for necrotizing pneumonia  Urine antigens negative  procal was trended 0 38-0 53  Patient was on broad-spectrum antibiotics, and was on azithromycin before  After discussion during rounding, pneumonia is less likely and looks like this is a progression of the patient's lung mass  Likely the fever episode before was because of the lung cancer     Plan:  · Continue off antibiotics for now  · sputum cultures pending  · Pulmonary team consulted; recs appreciated  · Patient will continue outpatient follow-up        Adenocarcinoma of right lung [facility])  Assessment & Plan  Patient currently undergoing radiation  Outpatient follow-up with Oncology     Chronic obstructive pulmonary disease ([facility] [address] )  Assessment & Plan  Not in acute exacerbation  Continue home bronchodilators     Sepsis ([facility] [address] )  Assessment & Plan  POA, as evidence by fever, tachycardia, tachypnea and leukocytosis  Received IV fluids and antibiotics in ED  Pneumonia was deemed as a likely source and the patient was started on broad-spectrum antibiotics, but with reassessment today, discussed during rounding and looks like this might have not been bacterial pneumonia and might have been progression of the lung mass     Brain mass  Assessment & Plan  Currently undergoing radiation  Outpatient follow-up with Oncology  Continue Keppra     Type 2 diabetes mellitus without complication, with long-term current use of insulin [facility])  Assessment & Plan        Lab Results   Component Value Date     HGBA1C 5 7 (H) 11/07/2021     TCM Call (since 3/26/2022)     Date and time call was made  4/25/2022 11:16 AM    Hospital care reviewed  Records reviewed        Patient was hospitialized at  [address] [place] [address]        Date of Admission  04/22/22    Date of discharge  04/24/22    Diagnosis  chronic pneumonia    Disposition  Home      TCM Call (since 3/26/2022)     Scheduled for follow up? Yes    Do you need help managing your prescriptions or medications  No    Is transportation to your appointment needed  No    I have advised the patient to call PCP with any new or worsening symptoms  Cielo Davis CCMA        Subjective:     Patient ID: Frankie Langston is a 64 y o  male  HPI    Review of Systems   Constitutional: Positive for fatigue  Negative for chills and fever  HENT: Negative for congestion, hearing loss, sinus pressure and sore throat  Eyes: Negative  Respiratory: Positive for shortness of breath  Negative for cough  Lung cancer   S/p pneumonia  Here for transition of care     Cardiovascular: Negative for chest pain, palpitations and leg swelling  Gastrointestinal: Negative for abdominal distention, abdominal pain, nausea and vomiting  Genitourinary: Negative for difficulty urinating and frequency  Musculoskeletal: Positive for arthralgias, gait problem and myalgias  Skin: Negative for rash  Allergic/Immunologic: Positive for environmental allergies  Neurological: Positive for weakness  Negative for headaches  Hematological:        Stage 4 lung cancer metastatic to the brain    Psychiatric/Behavioral: Positive for sleep disturbance  Negative for suicidal ideas  The patient is nervous/anxious  Objective:     Physical Exam  Vitals and nursing note reviewed  Constitutional:       Appearance: Normal appearance        Comments: Germanu speaking   Son and wife   Son translated    Cardiovascular:      Rate and Rhythm: Normal rate and regular rhythm  Pulses: Normal pulses  Pulmonary:      Effort: Pulmonary effort is normal       Comments: Diminished lung sounds to right base   Abdominal:      Palpations: Abdomen is soft  Musculoskeletal:         General: Normal range of motion  Cervical back: Normal range of motion  Skin:     General: Skin is warm  Neurological:      Mental Status: He is alert and oriented to person, place, and time  Motor: Weakness present  Psychiatric:         Mood and Affect: Mood normal          Behavior: Behavior normal            Vitals:    04/26/22 1415   BP: 122/70   BP Location: Left arm   Patient Position: Sitting   Cuff Size: Standard   Pulse: 73   Resp: 18   Temp: 98 1 °F (36 7 °C)   TempSrc: Temporal   SpO2: 97%   Weight: 63 4 kg (139 lb 12 8 oz)   Height: 5' 6" (1 676 m)       Transitional Care Management Review:  Nancy Walden is a 64 y o  male here for TCM follow up  During the TCM phone call patient stated:    TCM Call (since 3/26/2022)     Date and time call was made  4/25/2022 11:16 AM    Hospital care reviewed  Records reviewed        Patient was hospitialized at  [address] [place] [address]        Date of Admission  04/22/22    Date of discharge  04/24/22    Diagnosis  chronic pneumonia    Disposition  Home      TCM Call (since 3/26/2022)     Scheduled for follow up? Yes    Do you need help managing your prescriptions or medications  No    Is transportation to your appointment needed  No    I have advised the patient to call PCP with any new or worsening symptoms  Cielo Davis CCMA      CT chest abdomen pelvis w contrast    Status: Final result       PACS Images     Show images for CT chest abdomen pelvis w contrast    Study Result    Narrative & Impression   CT CHEST, ABDOMEN AND PELVIS WITH IV CONTRAST     INDICATION:   Sepsis  Unclear etiology      COMPARISON:  PET CT 4/13/2022    CT chest 3/22/2022     TECHNIQUE: CT examination of the chest, abdomen and pelvis was performed  Axial, sagittal, and coronal 2D reformatted images were created from the source data and submitted for interpretation      Radiation dose length product (DLP) for this visit:  591 mGy-cm   This examination, like all CT scans performed in the [facility], was performed utilizing techniques to minimize radiation dose exposure, including the use of iterative   reconstruction and automated exposure control      IV Contrast:  100 mL of iohexol (OMNIPAQUE)  Enteric contrast was not administered       FINDINGS:     CHEST     LUNGS: Redemonstrated large heterogeneous right upper lobe mass extending to the right hilum/mediastinum in keeping with known malignancy  Compared to prior PET/CT there are slightly increased areas of confluent with the mass in the right upper lobe in   the areas of previously seen consolidation, which may be due to necrotizing pneumonia as well as necrotic tumor      Mild bibasilar atelectasis      PLEURA:  No pleural effusion      HEART/GREAT VESSELS:  Heart is unremarkable for patient's age  No thoracic aortic aneurysm      MEDIASTINUM AND NAS: Mediastinal lymphadenopathy again noted for example anterior mediastinal node measuring 1 9 x 2 2 cm (series 2 image 29) and paratracheal adenopathy measuring approximately 3 3 x 2 1 cm (series 2 image 21)      CHEST WALL AND LOWER NECK: 1 6 cm right thyroid nodule unchanged  Incidental discovery of one or more thyroid nodule(s) measuring more than 1 5 cm and without suspicious features is noted in this patient who is above 28years old; according to   guidelines published in the February 2015 white paper on incidental thyroid nodules in the Journal of the [employer] of Radiology Salvatore Sever), further characterization with thyroid ultrasound is recommended      ABDOMEN     LIVER/BILIARY TREE:  Unremarkable      GALLBLADDER:  No calcified gallstones  No pericholecystic inflammatory change      SPLEEN:  Hypodense nodule in the spleen measuring 1 9 x 2 1 cm again noted concerning for metastasis on prior PET/CT, slightly enlarged from prior CT 3/22/22, not discretely visualized on prior unenhanced PET/CT      PANCREAS:  Unremarkable      ADRENAL GLANDS:  Again noted right adrenal metastasis measuring 1 3 x 1 9 cm grossly similar to prior PET/CT accounting for differences in technique      KIDNEYS/URETERS:  Unremarkable  No hydronephrosis  STOMACH AND BOWEL:  Unremarkable      APPENDIX:  No findings to suggest appendicitis      ABDOMINOPELVIC CAVITY:  No ascites  No pneumoperitoneum  No lymphadenopathy      VESSELS:  Unremarkable for patient's age        PELVIS     REPRODUCTIVE ORGANS:  Unremarkable for patient's age      URINARY BLADDER:  Unremarkable      ABDOMINAL WALL/INGUINAL REGIONS:  Unremarkable      OSSEOUS STRUCTURES:  No acute fracture or destructive osseous lesion      IMPRESSION:     CHEST:  1   Redemonstrated large right upper lobe mass extending to the right hilum/mediastinum with mediastinal adenopathy in keeping with known malignancy  Compared to prior PET/CT there are slightly increased areas of cavitation confluent with the mass in   the right upper lobe in the areas of previously seen consolidation, which may be due to necrotizing pneumonia as well as necrotic tumor      2  1 6 cm right thyroid nodule can be evaluated with nonemergent thyroid ultrasound      ABDOMEN/PELVIS:  1   No acute findings in the abdomen or pelvis      2   Splenic nodule concerning for metastasis slightly enlarged since CT 3/22/22      3    Right adrenal metastatic nodule grossly unchanged from prior PET/CT 4/13/22           XR chest 1 view portable    Status: Final result       PACS Images     Show images for XR chest 1 view portable    Study Result    Narrative & Impression   CHEST      INDICATION:   Fever, cough      COMPARISON:  4/5/2022     EXAM PERFORMED/VIEWS:  XR CHEST PORTABLE  AP semierect         FINDINGS:     Cardiomediastinal silhouette appears unremarkable      Large stable right upper lobe mass  Patchy opacity previously noted around the mass appears to have partly if not completely resolved  The mass is contiguous with the mediastinal silhouette  Left lung is clear  No evidence for effusion or   pneumothorax      No acute osseous abnormalities are apparent      IMPRESSION:     Stable large right upper lobe mass      Resolved/resolving peritumoral opacities                   Workstation performed: [id number]       CBC and differential, AM Draw, Tomorrow  Order: [id number]   Status: Final result     Visible to patient: Yes (not seen)     Next appt: 04/27/2022 at 03:20 PM in Palliative Medicine Lisette Jackson MD)     0 Result Notes    Component Ref Range & Units 4/23/22  5:51 AM 4/22/22  9:38 AM 4/10/22  8:26 AM 3/27/22  6:14 AM 3/26/22 11:11 AM 3/25/22  5:15 AM 3/24/22  5:53 AM   WBC 4 31 - 10 16 Thousand/uL 12 76 High   20 77 High   18 76 High   14 60 High   15 91 High   35 04 High Panic  CM  32 04 High Panic  CM    RBC 3 88 - 5 62 Million/uL 4 06  4 56  4 36  4 59  4 46  4 27  4 33    Hemoglobin 12 0 - 17 0 g/dL 12 5  14 0  13 9  14 2  13 6  13 2  13 4    Hematocrit 36 5 - 49 3 % 38 1  42 9  40 8  43 9  38 3  37 1  38 1    MCV 82 - 98 fL 94  94  94  96  86  87  88    MCH 26 8 - 34 3 pg 30 8  30 7  31 9  30 9  30 5  30 9  30 9    MCHC 31 4 - 37 4 g/dL 32 8  32 6  34 1  32 3  35 5  35 6  35 2    RDW 11 6 - 15 1 % 16 6 High   16 9 High   16 3 High   14 6  13 7  13 7  14 0    MPV 8 9 - 12 7 fL 8 5 Low   8 7 Low   9 4  11 1  10 6  10 7  11 2    Platelets 636 - 685 Thousands/uL 229  254  332  140                JONATHAN Rahman

## 2022-04-26 NOTE — Clinical Note
Please give him a call   Review my notes  Specific intrusions needed would like to work ini his yard not lift just do something   He is going crazy and anxious about not being allowed to do much   I figured you can probably advise him better on this- he is getting a bit upset about not being productive and sad

## 2022-04-26 NOTE — PATIENT INSTRUCTIONS
Pneumonia   WHAT YOU NEED TO KNOW:   Pneumonia is an infection in your lungs caused by bacteria, viruses, fungi, or parasites  You can become infected if you come in contact with someone who is sick  You can get pneumonia if you recently had surgery or needed a ventilator to help you breathe  Pneumonia can also be caused by accidentally inhaling saliva or small pieces of food  Pneumonia may cause mild symptoms, or it can be severe and life-threatening  DISCHARGE INSTRUCTIONS:   Return to the emergency department if:   · You cough up blood  · Your heart beats more than 100 beats in 1 minute  · You are very tired, confused, and cannot think clearly  · You have chest pain or trouble breathing  · Your lips or fingernails turn gray or blue  Call your doctor if:   · Your symptoms are the same or get worse 48 hours after you start antibiotics  · Your fever is not below 99°F (37 2°C) 48 hours after you start antibiotics  · You have a fever higher than 101°F (38 3°C)  · You cannot eat, or you have loss of appetite, nausea, or are vomiting  · You have questions or concerns about your condition or care  Medicines: You may need any of the following:  · Antibiotics  treat pneumonia caused by bacteria  · Acetaminophen  decreases pain and fever  It is available without a doctor's order  Ask how much to take and how often to take it  Follow directions  Read the labels of all other medicines you are using to see if they also contain acetaminophen, or ask your doctor or pharmacist  Acetaminophen can cause liver damage if not taken correctly  Do not use more than 4 grams (4,000 milligrams) total of acetaminophen in one day  · NSAIDs , such as ibuprofen, help decrease swelling, pain, and fever  This medicine is available with or without a doctor's order  NSAIDs can cause stomach bleeding or kidney problems in certain people   If you take blood thinner medicine, always ask your healthcare provider if NSAIDs are safe for you  Always read the medicine label and follow directions  · Take your medicine as directed  Contact your healthcare provider if you think your medicine is not helping or if you have side effects  Tell him or her if you are allergic to any medicine  Keep a list of the medicines, vitamins, and herbs you take  Include the amounts, and when and why you take them  Bring the list or the pill bottles to follow-up visits  Carry your medicine list with you in case of an emergency  Follow up with your doctor as directed: You will need to return for more tests  Write down your questions so you remember to ask them during your visits  Manage your symptoms:   · Rest as needed  Rest often throughout the day  Alternate times of activity with times of rest     · Drink liquids as directed  Ask how much liquid to drink each day and which liquids are best for you  Liquids help thin your mucus, which may make it easier for you to cough it up  · Do not smoke  Avoid secondhand smoke  Smoking increases your risk for pneumonia  Smoking also makes it harder for you to get better after you have had pneumonia  Ask your healthcare provider for information if you need help to quit smoking  · Limit alcohol  Women should limit alcohol to 1 drink a day  Men should limit alcohol to 2 drinks a day  A drink of alcohol is 12 ounces of beer, 5 ounces of wine, or 1½ ounces of liquor  · Use a cool mist humidifier  A humidifier will help increase air moisture in your home  This may make it easier for you to breathe and help decrease your cough  · Keep your head elevated  You may be able to breathe better if you lie down with the head of your bed up  Prevent pneumonia:   · Wash your hands often  Use soap and water every time you wash your hands  Rub your soapy hands together, lacing your fingers  Use the fingers of one hand to scrub under the nails of the other hand   Wash for at least 20 seconds  Rinse with warm, running water for several seconds  Then dry your hands with a clean towel or paper towel  Use hand  that contains alcohol if soap and water are not available  Do not touch your eyes, nose, or mouth without washing your hands first          · Cover a sneeze or cough  Use a tissue that covers your mouth and nose  Throw the tissue away in a trash can right away  Use the bend of your arm if a tissue is not available  Wash your hands well with soap and water or use a hand   Do not stand close to anyone who is sneezing or coughing  · Stay away from others until you are well  Do not go to work or other activities  Wait until your symptoms are gone or your healthcare provider says it is okay to return  · Ask about vaccines you may need  You may need a vaccine to help prevent pneumonia  Get an influenza (flu) vaccine every year as soon as recommended, usually in September or October  Flu viruses change, so it is important to get a yearly flu vaccine  © Copyright [employer] 2022 Information is for End User's use only and may not be sold, redistributed or otherwise used for commercial purposes  All illustrations and images included in CareNotes® are the copyrighted property of A D A M , Inc  or [address] [place], [place] [address]allyssa sridhar   The above information is an  only  It is not intended as medical advice for individual conditions or treatments  Talk to your doctor, nurse or pharmacist before following any medical regimen to see if it is safe and effective for you  Lung Cancer   AMBULATORY CARE:   Lung cancer  is a type of cancer that starts in the lungs  Lung cancer is the leading cause of cancer deaths worldwide  Cigarette smoking causes most lung cancer, but it can also develop in people who do not smoke  Types of lung cancer:   The main differences between the 2 major types is the cells the cancer starts in and how it grows:  · Non-small cell lung cancer (NSCLC)  is the most common type of lung cancer  Adenocarcinoma, squamous cell carcinoma, and large cell carcinoma are the examples of NSCLC  · Small cell lung cancer (SCLC)  is less common and is sometimes called oat cell cancer  These types of cells are small and round and can be more aggressive than NSCLC  Common signs and symptoms:   · Chest pain    · Shortness of breath or wheezing    · A cough that will not go away, and gets worse over time    · Coughing up blood    · Hoarseness    · Loss of appetite or weight loss without trying    · Headache    Call your local emergency number (911 in the [address] [place] [address],[address]) if:   · You have severe chest pain when you take a deep breath or cough  · You have new or worse trouble breathing  Seek care immediately if:   · You cannot think clearly  · You cough up blood, or more blood than before  · Your lips or nails look blue or pale  Call your doctor or oncologist if:   · You have a fever  · You are vomiting and cannot keep food or liquids down  · You have questions or concerns about your condition or care  Treatment for lung cancer  may include the following:  · Surgery  may be needed to remove the lung cancer  Part of your lymph nodes may be removed to check for signs of cancer  Surgery may also be needed if the cancer cannot be removed completely  In this case surgery may help treat complications or decrease your symptoms  · Radiation therapy  uses beams of intense energy, such as x-rays, to shrink or kill cancer cells  · Chemotherapy medicines  are used to kill cancer cells  Chemo may be given as a pill or in an IV  Chemo can be used by itself, before with or after surgery, and with radiation  · Targeted medicine therapy  focuses on specific targets inside cancer cells  · Immunotherapy  stimulates your immune system to fight the cancer  Cancer cells produce substances that help them hide from your immune system   Immunotherapy can block these substances and help your body identify the cancer cells so they can be destroyed  Lower your risk for lung cancer:   · Do not smoke, and avoid secondhand smoke  Nicotine and other chemicals in cigarettes and cigars can cause lung damage  Ask your healthcare provider for information if you currently smoke and need help to quit  E-cigarettes or smokeless tobacco still contain nicotine  Talk to your healthcare provider before you use these products  · Have lung cancer screening, if recommended  Lung cancer screening is a test done every year to find lung cancer early  Screening is different from diagnosis because screening is used before you have any signs or symptoms  Screening is offered to adults aged 48 to [de-identified] who have at least a 20-pack year history of smoking  Pack-years are the number of cigarette packs you smoked multiplied by the number of years you smoked  Examples are 1 pack of cigarettes each day for 20 years, or 2 packs each day for 10 years  Lung cancer screening has benefits and risks  Talk with your healthcare provider about the benefits and risks to help you decide if lung cancer screening is right for you  · Have your home tested for radon  Do this especially if you live in an area where radon is a known problem  · Wear protective gear  if you work with substances or chemicals that can cause cancer  Avoid exposure as much as you can  Follow safety precautions  · Eat a variety of healthy foods  Healthy foods include fruits, vegetables, whole-grain breads, low-fat dairy products, beans, lean meats, and fish  · Be physically active throughout the day  Physical activity such as exercise can help increase your energy level and fight illness  Be physically active for at least 30 minutes per day, on most days of the week  Include aerobic activity, such as walking or riding a bicycle  Also include strength training at least 2 times each week   Your healthcare providers can help you create a physical activity plan  Follow up with your doctor or oncologist as directed: You will need to see your oncologist for ongoing treatment  Write down your questions so you remember to ask them during your visits  © Copyright [employer] 2022 Information is for End User's use only and may not be sold, redistributed or otherwise used for commercial purposes  All illustrations and images included in CareNotes® are the copyrighted property of A D A M , Inc  or [facility] Ynes Delvalle  The above information is an  only  It is not intended as medical advice for individual conditions or treatments  Talk to your doctor, nurse or pharmacist before following any medical regimen to see if it is safe and effective for you

## 2022-04-27 ENCOUNTER — OFFICE VISIT (OUTPATIENT)
Dept: PALLIATIVE MEDICINE | Facility: CLINIC | Age: 57
End: 2022-04-27
Payer: COMMERCIAL

## 2022-04-27 VITALS
SYSTOLIC BLOOD PRESSURE: 120 MMHG | HEART RATE: 80 BPM | OXYGEN SATURATION: 97 % | DIASTOLIC BLOOD PRESSURE: 76 MMHG | HEIGHT: 66 IN | RESPIRATION RATE: 18 BRPM | WEIGHT: 143.5 LBS | BODY MASS INDEX: 23.06 KG/M2 | TEMPERATURE: 98.6 F

## 2022-04-27 DIAGNOSIS — G93.89 MASS OF BRAIN: ICD-10-CM

## 2022-04-27 DIAGNOSIS — Z51.5 PALLIATIVE CARE PATIENT: ICD-10-CM

## 2022-04-27 DIAGNOSIS — G89.3 CANCER ASSOCIATED PAIN: ICD-10-CM

## 2022-04-27 DIAGNOSIS — G93.6 CEREBRAL EDEMA (HCC): ICD-10-CM

## 2022-04-27 DIAGNOSIS — G93.89 BRAIN MASS: ICD-10-CM

## 2022-04-27 DIAGNOSIS — F51.02 ADJUSTMENT INSOMNIA: ICD-10-CM

## 2022-04-27 DIAGNOSIS — J44.9 CHRONIC OBSTRUCTIVE PULMONARY DISEASE, UNSPECIFIED COPD TYPE (HCC): ICD-10-CM

## 2022-04-27 DIAGNOSIS — C34.91 ADENOCARCINOMA OF RIGHT LUNG (HCC): Primary | ICD-10-CM

## 2022-04-27 LAB
BACTERIA BLD CULT: NORMAL
BACTERIA BLD CULT: NORMAL

## 2022-04-27 PROCEDURE — 99214 OFFICE O/P EST MOD 30 MIN: CPT | Performed by: INTERNAL MEDICINE

## 2022-04-27 PROCEDURE — 1111F DSCHRG MED/CURRENT MED MERGE: CPT | Performed by: INTERNAL MEDICINE

## 2022-04-27 RX ORDER — LORAZEPAM 0.5 MG/1
TABLET ORAL
Qty: 60 TABLET | Refills: 0 | Status: SHIPPED | OUTPATIENT
Start: 2022-05-01 | End: 2022-06-01 | Stop reason: SDUPTHER

## 2022-04-27 RX ORDER — OXYCODONE HYDROCHLORIDE 10 MG/1
10 TABLET ORAL EVERY 6 HOURS PRN
Qty: 120 TABLET | Refills: 0 | Status: SHIPPED | OUTPATIENT
Start: 2022-05-01 | End: 2022-06-07 | Stop reason: SDUPTHER

## 2022-04-27 RX ORDER — GUAIFENESIN 600 MG
1200 TABLET, EXTENDED RELEASE 12 HR ORAL EVERY 12 HOURS PRN
Qty: 60 TABLET | Refills: 2 | Status: SHIPPED | OUTPATIENT
Start: 2022-04-27 | End: 2022-07-27

## 2022-04-27 NOTE — PATIENT INSTRUCTIONS
It was good to see you today  Thank you for coming in  · Continue current medications,  · If constipation becomes an issue:  · Drink PLENTY of water  This is important to keep the gut moving  · Some people have success w/ using prunes, prune juice, certain fruits, wheat germ, or fiber gummies  · Try a probiotic  This could be yogurt or kefir, or fermented beverages such as kombucha, but probiotics are also available in capsule form  Aim for 10-15 billion colony-forming-units, w/ bacteria such as Lactobacillus / Saccharomyces / Actinomyces  · Take Benefiber or Miralax (or Citrucel or Metamucil or Bananatrol, etc) daily  · Colace is good for softening hard stools, but does not stimulate the bowel to move things along  · You can use senna, 1 to 2 tabs, once or twice daily as needed for constipation  Use as directed on the box/bottle  · Should that not be enough for your constipation, you can try Dulcolax, Milk of Magnesia, and/or magnesium citrate  · Should that not be enough, consider an enema  · All of these medications are available over-the-counter  · Return in about 1 month  · Call us for refills on medications that we supply, as needed  · If something changes and you need to come in sooner, please call our office  PRESCRIPTION REFILL REMINDER:  All medication refills should be requested prior to [facility] on Friday  Any refill requests after noon on Friday would be addressed the following Monday

## 2022-04-27 NOTE — PROGRESS NOTES
Follow-up with Palliative and Bassem Arias [age] y o  male [id number]    ASSESSMENT & PLAN:  1  Adenocarcinoma of right lung ([facility] [address] )    2  Brain mass    3  Cerebral edema (HCC)    4  Chronic obstructive pulmonary disease, unspecified COPD type ([facility] [address] )    5  Adjustment insomnia    6  Cancer associated pain    7  Palliative care patient    8  Mass of brain           HFU visit   Patient has received [address] vaccinations   Continue oxyIR 10mg q6h PRN  Effective   Continue lorazepam 0 5-1mg QHS PRN insomnia  Effective   Patient has been provided with a naloxone prescription   Continue Protonix   Continue Tylenol PRN   Start guaifenesin for thick / increased mucus  Use OTC product if not covered by insurer   Continue full disease-directed cares w/o limit   Reviewed notes (DC Summary, Medical Oncology, Pulmonology), labs (4/23/22 WBC 12 76, Hb 12 5; 4/22/22 Cr 1 06, eGFR 78, alb 2 9), imaging + procedures (4/22/22 CTCAP, 4/13/22 PET CT, 3/15/22 MRI brain)   Return in about 1 month (around 5/27/2022)   Emotional support provided   Medication safety issues addressed - no driving under the influence of narcotics, watch for adverse effects including AMS and respiratory depression, keep medications stored in a safe/locked environment        Requested Prescriptions     Signed Prescriptions Disp Refills    oxyCODONE (ROXICODONE) 10 MG TABS 120 tablet 0     Sig: Take 1 tablet (10 mg total) by mouth every 6 (six) hours as needed for moderate pain Max Daily Amount: 40 mg    LORazepam (ATIVAN) 0 5 mg tablet 60 tablet 0     Sig: For sleep 1-2 tablets at bed time (Max 1 mg )    guaiFENesin (MUCINEX) 600 mg 12 hr tablet 60 tablet 2     Sig: Take 2 tablets (1,200 mg total) by mouth every 12 (twelve) hours as needed for cough or congestion (thick mucis)       Medications Discontinued During This Encounter   Medication Reason    oxyCODONE (ROXICODONE) 10 MG TABS Reorder    LORazepam (ATIVAN) 0 5 mg tablet Reorder       Representatives have queried the patient's controlled substance dispensing history in the Prescription Drug Monitoring Program in compliance with regulations before I have prescribed any controlled substances  The prescription history is consistent with prescribed therapy and our practice policies  40 minutes were spent in this ambulatory visit with greater than 50% of the time spent face to face with patient and family (wife Fanny Dumont in person, sister Chantel Weir and son John Mackenzie via speakerphone) in counseling or coordination of care including discussions of symptom assessment and management, medication review and adjustment, psychosocial support, chart review, imaging review, lab review, goals of care, supportive listening and anticipatory guidance  All of the patient's questions were answered during this discussion  SUBJECTIVE:  Chief Complaint   Patient presents with    Follow-up    Medication Refill    Cancer    Cancer Pain    Counseling    COPD    Goals        HPI    Nancy Farmer is a 64 y o  male w/ 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 s/pRT; COPD, DM2, hepatitis B, h/o nicotine dependence  He follows w/ [facility] (Medical Oncology)  Systemic treatment not yet initiated as genetic analysis is pending  Patient is new to [facility] clinic; seen by our inpatient consult service during a 3/22-27/22 [facility] admission for septic shock s/t obstructive PNA in the setting of lung mass  Also admitted 4/22-24/22 for obstructive PNA  Patient reports good symptom control using oxyIR 10mg about 4x per day, and lorazepam (1-2 tabs of 0 5mg) QHS for insomnia  He denies N/V, denies bowel issues, denies gait problems  He feels his appetite is normal  He c/o increased production of thick mucus which can cause some issues, but does not endorse significant dyspnea  Patient is comfortable with the idea of receiving chemotherapy   He wishes to treat his disease with intent of extending life expectancy  Patient enjoys strong support from his family  He is joined today by his wife  His sister and son joined via speakerphone and assisted w/ translation (patient preference)  PDMP shows no concerns  The following portions of the medical history were reviewed: past medical history, surgical history, problem list, medication list, family history, and social history        Current Outpatient Medications:     acetaminophen (TYLENOL) 650 mg CR tablet, Take 1 tablet (650 mg total) by mouth every 8 (eight) hours as needed for mild pain or moderate pain, Disp: 90 tablet, Rfl: 2    BD Pen Needle Jeaneth 2nd Gen 32G X 4 MM MISC, USE ONCE DAILY WITH LANTUS, Disp: , Rfl:     benzonatate (TESSALON PERLES) 100 mg capsule, Take 1 capsule (100 mg total) by mouth 3 (three) times a day as needed for cough, Disp: 30 capsule, Rfl: 2    Blood Glucose Monitoring Suppl (FreeStyle Lite) FIDELIA, , Disp: , Rfl:     FREESTYLE LITE test strip, Check blood sugar  daily, Disp: 100 each, Rfl: 3    insulin glargine (Lantus SoloStar) 100 units/mL injection pen, Inject 20 Units under the skin daily, Disp: 15 mL, Rfl: 3    Insulin Pen Needle (BD Pen Needle Jeaneth U/F) 32G X 4 MM MISC, Use daily as directed with insulin pen, Disp: 100 each, Rfl: 3    ipratropium-albuterol (Combivent Respimat) inhaler, Inhale 1 puff 4 (four) times a day, Disp: 4 g, Rfl: 1    Lancets (freestyle) lancets, Check blood sugar daily, Disp: 100 each, Rfl: 3    levETIRAcetam (KEPPRA) 500 mg tablet, Take 1 tablet (500 mg total) by mouth every 12 (twelve) hours, Disp: 60 tablet, Rfl: 3    [START ON 5/1/2022] LORazepam (ATIVAN) 0 5 mg tablet, For sleep 1-2 tablets at bed time (Max 1 mg ), Disp: 60 tablet, Rfl: 0    metFORMIN (GLUCOPHAGE-XR) 500 mg 24 hr tablet, Take 2 tablets (1,000 mg total) by mouth daily with dinner, Disp: 180 tablet, Rfl: 2    naloxone (NARCAN) 4 mg/0 1 mL nasal spray, Administer 1 spray into a nostril  If no response after 2-3 minutes, give another dose in the other nostril using a new spray  It has to be on stand by use with opioid use - in case of overdose, Disp: 1 each, Rfl: 1    [START ON 5/1/2022] oxyCODONE (ROXICODONE) 10 MG TABS, Take 1 tablet (10 mg total) by mouth every 6 (six) hours as needed for moderate pain Max Daily Amount: 40 mg, Disp: 120 tablet, Rfl: 0    pantoprazole (PROTONIX) 40 mg tablet, Take 1 tablet (40 mg total) by mouth daily in the early morning, Disp: 90 tablet, Rfl: 1    Cholecalciferol (Vitamin D3) 125 MCG (5000 UT) CAPS, Take 1 capsule (5,000 Units total) by mouth daily, Disp: 90 capsule, Rfl: 2    guaiFENesin (MUCINEX) 600 mg 12 hr tablet, Take 2 tablets (1,200 mg total) by mouth every 12 (twelve) hours as needed for cough or congestion (thick mucis), Disp: 60 tablet, Rfl: 2    Review of Systems   Constitutional: Positive for activity change, fatigue and unexpected weight change  Negative for appetite change  Eyes: Negative for pain and redness  Respiratory: Negative for shortness of breath  Thick mucus, increased mucus production  Gastrointestinal: Negative for abdominal pain, constipation, diarrhea, nausea and vomiting  Endocrine: Negative for polydipsia and polyphagia  Musculoskeletal: Negative for gait problem  Allergic/Immunologic: Positive for immunocompromised state  Neurological: Positive for headaches  Negative for facial asymmetry and speech difficulty  Psychiatric/Behavioral: Positive for sleep disturbance  Negative for behavioral problems, decreased concentration and dysphoric mood  The patient is nervous/anxious  OBJECTIVE:  /76 (BP Location: Left arm, Patient Position: Sitting, Cuff Size: Standard)   Pulse 80   Temp 98 6 °F (37 °C) (Temporal)   Resp 18   Ht 5' 6" (1 676 m)   Wt 65 1 kg (143 lb 8 oz)   SpO2 97%   BMI 23 16 kg/m²   Physical Exam  Vitals reviewed     Constitutional:       General: He is not in acute distress  Appearance: Normal appearance  He is well-groomed and normal weight  He is not toxic-appearing  HENT:      Head: Normocephalic and atraumatic  Right Ear: External ear normal       Left Ear: External ear normal    Eyes:      General: No scleral icterus  Right eye: No discharge  Left eye: No discharge  Extraocular Movements: Extraocular movements intact  Conjunctiva/sclera: Conjunctivae normal       Pupils: Pupils are equal, round, and reactive to light  Cardiovascular:      Rate and Rhythm: Normal rate  Pulmonary:      Effort: Pulmonary effort is normal  No tachypnea, bradypnea, accessory muscle usage or respiratory distress  Abdominal:      General: There is no distension  Tenderness: There is no guarding  Musculoskeletal:      Cervical back: Normal range of motion  Right lower leg: No edema  Left lower leg: No edema  Skin:     General: Skin is dry  Coloration: Skin is not pale  Neurological:      Mental Status: He is alert and oriented to person, place, and time  Cranial Nerves: No dysarthria or facial asymmetry  Gait: Gait normal    Psychiatric:         Attention and Perception: Attention normal          Mood and Affect: Mood and affect normal          Speech: Speech normal          Behavior: Behavior normal  Behavior is cooperative  Thought Content: Thought content normal          Cognition and Memory: Cognition and memory normal          Judgment: Judgment normal         Glenda Call MD  [facility]      Portions of this document may have been created using dictation software and as such some "sound alike" terms may have been generated by the system  Do not hesitate to contact me with any questions or clarifications

## 2022-04-30 ENCOUNTER — APPOINTMENT (OUTPATIENT)
Dept: LAB | Facility: CLINIC | Age: 57
End: 2022-04-30
Payer: COMMERCIAL

## 2022-04-30 DIAGNOSIS — J18.9 PNEUMONIA: ICD-10-CM

## 2022-04-30 LAB
ANISOCYTOSIS BLD QL SMEAR: PRESENT
BASOPHILS # BLD MANUAL: 0 THOUSAND/UL (ref 0–0.1)
BASOPHILS NFR MAR MANUAL: 0 % (ref 0–1)
EOSINOPHIL # BLD MANUAL: 0.29 THOUSAND/UL (ref 0–0.4)
EOSINOPHIL NFR BLD MANUAL: 2 % (ref 0–6)
ERYTHROCYTE [DISTWIDTH] IN BLOOD BY AUTOMATED COUNT: 15.6 % (ref 11.6–15.1)
HCT VFR BLD AUTO: 40.8 % (ref 36.5–49.3)
HGB BLD-MCNC: 13.2 G/DL (ref 12–17)
LYMPHOCYTES # BLD AUTO: 25 % (ref 14–44)
LYMPHOCYTES # BLD AUTO: 3.69 THOUSAND/UL (ref 0.6–4.47)
MCH RBC QN AUTO: 30.1 PG (ref 26.8–34.3)
MCHC RBC AUTO-ENTMCNC: 32.4 G/DL (ref 31.4–37.4)
MCV RBC AUTO: 93 FL (ref 82–98)
METAMYELOCYTES NFR BLD MANUAL: 1 % (ref 0–1)
MONOCYTES # BLD AUTO: 2.06 THOUSAND/UL (ref 0–1.22)
MONOCYTES NFR BLD: 14 % (ref 4–12)
MYELOCYTES NFR BLD MANUAL: 1 % (ref 0–1)
NEUTROPHILS # BLD MANUAL: 8.11 THOUSAND/UL (ref 1.85–7.62)
NEUTS BAND NFR BLD MANUAL: 1 % (ref 0–8)
NEUTS SEG NFR BLD AUTO: 54 % (ref 43–75)
PLATELET # BLD AUTO: 367 THOUSANDS/UL (ref 149–390)
PLATELET BLD QL SMEAR: ABNORMAL
PMV BLD AUTO: 8.4 FL (ref 8.9–12.7)
RBC # BLD AUTO: 4.38 MILLION/UL (ref 3.88–5.62)
VARIANT LYMPHS # BLD AUTO: 2 %
WBC # BLD AUTO: 14.74 THOUSAND/UL (ref 4.31–10.16)

## 2022-04-30 PROCEDURE — 85007 BL SMEAR W/DIFF WBC COUNT: CPT

## 2022-04-30 PROCEDURE — 85027 COMPLETE CBC AUTOMATED: CPT

## 2022-04-30 PROCEDURE — 36415 COLL VENOUS BLD VENIPUNCTURE: CPT

## 2022-05-03 ENCOUNTER — TELEPHONE (OUTPATIENT)
Dept: HEMATOLOGY ONCOLOGY | Facility: CLINIC | Age: 57
End: 2022-05-03

## 2022-05-03 ENCOUNTER — HOSPITAL ENCOUNTER (OUTPATIENT)
Dept: RADIOLOGY | Facility: HOSPITAL | Age: 57
Discharge: HOME | End: 2022-05-03
Payer: COMMERCIAL

## 2022-05-03 ENCOUNTER — TELEPHONE (OUTPATIENT)
Dept: SURGICAL ONCOLOGY | Facility: CLINIC | Age: 57
End: 2022-05-03

## 2022-05-03 ENCOUNTER — OFFICE VISIT (OUTPATIENT)
Dept: HEMATOLOGY ONCOLOGY | Facility: CLINIC | Age: 57
End: 2022-05-03
Payer: COMMERCIAL

## 2022-05-03 VITALS
DIASTOLIC BLOOD PRESSURE: 70 MMHG | OXYGEN SATURATION: 82 % | TEMPERATURE: 97.2 F | RESPIRATION RATE: 16 BRPM | HEIGHT: 66 IN | WEIGHT: 138 LBS | SYSTOLIC BLOOD PRESSURE: 124 MMHG | BODY MASS INDEX: 22.18 KG/M2 | HEART RATE: 97 BPM

## 2022-05-03 DIAGNOSIS — C34.91 ADENOCARCINOMA OF RIGHT LUNG (HCC): Primary | ICD-10-CM

## 2022-05-03 DIAGNOSIS — Z51.5 PALLIATIVE CARE PATIENT: ICD-10-CM

## 2022-05-03 DIAGNOSIS — J18.9 PNEUMONIA OF RIGHT UPPER LOBE DUE TO INFECTIOUS ORGANISM: ICD-10-CM

## 2022-05-03 DIAGNOSIS — D70.1 CHEMOTHERAPY INDUCED NEUTROPENIA (HCC): Primary | ICD-10-CM

## 2022-05-03 DIAGNOSIS — T45.1X5A CHEMOTHERAPY INDUCED NEUTROPENIA (HCC): Primary | ICD-10-CM

## 2022-05-03 DIAGNOSIS — C34.91 ADENOCARCINOMA OF RIGHT LUNG (HCC): ICD-10-CM

## 2022-05-03 DIAGNOSIS — Z87.891 HISTORY OF TOBACCO USE: ICD-10-CM

## 2022-05-03 LAB
MYCOBACTERIUM SPEC CULT: NORMAL
RHODAMINE-AURAMINE STN SPEC: NORMAL

## 2022-05-03 PROCEDURE — 71046 X-RAY EXAM CHEST 2 VIEWS: CPT

## 2022-05-03 PROCEDURE — 99215 OFFICE O/P EST HI 40 MIN: CPT | Performed by: INTERNAL MEDICINE

## 2022-05-03 NOTE — TELEPHONE ENCOUNTER
Spoke to IR with a tentative date of 5/10/22 for port [place] at the [facility] [place] [facility] [place]  They will confirm with the patient

## 2022-05-03 NOTE — PROGRESS NOTES
Ru Castle  1965  [address] [facility] [place]  [address] [facility] [place] [id number]    DISCUSSION/SUMMARY:    54-year-old male recently diagnosed with non-small cell lung carcinoma with brain metastases  Issues:    Brain metastases  Patient was seen/evaluated by Dr Diamond Good, completing RT - tolerating these treatments well  Steroids have been tapered  Patient will follow-up with Radiation Oncology; already scheduled for follow-up MRI  Non-small cell lung carcinoma  RUL lung biopsy demonstrated adenocarcinoma  PET-CT demonstrated a number of metastatic lesions  NextGen sequencing did not demonstrate any actionable mutations  PDL-1 IHCs were negative  Patient feels +/-, has lost some weight  No pain control issues  No respiratory issues  No recent infections  We discussed options  Mr Coretta Shannon agrees to begin chemotherapy  Nursing staff spent time with patient and family today going over specifics, side effects and toxicities  NCCN guidelines 3 2022 state that for patients with M1 non-small cell lung carcinoma, adenocarcinoma, no actionable mutations, contraindication to PDL1, bevacizumab/carbo/paclitaxel is a category 1 regimen  Regimen  Bevacizumab 15 mg/kg IV day 1  Paclitaxel 175 mg/m2 IV day 1 (85% first cycle)  Carboplatin AUC = 5 5 IV day 1  G-CSF 3 mg subcu day 2  Cycle = 21 days  Goal = palliation    Paclitaxel and carboplatin will be increased on the 2nd cycle if patient tolerates  Patient has been referred to palliative care  Patient has also been referred to nutritional services  IR consulted for port placement  Mr Coretta Shannon and his family demonstrated a very good understanding of the situation, the fact that patient has metastatic disease that is likely not curable but that treatments can prolong his life and hopefully will improve his quality of life    We also discussed repeat scanning down the line and if good response to treatment, maintenance therapy  Patient is to return in 3 weeks  Mr Kenzie Woodard knows to call the hematology/oncology office if there are any other questions or concerns  Carefully review your medication list and verify that the list is accurate and up-to-date  Please call the hematology/oncology office if there are medications missing from the list, medications on the list that you are not currently taking or if there is a dosage or instruction that is different from how you're taking that medication  Patient goals and areas of care:  Begin systemic treatments  Barriers to care:  None  Patient is able to self-care   ______________________________________________________________________________________    Chief Complaint   Patient presents with    Follow-up     Metastatic non-small cell lung carcinoma     History of Present Illness:  44-year-old male recently admitted to [facility]  Patient was treated for obstructive pneumonia, septic shock, acute kidney injury and respiratory failure  Workup demonstrated a lung mass as well as lesions in the brain consistent with metastatic disease  Patient eventually improved and was discharged  Mr Kenzie Woodard was seen/evaluated by Radiation Oncology and completed whole-brain RT  Since the last office visit, patient has undergone a PET-CT and NextGen sequencing  Mr Kenzie Woodard presents with his wife and his brother  Patient states feeling +/-, has lost some weight  Appetite is +/- but no nausea vomiting  No cough, sputum or hemoptysis  Activities are limited- about the same as before  No pain control issues  No fevers or signs of infection recently  Fatigue is the same as before  Patient has a 40+ pack-year history of tobacco use, 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  Review of Systems   Constitutional: Positive for activity change, appetite change, fatigue and unexpected weight change  HENT: Negative  Eyes: Negative      Respiratory: Negative  Cardiovascular: Negative  Gastrointestinal: Negative  Endocrine: Negative  Genitourinary: Negative  Musculoskeletal: Negative  Skin: Negative  Allergic/Immunologic: Negative  Neurological: Negative  Hematological: Negative  Psychiatric/Behavioral: Negative  All other systems reviewed and are negative  Patient Active Problem List   Diagnosis    Type 2 diabetes mellitus without complication, with long-term current use of insulin (HCC)    Nonimmune to hepatitis B virus    Elevated PSA    Gall bladder polyp    Vitamin D deficiency    Cyanocobalamin deficiency    History of tobacco use    Lung mass    Brain mass    Cerebral edema (HCC)    Sepsis ([facility] )    Obstructive pneumonia    Adenocarcinoma of right lung ([facility] )    Pneumonia due to Klebsiella pneumoniae ([place] [address] )    Cancer associated pain    Adjustment insomnia    Continuous opioid dependence (HCC)    Chronic obstructive pulmonary disease ([place] [address] )    Palliative care patient     Past Medical History:   Diagnosis Date    Diabetes mellitus ([place] [address] )     Elevated PSA 3/11/2021    Fatty liver 3/11/2021    Gall bladder polyp 3/11/2021    Lung cancer ([place] [address] )     Nonimmune to hepatitis B virus 3/11/2021     No past surgical history on file    Family History   Problem Relation Age of Onset    No Known Problems Mother     No Known Problems Father      Social History     Socioeconomic History    Marital status: /Civil Union     Spouse name: Not on file    Number of children: Not on file    Years of education: Not on file    Highest education level: Not on file   Occupational History    Not on file   Tobacco Use    Smoking status: Former Smoker     Packs/day: 0 50     Types: Cigarettes     Quit date: 2022     Years since quittin 2    Smokeless tobacco: Never Used    Tobacco comment: quit 3 months ago   Vaping Use    Vaping Use: Never used   Substance and Sexual Activity    Alcohol use: Not Currently     Comment: quit drinking 7 years ago    Drug use: Never    Sexual activity: Not on file   Other Topics Concern    Not on file   Social History Narrative    Patient lives independently with his wife and son  He also has a brother and sister who are very active in his life  Cheryle Collier is the only person in the home who is employed so not only is there stress regarding cancer diagnosis but also the financial stability of his family  Social Determinants of Health     Financial Resource Strain: Not on file   Food Insecurity: No Food Insecurity    Worried About Running Out of Food in the Last Year: Never true    Brittany of Food in the Last Year: Never true   Transportation Needs: No Transportation Needs    Lack of Transportation (Medical): No    Lack of Transportation (Non-Medical):  No   Physical Activity: Not on file   Stress: Not on file   Social Connections: Not on file   Intimate Partner Violence: Not on file   Housing Stability: Low Risk     Unable to Pay for Housing in the Last Year: No    Number of Places Lived in the Last Year: 1    Unstable Housing in the Last Year: No       Current Outpatient Medications:     acetaminophen (TYLENOL) 650 mg CR tablet, Take 1 tablet (650 mg total) by mouth every 8 (eight) hours as needed for mild pain or moderate pain, Disp: 90 tablet, Rfl: 2    BD Pen Needle Jeaneth 2nd Gen 32G X 4 MM MISC, USE ONCE DAILY WITH LANTUS, Disp: , Rfl:     benzonatate (TESSALON PERLES) 100 mg capsule, Take 1 capsule (100 mg total) by mouth 3 (three) times a day as needed for cough, Disp: 30 capsule, Rfl: 2    Blood Glucose Monitoring Suppl (FreeStyle Lite) FIDELIA, , Disp: , Rfl:     FREESTYLE LITE test strip, Check blood sugar  daily, Disp: 100 each, Rfl: 3    guaiFENesin (MUCINEX) 600 mg 12 hr tablet, Take 2 tablets (1,200 mg total) by mouth every 12 (twelve) hours as needed for cough or congestion (thick mucis), Disp: 60 tablet, Rfl: 2    insulin glargine (Lantus SoloStar) 100 units/mL injection pen, Inject 20 Units under the skin daily, Disp: 15 mL, Rfl: 3    Insulin Pen Needle (BD Pen Needle Jeaneth U/F) 32G X 4 MM MISC, Use daily as directed with insulin pen, Disp: 100 each, Rfl: 3    ipratropium-albuterol (Combivent Respimat) inhaler, Inhale 1 puff 4 (four) times a day, Disp: 4 g, Rfl: 1    Lancets (freestyle) lancets, Check blood sugar daily, Disp: 100 each, Rfl: 3    levETIRAcetam (KEPPRA) 500 mg tablet, Take 1 tablet (500 mg total) by mouth every 12 (twelve) hours, Disp: 60 tablet, Rfl: 3    LORazepam (ATIVAN) 0 5 mg tablet, For sleep 1-2 tablets at bed time (Max 1 mg ), Disp: 60 tablet, Rfl: 0    metFORMIN (GLUCOPHAGE-XR) 500 mg 24 hr tablet, Take 2 tablets (1,000 mg total) by mouth daily with dinner, Disp: 180 tablet, Rfl: 2    naloxone (NARCAN) 4 mg/0 1 mL nasal spray, Administer 1 spray into a nostril  If no response after 2-3 minutes, give another dose in the other nostril using a new spray  It has to be on stand by use with opioid use - in case of overdose, Disp: 1 each, Rfl: 1    oxyCODONE (ROXICODONE) 10 MG TABS, Take 1 tablet (10 mg total) by mouth every 6 (six) hours as needed for moderate pain Max Daily Amount: 40 mg, Disp: 120 tablet, Rfl: 0    pantoprazole (PROTONIX) 40 mg tablet, Take 1 tablet (40 mg total) by mouth daily in the early morning, Disp: 90 tablet, Rfl: 1    Cholecalciferol (Vitamin D3) 125 MCG (5000 UT) CAPS, Take 1 capsule (5,000 Units total) by mouth daily, Disp: 90 capsule, Rfl: 2    No Known Allergies    Vitals:    05/03/22 1405   BP: 124/70   Pulse: 97   Resp: 16   Temp: (!) 97 2 °F (36 2 °C)   SpO2: (!) 82%     Physical Exam  Constitutional:       Appearance: He is well-developed  Comments: Thin, more frail appearing middle-aged male, no respiratory distress, no signs of pain   HENT:      Head: Normocephalic and atraumatic        Right Ear: External ear normal       Left Ear: External ear normal       Mouth/Throat:      Comments: Oral mucosa moist and pink, no exudate, upper and lower plates  Eyes:      Conjunctiva/sclera: Conjunctivae normal       Pupils: Pupils are equal, round, and reactive to light  Cardiovascular:      Rate and Rhythm: Normal rate and regular rhythm  Heart sounds: Normal heart sounds  Pulmonary:      Effort: Pulmonary effort is normal       Breath sounds: Normal breath sounds  Comments: Fair to good air entry bilaterally, few scattered bilateral rhonchi  Abdominal:      General: Bowel sounds are normal       Palpations: Abdomen is soft  Comments: +bowel sounds, nontender, soft   Musculoskeletal:         General: Normal range of motion  Cervical back: Normal range of motion and neck supple  Skin:     General: Skin is warm  Comments: Warm, moist, good color, no petechiae, resolving upper extremity ecchymotic lesions (from hospitalization)   Neurological:      Mental Status: He is alert and oriented to person, place, and time  Deep Tendon Reflexes: Reflexes are normal and symmetric  Psychiatric:         Behavior: Behavior normal          Thought Content: Thought content normal          Judgment: Judgment normal      Extremities:  No lower extremity edema bilaterally, no cords, pulses are 1+   Lymphatics:  No adenopathy in the neck, supraclavicular region, axilla and groin bilaterally    Labs    03/27/2022 WBC = 14 6 hemoglobin = 14 2 hematocrit = 44 MCV = 96 platelet = 833 neutrophil = 63% bands = 10% lymphocytes = 11% monocyte = 10% eosinophil = 3%        03/23/2022 BUN = 22 creatinine = 1 04 calcium = 8 0 AST = 16 ALT = 33 alkaline phosphatase = 49 total protein = 5 0 albumin = 1 8 total bilirubin = 0 67    Imaging    04/13/2022 PET-CT    1  Large right upper lung hypermetabolic necrotic mass measures 10 1 x 8 4 x 11 4 cm, SUV 19 8, compatible with known malignancy  This mass invades the right hilum and mediastinum, and appears inseparable from the paratracheal adenopathy    2   Right adrenal metastasis measures 1 1 x 0 9 cm  Minimal nodular activity in the left adrenal may be physiologic, but may be reassessed on follow-up exam to exclude an additional early metastasis  3   1 5 x 1 2 cm hypermetabolic splenic lesion most concerning for metastasis  4   Known brain metastases are not well evaluated on this exam   5   No additional hypermetabolic metastases visualized  03/22/2022 CT head without contrast   Impression stated no acute findings, unchanged metastatic lesions with surrounding edema in the right occipital and left frontal lobes  03/26/2022 chest x-ray portable impression stated progressive right upper lobe consolidation of score is the known underlying mass  03/22/2022 CTA chest PE study    PULMONARY ARTERIAL TREE:  No pulmonary embolus is seen        LUNGS:  Large right upper lobe mass unchanged   New extensive surrounding groundglass density in the right upper lobe lobe, with more consolidative density at the apex   New small patchy groundglass glass densities in the left upper and right lower   lobes   Unchanged occlusion of the right upper lobe bronchus by the mass   No other tracheal or endobronchial lesion        PLEURA:  Unremarkable        HEART/GREAT VESSELS:  Unremarkable for patient's age  No thoracic aortic aneurysm        MEDIASTINUM AND NAS:  Unchanged mediastinal and right hilar adenopathy  No pulmonary embolus          Impression:  Unchanged large right upper lobe mass most compatible with carcinoma, with new extensive right upper lobe kerri   undglass density which could represent postobstructive pneumonia, edema, hemorrhage or lymphangitic carcinomatosis   New groundglass infiltrates   in the left upper and right lower lobes, presumably infectious/inflammatory        Unchanged mediastinal and right hilar adenopathy       Pathology                  Case Report   Non-gynecologic Cytology                          Case: [id number]                                   Authorizing Provider: Prem Vargas DO      Collected:           03/16/2022 1203               Ordering Location:     [facility]      Received:            03/16/2022 [address] [place] [address]                                                               Pathologist:           Emmy Villela MD                                                                  Specimens:   A) - Lung, Right Upper Lobe Bronchoalveolar Lavage                                                   B) - Lung, Right Upper Lobe Bronchoalveolar Lavage, cell block                                       C) - Lung, Right Upper Lobe Bronchial Brushing, with brush                                           D) - Lung, Right Upper Lobe, FNA                                                           Final Diagnosis   A & B  Lung, Right Upper Lobe Bronchoalveolar Lavage, : (Thin-Prep and cell block)  Negative for malignancy  Rare benign bronchial cells  Abundant macrophages and mixed inflammatory cells      Satisfactory for evaluation  C  Lung, Right Upper Lobe Bronchial Brushing, with brush: Atypical cellular changes seen  Atypical epithelioid cells, scant cellularity      Satisfactory for evaluation      D   Lung, Right Upper Lobe, FNA:  Positive for malignancy      The concomitant biopsy ([id number]) demonstrates Non-small cell carcinoma consistent with adenocarcinoma      Intradepartmental consultation is in agreement      Satisfactory for evaluation       Electronically signed by Emmy Villela MD on 3/21/2022 at 10:37 AM

## 2022-05-03 NOTE — H&P (VIEW-ONLY)
Ru Castle  1965  [address]  [facility]  [address]  [facility] [address]    DISCUSSION/SUMMARY:    51-year-old male recently diagnosed with non-small cell lung carcinoma with brain metastases  Issues:    Brain metastases  Patient was seen/evaluated by Dr Demian Lyons, completing RT - tolerating these treatments well  Steroids have been tapered  Patient will follow-up with Radiation Oncology; already scheduled for follow-up MRI  Non-small cell lung carcinoma  RUL lung biopsy demonstrated adenocarcinoma  PET-CT demonstrated a number of metastatic lesions  NextGen sequencing did not demonstrate any actionable mutations  PDL-1 IHCs were negative  Patient feels +/-, has lost some weight  No pain control issues  No respiratory issues  No recent infections  We discussed options  Mr Chris Avery agrees to begin chemotherapy  Nursing staff spent time with patient and family today going over specifics, side effects and toxicities  NCCN guidelines 3 2022 state that for patients with M1 non-small cell lung carcinoma, adenocarcinoma, no actionable mutations, contraindication to PDL1, bevacizumab/carbo/paclitaxel is a category 1 regimen  Regimen  Bevacizumab 15 mg/kg IV day 1  Paclitaxel 175 mg/m2 IV day 1 (85% first cycle)  Carboplatin AUC = 5 5 IV day 1  G-CSF 3 mg subcu day 2  Cycle = 21 days  Goal = palliation    Paclitaxel and carboplatin will be increased on the 2nd cycle if patient tolerates  Patient has been referred to palliative care  Patient has also been referred to nutritional services  IR consulted for port placement  Mr Chris Avery and his family demonstrated a very good understanding of the situation, the fact that patient has metastatic disease that is likely not curable but that treatments can prolong his life and hopefully will improve his quality of life    We also discussed repeat scanning down the line and if good response to treatment, maintenance therapy  Patient is to return in 3 weeks  Mr Nicole Bateman knows to call the hematology/oncology office if there are any other questions or concerns  Carefully review your medication list and verify that the list is accurate and up-to-date  Please call the hematology/oncology office if there are medications missing from the list, medications on the list that you are not currently taking or if there is a dosage or instruction that is different from how you're taking that medication  Patient goals and areas of care:  Begin systemic treatments  Barriers to care:  None  Patient is able to self-care   ______________________________________________________________________________________    Chief Complaint   Patient presents with    Follow-up     Metastatic non-small cell lung carcinoma     History of Present Illness:  66-year-old male recently admitted to [facility]  Patient was treated for obstructive pneumonia, septic shock, acute kidney injury and respiratory failure  Workup demonstrated a lung mass as well as lesions in the brain consistent with metastatic disease  Patient eventually improved and was discharged  Mr Nicole Bateman was seen/evaluated by Radiation Oncology and completed whole-brain RT  Since the last office visit, patient has undergone a PET-CT and NextGen sequencing  Mr Nicole Bateman presents with his wife and his brother  Patient states feeling +/-, has lost some weight  Appetite is +/- but no nausea vomiting  No cough, sputum or hemoptysis  Activities are limited- about the same as before  No pain control issues  No fevers or signs of infection recently  Fatigue is the same as before  Patient has a 40+ pack-year history of tobacco use, 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  Review of Systems   Constitutional: Positive for activity change, appetite change, fatigue and unexpected weight change  HENT: Negative  Eyes: Negative      Respiratory: Negative  Cardiovascular: Negative  Gastrointestinal: Negative  Endocrine: Negative  Genitourinary: Negative  Musculoskeletal: Negative  Skin: Negative  Allergic/Immunologic: Negative  Neurological: Negative  Hematological: Negative  Psychiatric/Behavioral: Negative  All other systems reviewed and are negative  Patient Active Problem List   Diagnosis    Type 2 diabetes mellitus without complication, with long-term current use of insulin (HCC)    Nonimmune to hepatitis B virus    Elevated PSA    Gall bladder polyp    Vitamin D deficiency    Cyanocobalamin deficiency    History of tobacco use    Lung mass    Brain mass    Cerebral edema (HCC)    Sepsis ([facility] )    Obstructive pneumonia    Adenocarcinoma of right lung ([facility] )    Pneumonia due to Klebsiella pneumoniae ([place] [address] )    Cancer associated pain    Adjustment insomnia    Continuous opioid dependence (HCC)    Chronic obstructive pulmonary disease ([place] [address] )    Palliative care patient     Past Medical History:   Diagnosis Date    Diabetes mellitus ([place] [address] )     Elevated PSA 3/11/2021    Fatty liver 3/11/2021    Gall bladder polyp 3/11/2021    Lung cancer ([place] [address] )     Nonimmune to hepatitis B virus 3/11/2021     No past surgical history on file    Family History   Problem Relation Age of Onset    No Known Problems Mother     No Known Problems Father      Social History     Socioeconomic History    Marital status: /Civil Union     Spouse name: Not on file    Number of children: Not on file    Years of education: Not on file    Highest education level: Not on file   Occupational History    Not on file   Tobacco Use    Smoking status: Former Smoker     Packs/day: 0 50     Types: Cigarettes     Quit date: 2022     Years since quittin 2    Smokeless tobacco: Never Used    Tobacco comment: quit 3 months ago   Vaping Use    Vaping Use: Never used   Substance and Sexual Activity    Alcohol use: Not Currently     Comment: quit drinking 7 years ago    Drug use: Never    Sexual activity: Not on file   Other Topics Concern    Not on file   Social History Narrative    Patient lives independently with his wife and son  He also has a brother and sister who are very active in his life  Shana Rivero is the only person in the home who is employed so not only is there stress regarding cancer diagnosis but also the financial stability of his family  Social Determinants of Health     Financial Resource Strain: Not on file   Food Insecurity: No Food Insecurity    Worried About Running Out of Food in the Last Year: Never true    Brittany of Food in the Last Year: Never true   Transportation Needs: No Transportation Needs    Lack of Transportation (Medical): No    Lack of Transportation (Non-Medical):  No   Physical Activity: Not on file   Stress: Not on file   Social Connections: Not on file   Intimate Partner Violence: Not on file   Housing Stability: Low Risk     Unable to Pay for Housing in the Last Year: No    Number of Places Lived in the Last Year: 1    Unstable Housing in the Last Year: No       Current Outpatient Medications:     acetaminophen (TYLENOL) 650 mg CR tablet, Take 1 tablet (650 mg total) by mouth every 8 (eight) hours as needed for mild pain or moderate pain, Disp: 90 tablet, Rfl: 2    BD Pen Needle Jeaneth 2nd Gen 32G X 4 MM MISC, USE ONCE DAILY WITH LANTUS, Disp: , Rfl:     benzonatate (TESSALON PERLES) 100 mg capsule, Take 1 capsule (100 mg total) by mouth 3 (three) times a day as needed for cough, Disp: 30 capsule, Rfl: 2    Blood Glucose Monitoring Suppl (FreeStyle Lite) FIDELIA, , Disp: , Rfl:     FREESTYLE LITE test strip, Check blood sugar  daily, Disp: 100 each, Rfl: 3    guaiFENesin (MUCINEX) 600 mg 12 hr tablet, Take 2 tablets (1,200 mg total) by mouth every 12 (twelve) hours as needed for cough or congestion (thick mucis), Disp: 60 tablet, Rfl: 2    insulin glargine (Lantus SoloStar) 100 units/mL injection pen, Inject 20 Units under the skin daily, Disp: 15 mL, Rfl: 3    Insulin Pen Needle (BD Pen Needle Jeaneth U/F) 32G X 4 MM MISC, Use daily as directed with insulin pen, Disp: 100 each, Rfl: 3    ipratropium-albuterol (Combivent Respimat) inhaler, Inhale 1 puff 4 (four) times a day, Disp: 4 g, Rfl: 1    Lancets (freestyle) lancets, Check blood sugar daily, Disp: 100 each, Rfl: 3    levETIRAcetam (KEPPRA) 500 mg tablet, Take 1 tablet (500 mg total) by mouth every 12 (twelve) hours, Disp: 60 tablet, Rfl: 3    LORazepam (ATIVAN) 0 5 mg tablet, For sleep 1-2 tablets at bed time (Max 1 mg ), Disp: 60 tablet, Rfl: 0    metFORMIN (GLUCOPHAGE-XR) 500 mg 24 hr tablet, Take 2 tablets (1,000 mg total) by mouth daily with dinner, Disp: 180 tablet, Rfl: 2    naloxone (NARCAN) 4 mg/0 1 mL nasal spray, Administer 1 spray into a nostril  If no response after 2-3 minutes, give another dose in the other nostril using a new spray  It has to be on stand by use with opioid use - in case of overdose, Disp: 1 each, Rfl: 1    oxyCODONE (ROXICODONE) 10 MG TABS, Take 1 tablet (10 mg total) by mouth every 6 (six) hours as needed for moderate pain Max Daily Amount: 40 mg, Disp: 120 tablet, Rfl: 0    pantoprazole (PROTONIX) 40 mg tablet, Take 1 tablet (40 mg total) by mouth daily in the early morning, Disp: 90 tablet, Rfl: 1    Cholecalciferol (Vitamin D3) 125 MCG (5000 UT) CAPS, Take 1 capsule (5,000 Units total) by mouth daily, Disp: 90 capsule, Rfl: 2    No Known Allergies    Vitals:    05/03/22 1405   BP: 124/70   Pulse: 97   Resp: 16   Temp: (!) 97 2 °F (36 2 °C)   SpO2: (!) 82%     Physical Exam  Constitutional:       Appearance: He is well-developed  Comments: Thin, more frail appearing middle-aged male, no respiratory distress, no signs of pain   HENT:      Head: Normocephalic and atraumatic        Right Ear: External ear normal       Left Ear: External ear normal       Mouth/Throat:      Comments: Oral mucosa moist and pink, no exudate, upper and lower plates  Eyes:      Conjunctiva/sclera: Conjunctivae normal       Pupils: Pupils are equal, round, and reactive to light  Cardiovascular:      Rate and Rhythm: Normal rate and regular rhythm  Heart sounds: Normal heart sounds  Pulmonary:      Effort: Pulmonary effort is normal       Breath sounds: Normal breath sounds  Comments: Fair to good air entry bilaterally, few scattered bilateral rhonchi  Abdominal:      General: Bowel sounds are normal       Palpations: Abdomen is soft  Comments: +bowel sounds, nontender, soft   Musculoskeletal:         General: Normal range of motion  Cervical back: Normal range of motion and neck supple  Skin:     General: Skin is warm  Comments: Warm, moist, good color, no petechiae, resolving upper extremity ecchymotic lesions (from hospitalization)   Neurological:      Mental Status: He is alert and oriented to person, place, and time  Deep Tendon Reflexes: Reflexes are normal and symmetric  Psychiatric:         Behavior: Behavior normal          Thought Content: Thought content normal          Judgment: Judgment normal      Extremities:  No lower extremity edema bilaterally, no cords, pulses are 1+   Lymphatics:  No adenopathy in the neck, supraclavicular region, axilla and groin bilaterally    Labs    03/27/2022 WBC = 14 6 hemoglobin = 14 2 hematocrit = 44 MCV = 96 platelet = 834 neutrophil = 63% bands = 10% lymphocytes = 11% monocyte = 10% eosinophil = 3%        03/23/2022 BUN = 22 creatinine = 1 04 calcium = 8 0 AST = 16 ALT = 33 alkaline phosphatase = 49 total protein = 5 0 albumin = 1 8 total bilirubin = 0 67    Imaging    04/13/2022 PET-CT    1  Large right upper lung hypermetabolic necrotic mass measures 10 1 x 8 4 x 11 4 cm, SUV 19 8, compatible with known malignancy  This mass invades the right hilum and mediastinum, and appears inseparable from the paratracheal adenopathy    2   Right adrenal metastasis measures 1 1 x 0 9 cm  Minimal nodular activity in the left adrenal may be physiologic, but may be reassessed on follow-up exam to exclude an additional early metastasis  3   1 5 x 1 2 cm hypermetabolic splenic lesion most concerning for metastasis  4   Known brain metastases are not well evaluated on this exam   5   No additional hypermetabolic metastases visualized  03/22/2022 CT head without contrast   Impression stated no acute findings, unchanged metastatic lesions with surrounding edema in the right occipital and left frontal lobes  03/26/2022 chest x-ray portable impression stated progressive right upper lobe consolidation of score is the known underlying mass  03/22/2022 CTA chest PE study    PULMONARY ARTERIAL TREE:  No pulmonary embolus is seen        LUNGS:  Large right upper lobe mass unchanged   New extensive surrounding groundglass density in the right upper lobe lobe, with more consolidative density at the apex   New small patchy groundglass glass densities in the left upper and right lower   lobes   Unchanged occlusion of the right upper lobe bronchus by the mass   No other tracheal or endobronchial lesion        PLEURA:  Unremarkable        HEART/GREAT VESSELS:  Unremarkable for patient's age  No thoracic aortic aneurysm        MEDIASTINUM AND NAS:  Unchanged mediastinal and right hilar adenopathy  No pulmonary embolus          Impression:  Unchanged large right upper lobe mass most compatible with carcinoma, with new extensive right upper lobe kerri   undglass density which could represent postobstructive pneumonia, edema, hemorrhage or lymphangitic carcinomatosis   New groundglass infiltrates   in the left upper and right lower lobes, presumably infectious/inflammatory        Unchanged mediastinal and right hilar adenopathy       Pathology                  Case Report   Non-gynecologic Cytology                          Case: [id number]                                   Authorizing Provider: Ghazal Whitley DO      Collected:           03/16/2022 1203               Ordering Location:     [facility]      Received:            03/16/2022 [address] [place] [address]                                                               Pathologist:           Yovanny Jay MD                                                                  Specimens:   A) - Lung, Right Upper Lobe Bronchoalveolar Lavage                                                   B) - Lung, Right Upper Lobe Bronchoalveolar Lavage, cell block                                       C) - Lung, Right Upper Lobe Bronchial Brushing, with brush                                           D) - Lung, Right Upper Lobe, FNA                                                           Final Diagnosis   A & B  Lung, Right Upper Lobe Bronchoalveolar Lavage, : (Thin-Prep and cell block)  Negative for malignancy  Rare benign bronchial cells  Abundant macrophages and mixed inflammatory cells      Satisfactory for evaluation  C  Lung, Right Upper Lobe Bronchial Brushing, with brush: Atypical cellular changes seen  Atypical epithelioid cells, scant cellularity      Satisfactory for evaluation      D   Lung, Right Upper Lobe, FNA:  Positive for malignancy      The concomitant biopsy ([id number]) demonstrates Non-small cell carcinoma consistent with adenocarcinoma      Intradepartmental consultation is in agreement      Satisfactory for evaluation       Electronically signed by Yovanny Jay MD on 3/21/2022 at 10:37 AM

## 2022-05-04 ENCOUNTER — TELEPHONE (OUTPATIENT)
Dept: NUTRITION | Facility: CLINIC | Age: 57
End: 2022-05-04

## 2022-05-04 NOTE — PROGRESS NOTES
Outpatient Oncology Nutrition Consultation   Type of Consult: Initial Consult  Care Location: Telephone Call  - met w/pt & son Raynelle Skiff) - pt gave his verbal permission for son to provide the info for today's visit    Reason for referral:   Received notification by Armando Blevins  on 3/29/21 that pt has triggered for oncology nutrition care (reason for referral: wt loss, lack of appetite)  Received notification by Dr Ambar Melo on 5/3/22 that pt has triggered for oncology nutrition care (reason for referral: none given, ambulatory referral)      Nutrition Assessment:   Oncology Diagnosis & Treatments: non-small cell lung carcinoma with brain metastases  S/p partial brain SRT 3/30/21-4/8/22  For palliative chemotherapy (bevacizumab, paclitaxel, carboplatin, G-CSF) starting 5/11/22  Oncology History   Adenocarcinoma of right lung ([facility] [address] )   3/28/2022 Initial Diagnosis    Adenocarcinoma of right lung ([facility] [address] )     5/11/2022 -  Chemotherapy    pegfilgrastim (NEULASTA), 3 mg (100 % of original dose 3 mg), Subcutaneous, Once, 1 of 14 cycles  Dose modification: 3 mg (original dose 3 mg, Cycle 1)  fosaprepitant (EMEND) IVPB, 150 mg, Intravenous, Once, 1 of 6 cycles  Administration: 150 mg (5/11/2022)  CARBOplatin (PARAPLATIN) IVPB (GOG AUC DOSING), 604 45 mg, Intravenous, Once, 1 of 6 cycles  bevacizumab (AVASTIN) IVPB, 940 mg, Intravenous, Once, 1 of 15 cycles  PACLItaxel (TAXOL) chemo IVPB, 148 75 mg/m2 = 254 4 mg (85 % of original dose 175 mg/m2), Intravenous, Once, 1 of 6 cycles  Dose modification: 148 75 mg/m2 (85 % of original dose 175 mg/m2, Cycle 1, Reason: Dose Not Tolerated)  Administration: 254 4 mg (5/11/2022)       Past Medical & Surgical Hx:   Patient Active Problem List   Diagnosis    Type 2 diabetes mellitus without complication, with long-term current use of insulin (HCC)    Nonimmune to hepatitis B virus    Elevated PSA    Gall bladder polyp    Vitamin D deficiency    Cyanocobalamin deficiency    History of tobacco use    Lung mass    Brain mass    Cerebral edema (HCC)    Sepsis (HCC)    Obstructive pneumonia    Ad[facility]ocarcinoma [facility] right lung ([facility] )    Pneumonia due to Klebsiella pneumoniae ([place] [address] )    Cancer associated pain    Adjustment insomnia    Continuous opioid dependence (HCC)    Chronic obstructive pulmonary disease (HCC)    Palliative care patient    Chemotherapy induced neutropenia ([place] [address] )     Past Medical History:   Diagnosis Date    Diabetes mellitus ([place] [address] )     Elevated PSA 3/11/2021    Fatty liver 3/11/2021    Gall bladder polyp 3/11/2021    Lung cancer ([place] [address] )     Nonimmune to hepatitis B virus 3/11/2021     Past Surgical History:   Procedure Laterality Date    FL GUIDED CENTRAL VENOUS ACCESS DEVICE INSERTION  5/10/2022    LUNG BIOPSY      DE INSJ TUNNELED CTR VAD W/SUBQ PORT AGE 5 YR/> N/A 5/10/2022    Procedure: INSERTION VENOUS PORT ( PORT-A-CATH) IR;  Surgeon: Kurtis Schneider DO;  Location: [facility];  Service: Interventional Radiology     Review of Medications:   Vitamins, Supplements and Herbals: Med List Reviewed & pt is only taking: vitamin D, Mg    Current Outpatient Medications:     acetaminophen (TYLENOL) 650 mg CR tablet, Take 1 tablet (650 mg total) by mouth every 8 (eight) hours as needed for mild pain or moderate pain, Disp: 90 tablet, Rfl: 2    BD Pen Needle Jeaneth 2nd Gen 32G X 4 MM MISC, USE ONCE DAILY WITH LANTUS, Disp: , Rfl:     benzonatate (TESSALON PERLES) 100 mg capsule, Take 1 capsule (100 mg total) by mouth 3 (three) times a day as needed for cough, Disp: 30 capsule, Rfl: 2    Blood Glucose Monitoring Suppl (FreeStyle Lite) FIDELIA, , Disp: , Rfl:     Cholecalciferol (Vitamin D3) 125 MCG (5000 UT) CAPS, Take 1 capsule (5,000 Units total) by mouth daily, Disp: 90 capsule, Rfl: 2    FREESTYLE LITE test strip, Check blood sugar  daily, Disp: 100 each, Rfl: 3    guaiFENesin (MUCINEX) 600 mg 12 hr tablet, Take 2 tablets (1,200 mg total) by mouth every 12 (twelve) hours as needed for cough or congestion (thick mucis), Disp: 60 tablet, Rfl: 2    insulin glargine (Lantus SoloStar) 100 units/mL injection pen, Inject 20 Units under the skin daily, Disp: 15 mL, Rfl: 3    Insulin Pen Needle (BD Pen Needle Jeaneth U/F) 32G X 4 MM MISC, Use daily as directed with insulin pen, Disp: 100 each, Rfl: 3    ipratropium-albuterol (Combivent Respimat) inhaler, Inhale 1 puff 4 (four) times a day, Disp: 4 g, Rfl: 1    Lancets (freestyle) lancets, Check blood sugar daily, Disp: 100 each, Rfl: 3    levETIRAcetam (KEPPRA) 500 mg tablet, Take 1 tablet (500 mg total) by mouth every 12 (twelve) hours, Disp: 60 tablet, Rfl: 3    lidocaine-prilocaine (EMLA) cream, Apply topically as needed for mild pain, Disp: 30 g, Rfl: 3    LORazepam (ATIVAN) 0 5 mg tablet, For sleep 1-2 tablets at bed time (Max 1 mg ), Disp: 60 tablet, Rfl: 0    magnesium gluconate (MAGONATE) 500 mg tablet, Take 1 tablet (500 mg total) by mouth daily, Disp: 30 tablet, Rfl: 1    metFORMIN (GLUCOPHAGE-XR) 500 mg 24 hr tablet, Take 2 tablets (1,000 mg total) by mouth daily with dinner, Disp: 180 tablet, Rfl: 2    naloxone (NARCAN) 4 mg/0 1 mL nasal spray, Administer 1 spray into a nostril  If no response after 2-3 minutes, give another dose in the other nostril using a new spray  It has to be on stand by use with opioid use - in case of overdose, Disp: 1 each, Rfl: 1    ondansetron (Zofran ODT) 8 mg disintegrating tablet, Take 1 tablet (8 mg total) by mouth every 8 (eight) hours as needed for nausea or vomiting, Disp: 20 tablet, Rfl: 5    oxyCODONE (ROXICODONE) 10 MG TABS, Take 1 tablet (10 mg total) by mouth every 6 (six) hours as needed for moderate pain Max Daily Amount: 40 mg, Disp: 120 tablet, Rfl: 0    pantoprazole (PROTONIX) 40 mg tablet, Take 1 tablet (40 mg total) by mouth daily in the early morning, Disp: 90 tablet, Rfl: 1  No current facility-administered medications for this visit      Most Recent Lab Results: Checks BG 1x/day; typically ~150-200 (son not completely sure)  Son reports that his dad's BG has improved since steroids were stopped, still gets steroids with chemo    Lab Results   Component Value Date    WBC 20 92 (H) 05/08/2022    NEUTROABS 12 59 (H) 03/17/2022    CHOLESTEROL 154 01/29/2021    TRIG 52 01/29/2021    HDL 68 01/29/2021    LDLCALC 73 01/29/2021    ALT 9 05/08/2022    AST 13 05/08/2022    ALB 3 7 05/08/2022    SODIUM 136 05/08/2022    SODIUM 141 04/22/2022    K 4 1 05/08/2022    K 3 9 04/22/2022     05/08/2022    BUN 24 05/08/2022    BUN 32 (H) 04/22/2022    CREATININE 0 86 05/08/2022    CREATININE 1 06 04/22/2022    EGFR 96 05/08/2022    PHOS 2 8 03/24/2022    PHOS 3 5 03/23/2022    GLUCOSE 363 (H) 01/06/2021    POCGLU 73 05/10/2022    GLUF 90 05/08/2022    GLUF 64 (L) 04/10/2022    GLUC 128 04/22/2022    HGBA1C 5 7 (H) 11/07/2021    HGBA1C 5 8 (H) 05/03/2021    HGBA1C 10 6 (H) 01/29/2021    CALCIUM 9 7 05/08/2022    MG 1 7 (L) 05/08/2022       Anthropometric Measurements:   Height: 66"  Ht Readings from Last 1 Encounters:   05/11/22 5' 6" (1 676 m)     Wt Readings from Last 20 Encounters:   05/11/22 62 1 kg (136 lb 14 5 oz)   05/10/22 62 6 kg (138 lb)   05/03/22 62 6 kg (138 lb)   04/27/22 65 1 kg (143 lb 8 oz)   04/26/22 63 4 kg (139 lb 12 8 oz)   04/22/22 64 1 kg (141 lb 6 4 oz)   04/15/22 66 kg (145 lb 9 6 oz)   04/11/22 67 4 kg (148 lb 9 6 oz)   04/05/22 67 6 kg (149 lb)   03/28/22 63 kg (139 lb)   03/23/22 66 2 kg (146 lb)   03/14/22 64 2 kg (141 lb 8 6 oz)   12/02/21 64 9 kg (143 lb)   11/17/21 63 6 kg (140 lb 3 2 oz)   05/17/21 65 5 kg (144 lb 6 4 oz)   05/13/21 65 8 kg (145 lb)   03/11/21 69 1 kg (152 lb 6 4 oz)   01/28/21 71 9 kg (158 lb 9 6 oz)   01/06/21 72 1 kg (159 lb)     Weight History:   Usual Weight: 150# (prior to DM dx 2021)     Oncology Nutrition-Anthropometrics    Flowsheet Row Nutrition from 5/12/2022 in [facility]   Patient age (years): 64 years   Patient (male) height (in): 66 in   Current weight (lbs): 136 9 lbs   Current weight to be used for anthropometric calculations (kg) 62 2 kg   BMI: 22 1   IBW male 142 lb   IBW (kg) male 64 5 kg   IBW % (male) 96 4 %   Adjusted BW (male): 140 7 lbs   Adjusted BW in kg (male): 64 kg   % weight change after 1 week: -0 8 %   Weight change after 1 week (lbs) -1 1 lbs   % weight change after 1 month: -7 9 %   Weight change after 1 month (lbs) -11 7 lbs   % weight change after 6 months: -2 4 %   Weight change after 6 months (lbs) -3 3 lbs        Nutrition-Focused Physical Findings: n/a due to telephone call    Food/Nutrition-Related History & Client/Social History:    Current Nutrition Impact Symptoms:  [] Nausea  [x] Reduced Appetite  [] Acid Reflux    [] Vomiting  [x] Unintended Wt Loss - pt has lost ~15# in the past year  -significant wt loss x1 month [] Malabsorption    [x] Diarrhea - started yesterday after chemo, resolved as of today [] Unintended Wt Gain  [] Dumping Syndrome    [] Constipation  [] Thick Mucous/Secretions  [] Abdominal Pain    [] Dysgeusia (Altered Taste)  [] Xerostomia (Dry Mouth)  [] Gas    [] Dysosmia (Altered Smell)  [] [place] Ever[place]  [] Difficulty Chewing    [] Oral Mucositis (Sore Mouth)  [x] Fatigue - has been resting much more [x] Hyperglycemia - A1C 5 7% on 11/7/21 (improved since Jan 2021)   [] Odynophagia  [] Esophagitis  [] Other:    [] Dysphagia  [] Early Satiety  [] No Problems Eating      Food Allergies & Intolerances: no    Current Diet: CHO-Controlled and [place] Diet   Eats salmon, pork, beef, all veggies, jackfruit, [place] pears, and rice  Pt likes cheesecake but has not been eating it d/t DM    Pt states he is open to being more flexible with his diet  Current Nutrition Intake: Less than usual   Appetite: Fair  - pt will eat what his wife makes  Nutrition Route: PO   Activity level: mostly sedentary; likes plants and gardening; limited by fatigue     24 Hr Diet Recall:  Eats 3 meals and a few snacks per day  Breakfast: 3/4 cup brown rice, 3 oz pork/salmon/beef/chicken/tilapia/cod, 3/4 cup veggie (green beans, asparagus, bok dionicio, cauliflower)  Snack: SF jello or pudding; or sometimes 1/2 pastry (limits d/t sugar content)  Lunch: 3/4 cup brown rice, 3 oz pork/salmon/beef/chicken/tilapia/cod, 3/4 cup veggie (green beans, asparagus, bok dionicio, cauliflower)  Snack: SF jello or pudding; or sometimes 1/2 pastry (limits d/t sugar content)  Dinner: 3/4 cup brown rice, 3 oz pork/salmon/beef/chicken/tilapia/cod, 3/4 cup veggie (green beans, asparagus, bok dionicio, cauliflower)  Snack:  SF jello or pudding; or sometimes 1/2 pastry (limits d/t sugar content)    Beverages: water (son unsure of quantity, prefers tea/coffee over water), coffee (sips throughout the day), oolong or cindy tea (occasionally, used to drink 2-3 cups per day), OJ or other juices that are available (occasionally), SF jello  Supplements:   Was drinking a lower sugar Ensure but finished these, wife just bought another low sugar/cho shake last night      Oncology Nutrition-Estimated Needs    Flowsheet Row Nutrition from 2022 in [facility] Diet[facility]   Weight type used Actual weight   Weight in kilograms (kg) used for estimated needs 62 2 kg   Energy needs formula:  35-40 kcal/kg   Energy needs based on 35 kcal/k kcal   Energy needs based on 40 kcal/k kcal   Protein needs formula: 1 5-2 g/kg   Protein needs based on 1 5 g/kg 93 g   Protein needs based on 2 g/kg 124 g   Fluid needs formula: 30-35 mL/kg   Fluid needs based on 30 mL/kg 1869 mL   Fluid needs in ounces 63 oz   Fluid needs based on 35 mL/kg 2181 mL   Fluid needs in ounces 74 oz           Discussion & Intervention:   Evangelina Massey was evaluated today for an initial RD consultation regarding wt loss and nutrition impact sx management  Evangelina Massey is currently undergoing tx for lung cancer    He has experienced a significant wt loss over the past month, a reduced appetite, and fatigue  Yesterday was his first chemo infusion and he had diarrhea when he got home, but it has resolved now  Miranda Wilkinson has DM and tries to limit his cho/sugar intake  Lately his BG has been higher than usual d/t steroids  He typically eats 3 meals and a few snacks daily  His meals are well balanced  His snacks show opportunities to add protein  He has used low-sugar oral nutrition supplements in the past and is planning to resume them again  Son is unsure how much his dad is drinking but will help his dad understand how important staying properly hydrated is during cancer tx  We briefly talked about a low-fiber diet and hydration for diarrhea and son will let me know if this resumes and/or worsens  Reviewed 24 hour recall, which revealed an inadequate po intake, and discussed ways to increase kcal, protein, and fluid intakes and optimize nutrient intake  Also reviewed the importance of wt management throughout the tx process and the role of a high kcal/ high protein diet and carbohydrate controlled diet in managing wt and overall health    Based on today's assessment, discussion included: MNT for: wt loss, reduced appetite, fatigue, diarrhea, DM, how to modify foods for anticipated nutrition impact symptoms pt may experience during CA tx, a high kcal/protein diet & food choices to include at all meals & snacks (Examples of high kcal foods: cheese, full-fat dairy products, nut butter, plant-based fats, coconut oil/milk, avocado, butter, cream soup, etc  Examples of high protein foods: eggs, chicken, fish, beans/legumes, nuts/nut butters, bone broth, etc ) , a diabetic diet & food choices to include at all meals & snacks, spreading carbohydrate intake throughout the day & counting carbohydrates for adequate glycemic control, fortifying foods for added kcal and protein (examples include: adding cheese to foods such as eggs, mashed potatoes, casseroles, etc ; Making oatmeal with whole milk rather than water; Making fortified mashed potatoes with cream, butter, dry milk powder, plain [place] yogurt, and cheese ), adequate hydration & fluid choices, eating smaller more frequent meals every 2-3 hours (5-6 small meals/day), starting oral nutrition supplements and adding extra fat to foods while cooking such as butter, plant-based oil, coconut oil/milk, avocado, nut butters, etc    Moving forward, Mac Peraza was encouraged to increase kcal, protein, and fluid intakes  Materials Provided: emailed son (@ '[email or web address]'): Nutrition Rx and Recommendations & Eating Hints book  All questions and concerns addressed during todays visit  Mac Peraza has RD contact information  Nutrition Diagnosis:   Inadequate Energy Intake related to physiological causes, disease state and treatment related issues as evidenced by food recall, wt loss and discussion with pt and/or family  Increased Nutrient Needs (kcal & pro) related to increased demand for nutrients and disease state as evidenced by cancer dx and pt undergoing tx for cancer  Monitoring & Evaluation:   Goals:  weight maintenance/stabilization  adequate nutrition impact symptom management  pt to meet >/=75% estimated nutrition needs daily    Progress Towards Goals: Initiated    Nutrition Rx & Recommendations:  Diet: Diabetic, High calorie, High protien diet  Small, frequent meals/snacks may be easier to tolerate than 3 large daily meals  Aim for 5-6 small meals per day (every 2-3 hours)  Include protein at all meals/snacks  Stay hydrated by sipping fluids of choice/tolerance throughout the day    For weight loss: monitor your weight at home at least 2x/week, record your weight, start by adding 250-500 extra calories per day, eat 5-6 small scheduled meals every 2-3 hrs, choose foods that are high in protein and calories (see pages 49-53 in your Eating Hints book), drink liquids with calories for example: milkshakes/smoothies/juice/soup/whole milk/chocolate milk, cook with protein fortified milk (see recipe on page 36 in your Eating Hints book), consider ready-to-drink oral nutrition supplements such as Ensure Plus, Ensure Enlive, Boost Plus, or Boost Very High Calorie, avoid "diet" and "light" foods when possible, avoid drinking too much with meals, contact your dietitian with any continued weight loss over the course of 1 week  For more info see pages 35-37 in your Eating Hints book  Weigh yourself regularly  If you notice weight loss, make an effort to increase your daily food/calorie intake  If you continue to notice loss after these efforts, reach out to your dietitian to establish a plan to stabilize weight  Consider weighing yourself 2 times per week, record your weight  Nutrition Supplements: Aim for 2 per day, choose one with <10 grams of sugar per serving  Ideally one that has >10 grams protein and is >200 calories  Example: Glucerna  Increase protein portion to 4 oz at meals  Consider preparing food with healthy, plant-based oils such as olive, canola, avocado, etc   Use larger portion to increase calorie intake without affecting blood sugars    Higher protein snack ideas: low-sugar [place] yogurt, SF pudding, 1/4-1/3 cup nuts, edamame, cheese stick, low-sugar oral nutrition supplement (Glucerna), oatmeal or lower sugar cereal with milk (choose a cereal with <10 grams of sugar per serving)  Increase fluid intake to 65-75 oz per day  Choose caffeine-free fluids  Water is the best choice  Ensure/Glucerna shakes count towards daily fluid goals  Ideas: SF jello, water, whole milk, unsweetened almond milk, unsweetened soy milk, herbal/decaf tea     Follow Up Plan: 6/10 at 2pm for a phone follow up  Recommend Referral to Other Providers: none at this time

## 2022-05-04 NOTE — TELEPHONE ENCOUNTER
Received notification by Dr Caitlin Dumont on 5/3/22 that pt has triggered for oncology nutrition care (reason for referral: none given, ambulatory referral)  See RD notes dated 3/30 and 4/1  Sourav Daly, spoke with son, Marilee Lord today  Explained the reason for today's outreach  Son reports that they are now interested in making a nutrition appt  He reports that his dad eats salmon, pork, beef, all veggies, jackfruit, [place] pears, and rice  Pt likes cheesecake but has not been eating it d/t DM  Son reports that his dad's BG has improved since steroids d/t RT were stopped, still gets steroids with chemo  Son would like to be present for consultation so that he can hep interpret  Discussed oncology nutrition services available (options for in-person and phone consultation) and the benefits of meeting for a consultation  Initial RD phone consultation set up for 5/12 at 1pm       Provided this RDs contact information asking that Devan and/or Marilee Lord reach out prn  All questions/concerns addressed at this time

## 2022-05-05 ENCOUNTER — TELEPHONE (OUTPATIENT)
Dept: HEMATOLOGY ONCOLOGY | Facility: CLINIC | Age: 57
End: 2022-05-05

## 2022-05-05 ENCOUNTER — ANESTHESIA EVENT (OUTPATIENT)
Dept: PERIOP | Facility: AMBULARY SURGERY CENTER | Age: 57
End: 2022-05-05
Payer: COMMERCIAL

## 2022-05-06 NOTE — PRE-PROCEDURE INSTRUCTIONS
Pre-Surgery Instructions:   Medication Instructions    acetaminophen (TYLENOL) 650 mg CR tablet Uses PRN- OK to take day of surgery    benzonatate (TESSALON PERLES) 100 mg capsule Uses PRN- DO NOT take day of surgery    Cholecalciferol (Vitamin D3) 125 MCG (5000 UT) CAPS Hold day of surgery   guaiFENesin (MUCINEX) 600 mg 12 hr tablet Uses PRN- DO NOT take day of surgery    insulin glargine (Lantus SoloStar) 100 units/mL injection pen Take night before surgery    ipratropium-albuterol (Combivent Respimat) inhaler Take day of surgery   levETIRAcetam (KEPPRA) 500 mg tablet Take day of surgery   LORazepam (ATIVAN) 0 5 mg tablet Take night before surgery    metFORMIN (GLUCOPHAGE-XR) 500 mg 24 hr tablet Hold day of surgery   oxyCODONE (ROXICODONE) 10 MG TABS Uses PRN- OK to take day of surgery    pantoprazole (PROTONIX) 40 mg tablet Take day of surgery  You will receive a phone call from hospital for arrival time  Please call surgeons office if any changes in your condition  Wear easy on/off clothing; consider type of surgery;  Valuables, jewelry, piercing's please keep at home  **COVID-19  education/surgical guidelines  Updated covid    Visitation policy  Please: No contact lenses or eye make up, artificial eyelashes    Please secure transportation     Follow pre surgery showering or cleaning instructions as  Reviewed by nurse or surgeons office      Questions answered and concerns addressed

## 2022-05-08 ENCOUNTER — APPOINTMENT (OUTPATIENT)
Dept: LAB | Facility: CLINIC | Age: 57
End: 2022-05-08
Payer: COMMERCIAL

## 2022-05-08 DIAGNOSIS — Z79.4 TYPE 2 DIABETES MELLITUS WITHOUT COMPLICATION, WITH LONG-TERM CURRENT USE OF INSULIN (HCC): ICD-10-CM

## 2022-05-08 DIAGNOSIS — J18.9 OBSTRUCTIVE PNEUMONIA: ICD-10-CM

## 2022-05-08 DIAGNOSIS — E11.9 TYPE 2 DIABETES MELLITUS WITHOUT COMPLICATION, WITH LONG-TERM CURRENT USE OF INSULIN (HCC): ICD-10-CM

## 2022-05-08 DIAGNOSIS — J44.9 CHRONIC OBSTRUCTIVE PULMONARY DISEASE, UNSPECIFIED COPD TYPE (HCC): ICD-10-CM

## 2022-05-08 DIAGNOSIS — C34.91 ADENOCARCINOMA OF RIGHT LUNG (HCC): ICD-10-CM

## 2022-05-08 LAB
ALBUMIN SERPL BCP-MCNC: 3.7 G/DL (ref 3.5–5)
ALP SERPL-CCNC: 101 U/L (ref 34–104)
ALT SERPL W P-5'-P-CCNC: 9 U/L (ref 7–52)
ANION GAP SERPL CALCULATED.3IONS-SCNC: 7 MMOL/L (ref 4–13)
AST SERPL W P-5'-P-CCNC: 13 U/L (ref 13–39)
BASOPHILS # BLD MANUAL: 0 THOUSAND/UL (ref 0–0.1)
BASOPHILS NFR MAR MANUAL: 0 % (ref 0–1)
BILIRUB SERPL-MCNC: 0.47 MG/DL (ref 0.2–1)
BUN SERPL-MCNC: 24 MG/DL (ref 5–25)
CALCIUM SERPL-MCNC: 9.7 MG/DL (ref 8.4–10.2)
CHLORIDE SERPL-SCNC: 100 MMOL/L (ref 96–108)
CO2 SERPL-SCNC: 29 MMOL/L (ref 21–32)
CREAT SERPL-MCNC: 0.86 MG/DL (ref 0.6–1.3)
CREAT UR-MCNC: 131.5 MG/DL
EOSINOPHIL # BLD MANUAL: 0.21 THOUSAND/UL (ref 0–0.4)
EOSINOPHIL NFR BLD MANUAL: 1 % (ref 0–6)
ERYTHROCYTE [DISTWIDTH] IN BLOOD BY AUTOMATED COUNT: 14.9 % (ref 11.6–15.1)
GFR SERPL CREATININE-BSD FRML MDRD: 96 ML/MIN/1.73SQ M
GLUCOSE P FAST SERPL-MCNC: 90 MG/DL (ref 65–99)
HCT VFR BLD AUTO: 40.9 % (ref 36.5–49.3)
HGB BLD-MCNC: 13.3 G/DL (ref 12–17)
LYMPHOCYTES # BLD AUTO: 26 % (ref 14–44)
LYMPHOCYTES # BLD AUTO: 5.44 THOUSAND/UL (ref 0.6–4.47)
MAGNESIUM SERPL-MCNC: 1.7 MG/DL (ref 1.9–2.7)
MCH RBC QN AUTO: 30.6 PG (ref 26.8–34.3)
MCHC RBC AUTO-ENTMCNC: 32.5 G/DL (ref 31.4–37.4)
MCV RBC AUTO: 94 FL (ref 82–98)
MONOCYTES # BLD AUTO: 2.3 THOUSAND/UL (ref 0–1.22)
MONOCYTES NFR BLD: 11 % (ref 4–12)
MYELOCYTES NFR BLD MANUAL: 2 % (ref 0–1)
NEUTROPHILS # BLD MANUAL: 12.55 THOUSAND/UL (ref 1.85–7.62)
NEUTS SEG NFR BLD AUTO: 60 % (ref 43–75)
PLATELET # BLD AUTO: 534 THOUSANDS/UL (ref 149–390)
PLATELET BLD QL SMEAR: ABNORMAL
PMV BLD AUTO: 8.6 FL (ref 8.9–12.7)
POTASSIUM SERPL-SCNC: 4.1 MMOL/L (ref 3.5–5.3)
PROT SERPL-MCNC: 7.2 G/DL (ref 6.4–8.4)
PROT UR-MCNC: 13 MG/DL
PROT/CREAT UR: 0.1 MG/G{CREAT} (ref 0–0.1)
RBC # BLD AUTO: 4.35 MILLION/UL (ref 3.88–5.62)
RBC MORPH BLD: NORMAL
SODIUM SERPL-SCNC: 136 MMOL/L (ref 135–147)
WBC # BLD AUTO: 20.92 THOUSAND/UL (ref 4.31–10.16)

## 2022-05-08 PROCEDURE — 36415 COLL VENOUS BLD VENIPUNCTURE: CPT

## 2022-05-08 PROCEDURE — 80053 COMPREHEN METABOLIC PANEL: CPT

## 2022-05-08 PROCEDURE — 84156 ASSAY OF PROTEIN URINE: CPT

## 2022-05-08 PROCEDURE — 85027 COMPLETE CBC AUTOMATED: CPT

## 2022-05-08 PROCEDURE — 83735 ASSAY OF MAGNESIUM: CPT

## 2022-05-08 PROCEDURE — 82570 ASSAY OF URINE CREATININE: CPT

## 2022-05-08 PROCEDURE — 85007 BL SMEAR W/DIFF WBC COUNT: CPT

## 2022-05-09 ENCOUNTER — TELEPHONE (OUTPATIENT)
Dept: SURGICAL ONCOLOGY | Facility: CLINIC | Age: 57
End: 2022-05-09

## 2022-05-09 DIAGNOSIS — E83.42 HYPOMAGNESEMIA: Primary | ICD-10-CM

## 2022-05-09 DIAGNOSIS — C34.91 ADENOCARCINOMA OF RIGHT LUNG (HCC): Primary | ICD-10-CM

## 2022-05-09 RX ORDER — SODIUM CHLORIDE 9 MG/ML
20 INJECTION, SOLUTION INTRAVENOUS ONCE
Status: CANCELLED | OUTPATIENT
Start: 2022-05-11

## 2022-05-09 RX ORDER — UREA 10 %
500 LOTION (ML) TOPICAL DAILY
Qty: 30 TABLET | Refills: 1 | Status: ON HOLD | OUTPATIENT
Start: 2022-05-09 | End: 2022-08-05

## 2022-05-09 NOTE — TELEPHONE ENCOUNTER
05/09/22 Spoke to patient's son who gave me the authorization to look for funding for his father for his treatment plan  He was on his way to work so he had no access to financial information at this time but will call back with that info later  I told him that this reduce some of his father's expenses towards his treatment  I told him I hope to hear from him soon  Gave him my call back number  Later that morning I was able to enroll the patient for a Co-Pay card for his Avastin kelley Barragan after speaking with Jihan Celeste at [facility]  The following will be e mailed to the patient:    Member ID: [id number]  Effective Date:   05/09/2022 through 12/31/2022  Auto Enrollment 01/01/2023  $25,000 00 max dori  A $5 00 co-payment is required each treatment  Look back date on claims: 11/10/2021  A welcome letter with the details of the card will be sent to the patient  I will e mail these details to the patient at  [email or web address]    I will also attempt to call the patient's son to inform him about the co-pay card

## 2022-05-10 ENCOUNTER — APPOINTMENT (OUTPATIENT)
Dept: RADIOLOGY | Facility: AMBULARY SURGERY CENTER | Age: 57
End: 2022-05-10
Payer: COMMERCIAL

## 2022-05-10 ENCOUNTER — HOSPITAL ENCOUNTER (OUTPATIENT)
Facility: AMBULARY SURGERY CENTER | Age: 57
Setting detail: OUTPATIENT SURGERY
Discharge: HOME | End: 2022-05-10
Attending: RADIOLOGY | Admitting: RADIOLOGY
Payer: COMMERCIAL

## 2022-05-10 ENCOUNTER — HOSPITAL ENCOUNTER (OUTPATIENT)
Dept: INFUSION CENTER | Facility: HOSPITAL | Age: 57
Discharge: HOME | End: 2022-05-10
Attending: INTERNAL MEDICINE

## 2022-05-10 ENCOUNTER — ANESTHESIA (OUTPATIENT)
Dept: PERIOP | Facility: AMBULARY SURGERY CENTER | Age: 57
End: 2022-05-10
Payer: COMMERCIAL

## 2022-05-10 VITALS
RESPIRATION RATE: 16 BRPM | HEART RATE: 100 BPM | TEMPERATURE: 97.9 F | WEIGHT: 138 LBS | OXYGEN SATURATION: 96 % | HEIGHT: 66 IN | DIASTOLIC BLOOD PRESSURE: 72 MMHG | BODY MASS INDEX: 22.18 KG/M2 | SYSTOLIC BLOOD PRESSURE: 122 MMHG

## 2022-05-10 LAB — GLUCOSE SERPL-MCNC: 73 MG/DL (ref 65–140)

## 2022-05-10 PROCEDURE — C1788 PORT, INDWELLING, IMP: HCPCS | Performed by: RADIOLOGY

## 2022-05-10 PROCEDURE — 36561 INSERT TUNNELED CV CATH: CPT | Performed by: RADIOLOGY

## 2022-05-10 PROCEDURE — 77001 FLUOROGUIDE FOR VEIN DEVICE: CPT | Performed by: RADIOLOGY

## 2022-05-10 PROCEDURE — 77001 FLUOROGUIDE FOR VEIN DEVICE: CPT

## 2022-05-10 PROCEDURE — 76937 US GUIDE VASCULAR ACCESS: CPT | Performed by: RADIOLOGY

## 2022-05-10 PROCEDURE — 82948 REAGENT STRIP/BLOOD GLUCOSE: CPT

## 2022-05-10 DEVICE — 8F PLASTIC PRO-FUSE® LOW PROFILE CT PORTLOW PROFILE C W/SILICONE FILLED SUTUREW/SILICO HOLES W/ATTACHABLEHOLES W/ATTACH CHRONOFLEX®POLYURETHANE CATHETERCATHETER
Type: IMPLANTABLE DEVICE | Site: CHEST | Status: FUNCTIONAL
Brand: PRO-FUSE®

## 2022-05-10 RX ORDER — LIDOCAINE HYDROCHLORIDE AND EPINEPHRINE 10; 10 MG/ML; UG/ML
INJECTION, SOLUTION INFILTRATION; PERINEURAL AS NEEDED
Status: DISCONTINUED | OUTPATIENT
Start: 2022-05-10 | End: 2022-05-10 | Stop reason: HOSPADM

## 2022-05-10 RX ORDER — CEFAZOLIN SODIUM 1 G/50ML
1000 SOLUTION INTRAVENOUS ONCE
Status: COMPLETED | OUTPATIENT
Start: 2022-05-10 | End: 2022-05-10

## 2022-05-10 RX ORDER — PROPOFOL 10 MG/ML
INJECTION, EMULSION INTRAVENOUS AS NEEDED
Status: DISCONTINUED | OUTPATIENT
Start: 2022-05-10 | End: 2022-05-10

## 2022-05-10 RX ORDER — FENTANYL CITRATE 50 UG/ML
INJECTION, SOLUTION INTRAMUSCULAR; INTRAVENOUS AS NEEDED
Status: DISCONTINUED | OUTPATIENT
Start: 2022-05-10 | End: 2022-05-10

## 2022-05-10 RX ORDER — MIDAZOLAM HYDROCHLORIDE 2 MG/2ML
INJECTION, SOLUTION INTRAMUSCULAR; INTRAVENOUS AS NEEDED
Status: DISCONTINUED | OUTPATIENT
Start: 2022-05-10 | End: 2022-05-10

## 2022-05-10 RX ORDER — SODIUM CHLORIDE 9 MG/ML
INJECTION, SOLUTION INTRAVENOUS AS NEEDED
Status: DISCONTINUED | OUTPATIENT
Start: 2022-05-10 | End: 2022-05-10 | Stop reason: HOSPADM

## 2022-05-10 RX ORDER — PROPOFOL 10 MG/ML
INJECTION, EMULSION INTRAVENOUS CONTINUOUS PRN
Status: DISCONTINUED | OUTPATIENT
Start: 2022-05-10 | End: 2022-05-10

## 2022-05-10 RX ORDER — SODIUM CHLORIDE, SODIUM LACTATE, POTASSIUM CHLORIDE, CALCIUM CHLORIDE 600; 310; 30; 20 MG/100ML; MG/100ML; MG/100ML; MG/100ML
INJECTION, SOLUTION INTRAVENOUS CONTINUOUS PRN
Status: DISCONTINUED | OUTPATIENT
Start: 2022-05-10 | End: 2022-05-10

## 2022-05-10 RX ADMIN — PROPOFOL 60 MCG/KG/MIN: 10 INJECTION, EMULSION INTRAVENOUS at 13:02

## 2022-05-10 RX ADMIN — MIDAZOLAM HYDROCHLORIDE 2 MG: 1 INJECTION, SOLUTION INTRAMUSCULAR; INTRAVENOUS at 12:54

## 2022-05-10 RX ADMIN — CEFAZOLIN SODIUM 1000 MG: 1 SOLUTION INTRAVENOUS at 12:53

## 2022-05-10 RX ADMIN — FENTANYL CITRATE 25 MCG: 50 INJECTION, SOLUTION INTRAMUSCULAR; INTRAVENOUS at 13:10

## 2022-05-10 RX ADMIN — FENTANYL CITRATE 25 MCG: 50 INJECTION, SOLUTION INTRAMUSCULAR; INTRAVENOUS at 12:57

## 2022-05-10 RX ADMIN — PROPOFOL 30 MG: 10 INJECTION, EMULSION INTRAVENOUS at 13:00

## 2022-05-10 RX ADMIN — CEFAZOLIN SODIUM 1000 MG: 1 SOLUTION INTRAVENOUS at 12:32

## 2022-05-10 RX ADMIN — FENTANYL CITRATE 25 MCG: 50 INJECTION, SOLUTION INTRAMUSCULAR; INTRAVENOUS at 13:14

## 2022-05-10 RX ADMIN — SODIUM CHLORIDE, SODIUM LACTATE, POTASSIUM CHLORIDE, AND CALCIUM CHLORIDE: .6; .31; .03; .02 INJECTION, SOLUTION INTRAVENOUS at 12:44

## 2022-05-10 RX ADMIN — FENTANYL CITRATE 25 MCG: 50 INJECTION, SOLUTION INTRAMUSCULAR; INTRAVENOUS at 13:03

## 2022-05-10 NOTE — ANESTHESIA POSTPROCEDURE EVALUATION
Post-Op Assessment Note    CV Status:  Stable  Pain Score: 0    Pain management: adequate     Mental Status:  Awake and alert   Hydration Status:  Stable   PONV Controlled:  None   Airway Patency:  Patent and adequate      Post Op Vitals Reviewed: Yes      Staff: CRNA, Anesthesiologist         No complications documented      BP (P) 102/63 (05/10/22 1335)    Temp (P) 97 7 °F (36 5 °C) (05/10/22 1335)    Pulse (P) 85 (05/10/22 1335)   Resp (P) 14 (05/10/22 1335)    SpO2 (P) 100 % (05/10/22 1335)

## 2022-05-10 NOTE — OP NOTE
Chest port placement 5/10/22     History:      Lung carcinoma     Contrast: none     Procedure: The patient was identified verbally and by wristband  Timeout was performed  Informed consent was obtained  All elements of maximal sterile barrier technique were followed (cap, mask, sterile gown, sterile gloves, large sterile sheet, hand hygiene and 2% chlorhexidine for cutaneous antisepsis)  Following obtaining informed consent, the patient was prepped and draped in the usual sterile fashion  Using ultrasound guidance, access was gained to the patient's right nternal jugular vein using a micropuncture system  The micropuncture wire was removed and a  035 wire was advanced to the level of the inferior vena cava using fluoroscopic guidance  Using 1% lidocaine, the region of the right anterior chest was was anesthetized  A small incision was made using a 15 blade scalpel  The port pocket was created using blunt dissection  The catheter tubing was tunneled from the incision to the venotomy  The micropuncture dilator was exchanged for a peel-away sheath, using fluoroscopic guidance  The catheter was place through the peel-away sheath  The catheter was measured and cut to size  The catheter was attached to the port  The port was flushed with saline to ensure patency without evidence of leakage  The port was placed in the port pocket  The port was sutured in the pocket using 3-0 Vicryl  The incisions were approximated with 3-0 Vicryl and tissue adhesive  The patient tolerated the procedure well without apparent immediate complications  The patient left the IR department in unchanged condition  Dr Leah Campos performed and directly supervised the entire procedure  Findings:      Using ultrasound guidance, the right internal jugular vein was cannulated using Seldinger technique  The right internal jugular vein was evaluated as a potential access site   The right internal jugular vein was patent, and free of thrombus  Static images of the vessel was obtained  Visualization of real time needle entry into the vessel was obtained  Fluoroscopic spot image demonstrates a newly placed single lumen chest port via the right internal jugular vein with the most central aspect at the SVC/RA junction  The catheter tubing is smooth in contour  IMPRESSION:     Successful placement of a single lumen chest port via the right internal jugular vein

## 2022-05-10 NOTE — ANESTHESIA PREPROCEDURE EVALUATION
Procedure:  INSERTION VENOUS PORT ( PORT-A-CATH) IR (N/A Chest)    Relevant Problems   ENDO   (+) Type 2 diabetes mellitus without complication, with long-term current use of insulin (HCC)      NEURO/PSYCH   (+) Continuous opioid dependence (HCC)      PULMONARY   (+) Chronic obstructive pulmonary disease (HCC)   (+) Obstructive pneumonia   (+) Pneumonia due to Klebsiella pneumoniae [facility])     Left Ventricle: Left ventricular cavity size is normal  Wall thickness    is normal  The left ventricular ejection fraction is 60%  Systolic   function is normal  Wall motion is normal  Diastolic function is normal      Physical Exam    Airway    Mallampati score: II  TM Distance: >3 FB  Neck ROM: full     Dental       Cardiovascular      Pulmonary      Other Findings        Anesthesia Plan  ASA Score- 2     Anesthesia Type- IV sedation with anesthesia with ASA Monitors  Additional Monitors:   Airway Plan:           Plan Factors-    Chart reviewed  Induction- intravenous  Postoperative Plan-     Informed Consent- Anesthetic plan and risks discussed with patient  I personally reviewed this patient with the CRNA  Discussed and agreed on the Anesthesia Plan with the CRNA  Anisha Console

## 2022-05-10 NOTE — DISCHARGE INSTRUCTIONS
Implanted Venous Access Port     WHAT YOU NEED TO KNOW:   An implanted venous access port is a device used to give treatments and take blood  It may also be called a central venous access device (CVAD)  The port is a small container that is placed under your skin, usually in your upper chest  The port is attached to a catheter that enters a large vein  DISCHARGE INSTRUCTIONS:   Resume your normal diet  Small sips of flat soda will help with mild nausea  Prevent an infection:   · Wash your hands often  Use soap and water  Clean your hands before and after you care for your port  Remind everyone who cares for your port to wash their hands  · Check your skin for infection every day  Look for redness, swelling, or fluid oozing from the port site  Care for your port:   1  You may shower beginning 48 hours after procedure  2   Leave glue in place  3  It is normal for some bruising to occur  4  Use Tylenol for pain  5  Limit use of arm on the side that your port was placed  Lift nothing heavier than 5 pounds for 1 week, and then gradually increase activity as tolerated  6  DO NOT apply ointment, lotion or cream to port site until incision is healed  Allow glue to fall off  DO NOT attempt to peel glue from skin even it it begins to flake  7  After the port incision is healed you may swim, bathe  Notify the Interventional Radiologist if you have any of the followin  Fever above 101 F    2  Increased redness or swelling after 1st day  3  Increased pain after 1st day  4  Any sign of infection (drainage from port site, skin separation, hot to touch)  5  Persistent nausea or vomiting  Contact Interventional Radiology at [phone] [facility] PATIENTS: Contact Interventional Radiology at [phone]) ([address]: Contact Interventional Radiology at [phone])

## 2022-05-10 NOTE — INTERVAL H&P NOTE
Patient arrived to [facility] for port placement    The procedure and risks were discussed with the patient  All questions were answered  Informed consent was obtained  H & P reviewed after examining the patient and I find no changes in the patient condition since the H & P has been written  /74   Pulse 87   Temp 99 2 °F (37 3 °C) (Temporal)   Resp 16   Ht 5' 6" (1 676 m)   Wt 62 6 kg (138 lb)   SpO2 96%   BMI 22 27 kg/m²     Patient re-evaluated   Accept as history and physical     Lynda Hussein, DO/May 10, 2022/11:39 AM

## 2022-05-11 ENCOUNTER — TELEPHONE (OUTPATIENT)
Dept: HEMATOLOGY ONCOLOGY | Facility: CLINIC | Age: 57
End: 2022-05-11

## 2022-05-11 ENCOUNTER — TELEPHONE (OUTPATIENT)
Dept: OTHER | Facility: OTHER | Age: 57
End: 2022-05-11

## 2022-05-11 ENCOUNTER — HOSPITAL ENCOUNTER (OUTPATIENT)
Dept: INFUSION CENTER | Facility: HOSPITAL | Age: 57
Discharge: HOME | End: 2022-05-11
Attending: INTERNAL MEDICINE
Payer: COMMERCIAL

## 2022-05-11 VITALS
DIASTOLIC BLOOD PRESSURE: 73 MMHG | RESPIRATION RATE: 20 BRPM | SYSTOLIC BLOOD PRESSURE: 107 MMHG | BODY MASS INDEX: 22 KG/M2 | HEIGHT: 66 IN | OXYGEN SATURATION: 96 % | WEIGHT: 136.91 LBS | TEMPERATURE: 99.8 F | HEART RATE: 94 BPM

## 2022-05-11 DIAGNOSIS — C34.91 ADENOCARCINOMA OF RIGHT LUNG (HCC): Primary | ICD-10-CM

## 2022-05-11 PROCEDURE — 96413 CHEMO IV INFUSION 1 HR: CPT

## 2022-05-11 PROCEDURE — 96367 TX/PROPH/DG ADDL SEQ IV INF: CPT

## 2022-05-11 PROCEDURE — 96417 CHEMO IV INFUS EACH ADDL SEQ: CPT

## 2022-05-11 PROCEDURE — 96415 CHEMO IV INFUSION ADDL HR: CPT

## 2022-05-11 RX ORDER — LIDOCAINE AND PRILOCAINE 25; 25 MG/G; MG/G
CREAM TOPICAL AS NEEDED
Qty: 30 G | Refills: 3 | Status: SHIPPED | OUTPATIENT
Start: 2022-05-11 | End: 2022-06-07

## 2022-05-11 RX ORDER — ONDANSETRON 8 MG/1
8 TABLET, ORALLY DISINTEGRATING ORAL EVERY 8 HOURS PRN
Qty: 20 TABLET | Refills: 5 | Status: SHIPPED | OUTPATIENT
Start: 2022-05-11 | End: 2022-07-12 | Stop reason: SDUPTHER

## 2022-05-11 RX ORDER — SODIUM CHLORIDE 9 MG/ML
20 INJECTION, SOLUTION INTRAVENOUS ONCE
Status: COMPLETED | OUTPATIENT
Start: 2022-05-11 | End: 2022-05-11

## 2022-05-11 RX ADMIN — DIPHENHYDRAMINE HYDROCHLORIDE 25 MG: 50 INJECTION, SOLUTION INTRAMUSCULAR; INTRAVENOUS at 08:58

## 2022-05-11 RX ADMIN — CARBOPLATIN 604.45 MG: 10 INJECTION, SOLUTION INTRAVENOUS at 13:41

## 2022-05-11 RX ADMIN — PACLITAXEL 254.4 MG: 6 INJECTION, SOLUTION, CONCENTRATE INTRAVENOUS at 10:26

## 2022-05-11 RX ADMIN — SODIUM CHLORIDE 20 ML/HR: 0.9 INJECTION, SOLUTION INTRAVENOUS at 08:38

## 2022-05-11 RX ADMIN — BEVACIZUMAB 900 MG: 400 INJECTION, SOLUTION INTRAVENOUS at 14:52

## 2022-05-11 RX ADMIN — DEXAMETHASONE SODIUM PHOSPHATE 20 MG: 10 INJECTION INTRAMUSCULAR; INTRAVENOUS at 08:37

## 2022-05-11 RX ADMIN — FOSAPREPITANT DIMEGLUMINE 150 MG: 150 INJECTION, POWDER, LYOPHILIZED, FOR SOLUTION INTRAVENOUS at 09:43

## 2022-05-11 RX ADMIN — FAMOTIDINE 20 MG: 10 INJECTION, SOLUTION INTRAVENOUS at 09:22

## 2022-05-11 NOTE — PROGRESS NOTES
Pt's PCP, Dr Dave Helm, made aware of pt's Magnesium level of 1 7  Per Dr Dylan Dillard, pt is to continue taking Magnesium Gluconate and to get his Magnesium level checked in two weeks  Pt and pt's wife verbally understand plan  Pt's wife stated they have not started Magnesium pills yet, but will start today  Pt and pt's wife aware of apt for blood work on 5/25/22

## 2022-05-11 NOTE — TELEPHONE ENCOUNTER
Patient's son calling in regarding side effects from the patient receiving chemotherapy today  Message sent to the On call provider regarding patient's concerns  Message confirmed read by provider

## 2022-05-11 NOTE — TELEPHONE ENCOUNTER
The patient has diarrhea after the chemotherapy, then was given today  I spoke with his son  I recommended him to take Imodium  He understood

## 2022-05-11 NOTE — PROGRESS NOTES
Pt to clinic for Taxol, Carboplatin, and Avastin  Pt requesting Emla cream and Zofran be sent to his pharmacy, Teams message sent to [employer]  Pt's Magnesium is 1 7, Janine Dang RN made aware  Pt resting comfortably in recliner at this time  Pt offers no complaints at this time

## 2022-05-12 ENCOUNTER — NUTRITION (OUTPATIENT)
Dept: NUTRITION | Facility: CLINIC | Age: 57
End: 2022-05-12

## 2022-05-12 ENCOUNTER — HOSPITAL ENCOUNTER (OUTPATIENT)
Dept: INFUSION CENTER | Facility: HOSPITAL | Age: 57
Discharge: HOME | End: 2022-05-12
Attending: INTERNAL MEDICINE

## 2022-05-12 DIAGNOSIS — Z71.3 NUTRITIONAL COUNSELING: Primary | ICD-10-CM

## 2022-05-12 NOTE — PATIENT INSTRUCTIONS
Nutrition Rx & Recommendations:  Diet: Diabetic, High calorie, High protien diet  Small, frequent meals/snacks may be easier to tolerate than 3 large daily meals  Aim for 5-6 small meals per day (every 2-3 hours)  Include protein at all meals/snacks  Stay hydrated by sipping fluids of choice/tolerance throughout the day  For weight loss: monitor your weight at home at least 2x/week, record your weight, start by adding 250-500 extra calories per day, eat 5-6 small scheduled meals every 2-3 hrs, choose foods that are high in protein and calories (see pages 49-53 in your Eating Hints book), drink liquids with calories for example: milkshakes/smoothies/juice/soup/whole milk/chocolate milk, cook with protein fortified milk (see recipe on page 36 in your Eating Hints book), consider ready-to-drink oral nutrition supplements such as Ensure Plus, Ensure Enlive, Boost Plus, or Boost Very High Calorie, avoid "diet" and "light" foods when possible, avoid drinking too much with meals, contact your dietitian with any continued weight loss over the course of 1 week  For more info see pages 35-37 in your Eating Hints book  Weigh yourself regularly  If you notice weight loss, make an effort to increase your daily food/calorie intake  If you continue to notice loss after these efforts, reach out to your dietitian to establish a plan to stabilize weight  Consider weighing yourself 2 times per week, record your weight  Nutrition Supplements: Aim for 2 per day, choose one with <10 grams of sugar per serving  Ideally one that has >10 grams protein and is >200 calories  Example: Glucerna  Increase protein portion to 4 oz at meals  Consider preparing food with healthy, plant-based oils such as olive, canola, avocado, etc   Use larger portion to increase calorie intake without affecting blood sugars    Higher protein snack ideas: low-sugar [place] yogurt, SF pudding, 1/4-1/3 cup nuts, edamame, cheese stick, low-sugar oral nutrition supplement (Glucerna), oatmeal or lower sugar cereal with milk (choose a cereal with <10 grams of sugar per serving)  Increase fluid intake to 65-75 oz per day  Choose caffeine-free fluids  Water is the best choice  Ensure/Glucerna shakes count towards daily fluid goals  Ideas: SF jello, water, whole milk, unsweetened almond milk, unsweetened soy milk, herbal/decaf tea     Follow Up Plan: 6/10 at 2pm for a phone follow up  Recommend Referral to Other Providers: none at this time

## 2022-05-12 NOTE — TELEPHONE ENCOUNTER
Spoke to patient's son, Phouc  Patient had 2  diarrhea stools last night, took imodium  No further stools , patient is afebrile  Son to make sure patient is staying hydrated  Instructed to call me back with status  Son verbalizes understanding

## 2022-05-14 ENCOUNTER — TELEPHONE (OUTPATIENT)
Dept: OTHER | Facility: OTHER | Age: 57
End: 2022-05-14

## 2022-05-14 NOTE — TELEPHONE ENCOUNTER
Deanne Morton call Farida @ [phone] regading PT Florencio Cook  1965 / he has a headache and is weak he had Infusion Therapy Plan

## 2022-05-16 ENCOUNTER — HOSPITAL ENCOUNTER (OUTPATIENT)
Dept: INFUSION CENTER | Facility: HOSPITAL | Age: 57
Discharge: HOME | End: 2022-05-16
Payer: COMMERCIAL

## 2022-05-16 VITALS
SYSTOLIC BLOOD PRESSURE: 100 MMHG | TEMPERATURE: 97.8 F | OXYGEN SATURATION: 98 % | DIASTOLIC BLOOD PRESSURE: 66 MMHG | HEART RATE: 87 BPM | RESPIRATION RATE: 18 BRPM

## 2022-05-16 DIAGNOSIS — E86.0 DEHYDRATION: Primary | ICD-10-CM

## 2022-05-16 PROCEDURE — 96360 HYDRATION IV INFUSION INIT: CPT

## 2022-05-16 RX ADMIN — SODIUM CHLORIDE 1000 ML: 0.9 INJECTION, SOLUTION INTRAVENOUS at 12:58

## 2022-05-16 NOTE — TELEPHONE ENCOUNTER
Spoke with patient's son Farida  Patient's headache has improved but patient is dehydrated  Will set up for IV fluids if patient able  Son will let me know

## 2022-05-17 ENCOUNTER — DOCUMENTATION (OUTPATIENT)
Dept: HEMATOLOGY ONCOLOGY | Facility: CLINIC | Age: 57
End: 2022-05-17

## 2022-05-17 NOTE — PROGRESS NOTES
I reached out to check on Halsey and offer any assistance with new needs that may have arisen  I left  for his son, [employer]ak [employer], to return my call if they need assistance

## 2022-05-24 ENCOUNTER — TELEPHONE (OUTPATIENT)
Dept: HEMATOLOGY ONCOLOGY | Facility: MEDICAL CENTER | Age: 57
End: 2022-05-24

## 2022-05-24 NOTE — TELEPHONE ENCOUNTER
Call from patient's son asking if hydration can be added to patient's treatment plan  Hydration over one hour added to D 2 neulasta appts    Will send to [facility] team to modify time of appts for neulasta    So aware of plan

## 2022-05-25 ENCOUNTER — HOSPITAL ENCOUNTER (OUTPATIENT)
Dept: INFUSION CENTER | Facility: HOSPITAL | Age: 57
Discharge: HOME | End: 2022-05-25
Payer: COMMERCIAL

## 2022-05-25 VITALS — TEMPERATURE: 97.2 F

## 2022-05-25 LAB
ALBUMIN SERPL BCP-MCNC: 3.1 G/DL (ref 3.5–5)
ALP SERPL-CCNC: 112 U/L (ref 46–116)
ALT SERPL W P-5'-P-CCNC: 26 U/L (ref 12–78)
ANION GAP SERPL CALCULATED.3IONS-SCNC: 9 MMOL/L (ref 4–13)
AST SERPL W P-5'-P-CCNC: 17 U/L (ref 5–45)
BASOPHILS # BLD AUTO: 0.05 THOUSANDS/ΜL (ref 0–0.1)
BASOPHILS NFR BLD AUTO: 1 % (ref 0–1)
BILIRUB SERPL-MCNC: 0.18 MG/DL (ref 0.2–1)
BUN SERPL-MCNC: 17 MG/DL (ref 5–25)
CALCIUM ALBUM COR SERPL-MCNC: 9.7 MG/DL (ref 8.3–10.1)
CALCIUM SERPL-MCNC: 9 MG/DL (ref 8.3–10.1)
CHLORIDE SERPL-SCNC: 102 MMOL/L (ref 100–108)
CO2 SERPL-SCNC: 29 MMOL/L (ref 21–32)
CREAT SERPL-MCNC: 1.29 MG/DL (ref 0.6–1.3)
CREAT UR-MCNC: 133 MG/DL
EOSINOPHIL # BLD AUTO: 0.17 THOUSAND/ΜL (ref 0–0.61)
EOSINOPHIL NFR BLD AUTO: 2 % (ref 0–6)
ERYTHROCYTE [DISTWIDTH] IN BLOOD BY AUTOMATED COUNT: 14.6 % (ref 11.6–15.1)
GFR SERPL CREATININE-BSD FRML MDRD: 61 ML/MIN/1.73SQ M
GLUCOSE SERPL-MCNC: 99 MG/DL (ref 65–140)
HCT VFR BLD AUTO: 32.8 % (ref 36.5–49.3)
HGB BLD-MCNC: 10.6 G/DL (ref 12–17)
IMM GRANULOCYTES # BLD AUTO: 0.24 THOUSAND/UL (ref 0–0.2)
IMM GRANULOCYTES NFR BLD AUTO: 2 % (ref 0–2)
LYMPHOCYTES # BLD AUTO: 3.51 THOUSANDS/ΜL (ref 0.6–4.47)
LYMPHOCYTES NFR BLD AUTO: 32 % (ref 14–44)
MCH RBC QN AUTO: 30.2 PG (ref 26.8–34.3)
MCHC RBC AUTO-ENTMCNC: 32.3 G/DL (ref 31.4–37.4)
MCV RBC AUTO: 93 FL (ref 82–98)
MONOCYTES # BLD AUTO: 1.54 THOUSAND/ΜL (ref 0.17–1.22)
MONOCYTES NFR BLD AUTO: 14 % (ref 4–12)
NEUTROPHILS # BLD AUTO: 5.34 THOUSANDS/ΜL (ref 1.85–7.62)
NEUTS SEG NFR BLD AUTO: 49 % (ref 43–75)
NRBC BLD AUTO-RTO: 0 /100 WBCS
PLATELET # BLD AUTO: 322 THOUSANDS/UL (ref 149–390)
PMV BLD AUTO: 7.8 FL (ref 8.9–12.7)
POTASSIUM SERPL-SCNC: 4.3 MMOL/L (ref 3.5–5.3)
PROT SERPL-MCNC: 6.8 G/DL (ref 6.4–8.2)
PROT UR-MCNC: 8 MG/DL
PROT/CREAT UR: 0.06 MG/G{CREAT} (ref 0–0.1)
RBC # BLD AUTO: 3.51 MILLION/UL (ref 3.88–5.62)
SODIUM SERPL-SCNC: 140 MMOL/L (ref 136–145)
WBC # BLD AUTO: 10.85 THOUSAND/UL (ref 4.31–10.16)

## 2022-05-25 PROCEDURE — 80053 COMPREHEN METABOLIC PANEL: CPT | Performed by: INTERNAL MEDICINE

## 2022-05-25 PROCEDURE — 85025 COMPLETE CBC W/AUTO DIFF WBC: CPT | Performed by: INTERNAL MEDICINE

## 2022-05-25 PROCEDURE — 3061F NEG MICROALBUMINURIA REV: CPT | Performed by: FAMILY MEDICINE

## 2022-05-25 PROCEDURE — 82570 ASSAY OF URINE CREATININE: CPT | Performed by: INTERNAL MEDICINE

## 2022-05-25 PROCEDURE — 83735 ASSAY OF MAGNESIUM: CPT

## 2022-05-25 PROCEDURE — 84156 ASSAY OF PROTEIN URINE: CPT | Performed by: INTERNAL MEDICINE

## 2022-05-25 RX ORDER — SODIUM CHLORIDE 9 MG/ML
20 INJECTION, SOLUTION INTRAVENOUS ONCE
Status: CANCELLED | OUTPATIENT
Start: 2022-06-01

## 2022-05-26 ENCOUNTER — HOSPITAL ENCOUNTER (OUTPATIENT)
Dept: RADIOLOGY | Facility: HOSPITAL | Age: 57
Discharge: HOME | End: 2022-05-26
Attending: FAMILY MEDICINE
Payer: COMMERCIAL

## 2022-05-26 ENCOUNTER — OFFICE VISIT (OUTPATIENT)
Dept: FAMILY MEDICINE CLINIC | Facility: CLINIC | Age: 57
End: 2022-05-26
Payer: COMMERCIAL

## 2022-05-26 VITALS
HEART RATE: 78 BPM | WEIGHT: 141 LBS | SYSTOLIC BLOOD PRESSURE: 112 MMHG | DIASTOLIC BLOOD PRESSURE: 68 MMHG | HEIGHT: 66 IN | BODY MASS INDEX: 22.66 KG/M2 | OXYGEN SATURATION: 98 % | TEMPERATURE: 97 F | RESPIRATION RATE: 18 BRPM

## 2022-05-26 DIAGNOSIS — Z79.4 TYPE 2 DIABETES MELLITUS WITHOUT COMPLICATION, WITH LONG-TERM CURRENT USE OF INSULIN (HCC): ICD-10-CM

## 2022-05-26 DIAGNOSIS — R79.0 LOW MAGNESIUM LEVEL: Primary | ICD-10-CM

## 2022-05-26 DIAGNOSIS — C79.31 LUNG CANCER METASTATIC TO BRAIN (HCC): ICD-10-CM

## 2022-05-26 DIAGNOSIS — E11.9 TYPE 2 DIABETES MELLITUS WITHOUT COMPLICATION, WITH LONG-TERM CURRENT USE OF INSULIN (HCC): ICD-10-CM

## 2022-05-26 DIAGNOSIS — M54.6 ACUTE RIGHT-SIDED THORACIC BACK PAIN: ICD-10-CM

## 2022-05-26 DIAGNOSIS — C34.91 ADENOCARCINOMA OF RIGHT LUNG (HCC): ICD-10-CM

## 2022-05-26 DIAGNOSIS — C34.90 LUNG CANCER METASTATIC TO BRAIN (HCC): ICD-10-CM

## 2022-05-26 LAB — MAGNESIUM SERPL-MCNC: 1.7 MG/DL (ref 1.6–2.6)

## 2022-05-26 PROCEDURE — 99215 OFFICE O/P EST HI 40 MIN: CPT | Performed by: FAMILY MEDICINE

## 2022-05-26 PROCEDURE — 1036F TOBACCO NON-USER: CPT | Performed by: FAMILY MEDICINE

## 2022-05-26 PROCEDURE — 71046 X-RAY EXAM CHEST 2 VIEWS: CPT

## 2022-05-26 PROCEDURE — 72072 X-RAY EXAM THORAC SPINE 3VWS: CPT

## 2022-05-26 NOTE — PROGRESS NOTES
Assessment/Plan:    Will get chest x-ray to check for lung mass and also thoracic x-ray to check for bones to make sure there is no fracture from cancer  Stop Lantus since his blood sugars low and he stop taking prednisone  Continue metformin  Advised to check with oncologist to see if it is okay for patient to get COVID booster vaccine and shingle vaccine since he is actively being treated with chemo for cancer  Will see him back in 1 month for evaluation  At that time will check hemoglobin A1c  I have spent 40 minutes with Patient and family today in which greater than 50% of this time was spent in counseling/coordination of care regarding Prognosis, Risks and benefits of tx options, Intructions for management, Patient and family education, Importance of tx compliance and Risk factor reductions  Problem List Items Addressed This Visit        Endocrine    Type 2 diabetes mellitus without complication, with long-term current use of insulin ([facility] [address] )       Respiratory    Adenocarcinoma of right lung ([facility] [address] )    Relevant Orders    XR chest pa & lateral    Lung cancer metastatic to brain [facility])       Other    Acute right-sided thoracic back pain    Relevant Orders    XR chest pa & lateral    XR spine thoracic 3 vw      Other Visit Diagnoses     Low magnesium level    -  Primary    Relevant Orders    Magnesium            Subjective:      Patient ID: Priyank Olsen is a 62 y o  male  72-year-old male follow-up type 2 diabetes  He is on Lantus 20 units daily and metformin 500 mg twice a day  He has lung cancer spread to the brain  Currently going through chemo treatment with oncologist   Plan is to get chemo every 3 weeks for 8 hours for 3 times then get an MRI to check for response  He is on Keppra for brain mets to prevent seizure  He takes lorazepam at night to help to sleep  He has follow-up w palliative care for pain meds oxycodone 10 mg every 6 hours as needed    He feels tired but his appetite returned no more cough and no more excessive sputum  his lung cancer cause mass obstruction due to large size  He remained optimistic  He only want 4 more years to live  He remained to be full code  Blood sugar has been well controlled 100 to 110  No nausea no vomiting  No constipation no diarrhea  For the past 3 days he complained of right sided back pain when he get up from the bed or walk  Comes and goes just a throbbing pain annoying  He is concerned because he has history of pneumonia and lung cancer he want to make sure that is nothing else is new  He stop working  He receiving short-term disability from work  The following portions of the patient's history were reviewed and updated as appropriate:   Past Medical History:  He has a past medical history of Diabetes mellitus ([facility] [address] ), Elevated PSA (3/11/2021), Fatty liver (3/11/2021), Gall bladder polyp (3/11/2021), Lung cancer ([facility] ), and Nonimmune to hepatitis B virus (3/11/2021)  ,  _______________________________________________________________________  Medical Problems:  does not have any pertinent problems on file ,  _______________________________________________________________________  Past Surgical History:   has a past surgical history that includes Lung biopsy; pr insj tunneled ctr vad w/subq port age 11 yr/> (N/A, 5/10/2022); and FL guided central venous access device insertion (5/10/2022)  ,  _______________________________________________________________________  Family History:  family history includes No Known Problems in his father and mother ,  _______________________________________________________________________  Social History:   reports that he quit smoking about 4 months ago  His smoking use included cigarettes  He has a 20 00 pack-year smoking history  He has never used smokeless tobacco  He reports previous alcohol use   He reports that he does not use drugs ,  _______________________________________________________________________  Allergies:  has No Known Allergies     _______________________________________________________________________  Current Outpatient Medications   Medication Sig Dispense Refill    acetaminophen (TYLENOL) 650 mg CR tablet Take 1 tablet (650 mg total) by mouth every 8 (eight) hours as needed for mild pain or moderate pain 90 tablet 2    BD Pen Needle Jeaneth 2nd Gen 32G X 4 MM MISC USE ONCE DAILY WITH LANTUS      benzonatate (TESSALON PERLES) 100 mg capsule Take 1 capsule (100 mg total) by mouth 3 (three) times a day as needed for cough 30 capsule 2    Blood Glucose Monitoring Suppl (FreeStyle Lite) FIDELIA       Cholecalciferol (Vitamin D3) 125 MCG (5000 UT) CAPS Take 1 capsule (5,000 Units total) by mouth daily 90 capsule 2    FREESTYLE LITE test strip Check blood sugar  daily 100 each 3    guaiFENesin (MUCINEX) 600 mg 12 hr tablet Take 2 tablets (1,200 mg total) by mouth every 12 (twelve) hours as needed for cough or congestion (thick mucis) 60 tablet 2    insulin glargine (Lantus SoloStar) 100 units/mL injection pen Inject 20 Units under the skin daily 15 mL 3    Insulin Pen Needle (BD Pen Needle Jeaneth U/F) 32G X 4 MM MISC Use daily as directed with insulin pen 100 each 3    ipratropium-albuterol (Combivent Respimat) inhaler Inhale 1 puff 4 (four) times a day 4 g 1    Lancets (freestyle) lancets Check blood sugar daily 100 each 3    levETIRAcetam (KEPPRA) 500 mg tablet Take 1 tablet (500 mg total) by mouth every 12 (twelve) hours 60 tablet 3    lidocaine-prilocaine (EMLA) cream Apply topically as needed for mild pain 30 g 3    LORazepam (ATIVAN) 0 5 mg tablet For sleep 1-2 tablets at bed time (Max 1 mg ) 60 tablet 0    magnesium gluconate (MAGONATE) 500 mg tablet Take 1 tablet (500 mg total) by mouth daily 30 tablet 1    metFORMIN (GLUCOPHAGE-XR) 500 mg 24 hr tablet Take 2 tablets (1,000 mg total) by mouth daily with dinner 180 tablet 2    naloxone (NARCAN) 4 mg/0 1 mL nasal spray Administer 1 spray into a nostril  If no response after 2-3 minutes, give another dose in the other nostril using a new spray  It has to be on stand by use with opioid use - in case of overdose 1 each 1    ondansetron (Zofran ODT) 8 mg disintegrating tablet Take 1 tablet (8 mg total) by mouth every 8 (eight) hours as needed for nausea or vomiting 20 tablet 5    oxyCODONE (ROXICODONE) 10 MG TABS Take 1 tablet (10 mg total) by mouth every 6 (six) hours as needed for moderate pain Max Daily Amount: 40 mg 120 tablet 0    pantoprazole (PROTONIX) 40 mg tablet Take 1 tablet (40 mg total) by mouth daily in the early morning 90 tablet 1     No current facility-administered medications for this visit      _______________________________________________________________________  Review of Systems   Constitutional: Positive for fatigue  Negative for activity change, appetite change, fever and unexpected weight change  HENT: Negative for dental problem and trouble swallowing  Eyes: Negative for photophobia and visual disturbance  Respiratory: Negative for cough and chest tightness  Cardiovascular: Negative for chest pain, palpitations and leg swelling  Gastrointestinal: Negative for abdominal pain, constipation and vomiting  Endocrine: Negative for cold intolerance, polydipsia and polyuria  Genitourinary: Negative for difficulty urinating, frequency and urgency  Musculoskeletal: Positive for back pain  Negative for arthralgias, joint swelling, myalgias and neck pain  Skin: Negative for color change, rash and wound  Allergic/Immunologic: Negative for environmental allergies  Neurological: Negative for dizziness, weakness and numbness  Hematological: Does not bruise/bleed easily  Psychiatric/Behavioral: Negative for decreased concentration, dysphoric mood, self-injury, sleep disturbance and suicidal ideas           Objective:  Vitals: 05/26/22 1655   BP: 112/68   Pulse: 78   Resp: 18   Temp: (!) 97 °F (36 1 °C)   SpO2: 98%   Weight: 64 kg (141 lb)   Height: 5' 6" (1 676 m)     Body mass index is 22 76 kg/m²  Physical Exam  Vitals and nursing note reviewed  Constitutional:       Appearance: Normal appearance  He is well-developed  HENT:      Head: Normocephalic and atraumatic  Right Ear: Tympanic membrane, ear canal and external ear normal       Left Ear: Tympanic membrane, ear canal and external ear normal       Nose: Nose normal       Mouth/Throat:      Mouth: Mucous membranes are dry  Pharynx: Oropharynx is clear  Eyes:      Extraocular Movements: Extraocular movements intact  Pupils: Pupils are equal, round, and reactive to light  Cardiovascular:      Rate and Rhythm: Normal rate and regular rhythm  Pulses: Normal pulses  Heart sounds: Normal heart sounds  Pulmonary:      Effort: Pulmonary effort is normal       Breath sounds: Normal breath sounds  Abdominal:      General: Abdomen is flat  Bowel sounds are normal       Palpations: Abdomen is soft  Musculoskeletal:         General: Normal range of motion  Cervical back: Normal range of motion and neck supple  Skin:     General: Skin is warm and dry  Capillary Refill: Capillary refill takes less than 2 seconds  Neurological:      General: No focal deficit present  Mental Status: He is alert and oriented to person, place, and time  Mental status is at baseline  Psychiatric:         Mood and Affect: Mood normal          Behavior: Behavior normal          Thought Content:  Thought content normal          Judgment: Judgment normal

## 2022-05-27 ENCOUNTER — TELEPHONE (OUTPATIENT)
Dept: FAMILY MEDICINE CLINIC | Facility: CLINIC | Age: 57
End: 2022-05-27

## 2022-05-27 NOTE — TELEPHONE ENCOUNTER
----- Message from Kandi Adame MD sent at 5/27/2022  7:51 AM EDT -----  Please let pt know the chest xr and back xr normal, no bone fracture, no lung infection

## 2022-05-31 ENCOUNTER — OFFICE VISIT (OUTPATIENT)
Dept: HEMATOLOGY ONCOLOGY | Facility: CLINIC | Age: 57
End: 2022-05-31
Payer: COMMERCIAL

## 2022-05-31 VITALS
RESPIRATION RATE: 14 BRPM | HEIGHT: 66 IN | TEMPERATURE: 96.8 F | DIASTOLIC BLOOD PRESSURE: 76 MMHG | WEIGHT: 142 LBS | OXYGEN SATURATION: 97 % | HEART RATE: 90 BPM | SYSTOLIC BLOOD PRESSURE: 120 MMHG | BODY MASS INDEX: 22.82 KG/M2

## 2022-05-31 DIAGNOSIS — C34.91 ADENOCARCINOMA OF RIGHT LUNG (HCC): Primary | ICD-10-CM

## 2022-05-31 DIAGNOSIS — C34.90 LUNG CANCER METASTATIC TO BRAIN (HCC): ICD-10-CM

## 2022-05-31 DIAGNOSIS — C79.31 LUNG CANCER METASTATIC TO BRAIN (HCC): ICD-10-CM

## 2022-05-31 PROCEDURE — 99214 OFFICE O/P EST MOD 30 MIN: CPT | Performed by: INTERNAL MEDICINE

## 2022-05-31 PROCEDURE — 3008F BODY MASS INDEX DOCD: CPT | Performed by: FAMILY MEDICINE

## 2022-05-31 NOTE — PROGRESS NOTES
Ru Castle  1965  [address] [facility]  [address]  [facility] [address]    DISCUSSION/SUMMARY:    59-year-old male recently diagnosed with non-small cell lung carcinoma with brain metastases  Issues:    Brain metastases  Patient was seen/evaluated by Dr Whitney Mckeon previously and has completed RT  Steroids have been tapered  Patient has a follow-up appointment in approximately 1 week  Eventually a CT or MRI/brain will need to be repeated  Non-small cell lung carcinoma  RUL lung biopsy demonstrated adenocarcinoma  PET-CT demonstrated a number of metastatic lesions  NextGen sequencing did not demonstrate any actionable mutations  PDL-1 IHCs were negative  Options were previously discussed and Mr Mary Constantino started bevacizumab, paclitaxel and carboplatin  Presently patient states feeling okay, slightly better than before  Clinically there are no concerning findings  Recent blood work was okay/acceptable  The 2nd cycle is scheduled for June 1, 2022  Patient states having all required medications at home  After 4 cycles we will re-scan  NCCN guidelines 3 2022 state that for patients with M1 non-small cell lung carcinoma, adenocarcinoma, no actionable mutations, contraindication to PDL1, bevacizumab/carbo/paclitaxel is a category 1 regimen  Regimen  Bevacizumab 15 mg/kg IV day 1  Paclitaxel 175 mg/m2 IV day 1   Carboplatin AUC = 5 5 IV day 1  G-CSF 3 mg subcu day 2  Cycle = 21 days  Goal = palliation  100% on all agents    Patient will follow up with palliative care and nutritional services as directed  The above was discussed at length with patient, wife and the patient's brother over the phone who translated     Mr Mary Constantino and his family demonstrated a very good understanding of the situation, the fact that patient has metastatic disease that is likely not curable but that treatments can prolong his life and hopefully will improve his quality of life  We also discussed repeat scanning down the line and if good response to treatment, maintenance therapy  Patient is to return in 3 weeks  Mr Kenzie Woodard knows to call the hematology/oncology office if there are any other questions or concerns  Carefully review your medication list and verify that the list is accurate and up-to-date  Please call the hematology/oncology office if there are medications missing from the list, medications on the list that you are not currently taking or if there is a dosage or instruction that is different from how you're taking that medication  Patient goals and areas of care:  Continue with chemotherapy  Barriers to care:  None  Patient is able to self-care   ______________________________________________________________________________________    Chief Complaint   Patient presents with    Follow-up    Non-small cell lung carcinoma     History of Present Illness:  59-year-old male recently admitted to [facility]  Patient was treated for obstructive pneumonia, septic shock, acute kidney injury and respiratory failure  Workup demonstrated a lung mass as well as lesions in the brain consistent with metastatic disease  Patient eventually improved and was discharged  Mr Kenzie Woodard was seen/evaluated by Radiation Oncology and completed whole-brain RT  Since the last office visit, patient has undergone a PET-CT and NextGen sequencing  Patient has received 1 cycle of chemotherapy  Mr Kenzie Woodard presents with his wife; brother was on the phone (translates)  Patient states feeling okay, was able get through the 1st cycle of chemotherapy without significant issues  Fatigue is about the same as before, possibly slightly less/better  Activities are baseline  No pain control issues  No headaches, blurred vision or dizziness  No respiratory problems  Appetite is good, weight is stable  No other GI or  issues      Patient has a 40+ pack-year history of tobacco use, 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  Review of Systems   Constitutional: Positive for fatigue and unexpected weight change  Negative for activity change and appetite change  HENT: Negative  Eyes: Negative  Respiratory: Negative  Cardiovascular: Negative  Gastrointestinal: Negative  Endocrine: Negative  Genitourinary: Negative  Musculoskeletal: Negative  Skin: Negative  Allergic/Immunologic: Negative  Neurological: Negative  Hematological: Negative  Psychiatric/Behavioral: Negative  All other systems reviewed and are negative      Patient Active Problem List   Diagnosis    Type 2 diabetes mellitus without complication, with long-term current use of insulin (HCC)    Nonimmune to hepatitis B virus    Elevated PSA    Gall bladder polyp    Vitamin D deficiency    Cyanocobalamin deficiency    History of tobacco use    Lung mass    Brain mass    Cerebral edema (HCC)    Sepsis ([address]    Obstructive pneumonia    Adenocarcinoma of right lung ([address]    Pneumonia due to Klebsiella pneumoniae ([address] )    Cancer associated pain    Adjustment insomnia    Continuous opioid dependence ([address]    Chronic obstructive pulmonary disease ([address] )    Palliative care patient    Chemotherapy induced neutropenia (HCC)    Dehydration    Acute right-sided thoracic back pain    Lung cancer metastatic to brain [facility])     Past Medical History:   Diagnosis Date    Diabetes mellitus ([address] )     Elevated PSA 3/11/2021    Fatty liver 3/11/2021    Gall bladder polyp 3/11/2021    Lung cancer ([address] )     Nonimmune to hepatitis B virus 3/11/2021     Past Surgical History:   Procedure Laterality Date    FL GUIDED CENTRAL VENOUS ACCESS DEVICE INSERTION  5/10/2022    LUNG BIOPSY      OR INSJ TUNNELED CTR VAD W/SUBQ PORT AGE 5 YR/> N/A 5/10/2022    Procedure: INSERTION VENOUS PORT ( PORT-A-CATH) IR;  Surgeon: Mark Valdivia DO;  [facility];  Service: Interventional Radiology     Family History   Problem Relation Age of Onset    No Known Problems Mother     No Known Problems Father      Social History     Socioeconomic History    Marital status: /Civil Union     Spouse name: Not on file    Number of children: Not on file    Years of education: Not on file    Highest education level: Not on file   Occupational History    Not on file   Tobacco Use    Smoking status: Former Smoker     Packs/day: 0 50     Years: 40 00     Pack years: 20 00     Types: Cigarettes     Quit date: 2022     Years since quittin 3    Smokeless tobacco: Never Used   Vaping Use    Vaping Use: Never used   Substance and Sexual Activity    Alcohol use: Not Currently     Comment: quit drinking 7 years ago    Drug use: Never    Sexual activity: Not on file   Other Topics Concern    Not on file   Social History Narrative    Patient lives independently with his wife and son  He also has a brother and sister who are very active in his life  Teodoro Lara is the only person in the home who is employed so not only is there stress regarding cancer diagnosis but also the financial stability of his family  Social Determinants of Health     Financial Resource Strain: Not on file   Food Insecurity: No Food Insecurity    Worried About Running Out of Food in the Last Year: Never true    Brittany of Food in the Last Year: Never true   Transportation Needs: No Transportation Needs    Lack of Transportation (Medical): No    Lack of Transportation (Non-Medical):  No   Physical Activity: Not on file   Stress: Not on file   Social Connections: Not on file   Intimate Partner Violence: Not on file   Housing Stability: Low Risk     Unable to Pay for Housing in the Last Year: No    Number of Places Lived in the Last Year: 1    Unstable Housing in the Last Year: No       Current Outpatient Medications:     acetaminophen (TYLENOL) 650 mg CR tablet, Take 1 tablet (650 mg total) by mouth every 8 (eight) hours as needed for mild pain or moderate pain, Disp: 90 tablet, Rfl: 2    BD Pen Needle Jeaneth 2nd Gen 32G X 4 MM MISC, USE ONCE DAILY WITH LANTUS, Disp: , Rfl:     benzonatate (TESSALON PERLES) 100 mg capsule, Take 1 capsule (100 mg total) by mouth 3 (three) times a day as needed for cough, Disp: 30 capsule, Rfl: 2    Blood Glucose Monitoring Suppl (FreeStyle Lite) FIDELIA, , Disp: , Rfl:     Cholecalciferol (Vitamin D3) 125 MCG (5000 UT) CAPS, Take 1 capsule (5,000 Units total) by mouth daily, Disp: 90 capsule, Rfl: 2    FREESTYLE LITE test strip, Check blood sugar  daily, Disp: 100 each, Rfl: 3    guaiFENesin (MUCINEX) 600 mg 12 hr tablet, Take 2 tablets (1,200 mg total) by mouth every 12 (twelve) hours as needed for cough or congestion (thick mucis), Disp: 60 tablet, Rfl: 2    insulin glargine (Lantus SoloStar) 100 units/mL injection pen, Inject 20 Units under the skin daily, Disp: 15 mL, Rfl: 3    Insulin Pen Needle (BD Pen Needle Jeaneth U/F) 32G X 4 MM MISC, Use daily as directed with insulin pen, Disp: 100 each, Rfl: 3    ipratropium-albuterol (Combivent Respimat) inhaler, Inhale 1 puff 4 (four) times a day, Disp: 4 g, Rfl: 1    Lancets (freestyle) lancets, Check blood sugar daily, Disp: 100 each, Rfl: 3    levETIRAcetam (KEPPRA) 500 mg tablet, Take 1 tablet (500 mg total) by mouth every 12 (twelve) hours, Disp: 60 tablet, Rfl: 3    lidocaine-prilocaine (EMLA) cream, Apply topically as needed for mild pain, Disp: 30 g, Rfl: 3    LORazepam (ATIVAN) 0 5 mg tablet, For sleep 1-2 tablets at bed time (Max 1 mg ), Disp: 60 tablet, Rfl: 0    magnesium gluconate (MAGONATE) 500 mg tablet, Take 1 tablet (500 mg total) by mouth daily, Disp: 30 tablet, Rfl: 1    metFORMIN (GLUCOPHAGE-XR) 500 mg 24 hr tablet, Take 2 tablets (1,000 mg total) by mouth daily with dinner, Disp: 180 tablet, Rfl: 2    naloxone (NARCAN) 4 mg/0 1 mL nasal spray, Administer 1 spray into a nostril   If no response after 2-3 minutes, give another dose in the other nostril using a new spray  It has to be on stand by use with opioid use - in case of overdose, Disp: 1 each, Rfl: 1    ondansetron (Zofran ODT) 8 mg disintegrating tablet, Take 1 tablet (8 mg total) by mouth every 8 (eight) hours as needed for nausea or vomiting, Disp: 20 tablet, Rfl: 5    oxyCODONE (ROXICODONE) 10 MG TABS, Take 1 tablet (10 mg total) by mouth every 6 (six) hours as needed for moderate pain Max Daily Amount: 40 mg, Disp: 120 tablet, Rfl: 0    pantoprazole (PROTONIX) 40 mg tablet, Take 1 tablet (40 mg total) by mouth daily in the early morning, Disp: 90 tablet, Rfl: 1    No Known Allergies    Vitals:    05/31/22 1327   BP: 120/76   Pulse: 90   Resp: 14   Temp: (!) 96 8 °F (36 °C)   SpO2: 97%     Physical Exam  Constitutional:       Appearance: He is well-developed  Comments: Thin, more frail appearing middle-aged male, no respiratory distress, no signs of pain   HENT:      Head: Normocephalic and atraumatic  Right Ear: External ear normal       Left Ear: External ear normal       Mouth/Throat:      Comments: Oral mucosa moist and pink, no exudate, upper and lower plates  Eyes:      Conjunctiva/sclera: Conjunctivae normal       Pupils: Pupils are equal, round, and reactive to light  Cardiovascular:      Rate and Rhythm: Normal rate and regular rhythm  Heart sounds: Normal heart sounds  Pulmonary:      Effort: Pulmonary effort is normal       Breath sounds: Normal breath sounds  Comments: Fair to good air entry bilaterally, few scattered bilateral rhonchi  Abdominal:      General: Bowel sounds are normal       Palpations: Abdomen is soft  Comments: +bowel sounds, nontender, soft   Musculoskeletal:         General: Normal range of motion  Cervical back: Normal range of motion and neck supple  Skin:     General: Skin is warm        Comments: Warm, moist, good color, no petechiae, resolving upper extremity ecchymotic lesions (from hospitalization)   Neurological:      Mental Status: He is alert and oriented to person, place, and time  Deep Tendon Reflexes: Reflexes are normal and symmetric  Psychiatric:         Behavior: Behavior normal          Thought Content: Thought content normal          Judgment: Judgment normal      Extremities:  No lower extremity edema bilaterally, no cords, pulses are 1+   Lymphatics:  No adenopathy in the neck, supraclavicular region, axilla and groin bilaterally    Labs    05/25/2022 WBC = 10 8 hemoglobin = 10 6 hematocrit = 33 platelet = 927 neutrophil = 49% BUN = 17 creatinine = 1 29 LFTs WNL calcium = 9 0    03/27/2022 WBC = 14 6 hemoglobin = 14 2 hematocrit = 44 MCV = 96 platelet = 744 neutrophil = 63% bands = 10% lymphocytes = 11% monocyte = 10% eosinophil = 3%  03/23/2022 BUN = 22 creatinine = 1 04 calcium = 8 0 AST = 16 ALT = 33 alkaline phosphatase = 49 total protein = 5 0 albumin = 1 8 total bilirubin = 0 67    Imaging    04/13/2022 PET-CT    1  Large right upper lung hypermetabolic necrotic mass measures 10 1 x 8 4 x 11 4 cm, SUV 19 8, compatible with known malignancy  This mass invades the right hilum and mediastinum, and appears inseparable from the paratracheal adenopathy  2   Right adrenal metastasis measures 1 1 x 0 9 cm  Minimal nodular activity in the left adrenal may be physiologic, but may be reassessed on follow-up exam to exclude an additional early metastasis  3   1 5 x 1 2 cm hypermetabolic splenic lesion most concerning for metastasis  4   Known brain metastases are not well evaluated on this exam   5   No additional hypermetabolic metastases visualized  03/22/2022 CT head without contrast   Impression stated no acute findings, unchanged metastatic lesions with surrounding edema in the right occipital and left frontal lobes      03/26/2022 chest x-ray portable impression stated progressive right upper lobe consolidation of score is the known underlying mass     03/22/2022 CTA chest PE study    PULMONARY ARTERIAL TREE:  No pulmonary embolus is seen        LUNGS:  Large right upper lobe mass unchanged   New extensive surrounding groundglass density in the right upper lobe lobe, with more consolidative density at the apex   New small patchy groundglass glass densities in the left upper and right lower   lobes   Unchanged occlusion of the right upper lobe bronchus by the mass   No other tracheal or endobronchial lesion        PLEURA:  Unremarkable        HEART/GREAT VESSELS:  Unremarkable for patient's age  No thoracic aortic aneurysm        MEDIASTINUM AND NAS:  Unchanged mediastinal and right hilar adenopathy  No pulmonary embolus          Impression:  Unchanged large right upper lobe mass most compatible with carcinoma, with new extensive right upper lobe kerri   undglass density which could represent postobstructive pneumonia, edema, hemorrhage or lymphangitic carcinomatosis   New groundglass infiltrates   in the left upper and right lower lobes, presumably infectious/inflammatory        Unchanged mediastinal and right hilar adenopathy       Pathology                  Case Report   Non-gynecologic Cytology                          Case: [id number]                                   Authorizing Provider: Jone Cockayne, DO      Collected:           03/16/2022 1203               Ordering Location:     [facility]      Received:            03/16/2022 [address] [place] [address] 6                                                               Pathologist:           Anastasia Weiss MD                                                                  Specimens:   A) - Lung, Right Upper Lobe Bronchoalveolar Lavage                                                   B) - Lung, Right Upper Lobe Bronchoalveolar Lavage, cell block                                       C) - Lung, Right Upper Lobe Bronchial Brushing, with brush                                           D) - Lung, Right Upper Lobe, FNA                                                           Final Diagnosis   A & B  Lung, Right Upper Lobe Bronchoalveolar Lavage, : (Thin-Prep and cell block)  Negative for malignancy  Rare benign bronchial cells  Abundant macrophages and mixed inflammatory cells      Satisfactory for evaluation  C  Lung, Right Upper Lobe Bronchial Brushing, with brush: Atypical cellular changes seen  Atypical epithelioid cells, scant cellularity      Satisfactory for evaluation      D   Lung, Right Upper Lobe, FNA:  Positive for malignancy      The concomitant biopsy ([id number]) demonstrates Non-small cell carcinoma consistent with adenocarcinoma      Intradepartmental consultation is in agreement      Satisfactory for evaluation       Electronically signed by Min Yan MD on 3/21/2022 at 10:37 AM

## 2022-06-01 ENCOUNTER — HOSPITAL ENCOUNTER (OUTPATIENT)
Dept: INFUSION CENTER | Facility: HOSPITAL | Age: 57
Discharge: HOME | End: 2022-06-01
Attending: INTERNAL MEDICINE
Payer: COMMERCIAL

## 2022-06-01 VITALS
HEIGHT: 66 IN | SYSTOLIC BLOOD PRESSURE: 107 MMHG | RESPIRATION RATE: 18 BRPM | DIASTOLIC BLOOD PRESSURE: 68 MMHG | HEART RATE: 80 BPM | BODY MASS INDEX: 22.6 KG/M2 | TEMPERATURE: 98.8 F | OXYGEN SATURATION: 97 % | WEIGHT: 140.65 LBS

## 2022-06-01 DIAGNOSIS — F51.02 ADJUSTMENT INSOMNIA: ICD-10-CM

## 2022-06-01 DIAGNOSIS — G93.89 BRAIN MASS: ICD-10-CM

## 2022-06-01 DIAGNOSIS — R79.0 LOW MAGNESIUM LEVEL: ICD-10-CM

## 2022-06-01 DIAGNOSIS — C34.91 ADENOCARCINOMA OF RIGHT LUNG (HCC): Primary | ICD-10-CM

## 2022-06-01 DIAGNOSIS — C34.91 ADENOCARCINOMA OF RIGHT LUNG (HCC): ICD-10-CM

## 2022-06-01 DIAGNOSIS — Z51.5 PALLIATIVE CARE PATIENT: ICD-10-CM

## 2022-06-01 PROCEDURE — 96413 CHEMO IV INFUSION 1 HR: CPT

## 2022-06-01 PROCEDURE — 96415 CHEMO IV INFUSION ADDL HR: CPT

## 2022-06-01 PROCEDURE — 96367 TX/PROPH/DG ADDL SEQ IV INF: CPT

## 2022-06-01 PROCEDURE — 96417 CHEMO IV INFUS EACH ADDL SEQ: CPT

## 2022-06-01 RX ORDER — SODIUM CHLORIDE 9 MG/ML
20 INJECTION, SOLUTION INTRAVENOUS ONCE
Status: COMPLETED | OUTPATIENT
Start: 2022-06-01 | End: 2022-06-01

## 2022-06-01 RX ADMIN — SODIUM CHLORIDE 20 ML/HR: 9 INJECTION, SOLUTION INTRAVENOUS at 08:25

## 2022-06-01 RX ADMIN — DEXAMETHASONE SODIUM PHOSPHATE 20 MG: 10 INJECTION INTRAMUSCULAR; INTRAVENOUS at 08:25

## 2022-06-01 RX ADMIN — BEVACIZUMAB 900 MG: 400 INJECTION, SOLUTION INTRAVENOUS at 14:49

## 2022-06-01 RX ADMIN — FAMOTIDINE 20 MG: 10 INJECTION, SOLUTION INTRAVENOUS at 09:26

## 2022-06-01 RX ADMIN — FOSAPREPITANT 150 MG: 150 INJECTION, POWDER, LYOPHILIZED, FOR SOLUTION INTRAVENOUS at 09:49

## 2022-06-01 RX ADMIN — DIPHENHYDRAMINE HYDROCHLORIDE 25 MG: 50 INJECTION, SOLUTION INTRAMUSCULAR; INTRAVENOUS at 08:56

## 2022-06-01 RX ADMIN — CARBOPLATIN 485.4 MG: 10 INJECTION, SOLUTION INTRAVENOUS at 13:38

## 2022-06-01 RX ADMIN — PACLITAXEL 299.4 MG: 6 INJECTION, SOLUTION, CONCENTRATE INTRAVENOUS at 10:32

## 2022-06-01 NOTE — PROGRESS NOTES
Outpatient Oncology Nutrition Consultation   Type of Consult:  Follow Up  Care Location: Telephone Call - met with son Marry Mcfarland) per pt request   Nutrition Assessment:   Oncology Diagnosis & Treatments: non-small cell lung carcinoma with brain metastases  S/p partial brain SRT 3/30/21-4/8/22  Palliative chemotherapy (bevacizumab, paclitaxel, carboplatin) started 5/11/22  For brain SRS scheduled for 6/15/22  Oncology History   Adenocarcinoma of right lung ([facility] )   3/28/2022 Initial Diagnosis    [facility] ([facility] )     5/11/2022 -  Chemotherapy    pegfilgrastim (NEULASTA), 3 mg (100 % of original dose 3 mg), Subcutaneous, Once, 1 of 13 cycles  Dose modification: 3 mg (original dose 3 mg, Cycle 2)  Administration: 3 mg (6/2/2022)  fosaprepitant (EMEND) IVPB, 150 mg, Intravenous, Once, 2 of 6 cycles  Administration: 150 mg (5/11/2022), 150 mg (6/1/2022)  CARBOplatin (PARAPLATIN) IVPB (GOG AUC DOSING), 604 45 mg, Intravenous, Once, 2 of 6 cycles  Administration: 604 45 mg (5/11/2022), 485 4 mg (6/1/2022)  bevacizumab (AVASTIN) IVPB, 940 mg, Intravenous, Once, 2 of 15 cycles  Administration: 900 mg (5/11/2022), 900 mg (6/1/2022)  PACLItaxel (TAXOL) chemo IVPB, 148 75 mg/m2 = 254 4 mg (85 % of original dose 175 mg/m2), Intravenous, Once, 2 of 6 cycles  Dose modification: 148 75 mg/m2 (85 % of original dose 175 mg/m2, Cycle 1, Reason: Dose Not Tolerated)  Administration: 254 4 mg (5/11/2022), 299 4 mg (6/1/2022)     Lung cancer metastatic to brain ([facility] )   3/30/2022 - 4/8/2022 Radiation      Treatments:  Course: C1 SRT    Plan ID Energy Fractions Dose per Fraction (cGy) Dose Correction (cGy) Total Dose Delivered (cGy) Elapsed Days   SRTLTemp 6X 5 / 5 600 0 3,000 9   SRTROccipital 6X 5 / 5 600 0 3,000 9      Treatment Dates:  3/30/2022 - 4/8/2022 5/26/2022 Initial Diagnosis    Lung cancer metastatic to brain [facility])       Past Medical & Surgical Hx:   Patient Active Problem List   Diagnosis    Type 2 diabetes mellitus without complication, with long-term current use of insulin (HCC)    Nonimmune to hepatitis B virus    Elevated PSA    Gall bladder polyp    Vitamin D deficiency    Cyanocobalamin deficiency    History of tobacco use    Lung mass    Brain mass    Cerebral edema (HCC)    Sepsis due to pneumonia    Obstructive pneumonia    Adenocarcinoma of right lung ([facility] )    Pneumonia due to Klebsiella pneumoniae ([facility]    Cancer associated pain    Adjustment insomnia    Continuous opioid dependence ([facility] )    Chronic obstructive pulmonary disease (HCC)    Palliative care patient    Chemotherapy induced neutropenia (HCC)    Dehydration    Acute right-sided thoracic back pain    Lung cancer metastatic to brain [facility])     Past Medical History:   Diagnosis Date    Diabetes mellitus ([facility] )     Elevated PSA 3/11/2021    Fatty liver 3/11/2021    Gall bladder polyp 3/11/2021    Lung cancer ([facility]     Nonimmune to hepatitis B virus 3/11/2021     Past Surgical History:   Procedure Laterality Date    FL GUIDED CENTRAL VENOUS ACCESS DEVICE INSERTION  5/10/2022    LUNG BIOPSY      SD INSJ TUNNELED CTR VAD W/SUBQ PORT AGE 5 YR/> N/A 5/10/2022    Procedure: INSERTION VENOUS PORT ( PORT-A-CATH) IR;  Surgeon: Brooks Santana DO;  [facility];  Service: Interventional Radiology     Review of Medications:   Vitamins, Supplements and Herbals: Med List Reviewed & pt is only taking: vitamin D, Mg    Current Outpatient Medications:     BD Pen Needle Jeaneth 2nd Gen 32G X 4 MM MISC, USE ONCE DAILY WITH LANTUS, Disp: , Rfl:     benzonatate (TESSALON PERLES) 100 mg capsule, Take 1 capsule (100 mg total) by mouth 3 (three) times a day as needed for cough, Disp: 30 capsule, Rfl: 2    Blood Glucose Monitoring Suppl (FreeStyle Lite) FIDELIA, , Disp: , Rfl:     cefdinir (OMNICEF) 300 mg capsule, Take 1 capsule (300 mg total) by mouth every 12 (twelve) hours for 5 days, Disp: 10 capsule, Rfl: 0   Cholecalciferol (Vitamin D3) 125 MCG (5000 UT) CAPS, Take 1 capsule (5,000 Units total) by mouth daily, Disp: 90 capsule, Rfl: 2    Cholecalciferol (Vitamin D3) 125 MCG (5000 UT) TABS, Take 5,000 Units by mouth daily, Disp: , Rfl:     doxycycline hyclate (VIBRAMYCIN) 100 mg capsule, Take 1 capsule (100 mg total) by mouth every 12 (twelve) hours for 5 days, Disp: 10 capsule, Rfl: 0    FREESTYLE LITE test strip, Check blood sugar  daily, Disp: 100 each, Rfl: 3    guaiFENesin (MUCINEX) 600 mg 12 hr tablet, Take 2 tablets (1,200 mg total) by mouth every 12 (twelve) hours as needed for cough or congestion (thick mucis), Disp: 60 tablet, Rfl: 2    Insulin Pen Needle (BD Pen Needle Jeaneth U/F) 32G X 4 MM MISC, Use daily as directed with insulin pen, Disp: 100 each, Rfl: 3    ipratropium-albuterol (Combivent Respimat) inhaler, Inhale 1 puff 4 (four) times a day, Disp: 4 g, Rfl: 1    Lancets (freestyle) lancets, Check blood sugar daily, Disp: 100 each, Rfl: 3    levETIRAcetam (KEPPRA) 500 mg tablet, Take 1 tablet (500 mg total) by mouth every 12 (twelve) hours, Disp: 60 tablet, Rfl: 3    LORazepam (ATIVAN) 0 5 mg tablet, For sleep 1-2 tablets at bed time (Max 1 mg ), Disp: 60 tablet, Rfl: 0    magnesium gluconate (MAGONATE) 500 mg tablet, Take 1 tablet (500 mg total) by mouth daily, Disp: 30 tablet, Rfl: 1    metFORMIN (GLUCOPHAGE-XR) 500 mg 24 hr tablet, Take 2 tablets (1,000 mg total) by mouth daily with dinner, Disp: 180 tablet, Rfl: 2    naloxone (NARCAN) 4 mg/0 1 mL nasal spray, Administer 1 spray into a nostril  If no response after 2-3 minutes, give another dose in the other nostril using a new spray   It has to be on stand by use with opioid use - in case of overdose, Disp: 1 each, Rfl: 1    ondansetron (Zofran ODT) 8 mg disintegrating tablet, Take 1 tablet (8 mg total) by mouth every 8 (eight) hours as needed for nausea or vomiting, Disp: 20 tablet, Rfl: 5    oxyCODONE (ROXICODONE) 10 MG TABS, Take 1 tablet (10 mg total) by mouth every 6 (six) hours as needed for moderate pain Max Daily Amount: 40 mg, Disp: 120 tablet, Rfl: 0    pantoprazole (PROTONIX) 40 mg tablet, Take 1 tablet (40 mg total) by mouth daily in the early morning, Disp: 90 tablet, Rfl: 1    Most Recent Lab Results: Checks BG 1x/day; typically ~102  Lab Results   Component Value Date    WBC 16 46 (H) 06/07/2022    NEUTROABS 5 34 05/25/2022    CHOLESTEROL 154 01/29/2021    TRIG 52 01/29/2021    HDL 68 01/29/2021    LDLCALC 73 01/29/2021    ALT 16 06/05/2022    AST 20 06/05/2022    ALB 3 8 06/05/2022    SODIUM 133 (L) 06/06/2022    SODIUM 135 06/05/2022    K 4 1 06/06/2022    K 4 2 06/05/2022    CL 99 06/06/2022    BUN 16 06/06/2022    BUN 18 06/05/2022    CREATININE 0 81 06/06/2022    CREATININE 0 97 06/05/2022    EGFR 98 06/06/2022    PHOS 2 8 03/24/2022    PHOS 3 5 03/23/2022    GLUCOSE 363 (H) 01/06/2021    POCGLU 118 06/07/2022    GLUF 90 05/08/2022    GLUF 64 (L) 04/10/2022    GLUC 105 06/06/2022    HGBA1C 6 5 (H) 06/05/2022    HGBA1C 5 7 (H) 11/07/2021    HGBA1C 5 8 (H) 05/03/2021    CALCIUM 9 0 06/06/2022    MG 1 7 05/25/2022       Anthropometric Measurements:   Height: 66"  Ht Readings from Last 1 Encounters:   06/08/22 5' 8" (1 727 m)     Wt Readings from Last 20 Encounters:   06/08/22 62 3 kg (137 lb 6 4 oz)   06/08/22 62 3 kg (137 lb 6 4 oz)   06/01/22 63 8 kg (140 lb 10 5 oz)   05/31/22 64 4 kg (142 lb)   05/26/22 64 kg (141 lb)   05/11/22 62 1 kg (136 lb 14 5 oz)   05/10/22 62 6 kg (138 lb)   05/03/22 62 6 kg (138 lb)   04/27/22 65 1 kg (143 lb 8 oz)   04/26/22 63 4 kg (139 lb 12 8 oz)   04/22/22 64 1 kg (141 lb 6 4 oz)   04/15/22 66 kg (145 lb 9 6 oz)   04/11/22 67 4 kg (148 lb 9 6 oz)   04/05/22 67 6 kg (149 lb)   03/28/22 63 kg (139 lb)   03/23/22 66 2 kg (146 lb)   03/14/22 64 2 kg (141 lb 8 6 oz)   12/02/21 64 9 kg (143 lb)   11/17/21 63 6 kg (140 lb 3 2 oz)   05/17/21 65 5 kg (144 lb 6 4 oz)     Weight History:   Usual Weight: 150# (prior to DM dx 2021)   Home Weight: ~138-140#, unsure of accuracy of home scale    Oncology Nutrition-Anthropometrics    Flowsheet Row Nutrition from 6/10/2022 in [place] [id number] [facility] Nutrition from 5/12/2022 in [place] [address] [facility]   Patient [age] (years): 62 years 64 years   Patient (male) height (in): 77 in 77 in   Current weight (lbs): 137 4 lbs 136 9 lbs   Current weight to be used for anthropometric calculations (kg) 62 5 kg 62 2 kg   BMI: 22 2 22 1   IBW male 142 lb 142 lb   IBW (kg) male 64 5 kg 64 5 kg   IBW % (male) 96 8 % 96 4 %   Adjusted BW (male): 140 9 lbs 140 7 lbs   Adjusted BW in kg (male): 64 kg 64 kg   % weight change after 1 week: -2 3 % -0 8 %   Weight change after 1 week (lbs) -3 3 lbs -1 1 lbs   % weight change after 1 month: -0 4 % -7 9 %   Weight change after 1 month (lbs) -0 6 lbs -11 7 lbs   % weight change after 3 months: -2 9 % --   Weight change after 3 months (lbs) -4 1 lbs --   % weight change after 6 months: -3 9 % -2 4 %   Weight change after 6 months (lbs) -5 6 lbs -3 3 lbs        Nutrition-Focused Physical Findings: n/a due to telephone call    Food/Nutrition-Related History & Client/Social History:    Current Nutrition Impact Symptoms:  [] Nausea  [x] Reduced Appetite - after chemo [] Acid Reflux    [] Vomiting  [x] Unintended Wt Loss -   -significant wt loss x1 week [] Malabsorption    [] Diarrhea - none [] Unintended Wt Gain  [] Dumping Syndrome    [] Constipation  [] Thick Mucous/Secretions  [] Abdominal Pain    [] Dysgeusia (Altered Taste)  [] Xerostomia (Dry Mouth)  [] Gas    [] Dysosmia (Altered Smell)  [] Thrush  [] Difficulty Chewing    [] Oral Mucositis (Sore Mouth)  [x] Fatigue & Weakness- 1-3 days after chemo [x] Hyperglycemia -  Lab Results   Component Value Date    HGBA1C 6 5 (H) 06/05/2022      [] Odynophagia  [] Esophagitis  [] Other:    [] Dysphagia  [] Early Satiety  [] No Problems Eating      Food Allergies & Intolerances: no    Current Diet: CHO-Controlled and [place] Diet   Eats salmon, pork, beef, all veggies, jackfruit, Welsh pears, and rice  Pt likes cheesecake but has not been eating it d/t DM  Pt states he is open to being more flexible with his diet  Current Nutrition Intake: Increased since last visit  Appetite: Good; but poor for ~3 days after chemo (forces self to eat/drink) - pt will eat what his wife makes  Nutrition Route: PO   Activity level: mostly sedentary; likes plants and gardening; limited by fatigue     24 Hr Diet Recall:   Wife is using more plant-based oils in cooking  Breakfast: 1 Ensure Max, eggs, sausage  Snack: almonds, cashews, other types of nuts  Lunch: 3/4 cup brown rice, 3 oz pork/salmon/beef/chicken/tilapia/cod, 3/4 cup veggie (green beans, asparagus, bok dionicio, cauliflower)  Snack: none  Dinner: 3/4 cup brown rice, 3 oz pork/salmon/beef/chicken/tilapia/cod, 3/4 cup veggie (green beans, asparagus, bok dionicio, cauliflower)  Snack: low sugar ice cream, sometimes second Ensure Max    Beverages: water (16 9 oz x3-4), coffee (8 oz x1), oolong or cindy tea (none lately), OJ or other juices that are available (none lately), Ensure Max (11 oz x1-2), Ensure Clear (occasionally)  Supplements:   Ensure Clear (10 oz, 180 kcal, 8 g pro) - occasioanlly  Ensure Max Protein (11 oz, 150 kcal, 30 g pro) - 1-2x/day (does not like it very much)      Oncology Nutrition-Estimated Needs    Flowsheet Row Nutrition from 2022 in [facility]   Weight type used Actual weight   Weight in kilograms (kg) used for estimated needs 62 2 kg   Energy needs formula:  35-40 kcal/kg   Energy needs based on 35 kcal/k kcal   Energy needs based on 40 kcal/k kcal   Protein needs formula: 1 5-2 g/kg   Protein needs based on 1 5 g/kg 93 g   Protein needs based on 2 g/kg 124 g   Fluid needs formula: 30-35 mL/kg   Fluid needs based on 30 mL/kg 1869 mL   Fluid needs in ounces 63 oz   Fluid needs based on 35 mL/kg 2181 mL   Fluid needs in ounces 74 oz           Discussion & Intervention:   Kathern Cranker was evaluated today for an RD follow up regarding wt loss and nutrition impact sx management  Kathern Cranker is currently undergoing tx for lung cancer  Since last visit, son reports a hospitalization with new brain findings  Pt will be starting RT next week  Pt has maintained his wt since last month, but appears to have lost some wt over the past week  His appetite has been good except for the few days after chemotherapy  Pt has been drinking 1-2 Ensure Max daily and occasionally Ensure Clear  He is hydrating much butter with the encouragement of his family  No more diarrhea since last visit  Pt seems to be snacking less which may be the cause of his recent wt loss  BG has been ~102 per son  Reviewed 24 hour recall, which revealed an suboptimal po intake, and discussed ways to increase kcal, protein, and fluid intakes and optimize nutrient intake  Also reviewed the importance of wt management throughout the tx process and the role of a high kcal/ high protein diet and carbohydrate controlled diet in managing wt and overall health    Based on today's assessment, discussion included: MNT for: wt loss, reduced appetite, fatigue, DM, a high kcal/protein diet & food choices to include at all meals & snacks (Examples of high kcal foods: cheese, full-fat dairy products, nut butter, plant-based fats, coconut oil/milk, avocado, butter, cream soup, etc  Examples of high protein foods: eggs, chicken, fish, beans/legumes, nuts/nut butters, bone broth, etc ) , a diabetic diet & food choices to include at all meals & snacks, spreading carbohydrate intake throughout the day & counting carbohydrates for adequate glycemic control, fortifying foods for added kcal and protein (examples include: adding cheese to foods such as eggs, mashed potatoes, casseroles, etc ; Making oatmeal with whole milk rather than water; Making fortified mashed potatoes with cream, butter, dry milk powder, plain [place] yogurt, and cheese ), adequate hydration & fluid choices, eating smaller more frequent meals every 2-3 hours (5-6 small meals/day), having consistent and planned snacks between meals, continuing or increasing oral nutrition supplements and adding extra fat to foods while cooking such as butter, plant-based oil, coconut oil/milk, avocado, nut butters, etc    Moving forward, Ibeth Urena was encouraged to increase kcal, protein, and fluid intakes  Materials Provided: not applicable  All questions and concerns addressed during todays visit  Ibeth Urena has RD contact information  Nutrition Diagnosis:   Inadequate Energy Intake related to physiological causes, disease state and treatment related issues as evidenced by food recall, wt loss and discussion with pt and/or family  Increased Nutrient Needs (kcal & pro) related to increased demand for nutrients and disease state as evidenced by cancer dx and pt undergoing tx for cancer  Monitoring & Evaluation:   Goals:  weight maintenance/stabilization  adequate nutrition impact symptom management  pt to meet >/=75% estimated nutrition needs daily    Progress Towards Goals: Progressing    Nutrition Rx & Recommendations:  Diet: Diabetic, High calorie, High protien diet  Small, frequent meals/snacks may be easier to tolerate than 3 large daily meals  Aim for 5-6 small meals per day (every 2-3 hours)  Include protein at all meals/snacks  Stay hydrated by sipping fluids of choice/tolerance throughout the day    For weight loss: monitor your weight at home at least 2x/week, record your weight, start by adding 250-500 extra calories per day, eat 5-6 small scheduled meals every 2-3 hrs, choose foods that are high in protein and calories (see pages 49-53 in your Eating Hints book), drink liquids with calories for example: milkshakes/smoothies/juice/soup/whole milk/chocolate milk, cook with protein fortified milk (see recipe on page 36 in your Eating Hints book), consider ready-to-drink oral nutrition supplements such as Ensure Plus, Ensure Enlive, Boost Plus, or Boost Very High Calorie, avoid "diet" and "light" foods when possible, avoid drinking too much with meals, contact your dietitian with any continued weight loss over the course of 1 week  For more info see pages 35-37 in your Eating Hints book  Weigh yourself regularly  If you notice weight loss, make an effort to increase your daily food/calorie intake  If you continue to notice loss after these efforts, reach out to your dietitian to establish a plan to stabilize weight  Weigh yourself 2 times per week, record your weight  Nutrition Supplements: Aim for 2 per day, choose one with <10 grams of sugar per serving  Ideally one that has >10 grams protein and is >200 calories  Example: Glucerna or Ensure Max  Can increase supplement usage on days when eating is more difficult  Increase protein portion to 4 oz at meals  Continue preparing food with healthy, plant-based oils such as olive, canola, avocado, etc   Use larger portion to increase calorie intake without affecting blood sugars    Higher protein snack ideas: low-sugar [place] yogurt, SF pudding, 1/4-1/3 cup nuts, edamame, cheese stick, low-sugar oral nutrition supplement (Glucerna), oatmeal or lower sugar cereal with milk (choose a cereal with <10 grams of sugar per serving)  Fluid intake should be ~65-75 oz per day  Choose caffeine-free fluids  Water is the best choice  Ensure/Glucerna shakes count towards daily fluid goals  Ideas: SF jello, water, whole milk, unsweetened almond milk, unsweetened soy milk, herbal/decaf tea  Increase eating the week leading up to chemo, add snacks between meals     Follow Up Plan: 7/15 at 1:30pm for a phone follow up  Recommend Referral to Other Providers: none at this time

## 2022-06-02 ENCOUNTER — HOSPITAL ENCOUNTER (OUTPATIENT)
Dept: INFUSION CENTER | Facility: HOSPITAL | Age: 57
Discharge: HOME | End: 2022-06-02
Attending: INTERNAL MEDICINE
Payer: COMMERCIAL

## 2022-06-02 VITALS
HEART RATE: 93 BPM | TEMPERATURE: 98.5 F | RESPIRATION RATE: 20 BRPM | OXYGEN SATURATION: 97 % | DIASTOLIC BLOOD PRESSURE: 78 MMHG | SYSTOLIC BLOOD PRESSURE: 132 MMHG

## 2022-06-02 DIAGNOSIS — C34.91 ADENOCARCINOMA OF RIGHT LUNG (HCC): Primary | ICD-10-CM

## 2022-06-02 DIAGNOSIS — E86.0 DEHYDRATION: ICD-10-CM

## 2022-06-02 PROCEDURE — 96360 HYDRATION IV INFUSION INIT: CPT

## 2022-06-02 PROCEDURE — 96372 THER/PROPH/DIAG INJ SC/IM: CPT

## 2022-06-02 RX ORDER — LORAZEPAM 0.5 MG/1
TABLET ORAL
Qty: 60 TABLET | Refills: 0 | Status: SHIPPED | OUTPATIENT
Start: 2022-06-02 | End: 2022-06-24 | Stop reason: SDUPTHER

## 2022-06-02 RX ADMIN — PEGFILGRASTIM 3 MG: 6 INJECTION SUBCUTANEOUS at 15:31

## 2022-06-02 RX ADMIN — SODIUM CHLORIDE 1000 ML: 0.9 INJECTION, SOLUTION INTRAVENOUS at 14:03

## 2022-06-02 NOTE — TELEPHONE ENCOUNTER
Please verify pharmacy  His pharmacy on record is [facility] ([address] in [place]), not [facility] Does he wish to change his pharmacy on record [facility] that [facility]?     Thank you

## 2022-06-02 NOTE — PLAN OF CARE
Problem: Potential for Falls  Goal: Patient will remain free of falls  Description: INTERVENTIONS:  - Educate patient/family on patient safety including physical limitations  - Instruct patient to call for assistance with activity   - Consult OT/PT to assist with strengthening/mobility   - Keep Call bell within reach    Outcome: Progressing     Problem: METABOLIC, FLUID AND ELECTROLYTES - ADULT  Goal: Fluid balance maintained  Description: INTERVENTIONS:  - Monitor labs   - Monitor I/O and WT  - Instruct patient on fluid and nutrition as appropriate  - Assess for signs & symptoms of volume excess or deficit  Outcome: Progressing     Problem: HEMATOLOGIC - ADULT  Goal: Maintains hematologic stability  Description: INTERVENTIONS  - Assess for signs and symptoms of bleeding or hemorrhage  - Monitor labs  - Administer supportive blood products/factors as ordered and appropriate  Outcome: Progressing

## 2022-06-02 NOTE — TELEPHONE ENCOUNTER
Primary palliative medicine provider:   Dr Refugio Beauchamp    Medication requested: Lorazepam (Ativan)0 5 mg tablet    If for pain, how has the patient been taking their pain medicine? Last appointment: 06/02/2022    Next scheduled appointment: 06/24/2022    PDMP review:    [id number] 04/29/2022 04/27/2022 LORazepam (Tablet) 60 0 30 0 5 MG NA JORGE ALBERTO SNYDER [facility] [employer] [facility] 00 / 00 [place]       [id number] 04/26/2022 04/26/2022 LORazepam (Tablet) 10 0 5 0 5 MG NA VERA FILIPOVSKA [facility]  [place] [employer] 00 / 00 [place]       [id number] 03/28/2022 03/28/2022 LORazepam (Tablet) 30 0 30 0 5 MG NA MARK STEWARD [facility]   [place] [employer] 00 / 00 [place]

## 2022-06-02 NOTE — TELEPHONE ENCOUNTER
Sent  Per our opioid agreement we will switch this patient's pharmacy of record to the [facility], as the patient should only have one primary pharmacy for narcotics

## 2022-06-02 NOTE — PROGRESS NOTES
Patient complains of his hand twitching after yesterdays treatment  This happened twice, each lasted about 3-4 minutes  Provider notified, per Fausto Zapata RN, continue to monitor patient  Instructed patient to report new or more frequent episodes to provider  Patient received hydration fluids via port and neulasta injection in L arm  Tolerated well with no adverse effects  Offers no complaints  Next appointment verified, AVS declined

## 2022-06-04 ENCOUNTER — HOSPITAL ENCOUNTER (OUTPATIENT)
Dept: MRI IMAGING | Facility: HOSPITAL | Age: 57
Discharge: HOME | DRG: 871 | End: 2022-06-04
Attending: STUDENT IN AN ORGANIZED HEALTH CARE EDUCATION/TRAINING PROGRAM
Payer: COMMERCIAL

## 2022-06-04 DIAGNOSIS — C79.31 METASTASIS TO BRAIN (HCC): ICD-10-CM

## 2022-06-04 PROCEDURE — G1004 CDSM NDSC: HCPCS

## 2022-06-04 PROCEDURE — 70553 MRI BRAIN STEM W/O & W/DYE: CPT

## 2022-06-04 PROCEDURE — A9585 GADOBUTROL INJECTION: HCPCS | Performed by: STUDENT IN AN ORGANIZED HEALTH CARE EDUCATION/TRAINING PROGRAM

## 2022-06-04 RX ADMIN — GADOBUTROL 6.5 ML: 604.72 INJECTION INTRAVENOUS at 15:04

## 2022-06-05 ENCOUNTER — HOSPITAL ENCOUNTER (INPATIENT)
Facility: HOSPITAL | Age: 57
LOS: 2 days | Discharge: HOME | DRG: 871 | End: 2022-06-07
Attending: EMERGENCY MEDICINE | Admitting: HOSPITALIST
Payer: COMMERCIAL

## 2022-06-05 ENCOUNTER — APPOINTMENT (EMERGENCY)
Dept: CT IMAGING | Facility: HOSPITAL | Age: 57
DRG: 871 | End: 2022-06-05
Payer: COMMERCIAL

## 2022-06-05 DIAGNOSIS — J18.9 MULTIFOCAL PNEUMONIA: Primary | ICD-10-CM

## 2022-06-05 DIAGNOSIS — C34.90 METASTATIC LUNG CANCER (METASTASIS FROM LUNG TO OTHER SITE) (HCC): ICD-10-CM

## 2022-06-05 LAB
ALBUMIN SERPL BCP-MCNC: 3.8 G/DL (ref 3.5–5)
ALP SERPL-CCNC: 143 U/L (ref 34–104)
ALT SERPL W P-5'-P-CCNC: 16 U/L (ref 7–52)
ANION GAP SERPL CALCULATED.3IONS-SCNC: 8 MMOL/L (ref 4–13)
AST SERPL W P-5'-P-CCNC: 20 U/L (ref 13–39)
BASOPHILS # BLD MANUAL: 0 THOUSAND/UL (ref 0–0.1)
BASOPHILS NFR MAR MANUAL: 0 % (ref 0–1)
BILIRUB SERPL-MCNC: 0.86 MG/DL (ref 0.2–1)
BILIRUB UR QL STRIP: NEGATIVE
BUN SERPL-MCNC: 18 MG/DL (ref 5–25)
CALCIUM SERPL-MCNC: 9.6 MG/DL (ref 8.4–10.2)
CHLORIDE SERPL-SCNC: 97 MMOL/L (ref 96–108)
CLARITY UR: CLEAR
CO2 SERPL-SCNC: 30 MMOL/L (ref 21–32)
COLOR UR: YELLOW
CREAT SERPL-MCNC: 0.97 MG/DL (ref 0.6–1.3)
EOSINOPHIL # BLD MANUAL: 0 THOUSAND/UL (ref 0–0.4)
EOSINOPHIL NFR BLD MANUAL: 0 % (ref 0–6)
ERYTHROCYTE [DISTWIDTH] IN BLOOD BY AUTOMATED COUNT: 14.9 % (ref 11.6–15.1)
FLUAV RNA RESP QL NAA+PROBE: NEGATIVE
FLUBV RNA RESP QL NAA+PROBE: NEGATIVE
GFR SERPL CREATININE-BSD FRML MDRD: 86 ML/MIN/1.73SQ M
GLUCOSE SERPL-MCNC: 103 MG/DL (ref 65–140)
GLUCOSE SERPL-MCNC: 103 MG/DL (ref 65–140)
GLUCOSE SERPL-MCNC: 113 MG/DL (ref 65–140)
GLUCOSE SERPL-MCNC: 128 MG/DL (ref 65–140)
GLUCOSE UR STRIP-MCNC: NEGATIVE MG/DL
HCT VFR BLD AUTO: 37.8 % (ref 36.5–49.3)
HGB BLD-MCNC: 12.6 G/DL (ref 12–17)
HGB UR QL STRIP.AUTO: NEGATIVE
KETONES UR STRIP-MCNC: NEGATIVE MG/DL
LACTATE SERPL-SCNC: 1.4 MMOL/L (ref 0.5–2)
LEUKOCYTE ESTERASE UR QL STRIP: NEGATIVE
LYMPHOCYTES # BLD AUTO: 2.17 THOUSAND/UL (ref 0.6–4.47)
LYMPHOCYTES # BLD AUTO: 8 % (ref 14–44)
MCH RBC QN AUTO: 30.8 PG (ref 26.8–34.3)
MCHC RBC AUTO-ENTMCNC: 33.3 G/DL (ref 31.4–37.4)
MCV RBC AUTO: 92 FL (ref 82–98)
METAMYELOCYTES NFR BLD MANUAL: 1 % (ref 0–1)
MONOCYTES # BLD AUTO: 0 THOUSAND/UL (ref 0–1.22)
MONOCYTES NFR BLD: 0 % (ref 4–12)
NEUTROPHILS # BLD MANUAL: 24.68 THOUSAND/UL (ref 1.85–7.62)
NEUTS BAND NFR BLD MANUAL: 9 % (ref 0–8)
NEUTS SEG NFR BLD AUTO: 82 % (ref 43–75)
NITRITE UR QL STRIP: NEGATIVE
PH UR STRIP.AUTO: 8 [PH]
PLATELET # BLD AUTO: 259 THOUSANDS/UL (ref 149–390)
PLATELET BLD QL SMEAR: ADEQUATE
PMV BLD AUTO: 8.6 FL (ref 8.9–12.7)
POTASSIUM SERPL-SCNC: 4.2 MMOL/L (ref 3.5–5.3)
PROCALCITONIN SERPL-MCNC: 0.34 NG/ML
PROT SERPL-MCNC: 7.2 G/DL (ref 6.4–8.4)
PROT UR STRIP-MCNC: NEGATIVE MG/DL
RBC # BLD AUTO: 4.09 MILLION/UL (ref 3.88–5.62)
RBC MORPH BLD: NORMAL
RSV RNA RESP QL NAA+PROBE: NEGATIVE
SARS-COV-2 RNA RESP QL NAA+PROBE: NEGATIVE
SODIUM SERPL-SCNC: 135 MMOL/L (ref 135–147)
SP GR UR STRIP.AUTO: 1.01 (ref 1–1.03)
UROBILINOGEN UR QL STRIP.AUTO: 0.2 E.U./DL
WBC # BLD AUTO: 27.12 THOUSAND/UL (ref 4.31–10.16)

## 2022-06-05 PROCEDURE — 87040 BLOOD CULTURE FOR BACTERIA: CPT | Performed by: EMERGENCY MEDICINE

## 2022-06-05 PROCEDURE — G1004 CDSM NDSC: HCPCS

## 2022-06-05 PROCEDURE — 36415 COLL VENOUS BLD VENIPUNCTURE: CPT | Performed by: EMERGENCY MEDICINE

## 2022-06-05 PROCEDURE — 0241U HB NFCT DS VIR RESP RNA 4 TRGT: CPT | Performed by: EMERGENCY MEDICINE

## 2022-06-05 PROCEDURE — 84145 PROCALCITONIN (PCT): CPT | Performed by: PHYSICIAN ASSISTANT

## 2022-06-05 PROCEDURE — 93005 ELECTROCARDIOGRAM TRACING: CPT

## 2022-06-05 PROCEDURE — 81003 URINALYSIS AUTO W/O SCOPE: CPT | Performed by: EMERGENCY MEDICINE

## 2022-06-05 PROCEDURE — 85007 BL SMEAR W/DIFF WBC COUNT: CPT | Performed by: EMERGENCY MEDICINE

## 2022-06-05 PROCEDURE — 80053 COMPREHEN METABOLIC PANEL: CPT | Performed by: EMERGENCY MEDICINE

## 2022-06-05 PROCEDURE — 96374 THER/PROPH/DIAG INJ IV PUSH: CPT

## 2022-06-05 PROCEDURE — 96375 TX/PRO/DX INJ NEW DRUG ADDON: CPT

## 2022-06-05 PROCEDURE — 94664 DEMO&/EVAL PT USE INHALER: CPT

## 2022-06-05 PROCEDURE — 87449 NOS EACH ORGANISM AG IA: CPT | Performed by: PHYSICIAN ASSISTANT

## 2022-06-05 PROCEDURE — 85027 COMPLETE CBC AUTOMATED: CPT | Performed by: EMERGENCY MEDICINE

## 2022-06-05 PROCEDURE — 82948 REAGENT STRIP/BLOOD GLUCOSE: CPT

## 2022-06-05 PROCEDURE — 71250 CT THORAX DX C-: CPT

## 2022-06-05 PROCEDURE — 83605 ASSAY OF LACTIC ACID: CPT | Performed by: EMERGENCY MEDICINE

## 2022-06-05 PROCEDURE — 99223 1ST HOSP IP/OBS HIGH 75: CPT | Performed by: HOSPITALIST

## 2022-06-05 PROCEDURE — 99285 EMERGENCY DEPT VISIT HI MDM: CPT | Performed by: EMERGENCY MEDICINE

## 2022-06-05 PROCEDURE — 96361 HYDRATE IV INFUSION ADD-ON: CPT

## 2022-06-05 PROCEDURE — 94760 N-INVAS EAR/PLS OXIMETRY 1: CPT

## 2022-06-05 PROCEDURE — 99285 EMERGENCY DEPT VISIT HI MDM: CPT

## 2022-06-05 PROCEDURE — 83036 HEMOGLOBIN GLYCOSYLATED A1C: CPT | Performed by: PHYSICIAN ASSISTANT

## 2022-06-05 RX ORDER — HYDROMORPHONE HCL/PF 1 MG/ML
0.5 SYRINGE (ML) INJECTION EVERY 6 HOURS PRN
Status: DISCONTINUED | OUTPATIENT
Start: 2022-06-05 | End: 2022-06-07 | Stop reason: HOSPADM

## 2022-06-05 RX ORDER — OXYCODONE HYDROCHLORIDE 10 MG/1
10 TABLET ORAL EVERY 6 HOURS PRN
Status: DISCONTINUED | OUTPATIENT
Start: 2022-06-05 | End: 2022-06-07 | Stop reason: HOSPADM

## 2022-06-05 RX ORDER — ONDANSETRON 2 MG/ML
4 INJECTION INTRAMUSCULAR; INTRAVENOUS EVERY 6 HOURS PRN
Status: DISCONTINUED | OUTPATIENT
Start: 2022-06-05 | End: 2022-06-07 | Stop reason: HOSPADM

## 2022-06-05 RX ORDER — ACETAMINOPHEN 325 MG/1
650 TABLET ORAL EVERY 6 HOURS PRN
Status: DISCONTINUED | OUTPATIENT
Start: 2022-06-05 | End: 2022-06-07 | Stop reason: HOSPADM

## 2022-06-05 RX ORDER — DOCUSATE SODIUM 100 MG/1
100 CAPSULE, LIQUID FILLED ORAL 2 TIMES DAILY
Status: DISCONTINUED | OUTPATIENT
Start: 2022-06-05 | End: 2022-06-07 | Stop reason: HOSPADM

## 2022-06-05 RX ORDER — DOXYCYCLINE HYCLATE 100 MG/1
100 CAPSULE ORAL EVERY 12 HOURS SCHEDULED
Status: DISCONTINUED | OUTPATIENT
Start: 2022-06-05 | End: 2022-06-07 | Stop reason: HOSPADM

## 2022-06-05 RX ORDER — IPRATROPIUM BROMIDE AND ALBUTEROL SULFATE 2.5; .5 MG/3ML; MG/3ML
3 SOLUTION RESPIRATORY (INHALATION)
Status: DISCONTINUED | OUTPATIENT
Start: 2022-06-05 | End: 2022-06-07 | Stop reason: HOSPADM

## 2022-06-05 RX ORDER — LEVETIRACETAM 500 MG/1
500 TABLET ORAL EVERY 12 HOURS SCHEDULED
Status: DISCONTINUED | OUTPATIENT
Start: 2022-06-05 | End: 2022-06-07 | Stop reason: HOSPADM

## 2022-06-05 RX ORDER — GUAIFENESIN 600 MG
1200 TABLET, EXTENDED RELEASE 12 HR ORAL EVERY 12 HOURS PRN
Status: DISCONTINUED | OUTPATIENT
Start: 2022-06-05 | End: 2022-06-07 | Stop reason: HOSPADM

## 2022-06-05 RX ORDER — HYDROMORPHONE HCL/PF 1 MG/ML
0.5 SYRINGE (ML) INJECTION ONCE
Status: COMPLETED | OUTPATIENT
Start: 2022-06-05 | End: 2022-06-05

## 2022-06-05 RX ORDER — ENOXAPARIN SODIUM 100 MG/ML
40 INJECTION SUBCUTANEOUS
Status: DISCONTINUED | OUTPATIENT
Start: 2022-06-05 | End: 2022-06-07 | Stop reason: HOSPADM

## 2022-06-05 RX ORDER — PANTOPRAZOLE SODIUM 40 MG/1
40 TABLET, DELAYED RELEASE ORAL
Status: DISCONTINUED | OUTPATIENT
Start: 2022-06-05 | End: 2022-06-07 | Stop reason: HOSPADM

## 2022-06-05 RX ORDER — INSULIN LISPRO 100 [IU]/ML
1-5 INJECTION, SOLUTION INTRAVENOUS; SUBCUTANEOUS
Status: DISCONTINUED | OUTPATIENT
Start: 2022-06-05 | End: 2022-06-07 | Stop reason: HOSPADM

## 2022-06-05 RX ORDER — LORAZEPAM 0.5 MG/1
0.5 TABLET ORAL
Status: DISCONTINUED | OUTPATIENT
Start: 2022-06-05 | End: 2022-06-07 | Stop reason: HOSPADM

## 2022-06-05 RX ORDER — BENZONATATE 100 MG/1
100 CAPSULE ORAL 3 TIMES DAILY PRN
Status: DISCONTINUED | OUTPATIENT
Start: 2022-06-05 | End: 2022-06-07 | Stop reason: HOSPADM

## 2022-06-05 RX ADMIN — DOCUSATE SODIUM 100 MG: 100 CAPSULE, LIQUID FILLED ORAL at 14:23

## 2022-06-05 RX ADMIN — DOXYCYCLINE 100 MG: 100 CAPSULE ORAL at 21:18

## 2022-06-05 RX ADMIN — LEVETIRACETAM 500 MG: 250 TABLET, FILM COATED ORAL at 21:19

## 2022-06-05 RX ADMIN — LEVETIRACETAM 500 MG: 250 TABLET, FILM COATED ORAL at 14:21

## 2022-06-05 RX ADMIN — DOCUSATE SODIUM 100 MG: 100 CAPSULE, LIQUID FILLED ORAL at 18:13

## 2022-06-05 RX ADMIN — SODIUM CHLORIDE 1000 ML: 0.9 INJECTION, SOLUTION INTRAVENOUS at 08:57

## 2022-06-05 RX ADMIN — IPRATROPIUM BROMIDE AND ALBUTEROL SULFATE 3 ML: 2.5; .5 SOLUTION RESPIRATORY (INHALATION) at 19:40

## 2022-06-05 RX ADMIN — OXYCODONE HYDROCHLORIDE 10 MG: 10 TABLET ORAL at 19:42

## 2022-06-05 RX ADMIN — VANCOMYCIN HYDROCHLORIDE 1250 MG: 5 INJECTION, POWDER, LYOPHILIZED, FOR SOLUTION INTRAVENOUS at 12:02

## 2022-06-05 RX ADMIN — GUAIFENESIN 1200 MG: 600 TABLET ORAL at 14:21

## 2022-06-05 RX ADMIN — IPRATROPIUM BROMIDE AND ALBUTEROL SULFATE 3 ML: 2.5; .5 SOLUTION RESPIRATORY (INHALATION) at 14:23

## 2022-06-05 RX ADMIN — HYDROMORPHONE HYDROCHLORIDE 0.5 MG: 1 INJECTION, SOLUTION INTRAMUSCULAR; INTRAVENOUS; SUBCUTANEOUS at 11:35

## 2022-06-05 RX ADMIN — CEFEPIME HYDROCHLORIDE 2000 MG: 2 INJECTION, POWDER, FOR SOLUTION INTRAVENOUS at 11:36

## 2022-06-05 RX ADMIN — DOXYCYCLINE 100 MG: 100 CAPSULE ORAL at 14:22

## 2022-06-05 RX ADMIN — ENOXAPARIN SODIUM 40 MG: 40 INJECTION SUBCUTANEOUS at 14:21

## 2022-06-05 NOTE — ASSESSMENT & PLAN NOTE
Lab Results   Component Value Date    HGBA1C 5 7 (H) 11/07/2021     No results for input(s): POCGLU in the last 72 hours    Blood Sugar Average: Last 72 hrs:  Overdue for A1c, can check in am  Monitor on accuchecks with sliding scale coverage  Patient on metformin alone at home, no longer on Lantus when not on steroids

## 2022-06-05 NOTE — H&P
[address] [facility] 1965, 62 y o  male MRN: [id number]  Unit/Bed#: ED 15 Encounter: [id number]  Primary Care Provider: Alexander Harris MD   Date and time admitted to hospital: 6/5/2022  8:09 AM    Lung cancer metastatic to brain [facility])  Assessment & Plan  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, bone, adrenal gland  Follows with Oncology  · Last chemo was 6/1/22 (Bevacizumab, Paclitaxel, Carboplatin) and also received Neulasta  In addition he has completed a course of brain RT and steroids  · MRI brain 6/4/22: 2 new hemorrhagic masses identified within the brain parenchyma consistent with new metastasis, with no significant edema or mass effect  Two prior brain mets have decreased in size  · CT chest: Right upper lobe lung mass with cavitation, decreased in size Enlarging right adrenal lesion with new left adrenal lesion, most compatible with metastatic disease  New bony metastatic disease involving the T9, T10 and T11 vertebral bodies  Pathologic fracture involving the superior endplate of B39   · keppra for seizure prophylaxis      Sepsis due to pneumonia  Assessment & Plan  · Patient presented to the hospital today with reports of fever of 100 4 at home and increased phlegm  On admission mets sepsis with HR >100 and leukocytosis (though he did receive Neulasta)  No fever or hypotension  · CT chest "Multiple areas of infiltrate throughout the right lung, most pronounced in the right upper lobe  Although the findings likely represent multifocal/multi lobar pneumonia, metastatic disease not excluded, particularly in the posterior right upper lobe  Posterior right upper lobe consolidation, likely postobstructive pneumonitis "   · Broad-spectrum antibiotics with IV cefepime and vancomycin given in the ER    However he meets criteria as non-severe CAP with drip score of 5, given that he was recently hospitalized with pneumonia less 2 months ago  · Will utilize ceftriaxone and escalate if needed but currently he appears quite    Type 2 diabetes mellitus without complication, with long-term current use of insulin (HCC)  Assessment & Plan  Lab Results   Component Value Date    HGBA1C 5 7 (H) 11/07/2021     No results for input(s): POCGLU in the last 72 hours  Blood Sugar Average: Last 72 hrs:  Overdue for A1c, can check in am  Monitor on accuchecks with sliding scale coverage  Patient on metformin alone at home, no longer on Lantus when not on steroids      Continuous opioid dependence ([facility] [address] )  Assessment & Plan  · Patient is maintained on oxycodone for chronic back pain related to metastasis, follows with palliative care    VTE Pharmacologic Prophylaxis: VTE Score: 5 lovenox  Code Status: Level 1 - Full Code per patient and son at bedside  Discussion with family: son at bedside    Anticipated Length of Stay: Patient will be admitted on an inpatient basis with an anticipated length of stay of greater than 2 midnights secondary to antibiotics  Total Time for Visit, including Counseling / Coordination of Care: 60 minutes Greater than 50% of this total time spent on direct patient counseling and coordination of care  Chief Complaint: fever    History of Present Illness:  Shannon Nolen is a 62 y o  male with a PMH of recently diagnosed metastatic lung cancer who presents with fever of 100 4 at home and increased production of clear phlegm  Patient had his last chemotherapy session 5 days ago  He denies any headache, chest pain, shortness of breath, severe cough, ill contacts, nausea or vomiting  He is eating and drinking okay  Patient's son at bedside assists in providing history but the patient is able to converse in [place]  Review of Systems:  Review of Systems   Constitutional: Positive for fever and unexpected weight change (40 lb weight loss)  Negative for appetite change, chills and fatigue     HENT: Negative for sore throat and trouble swallowing  Respiratory: Positive for cough  Negative for apnea, choking, chest tightness, shortness of breath, wheezing and stridor  Cardiovascular: Negative for chest pain, palpitations and leg swelling  Gastrointestinal: Negative for abdominal pain, blood in stool, constipation (slight decreased BM this week), diarrhea and nausea  Genitourinary: Negative for decreased urine volume and difficulty urinating  Musculoskeletal: Negative for arthralgias, back pain and gait problem  Skin: Negative for color change, pallor, rash and wound  Neurological: Negative for dizziness, seizures, syncope, facial asymmetry, speech difficulty, weakness, light-headedness, numbness and headaches  Psychiatric/Behavioral: Negative for agitation and confusion  Past Medical and Surgical History:   Past Medical History:   Diagnosis Date    Diabetes mellitus ([facility] [address] )     Elevated PSA 3/11/2021    Fatty liver 3/11/2021    Gall bladder polyp 3/11/2021    Lung cancer ([facility] [place] [facility] )     Nonimmune to hepatitis B virus 3/11/2021       Past Surgical History:   Procedure Laterality Date    FL GUIDED CENTRAL VENOUS ACCESS DEVICE INSERTION  5/10/2022    LUNG BIOPSY      OH INSJ TUNNELED CTR VAD W/SUBQ PORT AGE 5 YR/> N/A 5/10/2022    Procedure: INSERTION VENOUS PORT ( PORT-A-CATH) IR;  Surgeon: Fiorella Gordon DO;  Location: [facility] MAIN OR;  Service: Interventional Radiology       Meds/Allergies:  Prior to Admission medications    Medication Sig Start Date End Date Taking?  Authorizing Provider   acetaminophen (TYLENOL) 650 mg CR tablet Take 1 tablet (650 mg total) by mouth every 8 (eight) hours as needed for mild pain or moderate pain 4/20/22   Nilsa Iniguez MD   BD Pen Needle Jeaneth 2nd Gen 32G X 4 MM MISC USE ONCE DAILY WITH LANTUS 1/7/21   Historical Provider, MD   benzonatate (TESSALON PERLES) 100 mg capsule Take 1 capsule (100 mg total) by mouth 3 (three) times a day as needed for cough 4/15/22   Nilsa Iniguez MD   Blood Glucose Monitoring Suppl (FreeStyle Lite) FIDELIA  1/7/21   Historical Provider, MD   Cholecalciferol (Vitamin D3) 125 MCG (5000 UT) CAPS Take 1 capsule (5,000 Units total) by mouth daily 11/17/21 5/31/22  MD TEE HornSTYLE LITE test strip Check blood sugar  daily 11/17/21   Nilsa Iniguez MD   guaiFENesin (MUCINEX) 600 mg 12 hr tablet Take 2 tablets (1,200 mg total) by mouth every 12 (twelve) hours as needed for cough or congestion (thick mucis) 4/27/22   Refugio Beauchamp MD   insulin glargine (Lantus SoloStar) 100 units/mL injection pen Inject 20 Units under the skin daily 3/28/22 NO LONGER ON  Nilsa Iniguez MD   Insulin Pen Needle (BD Pen Needle Jeaneth U/F) 32G X 4 MM MISC Use daily as directed with insulin pen 3/28/22   Nilsa Iniguez MD   ipratropium-albuterol (Combivent Respimat) inhaler Inhale 1 puff 4 (four) times a day 4/15/22   Nilsa Iniguez MD   Lancets (freestyle) lancets Check blood sugar daily 11/17/21   Nilsa Iniguez MD   levETIRAcetam (KEPPRA) 500 mg tablet Take 1 tablet (500 mg total) by mouth every 12 (twelve) hours 3/28/22   Nilsa Iniguez MD   lidocaine-prilocaine (EMLA) cream Apply topically as needed for mild pain 5/11/22   Jaspreet Worley MD   LORazepam (ATIVAN) 0 5 mg tablet For sleep 1-2 tablets at bed time (Max 1 mg ) 6/2/22   Refugio Beauchamp MD   magnesium gluconate (MAGONATE) 500 mg tablet Take 1 tablet (500 mg total) by mouth daily 5/9/22 6/8/22  Nilsa Iniguez MD   metFORMIN (GLUCOPHAGE-XR) 500 mg 24 hr tablet Take 2 tablets (1,000 mg total) by mouth daily with dinner 3/28/22 6/26/22  Nilsa Iniguez MD   naloxone (NARCAN) 4 mg/0 1 mL nasal spray Administer 1 spray into a nostril  If no response after 2-3 minutes, give another dose in the other nostril using a new spray   It has to be on stand by use with opioid use - in case of overdose 4/26/22   JONATHAN Alvarado   ondansetron (Zofran ODT) 8 mg disintegrating tablet Take 1 tablet (8 mg total) by mouth every 8 (eight) hours as needed for nausea or vomiting 22   Joya Crystal MD   oxyCODONE (ROXICODONE) 10 MG TABS Take 1 tablet (10 mg total) by mouth every 6 (six) hours as needed for moderate pain Max Daily Amount: 40 mg 22   Dirk Fishman MD   pantoprazole (PROTONIX) 40 mg tablet Take 1 tablet (40 mg total) by mouth daily in the early morning 22   Nilsa Iniguez MD     I have reviewed home medications with patient family member  Allergies: No Known Allergies    Social History:  Marital Status: /Civil Union   Occupation:   Patient Pre-hospital Living Situation: Home  Patient Pre-hospital Level of Mobility: walks  Patient Pre-hospital Diet Restrictions: no restrictions  Substance Use History:   Social History     Substance and Sexual Activity   Alcohol Use Not Currently    Comment: quit drinking 7 years ago     Social History     Tobacco Use   Smoking Status Former Smoker    Packs/day: 0 50    Years: 40 00    Pack years: 20 00    Types: Cigarettes    Quit date: 2022    Years since quittin 3   Smokeless Tobacco Never Used   quit 6 months ago  Social History     Substance and Sexual Activity   Drug Use Never       Family History:  noncontributory    Physical Exam:     Vitals:   Blood Pressure: 131/77 (22 1230)  Pulse: 94 (22 1245)  Temperature: 98 5 °F (36 9 °C) (22 0808)  Temp Source: Oral (22 0808)  Respirations: 16 (22 0808)  SpO2: 93 % (22 1245)    Physical Exam  Vitals reviewed  Constitutional:       General: He is not in acute distress  Appearance: Normal appearance  He is not ill-appearing, toxic-appearing or diaphoretic  HENT:      Head: Normocephalic  Nose: No congestion or rhinorrhea  Mouth/Throat:      Mouth: Mucous membranes are moist       Pharynx: Oropharynx is clear  No oropharyngeal exudate or posterior oropharyngeal erythema  Eyes:      General: No scleral icterus  Right eye: No discharge           Left eye: No discharge  Conjunctiva/sclera: Conjunctivae normal    Cardiovascular:      Rate and Rhythm: Normal rate and regular rhythm  Heart sounds: No murmur heard  Pulmonary:      Effort: No respiratory distress  Breath sounds: No stridor  No wheezing or rhonchi  Comments: O2 saturation stable on room air  No dyspnea, tachypnea, cough, wheezing noted  Noted basilar crackles right greater than left  Abdominal:      General: There is no distension  Palpations: Abdomen is soft  Tenderness: There is no abdominal tenderness  There is no guarding  Musculoskeletal:         General: No swelling, tenderness, deformity or signs of injury  Right lower leg: No edema  Left lower leg: No edema  Comments: Strength full in bilateral lower extremities   Skin:     General: Skin is warm and dry  Coloration: Skin is not jaundiced or pale  Findings: No bruising, erythema, lesion or rash  Neurological:      General: No focal deficit present  Mental Status: He is alert  Mental status is at baseline  Comments: Awake alert, no evidence confusion  Follows commands appropriately  Psychiatric:         Mood and Affect: Mood normal          Thought Content:  Thought content normal           Additional Data:     Lab Results:  Results from last 7 days   Lab Units 06/05/22  0855   WBC Thousand/uL 27 12*   HEMOGLOBIN g/dL 12 6   HEMATOCRIT % 37 8   PLATELETS Thousands/uL 259   BANDS PCT % 9*   LYMPHO PCT % 8*   MONO PCT % 0*   EOS PCT % 0     Results from last 7 days   Lab Units 06/05/22  0855   SODIUM mmol/L 135   POTASSIUM mmol/L 4 2   CHLORIDE mmol/L 97   CO2 mmol/L 30   BUN mg/dL 18   CREATININE mg/dL 0 97   ANION GAP mmol/L 8   CALCIUM mg/dL 9 6   ALBUMIN g/dL 3 8   TOTAL BILIRUBIN mg/dL 0 86   ALK PHOS U/L 143*   ALT U/L 16   AST U/L 20   GLUCOSE RANDOM mg/dL 103                 Results from last 7 days   Lab Units 06/05/22  0855   LACTIC ACID mmol/L 1 4       Imaging: Reviewed radiology reports from this admission including CT chest and recent MRI brain  CT chest without contrast   Final Result by Vicki Gillespie DO (06/05 1055)   1  Multiple areas of infiltrate throughout the right lung, most pronounced in the right upper lobe  Although the findings likely represent multifocal/multi lobar pneumonia, metastatic disease not excluded, particularly in the posterior right upper    lobe  Posterior right upper lobe consolidation, likely postobstructive pneumonitis  These do not have the typical appearance for Covid type pneumonia, given the unilaterality  2   Right upper lobe lung mass with cavitation  The overall size has slightly improved from the prior study  3   Enlarging right adrenal lesion with new left adrenal lesion, most compatible with metastatic disease  4   New bony metastatic disease involving the T9, T10 and T11 vertebral bodies  Pathologic fracture involving the superior endplate of K21       5   Trace pericardial effusion, new from the prior study  Recommend follow-up with hematology/oncology  Workstation performed: [id number]             EKG and Other Studies Reviewed on Admission:   · EKG: sinus tachycardia    ** Please Note: This note has been constructed using a voice recognition system   **

## 2022-06-05 NOTE — ASSESSMENT & PLAN NOTE
· Patient presented to the hospital today with reports of fever of 100 4 at home and increased phlegm  On admission mets sepsis with HR >100 and leukocytosis (though he did receive Neulasta)  No fever or hypotension  · CT chest "Multiple areas of infiltrate throughout the right lung, most pronounced in the right upper lobe  Although the findings likely represent multifocal/multi lobar pneumonia, metastatic disease not excluded, particularly in the posterior right upper lobe  Posterior right upper lobe consolidation, likely postobstructive pneumonitis "   · Broad-spectrum antibiotics with IV cefepime and vancomycin given in the ER    However he meets criteria as non-severe CAP with drip score of 5, given that he was recently hospitalized with pneumonia less 2 months ago  · Will utilize ceftriaxone and escalate if needed but currently he appears quite

## 2022-06-05 NOTE — ASSESSMENT & PLAN NOTE
· Patient is maintained on oxycodone for chronic back pain related to metastasis, follows with palliative care

## 2022-06-05 NOTE — ASSESSMENT & PLAN NOTE
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, bone, adrenal gland  Follows with Oncology  · Last chemo was 6/1/22 (Bevacizumab, Paclitaxel, Carboplatin) and also received Neulasta  In addition he has completed a course of brain RT and steroids  · MRI brain 6/4/22: 2 new hemorrhagic masses identified within the brain parenchyma consistent with new metastasis, with no significant edema or mass effect  Two prior brain mets have decreased in size  · CT chest: Right upper lobe lung mass with cavitation, decreased in size Enlarging right adrenal lesion with new left adrenal lesion, most compatible with metastatic disease  New bony metastatic disease involving the T9, T10 and T11 vertebral bodies    Pathologic fracture involving the superior endplate of M93   · keppra for seizure prophylaxis

## 2022-06-05 NOTE — ED PROVIDER NOTES
History  Chief Complaint   Patient presents with    Fever Immunocompromised     Pt states he got Chemo Wednesday  Pt awoke this AM with a fever of 100 4  Pt also report phlegm  Pt denies difficulty breathing  No meds taken today  41-year-old male with a history of metastatic lung cancer presents to the emergency department accompanied by his wife for evaluation of fever, cough, and back pain  Patient is currently being treated with chemo and radiation for his cancer  He received chemotherapy on Wednesday followed by shot of Neulasta on Thursday  He has been having generalized arthralgias and bone pain since receiving the Neulasta  Over the past 1 day he has developed cough productive of thick sputum, no hemoptysis  He has developed low-grade fever  He denies feeling short of breath  He does admit to feeling generalized fatigue with poor appetite  Patient has been having back pain that has been worsening in intensity  History provided by:  Patient and medical records   used: Yes    Fever Immunocompromised  Max temp prior to arrival:  100 4  Temp source:  Oral  Severity:  Moderate  Onset quality:  Gradual  Duration:  1 day  Timing:  Intermittent  Progression:  Worsening  Chronicity:  New  Relieved by:  Nothing  Worsened by:  Nothing  Ineffective treatments:  None tried  Associated symptoms: chills, cough and nausea    Associated symptoms: no chest pain, no confusion, no diarrhea, no dysuria, no headaches, no myalgias, no rash, no sore throat and no vomiting    Risk factors: immunosuppression        Prior to Admission Medications   Prescriptions Last Dose Informant Patient Reported? Taking?    BD Pen Needle Jeaneth 2nd Gen 32G X 4 MM MISC  Spouse/Significant Other Yes Yes   Sig: USE ONCE DAILY WITH LANTUS   Blood Glucose Monitoring Suppl (FreeStyle Lite) FIDELIA  Spouse/Significant Other Yes Yes   Cholecalciferol (Vitamin D3) 125 MCG (5000 UT) CAPS  Spouse/Significant Other No Yes   Sig: Take 1 capsule (5,000 Units total) by mouth daily   FREESTYLE LITE test strip  Spouse/Significant Other No Yes   Sig: Check blood sugar  daily   Insulin Pen Needle (BD Pen Needle Jeaneth U/F) 32G X 4 MM MISC  Spouse/Significant Other No Yes   Sig: Use daily as directed with insulin pen   LORazepam (ATIVAN) 0 5 mg tablet   No Yes   Sig: For sleep 1-2 tablets at bed time (Max 1 mg )   Lancets (freestyle) lancets  Spouse/Significant Other No Yes   Sig: Check blood sugar daily   benzonatate (TESSALON PERLES) 100 mg capsule  Spouse/Significant Other No Yes   Sig: Take 1 capsule (100 mg total) by mouth 3 (three) times a day as needed for cough   guaiFENesin (MUCINEX) 600 mg 12 hr tablet  Spouse/Significant Other No Yes   Sig: Take 2 tablets (1,200 mg total) by mouth every 12 (twelve) hours as needed for cough or congestion (thick mucis)   insulin glargine (Lantus SoloStar) 100 units/mL injection pen  Spouse/Significant Other No Yes   Sig: Inject 20 Units under the skin daily   ipratropium-albuterol (Combivent Respimat) inhaler  Spouse/Significant Other No Yes   Sig: Inhale 1 puff 4 (four) times a day   levETIRAcetam (KEPPRA) 500 mg tablet  Spouse/Significant Other No Yes   Sig: Take 1 tablet (500 mg total) by mouth every 12 (twelve) hours   lidocaine-prilocaine (EMLA) cream  Spouse/Significant Other No Yes   Sig: Apply topically as needed for mild pain   magnesium gluconate (MAGONATE) 500 mg tablet  Spouse/Significant Other No Yes   Sig: Take 1 tablet (500 mg total) by mouth daily   metFORMIN (GLUCOPHAGE-XR) 500 mg 24 hr tablet  Spouse/Significant Other No Yes   Sig: Take 2 tablets (1,000 mg total) by mouth daily with dinner   naloxone (NARCAN) 4 mg/0 1 mL nasal spray  Spouse/Significant Other No Yes   Sig: Administer 1 spray into a nostril  If no response after 2-3 minutes, give another dose in the other nostril using a new spray   It has to be on stand by use with opioid use - in case of overdose   ondansetron (Zofran ODT) 8 mg disintegrating tablet  Spouse/Significant Other No Yes   Sig: Take 1 tablet (8 mg total) by mouth every 8 (eight) hours as needed for nausea or vomiting   oxyCODONE (ROXICODONE) 10 MG TABS  Spouse/Significant Other No Yes   Sig: Take 1 tablet (10 mg total) by mouth every 6 (six) hours as needed for moderate pain Max Daily Amount: 40 mg   pantoprazole (PROTONIX) 40 mg tablet  Spouse/Significant Other No Yes   Sig: Take 1 tablet (40 mg total) by mouth daily in the early morning      Facility-Administered Medications: None       Past Medical History:   Diagnosis Date    Diabetes mellitus ([facility] )     Elevated PSA 3/11/2021    Fatty liver 3/11/2021    Gall bladder polyp 3/11/2021    Lung cancer ([place] [address] )     Nonimmune to hepatitis B virus 3/11/2021       Past Surgical History:   Procedure Laterality Date    FL GUIDED CENTRAL VENOUS ACCESS DEVICE INSERTION  5/10/2022    LUNG BIOPSY      NV INSJ TUNNELED CTR VAD W/SUBQ PORT AGE 5 YR/> N/A 5/10/2022    Procedure: INSERTION VENOUS PORT ( PORT-A-CATH) IR;  Surgeon: Lynda Hussein DO;  Location: [facility] MAIN OR;  Service: Interventional Radiology       Family History   Problem Relation Age of Onset    No Known Problems Mother     No Known Problems Father      I have reviewed and agree with the history as documented  E-Cigarette/Vaping    E-Cigarette Use Never User      E-Cigarette/Vaping Substances    Nicotine No     THC No     CBD No     Flavoring No     Other No     Unknown No      Social History     Tobacco Use    Smoking status: Former Smoker     Packs/day: 0 50     Years: 40 00     Pack years: 20 00     Types: Cigarettes     Quit date: 2022     Years since quittin 3    Smokeless tobacco: Never Used   Vaping Use    Vaping Use: Never used   Substance Use Topics    Alcohol use: Not Currently     Comment: quit drinking 7 years ago    Drug use: Never       Review of Systems   Constitutional: Positive for chills     HENT: Negative for sore throat  Respiratory: Positive for cough  Cardiovascular: Negative for chest pain  Gastrointestinal: Positive for nausea  Negative for abdominal pain, diarrhea and vomiting  Genitourinary: Negative for dysuria and flank pain  Musculoskeletal: Positive for back pain  Negative for myalgias  Skin: Negative for rash  Allergic/Immunologic: Positive for immunocompromised state  Neurological: Negative for weakness and headaches  Psychiatric/Behavioral: Negative for confusion  All other systems reviewed and are negative  Physical Exam  Physical Exam  Vitals reviewed  Constitutional:       General: He is in acute distress  Appearance: Normal appearance  He is well-developed  He is ill-appearing  He is not toxic-appearing  HENT:      Head: Normocephalic and atraumatic  Right Ear: External ear normal       Left Ear: External ear normal       Nose: Nose normal       Mouth/Throat:      Mouth: Mucous membranes are moist    Eyes:      General: No scleral icterus  Conjunctiva/sclera: Conjunctivae normal       Pupils: Pupils are equal, round, and reactive to light  Cardiovascular:      Rate and Rhythm: Normal rate and regular rhythm  Heart sounds: Normal heart sounds  No murmur heard  Pulmonary:      Effort: Pulmonary effort is normal  No accessory muscle usage or respiratory distress  Breath sounds: Examination of the right-upper field reveals decreased breath sounds and rhonchi  Examination of the right-middle field reveals decreased breath sounds  Decreased breath sounds and rhonchi present  No wheezing  Chest:      Chest wall: No tenderness  Abdominal:      General: Bowel sounds are normal  There is no distension  Palpations: Abdomen is soft  Tenderness: There is no abdominal tenderness  There is no guarding or rebound  Musculoskeletal:         General: No deformity  Normal range of motion  Cervical back: Normal range of motion and neck supple   No tenderness  Thoracic back: Tenderness present  Lumbar back: Normal         Back:    Lymphadenopathy:      Cervical: No cervical adenopathy  Skin:     General: Skin is warm and dry  Capillary Refill: Capillary refill takes less than 2 seconds  Findings: No rash  Neurological:      General: No focal deficit present  Mental Status: He is alert and oriented to person, place, and time  Coordination: Coordination normal       Deep Tendon Reflexes: Reflexes are normal and symmetric  Psychiatric:         Behavior: Behavior normal          Thought Content:  Thought content normal          Judgment: Judgment normal          Vital Signs  ED Triage Vitals   Temperature Pulse Respirations Blood Pressure SpO2   06/05/22 0808 06/05/22 0808 06/05/22 0808 06/05/22 0808 06/05/22 0808   98 5 °F (36 9 °C) 104 16 134/74 95 %      Temp Source Heart Rate Source Patient Position - Orthostatic VS BP Location FiO2 (%)   06/05/22 0808 06/05/22 0808 06/05/22 0808 06/05/22 0808 --   Oral Monitor Sitting Left arm       Pain Score       06/05/22 1135       8           Vitals:    06/05/22 1245 06/05/22 1410 06/05/22 1500 06/05/22 1515   BP:   122/71    Pulse: 94 91 100 98   Patient Position - Orthostatic VS:             Visual Acuity      ED Medications  Medications   benzonatate (TESSALON PERLES) capsule 100 mg (has no administration in time range)   guaiFENesin (MUCINEX) 12 hr tablet 1,200 mg (1,200 mg Oral Given 6/5/22 1421)   ipratropium-albuterol (DUO-NEB) 0 5-2 5 mg/3 mL inhalation solution 3 mL (3 mL Nebulization Given 6/5/22 1423)   levETIRAcetam (KEPPRA) tablet 500 mg (500 mg Oral Given 6/5/22 1421)   LORazepam (ATIVAN) tablet 0 5 mg (has no administration in time range)   oxyCODONE (ROXICODONE) immediate release tablet 10 mg (has no administration in time range)   pantoprazole (PROTONIX) EC tablet 40 mg (40 mg Oral Not Given 6/5/22 1421)   insulin lispro (HumaLOG) 100 units/mL subcutaneous injection 1-5 Units (1 Units Subcutaneous Not Given 6/5/22 1420)   acetaminophen (TYLENOL) tablet 650 mg (has no administration in time range)   ondansetron (ZOFRAN) injection 4 mg (has no administration in time range)   ceftriaxone (ROCEPHIN) 1 g/50 mL in dextrose IVPB (has no administration in time range)   enoxaparin (LOVENOX) subcutaneous injection 40 mg (40 mg Subcutaneous Given 6/5/22 1421)   HYDROmorphone (DILAUDID) injection 0 5 mg (has no administration in time range)   docusate sodium (COLACE) capsule 100 mg (100 mg Oral Given 6/5/22 1423)   doxycycline hyclate (VIBRAMYCIN) capsule 100 mg (100 mg Oral Given 6/5/22 1422)   sodium chloride 0 9 % bolus 1,000 mL (0 mL Intravenous Stopped 6/5/22 0957)   HYDROmorphone (DILAUDID) injection 0 5 mg (0 5 mg Intravenous Given 6/5/22 1135)   cefepime (MAXIPIME) 2 g/50 mL dextrose IVPB (0 mg Intravenous Stopped 6/5/22 1201)   vancomycin (VANCOCIN) 1,250 mg in sodium chloride 0 9 % 250 mL IVPB (0 mg/kg × 63 8 kg Intravenous Stopped 6/5/22 1332)       Diagnostic Studies  Results Reviewed     Procedure Component Value Units Date/Time    Procalcitonin [[id number]]  (Abnormal) Collected: 06/05/22 1446    Lab Status: Final result Specimen: Blood from Arm, Left Updated: 06/05/22 1530     Procalcitonin 0 34 ng/ml     Hemoglobin A1c w/EAG Estimation (Orders if not completed within the last 90 days) [[id number]] Collected: 06/05/22 1446    Lab Status: In process Specimen: Blood from Arm, Left Updated: 06/05/22 1450    Fingerstick Glucose (POCT) [[id number]]  (Normal) Collected: 06/05/22 1418    Lab Status: Final result Updated: 06/05/22 1419     POC Glucose 113 mg/dl     Blood culture #1 [[id number]] Collected: 06/05/22 0900    Lab Status: Preliminary result Specimen: Blood from Arm, Left Updated: 06/05/22 1303     Blood Culture Received in Microbiology Lab  Culture in Progress      Blood culture #2 [[id number]] Collected: 06/05/22 0855    Lab Status: Preliminary result Specimen: Blood from Arm, Left Updated: 06/05/22 1303     Blood Culture Received in Microbiology Lab  Culture in Progress  Sputum culture and Gram stain [[id number]]     Lab Status: No result Specimen: Sputum     Strep Pneumoniae, Urine [[id number]]     Lab Status: No result Specimen: Urine     Legionella antigen, Urine [[id number]]     Lab Status: No result Specimen: Urine     UA w Reflex to Microscopic w Reflex to Culture [[id number]] Collected: 06/05/22 1135    Lab Status: Final result Specimen: Urine, Clean Catch Updated: 06/05/22 1156     Color, UA Yellow     Clarity, UA Clear     Specific Gravity, UA 1 015     pH, UA 8 0     Leukocytes, UA Negative     Nitrite, UA Negative     Protein, UA Negative mg/dl      Glucose, UA Negative mg/dl      Ketones, UA Negative mg/dl      Urobilinogen, UA 0 2 E U /dl      Bilirubin, UA Negative     Blood, UA Negative    CBC and differential [[id number]]  (Abnormal) Collected: 06/05/22 0855    Lab Status: Final result Specimen: Blood from Arm, Left Updated: 06/05/22 1002     WBC 27 12 Thousand/uL      RBC 4 09 Million/uL      Hemoglobin 12 6 g/dL      Hematocrit 37 8 %      MCV 92 fL      MCH 30 8 pg      MCHC 33 3 g/dL      RDW 14 9 %      MPV 8 6 fL      Platelets 292 Thousands/uL     Narrative: This is an appended report  These results have been appended to a previously verified report      Manual Differential(PHLEBS Do Not Order) [[id number]]  (Abnormal) Collected: 06/05/22 0855    Lab Status: Final result Specimen: Blood from Arm, Left Updated: 06/05/22 1002     Segmented % 82 %      Bands % 9 %      Lymphocytes % 8 %      Monocytes % 0 %      Eosinophils, % 0 %      Basophils % 0 %      Metamyelocytes% 1 %      Absolute Neutrophils 24 68 Thousand/uL      Lymphocytes Absolute 2 17 Thousand/uL      Monocytes Absolute 0 00 Thousand/uL      Eosinophils Absolute 0 00 Thousand/uL      Basophils Absolute 0 00 Thousand/uL      Total Counted --     RBC Morphology Normal     Platelet Estimate Adequate COVID/FLU/RSV - 2 hour TAT [[id number]]  (Normal) Collected: 06/05/22 0900    Lab Status: Final result Specimen: Nares from Nasopharyngeal Swab Updated: 06/05/22 0944     SARS-CoV-2 Negative     INFLUENZA A PCR Negative     INFLUENZA B PCR Negative     RSV PCR Negative    Narrative:      FOR PEDIATRIC PATIENTS - copy/paste COVID Guidelines URL to browser: [email or web address]  Ser[employer]rix    SARS-CoV-2 assay is a Nucleic Acid Amplification assay intended for the  qualitative detection of nucleic acid from SARS-CoV-2 in nasopharyngeal  swabs  Results are for the presumptive identification of SARS-CoV-2 RNA  Positive results are indicative of infection with SARS-CoV-2, the virus  causing COVID-19, but do not rule out bacterial infection or co-infection  with other viruses  Laboratories within the [place] and its  territories are required to report all positive results to the appropriate  public health authorities  Negative results do not preclude SARS-CoV-2  infection and should not be used as the sole basis for treatment or other  patient management decisions  Negative results must be combined with  clinical observations, patient history, and epidemiological information  This test has not been FDA cleared or approved  This test has been authorized by FDA under an Emergency Use Authorization  (EUA)  This test is only authorized for the duration of time the  declaration that circumstances exist justifying the authorization of the  emergency use of an in vitro diagnostic tests for detection of SARS-CoV-2  virus and/or diagnosis of COVID-19 infection under section 564(b)(1) of  the Act, 21 U  S C  468BLO-3(K)(9), unless the authorization is terminated  or revoked sooner  The test has been validated but independent review by FDA  and CLIA is pending  Test performed using [employer]: This RT-PCR assay targets N2,  a region unique to SARS-CoV-2   A conserved region in the E-gene was chosen  for pan-Sarbecovirus detection which includes SARS-CoV-2  Comprehensive metabolic panel [[id number]]  (Abnormal) Collected: 06/05/22 0855    Lab Status: Final result Specimen: Blood from Arm, Left Updated: 06/05/22 0926     Sodium 135 mmol/L      Potassium 4 2 mmol/L      Chloride 97 mmol/L      CO2 30 mmol/L      ANION GAP 8 mmol/L      BUN 18 mg/dL      Creatinine 0 97 mg/dL      Glucose 103 mg/dL      Calcium 9 6 mg/dL      AST 20 U/L      ALT 16 U/L      Alkaline Phosphatase 143 U/L      Total Protein 7 2 g/dL      Albumin 3 8 g/dL      Total Bilirubin 0 86 mg/dL      eGFR 86 ml/min/1 73sq m     Narrative:      [place] guidelines for Chronic Kidney Disease (CKD):     Stage 1 with normal or high GFR (GFR > 90 mL/min/1 73 square meters)    Stage 2 Mild CKD (GFR = 60-89 mL/min/1 73 square meters)    Stage 3A Moderate CKD (GFR = 45-59 mL/min/1 73 square meters)    Stage 3B Moderate CKD (GFR = 30-44 mL/min/1 73 square meters)    Stage 4 Severe CKD (GFR = 15-29 mL/min/1 73 square meters)    Stage 5 End Stage CKD (GFR <15 mL/min/1 73 square meters)  Note: GFR calculation is accurate only with a steady state creatinine    Lactic acid [[id number]]  (Normal) Collected: 06/05/22 0855    Lab Status: Final result Specimen: Blood from Arm, Left Updated: 06/05/22 0925     LACTIC ACID 1 4 mmol/L     Narrative:      Result may be elevated if tourniquet was used during collection  CT chest without contrast   Final Result by Christy Peabody, DO (06/05 1055)   1  Multiple areas of infiltrate throughout the right lung, most pronounced in the right upper lobe  Although the findings likely represent multifocal/multi lobar pneumonia, metastatic disease not excluded, particularly in the posterior right upper    lobe  Posterior right upper lobe consolidation, likely postobstructive pneumonitis    These do not have the typical appearance for Covid type pneumonia, given the unilaterality  2   Right upper lobe lung mass with cavitation  The overall size has slightly improved from the prior study  3   Enlarging right adrenal lesion with new left adrenal lesion, most compatible with metastatic disease  4   New bony metastatic disease involving the T9, T10 and T11 vertebral bodies  Pathologic fracture involving the superior endplate of E13       5   Trace pericardial effusion, new from the prior study  Recommend follow-up with hematology/oncology  Workstation performed: [id number]                    Procedures  ECG 12 Lead Documentation Only    Date/Time: 6/5/2022 9:51 AM  Performed by: Nik Perla DO  Authorized by: Nik Perla DO     Indications / Diagnosis:  Fever  ECG reviewed by me, the ED Provider: yes    Patient location:  ED  Previous ECG:     Previous ECG:  Compared to current    Similarity:  No change  Interpretation:     Interpretation: normal    Rate:     ECG rate:  101    ECG rate assessment: tachycardic    Rhythm:     Rhythm: sinus tachycardia    Ectopy:     Ectopy: none    QRS:     QRS axis:  Normal  Conduction:     Conduction: normal    ST segments:     ST segments:  Normal  T waves:     T waves: normal               ED Course                            Initial Sepsis Screening     Row Name 06/05/22 1132                Is the patient's history suggestive of a new or worsening infection? Yes (Proceed)  -AW        Suspected source of infection pneumonia  -AW        Are two or more of the following signs & symptoms of infection both present and new to the patient?  Yes (Proceed)  -AW        Indicate SIRS criteria Tachycardia > 90 bpm;Leukocytosis (WBC > 17326 IJL)  Did receive Neulasta 4 days ago  -AW        If the answer is yes to both questions, suspicion of sepsis is present --        If severe sepsis is present AND tissue hypoperfusion perists in the hour after fluid resuscitation or lactate > 4, the patient meets criteria for SEPTIC SHOCK --        Are any of the following organ dysfunction criteria present within 6 hours of suspected infection and SIRS criteria that are NOT considered to be chronic conditions?  --        Organ dysfunction --        Date of presentation of severe sepsis --        Time of presentation of severe sepsis --        Tissue hypoperfusion persists in the hour after crystalloid fluid administration, evidenced, by either: --        Was hypotension present within one hour of the conclusion of crystalloid fluid administration? --        Date of presentation of septic shock --        Time of presentation of septic shock --              User Key  (r) = Recorded By, (t) = Taken By, (c) = Cosigned By    [address] Name Provider Type    AW Opal Carpenter,  Physician                              MDM  Number of Diagnoses or Management Options  Metastatic lung cancer (metastasis from lung to other site) [facility]): new and requires workup  Multifocal pneumonia: new and requires workup     Amount and/or Complexity of Data Reviewed  Clinical lab tests: ordered and reviewed  Tests in the radiology section of CPT®: ordered and reviewed  Tests in the medicine section of CPT®: ordered and reviewed  Decide to obtain previous medical records or to obtain history from someone other than the patient: yes  Discuss the patient with other providers: yes  Independent visualization of images, tracings, or specimens: yes    Patient Progress  Patient progress: stable      Disposition  Final diagnoses:   Multifocal pneumonia   Metastatic lung cancer (metastasis from lung to other site) [facility])     Time reflects when diagnosis was documented in both MDM as applicable and the Disposition within this note     Time User Action Codes Description Comment    6/5/2022 11:46 AM Marion Curran Add [J18 9] Multifocal pneumonia     6/5/2022 11:47 AM Marion Curran Add [J18 9] Metastatic pneumonia     6/5/2022 11:47 AM Marion Curran Remove [J18 9] Metastatic pneumonia     6/5/2022 11:47 AM Marion Curran Add [C34 90] Metastatic lung cancer (metastasis from lung to other site) [facility])       ED Disposition     ED Disposition   Admit    Condition   Stable    Date/Time   Sun Jun 5, 2022 11:42 AM    Comment   Case was discussed with Dr Aster Cole and the patient's admission status was agreed to be Admission Status: inpatient status to the service of Dr Aster Cole  Follow-up Information    None         Patient's Medications   Discharge Prescriptions    No medications on file       No discharge procedures on file      PDMP Review       Value Time User    PDMP Reviewed  Yes 6/2/2022  9:05 AM Refugio Beauchamp MD          ED Provider  Electronically Signed by           Brigitte Arellano DO  06/05/22 2747

## 2022-06-06 LAB
ANION GAP SERPL CALCULATED.3IONS-SCNC: 8 MMOL/L (ref 4–13)
ATRIAL RATE: 101 BPM
BUN SERPL-MCNC: 16 MG/DL (ref 5–25)
CALCIUM SERPL-MCNC: 9 MG/DL (ref 8.4–10.2)
CHLORIDE SERPL-SCNC: 99 MMOL/L (ref 96–108)
CO2 SERPL-SCNC: 26 MMOL/L (ref 21–32)
CREAT SERPL-MCNC: 0.81 MG/DL (ref 0.6–1.3)
ERYTHROCYTE [DISTWIDTH] IN BLOOD BY AUTOMATED COUNT: 14.8 % (ref 11.6–15.1)
EST. AVERAGE GLUCOSE BLD GHB EST-MCNC: 140 MG/DL
GFR SERPL CREATININE-BSD FRML MDRD: 98 ML/MIN/1.73SQ M
GLUCOSE SERPL-MCNC: 105 MG/DL (ref 65–140)
GLUCOSE SERPL-MCNC: 123 MG/DL (ref 65–140)
GLUCOSE SERPL-MCNC: 135 MG/DL (ref 65–140)
GLUCOSE SERPL-MCNC: 196 MG/DL (ref 65–140)
GLUCOSE SERPL-MCNC: 89 MG/DL (ref 65–140)
HBA1C MFR BLD: 6.5 %
HCT VFR BLD AUTO: 31.4 % (ref 36.5–49.3)
HGB BLD-MCNC: 10.5 G/DL (ref 12–17)
L PNEUMO1 AG UR QL IA.RAPID: NEGATIVE
MCH RBC QN AUTO: 30.6 PG (ref 26.8–34.3)
MCHC RBC AUTO-ENTMCNC: 33.4 G/DL (ref 31.4–37.4)
MCV RBC AUTO: 92 FL (ref 82–98)
P AXIS: 53 DEGREES
PLATELET # BLD AUTO: 249 THOUSANDS/UL (ref 149–390)
PMV BLD AUTO: 8.9 FL (ref 8.9–12.7)
POTASSIUM SERPL-SCNC: 4.1 MMOL/L (ref 3.5–5.3)
PR INTERVAL: 140 MS
PROCALCITONIN SERPL-MCNC: 1 NG/ML
QRS AXIS: 32 DEGREES
QRSD INTERVAL: 88 MS
QT INTERVAL: 318 MS
QTC INTERVAL: 412 MS
RBC # BLD AUTO: 3.43 MILLION/UL (ref 3.88–5.62)
S PNEUM AG UR QL: NEGATIVE
SODIUM SERPL-SCNC: 133 MMOL/L (ref 135–147)
T WAVE AXIS: 54 DEGREES
VENTRICULAR RATE: 101 BPM
WBC # BLD AUTO: 21.64 THOUSAND/UL (ref 4.31–10.16)

## 2022-06-06 PROCEDURE — 93010 ELECTROCARDIOGRAM REPORT: CPT | Performed by: INTERNAL MEDICINE

## 2022-06-06 PROCEDURE — 82948 REAGENT STRIP/BLOOD GLUCOSE: CPT

## 2022-06-06 PROCEDURE — 80048 BASIC METABOLIC PNL TOTAL CA: CPT | Performed by: PHYSICIAN ASSISTANT

## 2022-06-06 PROCEDURE — 94640 AIRWAY INHALATION TREATMENT: CPT

## 2022-06-06 PROCEDURE — 85027 COMPLETE CBC AUTOMATED: CPT | Performed by: PHYSICIAN ASSISTANT

## 2022-06-06 PROCEDURE — 3044F HG A1C LEVEL LT 7.0%: CPT | Performed by: INTERNAL MEDICINE

## 2022-06-06 PROCEDURE — 99233 SBSQ HOSP IP/OBS HIGH 50: CPT | Performed by: PHYSICIAN ASSISTANT

## 2022-06-06 PROCEDURE — 94760 N-INVAS EAR/PLS OXIMETRY 1: CPT

## 2022-06-06 PROCEDURE — 84145 PROCALCITONIN (PCT): CPT | Performed by: PHYSICIAN ASSISTANT

## 2022-06-06 RX ADMIN — OXYCODONE HYDROCHLORIDE 10 MG: 10 TABLET ORAL at 09:22

## 2022-06-06 RX ADMIN — DOXYCYCLINE 100 MG: 100 CAPSULE ORAL at 20:13

## 2022-06-06 RX ADMIN — PANTOPRAZOLE SODIUM 40 MG: 40 TABLET, DELAYED RELEASE ORAL at 09:31

## 2022-06-06 RX ADMIN — IPRATROPIUM BROMIDE AND ALBUTEROL SULFATE 3 ML: 2.5; .5 SOLUTION RESPIRATORY (INHALATION) at 07:09

## 2022-06-06 RX ADMIN — INSULIN LISPRO 1 UNITS: 100 INJECTION, SOLUTION INTRAVENOUS; SUBCUTANEOUS at 09:25

## 2022-06-06 RX ADMIN — DOXYCYCLINE 100 MG: 100 CAPSULE ORAL at 09:33

## 2022-06-06 RX ADMIN — GUAIFENESIN 1200 MG: 600 TABLET ORAL at 09:23

## 2022-06-06 RX ADMIN — IPRATROPIUM BROMIDE AND ALBUTEROL SULFATE 3 ML: 2.5; .5 SOLUTION RESPIRATORY (INHALATION) at 13:07

## 2022-06-06 RX ADMIN — DOCUSATE SODIUM 100 MG: 100 CAPSULE, LIQUID FILLED ORAL at 17:37

## 2022-06-06 RX ADMIN — CEFTRIAXONE SODIUM 1000 MG: 10 INJECTION, POWDER, FOR SOLUTION INTRAVENOUS at 18:52

## 2022-06-06 RX ADMIN — LEVETIRACETAM 500 MG: 250 TABLET, FILM COATED ORAL at 09:34

## 2022-06-06 RX ADMIN — OXYCODONE HYDROCHLORIDE 10 MG: 10 TABLET ORAL at 16:14

## 2022-06-06 RX ADMIN — IPRATROPIUM BROMIDE AND ALBUTEROL SULFATE 3 ML: 2.5; .5 SOLUTION RESPIRATORY (INHALATION) at 21:24

## 2022-06-06 RX ADMIN — ENOXAPARIN SODIUM 40 MG: 40 INJECTION SUBCUTANEOUS at 09:34

## 2022-06-06 RX ADMIN — LEVETIRACETAM 500 MG: 250 TABLET, FILM COATED ORAL at 20:13

## 2022-06-06 RX ADMIN — DOCUSATE SODIUM 100 MG: 100 CAPSULE, LIQUID FILLED ORAL at 09:33

## 2022-06-06 RX ADMIN — OXYCODONE HYDROCHLORIDE 10 MG: 10 TABLET ORAL at 04:14

## 2022-06-06 NOTE — ASSESSMENT & PLAN NOTE
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, bone, adrenal gland  Follows with Oncology  · Last chemo was 6/1/22 (Bevacizumab, Paclitaxel, Carboplatin) and also received Neulasta  In addition he has completed a course of brain RT and steroids  · MRI brain 6/4/22: 2 new hemorrhagic masses identified within the brain parenchyma consistent with new metastasis, with no significant edema or mass effect  Two prior brain mets have decreased in size  · CT chest: Right upper lobe lung mass with cavitation, decreased in size Enlarging right adrenal lesion with new left adrenal lesion, most compatible with metastatic disease  New bony metastatic disease involving the T9, T10 and T11 vertebral bodies    Pathologic fracture involving the superior endplate of K12   · keppra for seizure prophylaxis

## 2022-06-06 NOTE — PROGRESS NOTES
[facility]  Progress Note - Олег Speaks 1965, 62 y o  male MRN: [id number]  Unit/Bed#: S -01 Encounter: [id number]  Primary Care Provider: Checo Robledo MD   Date and time admitted to hospital: 6/5/2022  8:09 AM    Lung cancer metastatic to brain [facility])  Assessment & Plan  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, bone, adrenal gland  Follows with Oncology  · Last chemo was 6/1/22 (Bevacizumab, Paclitaxel, Carboplatin) and also received Neulasta  In addition he has completed a course of brain RT and steroids  · MRI brain 6/4/22: 2 new hemorrhagic masses identified within the brain parenchyma consistent with new metastasis, with no significant edema or mass effect  Two prior brain mets have decreased in size  · CT chest: Right upper lobe lung mass with cavitation, decreased in size Enlarging right adrenal lesion with new left adrenal lesion, most compatible with metastatic disease  New bony metastatic disease involving the T9, T10 and T11 vertebral bodies  Pathologic fracture involving the superior endplate of E12   · keppra for seizure prophylaxis      Continuous opioid dependence ([facility] [address] )  Assessment & Plan  · Patient is maintained on oxycodone for chronic back pain related to metastasis, follows with palliative care    Type 2 diabetes mellitus without complication, with long-term current use of insulin [facility])  Assessment & Plan  Lab Results   Component Value Date    HGBA1C 6 5 (H) 06/05/2022     Recent Labs     06/05/22  1639 06/05/22  2101 06/06/22  0840 06/06/22  1058   POCGLU 103 128 196* 123     Blood Sugar Average: Last 72 hrs:  A1c 6 5  Monitor on accuchecks with sliding scale coverage  Patient on metformin alone at home, no longer on Lantus when not on steroids  (P) 132 6    * Sepsis due to pneumonia  Assessment & Plan  · Patient presented to the hospital with reports of fever of 100 4 at home and increased phlegm   On admission mets sepsis with HR >100 and leukocytosis (though he did receive Neulasta)  No fever or hypotension  · CT chest "Multiple areas of infiltrate throughout the right lung, most pronounced in the right upper lobe  Although the findings likely represent multifocal/multi lobar pneumonia, metastatic disease not excluded, particularly in the posterior right upper lobe  Posterior right upper lobe consolidation, likely postobstructive pneumonitis "   · Broad-spectrum antibiotics with IV cefepime and vancomycin given in the ER  However he meets criteria as non-severe CAP with drip score of 5, given that he was recently hospitalized with pneumonia less 2 months ago  · De-escalated to ceftriaxone and doxycycline currently, procal did uptrend so will monitor on this regimen for 1 more day and recheck procalcitonin, if white blood cell count and procalcitonin are stable and patient remains afebrile likely can transition to p o  Medication   · Strep and legionella Urine Ag neg, Blood cultures remain negative to date    VTE Pharmacologic Prophylaxis:   Pharmacologic: Enoxaparin (Lovenox)  Mechanical VTE Prophylaxis in Place: Yes    Patient Centered Rounds: I have performed bedside rounds with nursing staff today  Discussions with Specialists or Other Care Team Provider: Discussed with nursing and care management    Education and Discussions with Family / Patient: Discussed with pt ad son at bedside    Time Spent for Care: 30 minutes  More than 50% of total time spent on counseling and coordination of care as described above  Current Length of Stay: 1 day(s)    Current Patient Status: Inpatient   Certification Statement: The patient will continue to require additional inpatient hospital stay due to requiring IV abx    Discharge Plan: likely home tomorrow if procal not uptrending and pt remains afebrile    Code Status: Level 1 - Full Code      Subjective:   Patient states he feels much better, he denies any chest pain    He does have back pain at times but says his pain medication completely relieves his pain  He has been walking around without significant shortness of breath  He denies fever or chills  He denies headache or dizziness  He denies lower extremity edema  He denies nausea, vomiting or diarrhea  Denies urinary symptoms  Objective:     Vitals:   Temp (24hrs), Av 2 °F (36 8 °C), Min:98 1 °F (36 7 °C), Max:98 3 °F (36 8 °C)    Temp:  [98 1 °F (36 7 °C)-98 3 °F (36 8 °C)] 98 3 °F (36 8 °C)  HR:  [] 94  Resp:  [16-18] 18  BP: (109-135)/(63-81) 120/74  SpO2:  [91 %-97 %] 91 %  Body mass index is 21 39 kg/m²  Input and Output Summary (last 24 hours): Intake/Output Summary (Last 24 hours) at 2022 1229  Last data filed at 2022 1332  Gross per 24 hour   Intake 259 ml   Output --   Net 259 ml       Physical Exam:     Physical Exam  Vitals and nursing note reviewed  Constitutional:       General: He is not in acute distress  Appearance: He is normal weight  He is not ill-appearing  HENT:      Head: Normocephalic and atraumatic  Right Ear: External ear normal       Left Ear: External ear normal       Nose: Nose normal  No congestion  Mouth/Throat:      Mouth: Mucous membranes are moist       Pharynx: Oropharynx is clear  Eyes:      Conjunctiva/sclera: Conjunctivae normal    Cardiovascular:      Rate and Rhythm: Normal rate and regular rhythm  Pulses: Normal pulses  Heart sounds: Normal heart sounds  No murmur heard  Pulmonary:      Effort: Pulmonary effort is normal  No respiratory distress  Breath sounds: Normal breath sounds  No stridor  No wheezing, rhonchi or rales  Abdominal:      General: Abdomen is flat  Bowel sounds are normal  There is no distension  Palpations: There is no mass  Tenderness: There is no abdominal tenderness  There is no guarding  Musculoskeletal:         General: No swelling  Normal range of motion        Cervical back: Normal range of motion and neck supple  Right lower leg: No edema  Left lower leg: No edema  Skin:     General: Skin is warm and dry  Capillary Refill: Capillary refill takes less than 2 seconds  Coloration: Skin is not jaundiced or pale  Findings: No bruising or erythema  Neurological:      General: No focal deficit present  Mental Status: He is alert  Mental status is at baseline  Sensory: No sensory deficit  Motor: No weakness  Psychiatric:         Mood and Affect: Mood normal          Thought Content: Thought content normal          Additional Data:     Labs:    Results from last 7 days   Lab Units 06/06/22  0430 06/05/22  0855   WBC Thousand/uL 21 64* 27 12*   HEMOGLOBIN g/dL 10 5* 12 6   HEMATOCRIT % 31 4* 37 8   PLATELETS Thousands/uL 249 259   LYMPHO PCT %  --  8*   MONO PCT %  --  0*   EOS PCT %  --  0     Results from last 7 days   Lab Units 06/06/22  0430 06/05/22  0855   POTASSIUM mmol/L 4 1 4 2   CHLORIDE mmol/L 99 97   CO2 mmol/L 26 30   BUN mg/dL 16 18   CREATININE mg/dL 0 81 0 97   CALCIUM mg/dL 9 0 9 6   ALK PHOS U/L  --  143*   ALT U/L  --  16   AST U/L  --  20           * I Have Reviewed All Lab Data Listed Above  * Additional Pertinent Lab Tests Reviewed: [address] Admission Reviewed    Imaging:    Imaging Reports Reviewed Today Include:  CT chest  Imaging Personally Reviewed by Myself Includes:  None    Recent Cultures (last 7 days):     Results from last 7 days   Lab Units 06/05/22  1913 06/05/22  0900 06/05/22  0855   BLOOD CULTURE   --  No Growth at 24 hrs  No Growth at 24 hrs     LEGIONELLA URINARY ANTIGEN  Negative  --   --        Last 24 Hours Medication List:   Current Facility-Administered Medications   Medication Dose Route Frequency Provider Last Rate    acetaminophen  650 mg Oral Q6H PRN Selene Freitas PA-C      benzonatate  100 mg Oral TID PRN Selene Freitas PA-C      cefTRIAXone  1,000 mg Intravenous Q24H Selene Freitas PA-C      docusate sodium  100 mg Oral BID [facility], PA-C      doxycycline hyclate  100 mg Oral Q12H [address] [facility], PA-C      enoxaparin  40 mg Subcutaneous Q24H [address] [facility], PA-C      guaiFENesin  1,200 mg Oral Q12H PRN [facility], PA-C      HYDROmorphone  0 5 mg Intravenous Q6H PRN [facility], PA-C      insulin lispro  1-5 Units Subcutaneous TID AC [facility], PA-C      ipratropium-albuterol  3 mL Nebulization Q6H [facility], PA-C      levETIRAcetam  500 mg Oral Q12H [address] [facility], PA-C      LORazepam  0 5 mg Oral HS PRN [facility], PA-C      ondansetron  4 mg Intravenous Q6H PRN [facility], PA-C      oxyCODONE  10 mg Oral Q6H PRN [facility], PA-C      pantoprazole  40 mg Oral Early Morning Marcel Blackmon PA-C          Today, Patient Was Seen By: Dagoberto Leyden, PA-C    ** Please Note: Dictation voice to text software may have been used in the creation of this document   **

## 2022-06-06 NOTE — ASSESSMENT & PLAN NOTE
Lab Results   Component Value Date    HGBA1C 6 5 (H) 06/05/2022     Recent Labs     06/05/22  1639 06/05/22  2101 06/06/22  0840 06/06/22  1058   POCGLU 103 128 196* 123     Blood Sugar Average: Last 72 hrs:  A1c 6 5  Monitor on accuchecks with sliding scale coverage  Patient on metformin alone at home, no longer on Lantus when not on steroids  (P) 132 6

## 2022-06-06 NOTE — ASSESSMENT & PLAN NOTE
· Patient presented to the hospital with reports of fever of 100 4 at home and increased phlegm  On admission met sepsis with HR >100 and leukocytosis (though he did receive Neulasta)  No fever or hypotension  · CT chest "Multiple areas of infiltrate throughout the right lung, most pronounced in the right upper lobe  Although the findings likely represent multifocal/multi lobar pneumonia, metastatic disease not excluded, particularly in the posterior right upper lobe  Posterior right upper lobe consolidation, likely postobstructive pneumonitis "   · Broad-spectrum antibiotics with IV cefepime and vancomycin given in the ER  However he met criteria as non-severe CAP with drip score of 5, given that he was recently hospitalized with pneumonia less 2 months ago  · Procalcitonin now down trending  · De-escalated to ceftriaxone with addition of doxycycline due to multifocal infiltrates    Complete p o  7 day course  · Strep and legionella Urine Ag neg, Blood cultures negative

## 2022-06-06 NOTE — UTILIZATION REVIEW
Initial Clinical Review    Admission: Date/Time/Statement:   Admission Orders (From admission, onward)     Ordered        06/05/22 1149  Inpatient Admission  Once                      Orders Placed This Encounter   Procedures    Inpatient Admission     Standing Status:   Standing     Number of Occurrences:   1     Order Specific Question:   Level of Care     Answer:   Med Surg [16]     Order Specific Question:   Estimated length of stay     Answer:   More than 2 Midnights     Order Specific Question:   Certification     Answer:   I certify that inpatient services are medically necessary for this patient for a duration of greater than two midnights  See H&P and MD Progress Notes for additional information about the patient's course of treatment  ED Arrival Information     Expected   -    Arrival   6/5/2022 08:01    Acuity   Urgent            Means of arrival   Walk-In    Escorted by   Family Member    Service   Hospitalist    Admission type   Urgent            Arrival complaint   fever           Chief Complaint   Patient presents with    Fever Immunocompromised     Pt states he got Chemo Wednesday  Pt awoke this AM with a fever of 100 4  Pt also report phlegm  Pt denies difficulty breathing  No meds taken today  Initial Presentation: 62 y o  male PMH of recently diagnosed metastatic lung cancer receiving chemo with last treatment 5 days ago with neulasta also , with 6/4 MRI brain showing 2 new hemorrhagic areas in the brain consistent w/ new metastasis,has mets to bone, adrenal gland ,  PNA 2 months ago  who presents to ED from home with fever of 100 4 at home and increased production of clear phlegm  On exam, pt tachycardic, has sat in 90's on RA, no dyspnea  Pt has bibasilar crackles R>L   Oriented    Labs WBC 27  12  CT chest shows multiple preston infiltrate R lung  - most pronounced in the right upper lobe   Findings likely represent multifocal/multi lobar pneumonia, metastatic disease not excluded, particularly in the posterior right upper lobe   Posterior right upper lobe consolidation, likely postobstructive pneumonitis  Pt given IVF, IV analgesic, IV abx in ED  Pt admitted as Inpatient with multifocal PNA in pt with metastatic lung CA with mets to brain  Plan-IV Ceftriaxone, po Doxycycline, Keppra for seizure ppx  Monitor blood sugars   Ordered nebs q6h   Date: 6/6   Day 2:    WBC's down to 21 64  Pt afebrile  Strep and legionella Urine Ag neg, Blood cultures remain negative to date  Procal up to 1 0 today from 0 34 on admission   Pt denies SOB  Normal lung sounds   Monitor procal, CBC tomorrow   Continue IV/po abx   Back pain relieved with pain meds              ED Triage Vitals   Temperature Pulse Respirations Blood Pressure SpO2   06/05/22 0808 06/05/22 0808 06/05/22 0808 06/05/22 0808 06/05/22 0808   98 5 °F (36 9 °C) 104 16 134/74 95 %      Temp Source Heart Rate Source Patient Position - Orthostatic VS BP Location FiO2 (%)   06/05/22 0808 06/05/22 0808 06/05/22 0808 06/05/22 0808 --   Oral Monitor Sitting Left arm       Pain Score       06/05/22 1135       8          Wt Readings from Last 1 Encounters:   06/01/22 63 8 kg (140 lb 10 5 oz)     Additional Vital Signs:   Date/Time Temp Pulse Resp BP MAP (mmHg) SpO2   06/06/22 08:08:38 98 3 °F (36 8 °C) 94 18 120/74 89 91 %   06/06/22 0711 -- -- -- -- -- 97 %   06/06/22 03:07:21 98 1 °F (36 7 °C) 80 17 119/81 94 96 %   06/06/22 0245 -- 80 18 135/78 -- 95 %   06/06/22 0200 -- 78 -- 135/77 98 95 %   06/05/22 2245 -- 84 -- -- -- 93 %   06/05/22 2200 -- 92 18 117/63 82 94 %   06/05/22 2000 -- 96 18 113/67 83 95 %   06/05/22 1830 -- 88 -- -- -- 94 %   06/05/22 1815 -- 86 -- -- -- 95 %   06/05/22 1800 -- 90 -- 116/76 92 96 %   06/05/22 1745 -- 88 -- -- -- 96 %   06/05/22 1730 -- 88 -- -- -- 96 %   06/05/22 1715 -- 96 -- -- -- 96 %   06/05/22 1700 -- 92 -- 109/65 80 96 %   06/05/22 1645 -- 92 -- -- -- 95 %   06/05/22 1630 -- 92 -- -- -- 96 %   06/05/22 1615 -- 96 -- -- -- 95 %   06/05/22 1600 -- 94 -- 119/68 85 95 %   06/05/22 1545 -- 94 -- -- -- 94 %   06/05/22 1515 -- 98 -- -- -- 95 %   06/05/22 1500 -- 100 -- 122/71 92 95 %   06/05/22 1410 -- 91 16 -- -- 94 %   06/05/22 1245 -- 94 -- -- -- 93 %   06/05/22 1230 -- 94 -- 131/77 98 94 %   06/05/22 1215 -- 96 -- -- -- 93 %   06/05/22 1200 -- 98 -- 126/79 98 93 %   06/05/22 1100 -- 98 -- 136/84 105 94 %   06/05/22 1045 -- 96 -- -- -- 95 %   06/05/22 1030 -- 96 -- 142/84 108 96 %       Pertinent Labs/Diagnostic Test Results:   6/5 ECG-ED-  ECG rate:  101     ECG rate assessment: tachycardic     Rhythm:     Rhythm: sinus tachycardia     Ectopy:     Ectopy: none     QRS:     QRS axis:  Normal   Conduction:     Conduction: normal     ST segments:     ST segments:  Normal   T waves:     T waves: normal    CT chest without contrast   Final Result by Marlee Mensah DO (06/05 1055)   1  Multiple areas of infiltrate throughout the right lung, most pronounced in the right upper lobe  Although the findings likely represent multifocal/multi lobar pneumonia, metastatic disease not excluded, particularly in the posterior right upper    lobe  Posterior right upper lobe consolidation, likely postobstructive pneumonitis  These do not have the typical appearance for Covid type pneumonia, given the unilaterality  2   Right upper lobe lung mass with cavitation  The overall size has slightly improved from the prior study  3   Enlarging right adrenal lesion with new left adrenal lesion, most compatible with metastatic disease  4   New bony metastatic disease involving the T9, T10 and T11 vertebral bodies  Pathologic fracture involving the superior endplate of K80       5   Trace pericardial effusion, new from the prior study  Recommend follow-up with hematology/oncology        Workstation performed: [id number]         6/4 MRI brain - There are 2 new hemorrhagic masses identified within the brain parenchyma consistent with new metastasis  The larger of these is seen within the left superior cerebellum  The smaller of the 2 is less than 1 cm in size within the right frontal lobe   towards the vertex  Neither demonstrates significant edema or mass effect    The 2 previously treated hemorrhagic enhancing lesions seen on MRI from March 15, 2022 have significantly decreased in size and there is no adjacent significant vasogenic edema or mass effect at this time    Results from last 7 days   Lab Units 06/05/22  0900   SARS-COV-2  Negative     Results from last 7 days   Lab Units 06/06/22  0430 06/05/22  0855   WBC Thousand/uL 21 64* 27 12*   HEMOGLOBIN g/dL 10 5* 12 6   HEMATOCRIT % 31 4* 37 8   PLATELETS Thousands/uL 249 259   BANDS PCT %  --  9*         Results from last 7 days   Lab Units 06/06/22  0430 06/05/22  0855   SODIUM mmol/L 133* 135   POTASSIUM mmol/L 4 1 4 2   CHLORIDE mmol/L 99 97   CO2 mmol/L 26 30   ANION GAP mmol/L 8 8   BUN mg/dL 16 18   CREATININE mg/dL 0 81 0 97   EGFR ml/min/1 73sq m 98 86   CALCIUM mg/dL 9 0 9 6     Results from last 7 days   Lab Units 06/05/22  0855   AST U/L 20   ALT U/L 16   ALK PHOS U/L 143*   TOTAL PROTEIN g/dL 7 2   ALBUMIN g/dL 3 8   TOTAL BILIRUBIN mg/dL 0 86     Results from last 7 days   Lab Units 06/06/22  1058 06/06/22  0840 06/05/22  2101 06/05/22  1639 06/05/22  1418   POC GLUCOSE mg/dl 123 196* 128 103 113     Results from last 7 days   Lab Units 06/06/22  0430 06/05/22  0855   GLUCOSE RANDOM mg/dL 105 103         Results from last 7 days   Lab Units 06/05/22  1446   HEMOGLOBIN A1C % 6 5*   EAG mg/dl 140     BETA-HYDROXYBUTYRATE   Date Value Ref Range Status   01/06/2021 0 6 (H) <0 6 mmol/L Final                                      Results from last 7 days   Lab Units 06/06/22  0430 06/05/22  1446   PROCALCITONIN ng/ml 1 00* 0 34*     Results from last 7 days   Lab Units 06/05/22  0855   LACTIC ACID mmol/L 1 4                                         Results from last 7 days   Lab Units 06/05/22  1135   CLARITY UA  Clear   COLOR UA  Yellow   SPEC GRAV UA  1 015   PH UA  8 0   GLUCOSE UA mg/dl Negative   KETONES UA mg/dl Negative   BLOOD UA  Negative   PROTEIN UA mg/dl Negative   NITRITE UA  Negative   BILIRUBIN UA  Negative   UROBILINOGEN UA E U /dl 0 2   LEUKOCYTES UA  Negative     Results from last 7 days   Lab Units 06/05/22  1913 06/05/22  0900   STREP PNEUMONIAE ANTIGEN, URINE  Negative  --    LEGIONELLA URINARY ANTIGEN  Negative  --    INFLUENZA A PCR   --  Negative   INFLUENZA B PCR   --  Negative   RSV PCR   --  Negative                             Results from last 7 days   Lab Units 06/05/22  0900 06/05/22  0855   BLOOD CULTURE  No Growth at 24 hrs  No Growth at 24 hrs                 ED Treatment:   Medication Administration from 06/05/2022 0801 to 06/06/2022 0301       Date/Time Order Dose Route Action     06/05/2022 0857 sodium chloride 0 9 % bolus 1,000 mL 1,000 mL Intravenous New Bag     06/05/2022 1135 HYDROmorphone (DILAUDID) injection 0 5 mg 0 5 mg Intravenous Given     06/05/2022 1136 cefepime (MAXIPIME) 2 g/50 mL dextrose IVPB 2,000 mg Intravenous New Bag     06/05/2022 1332 vancomycin (VANCOCIN) 1,250 mg in sodium chloride 0 9 % 250 mL IVPB 0 mg/kg Intravenous Stopped     06/05/2022 1421 guaiFENesin (MUCINEX) 12 hr tablet 1,200 mg 1,200 mg Oral Given     06/05/2022 1940 ipratropium-albuterol (DUO-NEB) 0 5-2 5 mg/3 mL inhalation solution 3 mL 3 mL Nebulization Given     06/05/2022 1423 ipratropium-albuterol (DUO-NEB) 0 5-2 5 mg/3 mL inhalation solution 3 mL 3 mL Nebulization Given     06/05/2022 2119 levETIRAcetam (KEPPRA) tablet 500 mg 500 mg Oral Given     06/05/2022 1421 levETIRAcetam (KEPPRA) tablet 500 mg 500 mg Oral Given     06/05/2022 1942 oxyCODONE (ROXICODONE) immediate release tablet 10 mg 10 mg Oral Given     06/05/2022 1421 pantoprazole (PROTONIX) EC tablet 40 mg 40 mg Oral Not Given     06/05/2022 1421 enoxaparin (LOVENOX) subcutaneous injection 40 mg 40 mg Subcutaneous Given     06/05/2022 1813 docusate sodium (COLACE) capsule 100 mg 100 mg Oral Given     06/05/2022 1423 docusate sodium (COLACE) capsule 100 mg 100 mg Oral Given     06/05/2022 2118 doxycycline hyclate (VIBRAMYCIN) capsule 100 mg 100 mg Oral Given     06/05/2022 1422 doxycycline hyclate (VIBRAMYCIN) capsule 100 mg 100 mg Oral Given        Past Medical History:   Diagnosis Date    Diabetes mellitus ([facility] )     Elevated PSA 3/11/2021    Fatty liver 3/11/2021    Gall bladder polyp 3/11/2021    Lung cancer ([place] [address] )     Nonimmune to hepatitis B virus 3/11/2021     Present on Admission:   Lung cancer metastatic [facility])   Sepsis due to pneumonia   Continuous opioid dependence ([place] [address] )      Admitting Diagnosis: Metastatic lung cancer (metastasis from lung to other site) ([place] [address] ) [C34 90]  Fever [R50 9]  Multifocal pneumonia [J18 9]  Age/Sex: 62 y o  male  Admission Orders:  Scheduled Medications:  cefTRIAXone, 1,000 mg, Intravenous, Q24H  docusate sodium, 100 mg, Oral, BID  doxycycline hyclate, 100 mg, Oral, Q12H LISY  enoxaparin, 40 mg, Subcutaneous, Q24H LISY  insulin lispro, 1-5 Units, Subcutaneous, TID AC  ipratropium-albuterol, 3 mL, Nebulization, Q6H  levETIRAcetam, 500 mg, Oral, Q12H LISY  pantoprazole, 40 mg, Oral, Early Morning      Continuous IV Infusions:     PRN Meds:  acetaminophen, 650 mg, Oral, Q6H PRN  benzonatate, 100 mg, Oral, TID PRN  guaiFENesin, 1,200 mg, Oral, Q12H PRN x1 6/6  HYDROmorphone, 0 5 mg, Intravenous, Q6H PRN  LORazepam, 0 5 mg, Oral, HS PRN  ondansetron, 4 mg, Intravenous, Q6H PRN  oxyCODONE, 10 mg, Oral, Q6H PRN x2 6/6      poct glucose    monitor for hypoglycemia  OOB as jocelyn    Network Utilization Review Department  ATTENTION: Please call with any questions or concerns to [phone] and carefully listen to the prompts so that you are directed to the right person   All voicemails are confidential   Alba Franco all requests for admission clinical reviews, approved or denied determinations and any other requests to dedicated fax number below belonging to the campus where the patient is receiving treatment   List of dedicated fax numbers for the Facilities:  [address] [place] [address] DENIALS (Administrative/Medical Necessity) [phone]   [address]  [address] (Maternity/NICU/Pediatrics) [phone]   [address] [place] [address]  [address] [place] [phone] [id number] [place] [address] [phone] [place] [address] [phone]

## 2022-06-06 NOTE — ASSESSMENT & PLAN NOTE
· Patient presented to the hospital today with reports of fever of 100 4 at home and increased phlegm  On admission mets sepsis with HR >100 and leukocytosis (though he did receive Neulasta)  No fever or hypotension  · CT chest "Multiple areas of infiltrate throughout the right lung, most pronounced in the right upper lobe  Although the findings likely represent multifocal/multi lobar pneumonia, metastatic disease not excluded, particularly in the posterior right upper lobe  Posterior right upper lobe consolidation, likely postobstructive pneumonitis "   · Broad-spectrum antibiotics with IV cefepime and vancomycin given in the ER  However he meets criteria as non-severe CAP with drip score of 5, given that he was recently hospitalized with pneumonia less 2 months ago  · De-escalated to ceftriaxone and doxycycline currently, procal did uptrend so will monitor on this regimen for 1 more day and recheck procalcitonin, if white blood cell count and procalcitonin are stable and patient remains afebrile likely can transition to p o   Medication

## 2022-06-06 NOTE — PLAN OF CARE
Problem: PAIN - ADULT  Goal: Verbalizes/displays adequate comfort level or baseline comfort level  Description: Interventions:  - Encourage patient to monitor pain and request assistance  - Assess pain using appropriate pain scale  - Administer analgesics based on type and severity of pain and evaluate response  - Implement non-pharmacological measures as appropriate and evaluate response  - Consider cultural and social influences on pain and pain management  - Notify physician/advanced practitioner if interventions unsuccessful or patient reports new pain  Outcome: Progressing     Problem: INFECTION - ADULT  Goal: Absence or prevention of progression during hospitalization  Description: INTERVENTIONS:  - Assess and monitor for signs and symptoms of infection  - Monitor lab/diagnostic results  - Monitor all insertion sites, i e  indwelling lines, tubes, and drains  - Monitor endotracheal if appropriate and nasal secretions for changes in amount and color  - [place] appropriate cooling/warming therapies per order  - Administer medications as ordered  - Instruct and encourage patient and family to use good hand hygiene technique  - Identify and instruct in appropriate isolation precautions for identified infection/condition  Outcome: Progressing

## 2022-06-06 NOTE — ASSESSMENT & PLAN NOTE
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, bone, adrenal gland  Follows with Oncology  · Last chemo was 6/1/22 (Bevacizumab, Paclitaxel, Carboplatin) and also received Neulasta  In addition he has completed a course of brain RT and steroids  · MRI brain 6/4/22: 2 new hemorrhagic masses identified within the brain parenchyma consistent with new metastasis, with no significant edema or mass effect  Two prior brain mets have decreased in size  · CT chest: Right upper lobe lung mass with cavitation, decreased in size Enlarging right adrenal lesion with new left adrenal lesion, most compatible with metastatic disease  New bony metastatic disease involving the T9, T10 and T11 vertebral bodies    Pathologic fracture involving the superior endplate of I82   · keppra for seizure prophylaxis

## 2022-06-07 VITALS
SYSTOLIC BLOOD PRESSURE: 134 MMHG | DIASTOLIC BLOOD PRESSURE: 90 MMHG | RESPIRATION RATE: 18 BRPM | TEMPERATURE: 98.1 F | OXYGEN SATURATION: 96 % | HEIGHT: 68 IN | HEART RATE: 87 BPM | BODY MASS INDEX: 21.39 KG/M2

## 2022-06-07 DIAGNOSIS — C34.91 ADENOCARCINOMA OF RIGHT LUNG (HCC): ICD-10-CM

## 2022-06-07 DIAGNOSIS — Z51.5 PALLIATIVE CARE PATIENT: ICD-10-CM

## 2022-06-07 DIAGNOSIS — G93.89 BRAIN MASS: ICD-10-CM

## 2022-06-07 DIAGNOSIS — G89.3 CANCER ASSOCIATED PAIN: ICD-10-CM

## 2022-06-07 LAB
ANISOCYTOSIS BLD QL SMEAR: PRESENT
BASOPHILS # BLD MANUAL: 0 THOUSAND/UL (ref 0–0.1)
BASOPHILS NFR MAR MANUAL: 0 % (ref 0–1)
EOSINOPHIL # BLD MANUAL: 0.16 THOUSAND/UL (ref 0–0.4)
EOSINOPHIL NFR BLD MANUAL: 1 % (ref 0–6)
ERYTHROCYTE [DISTWIDTH] IN BLOOD BY AUTOMATED COUNT: 14.6 % (ref 11.6–15.1)
GLUCOSE SERPL-MCNC: 110 MG/DL (ref 65–140)
GLUCOSE SERPL-MCNC: 118 MG/DL (ref 65–140)
HCT VFR BLD AUTO: 32.3 % (ref 36.5–49.3)
HGB BLD-MCNC: 11 G/DL (ref 12–17)
HYPERCHROMIA BLD QL SMEAR: PRESENT
LYMPHOCYTES # BLD AUTO: 1.81 THOUSAND/UL (ref 0.6–4.47)
LYMPHOCYTES # BLD AUTO: 11 % (ref 14–44)
MCH RBC QN AUTO: 31.1 PG (ref 26.8–34.3)
MCHC RBC AUTO-ENTMCNC: 34.1 G/DL (ref 31.4–37.4)
MCV RBC AUTO: 91 FL (ref 82–98)
MONOCYTES # BLD AUTO: 2.3 THOUSAND/UL (ref 0–1.22)
MONOCYTES NFR BLD: 14 % (ref 4–12)
MYELOCYTES NFR BLD MANUAL: 2 % (ref 0–1)
NEUTROPHILS # BLD MANUAL: 11.85 THOUSAND/UL (ref 1.85–7.62)
NEUTS BAND NFR BLD MANUAL: 4 % (ref 0–8)
NEUTS SEG NFR BLD AUTO: 68 % (ref 43–75)
PLATELET # BLD AUTO: 262 THOUSANDS/UL (ref 149–390)
PLATELET BLD QL SMEAR: ADEQUATE
PMV BLD AUTO: 8.6 FL (ref 8.9–12.7)
PROCALCITONIN SERPL-MCNC: 0.31 NG/ML
RBC # BLD AUTO: 3.54 MILLION/UL (ref 3.88–5.62)
RBC MORPH BLD: PRESENT
WBC # BLD AUTO: 16.46 THOUSAND/UL (ref 4.31–10.16)

## 2022-06-07 PROCEDURE — 84145 PROCALCITONIN (PCT): CPT | Performed by: PHYSICIAN ASSISTANT

## 2022-06-07 PROCEDURE — 85027 COMPLETE CBC AUTOMATED: CPT | Performed by: PHYSICIAN ASSISTANT

## 2022-06-07 PROCEDURE — 94760 N-INVAS EAR/PLS OXIMETRY 1: CPT

## 2022-06-07 PROCEDURE — 82948 REAGENT STRIP/BLOOD GLUCOSE: CPT

## 2022-06-07 PROCEDURE — 94640 AIRWAY INHALATION TREATMENT: CPT

## 2022-06-07 PROCEDURE — 85007 BL SMEAR W/DIFF WBC COUNT: CPT | Performed by: PHYSICIAN ASSISTANT

## 2022-06-07 PROCEDURE — 99239 HOSP IP/OBS DSCHRG MGMT >30: CPT | Performed by: PHYSICIAN ASSISTANT

## 2022-06-07 RX ORDER — CEFDINIR 300 MG/1
300 CAPSULE ORAL EVERY 12 HOURS SCHEDULED
Qty: 10 CAPSULE | Refills: 0 | Status: SHIPPED | OUTPATIENT
Start: 2022-06-07 | End: 2022-06-12

## 2022-06-07 RX ORDER — DOXYCYCLINE HYCLATE 100 MG/1
100 CAPSULE ORAL EVERY 12 HOURS SCHEDULED
Qty: 10 CAPSULE | Refills: 0 | Status: SHIPPED | OUTPATIENT
Start: 2022-06-07 | End: 2022-06-12

## 2022-06-07 RX ADMIN — LEVETIRACETAM 500 MG: 250 TABLET, FILM COATED ORAL at 08:52

## 2022-06-07 RX ADMIN — ENOXAPARIN SODIUM 40 MG: 40 INJECTION SUBCUTANEOUS at 08:52

## 2022-06-07 RX ADMIN — OXYCODONE HYDROCHLORIDE 10 MG: 10 TABLET ORAL at 06:26

## 2022-06-07 RX ADMIN — IPRATROPIUM BROMIDE AND ALBUTEROL SULFATE 3 ML: 2.5; .5 SOLUTION RESPIRATORY (INHALATION) at 07:16

## 2022-06-07 RX ADMIN — PANTOPRAZOLE SODIUM 40 MG: 40 TABLET, DELAYED RELEASE ORAL at 06:24

## 2022-06-07 RX ADMIN — DOCUSATE SODIUM 100 MG: 100 CAPSULE, LIQUID FILLED ORAL at 08:52

## 2022-06-07 RX ADMIN — DOXYCYCLINE 100 MG: 100 CAPSULE ORAL at 08:52

## 2022-06-07 NOTE — PLAN OF CARE
Problem: PAIN - ADULT  Goal: Verbalizes/displays adequate comfort level or baseline comfort level  Description: Interventions:  - Encourage patient to monitor pain and request assistance  - Assess pain using appropriate pain scale  - Administer analgesics based on type and severity of pain and evaluate response  - Implement non-pharmacological measures as appropriate and evaluate response  - Consider cultural and social influences on pain and pain management  - Notify physician/advanced practitioner if interventions unsuccessful or patient reports new pain  Outcome: Progressing     Problem: INFECTION - ADULT  Goal: Absence or prevention of progression during hospitalization  Description: INTERVENTIONS:  - Assess and monitor for signs and symptoms of infection  - Monitor lab/diagnostic results  - Monitor all insertion sites, i e  indwelling lines, tubes, and drains  - Monitor endotracheal if appropriate and nasal secretions for changes in amount and color  - [place] appropriate cooling/warming therapies per order  - Administer medications as ordered  - Instruct and encourage patient and family to use good hand hygiene technique  - Identify and instruct in appropriate isolation precautions for identified infection/condition  Outcome: Progressing

## 2022-06-07 NOTE — DISCHARGE SUMMARY
[facility]  Discharge- [facility] 1965, 62 y o  male MRN: [id number]  Unit/Bed#: S -01 Encounter: [id number]  Primary Care Provider: Farooq Harris MD   Date and time admitted to hospital: 6/5/2022  8:09 AM    * Sepsis due to pneumonia  Assessment & Plan  · Patient presented to the hospital with reports of fever of 100 4 at home and increased phlegm  On admission met sepsis with HR >100 and leukocytosis (though he did receive Neulasta)  No fever or hypotension  · CT chest "Multiple areas of infiltrate throughout the right lung, most pronounced in the right upper lobe  Although the findings likely represent multifocal/multi lobar pneumonia, metastatic disease not excluded, particularly in the posterior right upper lobe  Posterior right upper lobe consolidation, likely postobstructive pneumonitis "   · Broad-spectrum antibiotics with IV cefepime and vancomycin given in the ER  However he met criteria as non-severe CAP with drip score of 5, given that he was recently hospitalized with pneumonia less 2 months ago  · Procalcitonin now down trending  · De-escalated to ceftriaxone with addition of doxycycline due to multifocal infiltrates  Complete p o  7 day course  · Strep and legionella Urine Ag neg, Blood cultures negative    Lung cancer metastatic to brain [facility])  Assessment & Plan  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, bone, adrenal gland  Follows with Oncology  · Last chemo was 6/1/22 (Bevacizumab, Paclitaxel, Carboplatin) and also received Neulasta  In addition he has completed a course of brain RT and steroids  · MRI brain 6/4/22: 2 new hemorrhagic masses identified within the brain parenchyma consistent with new metastasis, with no significant edema or mass effect    Two prior brain mets have decreased in size  · CT chest: Right upper lobe lung mass with cavitation, decreased in size Enlarging right adrenal lesion with new left adrenal lesion, most compatible with metastatic disease  New bony metastatic disease involving the T9, T10 and T11 vertebral bodies  Pathologic fracture involving the superior endplate of S21   · keppra for seizure prophylaxis      Type 2 diabetes mellitus without complication, with long-term current use of insulin [facility])  Assessment & Plan  Lab Results   Component Value Date    HGBA1C 6 5 (H) 06/05/2022     Recent Labs     06/05/22  1639 06/05/22  2101 06/06/22  0840 06/06/22  1058   POCGLU 103 128 196* 123     Blood Sugar Average: Last 72 hrs:  A1c 6 5  Monitor on accuchecks with sliding scale coverage  Patient on metformin alone at home, no longer on Lantus when not on steroids  (P) 132 6    Continuous opioid dependence ([facility] [address] )  Assessment & Plan  · Patient is maintained on oxycodone for chronic back pain related to metastasis, follows with palliative care      Medical Problems             Resolved Problems  Date Reviewed: 6/7/2022   None               Discharging Physician / Practitioner: Efren Lin PA-C  PCP: Rosie Bradford MD  Admission Date:   Admission Orders (From admission, onward)     Ordered        06/05/22 1149  Inpatient Admission  Once                      Discharge Date: 06/07/22    Consultations During Hospital Stay:  · None    Procedures Performed:   · CT chest    Significant Findings / Test Results:   · As above    Incidental Findings:   · None     Test Results Pending at Discharge (will require follow up): · None     Outpatient Tests Requested:  · Repeat chest imaging    Complications:  None    Reason for Admission:  Fever    Hospital Course:   Kleber Cornelius is a 62 y o  male patient who originally presented to the hospital on 6/5/2022 due to fever of 100 4 at home and change in consistency of phlegm  Patient has recently diagnosed metastatic lung cancer and received chemotherapy a few days prior to this fever noted at home    Upon admission he had mild tachycardia and also had leukocytosis although this could have been caused by the neulasta he received as well  He was initially given cefepime and vancomycin in the emergency department but given that he had non severe pneumonia but with a drip score of 5 we managed him with ceftriaxone and doxycycline  Clinically he improved and was appropriate for discharge on oral antibiotics today and will follow-up with his oncology team and PCP    Please see above list of diagnoses and related plan for additional information  Condition at Discharge: stable    Discharge Day Visit / Exam:   Subjective:  Patient reports he is doing well with no shortness of breath, cough, fever, pain in his chest   He did inquire to me about what is normal heart rate and we discussed and addressed all of his questions  Vitals: Blood Pressure: 134/90 (06/07/22 0655)  Pulse: 87 (06/07/22 0655)  Temperature: 98 1 °F (36 7 °C) (06/07/22 0655)  Temp Source: Oral (06/06/22 2100)  Respirations: 18 (06/07/22 0655)  Height: 5' 8" (172 7 cm) (06/06/22 0414)  SpO2: 96 % (06/07/22 0716)  Exam:   Physical Exam  Vitals reviewed  Constitutional:       General: He is not in acute distress  Appearance: Normal appearance  He is normal weight  He is not toxic-appearing or diaphoretic  HENT:      Nose: No congestion or rhinorrhea  Eyes:      General: No scleral icterus  Right eye: No discharge  Left eye: No discharge  Conjunctiva/sclera: Conjunctivae normal    Cardiovascular:      Rate and Rhythm: Normal rate and regular rhythm  Heart sounds: No murmur heard  Pulmonary:      Effort: No respiratory distress  Breath sounds: No stridor  No wheezing or rhonchi  Comments: No cough, dyspnea, tachypnea  Patient not requiring oxygen  Abdominal:      General: There is no distension  Palpations: Abdomen is soft  Tenderness: There is no guarding  Musculoskeletal:      Right lower leg: No edema  Left lower leg: No edema     Skin:     General: Skin is warm and dry  Coloration: Skin is not jaundiced or pale  Findings: No bruising, erythema, lesion or rash  Neurological:      General: No focal deficit present  Mental Status: He is alert  Mental status is at baseline  Comments: Awake alert interactive, good historian, able to converse in [place]   Psychiatric:         Mood and Affect: Mood normal          Thought Content: Thought content normal           Discussion with Family:  Spoke with patient's son over the phone    Discharge instructions/Information to patient and family:   See after visit summary for information provided to patient and family  Provisions for Follow-Up Care:  See after visit summary for information related to follow-up care and any pertinent home health orders  Disposition:   Home    Planned Readmission: none     Discharge Statement:  I spent 30 minutes discharging the patient  This time was spent on the day of discharge  I had direct contact with the patient on the day of discharge  Greater than 50% of the total time was spent examining patient, answering all patient questions, arranging and discussing plan of care with patient as well as directly providing post-discharge instructions  Additional time then spent on discharge activities  Spoke with RN    Discharge Medications:  See after visit summary for reconciled discharge medications provided to patient and/or family        **Please Note: This note may have been constructed using a voice recognition system**

## 2022-06-07 NOTE — DISCHARGE INSTR - AVS FIRST PAGE
Dear Shannon Nolen,     It was our pleasure to care for you here at [facility]  It is our hope that we were always able to exceed the expected standards for your care during your stay  You were hospitalized due to pneumonia  You were cared for on the 4th floor by Shelia Rivera PA-C under the service of Nasario Apley, DO with the [facility] who covers for your primary care physician (PCP), Chandan Lyons MD, while you were hospitalized  If you have any questions or concerns related to this hospitalization, you may contact us at [phone]  For follow up as well as any medication refills, we recommend that you follow up with your primary care physician  A registered nurse will reach out to you by phone within a few days after your discharge to answer any additional questions that you may have after going home  However, at this time we provide for you here, the most important instructions / recommendations at discharge:     Notable Medication Adjustments -   Cefdinir and doxycycline twice a day as directed for the next 5 days  Testing Required after Discharge -   Repeat chest imaging to be done by your doctors as an outpatient in 4-6 weeks to follow-up on resolution of pneumonia  Important follow up information -   Follow-up with your cancer doctor and family doctor  Other Instructions -   Make sure you get adequate rest and nutrition  Please review this entire after visit summary as additional general instructions including medication list, appointments, activity, diet, any pertinent wound care, and other additional recommendations from your care team that may be provided for you        Sincerely,     Shelia Rivera PA-C

## 2022-06-08 ENCOUNTER — RADIATION ONCOLOGY CONSULT (OUTPATIENT)
Dept: RADIATION ONCOLOGY | Facility: HOSPITAL | Age: 57
End: 2022-06-08
Attending: RADIOLOGY
Payer: COMMERCIAL

## 2022-06-08 ENCOUNTER — TRANSITIONAL CARE MANAGEMENT (OUTPATIENT)
Dept: FAMILY MEDICINE CLINIC | Facility: CLINIC | Age: 57
End: 2022-06-08

## 2022-06-08 ENCOUNTER — TELEPHONE (OUTPATIENT)
Dept: RADIATION ONCOLOGY | Facility: HOSPITAL | Age: 57
End: 2022-06-08

## 2022-06-08 ENCOUNTER — OFFICE VISIT (OUTPATIENT)
Dept: NEUROSURGERY | Facility: CLINIC | Age: 57
End: 2022-06-08
Payer: COMMERCIAL

## 2022-06-08 VITALS
HEART RATE: 100 BPM | SYSTOLIC BLOOD PRESSURE: 108 MMHG | HEIGHT: 68 IN | OXYGEN SATURATION: 97 % | TEMPERATURE: 99.1 F | RESPIRATION RATE: 18 BRPM | DIASTOLIC BLOOD PRESSURE: 70 MMHG | WEIGHT: 137.4 LBS | BODY MASS INDEX: 20.82 KG/M2

## 2022-06-08 VITALS
RESPIRATION RATE: 18 BRPM | WEIGHT: 137.4 LBS | SYSTOLIC BLOOD PRESSURE: 108 MMHG | TEMPERATURE: 99.1 F | OXYGEN SATURATION: 97 % | DIASTOLIC BLOOD PRESSURE: 70 MMHG | HEIGHT: 68 IN | HEART RATE: 100 BPM | BODY MASS INDEX: 20.82 KG/M2

## 2022-06-08 DIAGNOSIS — C79.31 BRAIN METASTASES (HCC): Primary | ICD-10-CM

## 2022-06-08 DIAGNOSIS — J18.9 PNEUMONIA DUE TO INFECTIOUS ORGANISM, UNSPECIFIED LATERALITY, UNSPECIFIED PART OF LUNG: Primary | ICD-10-CM

## 2022-06-08 DIAGNOSIS — C79.31 LUNG CANCER METASTATIC TO BRAIN (HCC): Primary | ICD-10-CM

## 2022-06-08 DIAGNOSIS — C79.9 METASTATIC ADENOCARCINOMA (HCC): ICD-10-CM

## 2022-06-08 DIAGNOSIS — C34.90 LUNG CANCER METASTATIC TO BRAIN (HCC): Primary | ICD-10-CM

## 2022-06-08 PROCEDURE — 77263 THER RADIOLOGY TX PLNG CPLX: CPT | Performed by: RADIOLOGY

## 2022-06-08 PROCEDURE — G0463 HOSPITAL OUTPT CLINIC VISIT: HCPCS | Performed by: RADIOLOGY

## 2022-06-08 PROCEDURE — 99211 OFF/OP EST MAY X REQ PHY/QHP: CPT | Performed by: RADIOLOGY

## 2022-06-08 PROCEDURE — 99205 OFFICE O/P NEW HI 60 MIN: CPT | Performed by: RADIOLOGY

## 2022-06-08 PROCEDURE — 99212 OFFICE O/P EST SF 10 MIN: CPT | Performed by: NEUROLOGICAL SURGERY

## 2022-06-08 RX ORDER — OXYCODONE HYDROCHLORIDE 10 MG/1
10 TABLET ORAL EVERY 6 HOURS PRN
Qty: 120 TABLET | Refills: 0 | Status: SHIPPED | OUTPATIENT
Start: 2022-06-08 | End: 2022-06-29 | Stop reason: SDUPTHER

## 2022-06-08 NOTE — UTILIZATION REVIEW
Notification of Discharge   This is a Notification of Discharge from our facility [address]  Please be advised that this patient has been discharge from our facility  Below you will find the admission and discharge date and time including the patients disposition  UTILIZATION REVIEW CONTACT:  Bipin Hernandez MA  Utilization   Network Utilization Review Department  Phone: [phone] x carefully listen to the prompts  All voicemails are confidential   Email: [email or web address]     PHYSICIAN ADVISORY SERVICES:  FOR RJRO-LG-MOFD REVIEW - MEDICAL NECESSITY DENIAL  Phone: [phone]  Fax: [phone]  Email: [email or web address]     PRESENTATION DATE: 6/5/2022  8:09 AM  OBERVATION ADMISSION DATE:   INPATIENT ADMISSION DATE: 6/5/22 11:49 AM   DISCHARGE DATE: 6/7/2022 12:00 PM  DISPOSITION: Home/Self Care Home/Self Care      IMPORTANT INFORMATION:  Send all requests for admission clinical reviews, approved or denied determinations and any other requests to dedicated fax number below belonging to the campus where the patient is receiving treatment   List of dedicated fax numbers:  [address] DENIALS (Administrative/Medical Necessity) [phone]   [address] [place] [address] (Maternity/NICU/Pediatrics) [phone]   [facility] [phone]   [address] [facility] [phone]   [address] [place] [address] [phone]   [address] [place] [address],[address] Floor [address] [phone]   [facility]  [phone]   [address] [place] [address], [address]  [address] [facility] [address] [facility] [phone]

## 2022-06-08 NOTE — PROGRESS NOTES
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 with right occipital and left frontal brain metastases  On 22 he completed a course of SRT to a dose of 3000cGy in 5 fractions to each  He returns today for 2 month follow up       22 PET CT  IMPRESSION:  1  Large right upper lung hypermetabolic necrotic mass measures 10 1 x 8 4 x 11 4 cm, SUV 19 8, compatible with known malignancy   This mass invades the right hilum and mediastinum, and appears inseparable from the paratracheal adenopathy  2   Right adrenal metastasis measures 1 1 x 0 9 cm   Minimal nodular activity in the left adrenal may be physiologic, but may be reassessed on follow-up exam to exclude an additional early metastasis  3   1 5 x 1 2 cm hypermetabolic splenic lesion most concerning for metastasis    4   Known brain metastases are not well evaluated on this exam   5   No additional hypermetabolic metastases visualized      -22 Admission - Weakness, fatigue concern for obstructive pneumonia  CT possible necrotizing pneumonia - IV antibiotics started  All labs were negative - likely symptoms were related to lung cancer than pneumonia     22 CT CAP w contrast  IMPRESSION:  CHEST:  1   Redemonstrated large right upper lobe mass extending to the right hilum/mediastinum with mediastinal adenopathy in keeping with known malignancy   Compared to prior PET/CT there are slightly increased areas of cavitation confluent with the mass in   the right upper lobe in the areas of previously seen consolidation, which may be due to necrotizing pneumonia as well as necrotic tumor  2  1 6 cm right thyroid nodule can be evaluated with nonemergent thyroid ultrasound  ABDOMEN/PELVIS:  1   No acute findings in the abdomen or pelvis  2   Splenic nodule concerning for metastasis slightly enlarged since CT 3/22/22  3   Right adrenal metastatic nodule grossly unchanged from prior PET/CT 4/13/22         5/3/22 Hem/Onc - Dr Luiza Busby  Next Gen sequencing did not demonstrate any actionable mutationss  Agrees to begin chemotherapy  Regimen  Bevacizumab 15 mg/kg IV day 1  Paclitaxel 175 mg/m2 IV day 1 (85% first cycle)  Carboplatin AUC = 5 5 IV day 1  G-CSF 3 mg subcu day 2  Cycle = 21 days  Goal = palliation    5/26/22 XR spine thoracic 3 vw  IMPRESSION:  No acute cardiopulmonary disease  Unchanged large right upper lobe mass  No acute osseous abnormality   If there is concern for osseous metastatic disease, MRI or bone scan should be performed      5/26/22 XR chest pa & lateral  IMPRESSION:  No acute cardiopulmonary disease  Unchanged large right upper lobe mass    No acute osseous abnormality   If there is concern for osseous metastatic disease, MRI or bone scan should be performed      5/31/22 Hem/Onc - Dr Luiza Busby  2nd cycle scheduled for June 1st  Rescan after 4th cycle     6/4/22 MRI brain w wo contrast  IMPRESSION:  There are 2 new hemorrhagic masses identified within the brain parenchyma consistent with new metastasis   The larger of these is seen within the left superior cerebellum   The smaller of the 2 is less than 1 cm in size within the right frontal lobe towards the vertex   Neither demonstrates significant edema or mass effect      The 2 previously treated hemorrhagic enhancing lesions seen on MRI from March 15, 2022 have significantly decreased in size and there is no adjacent significant vasogenic edema or mass effect at this time            6/5-6/7/22 Admission - fever in change in consistency of phlegm  Started on Cefepime and vancomycin  then transitioned to oral antibiotic and discharged      6/5/22 CT chest wo contrast  IMPRESSION:  1   Multiple areas of infiltrate throughout the right lung, most pronounced in the right upper lobe   Although the findings likely represent multifocal/multi lobar pneumonia, metastatic disease not excluded, particularly in the posterior right upper   lobe   Posterior right upper lobe consolidation, likely postobstructive pneumonitis   These do not have the typical appearance for Covid type pneumonia, given the unilaterality  2   Right upper lobe lung mass with cavitation   The overall size has slightly improved from the prior study  3   Enlarging right adrenal lesion with new left adrenal lesion, most compatible with metastatic disease  4   New bony metastatic disease involving the T9, T10 and T11 vertebral bodies   Pathologic fracture involving the superior endplate of Z87   5   Trace pericardial effusion, new from the prior study    6/10/22 Dietitian  6/21/22 Hem/Onc - Dr Marylee Anchors  6/22/22 Infusion     Historical Information   Oncology History   [facility] )   3/28/2022 Initial Diagnosis    Adenocarcinoma of right lung ([facility] )     5/11/2022 -  Chemotherapy    pegfilgrastim (Senait Pouch), 3 mg (100 % of original dose 3 mg), Subcutaneous, Once, 1 of 13 cycles  Dose modification: 3 mg (original dose 3 mg, Cycle 2)  Administration: 3 mg (6/2/2022)  fosaprepitant (EMEND) IVPB, 150 mg, Intravenous, Once, 2 of 6 cycles  Administration: 150 mg (5/11/2022), 150 mg (6/1/2022)  CARBOplatin (PARAPLATIN) IVPB (GOG AUC DOSING), 604 45 mg, Intravenous, Once, 2 of 6 cycles  Administration: 604 45 mg (5/11/2022), 485 4 mg (6/1/2022)  bevacizumab (AVASTIN) IVPB, 940 mg, Intravenous, Once, 2 of 15 cycles  Administration: 900 mg (5/11/2022), 900 mg (6/1/2022)  PACLItaxel (TAXOL) chemo IVPB, 148 75 mg/m2 = 254 4 mg (85 % of original dose 175 mg/m2), Intravenous, Once, 2 of 6 cycles  Dose modification: 148 75 mg/m2 (85 % of original dose 175 mg/m2, Cycle 1, Reason: Dose Not Tolerated)  Administration: 254 4 mg (2022), 299 4 mg (2022)     Lung cancer metastatic to brain ([facility] [address] )   3/30/2022 - 2022 Radiation      Treatments:  Course: C1 SRT    Plan ID Energy Fractions Dose per Fraction (cGy) Dose Correction (cGy) Total Dose Delivered (cGy) Elapsed Days   SRTLTemp 6X 5 /  600 0 3,000 9   SRTROccipital 6X 5 / 5 600 0 3,000 9      Treatment Dates:  3/30/2022 - 2022 Initial Diagnosis    Lung cancer metastatic to brain [facility])           Past Medical History:   Diagnosis Date    Diabetes mellitus ([facility] )     Elevated PSA 3/11/2021    Fatty liver 3/11/2021    Gall bladder polyp 3/11/2021    Lung cancer ([place] [address] )     Nonimmune to hepatitis B virus 3/11/2021     Past Surgical History:   Procedure Laterality Date    FL GUIDED CENTRAL VENOUS ACCESS DEVICE INSERTION  5/10/2022    LUNG BIOPSY      MT INSJ TUNNELED CTR VAD W/SUBQ PORT AGE 5 YR/> N/A 5/10/2022    Procedure: INSERTION VENOUS PORT ( PORT-A-CATH) IR;  Surgeon: Harriett Brennan DO;  Location: [facility] MAIN OR;  Service: Interventional Radiology       Family History   Problem Relation Age of Onset    No Known Problems Mother     No Known Problems Father        Social History   Social History     Substance and Sexual Activity   Alcohol Use Not Currently    Comment: quit drinking 7 years ago     Social History     Substance and Sexual Activity   Drug Use Never     Social History     Tobacco Use   Smoking Status Former Smoker    Packs/day: 0 50    Years: 40 00    Pack years: 20 00    Types: Cigarettes    Quit date: 2022    Years since quittin 3   Smokeless Tobacco Never Used         Meds/Allergies     Current Outpatient Medications:     BD Pen Needle Jeaneth 2nd Gen 32G X 4 MM MISC, USE ONCE DAILY WITH LANTUS, Disp: , Rfl:     benzonatate (TESSALON PERLES) 100 mg capsule, Take 1 capsule (100 mg total) by mouth 3 (three) times a day as needed for cough, Disp: 30 capsule, Rfl: 2    Blood Glucose Monitoring Suppl (FreeStyle Lite) FIDELIA, , Disp: , Rfl:     cefdinir (OMNICEF) 300 mg capsule, Take 1 capsule (300 mg total) by mouth every 12 (twelve) hours for 5 days, Disp: 10 capsule, Rfl: 0    Cholecalciferol (Vitamin D3) 125 MCG (5000 UT) TABS, Take 5,000 Units by mouth daily, Disp: , Rfl:     doxycycline hyclate (VIBRAMYCIN) 100 mg capsule, Take 1 capsule (100 mg total) by mouth every 12 (twelve) hours for 5 days, Disp: 10 capsule, Rfl: 0    FREESTYLE LITE test strip, Check blood sugar  daily, Disp: 100 each, Rfl: 3    guaiFENesin (MUCINEX) 600 mg 12 hr tablet, Take 2 tablets (1,200 mg total) by mouth every 12 (twelve) hours as needed for cough or congestion (thick mucis), Disp: 60 tablet, Rfl: 2    Insulin Pen Needle (BD Pen Needle Jeaneth U/F) 32G X 4 MM MISC, Use daily as directed with insulin pen, Disp: 100 each, Rfl: 3    ipratropium-albuterol (Combivent Respimat) inhaler, Inhale 1 puff 4 (four) times a day, Disp: 4 g, Rfl: 1    Lancets (freestyle) lancets, Check blood sugar daily, Disp: 100 each, Rfl: 3    levETIRAcetam (KEPPRA) 500 mg tablet, Take 1 tablet (500 mg total) by mouth every 12 (twelve) hours, Disp: 60 tablet, Rfl: 3    LORazepam (ATIVAN) 0 5 mg tablet, For sleep 1-2 tablets at bed time (Max 1 mg ), Disp: 60 tablet, Rfl: 0    magnesium gluconate (MAGONATE) 500 mg tablet, Take 1 tablet (500 mg total) by mouth daily, Disp: 30 tablet, Rfl: 1    metFORMIN (GLUCOPHAGE-XR) 500 mg 24 hr tablet, Take 2 tablets (1,000 mg total) by mouth daily with dinner, Disp: 180 tablet, Rfl: 2    naloxone (NARCAN) 4 mg/0 1 mL nasal spray, Administer 1 spray into a nostril  If no response after 2-3 minutes, give another dose in the other nostril using a new spray   It has to be on stand by use with opioid use - in case of overdose, Disp: 1 each, Rfl: 1    ondansetron (Zofran ODT) 8 mg disintegrating tablet, Take 1 tablet (8 mg total) by mouth every 8 (eight) hours as needed for nausea or vomiting, Disp: 20 tablet, Rfl: 5    oxyCODONE (ROXICODONE) 10 MG TABS, Take 1 tablet (10 mg total) by mouth every 6 (six) hours as needed for moderate pain Max Daily Amount: 40 mg, Disp: 120 tablet, Rfl: 0    pantoprazole (PROTONIX) 40 mg tablet, Take 1 tablet (40 mg total) by mouth daily in the early morning, Disp: 90 tablet, Rfl: 1    Cholecalciferol (Vitamin D3) 125 MCG (5000 UT) CAPS, Take 1 capsule (5,000 Units total) by mouth daily, Disp: 90 capsule, Rfl: 2  No Known Allergies      Review of Systems   Constitutional: Positive for activity change (decreased but tries), appetite change (decreased but improving), fatigue and unexpected weight change  HENT: Negative  Eyes: Negative  Respiratory: Negative  Cardiovascular: Negative  Gastrointestinal: Negative  Endocrine: Negative  Genitourinary: Positive for frequency  Nocturia 2-3x - increases his fluids   Musculoskeletal: Positive for back pain (shoulder blades, lower back at times)  Bilateral knee pain from chemotherapy     Skin: Negative  Allergic/Immunologic: Negative  Neurological: Positive for weakness and headaches (occasional right parietal lobe - mild quick resolves on its own)  Negative for dizziness, tremors, seizures, speech difficulty, light-headedness and numbness  Hematological: Negative  Psychiatric/Behavioral: Positive for agitation (quickly for short period of time), decreased concentration and sleep disturbance (on and off)  Negative for confusion  The patient is not nervous/anxious           Memory loss - from radiation           OBJECTIVE:   /70 (BP Location: Left arm, Patient Position: Sitting, Cuff Size: Standard)   Pulse 100   Temp 99 1 °F (37 3 °C) (Temporal)   Resp 18   Ht 5' 8" (1 727 m)   Wt 62 3 kg (137 lb 6 4 oz)   SpO2 97%   BMI 20 89 kg/m²   Pain Assessment:  0  Performance Status: ECOG/Zubrod/WHO: 1    Physical Exam   He is conversing and following conversation with the assistance of   His breathing is unlabored  He is ambulating independently  RESULTS  Lab Results    Chemistry        Component Value Date/Time    K 4 1 06/06/2022 0430    K 4 4 01/29/2021 1155    CL 99 06/06/2022 0430     01/29/2021 1155    CO2 26 06/06/2022 0430    CO2 29 01/29/2021 1155    BUN 16 06/06/2022 0430    BUN 21 01/29/2021 1155    CREATININE 0 81 06/06/2022 0430        Component Value Date/Time    CALCIUM 9 0 06/06/2022 0430    CALCIUM 9 9 01/29/2021 1155    ALKPHOS 143 (H) 06/05/2022 0855    ALKPHOS 66 01/29/2021 1155    AST 20 06/05/2022 0855    AST 22 01/29/2021 1155    ALT 16 06/05/2022 0855    ALT 30 01/29/2021 1155            Lab Results   Component Value Date    WBC 16 46 (H) 06/07/2022    HGB 11 0 (L) 06/07/2022    HCT 32 3 (L) 06/07/2022    MCV 91 06/07/2022     06/07/2022         Imaging Studies  XR chest pa & lateral    Result Date: 5/26/2022  Narrative: CHEST AND THORACIC SPINE INDICATION:   M54 6: Pain in thoracic spine C34 91: Malignant neoplasm of unspecified part of right bronchus or lung  COMPARISON:  Multiple priors most recently 5/3/2022 EXAM PERFORMED/VIEWS:  XR CHEST PA & LATERAL, XR SPINE THORACIC 3 VW FINDINGS:  Right chest wall MediPort tip overlying the superior cavoatrial junction Cardiomediastinal silhouette appears unremarkable  Large right upper lobe mass unchanged  No pneumothorax or large pleural effusion  Osseous structures appear within normal limits for patient age  Impression: No acute cardiopulmonary disease  Unchanged large right upper lobe mass  No acute osseous abnormality  If there is concern for osseous metastatic disease, MRI or bone scan should be performed   Workstation performed: [id number]     XR spine thoracic 3 vw    Result Date: 5/26/2022  Narrative: CHEST AND THORACIC SPINE INDICATION:   M54 6: Pain in thoracic spine C34 91: Malignant neoplasm of unspecified part of right bronchus or lung  COMPARISON:  Multiple priors most recently 5/3/2022 EXAM PERFORMED/VIEWS:  XR CHEST PA & LATERAL, XR SPINE THORACIC 3 VW FINDINGS:  Right chest wall MediPort tip overlying the superior cavoatrial junction Cardiomediastinal silhouette appears unremarkable  Large right upper lobe mass unchanged  No pneumothorax or large pleural effusion  Osseous structures appear within normal limits for patient age  Impression: No acute cardiopulmonary disease  Unchanged large right upper lobe mass  No acute osseous abnormality  If there is concern for osseous metastatic disease, MRI or bone scan should be performed  Workstation performed: [id number]     CT chest without contrast    Result Date: 6/5/2022  Narrative: CT CHEST WITHOUT IV CONTRAST INDICATION:   Cough, persistent;  cough, fever  Fever Immunocompromised Pt states he got Chemo Wednesday  Pt awoke this AM with a fever of 100 4  Pt also report phlegm  Pt denies difficulty breathing  No meds taken today  The patient's entire past medical history was obtained directly from either the attending emergency room physicians notes and/or the resident/physician's assistant notes in [address] [place] [address]  The information was copied and pasted directly from Epic  Additional history obtained from patient's chart in copy and paste: 51-year-old male recently diagnosed with non-small cell lung carcinoma with brain metastases  COMPARISON:  CT chest abdomen pelvis April 22, 2022 TECHNIQUE: CT examination of the chest was performed without intravenous contrast  This examination was performed without intravenous contrast in the context of the critical nationwide Omnipaque shortage  Axial, sagittal, and coronal 2D reformatted images were created from the source data and submitted for interpretation  Radiation dose length product (DLP) for this visit:  348 mGy-cm     This examination, like all CT scans performed in the [facility], was performed utilizing techniques to minimize radiation dose exposure, including the use of iterative reconstruction and automated exposure control  FINDINGS: LUNGS:  The lungs are well aerated  Again identified is a cavitary right upper lobe lung mass measuring approximately 7 6 x 6 2 x 10 0 cm (series 2, image 14; series 601, image 79)  This has decreased in size from the prior study in which this measured 8 7 x 8 8 x 12 2 cm  Areas of cavitation are present in the anterior aspect of the right lung apex  Scattered areas of groundglass infiltrates are present throughout the right upper, middle and lower lobes  More focal area of consolidation is present in the posterior aspect of the right upper lobe, likely postobstructive pneumonitis  The left lung is clear  PLEURA:  Unremarkable  HEART/GREAT VESSELS: The heart is normal in size  There is a small pericardial effusion, new from the prior study  No thoracic aortic aneurysm  MEDIASTINUM AND NAS:  Enlarged mediastinal, paratracheal, precarinal and subcarinal lymph nodes  CHEST WALL AND LOWER NECK:  Right sided single lumen Ajaatl-u-Muyi catheter with tip in superior vena cava  VISUALIZED STRUCTURES IN THE UPPER ABDOMEN:  The previously identified splenic lesion is not well-visualized due to lack of intravenous contrast material  There is a 2 6 x 2 1 cm mass in the right adrenal gland, increased in size from the prior study (series 2, image 56)  Previously, this measured 1 3 x 1 9 cm  There is a new 1 9 x 1 6 cm lesion in the left adrenal gland (series 2, image 56)  OSSEOUS STRUCTURES:  The osseous structures are demineralized  There is a new lucent lesion in the T10 vertebral body (series 602, image 94; series 2, image 39)  There is cortical destruction along the superior endplate of Q82  Findings most compatible with bony metastatic disease, new from the prior study   Vague moth-eaten appearance of the T9 and T11 vertebral bodies, also suspicious for metastatic disease  Impression: 1  Multiple areas of infiltrate throughout the right lung, most pronounced in the right upper lobe  Although the findings likely represent multifocal/multi lobar pneumonia, metastatic disease not excluded, particularly in the posterior right upper lobe  Posterior right upper lobe consolidation, likely postobstructive pneumonitis  These do not have the typical appearance for Covid type pneumonia, given the unilaterality  2   Right upper lobe lung mass with cavitation  The overall size has slightly improved from the prior study  3   Enlarging right adrenal lesion with new left adrenal lesion, most compatible with metastatic disease  4   New bony metastatic disease involving the T9, T10 and T11 vertebral bodies  Pathologic fracture involving the superior endplate of D51  5   Trace pericardial effusion, new from the prior study  Recommend follow-up with hematology/oncology  Workstation performed: [id number]     MRI brain w wo contrast    Result Date: 6/5/2022  Narrative: MRI BRAIN WITH AND WITHOUT CONTRAST INDICATION: C79 31: Secondary malignant neoplasm of brain  Patient has known brain metastasis  History of non-small cell lung carcinoma  Prior radiation therapy  COMPARISON:  MRI dated 3/15/2022  TECHNIQUE: Sagittal T1, axial T2, axial FLAIR, axial T1, axial Pomeroy, axial diffusion  Sagittal, axial T1 postcontrast   Axial bravo postcontrast with coronal reconstructions  IV Contrast:  6 5 mL of Gadobutrol injection (SINGLE-DOSE)  IMAGE QUALITY:   Diagnostic  FINDINGS: BRAIN PARENCHYMA:  There is a new heterogeneous, hemorrhagic mass identified within the superior aspect of the left cerebellum  There is peripheral hemosiderin deposition and central hyperintense signal on T1 and T2-weighted imaging  This mass measures  approximately 2 7 x 1 7 x 1 8 cm  Only mild adjacent vasogenic edema is present   There is a new focus of susceptibility identified within the right posterior lateral frontal lobe on series 6 image 77 measuring less than 1 cm in size with faint hyperintense signal on T2-weighted imaging  There is only a tiny punctate focus of enhancement noted on series 12 image 100  This is new compared to the prior examination as well  The previously identified hemorrhagic, enhancing mass within the parafalcine right occipital lobe is significantly decreased in size when compared with the prior study from nearly 3 months ago  Only minimal vasogenic edema is seen within the adjacent occipital lobe  Previously seen hemorrhagic, enhancing mass within the left anterior medial temporal lobe is also markedly reduced in size with only mild focal enhancement identified  The previously seen edema is essentially resolved  A few small white matter hyperintensities are noted consistent with chronic microangiopathic change  There is a new FLAIR hyperintensity seen within the left frontal white matter on series 7 image 19 without enhancement or restriction  Diffusion imaging is unremarkable with no signs of acute ischemia  VENTRICLES:  Normal for the patient's age  SELLA AND PITUITARY GLAND:  Normal  ORBITS:  Normal  PARANASAL SINUSES:  There is a retention cyst within the inferior aspect of the left maxillary sinus, unchanged  VASCULATURE:  Evaluation of the major intracranial vasculature demonstrates appropriate flow voids  CALVARIUM AND SKULL BASE:  Normal  EXTRACRANIAL SOFT TISSUES:  Normal      Impression: There are 2 new hemorrhagic masses identified within the brain parenchyma consistent with new metastasis  The larger of these is seen within the left superior cerebellum  The smaller of the 2 is less than 1 cm in size within the right frontal lobe towards the vertex  Neither demonstrates significant edema or mass effect   The 2 previously treated hemorrhagic enhancing lesions seen on MRI from March 15, 2022 have significantly decreased in size and there is no adjacent significant vasogenic edema or mass effect at this time  This examination was marked "immediate notification" in Epic in order to begin the standard process by which the radiology reading room liaison alerts the referring practitioner  Workstation performed: [id number]     FL guided central venous access device insertion    Result Date: 5/11/2022  Narrative: Chest port placement 5/10/2022 1:00 PM History: Lung carcinoma Contrast: None Fluoroscopy time: 18 8 seconds Number of images: 2 Radiation dose: 1 82 mGy Procedure: The patient was identified verbally and by wristband  Timeout was performed  Informed consent was obtained  All elements of maximal sterile barrier technique were followed (cap, mask, sterile gown, sterile gloves, large sterile sheet, hand hygiene and 2% chlorhexidine for cutaneous antisepsis)  Following obtaining informed consent, the patient was prepped and draped in the usual sterile fashion  Using ultrasound guidance, access was gained to the patient's right internal jugular vein using a micropuncture system  The micropuncture wire was removed and a  035 wire was advanced to the level of the inferior vena cava using fluoroscopic guidance  Using 1% lidocaine, the region of the right anterior chest was was anesthetized  A small incision was made using a 15 blade scalpel  The port pocket was created using blunt dissection  The catheter tubing was tunneled from the incision to the venotomy  The micropuncture dilator was exchanged for a peel-away sheath, using fluoroscopic guidance  The catheter was placed through the peel-away sheath  The catheter was measured and cut to size  The catheter was attached to the port  The port was flushed with saline to ensure patency without evidence of leakage  The port was placed in the port pocket  The port was sutured in the pocket using 3-0 Vicryl  The incisions were approximated with 3-0 Vicryl and tissue adhesive    The patient tolerated the procedure well without apparent immediate complications  The patient left the IR department in unchanged condition  Dr Leah Campos performed and directly supervised the entire procedure  Findings: Using ultrasound guidance, the right internal jugular vein was cannulated using Seldinger technique  The right internal jugular vein was evaluated as a potential access site  The right internal jugular vein was patent, and free of thrombus  Static images of the vessel was obtained  Visualization of real time needle entry into the vessel was obtained  Fluoroscopic spot image demonstrates a newly placed single lumen chest port via the right internal jugular vein with the most central aspect at the SVC/RA junction  The catheter tubing is smooth in contour  Impression: Impression:  Successful placement of a single lumen chest port via the right internal jugular vein  Workstation performed: [id number]         Pathology:  Final Diagnosis   A & B  Lung, Right Upper Lobe Bronchoalveolar Lavage, : (Thin-Prep and cell block)  Negative for malignancy  Rare benign bronchial cells  Abundant macrophages and mixed inflammatory cells      Satisfactory for evaluation         C  Lung, Right Upper Lobe Bronchial Brushing, with brush: Atypical cellular changes seen  Atypical epithelioid cells, scant cellularity      Satisfactory for evaluation      D  Lung, Right Upper Lobe, FNA:  Positive for malignancy      The concomitant biopsy ([id number]) demonstrates Non-small cell carcinoma consistent with adenocarcinoma      Intradepartmental consultation is in agreement                 ASSESSMENT  1  Lung cancer metastatic to brain [facility])       Cancer Staging  No matching staging information was found for the patient  PLAN/DISCUSSION  No orders of the defined types were placed in this encounter  Pallavi Kay is a 62y o  year old male with stage IV lung carcinoma    He was seen jointly along with Dr Chandrika Baldwin in Neuro-Oncology Clinic today with the assistance of an   His case was also reviewed in Neuro-Oncology rounds  We discussed his MRI of the brain  Which revealed 2 new hemorrhagic masses consistent with metastasis  The previously treated hemorrhagic lesions have significantly decreased in size  We reviewed that the smaller lesion can be addressed with [facility] however the larger lesion likely will require an SRT technique  We discussed CT simulation at which time customized mask will be constructed for frameless radiosurgery  Possible side effects were reviewed with them  This can include but not limited to fatigue, swelling requiring steroids, radionecrosis  Our team will coordinate with the medical oncology team as he also is receiving systemic chemotherapy  His 1st treatment will be delivered on 6/15/2022 and will be given on every-other-day basis  He is agreeable to the above outlined plan        Zen Valentine MD  6/8/2022,2:51 PM      Portions of the record may have been created with voice recognition software  Occasional wrong word or "sound a like" substitutions may have occurred due to the inherent limitations of voice recognition software  Read the chart carefully and recognize, using context, where substitutions have occurred

## 2022-06-08 NOTE — PROGRESS NOTES
Joan Finney 1965 is a 62 y o  male  Mr  Ally Vargas is a 62year old man with prior smoking history who presented with headaches and was found to have 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 with right occipital and left frontal brain metastases  On 4/8/22 he completed a course of SRT to a dose of 3000cGy in 5 fractions to each  He returns today for 2 month follow up      4/13/22 PET CT  IMPRESSION:  1  Large right upper lung hypermetabolic necrotic mass measures 10 1 x 8 4 x 11 4 cm, SUV 19 8, compatible with known malignancy  This mass invades the right hilum and mediastinum, and appears inseparable from the paratracheal adenopathy  2   Right adrenal metastasis measures 1 1 x 0 9 cm  Minimal nodular activity in the left adrenal may be physiologic, but may be reassessed on follow-up exam to exclude an additional early metastasis  3   1 5 x 1 2 cm hypermetabolic splenic lesion most concerning for metastasis  4   Known brain metastases are not well evaluated on this exam   5   No additional hypermetabolic metastases visualized  4/22-4/27/22 Admission - Weakness, fatigue concern for obstructive pneumonia  CT possible necrotizing pneumonia - IV antibiotics started  All labs were negative - likely symptoms were related to lung cancer than pneumonia    4/27/22 CT CAP w contrast  IMPRESSION:  CHEST:  1   Redemonstrated large right upper lobe mass extending to the right hilum/mediastinum with mediastinal adenopathy in keeping with known malignancy  Compared to prior PET/CT there are slightly increased areas of cavitation confluent with the mass in   the right upper lobe in the areas of previously seen consolidation, which may be due to necrotizing pneumonia as well as necrotic tumor  2  1 6 cm right thyroid nodule can be evaluated with nonemergent thyroid ultrasound  ABDOMEN/PELVIS:  1   No acute findings in the abdomen or pelvis    2   Splenic nodule concerning for metastasis slightly enlarged since CT 3/22/22  3   Right adrenal metastatic nodule grossly unchanged from prior PET/CT 4/13/22  5/3/22 Bharat/Onc Isaías Chatterjee  Next Gen sequencing did not demonstrate any actionable mutationss  Agrees to begin chemotherapy  Regimen  Bevacizumab 15 mg/kg IV day 1  Paclitaxel 175 mg/m2 IV day 1 (85% first cycle)  Carboplatin AUC = 5 5 IV day 1  G-CSF 3 mg subcu day 2  Cycle = 21 days  Goal = palliation    5/26/22 XR spine thoracic 3 vw  IMPRESSION:  No acute cardiopulmonary disease  Unchanged large right upper lobe mass  No acute osseous abnormality  If there is concern for osseous metastatic disease, MRI or bone scan should be performed  5/26/22 XR chest pa & lateral  IMPRESSION:  No acute cardiopulmonary disease  Unchanged large right upper lobe mass  No acute osseous abnormality  If there is concern for osseous metastatic disease, MRI or bone scan should be performed  5/31/22 Bharat/Josep Chatterjee  2nd cycle scheduled for June 1st  Rescan after 4th cycle    6/4/22 MRI brain w wo contrast  IMPRESSION:  There are 2 new hemorrhagic masses identified within the brain parenchyma consistent with new metastasis  The larger of these is seen within the left superior cerebellum  The smaller of the 2 is less than 1 cm in size within the right frontal lobe towards the vertex  Neither demonstrates significant edema or mass effect  The 2 previously treated hemorrhagic enhancing lesions seen on MRI from March 15, 2022 have significantly decreased in size and there is no adjacent significant vasogenic edema or mass effect at this time  6/5-6/7/22 Admission - fever in change in consistency of phlegm  Started on Cefepime and vancomycin  then transitioned to oral antibiotic and discharged  6/5/22 CT chest wo contrast  IMPRESSION:  1   Multiple areas of infiltrate throughout the right lung, most pronounced in the right upper lobe    Although the findings likely represent multifocal/multi lobar pneumonia, metastatic disease not excluded, particularly in the posterior right upper   lobe  Posterior right upper lobe consolidation, likely postobstructive pneumonitis  These do not have the typical appearance for Covid type pneumonia, given the unilaterality  2   Right upper lobe lung mass with cavitation  The overall size has slightly improved from the prior study  3   Enlarging right adrenal lesion with new left adrenal lesion, most compatible with metastatic disease  4   New bony metastatic disease involving the T9, T10 and T11 vertebral bodies  Pathologic fracture involving the superior endplate of Y77   5   Trace pericardial effusion, new from the prior study      6/10/22 Dietitian  6/21/22 Hem/Onc - Dr Sander Mancini  6/22/22 Infusion      Oncology History   Adenocarcinoma of right lung ([facility] [address] )   3/28/2022 Initial Diagnosis    Aden[facility] right lung ([email or web address] )     5/11/2022 -  Chemotherapy    pegfilgrastim (Toomsuba Zi), 3 mg (100 % of original dose 3 mg), Subcutaneous, Once, 1 of 13 cycles  Dose modification: 3 mg (original dose 3 mg, Cycle 2)  Administration: 3 mg (6/2/2022)  fosaprepitant (EMEND) IVPB, 150 mg, Intravenous, Once, 2 of 6 cycles  Administration: 150 mg (5/11/2022), 150 mg (6/1/2022)  CARBOplatin (PARAPLATIN) IVPB (GOG AUC DOSING), 604 45 mg, Intravenous, Once, 2 of 6 cycles  Administration: 604 45 mg (5/11/2022), 485 4 mg (6/1/2022)  bevacizumab (AVASTIN) IVPB, 940 mg, Intravenous, Once, 2 of 15 cycles  Administration: 900 mg (5/11/2022), 900 mg (6/1/2022)  PACLItaxel (TAXOL) chemo IVPB, 148 75 mg/m2 = 254 4 mg (85 % of original dose 175 mg/m2), Intravenous, Once, 2 of 6 cycles  Dose modification: 148 75 mg/m2 (85 % of original dose 175 mg/m2, Cycle 1, Reason: Dose Not Tolerated)  Administration: 254 4 mg (5/11/2022), 299 4 mg (6/1/2022)     Lung cancer metastatic to brain ([facility] [address] )   3/30/2022 - 4/8/2022 Radiation      Treatments:  Course: C1 SRT    Plan ID Energy Fractions Dose per Fraction (cGy) Dose Correction (cGy) Total Dose Delivered (cGy) Elapsed Days   SRTLTemp 6X 5 / 5 600 0 3,000 9   SRTROccipital 6X 5 / 5 600 0 3,000 9      Treatment Dates:  3/30/2022 - 4/8/2022 5/26/2022 Initial Diagnosis    Lung cancer metastatic to brain [facility])         Review of Systems:  Review of Systems   Constitutional: Positive for activity change (decreased but tries), appetite change (decreased but improving), fatigue and unexpected weight change  HENT: Negative  Eyes: Negative  Respiratory: Negative  Cardiovascular: Negative  Gastrointestinal: Negative  Endocrine: Negative  Genitourinary: Positive for frequency  Nocturia 2-3x - increases his fluids   Musculoskeletal: Positive for back pain (shoulder blades, lower back at times)  Bilateral knee pain from chemotherapy     Skin: Negative  Allergic/Immunologic: Negative  Neurological: Positive for weakness and headaches (occasional right parietal lobe - mild quick resolves on its own)  Negative for dizziness, tremors, seizures, speech difficulty, light-headedness and numbness  Hematological: Negative  Psychiatric/Behavioral: Positive for agitation (quickly for short period of time), decreased concentration and sleep disturbance (on and off)  Negative for confusion  The patient is not nervous/anxious           Memory loss - from radiation         Clinical Trial: no  Pain assessment: 8    PFT: n/a    Prior Radiation: yes    Teaching: Sera Lancaster reviewed (patient has from previous SRT)     MST: complete    Implantable Devices ([address], pacemaker, pain stimulator): RCW PAC    Hip Replacement: no    Covid Vaccine Status: Vaccinated    Health Maintenance   Topic Date Due    DM Eye Exam  Never done    COVID-19 Vaccine (3 - Pfizer risk series) 05/09/2021    Pneumococcal Vaccine: Pediatrics (0 to 5 Years) and At-Risk Patients (6 to 59 Years) (2 - PCV) 01/28/2022    Annual Physical  11/17/2022    Diabetic Foot Exam 2022    HEMOGLOBIN A1C  2022    Depression Screening  04/15/2023    URINE MICROALBUMIN  2023    BMI: Adult  2023    Colorectal Cancer Screening  2024    DTaP,Tdap,and Td Vaccines (2 - Td or Tdap) 2031    HIV Screening  Completed    Hepatitis C Screening  Completed    Influenza Vaccine  Completed    HIB Vaccine  Aged Out    Hepatitis B Vaccine  Aged Out    IPV Vaccine  Aged Out    Hepatitis A Vaccine  Aged Out    Meningococcal ACWY Vaccine  Aged Out    HPV Vaccine  Aged Out       Past Medical History:   Diagnosis Date    Diabetes mellitus ([facility] [address] )     Elevated PSA 3/11/2021    Fatty liver 3/11/2021    Gall bladder polyp 3/11/2021    Lung cancer ([facility] [address] )     Nonimmune to hepatitis B virus 3/11/2021       Past Surgical History:   Procedure Laterality Date    FL GUIDED CENTRAL VENOUS ACCESS DEVICE INSERTION  5/10/2022    LUNG BIOPSY      NE INSJ TUNNELED CTR VAD W/SUBQ PORT AGE 5 YR/> N/A 5/10/2022    Procedure: INSERTION VENOUS PORT ( PORT-A-CATH) IR;  Surgeon: Rosalba Mcdaniel DO;  Location: [facility] MAIN OR;  Service: Interventional Radiology       Family History   Problem Relation Age of Onset    No Known Problems Mother     No Known Problems Father        Social History     Tobacco Use    Smoking status: Former Smoker     Packs/day: 0 50     Years: 40 00     Pack years: 20 00     Types: Cigarettes     Quit date: 2022     Years since quittin 3    Smokeless tobacco: Never Used   Vaping Use    Vaping Use: Never used   Substance Use Topics    Alcohol use: Not Currently     Comment: quit drinking 7 years ago    Drug use: Never          Current Outpatient Medications:     BD Pen Needle Jeaneth 2nd Gen 32G X 4 MM MISC, USE ONCE DAILY WITH LANTUS, Disp: , Rfl:     benzonatate (TESSALON PERLES) 100 mg capsule, Take 1 capsule (100 mg total) by mouth 3 (three) times a day as needed for cough, Disp: 30 capsule, Rfl: 2    Blood Glucose Monitoring Suppl (FreeStyle Lite) FIDELIA, , Disp: , Rfl:     cefdinir (OMNICEF) 300 mg capsule, Take 1 capsule (300 mg total) by mouth every 12 (twelve) hours for 5 days, Disp: 10 capsule, Rfl: 0    Cholecalciferol (Vitamin D3) 125 MCG (5000 UT) CAPS, Take 1 capsule (5,000 Units total) by mouth daily, Disp: 90 capsule, Rfl: 2    doxycycline hyclate (VIBRAMYCIN) 100 mg capsule, Take 1 capsule (100 mg total) by mouth every 12 (twelve) hours for 5 days, Disp: 10 capsule, Rfl: 0    FREESTYLE LITE test strip, Check blood sugar  daily, Disp: 100 each, Rfl: 3    guaiFENesin (MUCINEX) 600 mg 12 hr tablet, Take 2 tablets (1,200 mg total) by mouth every 12 (twelve) hours as needed for cough or congestion (thick mucis), Disp: 60 tablet, Rfl: 2    Insulin Pen Needle (BD Pen Needle Jeaneth U/F) 32G X 4 MM MISC, Use daily as directed with insulin pen, Disp: 100 each, Rfl: 3    ipratropium-albuterol (Combivent Respimat) inhaler, Inhale 1 puff 4 (four) times a day, Disp: 4 g, Rfl: 1    Lancets (freestyle) lancets, Check blood sugar daily, Disp: 100 each, Rfl: 3    levETIRAcetam (KEPPRA) 500 mg tablet, Take 1 tablet (500 mg total) by mouth every 12 (twelve) hours, Disp: 60 tablet, Rfl: 3    LORazepam (ATIVAN) 0 5 mg tablet, For sleep 1-2 tablets at bed time (Max 1 mg ), Disp: 60 tablet, Rfl: 0    magnesium gluconate (MAGONATE) 500 mg tablet, Take 1 tablet (500 mg total) by mouth daily, Disp: 30 tablet, Rfl: 1    metFORMIN (GLUCOPHAGE-XR) 500 mg 24 hr tablet, Take 2 tablets (1,000 mg total) by mouth daily with dinner, Disp: 180 tablet, Rfl: 2    naloxone (NARCAN) 4 mg/0 1 mL nasal spray, Administer 1 spray into a nostril  If no response after 2-3 minutes, give another dose in the other nostril using a new spray   It has to be on stand by use with opioid use - in case of overdose, Disp: 1 each, Rfl: 1    ondansetron (Zofran ODT) 8 mg disintegrating tablet, Take 1 tablet (8 mg total) by mouth every 8 (eight) hours as needed for nausea or vomiting, Disp: 20 tablet, Rfl: 5    oxyCODONE (ROXICODONE) 10 MG TABS, Take 1 tablet (10 mg total) by mouth every 6 (six) hours as needed for moderate pain Max Daily Amount: 40 mg, Disp: 120 tablet, Rfl: 0    pantoprazole (PROTONIX) 40 mg tablet, Take 1 tablet (40 mg total) by mouth daily in the early morning, Disp: 90 tablet, Rfl: 1  No current facility-administered medications for this visit  No Known Allergies     There were no vitals filed for this visit

## 2022-06-08 NOTE — LETTER
2022     Ashok Coyle, [address]  [address]  [address] [address]    Patient: Sherren Jones   YOB: 1965   Date of Visit: 2022       Dear Dr Giovanni Valadez:    Thank you for referring Marilu Dawn to me for evaluation  Below are my notes for this consultation  If you have questions, please do not hesitate to call me  I look forward to following your patient along with you  Sincerely,        Magdy Smith MD        CC: No Recipients  Magdy Smith MD  2022  3:35 PM  Sign when Signing Visit  Consultation - Radiation Oncology      BVC:[id number] : 1965  Encounter: [id number]  Patient Information: [facility]  Chief Complaint   Patient presents with    Consult     Cancer Staging  No matching staging information was found for the patient  History of Present Illness     Mr Marilu Dawn is a 62year old man with prior smoking history who presented with headaches and was found to have 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 with right occipital and left frontal brain metastases  On 22 he completed a course of SRT to a dose of 3000cGy in 5 fractions to each  He returns today for 2 month follow up       22 PET CT  IMPRESSION:  1  Large right upper lung hypermetabolic necrotic mass measures 10 1 x 8 4 x 11 4 cm, SUV 19 8, compatible with known malignancy   This mass invades the right hilum and mediastinum, and appears inseparable from the paratracheal adenopathy  2   Right adrenal metastasis measures 1 1 x 0 9 cm   Minimal nodular activity in the left adrenal may be physiologic, but may be reassessed on follow-up exam to exclude an additional early metastasis  3   1 5 x 1 2 cm hypermetabolic splenic lesion most concerning for metastasis    4   Known brain metastases are not well evaluated on this exam   5   No additional hypermetabolic metastases visualized      -22 Admission - Weakness, fatigue concern for obstructive pneumonia  CT possible necrotizing pneumonia - IV antibiotics started  All labs were negative - likely symptoms were related to lung cancer than pneumonia     4/27/22 CT CAP w contrast  IMPRESSION:  CHEST:  1   Redemonstrated large right upper lobe mass extending to the right hilum/mediastinum with mediastinal adenopathy in keeping with known malignancy   Compared to prior PET/CT there are slightly increased areas of cavitation confluent with the mass in   the right upper lobe in the areas of previously seen consolidation, which may be due to necrotizing pneumonia as well as necrotic tumor  2  1 6 cm right thyroid nodule can be evaluated with nonemergent thyroid ultrasound  ABDOMEN/PELVIS:  1   No acute findings in the abdomen or pelvis  2   Splenic nodule concerning for metastasis slightly enlarged since CT 3/22/22  3   Right adrenal metastatic nodule grossly unchanged from prior PET/CT 4/13/22         5/3/22 Bharat/Onc Isaías Curry Standing  Next Gen sequencing did not demonstrate any actionable mutationss  Agrees to begin chemotherapy  Regimen  Bevacizumab 15 mg/kg IV day 1  Paclitaxel 175 mg/m2 IV day 1 (85% first cycle)  Carboplatin AUC = 5 5 IV day 1  G-CSF 3 mg subcu day 2  Cycle = 21 days  Goal = palliation    5/26/22 XR spine thoracic 3 vw  IMPRESSION:  No acute cardiopulmonary disease  Unchanged large right upper lobe mass  No acute osseous abnormality   If there is concern for osseous metastatic disease, MRI or bone scan should be performed      5/26/22 XR chest pa & lateral  IMPRESSION:  No acute cardiopulmonary disease  Unchanged large right upper lobe mass    No acute osseous abnormality   If there is concern for osseous metastatic disease, MRI or bone scan should be performed      5/31/22 Bharat/Josep Curry Standing  2nd cycle scheduled for June 1st  Rescan after 4th cycle     6/4/22 MRI brain w wo contrast  IMPRESSION:  There are 2 new hemorrhagic masses identified within the brain parenchyma consistent with new metastasis   The larger of these is seen within the left superior cerebellum   The smaller of the 2 is less than 1 cm in size within the right frontal lobe towards the vertex   Neither demonstrates significant edema or mass effect      The 2 previously treated hemorrhagic enhancing lesions seen on MRI from March 15, 2022 have significantly decreased in size and there is no adjacent significant vasogenic edema or mass effect at this time            6/5-6/7/22 Admission - fever in change in consistency of phlegm  Started on Cefepime and vancomycin  then transitioned to oral antibiotic and discharged      6/5/22 CT chest wo contrast  IMPRESSION:  1   Multiple areas of infiltrate throughout the right lung, most pronounced in the right upper lobe   Although the findings likely represent multifocal/multi lobar pneumonia, metastatic disease not excluded, particularly in the posterior right upper   lobe   Posterior right upper lobe consolidation, likely postobstructive pneumonitis   These do not have the typical appearance for Covid type pneumonia, given the unilaterality  2   Right upper lobe lung mass with cavitation   The overall size has slightly improved from the prior study  3   Enlarging right adrenal lesion with new left adrenal lesion, most compatible with metastatic disease  4   New bony metastatic disease involving the T9, T10 and T11 vertebral bodies   Pathologic fracture involving the superior endplate of X14   5   Trace pericardial effusion, new from the prior study    6/10/22 Dietitian  6/21/22 Hem/Onc - Dr Dwight Quintanilla  6/22/22 Infusion     Historical Information   Oncology History   Adenocarcinoma of right lung ([facility] [address] )   3/28/2022 Initial Diagnosis    Adenocarcinoma of right lung ([address] )     5/11/2022 -  Chemotherapy    pegfilgrastim (NEULASTA), 3 mg (100 % of original dose 3 mg), Subcutaneous, Once, 1 of 13 cycles  Dose modification: 3 mg (original dose 3 mg, Cycle 2)  Administration: 3 mg (6/2/2022)  fosaprepitant (EMEND) IVPB, 150 mg, Intravenous, Once, 2 of 6 cycles  Administration: 150 mg (5/11/2022), 150 mg (6/1/2022)  CARBOplatin (PARAPLATIN) IVPB (GOG AUC DOSING), 604 45 mg, Intravenous, Once, 2 of 6 cycles  Administration: 604 45 mg (5/11/2022), 485 4 mg (6/1/2022)  bevacizumab (AVASTIN) IVPB, 940 mg, Intravenous, Once, 2 of 15 cycles  Administration: 900 mg (5/11/2022), 900 mg (6/1/2022)  PACLItaxel (TAXOL) chemo IVPB, 148 75 mg/m2 = 254 4 mg (85 % of original dose 175 mg/m2), Intravenous, Once, 2 of 6 cycles  Dose modification: 148 75 mg/m2 (85 % of original dose 175 mg/m2, Cycle 1, Reason: Dose Not Tolerated)  Administration: 254 4 mg (5/11/2022), 299 4 mg (6/1/2022)     Lung cancer metastatic to brain ([facility] [address] )   3/30/2022 - 4/8/2022 Radiation      Treatments:  Course: C1 SRT    Plan ID Energy Fractions Dose per Fraction (cGy) Dose Correction (cGy) Total Dose Delivered (cGy) Elapsed Days   SRTLTemp 6X 5 / 5 600 0 3,000 9   SRTROccipital 6X 5 / 5 600 0 3,000 9      Treatment Dates:  3/30/2022 - 4/8/2022 5/26/2022 Initial Diagnosis    Lung cancer metastatic to brain [facility])           Past Medical History:   Diagnosis Date    Diabetes mellitus ([facility] [address] )     Elevated PSA 3/11/2021    Fatty liver 3/11/2021    Gall bladder polyp 3/11/2021    Lung cancer ([facility] [address] )     Nonimmune to hepatitis B virus 3/11/2021     Past Surgical History:   Procedure Laterality Date    FL GUIDED CENTRAL VENOUS ACCESS DEVICE INSERTION  5/10/2022    LUNG BIOPSY      OH INSJ TUNNELED CTR VAD W/SUBQ PORT AGE 5 YR/> N/A 5/10/2022    Procedure: INSERTION VENOUS PORT ( PORT-A-CATH) IR;  Surgeon: Nancy Poe DO;  [facility];  Service: Interventional Radiology       Family History   Problem Relation Age of Onset    No Known Problems Mother     No Known Problems Father        Social History   Social History     Substance and Sexual Activity   Alcohol Use Not Currently    Comment: quit drinking 7 years ago     Social History     Substance and Sexual Activity   Drug Use Never     Social History     Tobacco Use   Smoking Status Former Smoker    Packs/day: 0 50    Years: 40 00    Pack years: 20 00    Types: Cigarettes    Quit date: 2022    Years since quittin 3   Smokeless Tobacco Never Used         Meds/Allergies     Current Outpatient Medications:     BD Pen Needle Jeaneth 2nd Gen 32G X 4 MM MISC, USE ONCE DAILY WITH LANTUS, Disp: , Rfl:     benzonatate (TESSALON PERLES) 100 mg capsule, Take 1 capsule (100 mg total) by mouth 3 (three) times a day as needed for cough, Disp: 30 capsule, Rfl: 2    Blood Glucose Monitoring Suppl (FreeStyle Lite) FIDELIA, , Disp: , Rfl:     cefdinir (OMNICEF) 300 mg capsule, Take 1 capsule (300 mg total) by mouth every 12 (twelve) hours for 5 days, Disp: 10 capsule, Rfl: 0    Cholecalciferol (Vitamin D3) 125 MCG (5000 UT) TABS, Take 5,000 Units by mouth daily, Disp: , Rfl:     doxycycline hyclate (VIBRAMYCIN) 100 mg capsule, Take 1 capsule (100 mg total) by mouth every 12 (twelve) hours for 5 days, Disp: 10 capsule, Rfl: 0    FREESTYLE LITE test strip, Check blood sugar  daily, Disp: 100 each, Rfl: 3    guaiFENesin (MUCINEX) 600 mg 12 hr tablet, Take 2 tablets (1,200 mg total) by mouth every 12 (twelve) hours as needed for cough or congestion (thick mucis), Disp: 60 tablet, Rfl: 2    Insulin Pen Needle (BD Pen Needle Jeaneth U/F) 32G X 4 MM MISC, Use daily as directed with insulin pen, Disp: 100 each, Rfl: 3    ipratropium-albuterol (Combivent Respimat) inhaler, Inhale 1 puff 4 (four) times a day, Disp: 4 g, Rfl: 1    Lancets (freestyle) lancets, Check blood sugar daily, Disp: 100 each, Rfl: 3    levETIRAcetam (KEPPRA) 500 mg tablet, Take 1 tablet (500 mg total) by mouth every 12 (twelve) hours, Disp: 60 tablet, Rfl: 3    LORazepam (ATIVAN) 0 5 mg tablet, For sleep 1-2 tablets at bed time (Max 1 mg ), Disp: 60 tablet, Rfl: 0    magnesium gluconate (MAGONATE) 500 mg tablet, Take 1 tablet (500 mg total) by mouth daily, Disp: 30 tablet, Rfl: 1    metFORMIN (GLUCOPHAGE-XR) 500 mg 24 hr tablet, Take 2 tablets (1,000 mg total) by mouth daily with dinner, Disp: 180 tablet, Rfl: 2    naloxone (NARCAN) 4 mg/0 1 mL nasal spray, Administer 1 spray into a nostril  If no response after 2-3 minutes, give another dose in the other nostril using a new spray  It has to be on stand by use with opioid use - in case of overdose, Disp: 1 each, Rfl: 1    ondansetron (Zofran ODT) 8 mg disintegrating tablet, Take 1 tablet (8 mg total) by mouth every 8 (eight) hours as needed for nausea or vomiting, Disp: 20 tablet, Rfl: 5    oxyCODONE (ROXICODONE) 10 MG TABS, Take 1 tablet (10 mg total) by mouth every 6 (six) hours as needed for moderate pain Max Daily Amount: 40 mg, Disp: 120 tablet, Rfl: 0    pantoprazole (PROTONIX) 40 mg tablet, Take 1 tablet (40 mg total) by mouth daily in the early morning, Disp: 90 tablet, Rfl: 1    Cholecalciferol (Vitamin D3) 125 MCG (5000 UT) CAPS, Take 1 capsule (5,000 Units total) by mouth daily, Disp: 90 capsule, Rfl: 2  No Known Allergies      Review of Systems   Constitutional: Positive for activity change (decreased but tries), appetite change (decreased but improving), fatigue and unexpected weight change  HENT: Negative  Eyes: Negative  Respiratory: Negative  Cardiovascular: Negative  Gastrointestinal: Negative  Endocrine: Negative  Genitourinary: Positive for frequency  Nocturia 2-3x - increases his fluids   Musculoskeletal: Positive for back pain (shoulder blades, lower back at times)  Bilateral knee pain from chemotherapy     Skin: Negative  Allergic/Immunologic: Negative  Neurological: Positive for weakness and headaches (occasional right parietal lobe - mild quick resolves on its own)  Negative for dizziness, tremors, seizures, speech difficulty, light-headedness and numbness  Hematological: Negative  Psychiatric/Behavioral: Positive for agitation (quickly for short period of time), decreased concentration and sleep disturbance (on and off)  Negative for confusion  The patient is not nervous/anxious  Memory loss - from radiation           OBJECTIVE:   /70 (BP Location: Left arm, Patient Position: Sitting, Cuff Size: Standard)   Pulse 100   Temp 99 1 °F (37 3 °C) (Temporal)   Resp 18   Ht 5' 8" (1 727 m)   Wt 62 3 kg (137 lb 6 4 oz)   SpO2 97%   BMI 20 89 kg/m²   Pain Assessment:  0  Performance Status: ECOG/Zubrod/WHO: 1    Physical Exam   He is conversing and following conversation with the assistance of   His breathing is unlabored  He is ambulating independently  RESULTS  Lab Results    Chemistry        Component Value Date/Time    K 4 1 06/06/2022 0430    K 4 4 01/29/2021 1155    CL 99 06/06/2022 0430     01/29/2021 1155    CO2 26 06/06/2022 0430    CO2 29 01/29/2021 1155    BUN 16 06/06/2022 0430    BUN 21 01/29/2021 1155    CREATININE 0 81 06/06/2022 0430        Component Value Date/Time    CALCIUM 9 0 06/06/2022 0430    CALCIUM 9 9 01/29/2021 1155    ALKPHOS 143 (H) 06/05/2022 0855    ALKPHOS 66 01/29/2021 1155    AST 20 06/05/2022 0855    AST 22 01/29/2021 1155    ALT 16 06/05/2022 0855    ALT 30 01/29/2021 1155            Lab Results   Component Value Date    WBC 16 46 (H) 06/07/2022    HGB 11 0 (L) 06/07/2022    HCT 32 3 (L) 06/07/2022    MCV 91 06/07/2022     06/07/2022         Imaging Studies  XR chest pa & lateral    Result Date: 5/26/2022  Narrative: CHEST AND THORACIC SPINE INDICATION:   M54 6: Pain in thoracic spine C34 91: Malignant neoplasm of unspecified part of right bronchus or lung  COMPARISON:  Multiple priors most recently 5/3/2022 EXAM PERFORMED/VIEWS:  XR CHEST PA & LATERAL, XR SPINE THORACIC 3 VW FINDINGS:  Right chest wall MediPort tip overlying the superior cavoatrial junction Cardiomediastinal silhouette appears unremarkable   Large right upper lobe mass unchanged  No pneumothorax or large pleural effusion  Osseous structures appear within normal limits for patient age  Impression: No acute cardiopulmonary disease  Unchanged large right upper lobe mass  No acute osseous abnormality  If there is concern for osseous metastatic disease, MRI or bone scan should be performed  Workstation performed: [id number]     XR spine thoracic 3 vw    Result Date: 5/26/2022  Narrative: CHEST AND THORACIC SPINE INDICATION:   M54 6: Pain in thoracic spine C34 91: Malignant neoplasm of unspecified part of right bronchus or lung  COMPARISON:  Multiple priors most recently 5/3/2022 EXAM PERFORMED/VIEWS:  XR CHEST PA & LATERAL, XR SPINE THORACIC 3 VW FINDINGS:  Right chest wall MediPort tip overlying the superior cavoatrial junction Cardiomediastinal silhouette appears unremarkable  Large right upper lobe mass unchanged  No pneumothorax or large pleural effusion  Osseous structures appear within normal limits for patient age  Impression: No acute cardiopulmonary disease  Unchanged large right upper lobe mass  No acute osseous abnormality  If there is concern for osseous metastatic disease, MRI or bone scan should be performed  Workstation performed: [id number]     CT chest without contrast    Result Date: 6/5/2022  Narrative: CT CHEST WITHOUT IV CONTRAST INDICATION:   Cough, persistent;  cough, fever  Fever Immunocompromised Pt states he got Chemo Wednesday  Pt awoke this AM with a fever of 100 4  Pt also report phlegm  Pt denies difficulty breathing  No meds taken today  The patient's entire past medical history was obtained directly from either the attending emergency room physicians notes and/or the resident/physician's assistant notes in [address] [place] [address]  The information was copied and pasted directly from Epic   Additional history obtained from patient's chart in copy and paste: 63-year-old male recently diagnosed with non-small cell lung carcinoma with brain metastases  COMPARISON:  CT chest abdomen pelvis April 22, 2022 TECHNIQUE: CT examination of the chest was performed without intravenous contrast  This examination was performed without intravenous contrast in the context of the critical nationwide Omnipaque shortage  Axial, sagittal, and coronal 2D reformatted images were created from the source data and submitted for interpretation  Radiation dose length product (DLP) for this visit:  348 mGy-cm   This examination, like all CT scans performed in the [facility], was performed utilizing techniques to minimize radiation dose exposure, including the use of iterative reconstruction and automated exposure control  FINDINGS: LUNGS:  The lungs are well aerated  Again identified is a cavitary right upper lobe lung mass measuring approximately 7 6 x 6 2 x 10 0 cm (series 2, image 14; series 601, image 79)  This has decreased in size from the prior study in which this measured 8 7 x 8 8 x 12 2 cm  Areas of cavitation are present in the anterior aspect of the right lung apex  Scattered areas of groundglass infiltrates are present throughout the right upper, middle and lower lobes  More focal area of consolidation is present in the posterior aspect of the right upper lobe, likely postobstructive pneumonitis  The left lung is clear  PLEURA:  Unremarkable  HEART/GREAT VESSELS: The heart is normal in size  There is a small pericardial effusion, new from the prior study  No thoracic aortic aneurysm  MEDIASTINUM AND NAS:  Enlarged mediastinal, paratracheal, precarinal and subcarinal lymph nodes  CHEST WALL AND LOWER NECK:  Right sided single lumen Ledoxg-r-Xuyj catheter with tip in superior vena cava   VISUALIZED STRUCTURES IN THE UPPER ABDOMEN:  The previously identified splenic lesion is not well-visualized due to lack of intravenous contrast material  There is a 2 6 x 2 1 cm mass in the right adrenal gland, increased in size from the prior study (series 2, image 56)  Previously, this measured 1 3 x 1 9 cm  There is a new 1 9 x 1 6 cm lesion in the left adrenal gland (series 2, image 56)  OSSEOUS STRUCTURES:  The osseous structures are demineralized  There is a new lucent lesion in the T10 vertebral body (series 602, image 94; series 2, image 39)  There is cortical destruction along the superior endplate of I67  Findings most compatible with bony metastatic disease, new from the prior study  Vague moth-eaten appearance of the T9 and T11 vertebral bodies, also suspicious for metastatic disease  Impression: 1  Multiple areas of infiltrate throughout the right lung, most pronounced in the right upper lobe  Although the findings likely represent multifocal/multi lobar pneumonia, metastatic disease not excluded, particularly in the posterior right upper lobe  Posterior right upper lobe consolidation, likely postobstructive pneumonitis  These do not have the typical appearance for Covid type pneumonia, given the unilaterality  2   Right upper lobe lung mass with cavitation  The overall size has slightly improved from the prior study  3   Enlarging right adrenal lesion with new left adrenal lesion, most compatible with metastatic disease  4   New bony metastatic disease involving the T9, T10 and T11 vertebral bodies  Pathologic fracture involving the superior endplate of K32  5   Trace pericardial effusion, new from the prior study  Recommend follow-up with hematology/oncology  Workstation performed: [id number]     MRI brain w wo contrast    Result Date: 6/5/2022  Narrative: MRI BRAIN WITH AND WITHOUT CONTRAST INDICATION: C79 31: Secondary malignant neoplasm of brain  Patient has known brain metastasis  History of non-small cell lung carcinoma  Prior radiation therapy  COMPARISON:  MRI dated 3/15/2022  TECHNIQUE: Sagittal T1, axial T2, axial FLAIR, axial T1, axial [place], axial diffusion   Sagittal, axial T1 postcontrast   Axial bravo postcontrast with coronal reconstructions  IV Contrast:  6 5 mL of Gadobutrol injection (SINGLE-DOSE)  IMAGE QUALITY:   Diagnostic  FINDINGS: BRAIN PARENCHYMA:  There is a new heterogeneous, hemorrhagic mass identified within the superior aspect of the left cerebellum  There is peripheral hemosiderin deposition and central hyperintense signal on T1 and T2-weighted imaging  This mass measures  approximately 2 7 x 1 7 x 1 8 cm  Only mild adjacent vasogenic edema is present  There is a new focus of susceptibility identified within the right posterior lateral frontal lobe on series 6 image 77 measuring less than 1 cm in size with faint hyperintense signal on T2-weighted imaging  There is only a tiny punctate focus of enhancement noted on series 12 image 100  This is new compared to the prior examination as well  The previously identified hemorrhagic, enhancing mass within the parafalcine right occipital lobe is significantly decreased in size when compared with the prior study from nearly 3 months ago  Only minimal vasogenic edema is seen within the adjacent occipital lobe  Previously seen hemorrhagic, enhancing mass within the left anterior medial temporal lobe is also markedly reduced in size with only mild focal enhancement identified  The previously seen edema is essentially resolved  A few small white matter hyperintensities are noted consistent with chronic microangiopathic change  There is a new FLAIR hyperintensity seen within the left frontal white matter on series 7 image 19 without enhancement or restriction  Diffusion imaging is unremarkable with no signs of acute ischemia  VENTRICLES:  Normal for the patient's age  SELLA AND PITUITARY GLAND:  Normal  ORBITS:  Normal  PARANASAL SINUSES:  There is a retention cyst within the inferior aspect of the left maxillary sinus, unchanged  VASCULATURE:  Evaluation of the major intracranial vasculature demonstrates appropriate flow voids   CALVARIUM AND SKULL BASE:  Normal  EXTRACRANIAL SOFT TISSUES:  Normal      Impression: There are 2 new hemorrhagic masses identified within the brain parenchyma consistent with new metastasis  The larger of these is seen within the left superior cerebellum  The smaller of the 2 is less than 1 cm in size within the right frontal lobe towards the vertex  Neither demonstrates significant edema or mass effect  The 2 previously treated hemorrhagic enhancing lesions seen on MRI from March 15, 2022 have significantly decreased in size and there is no adjacent significant vasogenic edema or mass effect at this time  This examination was marked "immediate notification" in Epic in order to begin the standard process by which the radiology reading room liaison alerts the referring practitioner  Workstation performed: [id number]     FL guided central venous access device insertion    Result Date: 5/11/2022  Narrative: Chest port placement 5/10/2022 1:00 PM History: Lung carcinoma Contrast: None Fluoroscopy time: 18 8 seconds Number of images: 2 Radiation dose: 1 82 mGy Procedure: The patient was identified verbally and by wristband  Timeout was performed  Informed consent was obtained  All elements of maximal sterile barrier technique were followed (cap, mask, sterile gown, sterile gloves, large sterile sheet, hand hygiene and 2% chlorhexidine for cutaneous antisepsis)  Following obtaining informed consent, the patient was prepped and draped in the usual sterile fashion  Using ultrasound guidance, access was gained to the patient's right internal jugular vein using a micropuncture system  The micropuncture wire was removed and a  035 wire was advanced to the level of the inferior vena cava using fluoroscopic guidance  Using 1% lidocaine, the region of the right anterior chest was was anesthetized  A small incision was made using a 15 blade scalpel  The port pocket was created using blunt dissection  The catheter tubing was tunneled from the incision to the venotomy  The micropuncture dilator was exchanged for a peel-away sheath, using fluoroscopic guidance  The catheter was placed through the peel-away sheath  The catheter was measured and cut to size  The catheter was attached to the port  The port was flushed with saline to ensure patency without evidence of leakage  The port was placed in the port pocket  The port was sutured in the pocket using 3-0 Vicryl  The incisions were approximated with 3-0 Vicryl and tissue adhesive  The patient tolerated the procedure well without apparent immediate complications  The patient left the IR department in unchanged condition  Dr Leah Campos performed and directly supervised the entire procedure  Findings: Using ultrasound guidance, the right internal jugular vein was cannulated using Seldinger technique  The right internal jugular vein was evaluated as a potential access site  The right internal jugular vein was patent, and free of thrombus  Static images of the vessel was obtained  Visualization of real time needle entry into the vessel was obtained  Fluoroscopic spot image demonstrates a newly placed single lumen chest port via the right internal jugular vein with the most central aspect at the SVC/RA junction  The catheter tubing is smooth in contour  Impression: Impression:  Successful placement of a single lumen chest port via the right internal jugular vein  Workstation performed: [id number]         Pathology:  Final Diagnosis   A & B  Lung, Right Upper Lobe Bronchoalveolar Lavage, : (Thin-Prep and cell block)  Negative for malignancy  Rare benign bronchial cells  Abundant macrophages and mixed inflammatory cells      Satisfactory for evaluation         C  Lung, Right Upper Lobe Bronchial Brushing, with brush: Atypical cellular changes seen  Atypical epithelioid cells, scant cellularity      Satisfactory for evaluation      D   Lung, Right Upper Lobe, FNA:  Positive for malignancy      The concomitant biopsy ([id number]) demonstrates Non-small cell carcinoma consistent with adenocarcinoma      Intradepartmental consultation is in agreement                 ASSESSMENT  1  Lung cancer metastatic to brain [facility])       Cancer Staging  No matching staging information was found for the patient  PLAN/DISCUSSION  No orders of the defined types were placed in this encounter  Tomas Shaikh is a 62y o  year old male with stage IV lung carcinoma  He was seen jointly along with Dr Kamaljit Dove in Neuro-Oncology Clinic today with the assistance of an   His case was also reviewed in Neuro-Oncology rounds  We discussed his MRI of the brain  Which revealed 2 new hemorrhagic masses consistent with metastasis  The previously treated hemorrhagic lesions have significantly decreased in size  We reviewed that the smaller lesion can be addressed with [facility] however the larger lesion likely will require an SRT technique  We discussed CT simulation at which time customized mask will be constructed for frameless radiosurgery  Possible side effects were reviewed with them  This can include but not limited to fatigue, swelling requiring steroids, radionecrosis  Our team will coordinate with the medical oncology team as he also is receiving systemic chemotherapy  His 1st treatment will be delivered on 6/15/2022 and will be given on every-other-day basis  He is agreeable to the above outlined plan        Luis Angel Vee MD  6/8/2022,2:51 PM      Portions of the record may have been created with voice recognition software  Occasional wrong word or "sound a like" substitutions may have occurred due to the inherent limitations of voice recognition software  Read the chart carefully and recognize, using context, where substitutions have occurred

## 2022-06-08 NOTE — TELEPHONE ENCOUNTER
Kourtney Kaur  was seen at neuro-oncology clinic today  SRT is planned to the brain starting 6/15/22  Dr Dudley Lobo wanted to let you know the RT plan incase any changes need to be made to the patient's systemic therapy  Patient is scheduled for next systemic therapy 6/22/22        Thank you,  Marta

## 2022-06-08 NOTE — TELEPHONE ENCOUNTER
Primary palliative medicine provider: Jaleel Car    Medication requested: OXYCODONE    If for pain, how has the patient been taking their pain medicine?      Last appointment: 4/27/22    Next scheduled appointment: 7/6/22    PDMP review:  LAST FILLED 30 DAY SUPPLY ON 5/2/22

## 2022-06-08 NOTE — PROGRESS NOTES
Neurosurgery Office Note  Joan Finney 62 y o  male MRN: [id number]      Assessment/Plan      Diagnoses and all orders for this visit:    Brain metastases [facility])    Metastatic adenocarcinoma [facility])        Discussion:    Pain Score:   8 (bilateral knees)    Status post SRT left frontal temporal and right occipital brain metastases 3/30/22  Patient with non-small cell lung cancer, presented mid March 2022 with the above lesions  At the time had headaches, visual changes  He was having trouble reading etcetera  Went to ED, and was also found to have a lung mass  Screening CT January 2022 noted that mass, it was increasing in size  Underwent bronchoscopy, pathology 3/16/22 was non-small cell carcinoma consistent with adenocarcinoma  Returns today for follow-up with his surveillance MRI scan and brain, after SRS as above  This study was reviewed at Neuro-Oncology working group  Demonstrates a new left cerebellar hemorrhagic as well as a new left frontal lesion  Patient seen in conjunction with Dr Dominga Fontenot  We have recommended SRS/SRT [facility] for the smaller left frontal, potentially SRT for the larger left cerebellar)  Risks, benefits, and alternatives of various treatment options were personally reviewed with the patient and his family, who translated  The patient's family deferred phone , apparently were dissatisfied with the service in the past  Options discussed included but were not limited to surgery, radiation therapy including variations thereof, such as a whole brain radiation therapy, SRS/SRT, etc , chemotherapy alone, and combinations of the above  Risks specific to SRS/SRT were reviewed, including but not limited to radiation necrosis  The patient wished to proceed with SRS/SRT  Written consent was obtained       06/08/22 Metrics: [id number] 32023=6 000; VAS 65; ECOG 1; KPS 90    Time spent discussing the results of the new study, and treatment options for these new brain metastases 10 minutes  CHIEF COMPLAINT    Chief Complaint   Patient presents with    Post-op     2 MO POST OP SRT MRI BRAIN SL 6/4/22       HISTORY    History of Present Illness     62y o  year old male     HPI    See Discussion    REVIEW OF SYSTEMS    Review of Systems   Constitutional: Positive for activity change (decreased), appetite change (was decreased now improving slightly), fatigue and unexpected weight change (from Jan until now about 30-40 pounds due to diabetes)  HENT: Negative  Eyes: Negative  Wears glasses   Respiratory: Negative  Cardiovascular: Negative  Gastrointestinal: Negative  Endocrine: Negative  Genitourinary: Positive for frequency (nocturia x3-4)  Musculoskeletal: Positive for arthralgias (bilateral knees, comes and goes) and back pain (across lower back)  Negative for gait problem  Skin: Negative  Allergic/Immunologic: Negative  Neurological: Positive for weakness (generalized) and headaches (occasional right side of head, quick pain sometimes)  Negative for dizziness, tremors, seizures, syncope, speech difficulty, light-headedness and numbness  Prior to hospitalization was having headaches   Hematological: Negative  Psychiatric/Behavioral: Positive for agitation (since treatment), decreased concentration (mild) and sleep disturbance (due to nocturia)  Negative for confusion          Prior to hospitalization was having some memory loss             Meds/Allergies     Current Outpatient Medications   Medication Sig Dispense Refill    BD Pen Needle Jeaneth 2nd Gen 32G X 4 MM MISC USE ONCE DAILY WITH LANTUS      benzonatate (TESSALON PERLES) 100 mg capsule Take 1 capsule (100 mg total) by mouth 3 (three) times a day as needed for cough 30 capsule 2    Blood Glucose Monitoring Suppl (FreeStyle Lite) FIDELIA       cefdinir (OMNICEF) 300 mg capsule Take 1 capsule (300 mg total) by mouth every 12 (twelve) hours for 5 days 10 capsule 0    Cholecalciferol (Vitamin D3) 125 MCG (5000 UT) CAPS Take 1 capsule (5,000 Units total) by mouth daily 90 capsule 2    Cholecalciferol (Vitamin D3) 125 MCG (5000 UT) TABS Take 5,000 Units by mouth daily      doxycycline hyclate (VIBRAMYCIN) 100 mg capsule Take 1 capsule (100 mg total) by mouth every 12 (twelve) hours for 5 days 10 capsule 0    FREESTYLE LITE test strip Check blood sugar  daily 100 each 3    guaiFENesin (MUCINEX) 600 mg 12 hr tablet Take 2 tablets (1,200 mg total) by mouth every 12 (twelve) hours as needed for cough or congestion (thick mucis) 60 tablet 2    Insulin Pen Needle (BD Pen Needle Jeaneth U/F) 32G X 4 MM MISC Use daily as directed with insulin pen 100 each 3    ipratropium-albuterol (Combivent Respimat) inhaler Inhale 1 puff 4 (four) times a day 4 g 1    Lancets (freestyle) lancets Check blood sugar daily 100 each 3    levETIRAcetam (KEPPRA) 500 mg tablet Take 1 tablet (500 mg total) by mouth every 12 (twelve) hours 60 tablet 3    LORazepam (ATIVAN) 0 5 mg tablet For sleep 1-2 tablets at bed time (Max 1 mg ) 60 tablet 0    magnesium gluconate (MAGONATE) 500 mg tablet Take 1 tablet (500 mg total) by mouth daily 30 tablet 1    metFORMIN (GLUCOPHAGE-XR) 500 mg 24 hr tablet Take 2 tablets (1,000 mg total) by mouth daily with dinner 180 tablet 2    naloxone (NARCAN) 4 mg/0 1 mL nasal spray Administer 1 spray into a nostril  If no response after 2-3 minutes, give another dose in the other nostril using a new spray   It has to be on stand by use with opioid use - in case of overdose 1 each 1    ondansetron (Zofran ODT) 8 mg disintegrating tablet Take 1 tablet (8 mg total) by mouth every 8 (eight) hours as needed for nausea or vomiting 20 tablet 5    oxyCODONE (ROXICODONE) 10 MG TABS Take 1 tablet (10 mg total) by mouth every 6 (six) hours as needed for moderate pain Max Daily Amount: 40 mg 120 tablet 0    pantoprazole (PROTONIX) 40 mg tablet Take 1 tablet (40 mg total) by mouth daily in the early morning 90 tablet 1     No current facility-administered medications for this visit  No Known Allergies    PAST HISTORY    Past Medical History:   Diagnosis Date    Diabetes mellitus ([facility] [address] )     Elevated PSA 3/11/2021    Fatty liver 3/11/2021    Gall bladder polyp 3/11/2021    Lung cancer ([facility] )     Nonimmune to hepatitis B virus 3/11/2021       Past Surgical History:   Procedure Laterality Date    FL GUIDED CENTRAL VENOUS ACCESS DEVICE INSERTION  5/10/2022    LUNG BIOPSY      TN INSJ TUNNELED CTR VAD W/SUBQ PORT AGE 5 YR/> N/A 5/10/2022    Procedure: INSERTION VENOUS PORT ( PORT-A-CATH) IR;  Surgeon: Tamera Buerger, DO;  [facility] MA[facility];  Service: Interventional Radiology       Social History     Tobacco Use    Smoking status: Former Smoker     Packs/day: 0 50     Years: 40 00     Pack years: 20 00     Types: Cigarettes     Quit date: 2022     Years since quittin 3    Smokeless tobacco: Never Used   Vaping Use    Vaping Use: Never used   Substance Use Topics    Alcohol use: Not Currently     Comment: quit drinking 7 years ago    Drug use: Never       Family History   Problem Relation Age of Onset    No Known Problems Mother     No Known Problems Father          The following portions of the patient's history were reviewed in this encounter and updated as appropriate: Past medical, surgical, family, and social history, as well as medications, allergies, and review of systems  EXAM    Vitals:Blood pressure 108/70, pulse 100, temperature 99 1 °F (37 3 °C), resp  rate 18, height 5' 8" (1 727 m), weight 62 3 kg (137 lb 6 4 oz), SpO2 97 %  ,Body mass index is 20 89 kg/m²  Physical Exam    Neurologic Exam      MEDICAL DECISION MAKING    Imaging Studies:     CT chest without contrast    Result Date: 2022  Narrative: CT CHEST WITHOUT IV CONTRAST INDICATION:   Cough, persistent;  cough, fever  Fever Immunocompromised Pt states he got Chemo Wednesday   Pt awoke this AM with a fever of 100 4  Pt also report phlegm  Pt denies difficulty breathing  No meds taken today  The patient's entire past medical history was obtained directly from either the attending emergency room physicians notes and/or the resident/physician's assistant notes in [address] [place] [address]  The information was copied and pasted directly from Epic  Additional history obtained from patient's chart in copy and paste: 80-year-old male recently diagnosed with non-small cell lung carcinoma with brain metastases  COMPARISON:  CT chest abdomen pelvis April 22, 2022 TECHNIQUE: CT examination of the chest was performed without intravenous contrast  This examination was performed without intravenous contrast in the context of the critical nationwide Omnipaque shortage  Axial, sagittal, and coronal 2D reformatted images were created from the source data and submitted for interpretation  Radiation dose length product (DLP) for this visit:  348 mGy-cm   This examination, like all CT scans performed in the [facility], was performed utilizing techniques to minimize radiation dose exposure, including the use of iterative reconstruction and automated exposure control  FINDINGS: LUNGS:  The lungs are well aerated  Again identified is a cavitary right upper lobe lung mass measuring approximately 7 6 x 6 2 x 10 0 cm (series 2, image 14; series 601, image 79)  This has decreased in size from the prior study in which this measured 8 7 x 8 8 x 12 2 cm  Areas of cavitation are present in the anterior aspect of the right lung apex  Scattered areas of groundglass infiltrates are present throughout the right upper, middle and lower lobes  More focal area of consolidation is present in the posterior aspect of the right upper lobe, likely postobstructive pneumonitis  The left lung is clear  PLEURA:  Unremarkable  HEART/GREAT VESSELS: The heart is normal in size  There is a small pericardial effusion, new from the prior study   No thoracic aortic aneurysm  MEDIASTINUM AND NAS:  Enlarged mediastinal, paratracheal, precarinal and subcarinal lymph nodes  CHEST WALL AND LOWER NECK:  Right sided single lumen Yctpxl-u-Itoe catheter with tip in superior vena cava  VISUALIZED STRUCTURES IN THE UPPER ABDOMEN:  The previously identified splenic lesion is not well-visualized due to lack of intravenous contrast material  There is a 2 6 x 2 1 cm mass in the right adrenal gland, increased in size from the prior study (series 2, image 56)  Previously, this measured 1 3 x 1 9 cm  There is a new 1 9 x 1 6 cm lesion in the left adrenal gland (series 2, image 56)  OSSEOUS STRUCTURES:  The osseous structures are demineralized  There is a new lucent lesion in the T10 vertebral body (series 602, image 94; series 2, image 39)  There is cortical destruction along the superior endplate of D02  Findings most compatible with bony metastatic disease, new from the prior study  Vague moth-eaten appearance of the T9 and T11 vertebral bodies, also suspicious for metastatic disease  Impression: 1  Multiple areas of infiltrate throughout the right lung, most pronounced in the right upper lobe  Although the findings likely represent multifocal/multi lobar pneumonia, metastatic disease not excluded, particularly in the posterior right upper lobe  Posterior right upper lobe consolidation, likely postobstructive pneumonitis  These do not have the typical appearance for Covid type pneumonia, given the unilaterality  2   Right upper lobe lung mass with cavitation  The overall size has slightly improved from the prior study  3   Enlarging right adrenal lesion with new left adrenal lesion, most compatible with metastatic disease  4   New bony metastatic disease involving the T9, T10 and T11 vertebral bodies  Pathologic fracture involving the superior endplate of I67  5   Trace pericardial effusion, new from the prior study  Recommend follow-up with hematology/oncology   Workstation performed: [id number]     MRI brain w wo contrast    Result Date: 6/5/2022  Narrative: MRI BRAIN WITH AND WITHOUT CONTRAST INDICATION: C79 31: Secondary malignant neoplasm of brain  Patient has known brain metastasis  History of non-small cell lung carcinoma  Prior radiation therapy  COMPARISON:  MRI dated 3/15/2022  TECHNIQUE: Sagittal T1, axial T2, axial FLAIR, axial T1, axial [place], axial diffusion  Sagittal, axial T1 postcontrast   Axial bravo postcontrast with coronal reconstructions  IV Contrast:  6 5 mL of Gadobutrol injection (SINGLE-DOSE)  IMAGE QUALITY:   Diagnostic  FINDINGS: BRAIN PARENCHYMA:  There is a new heterogeneous, hemorrhagic mass identified within the superior aspect of the left cerebellum  There is peripheral hemosiderin deposition and central hyperintense signal on T1 and T2-weighted imaging  This mass measures  approximately 2 7 x 1 7 x 1 8 cm  Only mild adjacent vasogenic edema is present  There is a new focus of susceptibility identified within the right posterior lateral frontal lobe on series 6 image 77 measuring less than 1 cm in size with faint hyperintense signal on T2-weighted imaging  There is only a tiny punctate focus of enhancement noted on series 12 image 100  This is new compared to the prior examination as well  The previously identified hemorrhagic, enhancing mass within the parafalcine right occipital lobe is significantly decreased in size when compared with the prior study from nearly 3 months ago  Only minimal vasogenic edema is seen within the adjacent occipital lobe  Previously seen hemorrhagic, enhancing mass within the left anterior medial temporal lobe is also markedly reduced in size with only mild focal enhancement identified  The previously seen edema is essentially resolved  A few small white matter hyperintensities are noted consistent with chronic microangiopathic change    There is a new FLAIR hyperintensity seen within the left frontal white matter on series 7 image 19 without enhancement or restriction  Diffusion imaging is unremarkable with no signs of acute ischemia  VENTRICLES:  Normal for the patient's age  SELLA AND PITUITARY GLAND:  Normal  ORBITS:  Normal  PARANASAL SINUSES:  There is a retention cyst within the inferior aspect of the left maxillary sinus, unchanged  VASCULATURE:  Evaluation of the major intracranial vasculature demonstrates appropriate flow voids  CALVARIUM AND SKULL BASE:  Normal  EXTRACRANIAL SOFT TISSUES:  Normal      Impression: There are 2 new hemorrhagic masses identified within the brain parenchyma consistent with new metastasis  The larger of these is seen within the left superior cerebellum  The smaller of the 2 is less than 1 cm in size within the right frontal lobe towards the vertex  Neither demonstrates significant edema or mass effect  The 2 previously treated hemorrhagic enhancing lesions seen on MRI from March 15, 2022 have significantly decreased in size and there is no adjacent significant vasogenic edema or mass effect at this time  This examination was marked "immediate notification" in Epic in order to begin the standard process by which the radiology reading room liaison alerts the referring practitioner  Workstation performed: [id number]       I have personally reviewed pertinent reports  and I have personally reviewed pertinent films in PACS with a Radiologist  at [facility]  PLEASE NOTE:  This encounter may have been completed utilizing the [employer] Direct Speech Voice Recognition Software  Grammatical errors, random word insertions, pronoun errors and incomplete sentences are occasional consequences of the system due to software limitations, ambient noise and hardware issues  These may be missed by proof reading prior to affixing electronic signature   Any questions or concerns about the content, text or information contained within the body of this dictation should be directly addressed to the advanced practitioner or physician for clarification

## 2022-06-09 ENCOUNTER — DOCUMENTATION (OUTPATIENT)
Dept: NUTRITION | Facility: CLINIC | Age: 57
End: 2022-06-09

## 2022-06-09 NOTE — PROGRESS NOTES
Received notification by [facility] RN (Venus Epley ) on 6/8/22 that pt has triggered for oncology nutrition care (reason for referral: MST Score: 4 indicating a >34# recent wt loss; eating quesiton not filled out (MST date 6/8/22))  Pt known to this RD and has follow up tomorrow

## 2022-06-10 ENCOUNTER — NUTRITION (OUTPATIENT)
Dept: NUTRITION | Facility: CLINIC | Age: 57
End: 2022-06-10

## 2022-06-10 DIAGNOSIS — Z71.3 NUTRITIONAL COUNSELING: Primary | ICD-10-CM

## 2022-06-10 LAB
BACTERIA BLD CULT: NORMAL
BACTERIA BLD CULT: NORMAL

## 2022-06-10 NOTE — PATIENT INSTRUCTIONS
Nutrition Rx & Recommendations:  Diet: Diabetic, High calorie, High protien diet  Small, frequent meals/snacks may be easier to tolerate than 3 large daily meals  Aim for 5-6 small meals per day (every 2-3 hours)  Include protein at all meals/snacks  Stay hydrated by sipping fluids of choice/tolerance throughout the day  For weight loss: monitor your weight at home at least 2x/week, record your weight, start by adding 250-500 extra calories per day, eat 5-6 small scheduled meals every 2-3 hrs, choose foods that are high in protein and calories (see pages 49-53 in your Eating Hints book), drink liquids with calories for example: milkshakes/smoothies/juice/soup/whole milk/chocolate milk, cook with protein fortified milk (see recipe on page 36 in your Eating Hints book), consider ready-to-drink oral nutrition supplements such as Ensure Plus, Ensure Enlive, Boost Plus, or Boost Very High Calorie, avoid "diet" and "light" foods when possible, avoid drinking too much with meals, contact your dietitian with any continued weight loss over the course of 1 week  For more info see pages 35-37 in your Eating Hints book  Weigh yourself regularly  If you notice weight loss, make an effort to increase your daily food/calorie intake  If you continue to notice loss after these efforts, reach out to your dietitian to establish a plan to stabilize weight  Weigh yourself 2 times per week, record your weight  Nutrition Supplements: Aim for 2 per day, choose one with <10 grams of sugar per serving  Ideally one that has >10 grams protein and is >200 calories  Example: Glucerna or Ensure Max  Can increase supplement usage on days when eating is more difficult  Increase protein portion to 4 oz at meals  Continue preparing food with healthy, plant-based oils such as olive, canola, avocado, etc   Use larger portion to increase calorie intake without affecting blood sugars    Higher protein snack ideas: low-sugar [place] yogurt, SF pudding, 1/4-1/3 cup nuts, edamame, cheese stick, low-sugar oral nutrition supplement (Glucerna), oatmeal or lower sugar cereal with milk (choose a cereal with <10 grams of sugar per serving)  Fluid intake should be ~65-75 oz per day  Choose caffeine-free fluids  Water is the best choice  Ensure/Glucerna shakes count towards daily fluid goals  Ideas: SF jello, water, whole milk, unsweetened almond milk, unsweetened soy milk, herbal/decaf tea  Increase eating the week leading up to chemo, add snacks between meals     Follow Up Plan: 7/15 at 1:30pm for a phone follow up  Recommend Referral to Other Providers: none at this time

## 2022-06-15 ENCOUNTER — TELEPHONE (OUTPATIENT)
Dept: HEMATOLOGY ONCOLOGY | Facility: MEDICAL CENTER | Age: 57
End: 2022-06-15

## 2022-06-15 ENCOUNTER — PROCEDURE VISIT (OUTPATIENT)
Dept: NEUROSURGERY | Facility: CLINIC | Age: 57
End: 2022-06-15
Payer: COMMERCIAL

## 2022-06-15 DIAGNOSIS — C79.31 BRAIN METASTASES (HCC): Primary | ICD-10-CM

## 2022-06-15 PROCEDURE — 61796 SRS CRANIAL LESION SIMPLE: CPT | Performed by: NEUROLOGICAL SURGERY

## 2022-06-15 PROCEDURE — 99024 POSTOP FOLLOW-UP VISIT: CPT | Performed by: NEUROLOGICAL SURGERY

## 2022-06-15 PROCEDURE — 61797 SRS CRAN LES SIMPLE ADDL: CPT | Performed by: NEUROLOGICAL SURGERY

## 2022-06-15 NOTE — TELEPHONE ENCOUNTER
Patient has f/u with Dr Will Griffin on 6/21/2022 before any chemo planned  Will discuss with patient's family

## 2022-06-15 NOTE — PROGRESS NOTES
PATIENT NAME: Jess Bonilla  : 1965  MRN: [id number]  PROCEDURE DATE: 6/15/2022    Stereotactic Radiotherapy (SRT) & Radiosurgery [facility] Operative Note    Preop Diagnosis:  Right frontal and left cerebellar brain metastases    Postop Diagnosis:  Same    Procedure Details: Frameless Stereotactic Radiotherapy left cerebellar & Radiosurgery for right frontal metastases    Surgeon: Tori Patel MD, PhD     Assistants: none    No qualified resident was available to assist with this case  Radiation Oncologist(s):  Dr Miguel A Fenton    Estimated Blood Loss:  None           Specimens: None    Drains: None           Total IV Fluids: None              Findings: As above  Complications:  None    Anesthesia: None      DETAILS OF PROCEDURE    The patient presented to the outpatient area of the Department of Radiation Oncology where an open faced immobilization mask was created  The patient then underwent a stereotactic head CT while immobilized in the mask  The patient was then released from the Department  The patients stereotactic CT and previous stereotactic MRI scans were fused in the SRT/SRS planning software, which was used to develop the SRT & SRS plans  The SRT prescription for the left cerebellar lesion called for a dose of 30 Gray to be delivered to the PTV in 5 fractions  The PTV was created by expanding the GTV, as contoured by myself and the Radiation Oncologist  The SRT plan utilized the mini-multileaf columnator on the TrueBeam machine at the [employer]  The radiosurgical prescription for the right frontal lesion called for a dose of 22 Gray to be delivered to the PTV  The PTV was created by expanding the GTV, as contoured by myself and the Radiation Oncologist  The radiosurgical plan utilized the mini-multileaf columnator on the TrueBeam machine at the [employer]        When the final treatment plan had been developed and approved by myself, the radiation oncologist and physicist, the patient returned to the Department for their [facility] SRT treatment  The patient was positioned on the treatment couch  The Optic Surface Monitoring System (OSMS) was used initially to align the patient  The patient was immobilized in their open faced mask  kV and then cone beam CT imaging was used to align the patient  Once the radiation oncologist, physicist and I agreed the patient was in correct position, the fields were treated sequentially without complications  The OSMS was used during the treatment to assure correct positioning of the patient, and if it detected patient motion, interrupted the treatment beam until the patient was back in alignment  After the [facility] SRT treatment had been delivered, the patient was recovered from the treatment room, scheduled for their remaining SRT treatments, and was discharged from the department  There was no blood loss and no specimen        SIGNATURE: Magdaleno Sauceda MD, PhD  DATE: 6/15/2022   TIME: 1:26 PM

## 2022-06-15 NOTE — TELEPHONE ENCOUNTER
Patient was here today for SRS/SRT and had question that systemic treatment was not changed, to see if someone can reach out to him      Thank you  Marta

## 2022-06-16 ENCOUNTER — TELEPHONE (OUTPATIENT)
Dept: RADIATION ONCOLOGY | Facility: HOSPITAL | Age: 57
End: 2022-06-16

## 2022-06-16 NOTE — TELEPHONE ENCOUNTER
Patient is s/p SRS yesterday, spoke with son briefly  He is not currently with patient, but states that he did talk to his dad earlier today around 8:00 am, and reports that the patient was feeling okay  He states that he was on the phone earlier today talking with relatives  Son gave me the patient's phone number to call him directly

## 2022-06-16 NOTE — TELEPHONE ENCOUNTER
S/p SRS/SRT to brain yesterday, patient reports feeling good, he denies headaches, nausea/vomiting  No new problems reported   He had difficulty falling asleep last night but eventually fell asleep around 12:00 am  He was reminded of his next SRT appointment , scheduled for tomorrow at 2:30 pm

## 2022-06-19 ENCOUNTER — TELEPHONE (OUTPATIENT)
Dept: OTHER | Facility: OTHER | Age: 57
End: 2022-06-19

## 2022-06-19 ENCOUNTER — TELEPHONE (OUTPATIENT)
Dept: RADIATION ONCOLOGY | Facility: CLINIC | Age: 57
End: 2022-06-19

## 2022-06-19 DIAGNOSIS — C79.31 LUNG CANCER METASTATIC TO BRAIN (HCC): Primary | ICD-10-CM

## 2022-06-19 DIAGNOSIS — C34.90 LUNG CANCER METASTATIC TO BRAIN (HCC): Primary | ICD-10-CM

## 2022-06-19 RX ORDER — DEXAMETHASONE 1 MG
2 TABLET ORAL 2 TIMES DAILY WITH MEALS
Qty: 10 TABLET | Refills: 0 | Status: SHIPPED | OUTPATIENT
Start: 2022-06-19 | End: 2022-06-20 | Stop reason: SDUPTHER

## 2022-06-19 NOTE — TELEPHONE ENCOUNTER
Per patient's son "My dad is experiencing a lot of nausea and sweating  He has a bit of a headache also "     Tiger connect message sent to the on call provider

## 2022-06-20 ENCOUNTER — TELEPHONE (OUTPATIENT)
Dept: RADIATION ONCOLOGY | Facility: CLINIC | Age: 57
End: 2022-06-20

## 2022-06-20 NOTE — TELEPHONE ENCOUNTER
Spoke with patient's son, he reports that his father is feeling better today, no nausea, no headaches  He states that yesterday, his father was also stating that his head felt heavy, states that when he is walking, his head tilts down  Son informed that patient will be seen today for clinical visit when he comes in for his radiation treatment this afternoon

## 2022-06-21 ENCOUNTER — OFFICE VISIT (OUTPATIENT)
Dept: HEMATOLOGY ONCOLOGY | Facility: CLINIC | Age: 57
End: 2022-06-21
Payer: COMMERCIAL

## 2022-06-21 ENCOUNTER — TELEPHONE (OUTPATIENT)
Dept: HEMATOLOGY ONCOLOGY | Facility: CLINIC | Age: 57
End: 2022-06-21

## 2022-06-21 VITALS
WEIGHT: 139 LBS | HEIGHT: 68 IN | RESPIRATION RATE: 16 BRPM | HEART RATE: 86 BPM | SYSTOLIC BLOOD PRESSURE: 112 MMHG | OXYGEN SATURATION: 96 % | TEMPERATURE: 97.2 F | BODY MASS INDEX: 21.07 KG/M2 | DIASTOLIC BLOOD PRESSURE: 76 MMHG

## 2022-06-21 DIAGNOSIS — C34.90 LUNG CANCER METASTATIC TO BRAIN (HCC): ICD-10-CM

## 2022-06-21 DIAGNOSIS — C34.91 ADENOCARCINOMA OF RIGHT LUNG (HCC): Primary | ICD-10-CM

## 2022-06-21 DIAGNOSIS — C79.31 LUNG CANCER METASTATIC TO BRAIN (HCC): ICD-10-CM

## 2022-06-21 PROCEDURE — 99215 OFFICE O/P EST HI 40 MIN: CPT | Performed by: INTERNAL MEDICINE

## 2022-06-21 NOTE — PROGRESS NOTES
Tomas Shaikh  1965  [address]  [facility]  [address] [place] [address]    DISCUSSION/SUMMARY:    63-year-old male recently diagnosed with non-small cell lung carcinoma with brain metastases  Issues:    Brain metastases  Patient was seen/evaluated by Dr Dirk Velázquez previously and completed RT  Repeat MRI/ brain demonstrated 2 new lesions  Patient was re-evaluated by Radiation Oncology and Neurosurgery and is presently undergoing frameless stereotactic radiotherapy to the left cerebral lesion and radio surgery for the right frontal lesion  Patient seems to be doing well from a neurologic standpoint  Mr Toney Linares will follow-up with Radiation Oncology and neuro surgery as directed  Non-small cell lung carcinoma  RUL lung biopsy demonstrated adenocarcinoma  PET-CT demonstrated a number of metastatic lesions  NextGen sequencing did not demonstrate any actionable mutations  PDL-1 IHCs were negative  Options were previously discussed and Mr Toney Linares started bevacizumab, paclitaxel and carboplatin  Third cycle was scheduled for tomorrow; this is on hold until next week (until after all radio surgery has been completed)  The plan is to complete 4 cycles and then re-scan  Patient states having all required medications at home  NCCN guidelines 3 2022 state that for patients with M1 non-small cell lung carcinoma, adenocarcinoma, no actionable mutations, contraindication to PDL1, bevacizumab/carbo/paclitaxel is a category 1 regimen  Regimen  Bevacizumab 15 mg/kg IV day 1  Paclitaxel 175 mg/m2 IV day 1   Carboplatin AUC = 5 5 IV day 1  G-CSF 3 mg subcu day 2  Cycle = 21 days  Goal = palliation  100% on all agents    Patient will follow up with palliative care and nutritional services as directed  Patient is to return in 3 weeks    Mr Toney Linares knows to call the hematology/oncology office if there are any other questions or concerns  Carefully review your medication list and verify that the list is accurate and up-to-date  Please call the hematology/oncology office if there are medications missing from the list, medications on the list that you are not currently taking or if there is a dosage or instruction that is different from how you're taking that medication  Patient goals and areas of care:  Complete treatments with Radiation Oncology and then resume chemotherapy  Barriers to care:  None  Patient is able to self-care   ______________________________________________________________________________________    Chief Complaint   Patient presents with    Follow-up     non-small cell lung carcinoma    Lung Cancer     History of Present Illness:  59-year-old male recently admitted to [facility]  Patient was treated for obstructive pneumonia, septic shock, acute kidney injury and respiratory failure  Workup demonstrated a lung mass as well as lesions in the brain consistent with metastatic disease  Patient eventually improved and was discharged  Mr Bertha Walker was seen/evaluated by Radiation Oncology and completed whole-brain RT  Patient subsequently has received 2 cycles of Paclitaxel, carboplatin and bevacizumab  Mr Bertha Walker pesents with his wife  Patient states feeling okay, baseline  Fatigue is the same as before  No nausea or vomiting, no headaches  No blurred vision  Appetite is good, weight is stable  Patient has become more active, working in his garden is outside  No fevers or signs of infection  No respiratory issues  Patient has a 40+ pack-year history of tobacco use, 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  Review of Systems   Constitutional: Positive for fatigue  Negative for activity change, appetite change and unexpected weight change  HENT: Negative  Eyes: Negative  Respiratory: Negative  Cardiovascular: Negative  Gastrointestinal: Negative  Endocrine: Negative  Genitourinary: Negative  Musculoskeletal: Negative  Skin: Negative  Allergic/Immunologic: Negative  Neurological: Negative  Hematological: Negative  Psychiatric/Behavioral: Negative  All other systems reviewed and are negative      Patient Active Problem List   Diagnosis    Type 2 diabetes mellitus without complication, with long-term current use of insulin (HCC)    Nonimmune to hepatitis B virus    Elevated PSA    Gall bladder polyp    Vitamin D deficiency    Cyanocobalamin deficiency    History of tobacco use    Lung mass    Brain mass    Cerebral edema ([facility] )    Sepsis due to pneumonia    Obstructive pneumonia    Adenocarcinoma of right lung ([facility] )    Pneumonia due to Klebsiella pneumoniae ([facility] )    Cancer associated pain    Adjustment insomnia    Continuous opioid dependence ([place] [id number] )    Chronic obstructive pulmonary disease (HCC)    Palliative care patient    Chemotherapy induced neutropenia (HCC)    Dehydration    Acute right-sided thoracic back pain    Lung cancer metastatic to brain [facility])     Past Medical History:   Diagnosis Date    Diabetes mellitus ([facility] )     Elevated PSA 3/11/2021    Fatty liver 3/11/2021    Gall bladder polyp 3/11/2021    Lung cancer ([facility] )     Nonimmune to hepatitis B virus 3/11/2021     Past Surgical History:   Procedure Laterality Date    FL GUIDED CENTRAL VENOUS ACCESS DEVICE INSERTION  5/10/2022    LUNG BIOPSY      NC INSJ TUNNELED CTR VAD W/SUBQ PORT AGE 5 YR/> N/A 5/10/2022    Procedure: INSERTION VENOUS PORT ( PORT-A-CATH) IR;  Surgeon: Phillip Grider DO;  Location: [facility] MAIN OR;  Service: Interventional Radiology     Family History   Problem Relation Age of Onset    No Known Problems Mother     No Known Problems Father      Social History     Socioeconomic History    Marital status: /Civil Union     Spouse name: Not on file    Number of children: Not on file    Years of education: Not on file   [employer] education level: Not on file   Occupational History    Not on file   Tobacco Use    Smoking status: Former Smoker     Packs/day: 0 50     Years: 40 00     Pack years: 20 00     Types: Cigarettes     Quit date: 2022     Years since quittin 4    Smokeless tobacco: Never Used   Vaping Use    Vaping Use: Never used   Substance and Sexual Activity    Alcohol use: Not Currently     Comment: quit drinking 7 years ago    Drug use: Never    Sexual activity: Not on file   Other Topics Concern    Not on file   Social History Narrative    Patient lives independently with his wife and son  He also has a brother and sister who are very active in his life  Ulisses Merritt is the only person in the home who is employed so not only is there stress regarding cancer diagnosis but also the financial stability of his family  Social Determinants of Health     Financial Resource Strain: Not on file   Food Insecurity: No Food Insecurity    Worried About Running Out of Food in the Last Year: Never true    Brittany of Food in the Last Year: Never true   Transportation Needs: No Transportation Needs    Lack of Transportation (Medical): No    Lack of Transportation (Non-Medical):  No   Physical Activity: Not on file   Stress: Not on file   Social Connections: Not on file   Intimate Partner Violence: Not on file   Housing Stability: Low Risk     Unable to Pay for Housing in the Last Year: No    Number of Places Lived in the Last Year: 1    Unstable Housing in the Last Year: No       Current Outpatient Medications:     BD Pen Needle Jeaneth 2nd Gen 32G X 4 MM MISC, USE ONCE DAILY WITH LANTUS, Disp: , Rfl:     benzonatate (TESSALON PERLES) 100 mg capsule, Take 1 capsule (100 mg total) by mouth 3 (three) times a day as needed for cough, Disp: 30 capsule, Rfl: 2    Blood Glucose Monitoring Suppl (FreeStyle Lite) FIDELIA, , Disp: , Rfl:     Cholecalciferol (Vitamin D3) 125 MCG (5000 UT) CAPS, Take 1 capsule (5,000 Units total) by mouth daily, Disp: 90 capsule, Rfl: 2    Cholecalciferol (Vitamin D3) 125 MCG (5000 UT) TABS, Take 5,000 Units by mouth daily, Disp: , Rfl:     dexamethasone (DECADRON) 1 mg tablet, Take 2 tablets (2 mg total) by mouth 2 (two) times a day with meals for 4 days, THEN 1 tablet (1 mg total) 2 (two) times a day with meals for 4 days, THEN 1 tablet (1 mg total) daily with breakfast for 4 days  , Disp: 28 tablet, Rfl: 0    FREESTYLE LITE test strip, Check blood sugar  daily, Disp: 100 each, Rfl: 3    guaiFENesin (MUCINEX) 600 mg 12 hr tablet, Take 2 tablets (1,200 mg total) by mouth every 12 (twelve) hours as needed for cough or congestion (thick mucis), Disp: 60 tablet, Rfl: 2    Insulin Pen Needle (BD Pen Needle Jeaneth U/F) 32G X 4 MM MISC, Use daily as directed with insulin pen, Disp: 100 each, Rfl: 3    ipratropium-albuterol (Combivent Respimat) inhaler, Inhale 1 puff 4 (four) times a day, Disp: 4 g, Rfl: 1    Lancets (freestyle) lancets, Check blood sugar daily, Disp: 100 each, Rfl: 3    levETIRAcetam (KEPPRA) 500 mg tablet, Take 1 tablet (500 mg total) by mouth every 12 (twelve) hours, Disp: 60 tablet, Rfl: 3    LORazepam (ATIVAN) 0 5 mg tablet, For sleep 1-2 tablets at bed time (Max 1 mg ), Disp: 60 tablet, Rfl: 0    magnesium gluconate (MAGONATE) 500 mg tablet, Take 1 tablet (500 mg total) by mouth daily, Disp: 30 tablet, Rfl: 1    metFORMIN (GLUCOPHAGE-XR) 500 mg 24 hr tablet, Take 2 tablets (1,000 mg total) by mouth daily with dinner, Disp: 180 tablet, Rfl: 2    naloxone (NARCAN) 4 mg/0 1 mL nasal spray, Administer 1 spray into a nostril  If no response after 2-3 minutes, give another dose in the other nostril using a new spray   It has to be on stand by use with opioid use - in case of overdose, Disp: 1 each, Rfl: 1    ondansetron (Zofran ODT) 8 mg disintegrating tablet, Take 1 tablet (8 mg total) by mouth every 8 (eight) hours as needed for nausea or vomiting, Disp: 20 tablet, Rfl: 5    oxyCODONE (ROXICODONE) 10 MG TABS, Take 1 tablet (10 mg total) by mouth every 6 (six) hours as needed for moderate pain Max Daily Amount: 40 mg, Disp: 120 tablet, Rfl: 0    pantoprazole (PROTONIX) 40 mg tablet, Take 1 tablet (40 mg total) by mouth daily in the early morning, Disp: 90 tablet, Rfl: 1    No Known Allergies    Vitals:    06/21/22 1415   BP: 112/76   Pulse: 86   Resp: 16   Temp: (!) 97 2 °F (36 2 °C)   SpO2: 96%     Physical Exam  Constitutional:       Appearance: He is well-developed  Comments: Thin but well-nourished male, no respiratory distress, no signs of pain   HENT:      Head: Normocephalic and atraumatic  Right Ear: External ear normal       Left Ear: External ear normal       Mouth/Throat:      Comments: Oral mucosa moist and pink, no exudate, upper and lower plates  Eyes:      Conjunctiva/sclera: Conjunctivae normal       Pupils: Pupils are equal, round, and reactive to light  Cardiovascular:      Rate and Rhythm: Normal rate and regular rhythm  Heart sounds: Normal heart sounds  Pulmonary:      Effort: Pulmonary effort is normal       Breath sounds: Normal breath sounds  Comments: Fair to good air entry bilaterally, few scattered bilateral rhonchi  Abdominal:      General: Bowel sounds are normal       Palpations: Abdomen is soft  Comments: +bowel sounds, nontender, soft   Musculoskeletal:         General: Normal range of motion  Cervical back: Normal range of motion and neck supple  Skin:     General: Skin is warm  Comments: Warm, moist, good color, no petechiae, Right anterior chest wall port area clean and dry   Neurological:      Mental Status: He is alert and oriented to person, place, and time  Deep Tendon Reflexes: Reflexes are normal and symmetric  Psychiatric:         Behavior: Behavior normal          Thought Content:  Thought content normal          Judgment: Judgment normal      Extremities:  No lower extremity edema bilaterally, no cords, pulses are 1+ Lymphatics:  No adenopathy in the neck, supraclavicular region, axilla and groin bilaterally    Labs    06/07/2022 WBC = 16 46 hemoglobin = 11 hematocrit = 32 3 platelet = 205 neutrophil = 68%    06/06/2022 BUN = 16 creatinine = 0 81    05/25/2022 WBC = 10 8 hemoglobin = 10 6 hematocrit = 33 platelet = 592 neutrophil = 49% BUN = 17 creatinine = 1 29 LFTs WNL calcium = 9 0    Imaging     06/05/2022 CT scan chest without contrast    IMPRESSION:  1   Multiple areas of infiltrate throughout the right lung, most pronounced in the right upper lobe  Although the findings likely represent multifocal/multi lobar pneumonia, metastatic disease not excluded, particularly in the posterior right upper   lobe  Posterior right upper lobe consolidation, likely postobstructive pneumonitis  These do not have the typical appearance for Covid type pneumonia, given the unilaterality  2   Right upper lobe lung mass with cavitation  The overall size has slightly improved from the prior study  3   Enlarging right adrenal lesion with new left adrenal lesion, most compatible with metastatic disease  4   New bony metastatic disease involving the T9, T10 and T11 vertebral bodies  Pathologic fracture involving the superior endplate of K17   5   Trace pericardial effusion, new from the prior study  06/04/2022 MRI brain with without contrast    There are 2 new hemorrhagic masses identified within the brain parenchyma consistent with new metastasis  The larger of these is seen within the left superior cerebellum  The smaller of the 2 is less than 1 cm in size within the right frontal lobe towards the vertex  Neither demonstrates significant edema or mass effect      The 2 previously treated hemorrhagic enhancing lesions seen on MRI from March 15, 2022 have significantly decreased in size and there is no adjacent significant vasogenic edema or mass effect at this time      04/13/2022 PET-CT    1  Large right upper lung hypermetabolic necrotic mass measures 10 1 x 8 4 x 11 4 cm, SUV 19 8, compatible with known malignancy  This mass invades the right hilum and mediastinum, and appears inseparable from the paratracheal adenopathy  2   Right adrenal metastasis measures 1 1 x 0 9 cm  Minimal nodular activity in the left adrenal may be physiologic, but may be reassessed on follow-up exam to exclude an additional early metastasis  3   1 5 x 1 2 cm hypermetabolic splenic lesion most concerning for metastasis  4   Known brain metastases are not well evaluated on this exam   5   No additional hypermetabolic metastases visualized  03/22/2022 CT head without contrast   Impression stated no acute findings, unchanged metastatic lesions with surrounding edema in the right occipital and left frontal lobes  03/26/2022 chest x-ray portable impression stated progressive right upper lobe consolidation of score is the known underlying mass  03/22/2022 CTA chest PE study    PULMONARY ARTERIAL TREE:  No pulmonary embolus is seen        LUNGS:  Large right upper lobe mass unchanged   New extensive surrounding groundglass density in the right upper lobe lobe, with more consolidative density at the apex   New small patchy groundglass glass densities in the left upper and right lower   lobes   Unchanged occlusion of the right upper lobe bronchus by the mass   No other tracheal or endobronchial lesion        PLEURA:  Unremarkable        HEART/GREAT VESSELS:  Unremarkable for patient's age  No thoracic aortic aneurysm        MEDIASTINUM AND NAS:  Unchanged mediastinal and right hilar adenopathy       No pulmonary embolus          Impression:  Unchanged large right upper lobe mass most compatible with carcinoma, with new extensive right upper lobe kerri   undglass density which could represent postobstructive pneumonia, edema, hemorrhage or lymphangitic carcinomatosis   New groundglass infiltrates   in the left upper and right lower lobes, presumably infectious/inflammatory        Unchanged mediastinal and right hilar adenopathy  Pathology                  Case Report   Non-gynecologic Cytology                          Case: [id number]                                   Authorizing Provider: Alonzo Ochoa DO      Collected:           03/16/2022 1203               Ordering Location:     [facility]      Received:            03/16/2022 [address] [place] [address] 6                                                               Pathologist:           Evelin Mariscal MD                                                                  Specimens:   A) - Lung, Right Upper Lobe Bronchoalveolar Lavage                                                   B) - Lung, Right Upper Lobe Bronchoalveolar Lavage, cell block                                       C) - Lung, Right Upper Lobe Bronchial Brushing, with brush                                           D) - Lung, Right Upper Lobe, FNA                                                           Final Diagnosis   A & B  Lung, Right Upper Lobe Bronchoalveolar Lavage, : (Thin-Prep and cell block)  Negative for malignancy  Rare benign bronchial cells  Abundant macrophages and mixed inflammatory cells      Satisfactory for evaluation  C  Lung, Right Upper Lobe Bronchial Brushing, with brush: Atypical cellular changes seen  Atypical epithelioid cells, scant cellularity      Satisfactory for evaluation      D   Lung, Right Upper Lobe, FNA:  Positive for malignancy      The concomitant biopsy ([id number]) demonstrates Non-small cell carcinoma consistent with adenocarcinoma      Intradepartmental consultation is in agreement      Satisfactory for evaluation       Electronically signed by Evelin Mariscal MD on 3/21/2022 at 10:37 AM

## 2022-06-21 NOTE — TELEPHONE ENCOUNTER
Spoke with patient's son to go over updated schedule  Pt's son aware and okay with updates  He will check patient's MyChart for more details  No pertinent family history in first degree relatives

## 2022-06-21 NOTE — TELEPHONE ENCOUNTER
Patient continues with SRT to brain  C3  Chemotherapy to be postponed one week  Hugo order changed  Will send to  pool to arrange    FYI to [employer]

## 2022-06-22 ENCOUNTER — HOSPITAL ENCOUNTER (OUTPATIENT)
Dept: INFUSION CENTER | Facility: CLINIC | Age: 57
Discharge: HOME | End: 2022-06-22

## 2022-06-23 ENCOUNTER — HOSPITAL ENCOUNTER (OUTPATIENT)
Dept: INFUSION CENTER | Facility: CLINIC | Age: 57
End: 2022-06-23

## 2022-06-24 ENCOUNTER — OFFICE VISIT (OUTPATIENT)
Dept: PALLIATIVE MEDICINE | Facility: CLINIC | Age: 57
End: 2022-06-24
Payer: COMMERCIAL

## 2022-06-24 VITALS
HEIGHT: 68 IN | TEMPERATURE: 97.6 F | DIASTOLIC BLOOD PRESSURE: 82 MMHG | OXYGEN SATURATION: 97 % | WEIGHT: 141 LBS | HEART RATE: 83 BPM | SYSTOLIC BLOOD PRESSURE: 130 MMHG | BODY MASS INDEX: 21.37 KG/M2

## 2022-06-24 DIAGNOSIS — Z51.5 PALLIATIVE CARE PATIENT: ICD-10-CM

## 2022-06-24 DIAGNOSIS — G93.89 BRAIN MASS: ICD-10-CM

## 2022-06-24 DIAGNOSIS — T40.2X5A THERAPEUTIC OPIOID-INDUCED CONSTIPATION (OIC): ICD-10-CM

## 2022-06-24 DIAGNOSIS — G89.3 CANCER ASSOCIATED PAIN: ICD-10-CM

## 2022-06-24 DIAGNOSIS — K59.03 THERAPEUTIC OPIOID-INDUCED CONSTIPATION (OIC): ICD-10-CM

## 2022-06-24 DIAGNOSIS — C34.90 LUNG CANCER METASTATIC TO BRAIN (HCC): Primary | ICD-10-CM

## 2022-06-24 DIAGNOSIS — C79.31 LUNG CANCER METASTATIC TO BRAIN (HCC): Primary | ICD-10-CM

## 2022-06-24 DIAGNOSIS — C34.91 ADENOCARCINOMA OF RIGHT LUNG (HCC): ICD-10-CM

## 2022-06-24 DIAGNOSIS — F51.02 ADJUSTMENT INSOMNIA: ICD-10-CM

## 2022-06-24 DIAGNOSIS — J44.9 CHRONIC OBSTRUCTIVE PULMONARY DISEASE, UNSPECIFIED COPD TYPE (HCC): ICD-10-CM

## 2022-06-24 PROCEDURE — 1036F TOBACCO NON-USER: CPT | Performed by: INTERNAL MEDICINE

## 2022-06-24 PROCEDURE — 3008F BODY MASS INDEX DOCD: CPT | Performed by: INTERNAL MEDICINE

## 2022-06-24 PROCEDURE — 99214 OFFICE O/P EST MOD 30 MIN: CPT | Performed by: INTERNAL MEDICINE

## 2022-06-24 PROCEDURE — 1111F DSCHRG MED/CURRENT MED MERGE: CPT | Performed by: INTERNAL MEDICINE

## 2022-06-24 RX ORDER — LORAZEPAM 0.5 MG/1
TABLET ORAL
Qty: 60 TABLET | Refills: 0 | Status: SHIPPED | OUTPATIENT
Start: 2022-06-30 | End: 2022-07-22 | Stop reason: SDUPTHER

## 2022-06-24 RX ORDER — LANOLIN ALCOHOL/MO/W.PET/CERES
3 CREAM (GRAM) TOPICAL
Qty: 30 TABLET | Refills: 2 | Status: ON HOLD | OUTPATIENT
Start: 2022-06-24

## 2022-06-24 NOTE — PATIENT INSTRUCTIONS
It was good to see you today  Thank you for coming in  I'm glad the oxycodone is helpful for your pain - continue  Take a half-tablet for moderate pain, full tablet (10mg)  Up to 4 tablets per day, every 6 hours as needed  Continue lorazepam as needed for insomnia  You can take melatonin for insomnia, every night  Start at 3mg, increase to 6mg if not effective  Return for next follow-up on 7/22/2022 at 1:40 PM   Call us for refills on medications that we supply, as needed  If something changes and you need to come in sooner, please call our office  PRESCRIPTION REFILL REMINDER:  All medication refills should be requested prior to [facility] on Friday  Any refill requests after noon on Friday would be addressed the following Monday

## 2022-06-24 NOTE — PROGRESS NOTES
Follow-up with Palliative and [address] [place] [address] 62 y o  male [phone]    ASSESSMENT & PLAN:  1  Lung cancer metastatic to brain ([facility] )    2  Adenocarcinoma of right lung ([facility] )    3  Chronic obstructive pulmonary disease, unspecified COPD type ([facility] )    4  Adjustment insomnia    5  Cancer associated pain    6  Palliative care patient    7  Therapeutic opioid-induced constipation (OIC)    8  Brain mass           Continue oxyIR PRN cancer- and treatment-related pain  He is taking 5-10mg q6h PRN, as he finds occasionally he does not need a full tab   Continue lorazepam 0 5-1 0mg QHS PRN insomnia  Start melatonin for insomnia as well, at 3mg QHS; may increase to 6mg if not effective after a few nights   Patient has been provided with a naloxone prescription   Continue PPI   Continue Tylenol PRN   Continue guaifenesin for thick / increased mucus  Use OTC product if not covered by insurer   Continue disease-directed cares w/o limit   Patient has received [address] vaccinations   Reviewed notes (Medical Oncology, Radiation Oncology, Neurosurgery, FM, Nutrition), labs (6/7/22 Hb 11 0; 6/6/22 Cr 0 81, Hb 10 5, WBC 21 64; 5/25/22 Cr 1 29, alb 3 1, eGFR 61, Hb 10 6), imaging + procedures (6/5/22 CT chest, 6/4/22 MRI brain; 5/26/22 XR chest + thoracic spine, 5/3/22 CXR)   Return in 4 weeks (on 7/22/2022) for Next scheduled follow up   Emotional support provided   Medication safety issues addressed - no driving under the influence of narcotics, watch for adverse effects including AMS and respiratory depression, keep medications stored in a safe/locked environment        Requested Prescriptions     Signed Prescriptions Disp Refills    LORazepam (ATIVAN) 0 5 mg tablet 60 tablet 0     Sig: For sleep 1-2 tablets at bed time (Max 1 mg )    melatonin 3 mg 30 tablet 2     Sig: Take 1 tablet (3 mg total) by mouth daily at bedtime       Medications Discontinued During This Encounter Medication Reason    LORazepam (ATIVAN) 0 5 mg tablet Reorder       Representatives have queried the patient's controlled substance dispensing history in the Prescription Drug Monitoring Program in compliance with regulations before I have prescribed any controlled substances  The prescription history is consistent with prescribed therapy and our practice policies  30+ minutes were spent in this ambulatory visit with greater than 50% of the time spent face to face with patient and family (wife Akin Song) in counseling or coordination of care including discussions of symptom assessment and management, medication review, medication adjustment, psychosocial support, chart review, imaging review, lab review, supportive listening and anticipatory guidance  All of the patient's questions were answered during this discussion  SUBJECTIVE:  Chief Complaint   Patient presents with   Sushant Bradford is a 62 y o  male w/ 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 s/p RT; COPD, DM2, hepatitis B, h/o nicotine dependence  He follows w/ Oracio Ulloa (Medical Oncology), Dr Kirti Gonzales (Radiation Oncology), Dr Francesca Molina (Neurosurgery)  Systemic treatment with paclitaxel + carboplatin + bevacizumab was briefly held so stereotactic RT could be provided  Patient is [facility] to [facility]; seen 4/27/22 for symptom management, psychosocial support  Recent imaging has shown mixed response to treatment  Patient has been doing fairly well in recent weeks, tolerating treatments - except he notes significant pain in the small joints of his b/l hands (PIP, MCP) and pain in his b/l legs (knees to feet) after infusions  This improves as he gets further from treatment, then will return after infusions  Pain is 2/10 in today's visit  OxyIR 5-10mg taken up to 4 times per day is providing adequate relief of pain  He does not mention any recent headaches      Patient notes some mild occasional constipation  He denies N/V  Patient states he will get hungry often but has early satiety - he is eating frequent small meals to compensate  His sleep is fragmented; lorazepam helps w/ sleep onset but maintenance can be an issue  He feels steroids can affect his sleep  PDMP shows no concerns  The following portions of the medical history were reviewed: past medical history, surgical history, problem list, medication list, family history, and social history  Current Outpatient Medications:     BD Pen Needle Jeaneth 2nd Gen 32G X 4 MM MISC, USE ONCE DAILY WITH LANTUS, Disp: , Rfl:     benzonatate (TESSALON PERLES) 100 mg capsule, Take 1 capsule (100 mg total) by mouth 3 (three) times a day as needed for cough, Disp: 30 capsule, Rfl: 2    Blood Glucose Monitoring Suppl (FreeStyle Lite) FIDELIA, , Disp: , Rfl:     Cholecalciferol (Vitamin D3) 125 MCG (5000 UT) TABS, Take 5,000 Units by mouth daily, Disp: , Rfl:     dexamethasone (DECADRON) 1 mg tablet, Take 2 tablets (2 mg total) by mouth 2 (two) times a day with meals for 4 days, THEN 1 tablet (1 mg total) 2 (two) times a day with meals for 4 days, THEN 1 tablet (1 mg total) daily with breakfast for 4 days  , Disp: 28 tablet, Rfl: 0    FREESTYLE LITE test strip, Check blood sugar  daily, Disp: 100 each, Rfl: 3    Insulin Pen Needle (BD Pen Needle Jeaneth U/F) 32G X 4 MM MISC, Use daily as directed with insulin pen, Disp: 100 each, Rfl: 3    ipratropium-albuterol (Combivent Respimat) inhaler, Inhale 1 puff 4 (four) times a day, Disp: 4 g, Rfl: 1    Lancets (freestyle) lancets, Check blood sugar daily, Disp: 100 each, Rfl: 3    levETIRAcetam (KEPPRA) 500 mg tablet, Take 1 tablet (500 mg total) by mouth every 12 (twelve) hours, Disp: 60 tablet, Rfl: 3    [START ON 6/30/2022] LORazepam (ATIVAN) 0 5 mg tablet, For sleep 1-2 tablets at bed time (Max 1 mg ), Disp: 60 tablet, Rfl: 0    melatonin 3 mg, Take 1 tablet (3 mg total) by mouth daily at bedtime, Disp: 30 tablet, Rfl: 2    metFORMIN (GLUCOPHAGE-XR) 500 mg 24 hr tablet, Take 2 tablets (1,000 mg total) by mouth daily with dinner, Disp: 180 tablet, Rfl: 2    naloxone (NARCAN) 4 mg/0 1 mL nasal spray, Administer 1 spray into a nostril  If no response after 2-3 minutes, give another dose in the other nostril using a new spray  It has to be on stand by use with opioid use - in case of overdose, Disp: 1 each, Rfl: 1    ondansetron (Zofran ODT) 8 mg disintegrating tablet, Take 1 tablet (8 mg total) by mouth every 8 (eight) hours as needed for nausea or vomiting, Disp: 20 tablet, Rfl: 5    oxyCODONE (ROXICODONE) 10 MG TABS, Take 1 tablet (10 mg total) by mouth every 6 (six) hours as needed for moderate pain Max Daily Amount: 40 mg, Disp: 120 tablet, Rfl: 0    pantoprazole (PROTONIX) 40 mg tablet, Take 1 tablet (40 mg total) by mouth daily in the early morning, Disp: 90 tablet, Rfl: 1    Cholecalciferol (Vitamin D3) 125 MCG (5000 UT) CAPS, Take 1 capsule (5,000 Units total) by mouth daily, Disp: 90 capsule, Rfl: 2    guaiFENesin (MUCINEX) 600 mg 12 hr tablet, Take 2 tablets (1,200 mg total) by mouth every 12 (twelve) hours as needed for cough or congestion (thick mucis) (Patient not taking: Reported on 6/24/2022), Disp: 60 tablet, Rfl: 2    magnesium gluconate (MAGONATE) 500 mg tablet, Take 1 tablet (500 mg total) by mouth daily, Disp: 30 tablet, Rfl: 1    Review of Systems   Constitutional: Positive for activity change, appetite change, fatigue and unexpected weight change  Gastrointestinal: Positive for constipation (occasional, mild)  Musculoskeletal: Positive for arthralgias  Allergic/Immunologic: Positive for immunocompromised state  Psychiatric/Behavioral: Positive for sleep disturbance  The patient is nervous/anxious  All other systems reviewed and are negative        OBJECTIVE:  /82 (BP Location: Left arm, Patient Position: Sitting, Cuff Size: Standard)   Pulse 83   Temp 97 6 °F (36 4 °C) (Temporal)   Ht 5' 8" (1 727 m)   Wt 64 kg (141 lb)   SpO2 97%   BMI 21 44 kg/m²   Physical Exam  Vitals reviewed  Constitutional:       General: He is not in acute distress  Appearance: He is well-groomed and normal weight  He is not toxic-appearing  HENT:      Head: Normocephalic and atraumatic  Right Ear: External ear normal       Left Ear: External ear normal    Eyes:      General: No scleral icterus  Right eye: No discharge  Left eye: No discharge  Extraocular Movements: Extraocular movements intact  Conjunctiva/sclera: Conjunctivae normal       Pupils: Pupils are equal, round, and reactive to light  Pulmonary:      Effort: Pulmonary effort is normal  No tachypnea, bradypnea, accessory muscle usage or respiratory distress  Abdominal:      General: There is no distension  Tenderness: There is no guarding  Musculoskeletal:      Cervical back: Normal range of motion  Right lower leg: No edema  Left lower leg: No edema  Skin:     General: Skin is dry  Coloration: Skin is not pale  Neurological:      Mental Status: He is alert and oriented to person, place, and time  Cranial Nerves: No dysarthria or facial asymmetry  Gait: Gait is intact  Psychiatric:         Attention and Perception: Attention normal          Mood and Affect: Mood and affect normal          Speech: Speech normal          Behavior: Behavior normal  Behavior is cooperative  Thought Content: Thought content normal          Cognition and Memory: Cognition and memory normal          Judgment: Judgment normal         Will Martin MD  [facility]  [phone]    Portions of this document may have been created using dictation software and as such some "sound alike" terms may have been generated by the system  Do not hesitate to contact me with any questions or clarifications

## 2022-06-27 PROCEDURE — 3061F NEG MICROALBUMINURIA REV: CPT | Performed by: INTERNAL MEDICINE

## 2022-06-28 ENCOUNTER — TELEPHONE (OUTPATIENT)
Dept: HEMATOLOGY ONCOLOGY | Facility: MEDICAL CENTER | Age: 57
End: 2022-06-28

## 2022-06-28 RX ORDER — SODIUM CHLORIDE 9 MG/ML
20 INJECTION, SOLUTION INTRAVENOUS ONCE
Status: CANCELLED | OUTPATIENT
Start: 2022-06-29

## 2022-06-28 NOTE — TELEPHONE ENCOUNTER
D/W PA  creat up to 1 32  Patient does receive hydration with D2  Spoke with patient's son Anuradha Cunha to check on patient's hydration status  Son will speak with patient and call my TEAMs number with status

## 2022-06-28 NOTE — TELEPHONE ENCOUNTER
Spoke with son YG  Patient is hydrated and voiding in good amounts   Instructed son to call with any hydration issues after chemo  Will update PA

## 2022-06-29 DIAGNOSIS — Z51.5 PALLIATIVE CARE PATIENT: ICD-10-CM

## 2022-06-29 DIAGNOSIS — G89.3 CANCER ASSOCIATED PAIN: ICD-10-CM

## 2022-06-29 DIAGNOSIS — C34.91 ADENOCARCINOMA OF RIGHT LUNG (HCC): ICD-10-CM

## 2022-06-29 DIAGNOSIS — G93.89 BRAIN MASS: ICD-10-CM

## 2022-06-29 NOTE — PROGRESS NOTES
Patient tolerated treatment today without issues  Patient aware of appointment time for day 2 hydration tomorrow  AVS declined

## 2022-06-30 ENCOUNTER — TELEPHONE (OUTPATIENT)
Dept: RADIATION ONCOLOGY | Facility: HOSPITAL | Age: 57
End: 2022-06-30

## 2022-06-30 ENCOUNTER — TELEPHONE (OUTPATIENT)
Dept: FAMILY MEDICINE CLINIC | Facility: CLINIC | Age: 57
End: 2022-06-30

## 2022-06-30 DIAGNOSIS — C79.31 METASTASIS TO BRAIN (HCC): Primary | ICD-10-CM

## 2022-06-30 RX ORDER — DEXAMETHASONE 1 MG
TABLET ORAL
Qty: 42 TABLET | Refills: 0 | Status: SHIPPED | OUTPATIENT
Start: 2022-06-30 | End: 2022-07-27

## 2022-06-30 RX ORDER — OXYCODONE HYDROCHLORIDE 10 MG/1
10 TABLET ORAL EVERY 6 HOURS PRN
Qty: 120 TABLET | Refills: 0 | Status: ON HOLD | OUTPATIENT
Start: 2022-07-06

## 2022-06-30 NOTE — PROGRESS NOTES
Discussed symptoms of heaviness in his head and discoordination with the patient's son Vickie  These symptoms worsened with steroid taper, now down to 1mg dexamethasone daily  Based on discussion will plan to increase dose back up to 1mg BID x14 days then 1mg daily x14 days to attempt a slower taper  Prescription sent  Patient's son aware of updated instructions       Fany Baldwin MD  Dept of Radiation Oncology

## 2022-06-30 NOTE — TELEPHONE ENCOUNTER
Recvd call from pt's son stating that pt is supposed to taper off of Decadron this weekend however he is having symptoms still (nausea, leaning forward when walking, stated it is hard to explain other symptoms) and asked if his dad should still taper off or if he should continue with the Decadron

## 2022-06-30 NOTE — PROGRESS NOTES
Patient tolerated hydration today without incident  Port remains with brisk blood return, flushed easily without resistance  Deaccessed, gauze dressing in place  Patient tolerated Neulasta injection to RIGHT arm without issue, bandaid in place  Patient declines AVS, utilizes MyChart  Patient AAOx4 and ambulatory with wife upon DC

## 2022-06-30 NOTE — PROGRESS NOTES
Patient arrives to infusion center for hydration and Neulasta injection  Patient reports some fatigue but offers no other complaints at this time  R PAC accessed without issue, brisk blood return noted at this time  Patient resting on recliner chair, call bell within reach

## 2022-06-30 NOTE — TELEPHONE ENCOUNTER
Son reports that after talking with his mother today, she states that Kim Mcdaniel is still having heavy feeling in his head and is still hunched forward when walking  He states that his father does not have nausea or headaches  He denies any other neurologic change  He has been on steroid taper since last week and currently is taking dexamethasone 1 mg daily x4 days, last dose would be this coming Saturday  He is questioning if steroids should be stopped if his father is still having some symptoms  He states that his father was feeling better when he was taking the dexamethasone 1 mg twice a day  Son informed that I will notify Dr Jonnie Camacho of above and call back with further instructions  Message routed to provider

## 2022-06-30 NOTE — TELEPHONE ENCOUNTER
Primary palliative medicine provider:   Maricruz Barber    Medication requested:  Oxycodone 10 mg IR    If for pain, how has the patient been taking their pain medicine? 1 tablet every 6 hours as needed     Last appointment: 06/24/22    Next scheduled appointment:  7/22/22     PDMP review:    06/08/2022 06/08/2022 oxyCODONE HCL (Tablet) 120 0 30 10 MG 60 0 JORGE ALBERTO SNYDER [facility], [place] [facility]ASSO

## 2022-06-30 NOTE — TELEPHONE ENCOUNTER
Tamiko Cavazos from [facility] Radiology called and said there are results in Epic for [facility]

## 2022-07-05 NOTE — PROGRESS NOTES
Outpatient Oncology Nutrition Consultation   Type of Consult:  Follow Up  Care Location: Telephone Call - met with aurea Alaina Bo) per pt request   Nutrition Assessment:   Oncology Diagnosis & Treatments: non-small cell lung carcinoma with brain metastases  S/p partial brain SRT 3/30/21-4/8/22  Palliative chemotherapy (bevacizumab, paclitaxel, carboplatin) started 5/11/22  [facility] 6/15/22-6/24/22  Oncology History   Adenocarcinoma of right lung ([facility] [address] )   3/28/2022 Initial Diagnosis    Adenocarcinoma of right lung ([facility] [address] )     5/11/2022 -  Chemotherapy    pegfilgrastim (NEULASTA), 3 mg (100 % of original dose 3 mg), Subcutaneous, Once, 2 of 5 cycles  Dose modification: 3 mg (original dose 3 mg, Cycle 2)  Administration: 3 mg (6/2/2022), 3 mg (6/30/2022)  fosaprepitant (EMEND) IVPB, 150 mg, Intravenous, Once, 3 of 6 cycles  Administration: 150 mg (5/11/2022), 150 mg (6/1/2022), 150 mg (6/29/2022)  CARBOplatin (PARAPLATIN) IVPB (GOG AUC DOSING), 604 45 mg, Intravenous, Once, 3 of 6 cycles  Administration: 604 45 mg (5/11/2022), 485 4 mg (6/1/2022), 438 35 mg (6/29/2022)  bevacizumab (AVASTIN) IVPB, 940 mg, Intravenous, Once, 3 of 6 cycles  Administration: 900 mg (5/11/2022), 900 mg (6/1/2022), 900 mg (6/29/2022)  PACLItaxel (TAXOL) chemo IVPB, 148 75 mg/m2 = 254 4 mg (85 % of original dose 175 mg/m2), Intravenous, Once, 3 of 6 cycles  Dose modification: 148 75 mg/m2 (85 % of original dose 175 mg/m2, Cycle 1, Reason: Dose Not Tolerated)  Administration: 254 4 mg (5/11/2022), 299 4 mg (6/1/2022), 299 4 mg (6/29/2022)     Lung cancer metastatic to brain ([address] )   3/30/2022 - 4/8/2022 Radiation      Treatments:  Course: C1 SRT    Plan ID Energy Fractions Dose per Fraction (cGy) Dose Correction (cGy) Total Dose Delivered (cGy) Elapsed Days   SRTLTemp 6X 5 / 5 600 0 3,000 9   SRTROccipital 6X 5 / 5 600 0 3,000 9      Treatment Dates:  3/30/2022 - 4/8/2022 5/26/2022 Initial Diagnosis    Lung cancer metastatic to brain [facility])       Past Medical & Surgical Hx:   Patient Active Problem List   Diagnosis    Type 2 diabetes mellitus without complication, with long-term current use of insulin ([facility] [address] )    Nonimmune to hepatitis B virus    Elevated PSA    Gall bladder polyp    Vitamin D deficiency    Cyanocobalamin deficiency    History of tobacco use    Lung mass    Brain mass    Cerebral edema (HCC)    Sepsis due to pneumonia    Obstructive pneumonia    Adenocarcinoma of right lung ([facility] [address] )    Pneumonia due to Klebsiella pneumoniae ([facility] [address] )    Cancer associated pain    Adjustment insomnia    Continuous opioid dependence (HCC)    Chronic obstructive pulmonary disease (HCC)    Palliative care patient    Chemotherapy induced neutropenia (HCC)    Dehydration    Acute right-sided thoracic back pain    Lung cancer metastatic to brain ([facility] [address] )    Therapeutic opioid-induced constipation ([facility])     Past Medical History:   Diagnosis Date    [facility]     Elevated PSA 3/11/2021    Fatty liver 3/11/2021    Gall bladder polyp 3/11/2021    Lung cancer ([facility] [address] )     Nonimmune to hepatitis B virus 3/11/2021     Past Surgical History:   Procedure Laterality Date    FL GUIDED CENTRAL VENOUS ACCESS DEVICE INSERTION  5/10/2022    LUNG BIOPSY      MT INSJ TUNNELED CTR VAD W/SUBQ PORT AGE 5 YR/> N/A 5/10/2022    Procedure: INSERTION VENOUS PORT ( PORT-A-CATH) IR;  Surgeon: Lance Winchester DO;  [facility]: [facility];  Service: Interventional Radiology     Review of Medications:   Vitamins, Supplements and Herbals: Med List Reviewed & pt is only taking: vitamin D, Mg    Current Outpatient Medications:     BD Pen Needle Jeaneth 2nd Gen 32G X 4 MM MISC, USE ONCE DAILY WITH LANTUS, Disp: , Rfl:     benzonatate (TESSALON PERLES) 100 mg capsule, Take 1 capsule (100 mg total) by mouth 3 (three) times a day as needed for cough (Patient not taking: Reported on 7/15/2022), Disp: 30 capsule, Rfl: 2    Blood Glucose Monitoring Suppl (FreeStyle Lite) FIDELIA, , Disp: , Rfl:     Cholecalciferol (Vitamin D3) 125 MCG (5000 UT) CAPS, Take 1 capsule (5,000 Units total) by mouth daily, Disp: 90 capsule, Rfl: 2    Cholecalciferol (Vitamin D3) 125 MCG (5000 UT) TABS, Take 5,000 Units by mouth daily, Disp: , Rfl:     dexamethasone (DECADRON) 1 mg tablet, Take 1 tablet (1 mg total) by mouth 2 (two) times a day with meals for 14 days, THEN 1 tablet (1 mg total) daily with breakfast for 14 days  , Disp: 42 tablet, Rfl: 0    FREESTYLE LITE test strip, Check blood sugar  daily, Disp: 100 each, Rfl: 3    guaiFENesin (MUCINEX) 600 mg 12 hr tablet, Take 2 tablets (1,200 mg total) by mouth every 12 (twelve) hours as needed for cough or congestion (thick mucis), Disp: 60 tablet, Rfl: 2    Insulin Pen Needle (BD Pen Needle Jeaneth U/F) 32G X 4 MM MISC, Use daily as directed with insulin pen, Disp: 100 each, Rfl: 3    ipratropium-albuterol (Combivent Respimat) inhaler, Inhale 1 puff 4 (four) times a day, Disp: 4 g, Rfl: 1    Lancets (freestyle) lancets, Check blood sugar daily, Disp: 100 each, Rfl: 3    levETIRAcetam (KEPPRA) 500 mg tablet, Take 1 tablet (500 mg total) by mouth every 12 (twelve) hours, Disp: 60 tablet, Rfl: 3    LORazepam (ATIVAN) 0 5 mg tablet, For sleep 1-2 tablets at bed time (Max 1 mg ), Disp: 60 tablet, Rfl: 0    magnesium gluconate (MAGONATE) 500 mg tablet, Take 1 tablet (500 mg total) by mouth daily, Disp: 30 tablet, Rfl: 1    melatonin 3 mg, Take 1 tablet (3 mg total) by mouth daily at bedtime, Disp: 30 tablet, Rfl: 2    metFORMIN (GLUCOPHAGE-XR) 500 mg 24 hr tablet, Take 2 tablets (1,000 mg total) by mouth daily with dinner, Disp: 180 tablet, Rfl: 2    naloxone (NARCAN) 4 mg/0 1 mL nasal spray, Administer 1 spray into a nostril  If no response after 2-3 minutes, give another dose in the other nostril using a new spray   It has to be on stand by use with opioid use - in case of overdose, Disp: 1 each, Rfl: 1   ondansetron (Zofran ODT) 8 mg disintegrating tablet, Take 1 tablet (8 mg total) by mouth every 8 (eight) hours as needed for nausea or vomiting, Disp: 40 tablet, Rfl: 3    oxyCODONE (ROXICODONE) 10 MG TABS, Take 1 tablet (10 mg total) by mouth every 6 (six) hours as needed for moderate pain Max Daily Amount: 40 mg, Disp: 120 tablet, Rfl: 0    pantoprazole (PROTONIX) 40 mg tablet, Take 1 tablet (40 mg total) by mouth daily in the early morning, Disp: 90 tablet, Rfl: 1    Current Facility-Administered Medications:     cyanocobalamin injection 1,000 mcg, 1,000 mcg, Intramuscular, Q30 Days, Nilas Perez MD, 1,000 mcg at 07/15/22 0920    Most Recent Lab Results:   Lab Results   Component Value Date    WBC 24 51 (H) 06/27/2022    NEUTROABS 19 46 (H) 06/27/2022    CHOLESTEROL 154 01/29/2021    TRIG 52 01/29/2021    HDL 68 01/29/2021    LDLCALC 73 01/29/2021    ALT 10 06/27/2022    AST 12 (L) 06/27/2022    ALB 3 8 06/27/2022    SODIUM 137 06/27/2022    SODIUM 133 (L) 06/06/2022    K 4 9 06/27/2022    K 4 1 06/06/2022    CL 99 06/27/2022    BUN 36 (H) 06/27/2022    BUN 16 06/06/2022    CREATININE 1 32 (H) 06/27/2022    CREATININE 0 81 06/06/2022    EGFR 59 06/27/2022    PHOS 2 8 03/24/2022    PHOS 3 5 03/23/2022    GLUCOSE 363 (H) 01/06/2021    POCGLU 118 06/07/2022    GLUF 101 (H) 06/27/2022    GLUF 90 05/08/2022    GLUC 105 06/06/2022    HGBA1C 6 5 (H) 06/05/2022    HGBA1C 5 7 (H) 11/07/2021    HGBA1C 5 8 (H) 05/03/2021    CALCIUM 9 8 06/27/2022    MG 1 7 05/25/2022       Anthropometric Measurements:   Height: 66"  Ht Readings from Last 1 Encounters:   07/15/22 5' 7" (1 702 m)     Wt Readings from Last 20 Encounters:   07/15/22 62 4 kg (137 lb 9 6 oz)   07/12/22 62 6 kg (138 lb)   06/29/22 65 8 kg (145 lb)   06/24/22 64 kg (141 lb)   06/21/22 63 kg (139 lb)   06/08/22 62 3 kg (137 lb 6 4 oz)   06/08/22 62 3 kg (137 lb 6 4 oz)   06/01/22 63 8 kg (140 lb 10 5 oz)   05/31/22 64 4 kg (142 lb)   05/26/22 64 kg (141 lb)   05/11/22 62 1 kg (136 lb 14 5 oz)   05/10/22 62 6 kg (138 lb)   05/03/22 62 6 kg (138 lb)   04/27/22 65 1 kg (143 lb 8 oz)   04/26/22 63 4 kg (139 lb 12 8 oz)   04/22/22 64 1 kg (141 lb 6 4 oz)   04/15/22 66 kg (145 lb 9 6 oz)   04/11/22 67 4 kg (148 lb 9 6 oz)   04/05/22 67 6 kg (149 lb)   03/28/22 63 kg (139 lb)     Weight History:   Usual Weight: 150# (prior to DM dx 2021)   Home Weight: ~138-140#, unsure of accuracy of home scale    Oncology Nutrition-Anthropometrics    Flowsheet Row Nutrition from 7/15/2022 in [place] [address] [facility] Nutrition from 6/10/2022 in [place] [address] [facility]   Patient age (years): 62 years 62 years   Patient (male) height (in): 77 in 77 in   Current weight (lbs): 137 6 lbs 137 4 lbs   Current weight to be used for anthropometric calculations (kg) 62 5 kg 62 5 kg   BMI: 22 2 22 2   IBW male 142 lb 142 lb   IBW (kg) male 64 5 kg 64 5 kg   IBW % (male) 96 9 % 96 8 %   Adjusted BW (male): 140 9 lbs 140 9 lbs   Adjusted BW in kg (male): 64 kg 64 kg   % weight change after 1 week: -- -2 3 %   Weight change after 1 week (lbs) -- -3 3 lbs   % weight change after 1 month: -1 % -0 4 %   Weight change after 1 month (lbs) -1 4 lbs -0 6 lbs   % weight change after 3 months: -5 5 % -2 9 %   Weight change after 3 months (lbs) -8 lbs -4 1 lbs   % weight change after 6 months: -- -3 9 %   Weight change after 6 months (lbs) -- -5 6 lbs        Nutrition-Focused Physical Findings: n/a due to telephone call    Food/Nutrition-Related History & Client/Social History:    Current Nutrition Impact Symptoms:  [] Nausea  [x] Reduced Appetite - after chemo [] Acid Reflux    [] Vomiting  [] Unintended Wt Loss - hx [] Malabsorption    [x] Diarrhea - x1 week after chemo, imodium was overly effective and now does not want to take it  -discussed hydration, soluble fiber, and banatrol trial [] Unintended Wt Gain  [] Dumping Syndrome    [] Constipation  [] Thick Mucous/Secretions  [] Abdominal Pain    [] Dysgeusia (Altered Taste)  [] Xerostomia (Dry Mouth)  [] Gas    [] Dysosmia (Altered Smell)  [] Thrush  [] Difficulty Chewing    [] Oral Mucositis (Sore Mouth)  [x] Fatigue & Weakness- 1-3 days after chemo [x] Hyperglycemia -Checks BG 1x/day; typically ~'s, higher now that he is back on steroids  Lab Results   Component Value Date    HGBA1C 6 5 (H) 06/05/2022      [] Odynophagia  [] Esophagitis  [] Other:    [] Dysphagia  [] Early Satiety  [] No Problems Eating      Food Allergies & Intolerances: no    Current Diet: CHO-Controlled and [place] Diet   Eats salmon, pork, beef, all veggies, jackfruit, [place] pears, and rice  Pt likes cheesecake but has not been eating it d/t DM  Pt states he is open to being more flexible with his diet  Current Nutrition Intake:  Increased since last visit  Appetite: Good; but poor for ~3 days after chemo (forces self to eat/drink) - pt will eat what his wife makes  Nutrition Route: PO   Activity level: mostly sedentary; likes plants and gardening; limited by fatigue     24 Hr Diet Recall:    Breakfast: 1 Ensure Max, eggs, sausage  Snack: nuts  Lunch: 3/4 cup brown rice, 3 oz pork/salmon/beef/chicken/tilapia/cod, 3/4 cup veggie (green beans, asparagus, bok dionicio, cauliflower); on days when he cannot eat his wife will make him congee  Snack: fruit (peaches, watermelon)   Dinner: 3/4 cup brown rice, 3 oz pork/salmon/beef/chicken/tilapia/cod, 3/4 cup veggie (green beans, asparagus, bok dionicio, cauliflower)  Snack: low sugar ice cream, sometimes second Ensure Max    Beverages: water (16 9 oz x3-4), coffee (sips), oolong or cindy tea (none lately), OJ or other juices that are available (none lately), Ensure Max (11 oz x1-2)  Supplements:   Ensure Clear (10 oz, 180 kcal, 8 g pro) - none lately  Ensure Max Protein (11 oz, 150 kcal, 30 g pro) - 1-2x/day     Oncology Nutrition-Estimated Needs    Flowsheet Row Nutrition from 2022 in [facility]   Weight type used Actual weight   Weight in kilograms (kg) used for estimated needs 62 2 kg   Energy needs formula:  35-40 kcal/kg   Energy needs based on 35 kcal/k kcal   Energy needs based on 40 kcal/k kcal   Protein needs formula: 1 5-2 g/kg   Protein needs based on 1 5 g/kg 93 g   Protein needs based on 2 g/kg 124 g   Fluid needs formula: 30-35 mL/kg   Fluid needs based on 30 mL/kg 1869 mL   Fluid needs in ounces 63 oz   Fluid needs based on 35 mL/kg 2181 mL   Fluid needs in ounces 74 oz           Discussion & Intervention:   Sandeep Singleton was evaluated today for an RD follow up regarding wt loss and nutrition impact sx management  Sandeep Singleton is currently undergoing tx for lung cancer  Ru's wt has been stable since last visit  His eating and drinking is unchanged  He continues to use oral nutrition supplements 1-2 times per day  His wife will make him congee on the days when he does not feel like eating  Son reports that the biggest problem pt is having is diarrhea x1 week after chemo  He tried taking imodium but it was overly effective and now pt does not want to use it again  Encouraged proper hydration, a low-fiber diet, adding soluble fiber, and trying banatrol after next cycle  Reviewed 24 hour recall, which revealed an suboptimal po intake, and discussed ways to increase kcal, protein, and fluid intakes and optimize nutrient intake  Also reviewed the importance of wt management throughout the tx process and the role of a high kcal/ high protein diet and carbohydrate controlled diet in managing wt and overall health    Based on today's assessment, discussion included: MNT for: diarrhea, reduced appetite, fatigue, DM, adequate hydration & fluid choices, eating smaller more frequent meals every 2-3 hours (5-6 small meals/day), having consistent and planned snacks between meals and continuing oral nutrition supplements  Moving forward, Dinesh Garg was encouraged to increase kcal, protein, and fluid intakes and practice MNT for diarrhea the week following chemo  Materials Provided: not applicable  All questions and concerns addressed during todays visit  Dinesh Garg has RD contact information  Nutrition Diagnosis:   Inadequate Energy Intake related to physiological causes, disease state and treatment related issues as evidenced by food recall, wt loss and discussion with pt and/or family  Increased Nutrient Needs (kcal & pro) related to increased demand for nutrients and disease state as evidenced by cancer dx and pt undergoing tx for cancer  Altered GI Function related to alteration in GI tract motility as evidenced by Diarrhea  x1 week after chemo  Monitoring & Evaluation:   Goals:  weight maintenance/stabilization  adequate nutrition impact symptom management  pt to meet >/=75% estimated nutrition needs daily    Progress Towards Goals: Progressing    Nutrition Rx & Recommendations:  Diet: Diabetic, High calorie, High protien diet  Small, frequent meals/snacks may be easier to tolerate than 3 large daily meals  Aim for 5-6 small meals per day (every 2-3 hours)  Include protein at all meals/snacks  Stay hydrated by sipping fluids of choice/tolerance throughout the day    For weight loss: monitor your weight at home at least 2x/week, record your weight, start by adding 250-500 extra calories per day, eat 5-6 small scheduled meals every 2-3 hrs, choose foods that are high in protein and calories (see pages 49-53 in your Eating Hints book), drink liquids with calories for example: milkshakes/smoothies/juice/soup/whole milk/chocolate milk, cook with protein fortified milk (see recipe on page 36 in your Eating Hints book), consider ready-to-drink oral nutrition supplements such as Ensure Plus, Ensure Enlive, Boost Plus, or Boost Very High Calorie, avoid "diet" and "light" foods when possible, avoid drinking too much with meals, contact your dietitian with any continued weight loss over the course of 1 week  For more info see pages 35-37 in your Eating Hints book  For diarrhea: drink plenty of fluids (>64 oz/day), avoid carbonation; eat 5-6 small meals/day; include high sodium & potassium foods/liquids (Sodium: soup/broth; Potassium: bananas, canned apricots, potatoes); eat low-fiber foods; choose foods/liquids that are room temperature; avoid foods/drinks that can make diarrhea worse (ex  high fiber, high sugar, hot/cold drinks, greasy/fatty foods, gas forming foods such as beans, raw produce, milk products, alcohol, spicy foods, caffeine, sugar alcohols (xylitol, sorbitol), and apple juice (high in sorbitol) (see pages 15-16 in your Eating Hints book)  If diarrhea is severe, consider adding Banatrol (banana flakes) 1-3 times per day mixed in applesauce (can be purchased online from [address] [place] [address])  Try adding soluble fiber: applesauce, banana, oatmeal, potatoes, rice, etc  to help thicken stools  Weigh yourself regularly  If you notice weight loss, make an effort to increase your daily food/calorie intake  If you continue to notice loss after these efforts, reach out to your dietitian to establish a plan to stabilize weight  Weigh yourself 2 times per week, record your weight  Nutrition Supplements: 1- per day, choose one with <10 grams of sugar per serving  Ideally one that has >10 grams protein and is >200 calories  Example: Glucerna or Ensure Max  Can increase supplement usage on days when eating is more difficult  Increase protein portion to 4 oz at meals  Continue preparing food with healthy, plant-based oils such as olive, canola, avocado, etc   Use larger portion to increase calorie intake without affecting blood sugars    Higher protein snack ideas: low-sugar [place] yogurt, SF pudding, 1/4-1/3 cup nuts, edamame, cheese stick, low-sugar oral nutrition supplement (Glucerna), oatmeal or lower sugar cereal with milk (choose a cereal with <10 grams of sugar per serving)  Fluid intake should be ~65-75 oz per day  Choose caffeine-free fluids  Water is the best choice  Ensure/Glucerna shakes count towards daily fluid goals  Ideas: SF jello, water, whole milk, unsweetened almond milk, unsweetened soy milk, herbal/decaf tea  Increase eating the week leading up to chemo, add snacks between meals  Trial Banatrol for diarrhea/loose stools for the week after chemo when diarrhea is problematic:  Start with 1 packet(s) 2-3 time(s) per day mixed with applesauce/oatmeal/juice/yogurt  You may increase to a maximum of 6 packets/day, no more than 2 packets at a single time  Continue until diarrhea/loose stools resolve or per RD  Banatrol may be purchased at [employer] or [employer]    Coupon Code: BYGK09 for 25% off     Follow Up Plan: 7/29 at 1pm for a phone follow up  Recommend Referral to Other Providers: none at this time

## 2022-07-05 NOTE — TELEPHONE ENCOUNTER
Received message from son  80 / 80 when sitting down and after standing up for a bit and then sitting back down, it's a 125 / 82       Patient is no longer c/o dizziness, is staying hydrated   Patient has productive cough bre clear mucous  Son will call if patient develops a fever , has discolored sputum or has any other adverse symptoms  Son verbalizes understanding

## 2022-07-05 NOTE — TELEPHONE ENCOUNTER
Received TEAMs call from patient's son  Patient had diarrhea over the weekend that has now resolved  Patient is c/o dizziness when standing  Patient is staying hydrated  Son instructed to take B/P sitting and standing and call back with reading

## 2022-07-12 NOTE — TELEPHONE ENCOUNTER
Patient will receive a total of 4 cycles of chemo then have PET scan 8/19/2022  Will send to Infusion  pool to cancel chemo appts for 8/11 and 8/12 at [facility]

## 2022-07-12 NOTE — PROGRESS NOTES
Tera Zambrano  1965  [address] [facility] [address]  [facility]  [address]   [place] [id number]    DISCUSSION/SUMMARY:    80-year-old male recently diagnosed with non-small cell lung carcinoma with brain metastases  Issues:    Brain metastases  Patient was seen/evaluated by Dr Mitch Morrison previously and completed RT  Repeat MRI/ brain demonstrated 2 new lesions  Patient was re-evaluated by Radiation Oncology and Neurosurgery and is presently undergoing frameless stereotactic radiotherapy to the left cerebral lesion and radio surgery for the right frontal lesion  Patient seems to be doing well from a neurologic standpoint  Mr Jessika Gustafson will follow-up with Radiation Oncology and neuro surgery as directed  Non-small cell lung carcinoma  RUL lung biopsy demonstrated adenocarcinoma  PET-CT demonstrated a number of metastatic lesions  NextGen sequencing did not demonstrate any actionable mutations  PDL-1 IHCs were negative  Options were previously discussed and Mr Jessika Gustafson started bevacizumab, paclitaxel and carboplatin  Fourth cycle is scheduled for July 21, 2022  Patient states having all required medications at home  As before, the plan is to complete 4 cycles and then re-scan (because of the extent of metastases and the complications, I feel it better that patient be rechecked after 4 cycles as opposed to 6)  Repeat PET-CT has been ordered  NCCN guidelines 3 2022 state that for patients with M1 non-small cell lung carcinoma, adenocarcinoma, no actionable mutations, contraindication to PDL1, bevacizumab/carbo/paclitaxel is a category 1 regimen  Regimen  Bevacizumab 15 mg/kg IV day 1  Paclitaxel 175 mg/m2 IV day 1   Carboplatin AUC = 5 5 IV day 1  G-CSF 3 mg subcu day 2  Cycle = 21 days  Goal = palliation  100% on all agents    Occasional nausea  No vomiting  Patient states having all required medications at home; ondansetron was renewed      Jl Shah will call the office if he has any issues with nausea  Laboratory test abnormalities  WBC is elevated, no evidence of infection  Patient is on Neulasta as well as a Decadron taper  Hemoglobin is decreased, likely multifactorial but clearly due to the chemotherapy  BUN/creatinine is higher than before  Patient understands that he needs to keep well hydrated, drink plenty of fluids  BUN/creatinine will be rechecked before the next cycle  Patient will follow up with palliative care and nutritional services as directed  Patient is to return in approximately 7 weeks (approximately 1 month after the 4th cycle)  Mr Jl Shah knows to call the hematology/oncology office if there are any other questions or concerns  Carefully review your medication list and verify that the list is accurate and up-to-date  Please call the hematology/oncology office if there are medications missing from the list, medications on the list that you are not currently taking or if there is a dosage or instruction that is different from how you're taking that medication  Patient goals and areas of care:  Complete treatments with Radiation Oncology and then resume chemotherapy  Barriers to care:  None  Patient is able to self-care   ______________________________________________________________________________________    Chief Complaint   Patient presents with    Follow-up    Lung Cancer     History of Present Illness:  43-year-old male recently admitted to [facility]  Patient was treated for obstructive pneumonia, septic shock, acute kidney injury and respiratory failure  Workup demonstrated a lung mass as well as lesions in the brain consistent with metastatic disease  Patient eventually improved and was discharged  Mr Jl Shah was seen/evaluated by Radiation Oncology and completed whole-brain RT  Patient subsequently has received 3 cycles of Paclitaxel, carboplatin and bevacizumab      Mr Jl Shah pesents with his wife, son was on the phone translating if needed  Patient states feeling okay, about the same as before  Fatigue is the same as before but patient is trying to be active  No headaches, blurred vision or dizziness, mental status is good  No abdominal problems, no diarrhea or constipation  Appetite is okay although patient occasionally has nausea (not clear if patient was taking the Zofran)  No fevers, chills or sweats  No specific problems with the port or chemotherapy  Patient has a 40+ pack-year history of tobacco use, 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  Review of Systems   Constitutional: Positive for fatigue  Negative for activity change, appetite change and unexpected weight change  HENT: Negative  Eyes: Negative  Respiratory: Negative  Cardiovascular: Negative  Gastrointestinal: Negative  Endocrine: Negative  Genitourinary: Negative  Musculoskeletal: Negative  Skin: Negative  Allergic/Immunologic: Negative  Neurological: Negative  Hematological: Negative  Psychiatric/Behavioral: Negative  All other systems reviewed and are negative      Patient Active Problem List   Diagnosis    Type 2 diabetes mellitus without complication, with long-term current use of insulin (HCC)    Nonimmune to hepatitis B virus    Elevated PSA    Gall bladder polyp    Vitamin D deficiency    Cyanocobalamin deficiency    History of tobacco use    Lung mass    Brain mass    Cerebral edema ([address]    Sepsis due to pneumonia    Obstructive pneumonia    Adenocarcinoma of right lung ([address]    Pneumonia due to Klebsiella pneumoniae ([address]    Cancer associated pain    Adjustment insomnia    Continuous opioid dependence ([address]    Chronic obstructive pulmonary disease ([address]    Palliative care patient    Chemotherapy induced neutropenia (HCC)    Dehydration    Acute right-sided thoracic back pain    Lung cancer metastatic to brain ([address] )    Therapeutic opioid-induced constipation ([facility])     Past Medical History:   Diagnosis Date    Diabetes mellitus ([facility]     Elevated PSA 3/11/2021    Fatty liver 3/11/2021    Gall bladder polyp 3/11/2021    Lung cancer ([facility]     Nonimmune to hepatitis B virus 3/11/2021     Past Surgical History:   Procedure Laterality Date    FL GUIDED CENTRAL VENOUS ACCESS DEVICE INSERTION  5/10/2022    LUNG BIOPSY      MT INSJ TUNNELED CTR VAD W/SUBQ PORT AGE 5 YR/> N/A 5/10/2022    Procedure: INSERTION VENOUS PORT ( PORT-A-CATH) IR;  Surgeon: Larry Solano DO;  Location: [facility];  Service: Interventional Radiology     Family History   Problem Relation Age of Onset    No Known Problems Mother     No Known Problems Father      Social History     Socioeconomic History    Marital status: /Civil Union     Spouse name: Not on file    Number of children: Not on file    Years of education: Not on file    Highest education level: Not on file   Occupational History    Not on file   Tobacco Use    Smoking status: Former Smoker     Packs/day: 0 50     Years: 40 00     Pack years: 20 00     Types: Cigarettes     Quit date: 2022     Years since quittin 4    Smokeless tobacco: Never Used   Vaping Use    Vaping Use: Never used   Substance and Sexual Activity    Alcohol use: Not Currently     Comment: quit drinking 7 years ago    Drug use: Never    Sexual activity: Not on file   Other Topics Concern    Not on file   Social History Narrative    Patient lives independently with his wife and son  He also has a brother and sister who are very active in his life  Patrice Rodriguez is the only person in the home who is employed so not only is there stress regarding cancer diagnosis but also the financial stability of his family       Social Determinants of Health     Financial Resource Strain: Not on file   Food Insecurity: No Food Insecurity    Worried About Running Out of Food in the Last Year: Never true    Brittany of Food in the Last Year: Never true   Transportation Needs: No Transportation Needs    Lack of Transportation (Medical): No    Lack of Transportation (Non-Medical): No   Physical Activity: Not on file   Stress: Not on file   Social Connections: Not on file   Intimate Partner Violence: Not on file   Housing Stability: Low Risk     Unable to Pay for Housing in the Last Year: No    Number of Places Lived in the Last Year: 1    Unstable Housing in the Last Year: No       Current Outpatient Medications:     BD Pen Needle Jeaneth 2nd Gen 32G X 4 MM MISC, USE ONCE DAILY WITH LANTUS, Disp: , Rfl:     benzonatate (TESSALON PERLES) 100 mg capsule, Take 1 capsule (100 mg total) by mouth 3 (three) times a day as needed for cough, Disp: 30 capsule, Rfl: 2    Blood Glucose Monitoring Suppl (FreeStyle Lite) FIDELIA, , Disp: , Rfl:     Cholecalciferol (Vitamin D3) 125 MCG (5000 UT) TABS, Take 5,000 Units by mouth daily, Disp: , Rfl:     dexamethasone (DECADRON) 1 mg tablet, Take 1 tablet (1 mg total) by mouth 2 (two) times a day with meals for 14 days, THEN 1 tablet (1 mg total) daily with breakfast for 14 days  , Disp: 42 tablet, Rfl: 0    FREESTYLE LITE test strip, Check blood sugar  daily, Disp: 100 each, Rfl: 3    guaiFENesin (MUCINEX) 600 mg 12 hr tablet, Take 2 tablets (1,200 mg total) by mouth every 12 (twelve) hours as needed for cough or congestion (thick mucis), Disp: 60 tablet, Rfl: 2    Insulin Pen Needle (BD Pen Needle Jeaneth U/F) 32G X 4 MM MISC, Use daily as directed with insulin pen, Disp: 100 each, Rfl: 3    ipratropium-albuterol (Combivent Respimat) inhaler, Inhale 1 puff 4 (four) times a day, Disp: 4 g, Rfl: 1    Lancets (freestyle) lancets, Check blood sugar daily, Disp: 100 each, Rfl: 3    levETIRAcetam (KEPPRA) 500 mg tablet, Take 1 tablet (500 mg total) by mouth every 12 (twelve) hours, Disp: 60 tablet, Rfl: 3    LORazepam (ATIVAN) 0 5 mg tablet, For sleep 1-2 tablets at bed time (Max 1 mg ), Disp: 60 tablet, Rfl: 0   melatonin 3 mg, Take 1 tablet (3 mg total) by mouth daily at bedtime, Disp: 30 tablet, Rfl: 2    naloxone (NARCAN) 4 mg/0 1 mL nasal spray, Administer 1 spray into a nostril  If no response after 2-3 minutes, give another dose in the other nostril using a new spray  It has to be on stand by use with opioid use - in case of overdose, Disp: 1 each, Rfl: 1    ondansetron (Zofran ODT) 8 mg disintegrating tablet, Take 1 tablet (8 mg total) by mouth every 8 (eight) hours as needed for nausea or vomiting, Disp: 40 tablet, Rfl: 3    oxyCODONE (ROXICODONE) 10 MG TABS, Take 1 tablet (10 mg total) by mouth every 6 (six) hours as needed for moderate pain Max Daily Amount: 40 mg, Disp: 120 tablet, Rfl: 0    pantoprazole (PROTONIX) 40 mg tablet, Take 1 tablet (40 mg total) by mouth daily in the early morning, Disp: 90 tablet, Rfl: 1    Cholecalciferol (Vitamin D3) 125 MCG (5000 UT) CAPS, Take 1 capsule (5,000 Units total) by mouth daily, Disp: 90 capsule, Rfl: 2    magnesium gluconate (MAGONATE) 500 mg tablet, Take 1 tablet (500 mg total) by mouth daily, Disp: 30 tablet, Rfl: 1    metFORMIN (GLUCOPHAGE-XR) 500 mg 24 hr tablet, Take 2 tablets (1,000 mg total) by mouth daily with dinner, Disp: 180 tablet, Rfl: 2    No Known Allergies    Vitals:    07/12/22 1419   BP: 120/70   Pulse: 63   Resp: 16   Temp: (!) 96 4 °F (35 8 °C)   SpO2: 97%     Physical Exam  Constitutional:       Appearance: He is well-developed  Comments: Thin but well-nourished male, no respiratory distress, no signs of pain   HENT:      Head: Normocephalic and atraumatic  Right Ear: External ear normal       Left Ear: External ear normal       Mouth/Throat:      Comments: Oral mucosa moist and pink, no exudate, upper and lower plates  Eyes:      Conjunctiva/sclera: Conjunctivae normal       Pupils: Pupils are equal, round, and reactive to light  Cardiovascular:      Rate and Rhythm: Normal rate and regular rhythm        Heart sounds: Normal heart sounds  Pulmonary:      Effort: Pulmonary effort is normal       Breath sounds: Normal breath sounds  Comments: Fair to good air entry bilaterally, few scattered bilateral rhonchi  Abdominal:      General: Bowel sounds are normal       Palpations: Abdomen is soft  Comments: +bowel sounds, nontender, soft   Musculoskeletal:         General: Normal range of motion  Cervical back: Normal range of motion and neck supple  Skin:     General: Skin is warm  Comments: Warm, moist, good color, no petechiae, Right anterior chest wall port area clean and dry   Neurological:      Mental Status: He is alert and oriented to person, place, and time  Deep Tendon Reflexes: Reflexes are normal and symmetric  Psychiatric:         Behavior: Behavior normal          Thought Content: Thought content normal          Judgment: Judgment normal      Extremities:  No lower extremity edema bilaterally, no cords, pulses are 1+   Lymphatics:  No adenopathy in the neck, supraclavicular region, axilla and groin bilaterally    Labs    06/27/2022 WBC = 24 5 hemoglobin = 10 3 hematocrit = 32 platelet = 625 neutrophil = 80% BUN = 36 creatinine = 1 32 calcium = 9 8 LFTs WNL    06/07/2022 WBC = 16 46 hemoglobin = 11 hematocrit = 32 3 platelet = 512 neutrophil = 68%  06/06/2022 BUN = 16 creatinine = 0 81    Imaging     06/05/2022 CT scan chest without contrast    IMPRESSION:  1   Multiple areas of infiltrate throughout the right lung, most pronounced in the right upper lobe  Although the findings likely represent multifocal/multi lobar pneumonia, metastatic disease not excluded, particularly in the posterior right upper   lobe  Posterior right upper lobe consolidation, likely postobstructive pneumonitis  These do not have the typical appearance for Covid type pneumonia, given the unilaterality  2   Right upper lobe lung mass with cavitation  The overall size has slightly improved from the prior study    3   Enlarging right adrenal lesion with new left adrenal lesion, most compatible with metastatic disease  4   New bony metastatic disease involving the T9, T10 and T11 vertebral bodies  Pathologic fracture involving the superior endplate of S66   5   Trace pericardial effusion, new from the prior study  06/04/2022 MRI brain with without contrast    There are 2 new hemorrhagic masses identified within the brain parenchyma consistent with new metastasis  The larger of these is seen within the left superior cerebellum  The smaller of the 2 is less than 1 cm in size within the right frontal lobe towards the vertex  Neither demonstrates significant edema or mass effect      The 2 previously treated hemorrhagic enhancing lesions seen on MRI from March 15, 2022 have significantly decreased in size and there is no adjacent significant vasogenic edema or mass effect at this time  04/13/2022 PET-CT    1  Large right upper lung hypermetabolic necrotic mass measures 10 1 x 8 4 x 11 4 cm, SUV 19 8, compatible with known malignancy  This mass invades the right hilum and mediastinum, and appears inseparable from the paratracheal adenopathy  2   Right adrenal metastasis measures 1 1 x 0 9 cm  Minimal nodular activity in the left adrenal may be physiologic, but may be reassessed on follow-up exam to exclude an additional early metastasis  3   1 5 x 1 2 cm hypermetabolic splenic lesion most concerning for metastasis  4   Known brain metastases are not well evaluated on this exam   5   No additional hypermetabolic metastases visualized  03/22/2022 CT head without contrast   Impression stated no acute findings, unchanged metastatic lesions with surrounding edema in the right occipital and left frontal lobes  03/26/2022 chest x-ray portable impression stated progressive right upper lobe consolidation of score is the known underlying mass  03/22/2022 CTA chest PE study    PULMONARY ARTERIAL TREE:  No pulmonary embolus is seen      LUNGS:  Large right upper lobe mass unchanged   New extensive surrounding groundglass density in the right upper lobe lobe, with more consolidative density at the apex   New small patchy groundglass glass densities in the left upper and right lower   lobes   Unchanged occlusion of the right upper lobe bronchus by the mass   No other tracheal or endobronchial lesion        PLEURA:  Unremarkable        HEART/GREAT VESSELS:  Unremarkable for patient's age  No thoracic aortic aneurysm        MEDIASTINUM AND NAS:  Unchanged mediastinal and right hilar adenopathy  No pulmonary embolus          Impression:  Unchanged large right upper lobe mass most compatible with carcinoma, with new extensive right upper lobe kerri   undglass density which could represent postobstructive pneumonia, edema, hemorrhage or lymphangitic carcinomatosis   New groundglass infiltrates   in the left upper and right lower lobes, presumably infectious/inflammatory        Unchanged mediastinal and right hilar adenopathy       Pathology                  Case Report   Non-gynecologic Cytology                          Case: [id number]                                   Authorizing Provider: Mariangel Desai DO      Collected:           03/16/2022 1203               Ordering Location:     [facility]      Received:            03/16/2022 [address] [place] [id number] 6                                                               Pathologist:           Stacey Cardoso MD                                                                  Specimens:   A) - Lung, Right Upper Lobe Bronchoalveolar Lavage                                                   B) - Lung, Right Upper Lobe Bronchoalveolar Lavage, cell block                                       C) - Lung, Right Upper Lobe Bronchial Brushing, with brush                                           D) - Lung, Right Upper Lobe, FNA                                                           Final Diagnosis   A & B  Lung, Right Upper Lobe Bronchoalveolar Lavage, : (Thin-Prep and cell block)  Negative for malignancy  Rare benign bronchial cells  Abundant macrophages and mixed inflammatory cells      Satisfactory for evaluation  C  Lung, Right Upper Lobe Bronchial Brushing, with brush: Atypical cellular changes seen  Atypical epithelioid cells, scant cellularity      Satisfactory for evaluation      D   Lung, Right Upper Lobe, FNA:  Positive for malignancy      The concomitant biopsy ([id number]) demonstrates Non-small cell carcinoma consistent with adenocarcinoma      Intradepartmental consultation is in agreement      Satisfactory for evaluation       Electronically signed by Ludmila Martinez MD on 3/21/2022 at 10:37 AM

## 2022-07-12 NOTE — TELEPHONE ENCOUNTER
Patient should be scheduled for f/u after 8/19/2022 PET scan  No appts available at this time  Will d/w dr Dianne Pugh

## 2022-07-15 NOTE — PROGRESS NOTES
Assessment/Plan:  Patient remained in good spirit and follow-up with treatment compliant  Will refer patient to pulmonologist for excessive sputum production hopefully that can be option for him  B12 injection given today and every office visit to prevent chemo side effect of neuropathy and also given some energy  If diarrhea is worsening despite using Imodium will need stool culture for C diff  continue supportive care  Advised to check his sugars since he is on Decadron =Lantus and metformin available  Seen back in 6 weeks    I have spent 40 minutes with Patient and family today in which greater than 50% of this time was spent in counseling/coordination of care regarding Diagnostic results, Prognosis, Risks and benefits of tx options, Intructions for management, Patient and family education, Importance of tx compliance and Risk factor reductions  Problem List Items Addressed This Visit        Endocrine    Type 2 diabetes mellitus without complication, with long-term current use of insulin ([facility] [address] ) - Primary       Respiratory    Adenocarcinoma of right lung [facility])    Relevant Orders    Ambulatory Referral to Pulmonology    Lung cancer metastatic to brain [facility])    Relevant Orders    Ambulatory Referral to Pulmonology       Other    Cyanocobalamin deficiency    Relevant Medications    cyanocobalamin injection 1,000 mcg    Cancer associated pain    Adjustment insomnia    Palliative care patient      Other Visit Diagnoses     Excessive sputum        Relevant Orders    Ambulatory Referral to Pulmonology            Subjective:      Patient ID: Mele Davidson is a 62 y o  male  49-year-old male follow-up diabetes type 2 he is on metformin 1000 mg daily and Lantus 1 blood sugar really high  A1c last month checked was 6 5  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    He currently under care of Neurosurgery finished with radio frequency treatment to the brain and follow-up with oncologist for lung cancer he is on 4th treatment for chemo  He has been getting diarrhea loose stool the past week Imodium does help  He is losing weight but it did get better when he drinking Ensure  Overall he feels well except for excessive sputum in his lungs  He takes Mucinex every day that does not seem to help but when he stops then the sputum come back  Oncologist suggest for him to see pulmonologist   He just quit smoking when he was diagnosed lung cancer couple months ago  Currently she he is not working  No fever no chills  He is weak from chemo treatment  He has trouble with sleeping he has palliative care give him oxycodone melatonin  He stop taking lorazepam       The following portions of the patient's history were reviewed and updated as appropriate:   Past Medical History:  He has a past medical history of Diabetes mellitus ([facility] [address] ), Elevated PSA (3/11/2021), Fatty liver (3/11/2021), Gall bladder polyp (3/11/2021), Lung cancer ([facility] ), and Nonimmune to hepatitis B virus (3/11/2021)  ,  _______________________________________________________________________  Medical Problems:  does not have any pertinent problems on file ,  _______________________________________________________________________  Past Surgical History:   has a past surgical history that includes Lung biopsy; pr insj tunneled ctr vad w/subq port age 11 yr/> (N/A, 5/10/2022); and FL guided central venous access device insertion (5/10/2022)  ,  _______________________________________________________________________  Family History:  family history includes No Known Problems in his father and mother ,  _______________________________________________________________________  Social History:   reports that he quit smoking about 5 months ago  His smoking use included cigarettes  He has a 20 00 pack-year smoking history  He has never used smokeless tobacco  He reports previous alcohol use   He reports that he does not use drugs ,  _______________________________________________________________________  Allergies:  has No Known Allergies     _______________________________________________________________________  Current Outpatient Medications   Medication Sig Dispense Refill    BD Pen Needle Jeaneth 2nd Gen 32G X 4 MM MISC USE ONCE DAILY WITH LANTUS      Cholecalciferol (Vitamin D3) 125 MCG (5000 UT) CAPS Take 1 capsule (5,000 Units total) by mouth daily 90 capsule 2    dexamethasone (DECADRON) 1 mg tablet Take 1 tablet (1 mg total) by mouth 2 (two) times a day with meals for 14 days, THEN 1 tablet (1 mg total) daily with breakfast for 14 days  42 tablet 0    FREESTYLE LITE test strip Check blood sugar  daily 100 each 3    guaiFENesin (MUCINEX) 600 mg 12 hr tablet Take 2 tablets (1,200 mg total) by mouth every 12 (twelve) hours as needed for cough or congestion (thick mucis) 60 tablet 2    Insulin Pen Needle (BD Pen Needle Jeaneth U/F) 32G X 4 MM MISC Use daily as directed with insulin pen 100 each 3    ipratropium-albuterol (Combivent Respimat) inhaler Inhale 1 puff 4 (four) times a day 4 g 1    Lancets (freestyle) lancets Check blood sugar daily 100 each 3    levETIRAcetam (KEPPRA) 500 mg tablet Take 1 tablet (500 mg total) by mouth every 12 (twelve) hours 60 tablet 3    LORazepam (ATIVAN) 0 5 mg tablet For sleep 1-2 tablets at bed time (Max 1 mg ) 60 tablet 0    magnesium gluconate (MAGONATE) 500 mg tablet Take 1 tablet (500 mg total) by mouth daily 30 tablet 1    melatonin 3 mg Take 1 tablet (3 mg total) by mouth daily at bedtime 30 tablet 2    metFORMIN (GLUCOPHAGE-XR) 500 mg 24 hr tablet Take 2 tablets (1,000 mg total) by mouth daily with dinner 180 tablet 2    naloxone (NARCAN) 4 mg/0 1 mL nasal spray Administer 1 spray into a nostril  If no response after 2-3 minutes, give another dose in the other nostril using a new spray   It has to be on stand by use with opioid use - in case of overdose 1 each 1    ondansetron (Zofran ODT) 8 mg disintegrating tablet Take 1 tablet (8 mg total) by mouth every 8 (eight) hours as needed for nausea or vomiting 40 tablet 3    oxyCODONE (ROXICODONE) 10 MG TABS Take 1 tablet (10 mg total) by mouth every 6 (six) hours as needed for moderate pain Max Daily Amount: 40 mg 120 tablet 0    pantoprazole (PROTONIX) 40 mg tablet Take 1 tablet (40 mg total) by mouth daily in the early morning 90 tablet 1    benzonatate (TESSALON PERLES) 100 mg capsule Take 1 capsule (100 mg total) by mouth 3 (three) times a day as needed for cough (Patient not taking: Reported on 7/15/2022) 30 capsule 2    Blood Glucose Monitoring Suppl (FreeStyle Lite) FIDELIA       Cholecalciferol (Vitamin D3) 125 MCG (5000 UT) TABS Take 5,000 Units by mouth daily       Current Facility-Administered Medications   Medication Dose Route Frequency Provider Last Rate Last Admin    cyanocobalamin injection 1,000 mcg  1,000 mcg Intramuscular Q30 Days Nilsa José MD   1,000 mcg at 07/15/22 0920     _______________________________________________________________________  Review of Systems   Constitutional: Positive for fatigue  Negative for activity change, appetite change, fever and unexpected weight change  HENT: Negative for dental problem and trouble swallowing  Eyes: Negative for photophobia and visual disturbance  Respiratory: Negative for cough and chest tightness  Cardiovascular: Negative for chest pain, palpitations and leg swelling  Gastrointestinal: Positive for diarrhea  Negative for abdominal pain, constipation and vomiting  Endocrine: Negative for cold intolerance, polydipsia and polyuria  Genitourinary: Negative for difficulty urinating, frequency and urgency  Musculoskeletal: Negative for arthralgias, joint swelling, myalgias and neck pain  Skin: Negative for color change, rash and wound  Allergic/Immunologic: Negative for environmental allergies     Neurological: Negative for dizziness, weakness and numbness  Hematological: Does not bruise/bleed easily  Psychiatric/Behavioral: Positive for sleep disturbance  Negative for decreased concentration, dysphoric mood, self-injury and suicidal ideas  Objective:  Vitals:    07/15/22 0827   BP: 110/70   BP Location: Left arm   Patient Position: Sitting   Cuff Size: Standard   Pulse: 72   Resp: 16   Temp: (!) 96 9 °F (36 1 °C)   TempSrc: Temporal   SpO2: 97%   Weight: 62 4 kg (137 lb 9 6 oz)   Height: 5' 7" (1 702 m)     Body mass index is 21 55 kg/m²  Physical Exam  Vitals and nursing note reviewed  Constitutional:       Appearance: Normal appearance  He is well-developed  HENT:      Head: Normocephalic and atraumatic  Right Ear: Tympanic membrane, ear canal and external ear normal       Left Ear: Tympanic membrane, ear canal and external ear normal       Nose: Nose normal       Mouth/Throat:      Mouth: Mucous membranes are moist       Pharynx: Oropharynx is clear  Eyes:      Pupils: Pupils are equal, round, and reactive to light  Cardiovascular:      Rate and Rhythm: Normal rate and regular rhythm  Heart sounds: Normal heart sounds  Pulmonary:      Effort: Pulmonary effort is normal       Breath sounds: Normal breath sounds  Abdominal:      General: Abdomen is flat  Bowel sounds are normal       Palpations: Abdomen is soft  Musculoskeletal:         General: Normal range of motion  Cervical back: Normal range of motion and neck supple  Skin:     General: Skin is warm and dry  Capillary Refill: Capillary refill takes less than 2 seconds  Neurological:      General: No focal deficit present  Mental Status: He is alert and oriented to person, place, and time  Mental status is at baseline  Psychiatric:         Mood and Affect: Mood normal          Behavior: Behavior normal          Thought Content:  Thought content normal          Judgment: Judgment normal

## 2022-07-15 NOTE — PATIENT INSTRUCTIONS
Nutrition Rx & Recommendations:  Diet: Diabetic, High calorie, High protien diet  Small, frequent meals/snacks may be easier to tolerate than 3 large daily meals  Aim for 5-6 small meals per day (every 2-3 hours)  Include protein at all meals/snacks  Stay hydrated by sipping fluids of choice/tolerance throughout the day  For weight loss: monitor your weight at home at least 2x/week, record your weight, start by adding 250-500 extra calories per day, eat 5-6 small scheduled meals every 2-3 hrs, choose foods that are high in protein and calories (see pages 49-53 in your Eating Hints book), drink liquids with calories for example: milkshakes/smoothies/juice/soup/whole milk/chocolate milk, cook with protein fortified milk (see recipe on page 36 in your Eating Hints book), consider ready-to-drink oral nutrition supplements such as Ensure Plus, Ensure Enlive, Boost Plus, or Boost Very High Calorie, avoid "diet" and "light" foods when possible, avoid drinking too much with meals, contact your dietitian with any continued weight loss over the course of 1 week  For more info see pages 35-37 in your Eating Hints book  For diarrhea: drink plenty of fluids (>64 oz/day), avoid carbonation; eat 5-6 small meals/day; include high sodium & potassium foods/liquids (Sodium: soup/broth; Potassium: bananas, canned apricots, potatoes); eat low-fiber foods; choose foods/liquids that are room temperature; avoid foods/drinks that can make diarrhea worse (ex  high fiber, high sugar, hot/cold drinks, greasy/fatty foods, gas forming foods such as beans, raw produce, milk products, alcohol, spicy foods, caffeine, sugar alcohols (xylitol, sorbitol), and apple juice (high in sorbitol) (see pages 15-16 in your Eating Hints book)  If diarrhea is severe, consider adding Banatrol (banana flakes) 1-3 times per day mixed in applesauce (can be purchased online from [address])    Try adding soluble fiber: applesauce, banana, oatmeal, potatoes, rice, etc  to help thicken stools  Weigh yourself regularly  If you notice weight loss, make an effort to increase your daily food/calorie intake  If you continue to notice loss after these efforts, reach out to your dietitian to establish a plan to stabilize weight  Weigh yourself 2 times per week, record your weight  Nutrition Supplements: 1- per day, choose one with <10 grams of sugar per serving  Ideally one that has >10 grams protein and is >200 calories  Example: Glucerna or Ensure Max  Can increase supplement usage on days when eating is more difficult  Increase protein portion to 4 oz at meals  Continue preparing food with healthy, plant-based oils such as olive, canola, avocado, etc   Use larger portion to increase calorie intake without affecting blood sugars  Higher protein snack ideas: low-sugar [place] yogurt, SF pudding, 1/4-1/3 cup nuts, edamame, cheese stick, low-sugar oral nutrition supplement (Glucerna), oatmeal or lower sugar cereal with milk (choose a cereal with <10 grams of sugar per serving)  Fluid intake should be ~65-75 oz per day  Choose caffeine-free fluids  Water is the best choice  Ensure/Glucerna shakes count towards daily fluid goals  Ideas: SF jello, water, whole milk, unsweetened almond milk, unsweetened soy milk, herbal/decaf tea  Increase eating the week leading up to chemo, add snacks between meals  Trial Banatrol for diarrhea/loose stools for the week after chemo when diarrhea is problematic:  Start with 1 packet(s) 2-3 time(s) per day mixed with applesauce/oatmeal/juice/yogurt  You may increase to a maximum of 6 packets/day, no more than 2 packets at a single time  Continue until diarrhea/loose stools resolve or per RD  Banatrol may be purchased at [email or web address] or [employer]    Coupon Code: ETWR17 for 25% off     Follow Up Plan: 7/29 at 1pm for a phone follow up  Recommend Referral to Other Providers: none at this time

## 2022-07-20 NOTE — PROGRESS NOTES
Outpatient Oncology Nutrition Consultation   Type of Consult:  Follow Up  Care Location: Telephone Call - met with son Kirk Garza) per pt request   Nutrition Assessment:   Oncology Diagnosis & Treatments: non-small cell lung carcinoma with brain metastases  S/p partial brain SRT 3/30/21-4/8/22  Palliative chemotherapy (bevacizumab, paclitaxel, carboplatin) started 5/11/22 - last tx was 7/21/22; has scans coming up then will see Dr Adilene King at the end of August to discuss next steps  S/p brain radiation [facility] & SRT) 6/15/22-6/24/22  Oncology History   Adenocarcinoma of right lung ([facility] [address] )   3/28/2022 Initial Diagnosis    Adenocarcinoma of right lung ([facility] [address] )     5/11/2022 -  Chemotherapy    pegfilgrastim (Maximiliano Franz), 3 mg (100 % of original dose 3 mg), Subcutaneous, Once, 3 of 5 cycles  Dose modification: 3 mg (original dose 3 mg, Cycle 2)  Administration: 3 mg (6/2/2022), 3 mg (6/30/2022), 3 mg (7/22/2022)  fosaprepitant (EMEND) IVPB, 150 mg, Intravenous, Once, 4 of 6 cycles  Administration: 150 mg (5/11/2022), 150 mg (6/1/2022), 150 mg (6/29/2022), 150 mg (7/21/2022)  CARBOplatin (PARAPLATIN) IVPB (GOG AUC DOSING), 604 45 mg, Intravenous, Once, 4 of 6 cycles  Administration: 604 45 mg (5/11/2022), 485 4 mg (6/1/2022), 438 35 mg (6/29/2022), 593 45 mg (7/21/2022)  bevacizumab (AVASTIN) IVPB, 940 mg, Intravenous, Once, 4 of 6 cycles  Administration: 900 mg (5/11/2022), 900 mg (6/1/2022), 900 mg (6/29/2022), 900 mg (7/21/2022)  PACLItaxel (TAXOL) chemo IVPB, 148 75 mg/m2 = 254 4 mg (85 % of original dose 175 mg/m2), Intravenous, Once, 4 of 6 cycles  Dose modification: 148 75 mg/m2 (85 % of original dose 175 mg/m2, Cycle 1, Reason: Dose Not Tolerated)  Administration: 254 4 mg (5/11/2022), 299 4 mg (6/1/2022), 299 4 mg (6/29/2022), 299 4 mg (7/21/2022)     Lung cancer metastatic to brain ([facility] [address] )   3/30/2022 - 4/8/2022 Radiation      Treatments:  Course: C1 SRT    Plan ID Energy Fractions Dose per Fraction (cGy) Dose Correction (cGy) Total Dose Delivered (cGy) Elapsed Days   SRTLTemp 6X 5 / 5 600 0 3,000 9   SRTROccipital 6X 5 / 5 600 0 3,000 9      Treatment Dates:  3/30/2022 - 4/8/2022 5/26/2022 Initial Diagnosis    Lung cancer metastatic to brain [facility])       Past Medical & Surgical Hx:   Patient Active Problem List   Diagnosis    Type 2 diabetes mellitus without complication, with long-term current use of insulin (HCC)    Nonimmune to hepatitis B virus    Elevated PSA    Gall bladder polyp    Vitamin D deficiency    Cyanocobalamin deficiency    History of tobacco use    Lung mass    Brain mass    Cerebral edema (HCC)    Sepsis due to pneumonia    Obstructive pneumonia    Adenocarcinoma of right lung ([facility] [address] )    Pneumonia due to Klebsiella pneumoniae ([facility] [address] )    Cancer associated pain    Adjustment insomnia    Continuous opioid dependence ([facility] [address] )    Chronic obstructive pulmonary disease (HCC)    Palliative care patient    Chemotherapy induced neutropenia (HCC)    Dehydration    Acute right-sided thoracic back pain    Lung cancer metastatic to brain ([facility] [address] )    Therapeutic opioid-induced constipation ([facility]IC)    Pneumonia of right upper lobe due to infectious organism    Hyponatremia    Anemia     Past Medical History:   Diagnosis Date    Diabetes mellitus ([facility] [address] )     Elevated PSA 3/11/2021    Fatty liver 3/11/2021    Gall bladder polyp 3/11/2021    Lung cancer ([facility] [address] )     Nonimmune to hepatitis B virus 3/11/2021     Past Surgical History:   Procedure Laterality Date    FL GUIDED CENTRAL VENOUS ACCESS DEVICE INSERTION  5/10/2022    LUNG BIOPSY      OH INSJ TUNNELED CTR VAD W/SUBQ PORT AGE 5 YR/> N/A 5/10/2022    Procedure: INSERTION VENOUS PORT ( PORT-A-CATH) IR;  Surgeon: Eladio Alvarado DO;  Location: [facility] MAIN OR;  Service: Interventional Radiology     Review of Medications:   Vitamins, Supplements and Herbals: Med List Reviewed & pt is only taking: vitamin D, Mg    Current Outpatient Medications:     BD Pen Needle Jeaneth 2nd Gen 32G X 4 MM MISC, USE ONCE DAILY WITH LANTUS, Disp: , Rfl:     Blood Glucose Monitoring Suppl (FreeStyle Lite) FIDELIA, , Disp: , Rfl:     cephalexin (KEFLEX) 500 mg capsule, Take 1 capsule (500 mg total) by mouth every 6 (six) hours for 7 days, Disp: 28 capsule, Rfl: 0    Cholecalciferol (Vitamin D3) 125 MCG (5000 UT) CAPS, Take 1 capsule (5,000 Units total) by mouth daily, Disp: 90 capsule, Rfl: 2    Cholecalciferol (Vitamin D3) 125 MCG (5000 UT) TABS, Take 5,000 Units by mouth daily, Disp: , Rfl:     dextromethorphan-guaiFENesin (ROBITUSSIN DM)  mg/5 mL syrup, Take 10 mL by mouth every 4 (four) hours as needed for cough or congestion (mucus production), Disp: 354 mL, Rfl: 0    FREESTYLE LITE test strip, Check blood sugar  daily, Disp: 100 each, Rfl: 3    Insulin Pen Needle (BD Pen Needle Jeaneth U/F) 32G X 4 MM MISC, Use daily as directed with insulin pen, Disp: 100 each, Rfl: 3    ipratropium-albuterol (Combivent Respimat) inhaler, Inhale 1 puff 4 (four) times a day, Disp: 4 g, Rfl: 1    Lancets (freestyle) lancets, Check blood sugar daily, Disp: 100 each, Rfl: 3    levETIRAcetam (KEPPRA) 500 mg tablet, Take 1 tablet (500 mg total) by mouth every 12 (twelve) hours, Disp: 60 tablet, Rfl: 3    LORazepam (ATIVAN) 0 5 mg tablet, For sleep 1-2 tablets at night if you wake and have trouble returning to sleep (Max 1 mg), Disp: 60 tablet, Rfl: 2    magnesium gluconate (MAGONATE) 500 mg tablet, Take 1 tablet (500 mg total) by mouth daily, Disp: 30 tablet, Rfl: 1    melatonin 3 mg, Take 1 tablet (3 mg total) by mouth daily at bedtime, Disp: 30 tablet, Rfl: 2    menthol-methyl salicylate (BENGAY) 79-08 % cream, Apply topically 4 (four) times a day as needed (chest and back pain), Disp: 35 g, Rfl: 0    metFORMIN (GLUCOPHAGE-XR) 500 mg 24 hr tablet, Take 2 tablets (1,000 mg total) by mouth daily with dinner, Disp: 180 tablet, Rfl: 2    naloxone (NARCAN) 4 mg/0 1 mL nasal spray, Administer 1 spray into a nostril  If no response after 2-3 minutes, give another dose in the other nostril using a new spray   It has to be on stand by use with opioid use - in case of overdose (Patient not taking: Reported on 7/22/2022), Disp: 1 each, Rfl: 1    ondansetron (Zofran ODT) 8 mg disintegrating tablet, Take 1 tablet (8 mg total) by mouth every 8 (eight) hours as needed for nausea or vomiting, Disp: 40 tablet, Rfl: 3    oxyCODONE (ROXICODONE) 10 MG TABS, Take 1 tablet (10 mg total) by mouth every 6 (six) hours as needed for moderate pain Max Daily Amount: 40 mg, Disp: 120 tablet, Rfl: 0    pantoprazole (PROTONIX) 40 mg tablet, Take 1 tablet (40 mg total) by mouth daily in the early morning, Disp: 90 tablet, Rfl: 1    Current Facility-Administered Medications:     cyanocobalamin injection 1,000 mcg, 1,000 mcg, Intramuscular, Q30 Days, Nilsa Sanderson MD, 1,000 mcg at 07/15/22 0920    Most Recent Lab Results:   Lab Results   Component Value Date    WBC 13 77 (H) 07/27/2022    NEUTROABS 19 46 (H) 06/27/2022    CHOLESTEROL 154 01/29/2021    TRIG 52 01/29/2021    HDL 68 01/29/2021    LDLCALC 73 01/29/2021    ALT 10 07/25/2022    AST 13 07/25/2022    ALB 3 3 (L) 07/25/2022    SODIUM 130 (L) 07/27/2022    SODIUM 131 (L) 07/26/2022    K 4 1 07/27/2022    K 4 1 07/26/2022    CL 98 07/27/2022    BUN 25 07/27/2022    BUN 23 07/26/2022    CREATININE 0 69 07/27/2022    CREATININE 0 86 07/26/2022    EGFR 105 07/27/2022    PHOS 2 8 03/24/2022    PHOS 3 5 03/23/2022    GLUCOSE 363 (H) 01/06/2021    POCGLU 99 07/27/2022    GLUF 101 (H) 06/27/2022    GLUF 90 05/08/2022    GLUC 102 07/27/2022    HGBA1C 6 5 (H) 06/05/2022    HGBA1C 5 7 (H) 11/07/2021    HGBA1C 5 8 (H) 05/03/2021    CALCIUM 9 4 07/27/2022    MG 1 7 05/25/2022       Anthropometric Measurements:   Height: 66"  Ht Readings from Last 1 Encounters:   07/25/22 5' 7" (1 702 m)     Wt Readings from Last 20 Encounters:   07/25/22 64 1 kg (141 lb 5 oz)   07/22/22 64 4 kg (142 lb)   07/21/22 63 kg (139 lb)   07/15/22 62 4 kg (137 lb 9 6 oz)   07/12/22 62 6 kg (138 lb)   06/29/22 65 8 kg (145 lb)   06/24/22 64 kg (141 lb)   06/21/22 63 kg (139 lb)   06/08/22 62 3 kg (137 lb 6 4 oz)   06/08/22 62 3 kg (137 lb 6 4 oz)   06/01/22 63 8 kg (140 lb 10 5 oz)   05/31/22 64 4 kg (142 lb)   05/26/22 64 kg (141 lb)   05/11/22 62 1 kg (136 lb 14 5 oz)   05/10/22 62 6 kg (138 lb)   05/03/22 62 6 kg (138 lb)   04/27/22 65 1 kg (143 lb 8 oz)   04/26/22 63 4 kg (139 lb 12 8 oz)   04/22/22 64 1 kg (141 lb 6 4 oz)   04/15/22 66 kg (145 lb 9 6 oz)     Weight History:   Usual Weight: 150# (prior to DM dx 2021)   Home Weight: no new home wts    Oncology Nutrition-Anthropometrics    Flowsheet Row [facility] Nutrition from [facility]/2022 [facility]   Patient age (years): 62 years 62 years   Patient (male) height (in): 77 in 77 in   Current weight (lbs): 141 3 lbs 137 6 lbs   Current weight to be used for anthropometric calculations (kg) 64 2 kg 62 5 kg   BMI: 22 8 22 2   IBW male 142 lb 142 lb   IBW (kg) male 64 5 kg 64 5 kg   IBW % (male) 99 5 % 96 9 %   Adjusted BW (male): 141 8 lbs 140 9 lbs   Adjusted BW in kg (male): 64 5 kg 64 kg   % weight change after 1 month: 0 2 % -1 %   Weight change after 1 month (lbs) 0 3 lbs -1 4 lbs   % weight change after 3 months: -1 5 % -5 5 %   Weight change after 3 months (lbs) -2 2 lbs -8 lbs        Nutrition-Focused Physical Findings: n/a due to telephone call    Food/Nutrition-Related History & Client/Social History:    Current Nutrition Impact Symptoms:  [] Nausea  [x] Reduced Appetite - after chemo; now back to normal [] Acid Reflux    [] Vomiting  [] Unintended Wt Loss - hx [] Malabsorption    [] Diarrhea - none after last chemo d/t pt using imodium, did not want to try banatrol [] Unintended Wt Gain  [] Dumping Syndrome    [] Constipation  [] Thick Mucous/Secretions  [] Abdominal Pain    [] Dysgeusia (Altered Taste)  [] Xerostomia (Dry Mouth)  [] Gas    [] Dysosmia (Altered Smell)  [] Jalaine Reveal  [] Difficulty Chewing    [] Oral Mucositis (Sore Mouth)  [x] Fatigue & Weakness- 1-3 days after chemo [x] Hyperglycemia -Checks BG 1x/day; typically ~100, was up to 160 in the hospital  Lab Results   Component Value Date    HGBA1C 6 5 (H) 06/05/2022      [] Odynophagia  [] Esophagitis  [] Other:    [] Dysphagia  [] Early Satiety  [] No Problems Eating      Food Allergies & Intolerances: no    Current Diet: CHO-Controlled and [place] Diet   Eats salmon, pork, beef, all veggies, jackfruit, [place] pears, and rice  Pt likes cheesecake but has not been eating it d/t DM  Pt states he is open to being more flexible with his diet  Current Nutrition Intake:  Increased since last visit  Appetite: Good; but poor for ~3 days after chemo (forces self to eat/drink) - pt will eat what his wife makes  Nutrition Route: PO   Activity level: mostly sedentary; likes plants and gardening; limited by fatigue     24 Hr Diet Recall:    Breakfast: 1 Ensure Max, eggs, sausage  Snack: nuts  Lunch: 3/4 cup brown rice, 3 oz pork/salmon/beef/chicken/tilapia/cod, 3/4 cup veggie (green beans, asparagus, bok dionicio, cauliflower); on days when he cannot eat his wife will make him congee  Snack: fruit (peaches, plums, cherries)  Dinner: 3/4 cup brown rice, 3 oz pork/salmon/beef/chicken/tilapia/cod, 3/4 cup veggie (green beans, asparagus, bok dionicio, cauliflower)  Snack: low sugar ice cream, Ensure Max    Beverages: water (16 9 oz x3-4), coffee (sips), oolong or cindy tea (none lately), OJ or other juices that are available (none lately), Ensure Max (11 oz x2)  Supplements:   Ensure Clear (10 oz, 180 kcal, 8 g pro) - none lately  Ensure Max Protein (11 oz, 150 kcal, 30 g pro) - 2x/day, more willing to drink now     Oncology Nutrition-Estimated Needs    Flowsheet Row Nutrition from 5/12/2022 in [facility]  [address] [facility] On[facility] Dietitian Services   Weight type used Actual weight   Weight in kilograms (kg) used for estimated needs 62 2 kg   Energy needs formula:  35-40 kcal/kg   Energy needs based on 35 kcal/k kcal   Energy needs based on 40 kcal/k kcal   Protein needs formula: 1 5-2 g/kg   Protein needs based on 1 5 g/kg 93 g   Protein needs based on 2 g/kg 124 g   Fluid needs formula: 30-35 mL/kg   Fluid needs based on 30 mL/kg 1869 mL   Fluid needs in ounces 63 oz   Fluid needs based on 35 mL/kg 2181 mL   Fluid needs in ounces 74 oz           Discussion & Intervention:   Jannie Kothari was evaluated today for an RD follow up regarding wt loss and nutrition impact sx management  Jannie Kothari is currently undergoing tx for lung cancer  Jannie Kothari was recently hospitalized for pneumonia  He is now home and feeling better  His wt has been fluctuating but stable overall  His appetite is currently good and he is eating regular, consistent meals/snacks  He is now drinking Ensure Max BID  He did not have diarrhea after his last round of chemo d/t deciding to use imodium; he did not want to try Banatrol  Son reports pt has scans coming up and will follow up with Dr Anuradha Otero at the end of August; no tx scheduled for August at this time  Reviewed 24 hour recall, which revealed an adequate po intake, and discussed ways to increase kcal, protein, and fluid intakes and optimize nutrient intake  Also reviewed the importance of wt management throughout the tx process and the role of a high kcal/ high protein diet and carbohydrate controlled diet in managing wt and overall health  Based on today's assessment, discussion included: MNT for: diarrhea, adequate hydration & fluid choices, eating smaller more frequent meals every 2-3 hours (5-6 small meals/day), having consistent and planned snacks between meals and continuing oral nutrition supplements     Moving forward, Jannie Kothari will continue current nutrition plan of care   Materials Provided: not applicable  All questions and concerns addressed during todays visit  Robe Florian has RD contact information  Nutrition Diagnosis:   Increased Nutrient Needs (kcal & pro) related to increased demand for nutrients and disease state as evidenced by cancer dx and pt undergoing tx for cancer  Monitoring & Evaluation:   Goals:  weight maintenance/stabilization  adequate nutrition impact symptom management  pt to meet >/=75% estimated nutrition needs daily    Progress Towards Goals: Progressing    Nutrition Rx & Recommendations:  Diet: Diabetic, High calorie, High protien diet  Small, frequent meals/snacks may be easier to tolerate than 3 large daily meals  Aim for 5-6 small meals per day (every 2-3 hours)  Include protein at all meals/snacks  Stay hydrated by sipping fluids of choice/tolerance throughout the day  For weight loss: monitor your weight at home at least 2x/week, record your weight, start by adding 250-500 extra calories per day, eat 5-6 small scheduled meals every 2-3 hrs, choose foods that are high in protein and calories (see pages 49-53 in your Eating Hints book), drink liquids with calories for example: milkshakes/smoothies/juice/soup/whole milk/chocolate milk, cook with protein fortified milk (see recipe on page 36 in your Eating Hints book), consider ready-to-drink oral nutrition supplements such as Ensure Plus, Ensure Enlive, Boost Plus, or Boost Very High Calorie, avoid "diet" and "light" foods when possible, avoid drinking too much with meals, contact your dietitian with any continued weight loss over the course of 1 week  For more info see pages 35-37 in your Eating Hints book  For diarrhea: drink plenty of fluids (>64 oz/day), avoid carbonation; eat 5-6 small meals/day; include high sodium & potassium foods/liquids (Sodium: soup/broth;   Potassium: bananas, canned apricots, potatoes); eat low-fiber foods; choose foods/liquids that are room temperature; avoid foods/drinks that can make diarrhea worse (ex  high fiber, high sugar, hot/cold drinks, greasy/fatty foods, gas forming foods such as beans, raw produce, milk products, alcohol, spicy foods, caffeine, sugar alcohols (xylitol, sorbitol), and apple juice (high in sorbitol) (see pages 15-16 in your Eating Hints book)  If diarrhea is severe, consider adding Banatrol (banana flakes) 1-3 times per day mixed in applesauce (can be purchased online from [address] [place] [address])  Try adding soluble fiber: applesauce, banana, oatmeal, potatoes, rice, etc  to help thicken stools  Weigh yourself regularly  If you notice weight loss, make an effort to increase your daily food/calorie intake  If you continue to notice loss after these efforts, reach out to your dietitian to establish a plan to stabilize weight  Weigh yourself 2 times per week, record your weight  Nutrition Supplements: 2 per day, choose one with <10 grams of sugar per serving  Ideally one that has >10 grams protein and is >200 calories  Example: Glucerna or Ensure Max  Can increase supplement usage on days when eating is more difficult  Increase protein portion to 4 oz at meals  Continue preparing food with healthy, plant-based oils such as olive, canola, avocado, etc   Use larger portion to increase calorie intake without affecting blood sugars    Higher protein snack ideas: low-sugar [place] yogurt, SF pudding, 1/4-1/3 cup nuts, edamame, cheese stick, low-sugar oral nutrition supplement (Glucerna), oatmeal or lower sugar cereal with milk (choose a cereal with <10 grams of sugar per serving)  Fluid intake should be ~65-75 oz per day  Choose caffeine-free fluids  Water is the best choice  Ensure/Glucerna shakes count towards daily fluid goals  Ideas: SF jello, water, whole milk, unsweetened almond milk, unsweetened soy milk, herbal/decaf tea    Follow Up Plan: 8/31 at 1pm for a phone follow up  Recommend Referral to Other Providers: none at this time

## 2022-07-21 NOTE — PROGRESS NOTES
Patient tolerated treatment without complications   Patient and wife aware of appointment tomorrow at 2:30 for neulasta and hydration, declined AVS

## 2022-07-21 NOTE — PROGRESS NOTES
Patient to infusion for Taxol, Carboplatin and Avastin  Wife states patient had fever last night of 100 4 he took tylenol and fever came down 99  This morning he was afebrile, however took tylenol anyway  He has no signs of infection, no open wounds, no cold like symptoms or cough  Rudolph Laughter RN aware, no changes to treatment today  Port accessed, good blood return noted    Call bell within reach,  VSS

## 2022-07-22 NOTE — PROGRESS NOTES
Pt here for hydration and Neulasta injection  Port accessed and blood return noted  Pt resting comfortably with no further questions or concerns  Call bell with in reach

## 2022-07-22 NOTE — PATIENT INSTRUCTIONS
It was good to see you today  Thank you for coming in  Continue melatonin prior to bedtime, to help get to sleep  If you wake up at night and cannot return to sleep, take 1 tablet of the 0 5mg lorazepam (Ativan)  If after 1 hour you are not able to resume sleeping, you may take a 2nd tablet  No more than 2 tablets per 24 hour period  Continue oxycodone as needed for pain, a half-tablet or full tablet, depending on how severe your pain is  If diarrhea is a problem:  Try Bananatrol (banana flakes)  Use as directed / as-needed for diarrhea  This is something you may safely take every day  If you become constipated you may wish to stop using it, although it can treat both constipation and diarrhea in some patients  This available over-the-counter at some pharmacies, but you may have more success looking online ([employer])  Stay hydrated! If needed, it is okay to take Imodium for diarrhea  Use as directed, available over-the-counter  I hope your upcoming appointment with Pulmonology will help with the mucus / phlegm  Until then continue the Mucinex  Return in about 2 month  Call us for refills on medications that we supply, as needed  If something changes and you need to come in sooner, please call our office  PRESCRIPTION REFILL REMINDER:  All medication refills should be requested prior to [facility] on Friday  Any refill requests after noon on Friday would be addressed the following Monday

## 2022-07-22 NOTE — PROGRESS NOTES
Follow-up with Palliative and [address] [place] [address] 62 y o  male [id number]    ASSESSMENT & PLAN:  1  Adenocarcinoma of right lung ([facility] )    2  Lung cancer metastatic to brain ([facility] )    3  Chronic obstructive pulmonary disease, unspecified COPD type ([facility] )    4  Adjustment insomnia    5  Therapeutic opioid-induced constipation (OIC)    6  Cancer associated pain    7  Palliative care patient           Continue oxyIR PRN cancer- and treatment-related pain  He is taking 5-10mg q6h PRN, as he finds occasionally he does not need a full tab  Effective   Continue melatonin for insomnia   Continue lorazepam 0 5-1 0mg QHS PRN insomnia  May take 1-2 tabs of 0 5mg per night if he wakes and cannot return to sleep   Patient has been provided with a naloxone prescription   Continue PPI   Continue  Tylenol PRN   Continue guaifenesin for thick / increased mucus  Use OTC product if not covered by insurer  Patient plans to meet w/ Pulmonology to discuss options for reducing/controllong mucus   Counseled on bowel regimen for diarrhea   Continue disease-directed cares w/o limit   Patient has received [address] vaccinations   Reviewed notes (FM, Nutrition, Medical Oncology), labs (7/19/22 Cr 0 87, , alb 3 4, WBC 28 17, Hb 11 1), imaging + procedures (6/30/22 CXR)   Return in about 2 months (around 9/22/2022)   Emotional support provided   Medication safety issues addressed - no driving under the influence of narcotics, watch for adverse effects including AMS and respiratory depression, keep medications stored in a safe/locked environment        Requested Prescriptions     Signed Prescriptions Disp Refills    LORazepam (ATIVAN) 0 5 mg tablet 60 tablet 2     Sig: For sleep 1-2 tablets at night if you wake and have trouble returning to sleep (Max 1 mg)       Medications Discontinued During This Encounter   Medication Reason    LORazepam (ATIVAN) 0 5 mg tablet Reorder       Representatives have queried the patient's controlled substance dispensing history in the Prescription Drug Monitoring Program in compliance with regulations before I have prescribed any controlled substances  The prescription history is consistent with prescribed therapy and our practice policies  30 minutes were spent in this ambulatory visit with greater than 50% of the time spent face to face with patient and family (wife Georgia Liu) in counseling or coordination of care including discussions of symptom assessment and management, medication review, medication adjustment, psychosocial support, chart review, imaging review, lab review, supportive listening and anticipatory guidance  All of the patient's questions were answered during this discussion  SUBJECTIVE:  Chief Complaint   Patient presents with    Follow-up    Insomnia    Cancer    Cancer Pain    Counseling    Fatigue        HPI  Idalia Bower is a 62 y o  male w/ 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 s/p RT; COPD, DM2, hepatitis B, h/o nicotine dependence  He follows w/ Jack Zavala (Medical Oncology), Dr Annie Arauz (Radiation Oncology), Dr Janeth Barber (Neurosurgery)  Systemic treatment with paclitaxel + carboplatin + bevacizumab was briefly held so stereotactic RT could be provided  Patient is known to [facility] clinic; seen 6/24/22 for symptom assessment and management, medication review, medication adjustment, psychosocial support, chart review, imaging review, lab review, supportive listening and anticipatory guidance  Patient reports sleep continues to be an issue  He is able to reliably fall asleep with help from melatonin QHS, but will wake d/t toileting needs and frequently will not be able to return to sleep  Lorazepam has been helpful for sleep but he has exhausted his supply  Patient's mood is stable  He endorses fatigue   He reports his cancer- and treatment-related pain (focused in the small joints of his hands as well as in his legs, after infusions) is well managed using 5-10mg oxyIR up to 4x/day, along w/ PRN Tylenol  Patient reports, "I eat okay"  He is defending his weight  He denies nausea today  He has occasional diarrhea  Patient is concerned about thick mucus which can be quite bothersome, but only seems to be improving w/ guifenesin  He and his wife wonder how long he can safely take that medication  He plans to meet w/ Pulmonology 8/3/22 to discuss this and other issues  PDMP shows no concerns  The following portions of the medical history were reviewed: past medical history, surgical history, problem list, medication list, family history, and social history        Current Outpatient Medications:     LORazepam (ATIVAN) 0 5 mg tablet, For sleep 1-2 tablets at night if you wake and have trouble returning to sleep (Max 1 mg), Disp: 60 tablet, Rfl: 2    ondansetron (Zofran ODT) 8 mg disintegrating tablet, Take 1 tablet (8 mg total) by mouth every 8 (eight) hours as needed for nausea or vomiting, Disp: 40 tablet, Rfl: 3    oxyCODONE (ROXICODONE) 10 MG TABS, Take 1 tablet (10 mg total) by mouth every 6 (six) hours as needed for moderate pain Max Daily Amount: 40 mg, Disp: 120 tablet, Rfl: 0    BD Pen Needle Jeaneth 2nd Gen 32G X 4 MM MISC, USE ONCE DAILY WITH LANTUS, Disp: , Rfl:     benzonatate (TESSALON PERLES) 100 mg capsule, Take 1 capsule (100 mg total) by mouth 3 (three) times a day as needed for cough (Patient not taking: Reported on 7/15/2022), Disp: 30 capsule, Rfl: 2    Blood Glucose Monitoring Suppl (FreeStyle Lite) FIDELIA, , Disp: , Rfl:     Cholecalciferol (Vitamin D3) 125 MCG (5000 UT) CAPS, Take 1 capsule (5,000 Units total) by mouth daily, Disp: 90 capsule, Rfl: 2    Cholecalciferol (Vitamin D3) 125 MCG (5000 UT) TABS, Take 5,000 Units by mouth daily, Disp: , Rfl:     dexamethasone (DECADRON) 1 mg tablet, Take 1 tablet (1 mg total) by mouth 2 (two) times a day with meals for 14 days, THEN 1 tablet (1 mg total) daily with breakfast for 14 days  , Disp: 42 tablet, Rfl: 0    FREESTYLE LITE test strip, Check blood sugar  daily, Disp: 100 each, Rfl: 3    guaiFENesin (MUCINEX) 600 mg 12 hr tablet, Take 2 tablets (1,200 mg total) by mouth every 12 (twelve) hours as needed for cough or congestion (thick mucis), Disp: 60 tablet, Rfl: 2    Insulin Pen Needle (BD Pen Needle Jeaneth U/F) 32G X 4 MM MISC, Use daily as directed with insulin pen, Disp: 100 each, Rfl: 3    ipratropium-albuterol (Combivent Respimat) inhaler, Inhale 1 puff 4 (four) times a day, Disp: 4 g, Rfl: 1    Lancets (freestyle) lancets, Check blood sugar daily, Disp: 100 each, Rfl: 3    levETIRAcetam (KEPPRA) 500 mg tablet, Take 1 tablet (500 mg total) by mouth every 12 (twelve) hours, Disp: 60 tablet, Rfl: 3    magnesium gluconate (MAGONATE) 500 mg tablet, Take 1 tablet (500 mg total) by mouth daily, Disp: 30 tablet, Rfl: 1    melatonin 3 mg, Take 1 tablet (3 mg total) by mouth daily at bedtime, Disp: 30 tablet, Rfl: 2    metFORMIN (GLUCOPHAGE-XR) 500 mg 24 hr tablet, Take 2 tablets (1,000 mg total) by mouth daily with dinner, Disp: 180 tablet, Rfl: 2    naloxone (NARCAN) 4 mg/0 1 mL nasal spray, Administer 1 spray into a nostril  If no response after 2-3 minutes, give another dose in the other nostril using a new spray  It has to be on stand by use with opioid use - in case of overdose (Patient not taking: Reported on 7/22/2022), Disp: 1 each, Rfl: 1    pantoprazole (PROTONIX) 40 mg tablet, Take 1 tablet (40 mg total) by mouth daily in the early morning, Disp: 90 tablet, Rfl: 1    Current Facility-Administered Medications:     cyanocobalamin injection 1,000 mcg, 1,000 mcg, Intramuscular, Q30 Days, Nilsa Gonzalez MD, 1,000 mcg at 07/15/22 0920    Review of Systems   Constitutional: Positive for activity change and fatigue  Gastrointestinal: Positive for diarrhea  Musculoskeletal: Positive for arthralgias and myalgias  Allergic/Immunologic: Positive for immunocompromised state  Psychiatric/Behavioral: Positive for sleep disturbance  All other systems reviewed and are negative  OBJECTIVE:  /70 (BP Location: Left arm, Cuff Size: Standard)   Pulse 93   Temp (!) 97 3 °F (36 3 °C) (Temporal)   Wt 64 4 kg (142 lb)   SpO2 96%   BMI 22 24 kg/m²   Physical Exam  Vitals reviewed  Constitutional:       General: He is not in acute distress  Appearance: Normal appearance  He is normal weight  He is not toxic-appearing  HENT:      Head: Normocephalic and atraumatic  Right Ear: External ear normal       Left Ear: External ear normal    Eyes:      General: No scleral icterus  Right eye: No discharge  Left eye: No discharge  Extraocular Movements: Extraocular movements intact  Conjunctiva/sclera: Conjunctivae normal       Pupils: Pupils are equal, round, and reactive to light  Cardiovascular:      Rate and Rhythm: Normal rate  Pulmonary:      Effort: Pulmonary effort is normal  No tachypnea, bradypnea, accessory muscle usage or respiratory distress  Abdominal:      General: There is no distension  Tenderness: There is no guarding  Musculoskeletal:      Cervical back: Normal range of motion  Right lower leg: No edema  Left lower leg: No edema  Skin:     General: Skin is dry  Coloration: Skin is not pale  Neurological:      Mental Status: He is alert and oriented to person, place, and time  Cranial Nerves: No dysarthria or facial asymmetry  Gait: Gait is intact  Psychiatric:         Attention and Perception: Attention normal          Mood and Affect: Mood and affect normal          Speech: Speech normal          Behavior: Behavior normal  Behavior is cooperative  Thought Content:  Thought content normal          Cognition and Memory: Cognition and memory normal          Judgment: Judgment normal         Anselmo Martinez MD  [facility] Palliative and Supportive Care      Portions of this document may have been created using dictation software and as such some "sound alike" terms may have been generated by the system  Do not hesitate to contact me with any questions or clarifications

## 2022-07-25 PROBLEM — J18.9 PNEUMONIA OF RIGHT UPPER LOBE DUE TO INFECTIOUS ORGANISM: Status: ACTIVE | Noted: 2022-01-01

## 2022-07-25 PROBLEM — E87.1 HYPONATREMIA: Status: ACTIVE | Noted: 2022-01-01

## 2022-07-25 PROBLEM — D64.9 ANEMIA: Chronic | Status: ACTIVE | Noted: 2022-01-01

## 2022-07-25 NOTE — ASSESSMENT & PLAN NOTE
· No exacerbation  · Continue patient's DuoNeb nebulization 4 times a day  · Oxygen p r n  Christiano Arreola

## 2022-07-25 NOTE — ASSESSMENT & PLAN NOTE
Lab Results   Component Value Date    HGBA1C 6 5 (H) 06/05/2022       No results for input(s): POCGLU in the last 72 hours  Blood Sugar Average: Last 72 hrs:     · On metformin at home; hold off while in the hospital   · Insulin sliding scale for now  · Patient still on dexamethasone until 7/27, thus this may exacerbate patient's diabetes mellitus for the next 3 days and blood sugars may go down after course is done  · Diabetic diet  · Hypoglycemia protocol  · Adjust treatment accordingly

## 2022-07-25 NOTE — ASSESSMENT & PLAN NOTE
· From my review, patient had been having on and off hyponatremia in the past   · 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 and opiate medications  · Fluid restriction for now  · Patient was given 2 L of IV fluids in the emergency room  · Monitor  · For further evaluation and management if this worsens significantly

## 2022-07-25 NOTE — H&P
[address] [facility] 1965, 62 y o  male MRN: [id number]  Unit/Bed#: S -01 Encounter: [id number]  Primary Care Provider: Alyse Bence, MD   Date and time admitted to hospital: 7/25/2022  7:24 AM    * Pneumonia of right upper lobe due to infectious organism  Assessment & Plan  · Came in due to fever at home yesterday and today associated with productive cough, with difficult to expectorate phlegm and at times chest pains on coughing  · Possible postobstructive pneumonia as patient's pneumonia is located where his lung mass is  Patient had history of pneumonia, with last 1 early of this year with Klebsiella pneumoniae  · Pneumonia order set done  · Continue with IV Rocephin  · Urine antigens for Legionella and Streptococcus  · Sputum gram stain and culture  · Follow-up results of the blood cultures  · Check procalcitonin  · Monitor CBC with differential count  · 2 L of IV fluids already given in the emergency room  · Continue patient's Mucinex and Tessalon on as needed basis  · Oxygen p r n  To keep oxygen saturation 90% and above  · Will eventually need a repeat chest x-ray to determine if there is improvement of the opacity in the right upper lobe with the treatment for pneumonia, and to consider tumor progression if no improvement  Lung cancer metastatic to brain [facility])  Assessment & Plan  · Patient undergoing chemotherapy  Last chemotherapy was just last Thursday  According the patient's wife, next step is a PET scan, and tentative chemotherapy scheduled this coming September, which will also be based on the results of the PET scan  Patient had radiation treatment of his brain in the past   · Follows with oncologist   · Follows also with palliative care specialist   · Continue patient's dexamethasone and finish the course on July 27, 2022  · Continue pain control medication as needed        Chronic obstructive pulmonary disease [facility])  Assessment & Plan  · No exacerbation  · Continue patient's DuoNeb nebulization 4 times a day  · Oxygen p r n  Hyponatremia  Assessment & Plan  · From my review, patient had been having on and off hyponatremia in the past   · 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 and opiate medications  · Fluid restriction for now  · Patient was given 2 L of IV fluids in the emergency room  · Monitor  · For further evaluation and management if this worsens significantly  Anemia  Assessment & Plan  · From my review, chronic anemia likely due to malignancy and/or chemotherapy  · No active bleeding  · Monitor  · For further evaluation and management if this worsens significantly  Type 2 diabetes mellitus without complication, with long-term current use of insulin (HCC)  Assessment & Plan  Lab Results   Component Value Date    HGBA1C 6 5 (H) 06/05/2022       No results for input(s): POCGLU in the last 72 hours  Blood Sugar Average: Last 72 hrs:     · On metformin at home; hold off while in the hospital   · Insulin sliding scale for now  · Patient still on dexamethasone until 7/27, thus this may exacerbate patient's diabetes mellitus for the next 3 days and blood sugars may go down after course is done  · Diabetic diet  · Hypoglycemia protocol  · Adjust treatment accordingly  VTE Pharmacologic Prophylaxis: VTE Score: 3 Moderate Risk (Score 3-4) - Pharmacological DVT Prophylaxis Ordered: enoxaparin (Lovenox)  Code Status: Level 1 - Full Code   Discussion with family: Updated  (wife) at bedside  And patient's brother on the phone  Anticipated Length of Stay: Patient will be admitted on an inpatient basis with an anticipated length of stay of greater than 2 midnights secondary to Above findings and plans      Total Time for Visit, including Counseling / Coordination of Care: 70 minutes Greater than 50% of this total time spent on direct patient counseling and coordination of care     Chief Complaint:  Fever  History of Present Illness:  Sirisha Correa is a 62 y o  male with a PMH significant for metastatic lung cancer to the brain on chemotherapy, with last one done 4 days ago, status post radiation treatment to the brain, pneumonia, with last one early this year with Klebsiella pneumoniae and diabetes mellitus type 2 who presents with complaints of fever  According to the patient's wife, patient had fever yesterday at 101 4° F and she had given the patient Tylenol, to relieve it  Today, patient again had a fever of 100 9° F at home  Patient also has been having productive cough, with difficult to expectorate phlegm  According to the patient, at times when he coughs, he would have pains on his chest and back  Otherwise no other complaints  Patient denies any shortness of breath  Review of Systems:  Review of Systems     Ten point review systems done and they were negative except for the ones I mentioned in my history of present illness  Patient denies any headaches, dizziness or any loss of consciousness  Patient denies any nausea, vomiting or any abdominal pains  Patient denies any problems urinating or moving his bowels (no more constipation, as he usually had before)  For the other symptoms, please see notes above        Past Medical and Surgical History:   Past Medical History:   Diagnosis Date    Diabetes mellitus ([facility] )     Elevated PSA 3/11/2021    Fatty liver 3/11/2021    Gall bladder polyp 3/11/2021    Lung cancer ([facility] )     Nonimmune to hepatitis B virus 3/11/2021       Past Surgical History:   Procedure Laterality Date    FL GUIDED CENTRAL VENOUS ACCESS DEVICE INSERTION  5/10/2022    LUNG BIOPSY      NV INSJ TUNNELED CTR VAD W/SUBQ PORT AGE 5 YR/> N/A 5/10/2022    Procedure: INSERTION VENOUS PORT ( PORT-A-CATH) IR;  Surgeon: Luli Jackson DO;  Location: [facility];  Service: Interventional Radiology       Meds/Allergies:  Prior to Admission medications    Medication Sig Start Date End Date Taking? Authorizing Provider   BD Pen Needle Jeaneth 2nd Gen 32G X 4 MM MISC USE ONCE DAILY WITH LANTUS 1/7/21   Historical Provider, MD   benzonatate (TESSALON PERLES) 100 mg capsule Take 1 capsule (100 mg total) by mouth 3 (three) times a day as needed for cough  Patient not taking: Reported on 7/15/2022 4/15/22   Nilsa Zamora MD   Blood Glucose Monitoring Suppl (FreeStyle Lite) FIDELIA  1/7/21   Historical Provider, MD   Cholecalciferol (Vitamin D3) 125 MCG (5000 UT) CAPS Take 1 capsule (5,000 Units total) by mouth daily 11/17/21 7/15/22  Nilsa Zamora MD   Cholecalciferol (Vitamin D3) 125 MCG (5000 UT) TABS Take 5,000 Units by mouth daily    Historical Provider, MD   dexamethasone (DECADRON) 1 mg tablet Take 1 tablet (1 mg total) by mouth 2 (two) times a day with meals for 14 days, THEN 1 tablet (1 mg total) daily with breakfast for 14 days   6/30/22 7/28/22  Chase Vasquez MD   FREESTYLE LITE test strip Check blood sugar  daily 11/17/21   Nilsa Zamora MD   guaiFENesin (MUCINEX) 600 mg 12 hr tablet Take 2 tablets (1,200 mg total) by mouth every 12 (twelve) hours as needed for cough or congestion (thick mucis) 4/27/22   Jackelin Quiñones MD   Insulin Pen Needle (BD Pen Needle Jeaneth U/F) 32G X 4 MM MISC Use daily as directed with insulin pen 3/28/22   Nilsa Zamora MD   ipratropium-albuterol (Combivent Respimat) inhaler Inhale 1 puff 4 (four) times a day 4/15/22   Nilsa Zamora MD   Lancets (freestyle) lancets Check blood sugar daily 11/17/21   Nilsa Zamora MD   levETIRAcetam (KEPPRA) 500 mg tablet Take 1 tablet (500 mg total) by mouth every 12 (twelve) hours 3/28/22   Nilsa Zamora MD   LORazepam (ATIVAN) 0 5 mg tablet For sleep 1-2 tablets at night if you wake and have trouble returning to sleep (Max 1 mg) 7/22/22   Jackelin Quiñones MD   magnesium gluconate (MAGONATE) 500 mg tablet Take 1 tablet (500 mg total) by mouth daily 5/9/22 7/15/22  Nilsa Fishman MD   melatonin 3 mg Take 1 tablet (3 mg total) by mouth daily at bedtime 22   Polly Giralod MD   metFORMIN (GLUCOPHAGE-XR) 500 mg 24 hr tablet Take 2 tablets (1,000 mg total) by mouth daily with dinner 3/28/22 7/15/22  Nilsa Fishman MD   naloxone [facility]) 4 mg/0 1 mL nasal spray Administer 1 spray into a nostril  If no response after 2-3 minutes, give another dose in the other nostril using a new spray  It has to be on stand by use with opioid use - in case of overdose  Patient not taking: Reported on 2022   JONATHAN Burnett   ondansetron (Zofran ODT) 8 mg disintegrating tablet Take 1 tablet (8 mg total) by mouth every 8 (eight) hours as needed for nausea or vomiting 22   Blayne David MD   oxyCODONE (ROXICODONE) 10 MG TABS Take 1 tablet (10 mg total) by mouth every 6 (six) hours as needed for moderate pain Max Daily Amount: 40 mg 22   Polly Giraldo MD   pantoprazole (PROTONIX) 40 mg tablet Take 1 tablet (40 mg total) by mouth daily in the early morning 22   Nilsa Fishman MD     I have reviewed home medications using recent Epic encounter  Allergies: No Known Allergies    Social History:  Marital Status: /Civil Union   Patient Pre-hospital Living Situation: Home, With spouse  Patient Pre-hospital Diet Restrictions:  None    Substance Use History:   Social History     Substance and Sexual Activity   Alcohol Use Not Currently    Comment: quit drinking 7 years ago     Social History     Tobacco Use   Smoking Status Former Smoker    Packs/day: 0 50    Years: 40 00    Pack years: 20 00    Types: Cigarettes    Quit date: 2022    Years since quittin 5   Smokeless Tobacco Never Used     Social History     Substance and Sexual Activity   Drug Use Never       Family History:  Family History   Problem Relation Age of Onset    No Known Problems Mother     No Known Problems Father        Physical Exam: Vitals:   Blood Pressure: 126/74 (07/25/22 1155)  Pulse: 79 (07/25/22 1155)  Temperature: 98 5 °F (36 9 °C) (07/25/22 1155)  Temp Source: Oral (07/25/22 1155)  Respirations: 18 (07/25/22 1155)  Height: 5' 7" (170 2 cm) (07/25/22 0726)  Weight - Scale: 64 1 kg (141 lb 5 oz) (07/25/22 0726)  SpO2: 95 % (07/25/22 1155)    Physical Exam  Vitals and nursing note reviewed  Exam conducted with a chaperone present  Constitutional:       General: He is not in acute distress  Appearance: He is not ill-appearing, toxic-appearing or diaphoretic  HENT:      Mouth/Throat:      Mouth: Mucous membranes are moist       Pharynx: Oropharynx is clear  No oropharyngeal exudate or posterior oropharyngeal erythema  Eyes:      General: No scleral icterus  Right eye: No discharge  Left eye: No discharge  Conjunctiva/sclera: Conjunctivae normal    Cardiovascular:      Rate and Rhythm: Normal rate and regular rhythm  Heart sounds: Normal heart sounds  Pulmonary:      Effort: Pulmonary effort is normal  No respiratory distress  Breath sounds: No stridor  Rales present  No wheezing or rhonchi  Comments: Positive for rales at the right lung fields  Positive for decreased breath sounds at the right upper lung field  Chest:      Chest wall: No tenderness  Abdominal:      General: Bowel sounds are normal  There is no distension  Palpations: Abdomen is soft  Tenderness: There is no abdominal tenderness  There is no guarding  Musculoskeletal:      Right lower leg: No edema  Left lower leg: No edema  Skin:     General: Skin is warm  Coloration: Skin is not pale  Findings: No erythema or rash  Neurological:      General: No focal deficit present  Mental Status: He is alert  Mental status is at baseline  Psychiatric:         Mood and Affect: Mood normal          Behavior: Behavior normal          Thought Content:  Thought content normal               Additional Data:     Lab Results:  Results from last 7 days   Lab Units 07/25/22  0825   WBC Thousand/uL 27 31*   HEMOGLOBIN g/dL 10 7*   HEMATOCRIT % 32 3*   PLATELETS Thousands/uL 302   BANDS PCT % 9*   LYMPHO PCT % 4*   MONO PCT % 0*   EOS PCT % 0     Results from last 7 days   Lab Units 07/25/22  0825   SODIUM mmol/L 129*   POTASSIUM mmol/L 4 0   CHLORIDE mmol/L 93*   CO2 mmol/L 28   BUN mg/dL 24   CREATININE mg/dL 0 77   ANION GAP mmol/L 8   CALCIUM mg/dL 9 6   ALBUMIN g/dL 3 3*   TOTAL BILIRUBIN mg/dL 0 65   ALK PHOS U/L 149*   ALT U/L 10   AST U/L 13   GLUCOSE RANDOM mg/dL 82         Results from last 7 days   Lab Units 07/25/22  1255   POC GLUCOSE mg/dl 86         Results from last 7 days   Lab Units 07/25/22  0825   LACTIC ACID mmol/L 1 2       Imaging: Reviewed radiology reports from this admission including: chest xray  XR chest portable   Final Result by Moni Joseph MD (07/25 6425)      Redemonstration of large right upper lobe tumor  It cannot be determined if the additional increased opacity in the right upper lobe when compared with 6/30/2022 is due to tumor progression or superimposed pneumonia  Workstation performed: [id number]             EKG and Other Studies Reviewed on Admission:   · EKG: NSR  HR Seventy-five per minute  ** Please Note: This note has been constructed using a voice recognition system   **

## 2022-07-25 NOTE — ASSESSMENT & PLAN NOTE
· Patient undergoing chemotherapy  Last chemotherapy was just last Thursday  According the patient's wife, next step is a PET scan, and tentative chemotherapy scheduled this coming September, which will also be based on the results of the PET scan  Patient had radiation treatment of his brain in the past   · Follows with oncologist   · Follows also with palliative care specialist   · Continue patient's dexamethasone and finish the course on July 27, 2022  · Continue pain control medication as needed

## 2022-07-25 NOTE — ASSESSMENT & PLAN NOTE
· From my review, chronic anemia likely due to malignancy and/or chemotherapy  · No active bleeding  · Monitor  · For further evaluation and management if this worsens significantly

## 2022-07-25 NOTE — ED PROVIDER NOTES
History  Chief Complaint   Patient presents with    Fever - 9 weeks to 76 years     Per wife pt started with fever yesterday and cough about 3 weeks ago     This is a 59-year-old male patient with a relevant past medical history of stage IV adenocarcinoma of the lung, on chemo with his most recent treatment being on 07/21/2022, presented to the ED today for complaints of a fever  At home his fever was 101 4 yesterday, and this morning it was 100 9, patient has taken Tylenol for his symptoms  He also has had a cough, which has been productive of sputum, but he has not paid attention to the color  He also has had loose stools over the past 24-48 hours, and he has not noticed any changes in color of his stool  He otherwise denies any significantly related symptoms  Fever - 9 weeks to 74 years  Associated symptoms: cough and diarrhea    Associated symptoms: no chest pain, no chills, no dysuria, no ear pain, no rash, no sore throat and no vomiting        Prior to Admission Medications   Prescriptions Last Dose Informant Patient Reported? Taking?    BD Pen Needle Jeaneth 2nd Gen 32G X 4 MM MISC  Spouse/Significant Other Yes No   Sig: USE ONCE DAILY WITH LANTUS   Blood Glucose Monitoring Suppl (FreeStyle Lite) FIDELIA  Spouse/Significant Other Yes No   Cholecalciferol (Vitamin D3) 125 MCG (5000 UT) CAPS  Spouse/Significant Other No No   Sig: Take 1 capsule (5,000 Units total) by mouth daily   Cholecalciferol (Vitamin D3) 125 MCG (5000 UT) TABS  Spouse/Significant Other Yes No   Sig: Take 5,000 Units by mouth daily   FREESTYLE LITE test strip  Spouse/Significant Other No No   Sig: Check blood sugar  daily   Insulin Pen Needle (BD Pen Needle Jeaneth U/F) 32G X 4 MM MISC  Spouse/Significant Other No No   Sig: Use daily as directed with insulin pen   LORazepam (ATIVAN) 0 5 mg tablet   No No   Sig: For sleep 1-2 tablets at night if you wake and have trouble returning to sleep (Max 1 mg)   Lancets (freestyle) lancets Spouse/Significant Other No No   Sig: Check blood sugar daily   benzonatate (TESSALON PERLES) 100 mg capsule  Spouse/Significant Other No No   Sig: Take 1 capsule (100 mg total) by mouth 3 (three) times a day as needed for cough   Patient not taking: Reported on 7/15/2022   dexamethasone (DECADRON) 1 mg tablet   No No   Sig: Take 1 tablet (1 mg total) by mouth 2 (two) times a day with meals for 14 days, THEN 1 tablet (1 mg total) daily with breakfast for 14 days  guaiFENesin (MUCINEX) 600 mg 12 hr tablet   No No   Sig: Take 2 tablets (1,200 mg total) by mouth every 12 (twelve) hours as needed for cough or congestion (thick mucis)   ipratropium-albuterol (Combivent Respimat) inhaler  Spouse/Significant Other No No   Sig: Inhale 1 puff 4 (four) times a day   levETIRAcetam (KEPPRA) 500 mg tablet  Spouse/Significant Other No No   Sig: Take 1 tablet (500 mg total) by mouth every 12 (twelve) hours   magnesium gluconate (MAGONATE) 500 mg tablet  Spouse/Significant Other No No   Sig: Take 1 tablet (500 mg total) by mouth daily   melatonin 3 mg   No No   Sig: Take 1 tablet (3 mg total) by mouth daily at bedtime   metFORMIN (GLUCOPHAGE-XR) 500 mg 24 hr tablet  Spouse/Significant Other No No   Sig: Take 2 tablets (1,000 mg total) by mouth daily with dinner   naloxone (NARCAN) 4 mg/0 1 mL nasal spray  Spouse/Significant Other No No   Sig: Administer 1 spray into a nostril  If no response after 2-3 minutes, give another dose in the other nostril using a new spray   It has to be on stand by use with opioid use - in case of overdose   Patient not taking: Reported on 7/22/2022   ondansetron (Zofran ODT) 8 mg disintegrating tablet   No No   Sig: Take 1 tablet (8 mg total) by mouth every 8 (eight) hours as needed for nausea or vomiting   oxyCODONE (ROXICODONE) 10 MG TABS   No No   Sig: Take 1 tablet (10 mg total) by mouth every 6 (six) hours as needed for moderate pain Max Daily Amount: 40 mg   pantoprazole (PROTONIX) 40 mg tablet Spouse/Significant Other No No   Sig: Take 1 tablet (40 mg total) by mouth daily in the early morning      Facility-Administered Medications Last Administration Doses Remaining   cyanocobalamin injection 1,000 mcg 7/15/2022  9:20 AM           Past Medical History:   Diagnosis Date    Diabetes mellitus ([facility] [address] )     Elevated PSA 3/11/2021    Fatty liver 3/11/2021    Gall bladder polyp 3/11/2021    [facility] ([facility] )     Nonimmune to hepatitis B virus 3/11/2021       Past Surgical History:   Procedure Laterality Date    FL GUIDED CENTRAL VENOUS ACCESS DEVICE INSERTION  5/10/2022    LUNG BIOPSY      LA INSJ TUNNELED CTR VAD W/SUBQ PORT AGE 5 YR/> N/A 5/10/2022    Procedure: INSERTION VENOUS PORT ( PORT-A-CATH) IR;  Surgeon: Anselmo Hess DO;  [facility]: [facility] MAIN OR;  Service: Interventional Radiology       Family History   Problem Relation Age of Onset    No Known Problems Mother     No Known Problems Father      I have reviewed and agree with the history as documented  E-Cigarette/Vaping    E-Cigarette Use Never User      E-Cigarette/Vaping Substances    Nicotine No     THC No     CBD No     Flavoring No     Other No     Unknown No      Social History     Tobacco Use    Smoking status: Former Smoker     Packs/day: 0 50     Years: 40 00     Pack years: 20 00     Types: Cigarettes     Quit date: 2022     Years since quittin 5    Smokeless tobacco: Never Used   Vaping Use    Vaping Use: Never used   Substance Use Topics    Alcohol use: Not Currently     Comment: quit drinking 7 years ago    Drug use: Never       Review of Systems   Constitutional: Positive for fever  Negative for chills  HENT: Negative for ear pain and sore throat  Eyes: Negative for pain and visual disturbance  Respiratory: Positive for cough  Negative for shortness of breath  Cardiovascular: Negative for chest pain and palpitations  Gastrointestinal: Positive for diarrhea   Negative for abdominal pain and vomiting  Genitourinary: Negative for dysuria and hematuria  Musculoskeletal: Negative for arthralgias and back pain  Skin: Negative for color change and rash  Neurological: Negative for seizures and syncope  All other systems reviewed and are negative  Physical Exam  Physical Exam  Vitals and nursing note reviewed  Constitutional:       General: He is not in acute distress  Appearance: Normal appearance  He is well-developed and normal weight  He is not ill-appearing or toxic-appearing  HENT:      Head: Normocephalic and atraumatic  Right Ear: External ear normal       Left Ear: External ear normal       Nose: Nose normal  No congestion or rhinorrhea  Mouth/Throat:      Mouth: Mucous membranes are moist       Pharynx: Oropharynx is clear  Eyes:      General: No scleral icterus  Conjunctiva/sclera: Conjunctivae normal    Cardiovascular:      Rate and Rhythm: Normal rate and regular rhythm  Pulses: Normal pulses  Heart sounds: Normal heart sounds  No murmur heard  Pulmonary:      Effort: Pulmonary effort is normal  No respiratory distress  Breath sounds: Rhonchi (Left base) present  No wheezing or rales  Abdominal:      General: Abdomen is flat  Bowel sounds are normal  There is no distension  Palpations: Abdomen is soft  There is no mass  Tenderness: There is no abdominal tenderness  Musculoskeletal:         General: Normal range of motion  Cervical back: Normal range of motion and neck supple  No tenderness  Right lower leg: No edema  Left lower leg: No edema  Skin:     General: Skin is warm and dry  Capillary Refill: Capillary refill takes less than 2 seconds  Neurological:      General: No focal deficit present  Mental Status: He is alert and oriented to person, place, and time  Mental status is at baseline  Motor: No weakness     Psychiatric:         Mood and Affect: Mood normal          Behavior: Behavior normal          Thought Content: Thought content normal          Judgment: Judgment normal          Vital Signs  ED Triage Vitals [07/25/22 0726]   Temperature Pulse Respirations Blood Pressure SpO2   98 2 °F (36 8 °C) 84 16 98/68 97 %      Temp src Heart Rate Source Patient Position - Orthostatic VS BP Location FiO2 (%)   -- -- -- -- --      Pain Score       9           Vitals:    07/25/22 0726   BP: 98/68   Pulse: 84         Visual Acuity      ED Medications  Medications - No data to display    Diagnostic Studies  Results Reviewed     None                 No orders to display              Procedures  CriticalCare Time  Performed by: Jermaine Conrad MD  Authorized by: Jermaine Conrad MD     Critical care provider statement:     Critical care time (minutes):  40    Critical care time was exclusive of:  Separately billable procedures and treating other patients    Critical care was necessary to treat or prevent imminent or life-threatening deterioration of the following conditions:  Sepsis    Critical care was time spent personally by me on the following activities:  Ordering and performing treatments and interventions and ordering and review of laboratory studies             ED Course                                             MDM  Number of Diagnoses or Management Options  Hyponatremia  Pneumonia  Diagnosis management comments: This is a 66-year-old male patient presenting to the ED for fever, cough  At home his temperature was significantly elevated, up to 101 4  He is here on presentation has not had a fever  He does have a history of lung cancer for which she is on chemotherapy, and his most recent chemotherapy treatment was on the 20th of this month  Patient on exam has rhonchorous breath sounds in bilateral bases, most pronounced on the left side  His differential diagnosis includes:  Postobstructive pneumonia versus COVID pneumonia versus urinary tract infection versus other    Patient had a CBC showing a leukocytosis, but he has had a leukocytosis chronically, he does however have an increased left shift of 80%  He did have increasing consolidation adjacent to his mass, concerning for potential worsening of his cancer versus superimposed pneumonia  Patient was started on Rocephin, azithromycin, and requested to be hospitalized by the hospice provider whom accepted without any further order as requested  Patient received his 27 cc/kilogram of IV fluids here in the ED  He also had blood cultures and lactic acid drawn  Disposition  Final diagnoses:   None     ED Disposition     None      Follow-up Information    None         Patient's Medications   Discharge Prescriptions    No medications on file       No discharge procedures on file      PDMP Review       Value Time User    PDMP Reviewed  Yes 7/22/2022  1:40 PM Hal Flowers MD          ED Provider  Electronically Signed by           Jayjay Amin MD  07/25/22 0625

## 2022-07-25 NOTE — ASSESSMENT & PLAN NOTE
· Came in due to fever at home yesterday and today associated with productive cough, with difficult to expectorate phlegm and at times chest pains on coughing  · Possible postobstructive pneumonia as patient's pneumonia is located where his lung mass is  Patient had history of pneumonia, with last 1 early of this year with Klebsiella pneumoniae  · Pneumonia order set done  · Continue with IV Rocephin  · Urine antigens for Legionella and Streptococcus  · Sputum gram stain and culture  · Follow-up results of the blood cultures  · Check procalcitonin  · Monitor CBC with differential count  · 2 L of IV fluids already given in the emergency room  · Continue patient's Mucinex and Tessalon on as needed basis  · Oxygen p r n  To keep oxygen saturation 90% and above  · Will eventually need a repeat chest x-ray to determine if there is improvement of the opacity in the right upper lobe with the treatment for pneumonia, and to consider tumor progression if no improvement

## 2022-07-26 NOTE — UTILIZATION REVIEW
Initial Clinical Review    Admission: Date/Time/Statement:   Admission Orders (From admission, onward)     Ordered        07/25/22 1015  INPATIENT ADMISSION  Once                      Orders Placed This Encounter   Procedures    INPATIENT ADMISSION     Standing Status:   Standing     Number of Occurrences:   1     Order Specific Question:   Level of Care     Answer:   Med Surg [16]     Order Specific Question:   Estimated length of stay     Answer:   More than 2 Midnights     Order Specific Question:   Certification     Answer:   I certify that inpatient services are medically necessary for this patient for a duration of greater than two midnights  See H&P and MD Progress Notes for additional information about the patient's course of treatment  ED Arrival Information     Expected   -    Arrival   7/25/2022 07:12    Acuity   Emergent            Means of arrival   Walk-In    Escorted by   Family Member    Service   Hospitalist    Admission type   Emergency            Arrival complaint   fever/cough           Chief Complaint   Patient presents with    Fever - 9 weeks to 76 years     Per wife pt started with fever yesterday and cough about 3 weeks ago       Initial Presentation: 62 y o  male PMH DM2,COPD,  metastatic lung cancer to the brain on chemotherapy, with last one done 4 days ago,s/p radiation treatment to the brain, pneumonia- with last one early this year  Klebsiella pneumoniae who presents to ED from home with complaints of fever of 101 4 F at home yesterday  Associated productive cough with difficulty expectorating  Pt reports pain in chest and back when coughs at times   Denies SOB   On exam, has rales R lung fields, decreased breath sounds R upper lung, sat 96 %on RA    Labs WBC 27 31, sodium 129, Hgb 10 7   CXR shows large right upper lobe tumor with additional increased opacity-could be due to tumor progression or superimposed pneumonia  Pt given IVF, IV abx in ED   Pt admitted as Inpatient with PNA RUL due to infectious process, hyponatremia  Plan - IV Rocephin, urine antigens for legionella and strep, sputum culture and Gm stain, F/U blood cultures, check procal, Mucinex and tessalon perles PRN, Os to keep sat >90 %  Complete home dexamethasone until 7/27/completion, [place], SSI   Pain control  Fluid restriction, monitor sodium  Monitor hgb  Date: 7/26  Day 2:   Procal 0 38--->0 32 today WBC down to 21  Urine antigens for Legionella and Streptococcus are negative  Sputum pending  Pt afebrile, on RA with sat 96 % this am    Pt reports mild pain in back and chest w/ coughing, continues with phlegm, denies SOB  Ordered Thor- correia and robitussin   Pt receiving po prn narcotic analgesic for pain   Continue IV Ceftriaxone   CBC and BMP in am   Monitoring blood sugars whie completing po steroid  Glucose  this am       ED Triage Vitals   Temperature Pulse Respirations Blood Pressure SpO2   07/25/22 0726 07/25/22 0726 07/25/22 0726 07/25/22 0726 07/25/22 0726   98 2 °F (36 8 °C) 84 16 98/68 97 %      Temp Source Heart Rate Source Patient Position - Orthostatic VS BP Location FiO2 (%)   07/25/22 1155 07/25/22 1134 07/25/22 1155 07/25/22 1134 --   Oral Monitor Lying Right arm       Pain Score       07/25/22 0726       9          Wt Readings from Last 1 Encounters:   07/25/22 64 1 kg (141 lb 5 oz)     Additional Vital Signs:   Date/Time Temp Pulse Resp BP MAP (mmHg) SpO2   07/26/22 0700 98 3 °F (36 8 °C) 68 18 104/59 -- 96 %   07/25/22 2323 98 °F (36 7 °C) 68 18 118/65 91 97 %   07/25/22 1500 98 2 °F (36 8 °C) 74 18 121/68 -- 96 %   07/25/22 1155 98 5 °F (36 9 °C) 79 18 126/74 93 95 %   07/25/22 1134 -- 73 16 103/70 -- 96 %   07/25/22 0915 -- 98 -- 104/63 79 97 %       Pertinent Labs/Diagnostic Test Results:   XR chest portable   Final Result by Kraig Short MD (07/25 9703)      Redemonstration of large right upper lobe tumor    It cannot be determined if the additional increased opacity in the right upper lobe when compared with 6/30/2022 is due to tumor progression or superimposed pneumonia                    Workstation performed: [id number]         7/25 ECG-  NSR  Results from last 7 days   Lab Units 07/25/22  0825   SARS-COV-2  Negative     Results from last 7 days   Lab Units 07/26/22  0544 07/25/22  1254 07/25/22  0825 07/19/22  1539   WBC Thousand/uL 21 34*  --  27 31* 28 17*   HEMOGLOBIN g/dL 10 1*  --  10 7* 11 1*   HEMATOCRIT % 31 3*  --  32 3* 34 4*   PLATELETS Thousands/uL 296 261 302 392*   BANDS PCT % 4  --  9* 3         Results from last 7 days   Lab Units 07/26/22  0544 07/25/22  0825 07/19/22  1539   SODIUM mmol/L 131* 129* 134*   POTASSIUM mmol/L 4 1 4 0 4 3   CHLORIDE mmol/L 97 93* 98   CO2 mmol/L 28 28 29   ANION GAP mmol/L 6 8 7   BUN mg/dL 23 24 24   CREATININE mg/dL 0 86 0 77 0 87   EGFR ml/min/1 73sq m 96 100 95   CALCIUM mg/dL 9 3 9 6 9 4     Results from last 7 days   Lab Units 07/25/22  0825 07/19/22  1539   AST U/L 13 12*   ALT U/L 10 11   ALK PHOS U/L 149* 136*   TOTAL PROTEIN g/dL 6 8 7 4   ALBUMIN g/dL 3 3* 3 4*   TOTAL BILIRUBIN mg/dL 0 65 0 33     Results from last 7 days   Lab Units 07/26/22  1058 07/26/22  0722 07/25/22  2051 07/25/22  1608 07/25/22  1255   POC GLUCOSE mg/dl 110 98 133 155* 86     Results from last 7 days   Lab Units 07/26/22  0544 07/25/22  0825 07/19/22  1539   GLUCOSE RANDOM mg/dL 161* 82 114             BETA-HYDROXYBUTYRATE   Date Value Ref Range Status   01/06/2021 0 6 (H) <0 6 mmol/L Final                Results from last 7 days   Lab Units 07/25/22  1219 07/25/22  1011 07/25/22  0825   HS TNI 0HR ng/L  --   --  11   HS TNI 2HR ng/L  --  10  --    HSTNI D2 ng/L  --  -1  --    HS TNI 4HR ng/L 9  --   --    HSTNI D4 ng/L -2  --   --                  Results from last 7 days   Lab Units 07/26/22  0544 07/25/22  1254   PROCALCITONIN ng/ml 0 32* 0 38*     Results from last 7 days   Lab Units 07/25/22  0825   LACTIC ACID mmol/L 1 2             Results from last 7 days   Lab Units 07/25/22  0825   BNP pg/mL 139*           Results from last 7 days   Lab Units 07/25/22  1008 07/19/22  1539   CLARITY UA  Clear  --    COLOR UA  Light Yellow  --    SPEC GRAV UA  1 010  --    PH UA  5 0  --    GLUCOSE UA mg/dl Negative  --    KETONES UA mg/dl Negative  --    BLOOD UA  Negative  --    PROTEIN UA mg/dl Negative  --    NITRITE UA  Negative  --    BILIRUBIN UA  Negative  --    UROBILINOGEN UA (BE) mg/dl <2 0  --    LEUKOCYTES UA  Negative  --    CREATININE UR mg/dL  --  61 9   PROTEIN UR mg/dL  --  7   PROT/CREAT RATIO UR   --  0 11*     Results from last 7 days   Lab Units 07/25/22  1416 07/25/22  0825   STREP PNEUMONIAE ANTIGEN, URINE  Negative  --    LEGIONELLA URINARY ANTIGEN  Negative  --    INFLUENZA A PCR   --  Negative   INFLUENZA B PCR   --  Negative   RSV PCR   --  Negative               Results from last 7 days   Lab Units 07/25/22  1419 07/25/22  0836 07/25/22  5995   BLOOD CULTURE   --  Received in Microbiology Lab  Culture in Progress  Received in Microbiology Lab  Culture in Progress     SPUTUM CULTURE  Culture too young- will reincubate  --   --    GRAM STAIN RESULT  1+ Gram positive cocci in pairs*  1+ Gram variable rods*  1+ Epithelial Cells*  No polys seen*  --   --                ED Treatment:   Medication Administration from 07/25/2022 0712 to 07/25/2022 1154       Date/Time Order Dose Route Action     07/25/2022 0950 lactated ringers bolus 1,000 mL 1,000 mL Intravenous New Bag     07/25/2022 0841 lactated ringers bolus 1,000 mL 1,000 mL Intravenous New Bag     07/25/2022 1009 azithromycin (ZITHROMAX) 500 mg in sodium chloride 0 9% 250mL IVPB 500 mg 500 mg Intravenous New Bag        Past Medical History:   Diagnosis Date    [facility] mellitus ([facility] [address] [facility]     Elevated PSA 3/11/2021    Fatty liver 3/11/2021    Gall bladder polyp 3/11/2021    Lung cancer ([facility] [address] [facility]     Nonimmune to hepatitis B virus 3/11/2021     Present on Admission:   Pneumonia of right upper lobe due to infectious organism   [facility] cancer [facility] to brain [facility] [address] [facility]   Hyponatremia   Anemia   Chronic obstructive pulmonary disease (HCC)      Admitting Diagnosis: Hyponatremia [E87 1]  Pneumonia [J18 9]  Fever [R50 9]  Age/Sex: 62 y o  male  Admission Orders:  Scheduled Medications:  cefTRIAXone, 1,000 mg, Intravenous, Q24H  cholecalciferol, 2,000 Units, Oral, Daily  dexamethasone, 1 mg, Oral, Daily  enoxaparin, 40 mg, Subcutaneous, Daily  guaiFENesin, 1,200 mg, Oral, Q12H LISY  insulin lispro, 1-5 Units, Subcutaneous, TID AC  insulin lispro, 1-5 Units, Subcutaneous, HS  levETIRAcetam, 500 mg, Oral, Q12H LISY  magnesium gluconate, 500 mg, Oral, Daily  melatonin, 3 mg, Oral, HS  pantoprazole, 40 mg, Oral, Early Morning  umeclidinium bromide, 1 puff, Inhalation, Daily      Continuous IV Infusions:     PRN Meds:  acetaminophen, 650 mg, Oral, Q6H PRN  albuterol, 2 puff, Inhalation, Q4H PRN  dextromethorphan-guaiFENesin, 10 mL, Oral, Q4H PRN  LORazepam, 0 5 mg, Oral, Daily PRN  menthol-methyl salicylate, , Apply externally, 4x Daily PRN  naloxone, 0 04 mg, Intravenous, Q3 min PRN  ondansetron, 4 mg, Intravenous, Q6H PRN  oxyCODONE, 10 mg, Oral, Q6H PRN x2 7/25, x2 7/26  senna, 1 tablet, Oral, HS PRN  dextromethorphan-guaiFENesin (ROBITUSSIN DM) oral syrup 10 mL  Dose: 10 mL  Freq: Every 4 hours PRN Route: PO  PRN Reason: cough  Start: 07/26/22 0945  benzonatate (TESSALON PERLES) capsule 100 mg  Dose: 100 mg  Freq: 3 times daily PRN Route: PO  PRN Reason: cough  Start: 07/25/22 1234 End: 07/26/22 0946  menthol-methyl salicylate (BENGAY) 61-02 % cream  Freq: 4 times daily PRN Route: EX  PRN Reason: muscle soreness  Start: 07/26/22 1015      Aspiration monitoring  HOB elevation 30 degrees  Spirometry  OOB as jocelyn    Network Utilization Review Department  ATTENTION: Please call with any questions or concerns to [phone] and carefully listen to the prompts so that you are directed to the right person   All voicemails are confidential   Malka Owusu all requests for admission clinical reviews, approved or denied determinations and any other requests to dedicated fax number below belonging to the campus where the patient is receiving treatment   List of dedicated fax numbers for the Facilities:  [address] DENIALS (Administrative/Medical Necessity) [phone]   [address] (Maternity/NICU/Pediatrics) [phone]   [address]  [address] [facility] [place] [address] [id number] [place] [address] [address] [facility] [place] [address] [phone]

## 2022-07-26 NOTE — PROGRESS NOTES
[facility]  Progress Note - Rebeccadunia Burgostomy 1965, 62 y o  male MRN: [id number]  Unit/Bed#: S -01 Encounter: [id number]  Primary Care Provider: Maryann Harada, MD   Date and time admitted to hospital: 7/25/2022  7:24 AM    * Pneumonia of right upper lobe due to infectious organism  Assessment & Plan  · Came in due to fever at home associated with productive cough, with difficult to expectorate phlegm and at times chest pains on coughing  · Possible postobstructive pneumonia as patient's pneumonia is located where his lung mass is  Patient had history of pneumonia, with last 1 early of this year with Klebsiella pneumoniae  · Pneumonia order set done  · Continue with IV Rocephin  · Urine antigens for Legionella and Streptococcus are negative  · Sputum gram stain and culture pending  · Follow-up results of the blood cultures  · Procal downtrending  · Monitor CBC with differential count  · Oxygen p r n  To keep oxygen saturation 90% and above  · Will eventually need a repeat chest x-ray to determine if there is improvement of the opacity in the right upper lobe with the treatment for pneumonia, and to consider tumor progression if no improvement  Anemia  Assessment & Plan  · From my review, chronic anemia likely due to malignancy and/or chemotherapy  · No active bleeding  · Monitor  · For further evaluation and management if this worsens significantly  Hyponatremia  Assessment & Plan  · From my review, patient had been having on and off hyponatremia in the past   · 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 and opiate medications  · Fluid restriction for now  · Patient was given 2 L of IV fluids in the emergency room  · Monitor  · For further evaluation and management if this worsens significantly  Lung cancer metastatic to brain [facility])  Assessment & Plan  · Patient undergoing chemotherapy  Last chemotherapy was just last Thursday    According the patient's wife, next step is a PET scan, and tentative chemotherapy scheduled this coming September, which will also be based on the results of the PET scan  Patient had radiation treatment of his brain in the past   · Follows with oncologist   · Follows also with palliative care specialist   · Continue patient's dexamethasone and finish the course on July 27, 2022  · Continue pain control medication as needed  Chronic obstructive pulmonary disease (HCC)  Assessment & Plan  · No exacerbation  · Continue patient's DuoNeb nebulization 4 times a day  · Oxygen p r n  Xiomy Skipper Type 2 diabetes mellitus without complication, with long-term current use of insulin [facility])  Assessment & Plan  Lab Results   Component Value Date    HGBA1C 6 5 (H) 06/05/2022       Recent Labs     07/25/22  1255 07/25/22  1608 07/25/22  2051 07/26/22  0722   POCGLU 86 155* 133 98       Blood Sugar Average: Last 72 hrs:  (P) 118   · On metformin at home; hold off while in the hospital   · Insulin sliding scale for now  · Patient still on dexamethasone until 7/27, thus this may exacerbate patient's diabetes mellitus for the next 3 days and blood sugars may go down after course is done  · Diabetic diet  · Hypoglycemia protocol  · Adjust treatment accordingly  VTE Pharmacologic Prophylaxis: VTE Score: 3 Moderate Risk (Score 3-4) - Pharmacological DVT Prophylaxis Ordered: enoxaparin (Lovenox)  Patient Centered Rounds: I performed bedside rounds with nursing staff today  Discussions with Specialists or Other Care Team Provider: CM, nursing    Education and Discussions with Family / Patient: Updated  (wife) at bedside  Time Spent for Care: 30 minutes  More than 50% of total time spent on counseling and coordination of care as described above      Current Length of Stay: 1 day(s)  Current Patient Status: Inpatient   Certification Statement: The patient will continue to require additional inpatient hospital stay due to pneumonia on IV abx  Discharge Plan: Anticipate discharge in 24-48 hrs to home  Code Status: Level 1 - Full Code    Subjective:   Patient reports mild pain when coughing in his chest and back  Denies shortness of breath  Still reporting phlegm  Afebrile  Objective:     Vitals:   Temp (24hrs), Av 3 °F (36 8 °C), Min:98 °F (36 7 °C), Max:98 5 °F (36 9 °C)    Temp:  [98 °F (36 7 °C)-98 5 °F (36 9 °C)] 98 3 °F (36 8 °C)  HR:  [68-79] 68  Resp:  [16-18] 18  BP: (103-126)/(59-74) 104/59  SpO2:  [95 %-97 %] 96 %  Body mass index is 22 13 kg/m²  Input and Output Summary (last 24 hours): Intake/Output Summary (Last 24 hours) at 2022 1128  Last data filed at 2022 1801  Gross per 24 hour   Intake 1450 ml   Output 300 ml   Net 1150 ml       Physical Exam:   Physical Exam  Vitals and nursing note reviewed  Constitutional:       General: He is not in acute distress  Appearance: Normal appearance  HENT:      Head: Normocephalic  Mouth/Throat:      Mouth: Mucous membranes are moist    Eyes:      Pupils: Pupils are equal, round, and reactive to light  Cardiovascular:      Rate and Rhythm: Normal rate and regular rhythm  Heart sounds: No murmur heard  Pulmonary:      Effort: Pulmonary effort is normal  No respiratory distress  Breath sounds: Normal breath sounds  No wheezing, rhonchi or rales  Abdominal:      General: Bowel sounds are normal  There is no distension  Palpations: Abdomen is soft  Tenderness: There is no abdominal tenderness  There is no guarding  Musculoskeletal:         General: No deformity  Cervical back: Normal range of motion  Right lower leg: No edema  Left lower leg: No edema  Skin:     Capillary Refill: Capillary refill takes less than 2 seconds  Neurological:      General: No focal deficit present  Mental Status: He is alert and oriented to person, place, and time  Mental status is at baseline            Additional Data: Labs:  Results from last 7 days   Lab Units 07/26/22  0544   WBC Thousand/uL 21 34*   HEMOGLOBIN g/dL 10 1*   HEMATOCRIT % 31 3*   PLATELETS Thousands/uL 296   BANDS PCT % 4   LYMPHO PCT % 10*   MONO PCT % 0*   EOS PCT % 0     Results from last 7 days   Lab Units 07/26/22  0544 07/25/22  0825   SODIUM mmol/L 131* 129*   POTASSIUM mmol/L 4 1 4 0   CHLORIDE mmol/L 97 93*   CO2 mmol/L 28 28   BUN mg/dL 23 24   CREATININE mg/dL 0 86 0 77   ANION GAP mmol/L 6 8   CALCIUM mg/dL 9 3 9 6   ALBUMIN g/dL  --  3 3*   TOTAL BILIRUBIN mg/dL  --  0 65   ALK PHOS U/L  --  149*   ALT U/L  --  10   AST U/L  --  13   GLUCOSE RANDOM mg/dL 161* 82         Results from last 7 days   Lab Units 07/26/22  1058 07/26/22  0722 07/25/22  2051 07/25/22  1608 07/25/22  1255   POC GLUCOSE mg/dl 110 98 133 155* 86         Results from last 7 days   Lab Units 07/26/22  0544 07/25/22  1254 07/25/22  0825   LACTIC ACID mmol/L  --   --  1 2   PROCALCITONIN ng/ml 0 32* 0 38*  --        Lines/Drains:  Invasive Devices  Report    Central Venous Catheter Line  Duration           Port A Cath 05/10/22 Right Chest 76 days          Peripheral Intravenous Line  Duration           Peripheral IV 07/25/22 Left Antecubital 1 day                Central Line:  Goal for removal: N/A - Discharging with PICC for IV ABX/medications             Imaging: No pertinent imaging reviewed  Recent Cultures (last 7 days):   Results from last 7 days   Lab Units 07/25/22  1419 07/25/22  1416 07/25/22  0836 07/25/22  9770   BLOOD CULTURE   --   --  Received in Microbiology Lab  Culture in Progress  Received in Microbiology Lab  Culture in Progress     SPUTUM CULTURE  Culture too young- will reincubate  --   --   --    GRAM STAIN RESULT  1+ Gram positive cocci in pairs*  1+ Gram variable rods*  1+ Epithelial Cells*  No polys seen*  --   --   --    LEGIONELLA URINARY ANTIGEN   --  Negative  --   --        Last 24 Hours Medication List:   Current Facility-Administered Medications   Medication Dose Route Frequency Provider Last Rate    acetaminophen  650 mg Oral Q6H PRN Kelli Gutierrez MD      albuterol  2 puff Inhalation Q4H PRN Kelli Gutierrez MD      cefTRIAXone  1,000 mg Intravenous Q24H Jone Landon MD 1,000 mg (07/26/22 0837)    cholecalciferol  2,000 Units Oral Daily Kelli Gutierrez MD      dexamethasone  1 mg Oral Daily Kelli Gutierrez MD      dextromethorphan-guaiFENesin  10 mL Oral Q4H PRN Barby Gilliam PA-C      enoxaparin  40 mg Subcutaneous Daily Jone Landon MD      guaiFENesin  1,200 mg Oral Q12H Moustaphamatinbutch Landon MD      insulin lispro  1-5 Units Subcutaneous TID AC Kelli Gutierrez MD      insulin lispro  1-5 Units Subcutaneous HS Kelli Gutierrez MD      levETIRAcetam  500 mg Oral Q12H [address] Kelli Gutierrez MD      LORazepam  0 5 mg Oral Daily PRN Kelli Gutierrez MD      magnesium gluconate  500 mg Oral Daily Kelli Gutierrez MD      melatonin  3 mg Oral HS Kelli Gutierrez MD      menthol-methyl salicylate   Apply externally 4x Daily PRN Barby Gilliam PA-C      naloxone  0 04 mg Intravenous Q3 min PRN Jone Landon MD      ondansetron  4 mg Intravenous Q6H PRN Kelli Gutierrez MD      oxyCODONE  10 mg Oral Q6H PRN Jone Landon MD      pantoprazole  40 mg Oral Early Morning Jone Landon MD      senna  1 tablet Oral HS PRN Kelli Gutierrez MD      umeclidinium bromide  1 puff Inhalation Daily Jeane Chowdary MD          Today, Patient Was Seen By: Sirisha Slater PA-C    **Please Note: This note may have been constructed using a voice recognition system  **

## 2022-07-26 NOTE — ASSESSMENT & PLAN NOTE
Lab Results   Component Value Date    HGBA1C 6 5 (H) 06/05/2022       Recent Labs     07/25/22  1255 07/25/22  1608 07/25/22 2051 07/26/22  0722   POCGLU 86 155* 133 98       Blood Sugar Average: Last 72 hrs:  (P) 118   · On metformin at home; hold off while in the hospital   · Insulin sliding scale for now  · Patient still on dexamethasone until 7/27, thus this may exacerbate patient's diabetes mellitus for the next 3 days and blood sugars may go down after course is done  · Diabetic diet  · Hypoglycemia protocol  · Adjust treatment accordingly

## 2022-07-26 NOTE — ASSESSMENT & PLAN NOTE
· No exacerbation  · Continue patient's DuoNeb nebulization 4 times a day  · Oxygen p r n  [place] [facility]

## 2022-07-26 NOTE — ASSESSMENT & PLAN NOTE
· Came in due to fever at home associated with productive cough, with difficult to expectorate phlegm and at times chest pains on coughing  · Possible postobstructive pneumonia as patient's pneumonia is located where his lung mass is  Patient had history of pneumonia, with last 1 early of this year with Klebsiella pneumoniae  · Pneumonia order set done  · Continue with IV Rocephin  · Urine antigens for Legionella and Streptococcus are negative  · Sputum gram stain and culture pending  · Follow-up results of the blood cultures  · Procal downtrending  · Monitor CBC with differential count  · Oxygen p r n  To keep oxygen saturation 90% and above  · Will eventually need a repeat chest x-ray to determine if there is improvement of the opacity in the right upper lobe with the treatment for pneumonia, and to consider tumor progression if no improvement

## 2022-07-27 NOTE — ASSESSMENT & PLAN NOTE
Lab Results   Component Value Date    HGBA1C 6 5 (H) 06/05/2022       Recent Labs     07/26/22  1058 07/26/22  1607 07/26/22  2106 07/27/22  0731   POCGLU 110 160* 138 83       Blood Sugar Average: Last 72 hrs:  (P) 120 375   · On metformin at home  · Diabetic diet

## 2022-07-27 NOTE — ASSESSMENT & PLAN NOTE
· Came in due to fever at home associated with productive cough, with difficult to expectorate phlegm and at times chest pains on coughing  · Possible postobstructive pneumonia as patient's pneumonia is located where his lung mass is  Patient had history of pneumonia, with last 1 early of this year with Klebsiella pneumoniae  · Sputum culture with growth of Klebsiella which responded to Keflex in March of this year  · Responded well to IV ceftriaxone, transitioned to Keflex for 7 more days to complete 10 days of antibiotic therapy  · Blood cultures no growth times 24 hours  · Procal downtrending  · Monitor CBC with differential count  · Will need repeat chest x-ray in 6 weeks, defer to PCP  Told to follow-up with his PCP in the next 1-2 weeks

## 2022-07-27 NOTE — ASSESSMENT & PLAN NOTE
· Patient undergoing chemotherapy  Last chemotherapy was just last Thursday  According the patient's wife, next step is a PET scan, and tentative chemotherapy scheduled this coming September, which will also be based on the results of the PET scan  Patient had radiation treatment of his brain in the past   · Follows with oncologist   · Follows also with palliative care specialist   · Completed dexamethasone therapy while hospitalized  · Continue pain control medication as needed

## 2022-07-27 NOTE — ASSESSMENT & PLAN NOTE
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

## 2022-07-27 NOTE — ASSESSMENT & PLAN NOTE
· No exacerbation  · Continue patient's DuoNeb nebulization 4 times a day  · Oxygen p r n  Taty Rabago

## 2022-07-27 NOTE — DISCHARGE SUMMARY
[facility]  Discharge- José Combs 1965, 62 y o  male MRN: [id number]  Unit/Bed#: S -01 Encounter: [id number]  Primary Care Provider: Jayson Estrella MD   Date and time admitted to hospital: 7/25/2022  7:24 AM    * Pneumonia of right upper lobe due to infectious organism  Assessment & Plan  · Came in due to fever at home associated with productive cough, with difficult to expectorate phlegm and at times chest pains on coughing  · Possible postobstructive pneumonia as patient's pneumonia is located where his lung mass is  Patient had history of pneumonia, with last 1 early of this year with Klebsiella pneumoniae  · Sputum culture with growth of Klebsiella which responded to Keflex in March of this year  · Responded well to IV ceftriaxone, transitioned to Keflex for 7 more days to complete 10 days of antibiotic therapy  · Blood cultures no growth times 24 hours  · Procal downtrending  · Monitor CBC with differential count  · Will need repeat chest x-ray in 6 weeks, defer to PCP  Told to follow-up with his PCP in the next 1-2 weeks  Anemia  Assessment & Plan  · From my review, chronic anemia likely due to malignancy and/or chemotherapy  · No active bleeding  · Monitor  · For further evaluation and management if this worsens significantly  Hyponatremia  Assessment & Plan  · From my review, patient had been having on and off hyponatremia in the past   · 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)  Assessment & Plan  · Patient undergoing chemotherapy  Last chemotherapy was just last Thursday  According the patient's wife, next step is a PET scan, and tentative chemotherapy scheduled this coming September, which will also be based on the results of the PET scan    Patient had radiation treatment of his brain in the past   · Follows with oncologist   · Follows also with palliative care specialist   · Completed dexamethasone therapy while hospitalized  · Continue pain control medication as needed  Chronic obstructive pulmonary disease (HCC)  Assessment & Plan  · No exacerbation  · Continue patient's DuoNeb nebulization 4 times a day  · Oxygen p r n  Alekrocio Mena Type 2 diabetes mellitus without complication, with long-term current use of insulin [facility])  Assessment & Plan  Lab Results   Component Value Date    HGBA1C 6 5 (H) 06/05/2022       Recent Labs     07/26/22  1058 07/26/22  1607 07/26/22  2106 07/27/22  0731   POCGLU 110 160* 138 83       Blood Sugar Average: Last 72 hrs:  (P) 120 375   · On metformin at home  · Diabetic diet  Medical Problems             Resolved Problems  Date Reviewed: 7/25/2022   None               Discharging Physician / Practitioner: Asad Harper PA-C  PCP: Antonio Lehman MD  Admission Date:   Admission Orders (From admission, onward)     Ordered        07/25/22 [address]  Once                      Discharge Date: 07/27/22    Consultations During Hospital Stay:  · none    Procedures Performed:   · none    Significant Findings / Test Results:   · Sputum culture with growth of Klebsiella pneumoniae  · Chest x-ray with large right upper lobe tumor with additional increased opacity in the right upper lobe possible superimposed pneumonia versus tumor progression    Incidental Findings:   · none     Test Results Pending at Discharge (will require follow up):   · none     Outpatient Tests Requested:  · CXR 4-6 weeks    Complications:  None     Reason for Admission: RUL pneumonia    Hospital Course:   Parveen Trejo is a 62 y o  male patient who originally presented to the hospital on 7/25/2022 due to fever at home of 100 4 with cough and mucus production  He would also complained of some pains on his chest and his back  Did not have any shortness of breath    Was found to have a right upper lobe pneumonia on chest x-ray and treated with IV ceftriaxone  Sputum culture did grow Klebsiella which was similar to his infection in March of 2020 which she responded to Keflex  Thus, we transitioned him to Keflex for total 7 days of antibiotics starting 7/28  Throughout his stay he clinically improved was afebrile and downtrending leukocytosis  He was feeling well upon day of discharge, was ambulating around the hallways in the rooms  Still reported some phlegm overall no cough and no chest pain  Please see above list of diagnoses and related plan for additional information  Condition at Discharge: stable    Discharge Day Visit / Exam:   Subjective:  Patient was seen ambulating around the hallways today  Reports he is feeling well  Cough is improved still with some mucus production but overall improving  No fevers  Vitals: Blood Pressure: 98/64 (07/26/22 2213)  Pulse: 78 (07/26/22 1500)  Temperature: 98 °F (36 7 °C) (07/26/22 2213)  Temp Source: Oral (07/26/22 1500)  Respirations: 16 (07/26/22 2213)  Height: 5' 7" (170 2 cm) (07/25/22 0726)  Weight - Scale: 64 1 kg (141 lb 5 oz) (07/25/22 0726)  SpO2: 95 % (07/26/22 2213)  Exam:   Physical Exam  Vitals and nursing note reviewed  Constitutional:       General: He is not in acute distress  Appearance: Normal appearance  HENT:      Head: Normocephalic  Mouth/Throat:      Mouth: Mucous membranes are moist    Eyes:      Pupils: Pupils are equal, round, and reactive to light  Cardiovascular:      Rate and Rhythm: Normal rate and regular rhythm  Heart sounds: No murmur heard  Pulmonary:      Effort: Pulmonary effort is normal  No respiratory distress  Breath sounds: Normal breath sounds  No wheezing, rhonchi or rales  Abdominal:      General: Bowel sounds are normal  There is no distension  Palpations: Abdomen is soft  Tenderness: There is no abdominal tenderness  There is no guarding  Musculoskeletal:         General: No deformity        Cervical back: Normal range of motion  Right lower leg: No edema  Left lower leg: No edema  Skin:     Capillary Refill: Capillary refill takes less than 2 seconds  Neurological:      General: No focal deficit present  Mental Status: He is alert and oriented to person, place, and time  Mental status is at baseline  Discussion with Family: Updated  (wife and brother) via phone  Discharge instructions/Information to patient and family:   See after visit summary for information provided to patient and family  Provisions for Follow-Up Care:  See after visit summary for information related to follow-up care and any pertinent home health orders  Disposition:   Home    Planned Readmission: no     Discharge Statement:  I spent 45 minutes discharging the patient  This time was spent on the day of discharge  I had direct contact with the patient on the day of discharge  Greater than 50% of the total time was spent examining patient, answering all patient questions, arranging and discussing plan of care with patient as well as directly providing post-discharge instructions  Additional time then spent on discharge activities  Discharge Medications:  See after visit summary for reconciled discharge medications provided to patient and/or family        **Please Note: This note may have been constructed using a voice recognition system**

## 2022-07-27 NOTE — DISCHARGE INSTR - AVS FIRST PAGE
Dear Will Diop,     It was our pleasure to care for you here at [facility] [employer]  It is our hope that we were always able to exceed the expected standards for your care during your stay  You were hospitalized due to pneumonia  You were cared for on the 4th floor by Maico Cunningham PA-C under the service of Liban Baltazar MD with the [facility] Hospitalist [facility] who covers for your primary care physician (PCP), Nilsa Mccracken MD, while you were hospitalized  If you have any questions or concerns related to this hospitalization, you may contact us at [phone]  For follow up as well as any medication refills, we recommend that you follow up with your primary care physician  A registered nurse will reach out to you by phone within a few days after your discharge to answer any additional questions that you may have after going home  However, at this time we provide for you here, the most important instructions / recommendations at discharge:     Notable Medication Adjustments -   Starting tomorrow please take Keflex 4 times per day for the next 7 days  Please take the cough medication every 4 hours as needed for cough or mucus production  Testing Required after Discharge -   None  Important follow up information -   Please follow-up with your family doctor in the next week or so to monitor your symptoms  Other Instructions -   Return to the ER if you have high fever, worsening shortness of breath, or cough that is becoming increasingly productive or is bloody  Please review this entire after visit summary as additional general instructions including medication list, appointments, activity, diet, any pertinent wound care, and other additional recommendations from your care team that may be provided for you        Sincerely,     Maico Cunningham PA-C

## 2022-07-28 NOTE — UTILIZATION REVIEW
Notification of Discharge   This is a Notification of Discharge from our facility [address]  Please be advised that this patient has been discharge from our facility  Below you will find the admission and discharge date and time including the patients disposition  UTILIZATION REVIEW CONTACT:  Shirin Yuan MA  Utilization   Network Utilization Review Department  Phone: [phone] x carefully listen to the prompts  All voicemails are confidential   Email: [email or web address]     PHYSICIAN ADVISORY SERVICES:  FOR YTIH-RD-LUPG REVIEW - MEDICAL NECESSITY DENIAL  Phone: [phone]  Fax: [phone]  Email: [email or web address]     PRESENTATION DATE: 7/25/2022  7:24 AM  OBERVATION ADMISSION DATE:   INPATIENT ADMISSION DATE: 7/25/22 10:15 AM   DISCHARGE DATE: 7/27/2022  3:57 PM  DISPOSITION: Home/Self Care Home/Self Care      IMPORTANT INFORMATION:  Send all requests for admission clinical reviews, approved or denied determinations and any other requests to dedicated fax number below belonging to the campus where the patient is receiving treatment   List of dedicated fax numbers:  [address] DENIALS (Administrative/Medical Necessity) [phone]   [address]  [facility] (Maternity/NICU/Pediatrics) [phone]   [facility] [place] [phone]   [address] [facility] [phone]   [address] [place] [address] [phone]   2000 [facility] [address] [place] [address],[address] [phone]   [facility]  [phone]   [address] [place] [address], [address]  [address] [facility] [address] [facility] [phone]

## 2022-07-29 NOTE — PATIENT INSTRUCTIONS
Nutrition Rx & Recommendations:  Diet: Diabetic, High calorie, High protien diet  Small, frequent meals/snacks may be easier to tolerate than 3 large daily meals  Aim for 5-6 small meals per day (every 2-3 hours)  Include protein at all meals/snacks  Stay hydrated by sipping fluids of choice/tolerance throughout the day  For weight loss: monitor your weight at home at least 2x/week, record your weight, start by adding 250-500 extra calories per day, eat 5-6 small scheduled meals every 2-3 hrs, choose foods that are high in protein and calories (see pages 49-53 in your Eating Hints book), drink liquids with calories for example: milkshakes/smoothies/juice/soup/whole milk/chocolate milk, cook with protein fortified milk (see recipe on page 36 in your Eating Hints book), consider ready-to-drink oral nutrition supplements such as Ensure Plus, Ensure Enlive, Boost Plus, or Boost Very High Calorie, avoid "diet" and "light" foods when possible, avoid drinking too much with meals, contact your dietitian with any continued weight loss over the course of 1 week  For more info see pages 35-37 in your Eating Hints book  For diarrhea: drink plenty of fluids (>64 oz/day), avoid carbonation; eat 5-6 small meals/day; include high sodium & potassium foods/liquids (Sodium: soup/broth; Potassium: bananas, canned apricots, potatoes); eat low-fiber foods; choose foods/liquids that are room temperature; avoid foods/drinks that can make diarrhea worse (ex  high fiber, high sugar, hot/cold drinks, greasy/fatty foods, gas forming foods such as beans, raw produce, milk products, alcohol, spicy foods, caffeine, sugar alcohols (xylitol, sorbitol), and apple juice (high in sorbitol) (see pages 15-16 in your Eating Hints book)  If diarrhea is severe, consider adding Banatrol (banana flakes) 1-3 times per day mixed in applesauce (can be purchased online from [address])    Try adding soluble fiber: applesauce, banana, oatmeal, potatoes, rice, etc  to help thicken stools  Weigh yourself regularly  If you notice weight loss, make an effort to increase your daily food/calorie intake  If you continue to notice loss after these efforts, reach out to your dietitian to establish a plan to stabilize weight  Weigh yourself 2 times per week, record your weight  Nutrition Supplements: 2 per day, choose one with <10 grams of sugar per serving  Ideally one that has >10 grams protein and is >200 calories  Example: Glucerna or Ensure Max  Can increase supplement usage on days when eating is more difficult  Increase protein portion to 4 oz at meals  Continue preparing food with healthy, plant-based oils such as olive, canola, avocado, etc   Use larger portion to increase calorie intake without affecting blood sugars    Higher protein snack ideas: low-sugar [place] yogurt, SF pudding, 1/4-1/3 cup nuts, edamame, cheese stick, low-sugar oral nutrition supplement (Glucerna), oatmeal or lower sugar cereal with milk (choose a cereal with <10 grams of sugar per serving)  Fluid intake should be ~65-75 oz per day  Choose caffeine-free fluids  Water is the best choice  Ensure/Glucerna shakes count towards daily fluid goals  Ideas: SF jello, water, whole milk, unsweetened almond milk, unsweetened soy milk, herbal/decaf tea    Follow Up Plan: 8/31 at 1pm for a phone follow up  Recommend Referral to Other Providers: none at this time

## 2022-08-01 PROBLEM — I82.C11 INTERNAL JUGULAR (IJ) VEIN THROMBOEMBOLISM, ACUTE, RIGHT (HCC): Status: ACTIVE | Noted: 2022-01-01

## 2022-08-01 PROBLEM — I82.C11 INTERNAL JUGULAR (IJ) VEIN THROMBOEMBOLISM, ACUTE, RIGHT (HCC): Status: RESOLVED | Noted: 2022-01-01 | Resolved: 2022-01-01

## 2022-08-01 PROBLEM — D72.829 LEUKOCYTOSIS: Status: ACTIVE | Noted: 2022-01-01

## 2022-08-01 PROBLEM — R65.10 SIRS (SYSTEMIC INFLAMMATORY RESPONSE SYNDROME) (HCC): Status: ACTIVE | Noted: 2022-01-01

## 2022-08-01 NOTE — TELEPHONE ENCOUNTER
Patient's son called in  He had fevers since 4pm on Sunday, between 100-103  This morning, fevers have been between 98-99  Patient has been taking tylenol  He was recently in the hospital for pneumonemia, 7/25-7/27  Patient had tx with taxol, carbo, and avastin on 7/21  He is also having right shoulder pain  Discussed with Dr Tenisha Singh and he advised a trip to the ED for evaluation due to past sepsis  Spoke with patient's son to make him aware  He verbalized understanding and agreed to plan

## 2022-08-01 NOTE — ED ATTENDING ATTESTATION
8/1/2022  IJazmyn MD, saw and evaluated the patient  I have discussed the patient with the resident/non-physician practitioner and agree with the resident's/non-physician practitioner's findings, Plan of Care, and MDM as documented in the resident's/non-physician practitioner's note, except where noted  All available labs and Radiology studies were reviewed  I was present for key portions of any procedure(s) performed by the resident/non-physician practitioner and I was immediately available to provide assistance  At this point I agree with the current assessment done in the Emergency Department  I have conducted an independent evaluation of this patient a history and physical is as follows:    40-year-old male with history of lung cancer with brain metastasis on chemotherapy (last chemo on 7/22), COPD onto on home O2, IDDM2  Patient was recently admitted to the hospital with discharge on 07/27 for possible postobstructive pneumonia  He was initially treated with IV antibiotics and then switched to Keflex which he continues to take  He presents for evaluation of recurrent fever  Reports fever to 101° yesterday evening and then a temperature just over 100° this morning  He denies cough, shortness of breath, chest pain, abdominal pain, nausea, vomiting, diarrhea, dysuria, or frequency  He does report pain to the right side of his neck that started yesterday afternoon and has been getting worse  Pain is present while at rest and turning his head to the right side  He denies any stiffness to the neck or pain with touching his chin to his chest   No difficulty swallowing  No sore throat or ear pain  Physical Exam  Constitutional:       General: He is not in acute distress  Appearance: He is well-developed  He is not diaphoretic  HENT:      Head: Normocephalic and atraumatic        Right Ear: Tympanic membrane and external ear normal       Left Ear: Tympanic membrane and external ear normal       Ears:      Comments: No tenderness to palpation over the mastoid processes  No overlying erythema     Nose: Nose normal       Mouth/Throat:      Mouth: Mucous membranes are moist       Pharynx: Oropharynx is clear  No oropharyngeal exudate or posterior oropharyngeal erythema  Eyes:      Conjunctiva/sclera: Conjunctivae normal    Neck:      Comments: Tenderness to palpation to the right side of the neck along the course of the IJ  No edema or overlying erythema  Cardiovascular:      Rate and Rhythm: Normal rate and regular rhythm  Pulses: Normal pulses  Heart sounds: Normal heart sounds  No murmur heard  No friction rub  No gallop  Pulmonary:      Effort: Pulmonary effort is normal  No respiratory distress  Breath sounds: Normal breath sounds  No wheezing or rales  Comments: Port in place to the R chest  TTP just above the port tracking up to the neck without overlying erythema  Abdominal:      General: Bowel sounds are normal  There is no distension  Palpations: Abdomen is soft  Tenderness: There is no abdominal tenderness  There is no guarding  Musculoskeletal:         General: No deformity  Normal range of motion  Cervical back: Normal range of motion and neck supple  No rigidity  Right lower leg: No edema  Left lower leg: No edema  Comments: No calf swelling or tenderness   Lymphadenopathy:      Cervical: No cervical adenopathy  Skin:     General: Skin is warm and dry  Neurological:      Mental Status: He is alert and oriented to person, place, and time  Motor: No abnormal muscle tone  ED Course  ED Course as of 08/01/22 2115   [facility] Aug 01, 2022   1935 Patient has TTP to the R side of the neck along the distribution of the IJ  No visible swelling to the R side of the neck or overlying skin changes  Tenderness extends down towards the pt's port on the R side of his chest  No erythema overlying the port   Neck is supple without meningismus  No TTP over the mastoid process  Oropharynx is clear  Will obtain CT scan of the neck with contrast for further evaluation  2004 CXR shows improvement of previously demonstrated PNA  RUL mass appears consistent with x-rays taken prior to the patient's pneumonia  2008 I personally interpreted the pt's EKG which reveals normal rate, normal sinus rhythm, normal axis, normal intervals, T wave inversion in aVL unchanged from prior, new T wave inversion in lead V2 compared to prior  2040 Procalcitonin 0 8 from 0 32 4 days ago  Leukocytosis 40 93 from 13 77 4 days ago  Patient received ceftriaxone when recently in the hospital  Will escalate to zosyn  Source still unknown  Less likely to be pulmonary as CXR appears improved  Possibly bacteremia given recent PNA  Still awaiting results of CT scan of the neck  Care of patient transferred to Dr Sari Saucedo at 2115 while awaiting results of CT scan of the neck  Anticipate admission to the hospital for sepsis of unknown source       Critical Care Time  Procedures

## 2022-08-01 NOTE — ED PROVIDER NOTES
History  Chief Complaint   Patient presents with    Fever - 9 weeks to 74 years     Pt states he has had a fever since yesterday and has R sided neck and shoulder pain, pt was here last week with dx of pneumonia  Denies chest pain and sob     Patient is a 66-year-old male with a past medical history of metastatic lung cancer and a recent diagnosis of pneumonia with hospitalization last week presents to the emergency department with complaint of right-sided neck pain and fever  Patient reports that began developing fevers yesterday afternoon as well as right-sided neck pain that radiates posterior inferior to his right ear down into his right shoulder  He denies chest pain, shortness of breath, headache, change in vision, abdominal pain, nausea, vomiting, dysuria, hematuria, weakness or rash  The patient reports that he understood went chemotherapy on 07/22 and is scheduled for another round in September  He also reports that he has been taking Keflex since he was discharged from the hospital 4 days ago  Prior to Admission Medications   Prescriptions Last Dose Informant Patient Reported? Taking?    BD Pen Needle Jeaneth 2nd Gen 32G X 4 MM MISC  Spouse/Significant Other Yes No   Sig: USE ONCE DAILY WITH LANTUS   Blood Glucose Monitoring Suppl (FreeStyle Lite) FIDELIA  Spouse/Significant Other Yes No   Cholecalciferol (Vitamin D3) 125 MCG (5000 UT) CAPS  Spouse/Significant Other No No   Sig: Take 1 capsule (5,000 Units total) by mouth daily   Cholecalciferol (Vitamin D3) 125 MCG (5000 UT) TABS  Spouse/Significant Other Yes No   Sig: Take 5,000 Units by mouth daily   FREESTYLE LITE test strip  Spouse/Significant Other No No   Sig: Check blood sugar  daily   Insulin Pen Needle (BD Pen Needle Jeaneth U/F) 32G X 4 MM MISC  Spouse/Significant Other No No   Sig: Use daily as directed with insulin pen   LORazepam (ATIVAN) 0 5 mg tablet   No No   Sig: For sleep 1-2 tablets at night if you wake and have trouble returning to sleep (Max 1 mg)   Lancets (freestyle) lancets  Spouse/Significant Other No No   Sig: Check blood sugar daily   cephalexin (KEFLEX) 500 mg capsule   No No   Sig: Take 1 capsule (500 mg total) by mouth every 6 (six) hours for 7 days   dextromethorphan-guaiFENesin (ROBITUSSIN DM)  mg/5 mL syrup   No No   Sig: Take 10 mL by mouth every 4 (four) hours as needed for cough or congestion (mucus production)   ipratropium-albuterol (Combivent Respimat) inhaler  Spouse/Significant Other No No   Sig: Inhale 1 puff 4 (four) times a day   levETIRAcetam (KEPPRA) 500 mg tablet  Spouse/Significant Other No No   Sig: Take 1 tablet (500 mg total) by mouth every 12 (twelve) hours   magnesium gluconate (MAGONATE) 500 mg tablet  Spouse/Significant Other No No   Sig: Take 1 tablet (500 mg total) by mouth daily   melatonin 3 mg   No No   Sig: Take 1 tablet (3 mg total) by mouth daily at bedtime   menthol-methyl salicylate (BENGAY) 45-60 % cream   No No   Sig: Apply topically 4 (four) times a day as needed (chest and back pain)   metFORMIN (GLUCOPHAGE-XR) 500 mg 24 hr tablet  Spouse/Significant Other No No   Sig: Take 2 tablets (1,000 mg total) by mouth daily with dinner   naloxone (NARCAN) 4 mg/0 1 mL nasal spray  Spouse/Significant Other No No   Sig: Administer 1 spray into a nostril  If no response after 2-3 minutes, give another dose in the other nostril using a new spray   It has to be on stand by use with opioid use - in case of overdose   Patient not taking: Reported on 7/22/2022   ondansetron (Zofran ODT) 8 mg disintegrating tablet   No No   Sig: Take 1 tablet (8 mg total) by mouth every 8 (eight) hours as needed for nausea or vomiting   oxyCODONE (ROXICODONE) 10 MG TABS   No No   Sig: Take 1 tablet (10 mg total) by mouth every 6 (six) hours as needed for moderate pain Max Daily Amount: 40 mg   pantoprazole (PROTONIX) 40 mg tablet  Spouse/Significant Other No No   Sig: Take 1 tablet (40 mg total) by mouth daily in the early morning      Facility-Administered Medications Last Administration Doses Remaining   cyanocobalamin injection 1,000 mcg 7/15/2022  9:20 AM           Past Medical History:   Diagnosis Date    Diabetes mellitus ([address] )     Elevated PSA 3/11/2021    Fatty liver 3/11/2021    Gall bladder polyp 3/11/2021    Lung cancer ([address] )     Nonimmune to hepatitis B virus 3/11/2021       Past Surgical History:   Procedure Laterality Date    FL GUIDED CENTRAL VENOUS ACCESS DEVICE INSERTION  5/10/2022    LUNG BIOPSY      NV INSJ TUNNELED CTR VAD W/SUBQ PORT AGE 5 YR/> N/A 5/10/2022    Procedure: INSERTION VENOUS PORT ( PORT-A-CATH) IR;  Surgeon: Ronel Duncan DO;  [facility]: [facility] MAIN OR;  Service: Interventional Radiology       Family History   Problem Relation Age of Onset    No Known Problems Mother     No Known Problems Father      I have reviewed and agree with the history as documented  E-Cigarette/Vaping    E-Cigarette Use Never User      E-Cigarette/Vaping Substances    Nicotine No     THC No     CBD No     Flavoring No     Other No     Unknown No      Social History     Tobacco Use    Smoking status: Former Smoker     Packs/day: 0 50     Years: 40 00     Pack years: 20 00     Types: Cigarettes     Quit date: 2022     Years since quittin 5    Smokeless tobacco: Never Used   Vaping Use    Vaping Use: Never used   Substance Use Topics    Alcohol use: Not Currently     Comment: quit drinking 7 years ago    Drug use: Never        Review of Systems   Constitutional: Positive for fever  Negative for chills  HENT: Negative for congestion, ear pain and sore throat  Eyes: Negative for pain and visual disturbance  Respiratory: Negative for cough and shortness of breath  Cardiovascular: Negative for chest pain and palpitations  Gastrointestinal: Negative for abdominal distention, abdominal pain, diarrhea, nausea and vomiting  Endocrine: Negative      Genitourinary: Negative for dysuria and hematuria  Musculoskeletal: Positive for neck pain  Negative for arthralgias and back pain  Skin: Negative for color change and rash  Allergic/Immunologic: Negative  Neurological: Negative for dizziness, seizures, syncope, weakness, numbness and headaches  Hematological: Negative  Psychiatric/Behavioral: Negative  All other systems reviewed and are negative  Physical Exam  ED Triage Vitals [08/01/22 1802]   Temperature Pulse Respirations Blood Pressure SpO2   98 6 °F (37 °C) 91 18 131/72 100 %      Temp Source Heart Rate Source Patient Position - Orthostatic VS BP Location FiO2 (%)   Oral Monitor Sitting Left arm --      Pain Score       6             Orthostatic Vital Signs  Vitals:    08/01/22 1802   BP: 131/72   Pulse: 91   Patient Position - Orthostatic VS: Sitting       Physical Exam  Vitals and nursing note reviewed  Constitutional:       General: He is not in acute distress  Appearance: Normal appearance  He is well-developed  He is ill-appearing  HENT:      Head: Normocephalic and atraumatic  Right Ear: Tympanic membrane, ear canal and external ear normal       Left Ear: Tympanic membrane, ear canal and external ear normal       Nose: Nose normal       Mouth/Throat:      Mouth: Mucous membranes are moist       Pharynx: Oropharynx is clear  Eyes:      Extraocular Movements: Extraocular movements intact  Conjunctiva/sclera: Conjunctivae normal       Pupils: Pupils are equal, round, and reactive to light  Neck:      Comments: Tenderness to the right side of his neck and down into the right side of his chest near his port  Cardiovascular:      Rate and Rhythm: Normal rate and regular rhythm  Pulses: Normal pulses  Heart sounds: Normal heart sounds  No murmur heard  No friction rub  Pulmonary:      Effort: Pulmonary effort is normal  No respiratory distress  Breath sounds: Normal breath sounds  No stridor  No wheezing, rhonchi or rales     Chest: Chest wall: No tenderness  Abdominal:      General: Abdomen is flat  Bowel sounds are normal  There is no distension  Palpations: Abdomen is soft  Tenderness: There is no abdominal tenderness  There is no guarding or rebound  Musculoskeletal:         General: No swelling or tenderness  Normal range of motion  Cervical back: Normal range of motion and neck supple  No rigidity  Right lower leg: No edema  Left lower leg: No edema  Skin:     General: Skin is warm and dry  Capillary Refill: Capillary refill takes less than 2 seconds  Neurological:      General: No focal deficit present  Mental Status: He is alert and oriented to person, place, and time  Mental status is at baseline  Cranial Nerves: No cranial nerve deficit  Sensory: No sensory deficit  Motor: No weakness           ED Medications  Medications   piperacillin-tazobactam (ZOSYN) 3 375 g in sodium chloride 0 9 % 100 mL IVPB (has no administration in time range)   levETIRAcetam (KEPPRA) tablet 500 mg (has no administration in time range)   iohexol (OMNIPAQUE) 350 MG/ML injection (SINGLE-DOSE) 70 mL (70 mL Intravenous Given 8/1/22 2040)       Diagnostic Studies  Results Reviewed     Procedure Component Value Units Date/Time    HS Troponin 0hr (reflex protocol) [[id number]]  (Normal) Collected: 08/01/22 2019    Lab Status: Final result Specimen: Blood from Arm, Right Updated: 08/01/22 2047     hs TnI 0hr 7 ng/L     HS Troponin I 2hr [[id number]]     Lab Status: No result Specimen: Blood     CBC and differential [[id number]]  (Abnormal) Collected: 08/01/22 1946    Lab Status: Final result Specimen: Blood from Arm, Right Updated: 08/01/22 2029     WBC 40 93 Thousand/uL      RBC 3 23 Million/uL      Hemoglobin 10 1 g/dL      Hematocrit 31 6 %      MCV 98 fL      MCH 31 3 pg      MCHC 32 0 g/dL      RDW 15 4 %      MPV 8 2 fL      Platelets 788 Thousands/uL     Manual Differential(PHLEBS Do Not Order) [[id number]]  (Abnormal) Collected: 08/01/22 1946    Lab Status: Final result Specimen: Blood from Arm, Right Updated: 08/01/22 2029     Segmented % 65 %      Bands % 11 %      Lymphocytes % 8 %      Monocytes % 9 %      Eosinophils, % 1 %      Basophils % 0 %      Metamyelocytes% 4 %      Myelocytes % 2 %      Absolute Neutrophils 31 11 Thousand/uL      Lymphocytes Absolute 3 27 Thousand/uL      Monocytes Absolute 3 68 Thousand/uL      Eosinophils Absolute 0 41 Thousand/uL      Basophils Absolute 0 00 Thousand/uL      Total Counted --     Anisocytosis Present     Platelet Estimate Adequate    Procalcitonin [[id number]]  (Abnormal) Collected: 08/01/22 1946    Lab Status: Final result Specimen: Blood from Arm, Right Updated: 08/01/22 2024     Procalcitonin 0 80 ng/ml     Lactic acid [[id number]]  (Normal) Collected: 08/01/22 1946    Lab Status: Final result Specimen: Blood from Arm, Right Updated: 08/01/22 2014     LACTIC ACID 1 4 mmol/L     Narrative:      Result may be elevated if tourniquet was used during collection      Comprehensive metabolic panel [[id number]]  (Abnormal) Collected: 08/01/22 1946    Lab Status: Final result Specimen: Blood from Arm, Right Updated: 08/01/22 2014     Sodium 133 mmol/L      Potassium 4 4 mmol/L      Chloride 96 mmol/L      CO2 31 mmol/L      ANION GAP 6 mmol/L      BUN 20 mg/dL      Creatinine 0 94 mg/dL      Glucose 101 mg/dL      Calcium 9 7 mg/dL      Corrected Calcium 10 2 mg/dL      AST 14 U/L      ALT 12 U/L      Alkaline Phosphatase 215 U/L      Total Protein 7 0 g/dL      Albumin 3 4 g/dL      Total Bilirubin 0 31 mg/dL      eGFR 89 ml/min/1 73sq m     Narrative:      [facility] guidelines for Chronic Kidney Disease (CKD):     Stage 1 with normal or high GFR (GFR > 90 mL/min/1 73 square meters)    Stage 2 Mild CKD (GFR = 60-89 mL/min/1 73 square meters)    Stage 3A Moderate CKD (GFR = 45-59 mL/min/1 73 square meters)    Stage 3B Moderate CKD (GFR = 30-44 mL/min/1 73 square meters)    Stage 4 Severe CKD (GFR = 15-29 mL/min/1 73 square meters)    Stage 5 End Stage CKD (GFR <15 mL/min/1 73 square meters)  Note: GFR calculation is accurate only with a steady state creatinine    Protime-INR [[id number]]  (Normal) Collected: 08/01/22 1946    Lab Status: Final result Specimen: Blood from Arm, Right Updated: 08/01/22 2009     Protime 13 8 seconds      INR 1 04    APTT [[id number]]  (Normal) Collected: 08/01/22 1946    Lab Status: Final result Specimen: Blood from Arm, Right Updated: 08/01/22 2009     PTT 32 seconds     Blood culture #2 [[id number]] Collected: 08/01/22 1946    Lab Status: In process Specimen: Blood from Arm, Right Updated: 08/01/22 1950    Blood culture #1 [[id number]] Collected: 08/01/22 1946    Lab Status: In process Specimen: Blood from Arm, Right Updated: 08/01/22 1950                 XR chest 1 view portable   ED Interpretation by Ancelmo Samuel DO (08/01 1930)   Improved opacifications in the right lung field  No acute cardiopulmonary disease noted  Unchanged from chest x-ray on July 25  CT soft tissue neck    (Results Pending)         Procedures  ECG 12 Lead Documentation Only    Date/Time: 8/1/2022 7:32 PM  Performed by: Ancelmo Samuel DO  Authorized by: Ancelmo Samuel DO     Indications / Diagnosis:  Elderly  ECG reviewed by me, the ED Provider: yes    Patient location:  ED  Previous ECG:     Previous ECG:  Compared to current    Similarity:  Changes noted    Comparison to cardiac monitor: No    Interpretation:     Interpretation: abnormal    Rate:     ECG rate:  88    ECG rate assessment: normal    Rhythm:     Rhythm: sinus rhythm    Ectopy:     Ectopy: none    QRS:     QRS axis:  Normal    QRS intervals:  Normal  Conduction:     Conduction: normal    ST segments:     ST segments:  Normal  T waves:     T waves: inverted      Inverted:  V2  Comments:      New inverted T wave in V2    Will order troponin            ED Course  ED Course as of 08/01/22 2113   Mon Aug 01, 2022   2039 WBC(!!): 40 93  Up from 13 7 five days ago  2040 Patient has a white count of 40, a procal that is increased to 0 8, bands at 11  We have concerns for sepsis at this time without a source  We are still awaiting CT read at this time  I mildly concerned that the patient may have a thrombosed IJ with possible infection  We will initiate Zosyn at this time  2110 Care has been signed over to Dr Gayle Freeman for continuation of care  MDM  Number of Diagnoses or Management Options  Diagnosis management comments: Patient is a 66-year-old male with a past medical history of metastatic lung cancer and a recent diagnosis of pneumonia with hospitalization last week presents to the emergency department with complaint of right-sided neck pain and fever  Upon initial presentation the patient was alert oriented  He is complaining of right-sided neck pain and I have some concern that he may have an occluded on the right side  I have ordered a CBC, CMP, chest x-ray and EKG as well as a CTA the soft tissue of the neck  Labs and imaging are pending at this time  Disposition  Final diagnoses:   None     ED Disposition     None      Follow-up Information    None         Patient's Medications   Discharge Prescriptions    No medications on file     No discharge procedures on file  PDMP Review       Value Time User    PDMP Reviewed  Yes 7/25/2022 12:36 PM Marck Landon MD           ED Provider  Attending physically available and evaluated Tre Augustine  I managed the patient along with the ED Attending      Electronically Signed by         Edgar Chase DO  08/01/22 2113

## 2022-08-01 NOTE — LETTER
Thank you for allowing us to participate in the care of your patient, Tre Augustine, who was hospitalized from 8/1/2022 1817 through 8/3/2022 with the admitting diagnosis of Right internal jugular thrombus  He was instructed at discharge to follow up with your office and with vascular surgery as an outpatient  If you have any additional questions or would like to discuss further, please feel free to contact me      DO Melony Doshi  Internal Medicine, Hospitalist  [phone]

## 2022-08-02 PROBLEM — I82.C11 INTERNAL JUGULAR (IJ) VEIN THROMBOEMBOLISM, ACUTE, RIGHT (HCC): Status: ACTIVE | Noted: 2022-01-01

## 2022-08-02 NOTE — ASSESSMENT & PLAN NOTE
Follows with palliative as outpatient    Plan:   Regimen as follows, will continue  Ativan 0 5 for sleep  Oxycodone 10 mg q 6 p r n

## 2022-08-02 NOTE — ASSESSMENT & PLAN NOTE
Lab Results   Component Value Date    HGBA1C 6 5 (H) 06/05/2022     Outpatient metformin 1000 mg daily + occasional use of insulin pen when needed  Blood Sugar Average: Last 72 hrs:     SSI algo 2 while inpatient   POC glucose checks  Hypoglycemia protocol  Adjust regimen as needed

## 2022-08-02 NOTE — ASSESSMENT & PLAN NOTE
POA tachycardia 91, leukocytosis 40 w/ bandemia  Likely due to leukemoid reaction, unclear source at this time meeting 2/4 SIRS qsofa 0    Recent Labs     08/01/22 1946 08/02/22  0526   WBC 40 93* 34 45*     Recent Labs     08/01/22 1946   LACTICACID 1 4     Recent Labs     08/01/22 1946 08/02/22  0526   PROCALCITONI 0 80* 0 68*         BP 97/67   Pulse 92   Temp 97 9 °F (36 6 °C)   Resp 18   Wt 66 kg (145 lb 8 1 oz)   SpO2 96%   BMI 22 79 kg/m²     CT soft tissue neck  Result Date: 8/1/2022  Impression: Occlusive filling defect within the right internal jugular vein compatible with deep venous thrombus   Findings were discussed with Dr Lyndon Moreno at 9:45 PM, 8/1/2022 Workstation performed: [id number]     CXR:     Plan:  Clx:  Pending  Procalcitonin rpt improved but remains elevated  Abx:  Received 1 time dose of Zosyn, will continue remaining PO abx keflex  IVF:  Gentle hydration w/ normal saline at 75 mL/hour

## 2022-08-02 NOTE — ASSESSMENT & PLAN NOTE
Follows with palliative as outpatient    Plan:   Regimen as follows, will continue  Ativan 0 5mg for sleep  Oxycodone 10 mg q 6 p r n

## 2022-08-02 NOTE — PLAN OF CARE
Problem: PHYSICAL THERAPY ADULT  Goal: Performs mobility at highest level of function for planned discharge setting  See evaluation for individualized goals  Description: Treatment/Interventions: Functional transfer training, LE strengthening/ROM, Therapeutic exercise, Elevations, Endurance training, Patient/family training, Equipment eval/education, Gait training, Bed mobility  Equipment Recommended:  (most likely no AD; possibly cane trial pending progress)       See flowsheet documentation for full assessment, interventions and recommendations  Note: Prognosis: Good  Problem List: Decreased strength, Decreased endurance, Impaired balance, Decreased mobility, Pain  Assessment: Pt is a 62 y o  male seen for PT evaluation s/p admit to [address] [place] [address] on 8/1/2022 w/ Internal jugular (IJ) vein thromboembolism, acute, right ([address] )  Order placed for PT  Comorbidities affecting pt's physical performance at time of assessment include: DM and cancer history  Personal factors affecting pt at time of IE include: steps to enter environment, inability to perform IADLs, inability to perform ADLs, and preferred language not [place] (language barrier)  Prior to admission, pt was was independent w/ all functional mobility w/ out AD, lived in one floor environment, had [address] (+) railing, and lived with wife   Upon evaluation: Pt requires supervision for transfers and ambulation with and without AD  (Please find full objective findings from PT assessment regarding body systems outlined above)  Impairments and limitations also listed above, especially due to  weakness, impaired balance, decreased endurance, gait deviations, pain, decreased activity tolerance, decreased safety awareness, and fall risk  Pt's clinical presentation is currently unstable/unpredictable seen in pt's presentation of fall risk, decreased safety awareness, and limited insight into deficits    Pt to benefit from continued skilled PT tx while in hospital and upon DC to address deficits as defined above and maximize level of functional mobility  From PT/mobility standpoint, recommendation at time of d/c would be home with family support pending progress in order to maximize pt's functional independence and consistency w/ mobility in order to facilitate return to PLOF  Recommend  progression of ambulation, stair negotiation, and initiation of HEP as appropriate   PT Discharge Recommendation: No rehabilitation needs (home with family support)    See flowsheet documentation for full assessment

## 2022-08-02 NOTE — ASSESSMENT & PLAN NOTE
Lab Results   Component Value Date    HGBA1C 6 5 (H) 06/05/2022     Outpatient metformin 1000 mg daily + occasional use of insulin pen when needed  Blood Sugar Average: Last 72 hrs:  (P) 107   SSI algo 2 while inpatient   POC glucose checks  Hypoglycemia protocol  Adjust regimen as needed

## 2022-08-02 NOTE — ASSESSMENT & PLAN NOTE
Detail Level: Detailed Recent Labs     08/01/22 1946 08/02/22  0526   SODIUM 133* 134*     Chronic, stable  Gentle hydration with NS @ 75mL/hr Add 07861 Cpt? (Important Note: In 2017 The Use Of 48259 Is Being Tracked By Cms To Determine Future Global Period Reimbursement For Global Periods): yes Wound Evaluated By: ST Edith Additional Comments: wound was cleaned with antibacterial soap and water. Wound Evaluated By: ST Edith Additional Comments: wound was cleaned with antibacterial soap and water

## 2022-08-02 NOTE — ASSESSMENT & PLAN NOTE
Lab Results   Component Value Date    PROCALCITONI 0 68 (H) 08/02/2022    PROCALCITONI 0 80 (H) 08/01/2022    PROCALCITONI 0 32 (H) 07/26/2022       Recent admission 7/25-7/28, likely postobstructive w/ hx of Klebsiella pneumonia  Treated with IV ceftriaxone and transitioned to keflex on discharge for to complete 7 day total course abx  Procal at time of discharge (0 3) 7/28 ; today 0 8     Afebrile, CXR appears w/ improving RUL     Plan:  Continue keflex that was given upon last d/c to complete course of abx (2 remaining days)   Given 1x dose of zosyn in ED  Repeat procal improved  Monitor CBC  Monitor vitals / resp status   PRN robitussin if cough   Xopinex and atrovent nebs TID

## 2022-08-02 NOTE — UTILIZATION REVIEW
Initial Clinical Review    Admission: Date/Time/Statement:   Admission Orders (From admission, onward)     Ordered        08/01/22 2223  INPATIENT ADMISSION  Once                      Orders Placed This Encounter   Procedures    INPATIENT ADMISSION     Standing Status:   Standing     Number of Occurrences:   1     Order Specific Question:   Level of Care     Answer:   Med Surg [16]     Order Specific Question:   Estimated length of stay     Answer:   More than 2 Midnights     Order Specific Question:   Certification     Answer:   I certify that inpatient services are medically necessary for this patient for a duration of greater than two midnights  See H&P and MD Progress Notes for additional information about the patient's course of treatment  ED Arrival Information     Expected   -    Arrival   8/1/2022 17:33    Acuity   Urgent            Means of arrival   Wheelchair    Escorted by   Spouse    Service   Hospitalist    Admission type   Urgent            Arrival complaint   Fever/Neck and shoulder pain           Chief Complaint   Patient presents with    Fever - 9 weeks to 74 years     Pt states he has had a fever since yesterday and has R sided neck and shoulder pain, pt was here last week with dx of pneumonia   Denies chest pain and sob       Initial Presentation: 62 y o  male presented to the ED with complaints fever and right-sided neck pain   Patient was recently admitted to this hospital from 7/25 to 7/28 for right upper lobe pneumonia (sputum grew Klebsiella pneumonia) originally treated with IV antibiotics then transition to Keflex upon discharge  [facility] discharge he was clinically improving with WBC on down trend   Patient reports he felt better and has continued Keflex since discharge however yesterday afternoon began feeling right-sided neck pain that went to his shoulder and became febrile , T-max at home 101 ° F  His wife at bedside also reports that hurt to touch that area  Imaging studies revealed chest x-ray with improvement previous pneumonia with unchanged right upper lobe mass and CT neck with right-sided IJ thrombus  PMH: metastatic adenocarcinoma of the lung with mets to the brain, COPD, DM  Plan: Inpatient admission for evaluation and treatment of R Internal jugular vein thromboembolism, pneumonia, SIRS, leukocytosis (40 93), hyponatremia (133): heparin drip, telemetry, Vascular consult, finish remainder of PO Keflex, trend procal, monitor CBC, IV fluids  Date: 8/2   Day 2:     Vascular Surgery consult: No acute vascular surgery intervention warranted at this time  Continue heparin drip  Recommend sitting up right, sleeping with head elevated  Ice packs for swelling and pain     Internal Medicine: Continue heparin drip, telemetry, Ice packs to reduce swelling and pain, pain management, trend procalcitonin, continue remaining PO keflex, continue IV fluids  ED Triage Vitals [08/01/22 1802]   Temperature Pulse Respirations Blood Pressure SpO2   98 6 °F (37 °C) 91 18 131/72 100 %      Temp Source Heart Rate Source Patient Position - Orthostatic VS BP Location FiO2 (%)   Oral Monitor Sitting Left arm --      Pain Score       6          Wt Readings from Last 1 Encounters:   08/01/22 66 kg (145 lb 8 1 oz)     Additional Vital Signs:     Date/Time Temp Pulse Resp BP MAP (mmHg) SpO2 O2 Device   08/02/22 0900 -- -- -- -- -- 93 % --   08/02/22 08:11:10 97 9 °F (36 6 °C) 92 18 97/67 77 93 % --   08/02/22 0800 -- -- -- -- -- -- None (Room air)   08/02/22 0726 -- -- -- -- -- 96 % None (Room air)   08/02/22 00:11:36 98 8 °F (37 1 °C) 88 18 101/78 86 94 % None (Room air)   08/01/22 2200 -- 88 18 106/69 82 96 % None (Room air)   08/01/22 2115 -- 90 16 115/70 85 98 % --     Pertinent Labs/Diagnostic Test Results:   CT soft tissue neck   Final Result by Hector Tilley MD (08/01 2157)      Occlusive filling defect within the right internal jugular vein compatible with deep venous thrombus        Findings were discussed with Dr Sumanth Blackmon at 9:45 PM, 8/1/2022      Workstation performed: [id number]         XR chest 1 view portable   ED Interpretation by Kee Clay DO (08/01 1930)   Improved opacifications in the right lung field  No acute cardiopulmonary disease noted  Unchanged from chest x-ray on July 25  Final Result by Mendel Haff, MD (08/02 1368)      No change right upper lung mass  No acute cardiopulmonary disease                    Workstation performed: [id number]               Results from last 7 days   Lab Units 08/02/22  0526 08/01/22 1946 07/27/22  0618   WBC Thousand/uL 34 45* 40 93* 13 77*   HEMOGLOBIN g/dL 9 7* 10 1* 10 1*   HEMATOCRIT % 30 0* 31 6* 31 8*   PLATELETS Thousands/uL 290 292 321   NEUTROS ABS Thousands/µL 23 46*  --   --    BANDS PCT %  --  11* 6         Results from last 7 days   Lab Units 08/02/22  0526 08/01/22 1946 07/27/22  0618   SODIUM mmol/L 134* 133* 130*   POTASSIUM mmol/L 4 1 4 4 4 1   CHLORIDE mmol/L 98 96 98   CO2 mmol/L 30 31 25   ANION GAP mmol/L 6 6 7   BUN mg/dL 16 20 25   CREATININE mg/dL 0 92 0 94 0 69   EGFR ml/min/1 73sq m 91 89 105   CALCIUM mg/dL 9 3 9 7 9 4     Results from last 7 days   Lab Units 08/01/22 1946   AST U/L 14   ALT U/L 12   ALK PHOS U/L 215*   TOTAL PROTEIN g/dL 7 0   ALBUMIN g/dL 3 4*   TOTAL BILIRUBIN mg/dL 0 31     Results from last 7 days   Lab Units 08/02/22  1125 08/02/22  0809 07/27/22  1111 07/27/22  0731 07/26/22  2106 07/26/22  1607   POC GLUCOSE mg/dl 100 114 99 83 138 160*     Results from last 7 days   Lab Units 08/02/22  0526 08/01/22 1946 07/27/22  0618   GLUCOSE RANDOM mg/dL 114 101 102             BETA-HYDROXYBUTYRATE   Date Value Ref Range Status   01/06/2021 0 6 (H) <0 6 mmol/L Final          Results from last 7 days   Lab Units 08/02/22  0018 08/01/22 2217 08/01/22  2019   HS TNI 0HR ng/L  --   --  7   HS TNI 2HR ng/L  --  6  --    HSTNI D2 ng/L  --  -1  --    HS TNI 4HR ng/L 10  --   --    HSTNI D4 ng/L 3  --   --          Results from last 7 days   Lab Units 08/02/22  0526 08/02/22  0018 08/01/22  1946   PROTIME seconds  --  16 0* 13 8   INR   --  1 26* 1 04   PTT seconds 136*  --  32         Results from last 7 days   Lab Units 08/02/22  0526 08/01/22  1946   PROCALCITONIN ng/ml 0 68* 0 80*     Results from last 7 days   Lab Units 08/01/22  1946   LACTIC ACID mmol/L 1 4         Results from last 7 days   Lab Units 08/01/22  1946   BLOOD CULTURE  Received in Microbiology Lab  Culture in Progress  Received in Microbiology Lab  Culture in Progress                 ED Treatment:   Medication Administration from 08/01/2022 1732 to 08/01/2022 2351       Date/Time Order Dose Route Action     08/01/2022 2040 iohexol (OMNIPAQUE) 350 MG/ML injection (SINGLE-DOSE) 70 mL 70 mL Intravenous Given     08/01/2022 2119 piperacillin-tazobactam (ZOSYN) 3 375 g in sodium chloride 0 9 % 100 mL IVPB 3 375 g Intravenous New Bag     08/01/2022 2120 levETIRAcetam (KEPPRA) tablet 500 mg 500 mg Oral Given     08/01/2022 2234 heparin (porcine) injection 5,200 Units 5,200 Units Intravenous Given     08/01/2022 2233 heparin (porcine) 25,000 units in 0 45% NaCl 250 mL infusion (premix) 18 Units/kg/hr Intravenous New Bag        Past Medical History:   Diagnosis Date    Diabetes mellitus ([facility] )     Elevated PSA 3/11/2021    Fatty liver 3/11/2021    Gall bladder polyp 3/11/2021    Lung cancer ([facility] )     Nonimmune to hepatitis B virus 3/11/2021     Present on Admission:   Adenocarcinoma of right lung ([facility] )   Brain mass   Chronic obstructive pulmonary disease ([facility] )   Hyponatremia   Obstructive pneumonia      Admitting Diagnosis: DVT (deep venous thrombosis) (HCC) [I82 409]  Fever [R50 9]  Sepsis without acute organ dysfunction, due to unspecified organism ([facility] ) [A41 9]  Age/Sex: 62 y o  male  Admission Orders:  Scheduled Medications:  cephalexin, 500 mg, Oral, Q6H [address]  insulin lispro, 1-5 Units, Subcutaneous, TID AC  ipratropium, 0 5 mg, Nebulization, TID  levalbuterol, 1 25 mg, Nebulization, TID  levETIRAcetam, 500 mg, Oral, Q12H [facility]  melatonin, 3 mg, Oral, HS  pantoprazole, 40 mg, Oral, Early Morning  polyethylene glycol, 17 g, Oral, Daily      Continuous IV Infusions:  heparin (porcine), 3-30 Units/kg/hr (Order-Specific), Intravenous, Titrated  sodium chloride, 75 mL/hr, Intravenous, Continuous      PRN Meds:  dextromethorphan-guaiFENesin, 10 mL, Oral, Q4H PRN  heparin (porcine), 2,600 Units, Intravenous, Q1H PRN  heparin (porcine), 5,200 Units, Intravenous, Q1H PRN  HYDROmorphone, 0 2 mg, Intravenous, Q4H PRN  LORazepam, 0 5 mg, Oral, HS PRN  menthol-methyl salicylate, , Apply externally, 4x Daily PRN  ondansetron, 8 mg, Oral, Q8H PRN  oxyCODONE, 10 mg, Oral, Q6H PRN  senna, 1 tablet, Oral, HS PRN        IP CONSULT TO VASCULAR SURGERY    Network Utilization Review Department  ATTENTION: Please call with any questions or concerns to [phone] and carefully listen to the prompts so that you are directed to the right person  All voicemails are confidential   César Waller all requests for admission clinical reviews, approved or denied determinations and any other requests to dedicated fax number below belonging to the campus where the patient is receiving treatment   List of dedicated fax numbers for the Facilities:  [address] DENIALS (Administrative/Medical Necessity) [phone]   [address] (Maternity/NICU/Pediatrics) [phone]   [address] 40 125 [facility]  [address] [address] [place], [place] [address] [place] [address] [address] [place] [address] [phone] [address] [place] [address] [phone]

## 2022-08-02 NOTE — PLAN OF CARE
Problem: Potential for Falls  Goal: Patient will remain free of falls  Description: INTERVENTIONS:  - Educate patient/family on patient safety including physical limitations  - Instruct patient to call for assistance with activity   - Consult OT/PT to assist with strengthening/mobility   - Keep Call bell within reach  - Keep bed low and locked with side rails adjusted as appropriate  - Keep care items and personal belongings within reach  - Initiate and maintain comfort rounds  - Make Fall Risk Sign visible to staff  - Offer Toileting every  Hours, in advance of need  - Initiate/Maintain alarm  - Obtain necessary fall risk management equip  - Apply yellow socks and bracelet for high fall risk patients  - Consider moving patient to room near nurses station  Outcome: Progressing     Problem: INFECTION - ADULT  Goal: Absence or prevention of progression during hospitalization  Description: INTERVENTIONS:  - Assess and monitor for signs and symptoms of infection  - Monitor lab/diagnostic results  - Monitor all insertion sites, i e  indwelling lines, tubes, and drains  - Monitor endotracheal if appropriate and nasal secretions for changes in amount and color  - [place] appropriate cooling/warming therapies per order  - Administer medications as ordered  - Instruct and encourage patient and family to use good hand hygiene technique  - Identify and instruct in appropriate isolation precautions for identified infection/condition  Outcome: Progressing     Problem: DISCHARGE PLANNING  Goal: Discharge to home or other facility with appropriate resources  Description: INTERVENTIONS:  - Identify barriers to discharge w/patient and caregiver  - Arrange for needed discharge resources and transportation as appropriate  - Identify discharge learning needs (meds, wound care, etc )  - Arrange for interpretive services to assist at discharge as needed  - Refer to Case Management Department for coordinating discharge planning if the patient needs post-hospital services based on physician/advanced practitioner order or complex needs related to functional status, cognitive ability, or social support system  Outcome: Progressing     Problem: HEMATOLOGIC - ADULT  Goal: Maintains hematologic stability  Description: INTERVENTIONS  - Assess for signs and symptoms of bleeding or hemorrhage  - Monitor labs  - Administer supportive blood products/factors as ordered and appropriate  Outcome: Progressing

## 2022-08-02 NOTE — ASSESSMENT & PLAN NOTE
Patient presented with right-sided neck pain and tenderness to palpation along distribution of right IJ   CT soft tissue neck revealed result below:   Result Date: 8/1/2022  Impression: Occlusive filling defect within the right internal jugular vein compatible with deep venous thrombus   Findings were discussed with Dr Zara Villeda at 9:45 PM, 8/1/2022    Plan:  Continue Heparin gtt and plan to switch to [address]  Vascular surgery consulted and does not recommend surgical intervention  Ice packs to reduce swelling and pain  Pt is having significant pain at site; continue 10mg oxycodone for moderate pain and add dilaudid 0 2mg for severe pain  Monitor Vitals Principal Discharge DX:	Chest pain  Secondary Diagnosis:	Thoracic spine fracture

## 2022-08-02 NOTE — CONSULTS
Consult Note - Vascular Surgery   [facility]: [phone]    Assessment:  63 yo male with metastatic adenocarcinoma with a right IJ port-a-cath found to have a right internal jugular DVT  Plan:  -No acute vascular surgery intervention warranted at this time  -Continue heparin gtt with eventual transition to oral AC  -Recommend sitting up right, sleeping with head elevated   -Ice packs for swelling and pain   -Patient can follow up in vascular surgery office as an outpatient to discuss duration of AC treatment  Rest of care per primary team  Discussed with Dr Juarez  ______________________________________________________________________    Consulting Service: SLIM    Chief Complaint: neck pain    HPI: Bob Bravo is a 62 y o  male with PMHx metastatic adenocarcinoma, COPD, and DM who presents with right-sided neck pain since Friday  Pain is worse with movement and touch  Patient tried over-the-counter topical pain relief without relief  He reports some SOB and chest pain on Thursday night with laying flat, this has since resolved  Denies chest pain currently  Port-a-cath was placed in 5/10/22 by Dr Medrano, he has received 4 chemotherapy sessions since  Last session 7/22/22, to have a PET scan this month and follow up with oncologist for further chemotherapy treatments, tentatively in September (per wife)  Review of Systems:  Review of Systems   Constitutional: Positive for fever  Respiratory: Negative for shortness of breath  Cardiovascular: Negative for chest pain  Musculoskeletal: Positive for neck pain  Skin: Positive for color change  Neurological: Negative for headaches         Past Medical History:  Past Medical History:   Diagnosis Date    Diabetes mellitus ([address] )     Elevated PSA 3/11/2021    Fatty liver 3/11/2021    Gall bladder polyp 3/11/2021    Lung cancer [facility] [address] )     Nonimmune to hepatitis B virus 3/11/2021       Past Surgical History:  Past Surgical History:   Procedure Laterality Date    FL GUIDED CENTRAL VENOUS ACCESS DEVICE INSERTION  5/10/2022    LUNG BIOPSY      RI INSJ TUNNELED CTR VAD W/SUBQ PORT AGE 5 YR/> N/A 5/10/2022    Procedure: INSERTION VENOUS PORT ( PORT-A-CATH) IR;  Surgeon: Clara Bhatti DO;  Location: [facility];  Service: Interventional Radiology       Social History:  Social History     Substance and Sexual Activity   Alcohol Use Not Currently    Comment: quit drinking 7 years ago     Social History     Substance and Sexual Activity   Drug Use Never     Social History     Tobacco Use   Smoking Status Former Smoker    Packs/day: 0 50    Years: 40 00    Pack years: 20 00    Types: Cigarettes    Quit date: 2022    Years since quittin 5   Smokeless Tobacco Never Used       Family History:  Family History   Problem Relation Age of Onset    No Known Problems Mother     No Known Problems Father        Allergies:  No Known Allergies    Medications:  Current Facility-Administered Medications   Medication Dose Route Frequency    cephalexin (KEFLEX) capsule 500 mg  500 mg Oral Q6H [address]    dextromethorphan-guaiFENesin (ROBITUSSIN DM) oral syrup 10 mL  10 mL Oral Q4H PRN    heparin (porcine) 25,000 units in 0 45% NaCl 250 mL infusion (premix)  3-30 Units/kg/hr (Order-Specific) Intravenous Titrated    heparin (porcine) injection 2,600 Units  2,600 Units Intravenous Q1H PRN    heparin (porcine) injection 5,200 Units  5,200 Units Intravenous Q1H PRN    insulin lispro (HumaLOG) 100 units/mL subcutaneous injection 1-5 Units  1-5 Units Subcutaneous TID AC    ipratropium (ATROVENT) 0 02 % inhalation solution 0 5 mg  0 5 mg Nebulization TID    levalbuterol (XOPENEX) inhalation solution 1 25 mg  1 25 mg Nebulization TID    levETIRAcetam (KEPPRA) tablet 500 mg  500 mg Oral Q12H [address]    LORazepam (ATIVAN) tablet 0 5 mg  0 5 mg Oral HS PRN    melatonin tablet 3 mg  3 mg Oral HS    menthol-methyl salicylate (BENGAY) 28-12 % cream   Apply externally 4x Daily PRN    ondansetron (ZOFRAN-ODT) dispersible tablet 8 mg  8 mg Oral Q8H PRN    oxyCODONE (ROXICODONE) immediate release tablet 10 mg  10 mg Oral Q6H PRN    pantoprazole (PROTONIX) EC tablet 40 mg  40 mg Oral Early Morning    polyethylene glycol (MIRALAX) packet 17 g  17 g Oral Daily    senna (SENOKOT) tablet 8 6 mg  1 tablet Oral HS PRN    sodium chloride 0 9 % infusion  75 mL/hr Intravenous Continuous       Vitals:  BP      Temp      Pulse     Resp      SpO2        I/Os:  I/O last 3 completed shifts: In: 100 [IV Piggyback:100]  Out: -   No intake/output data recorded  Lab Results and Cultures:   CBC with diff:   Lab Results   Component Value Date    WBC 34 45 (HH) 08/02/2022    HGB 9 7 (L) 08/02/2022    HCT 30 0 (L) 08/02/2022    MCV 97 08/02/2022     08/02/2022    MCH 31 3 08/02/2022    MCHC 32 3 08/02/2022    RDW 15 7 (H) 08/02/2022    MPV 8 1 (L) 08/02/2022    NRBC 0 08/02/2022   ,   BMP/CMP:  Lab Results   Component Value Date    K 4 1 08/02/2022    K 4 4 01/29/2021    CL 98 08/02/2022     01/29/2021    CO2 30 08/02/2022    CO2 29 01/29/2021    BUN 16 08/02/2022    BUN 21 01/29/2021    CREATININE 0 92 08/02/2022    GLUCOSE 363 (H) 01/06/2021    CALCIUM 9 3 08/02/2022    CALCIUM 9 9 01/29/2021    AST 14 08/01/2022    AST 22 01/29/2021    ALT 12 08/01/2022    ALT 30 01/29/2021    ALKPHOS 215 (H) 08/01/2022    ALKPHOS 66 01/29/2021    EGFR 91 08/02/2022   ,   Lipid Panel: No results found for: CHOL,   Coags:   Lab Results   Component Value Date     (HH) 08/02/2022    INR 1 26 (H) 08/02/2022   ,     Blood Culture:   Lab Results   Component Value Date    BLOODCX Received in Microbiology Lab  Culture in Progress  08/01/2022    BLOODCX Received in Microbiology Lab  Culture in Progress   08/01/2022   ,   Urinalysis:   Lab Results   Component Value Date    COLORU Light Yellow 07/25/2022    CLARITYU Clear 07/25/2022    SPECGRAV 1 010 07/25/2022    PHUR 5 0 07/25/2022    PHUR 5 0 2022    LEUKOCYTESUR Negative 2022    NITRITE Negative 2022    GLUCOSEU Negative 2022    KETONESU Negative 2022    BILIRUBINUR Negative 2022    BLOODU Negative 2022   ,   Urine Culture: No results found for: URINECX,   Wound Culure: No results found for: WOUNDCULT    Imagin22 CT soft tissue neck:   Occlusive filling defect within the right internal jugular vein was from the C2-C3 level  Right internal jugular chemotherapy port  Occlusive filling defect within the right internal jugular vein compatible with deep venous thrombus  Physical Exam:  Physical Exam  Vitals and nursing note reviewed  Constitutional:       General: He is not in acute distress  Appearance: He is ill-appearing  Comments: Frail appearing   Neck:      Comments: Right lateral neck with minimal edema, and mild erythema, exquisitely tender to palpation  Pain with active range of motion, worse with rotation to right side  Cardiovascular:      Rate and Rhythm: Normal rate  Pulses: Normal pulses  Pulmonary:      Effort: Pulmonary effort is normal  No respiratory distress  Chest:      Chest wall: Tenderness present  Comments: Right chest wall port in place without surrounding erythema or edema, minimal tenderness to palpation around port site  Musculoskeletal:      Cervical back: Edema present  Pain with movement present  Skin:     General: Skin is warm and dry  Neurological:      General: No focal deficit present  Mental Status: He is alert  Cranial Nerves: No facial asymmetry     Psychiatric:         Mood and Affect: Mood normal             Pulse exam:  Radial: Right: 2+ Left[de-identified] 2+      YOLI Alcala  2022

## 2022-08-02 NOTE — ASSESSMENT & PLAN NOTE
Not in acute exacerbation    Plan:  Continue Robitussin p r n    Atrovent/Xopenex for home inhaler while inpatient

## 2022-08-02 NOTE — ASSESSMENT & PLAN NOTE
Patient presented with right-sided neck pain and tenderness to palpation along distribution of right IJ   CT soft tissue neck revealed result below:   Result Date: 8/1/2022  Impression: Occlusive filling defect within the right internal jugular vein compatible with deep venous thrombus   Findings were discussed with Dr Suzanne Willoughby at 9:45 PM, 8/1/2022    Plan:  Heparin gtt , continue  Telemetry  Vascular consult   Monitor Vitals

## 2022-08-02 NOTE — PLAN OF CARE
Problem: Potential for Falls  Goal: Patient will remain free of falls  Description: INTERVENTIONS:  - Educate patient/family on patient safety including physical limitations  - Instruct patient to call for assistance with activity   - Consult OT/PT to assist with strengthening/mobility   - Keep Call bell within reach  - Keep bed low and locked with side rails adjusted as appropriate  - Keep care items and personal belongings within reach  - Initiate and maintain comfort rounds  - Make Fall Risk Sign visible to staff  - Offer Toileting every  Hours, in advance of need  - Initiate/Maintain alarm  - Obtain necessary fall risk management equipme  - Apply yellow socks and bracelet for high fall risk patients  - Consider moving patient to room near nurses station  8/2/2022 1012 by Hossein Turcios RN  Outcome: Progressing  8/2/2022 1011 by Hossein Turcios RN  Outcome: Progressing     Problem: PAIN - ADULT  Goal: Verbalizes/displays adequate comfort level or baseline comfort level  Description: Interventions:  - Encourage patient to monitor pain and request assistance  - Assess pain using appropriate pain scale  - Administer analgesics based on type and severity of pain and evaluate response  - Implement non-pharmacological measures as appropriate and evaluate response  - Consider cultural and social influences on pain and pain management  - Notify physician/advanced practitioner if interventions unsuccessful or patient reports new pain  8/2/2022 1012 by Hossein Turcios RN  Outcome: Progressing  8/2/2022 1011 by Hossein Turcios RN  Outcome: Progressing     Problem: INFECTION - ADULT  Goal: Absence or prevention of progression during hospitalization  Description: INTERVENTIONS:  - Assess and monitor for signs and symptoms of infection  - Monitor lab/diagnostic results  - Monitor all insertion sites, i e  indwelling lines, tubes, and drains  - Monitor endotracheal if appropriate and nasal secretions for changes in amount and color  - [place] appropriate cooling/warming therapies per order  - Administer medications as ordered  - Instruct and encourage patient and family to use good hand hygiene technique  - Identify and instruct in appropriate isolation precautions for identified infection/condition  8/2/2022 1012 by Gudelia Batres RN  Outcome: Progressing  8/2/2022 1011 by Gudelia Batres RN  Outcome: Progressing     Problem: DISCHARGE PLANNING  Goal: Discharge to home or other facility with appropriate resources  Description: INTERVENTIONS:  - Identify barriers to discharge w/patient and caregiver  - Arrange for needed discharge resources and transportation as appropriate  - Identify discharge learning needs (meds, wound care, etc )  - Arrange for interpretive services to assist at discharge as needed  - Refer to Case Management Department for coordinating discharge planning if the patient needs post-hospital services based on physician/advanced practitioner order or complex needs related to functional status, cognitive ability, or social support system  8/2/2022 1012 by Gudelia Batres RN  Outcome: Progressing  8/2/2022 1011 by Gudelia Batres RN  Outcome: Progressing     Problem: HEMATOLOGIC - ADULT  Goal: Maintains hematologic stability  Description: INTERVENTIONS  - Assess for signs and symptoms of bleeding or hemorrhage  - Monitor labs  - Administer supportive blood products/factors as ordered and appropriate  8/2/2022 1012 by Gudelia Batres RN  Outcome: Progressing  8/2/2022 1011 by Gudelia Batres RN  Outcome: Progressing

## 2022-08-02 NOTE — PHYSICAL THERAPY NOTE
PHYSICAL THERAPY EVALUATION  NAME: Haider Barber  AGE:   62 y o  MRN:  [id number]  ADMIT DX: DVT (deep venous thrombosis) (HCC) [I82 409]  Fever [R50 9]  Sepsis without acute organ dysfunction, due to unspecified organism [facility]) [A41 9]    PMH:   Past Medical History:   Diagnosis Date    Diabetes mellitus ([facility] )     Elevated PSA 3/11/2021    Fatty liver 3/11/2021    Gall bladder polyp 3/11/2021    Lung cancer ([facility] )     Nonimmune to hepatitis B virus 3/11/2021     LENGTH OF STAY: 1       22 1256   PT Last Visit   PT Visit Date 22   Note Type   Note type Evaluation   Pain Assessment   Pain Assessment Tool 0-10   Pain Score 9   Pain Location/Orientation Orientation: Right;Location: [facility] Pain Intervention(s) Repositioned; Ambulation/increased activity   Restrictions/Precautions   Weight Bearing Precautions Per Order No   Other Precautions Telemetry; Fall Risk;Pain   Home Living   Type of [address] [place] [address] One level;Stairs to enter with rails  ([address])   Additional Comments Ambulates independently without AD at baseline  Prior Function   Level of [place] Independent with ADLs and functional mobility   Lives With Spouse   Receives Help From Family   ADL Assistance Independent   IADLs Independent   Falls in the last 6 months 0   General   Additional Pertinent History Pt is primarily [place] speaking, however understands most [place]; wife translates as needed  Family/Caregiver Present Yes   Cognition   Overall Cognitive Status WFL   Arousal/Participation Cooperative   Orientation Level Oriented X4   Memory Within functional limits   Following Commands Follows one step commands with increased time or repetition   Comments Pt identified by name and   Subjective   Subjective Agrees to PT evaluation and is pleasant and cooperative throughout session     RLE Assessment   RLE Assessment X   Strength RLE   RLE Overall Strength 4/5  (functionally)   LLE Assessment   LLE Assessment X Strength LLE   LLE Overall Strength 4/5  (functionally)   Bed Mobility   Supine to Sit Unable to assess  (sitting EOB pre/post session)   Transfers   Sit to Stand 5  Supervision   Additional items Increased time required   Stand to Sit 5  Supervision   Additional items Increased time required   Ambulation/Elevation   Gait pattern Decreased foot clearance; Short stride; Excessively slow   Gait Assistance 5  Supervision   Additional items Verbal cues   Assistive Device Other (Comment)  (IV pole for support)   Distance 120` x1   Stair Management Assistance Not tested  (pt declines trial; reports no concerns at this time)   Balance   Static Sitting Normal   Dynamic Sitting Good   Static Standing Fair   Dynamic Standing Fair -   Ambulatory Fair -   Endurance Deficit   Endurance Deficit Yes   Endurance Deficit Description mild gait degradation; pain   Activity Tolerance   Activity Tolerance Patient limited by pain   Nurse Made Aware Per RN, pt appropriate to evaluate   Assessment   Prognosis Good   Problem List Decreased strength;Decreased endurance; Impaired balance;Decreased mobility;Pain   Goals   Patient Goals to have less pain and get back to gardening   STG Expiration Date 08/12/22   Short Term Goal #1 Pt will be able to: (1) perform bed mobility with mod I to decrease caregiver burden (2) perform sit to stand with mod I to increase level of independence and promote safe toiletting and transfers (3) ambulate at least 200` with mod I and least restrictive AD to increase activity tolerance and allow for safe community mobilization (4) increase standing balance by 1 grade to decrease risk of falls (5) negotiate at least 12 stairs with mod I and handrail to allow safe access into home   PT Treatment Day 1   Plan   Treatment/Interventions Functional transfer training;LE strengthening/ROM; Therapeutic exercise;Elevations; Endurance training;Patient/family training;Equipment eval/education;Gait training;Bed mobility   PT Frequency 3-5x/wk   Recommendation   PT Discharge Recommendation No rehabilitation needs  (home with family support)   Equipment Recommended   (most likely no AD; possibly cane trial pending progress)   [facility] Basic Mobility Inpatient   Turning in Bed Without Bedrails 4   Lying on Back to Sitting on Edge of Flat Bed 4   Moving Bed to Chair 3   Standing Up From Chair 3   Walk in Room 3   Climb 3-5 Stairs 3   Basic Mobility Inpatient Raw Score 20   Basic Mobility Standardized Score 43 99   Highest Level Of Mobility   -HL Goal 6: Walk 10 steps or more   -HLM Achieved 8: Walk 250 feet ot more   Additional Treatment Session   Start Time 1245   End Time 1256   Treatment Assessment Pt agrees to PT treatment post evaluation and trial of ambulation without AD  Able to ambulate an additional ~220` x1 with close supervision without AD   x1 small anterior LOB as pt's toe caught floor due to decreased foot clearance  Pt may also benefit from education regarding appropriate footwear in future as pt ambulating in slip-on crocs  Will continue to benefit from ongoing skilled PT to maximize his functional mobility and increase his level of independence  Equipment Use none   Additional Treatment Day 1   End of Consult   Patient Position at End of Consult Seated edge of bed; All needs within reach   The patient's [facility] Basic Mobility Inpatient Short Form Raw Score is 20, Standardized Score is 43 99  A standardized score greater than or equal to 40 78 or Raw Score of 16 suggests the patient may benefit from discharge to home, which DOES coincide with CURRENT above PT recommendations  However please refer to therapist recommendation for discharge planning given other factors that may influence destination  Adapted from Alma Carter of [facility] 6-Clicks Basic Mobility and Daily Activity Scores With Discharge Destination  Physical Therapy, 2021;101:1-9   DOI: [email or web address]      Assessment: Pt is a 62 y o  male seen for PT evaluation s/p admit to [facility] on 8/1/2022 w/ Internal jugular (IJ) vein thromboembolism, acute, right ([facility] [address] )  Order placed for PT  Comorbidities affecting pt's physical performance at time of assessment include: DM and cancer history  Personal factors affecting pt at time of IE include: steps to enter environment, inability to perform IADLs, inability to perform ADLs, and preferred language not [place] (language barrier)  Prior to admission, pt was was independent w/ all functional mobility w/ out AD, lived in one floor environment, had 12 [address] (+) railing, and lived with wife   Upon evaluation: Pt requires supervision for transfers and ambulation with and without AD  (Please find full objective findings from PT assessment regarding body systems outlined above)  Impairments and limitations also listed above, especially due to  weakness, impaired balance, decreased endurance, gait deviations, pain, decreased activity tolerance, decreased safety awareness, and fall risk  Pt's clinical presentation is currently unstable/unpredictable seen in pt's presentation of fall risk, decreased safety awareness, and limited insight into deficits  Pt to benefit from continued skilled PT tx while in hospital and upon DC to address deficits as defined above and maximize level of functional mobility  From PT/mobility standpoint, recommendation at time of d/c would be home with family support pending progress in order to maximize pt's functional independence and consistency w/ mobility in order to facilitate return to PLOF  Recommend  progression of ambulation, stair negotiation, and initiation of HEP as appropriate         Sharyle Lennert, PT,DPT

## 2022-08-02 NOTE — H&P
[address] [facility] 1965, 62 y o  male MRN: [id number]  Unit/Bed#: S -01 Encounter: [id number]  Primary Care Provider: Julieta Mckeon MD   Date and time admitted to hospital: 8/1/2022  6:17 PM    * Internal jugular (IJ) vein thromboembolism, acute, right [facility])  Assessment & Plan  Patient presented with right-sided neck pain and tenderness to palpation along distribution of right IJ   CT soft tissue neck revealed result below:   Result Date: 8/1/2022  Impression: Occlusive filling defect within the right internal jugular vein compatible with deep venous thrombus  Findings were discussed with Dr Suzanne Willoughby at 9:45 PM, 8/1/2022    Plan:  Heparin gtt , continue  Telemetry  Vascular consult   Monitor Vitals     Obstructive pneumonia  Assessment & Plan  Recent admission 7/25-7/28, likely postobstructive w/ hx of Klebsiella pneumonia  Treated with IV ceftriaxone and transitioned to keflex on discharge for to complete 7 day total course abx  Procal at time of discharge (0 3) 7/28 ; today 0 8     Afebrile, CXR appears w/ improving RUL     Plan:  Continue keflex that was given upon last d/c (2 remaining days)   Given 1x dose of zosyn in ED, will hold for now  Will f/u Rpt procal , if procal continues to rise will consider escalation of antibiotic therapy  Monitor CBC  Monitor vitals / resp status   PRN robitussin if cough         SIRS (systemic inflammatory response syndrome) (HCC)  Assessment & Plan  POA tachycardia 91, leukocytosis 40 w/ bandemia  Likely reactive, unclear source at this time meeting 2/4 SIRS qsofa 0    Recent Labs     08/01/22 1946   WBC 40 93*     Recent Labs     08/01/22 1946   LACTICACID 1 4     Recent Labs     08/01/22 1946   PROCALCITONI 0 80*         /78 (BP Location: Right arm)   Pulse 88   Temp 98 8 °F (37 1 °C) (Oral)   Resp 18   Wt 66 kg (145 lb 8 1 oz)   SpO2 94%   BMI 22 79 kg/m²     CT soft tissue neck  Result Date: 8/1/2022  Impression: Occlusive filling defect within the right internal jugular vein compatible with deep venous thrombus  Findings were discussed with Dr Tamera Santos at 9:45 PM, 8/1/2022 Workstation performed: [id number]     CXR:     Plan:  Clx:  Pending  Procalcitonin rpt pending   Abx:  Received 1 time dose of Zosyn, will continue remaining PO abx keflex, will consider escalating antibiotics if procal continues to trend upward  IVF:  Gentle hydration w/ normal saline at 75 mL/hour      Leukocytosis  Assessment & Plan  Recent Labs     08/01/22  1946   WBC 40 93*       Hyponatremia  Assessment & Plan  Recent Labs     08/01/22 1946   SODIUM 133*     Chronic, stable  Gentle hydration with NS @ 75mL/hr    Palliative care patient  Assessment & Plan  Follows with palliative as outpatient    Plan:   Regimen as follows, will continue  Ativan 0 5 for sleep  Oxycodone 10 mg q 6 p r n  Chronic obstructive pulmonary disease (HCC)  Assessment & Plan  Not in acute exacerbation    Plan:  Continue Robitussin p r n  Atrovent/Xopenex for home inhaler while inpatient    Adenocarcinoma of right lung [facility])  Assessment & Plan  Patient currently undergoing chemotherapy  Last session was 7/22  Tentatively scheduled for PET + next session in September  Patient following with both Oncology and palliative as outpatient  Brain mass  Assessment & Plan  Keppra 500 mg b i d  Type 2 diabetes mellitus without complication, with long-term current use of insulin ([facility])  Assessment & Plan  Lab Results   Component Value Date    HGBA1C 6 5 (H) 06/05/2022     Outpatient metformin 1000 mg daily + occasional use of insulin pen when needed  Blood Sugar Average: Last 72 hrs:     SSI algo 2 while inpatient   POC glucose checks  Hypoglycemia protocol  Adjust regimen as needed    VTE Pharmacologic Prophylaxis: VTE Score: 3 Moderate Risk (Score 3-4) - Pharmacological DVT Prophylaxis Ordered: heparin drip    Code Status: Level 1 - Full Code  Discussion with family: Updated  (wife) at bedside  Anticipated Length of Stay: Patient will be admitted on an inpatient basis with an anticipated length of stay of greater than 2 midnights secondary to RIJ thrombus, leukocytosis w/ bandemia  Chief Complaint: R neck pain and fever     History of Present Illness:  Gaudencio Jamil is a 62 y o  male with a PMH of metastatic adenocarcinoma of the lung (mets to brain), COPD, type 2 diabetes, who presents with complaints fever and right-sided neck pain  Patient was recently admitted to this hospital from 7/25 to 7/28 for right upper lobe pneumonia (sputum grew Klebsiella pneumonia) originally treated with IV antibiotics then transition to Keflex upon discharge  Prior to discharge he was clinically improving with WBC on down trend  Patient reports he felt better and has continued Keflex since discharge however yesterday afternoon began feeling right-sided neck pain that went to his shoulder and became febrile , T-max at home 101 ° F  His wife at bedside also reports that hurt to touch that area  Denies visual changes, denies any neuro deficits or change from baseline such as difficulty speaking, facial droop, one-sided weakness, etc  Also denies, chest pain palpitations, shortness of breath, cough, nausea, diarrhea, vomiting, rash, urinary symptoms, hematuria  In the ED vital signs stable  Patient has remained afebrile since arrival to hospital, no observed fevers  Labs revealed leukocytosis with bandemia, hyponatremia, procal of 0 8  Blood cultures drawn and patient given dose of Zosyn  Imaging studies revealed chest x-ray with improvement previous pneumonia with unchanged right upper lobe mass and CT neck with right-sided IJ thrombus and patient started on heparin drip       Patient to be admitted to medicine service for management and further workup of right IJ thrombus, leukocytosis with bandemia      Review of Systems:  Review of Systems Constitutional: Positive for fever  HENT: Negative for trouble swallowing  Eyes: Negative for visual disturbance  Respiratory: Negative for cough and shortness of breath  Cardiovascular: Negative for chest pain, palpitations and leg swelling  Gastrointestinal: Negative for diarrhea, nausea and vomiting  Endocrine: Negative for polyuria  Genitourinary: Negative for dysuria and hematuria  Musculoskeletal: Positive for neck pain  Skin: Negative for rash  Allergic/Immunologic: Positive for immunocompromised state  Neurological: Negative for facial asymmetry and headaches  Psychiatric/Behavioral: The patient is not nervous/anxious  All other systems reviewed and are negative  Past Medical and Surgical History:   Past Medical History:   Diagnosis Date    Diabetes mellitus ([facility] [address] )     Elevated PSA 3/11/2021    Fatty liver 3/11/2021    Gall bladder polyp 3/11/2021    Lung cancer ([facility] )     Nonimmune to hepatitis B virus 3/11/2021       Past Surgical History:   Procedure Laterality Date    FL GUIDED CENTRAL VENOUS ACCESS DEVICE INSERTION  5/10/2022    LUNG BIOPSY      NM INSJ TUNNELED CTR VAD W/SUBQ PORT AGE 5 YR/> N/A 5/10/2022    Procedure: INSERTION VENOUS PORT ( PORT-A-CATH) IR;  Surgeon: Clara Bhatti DO;  Location: [facility] MAIN [facility];  Service: Interventional Radiology       Meds/Allergies:  Prior to Admission medications    Medication Sig Start Date End Date Taking?  Authorizing Provider   cephalexin (KEFLEX) 500 mg capsule Take 1 capsule (500 mg total) by mouth every 6 (six) hours for 7 days 7/28/22 8/4/22 Yes No Peres PA-C   dextromethorphan-guaiFENesin (ROBITUSSIN DM)  mg/5 mL syrup Take 10 mL by mouth every 4 (four) hours as needed for cough or congestion (mucus production) 7/27/22  Yes No Peres PA-C   ipratropium-albuterol (Combivent Respimat) inhaler Inhale 1 puff 4 (four) times a day 4/15/22  Yes Nilsa Matthews MD   levETIRAcetam (KEPPRA) 500 mg tablet Take 1 tablet (500 mg total) by mouth every 12 (twelve) hours 3/28/22  Yes Nilsa Galarza MD   LORazepam (ATIVAN) 0 5 mg tablet For sleep 1-2 tablets at night if you wake and have trouble returning to sleep (Max 1 mg) 7/22/22  Yes Amrita Azul MD   melatonin 3 mg Take 1 tablet (3 mg total) by mouth daily at bedtime 6/24/22  Yes Amrita Azul MD   menthol-methyl salicylate (BENGAY) 69-93 % cream Apply topically 4 (four) times a day as needed (chest and back pain) 7/27/22  Yes Barby Gilliam PA-C   ondansetron (Zofran ODT) 8 mg disintegrating tablet Take 1 tablet (8 mg total) by mouth every 8 (eight) hours as needed for nausea or vomiting 7/12/22  Yes Celestino Meredith MD   oxyCODONE (ROXICODONE) 10 MG TABS Take 1 tablet (10 mg total) by mouth every 6 (six) hours as needed for moderate pain Max Daily Amount: 40 mg 7/6/22  Yes Amrita Azul MD   pantoprazole (PROTONIX) 40 mg tablet Take 1 tablet (40 mg total) by mouth daily in the early morning 4/20/22  Yes Nilsa Galarza MD   BD Pen Needle Jeaneth 2nd Gen 32G X 4 MM MISC USE ONCE DAILY WITH LANTUS 1/7/21   Historical Provider, MD   Blood Glucose Monitoring Suppl (FreeStyle Lite) FIDELIA  1/7/21   Historical Provider, MD   Cholecalciferol (Vitamin D3) 125 MCG (5000 UT) CAPS Take 1 capsule (5,000 Units total) by mouth daily 11/17/21 7/15/22  Nilsa Galarza MD   Cholecalciferol (Vitamin D3) 125 MCG (5000 UT) TABS Take 5,000 Units by mouth daily    Historical Provider, MD   FREESTYLE LITE test strip Check blood sugar  daily 11/17/21   Nilsa Galarza MD   Insulin Pen Needle (BD Pen Needle Jeaneth U/F) 32G X 4 MM MISC Use daily as directed with insulin pen 3/28/22   Nilsa Galarza MD   Lancets (freestyle) lancets Check blood sugar daily 11/17/21   Nilsa Galarza MD   magnesium gluconate (MAGONATE) 500 mg tablet Take 1 tablet (500 mg total) by mouth daily 5/9/22 7/15/22  Nilsa Galarza MD   metFORMIN (GLUCOPHAGE-XR) 500 mg 24 hr tablet Take 2 tablets (1,000 mg total) by mouth daily with dinner 3/28/22 7/15/22  Nilsa Matthews MD   naloxone [facility]) 4 mg/0 1 mL nasal spray Administer 1 spray into a nostril  If no response after 2-3 minutes, give another dose in the other nostril using a new spray  It has to be on stand by use with opioid use - in case of overdose  Patient not taking: No sig reported 22   JONATHAN Colindres     I have reviewed home medications with patient personally  Allergies: No Known Allergies    Social History:  Marital Status: /Civil Union   Patient Pre-hospital Living Situation: Home  Patient Pre-hospital Level of Mobility: walks  Patient Pre-hospital Diet Restrictions: none  Substance Use History:   Social History     Substance and Sexual Activity   Alcohol Use Not Currently    Comment: quit drinking 7 years ago     Social History     Tobacco Use   Smoking Status Former Smoker    Packs/day: 0 50    Years: 40 00    Pack years: 20 00    Types: Cigarettes    Quit date: 2022    Years since quittin 5   Smokeless Tobacco Never Used     Social History     Substance and Sexual Activity   Drug Use Never       Family History:  Family History   Problem Relation Age of Onset    No Known Problems Mother     No Known Problems Father        Physical Exam:     Vitals:   Blood Pressure: 101/78 (22 001)  Pulse: 88 (22)  Temperature: 98 8 °F (37 1 °C) (22)  Temp Source: Oral (22 001)  Respirations: 18 (22)  Weight - Scale: 66 kg (145 lb 8 1 oz) (220)  SpO2: 94 % (22 001)    Physical Exam  Constitutional:       General: He is not in acute distress  Appearance: He is ill-appearing (Chronically ill in appearance)  He is not toxic-appearing or diaphoretic  HENT:      Head: Normocephalic and atraumatic        Right Ear: External ear normal       Left Ear: External ear normal       Nose: Nose normal    Eyes:      General: No scleral icterus  Neck:      Meningeal: Brudzinski's sign and Kernig's sign absent  Comments: TTP in area indicated above  Cardiovascular:      Rate and Rhythm: Normal rate and regular rhythm  Heart sounds: Normal heart sounds  No murmur heard  Pulmonary:      Effort: No respiratory distress  Breath sounds: Normal breath sounds  No wheezing or rales  Comments: Port right chest, the site without swelling or irritation  Abdominal:      General: There is no distension  Palpations: Abdomen is soft  Tenderness: There is no abdominal tenderness  There is no guarding  Musculoskeletal:         General: No swelling or tenderness  Cervical back: No crepitus  Right lower leg: No edema  Left lower leg: No edema  Comments: Bilateral calves without swelling tenderness or discoloration   Skin:     Coloration: Skin is pale  Neurological:      Mental Status: He is alert and oriented to person, place, and time     Psychiatric:         Mood and Affect: Mood normal          Behavior: Behavior normal        Additional Data:     Lab Results:  Results from last 7 days   Lab Units 08/01/22 1946   WBC Thousand/uL 40 93*   HEMOGLOBIN g/dL 10 1*   HEMATOCRIT % 31 6*   PLATELETS Thousands/uL 292   BANDS PCT % 11*   LYMPHO PCT % 8*   MONO PCT % 9   EOS PCT % 1     Results from last 7 days   Lab Units 08/01/22 1946   SODIUM mmol/L 133*   POTASSIUM mmol/L 4 4   CHLORIDE mmol/L 96   CO2 mmol/L 31   BUN mg/dL 20   CREATININE mg/dL 0 94   ANION GAP mmol/L 6   CALCIUM mg/dL 9 7   ALBUMIN g/dL 3 4*   TOTAL BILIRUBIN mg/dL 0 31   ALK PHOS U/L 215*   ALT U/L 12   AST U/L 14   GLUCOSE RANDOM mg/dL 101     Results from last 7 days   Lab Units 08/02/22  0018   INR  1 26*     Results from last 7 days   Lab Units 07/27/22  1111 07/27/22  0731 07/26/22  2106 07/26/22  1607 07/26/22  1058 07/26/22  0722   POC GLUCOSE mg/dl 99 83 138 160* 110 98         Results from last 7 days   Lab Units 08/01/22 1946 07/26/22  0544   LACTIC ACID mmol/L 1 4  --    PROCALCITONIN ng/ml 0 80* 0 32*       Imaging: Reviewed radiology reports from this admission including: chest xray and chest CT scan  CT soft tissue neck   Final Result by Gustabo Goltz, MD (08/01 2157)      Occlusive filling defect within the right internal jugular vein compatible with deep venous thrombus  Findings were discussed with Dr Altagracia Felix at 9:45 PM, 8/1/2022      Workstation performed: [id number]         XR chest 1 view portable   ED Interpretation by Hieu Morrison DO (08/01 1930)   Improved opacifications in the right lung field  No acute cardiopulmonary disease noted  Unchanged from chest x-ray on July 25  EKG and Other Studies Reviewed on Admission:   · EKG: NSR, 88rate, new T-wave inversion in V2     ** Please Note: This note has been constructed using a voice recognition system   **

## 2022-08-02 NOTE — ASSESSMENT & PLAN NOTE
Recent admission 7/25-7/28, likely postobstructive w/ hx of Klebsiella pneumonia  Treated with IV ceftriaxone and transitioned to keflex on discharge for to complete 7 day total course abx  Procal at time of discharge (0 3) 7/28 ; today 0 8     Afebrile, CXR appears w/ improving RUL     Plan:  Continue keflex that was given upon last d/c (2 remaining days)   Given 1x dose of zosyn in ED, will hold for now  Will f/u Rpt procal , if procal continues to rise will consider escalation of antibiotic therapy  Monitor CBC  Monitor vitals / resp status   PRN robitussin if cough

## 2022-08-02 NOTE — H&P
69 [facility] 1965, 62 y o  male MRN: [id number]  Unit/Bed#: S -01 Encounter: [id number]  Primary Care Provider: Jhony Forrester MD   Date and time admitted to hospital: 8/1/2022  6:17 PM    * Internal jugular (IJ) vein thromboembolism, acute, right [facility])  Assessment & Plan  CT soft tissue neck  Result Date: 8/1/2022  Impression: Occlusive filling defect within the right internal jugular vein compatible with deep venous thrombus  Findings were discussed with Dr Kvng Gaitan at 9:45 PM, 8/1/2022    Plan:  Heparin gtt   Tele   Pain control         Leukocytosis  Assessment & Plan  Recent Labs     08/01/22 1946   WBC 40 93*       Hyponatremia  Assessment & Plan  Recent Labs     08/01/22 1946   SODIUM 133*     Chronic, stable    Palliative care patient  Assessment & Plan  Follows with palliative as outpatient    Plan:   Regimen as follows, will continue  Ativan 0 5 for sleep  Oxycodone 10 mg q 6 p r n  Chronic obstructive pulmonary disease (HCC)  Assessment & Plan  Not in acute exacerbation    Plan:  Continue Robitussin p r n  Atrovent/Xopenex for home inhaler while inpatient    Adenocarcinoma of right lung [facility])  Assessment & Plan         Brain mass  Assessment & Plan  Keppra 500 mg b i d       VTE Pharmacologic Prophylaxis: VTE Score: 3 Moderate Risk (Score 3-4) - Pharmacological DVT Prophylaxis Ordered: heparin drip  Code Status: Level 1 - Full Code   Discussion with family: Updated  (wife) at bedside  Anticipated Length of Stay: Patient will be admitted on an inpatient basis with an anticipated length of stay of greater than 2 midnights secondary to Right IJ thrombosis leukocytosis w/ bandemia  Chief Complaint: ***    History of Present Illness:  Manasa Murray is a 62 y o  male with a PMH of metastatic adenocarcinoma of the lung (mets to brain), COPD, type 2 diabetes, who presents with complaints fever and right-sided neck pain    Patient was recently admitted to this hospital from 7/25 to 7/28 for right upper lobe pneumonia (sputum grew Klebsiella pneumonia) originally treated with IV antibiotics then transition to Keflex upon discharge  Prior to discharge he was clinically improving with WBC on down trend  Patient reports he felt better and has continued Keflex since discharge however yesterday afternoon began feeling right-sided neck pain that went to his shoulder and became febrile , T-max at home 101 ° F  His wife at bedside also reports that hurt to touch that area  Denies visual changes, denies any neuro deficits or change from baseline such as difficulty speaking, facial droop, one-sided weakness, etc  Also denies, chest pain palpitations, shortness of breath, cough, nausea, diarrhea, vomiting, rash, urinary symptoms, hematuria  In the ED vital signs stable  Patient has remained afebrile since arrival to hospital  Labs revealed leukocytosis with bandemia, hyponatremia, procal of 0 8    Imaging studies revealed right-sided IJ thrombo    Review of Systems:  Review of Systems    Past Medical and Surgical History:   Past Medical History:   Diagnosis Date    Diabetes mellitus ([facility] [address] )     Elevated PSA 3/11/2021    Fatty liver 3/11/2021    Gall bladder polyp 3/11/2021    Lung cancer ([facility] [address] )     Nonimmune to hepatitis B virus 3/11/2021       Past Surgical History:   Procedure Laterality Date    FL GUIDED CENTRAL VENOUS ACCESS DEVICE INSERTION  5/10/2022    LUNG BIOPSY      MI INSJ TUNNELED CTR VAD W/SUBQ PORT AGE 5 YR/> N/A 5/10/2022    Procedure: INSERTION VENOUS PORT ( PORT-A-CATH) IR;  Surgeon: Brianna Flores DO;  Location: [facility] MAIN OR;  Service: Interventional Radiology       Meds/Allergies:  Prior to Admission medications    Medication Sig Start Date End Date Taking?  Authorizing Provider   cephalexin (KEFLEX) 500 mg capsule Take 1 capsule (500 mg total) by mouth every 6 (six) hours for 7 days 7/28/22 8/4/22 Yes Michal Gowers YOLI Shine   dextromethorphan-guaiFENesin (ROBITUSSIN DM)  mg/5 mL syrup Take 10 mL by mouth every 4 (four) hours as needed for cough or congestion (mucus production) 7/27/22  Yes Jordyn Ahuja PA-C   ipratropium-albuterol (Combivent Respimat) inhaler Inhale 1 puff 4 (four) times a day 4/15/22  Yes Nilsa Danielson MD   levETIRAcetam (KEPPRA) 500 mg tablet Take 1 tablet (500 mg total) by mouth every 12 (twelve) hours 3/28/22  Yes Nilsa Danielson MD   LORazepam (ATIVAN) 0 5 mg tablet For sleep 1-2 tablets at night if you wake and have trouble returning to sleep (Max 1 mg) 7/22/22  Yes Lexi Radford MD   melatonin 3 mg Take 1 tablet (3 mg total) by mouth daily at bedtime 6/24/22  Yes Lexi Radford MD   menthol-methyl salicylate (BENGAY) 82-43 % cream Apply topically 4 (four) times a day as needed (chest and back pain) 7/27/22  Yes Jordyn Ahuja PA-C   ondansetron (Zofran ODT) 8 mg disintegrating tablet Take 1 tablet (8 mg total) by mouth every 8 (eight) hours as needed for nausea or vomiting 7/12/22  Yes Ashely Howard MD   oxyCODONE (ROXICODONE) 10 MG TABS Take 1 tablet (10 mg total) by mouth every 6 (six) hours as needed for moderate pain Max Daily Amount: 40 mg 7/6/22  Yes Lexi Radford MD   pantoprazole (PROTONIX) 40 mg tablet Take 1 tablet (40 mg total) by mouth daily in the early morning 4/20/22  Yes Nilsa Danielson MD   BD Pen Needle Jeaneth 2nd Gen 32G X 4 MM MISC USE ONCE DAILY WITH LANTUS 1/7/21   Historical Provider, MD   Blood Glucose Monitoring Suppl (FreeStyle Lite) FIDELIA  1/7/21   Historical Provider, MD   Cholecalciferol (Vitamin D3) 125 MCG (5000 UT) CAPS Take 1 capsule (5,000 Units total) by mouth daily 11/17/21 7/15/22  Nilsa Danielson MD   Cholecalciferol (Vitamin D3) 125 MCG (5000 UT) TABS Take 5,000 Units by mouth daily    Historical Provider, MD   FREESTYLE LITE test strip Check blood sugar  daily 11/17/21   Nilsa Danielson MD   Insulin Pen Needle (BD Pen Needle Jeaneth U/F) 32G X 4 MM MISC Use daily as directed with insulin pen 3/28/22   Nilsa Danielson MD   Lancets (freestyle) lancets Check blood sugar daily 21   Nilsa Danielson MD   magnesium gluconate (MAGONATE) 500 mg tablet Take 1 tablet (500 mg total) by mouth daily 5/9/22 7/15/22  Nilsa Danielson MD   metFORMIN (GLUCOPHAGE-XR) 500 mg 24 hr tablet Take 2 tablets (1,000 mg total) by mouth daily with dinner 3/28/22 7/15/22  Nilsa Danielson MD   naloxone [facility]) 4 mg/0 1 mL nasal spray Administer 1 spray into a nostril  If no response after 2-3 minutes, give another dose in the other nostril using a new spray   It has to be on stand by use with opioid use - in case of overdose  Patient not taking: No sig reported 22   JONATHAN Casey     {Home CMI}    Allergies: No Known Allergies    Social History:  Marital Status: /Civil Union   Occupation: ***  Patient Pre-hospital Living Situation: {Living Situation:00252}  Patient Pre-hospital Level of Mobility: {Mobility:76304}  Patient Pre-hospital Diet Restrictions: ***  Substance Use History:   Social History     Substance and Sexual Activity   Alcohol Use Not Currently    Comment: quit drinking 7 years ago     Social History     Tobacco Use   Smoking Status Former Smoker    Packs/day: 0 50    Years: 40 00    Pack years: 20 00    Types: Cigarettes    Quit date: 2022    Years since quittin 5   Smokeless Tobacco Never Used     Social History     Substance and Sexual Activity   Drug Use Never       Family History:  Family History   Problem Relation Age of Onset    No Known Problems Mother     No Known Problems Father        Physical Exam:     Vitals:   Blood Pressure: 101/78 (22)  Pulse: 88 (22)  Temperature: 98 8 °F (37 1 °C) (22)  Temp Source: Oral (22)  Respirations: 18 (22)  Weight - Scale: 66 kg (145 lb 8 1 oz) (220)  SpO2: 94 % (22 0)    Physical Exam  Vitals reviewed  Constitutional:       General: He is not in acute distress  Appearance: He is ill-appearing  He is not toxic-appearing or diaphoretic  HENT:      Head: Normocephalic and atraumatic  Right Ear: External ear normal       Left Ear: External ear normal       Nose: Nose normal    Eyes:      General: No scleral icterus  Neck:        Comments: Tenderness to palpation in area indicated above  Cardiovascular:      Rate and Rhythm: Normal rate and regular rhythm  Heart sounds: Normal heart sounds  No murmur heard  Pulmonary:      Effort: Pulmonary effort is normal  No respiratory distress  Breath sounds: No wheezing or rales  Comments: Port placed right chest, site w/out swelling or irritation, overlying skin WNL   Abdominal:      General: Bowel sounds are normal  There is no distension  Palpations: Abdomen is soft  Tenderness: There is no abdominal tenderness  There is no guarding  Musculoskeletal:      Right lower leg: No edema  Left lower leg: No edema  Skin:     Coloration: Skin is pale  Neurological:      Mental Status: He is alert and oriented to person, place, and time     Psychiatric:         Mood and Affect: Mood normal          Behavior: Behavior normal          Additional Data:     Lab Results:  Results from last 7 days   Lab Units 08/01/22  1946   WBC Thousand/uL 40 93*   HEMOGLOBIN g/dL 10 1*   HEMATOCRIT % 31 6*   PLATELETS Thousands/uL 292   BANDS PCT % 11*   LYMPHO PCT % 8*   MONO PCT % 9   EOS PCT % 1     Results from last 7 days   Lab Units 08/01/22  1946   SODIUM mmol/L 133*   POTASSIUM mmol/L 4 4   CHLORIDE mmol/L 96   CO2 mmol/L 31   BUN mg/dL 20   CREATININE mg/dL 0 94   ANION GAP mmol/L 6   CALCIUM mg/dL 9 7   ALBUMIN g/dL 3 4*   TOTAL BILIRUBIN mg/dL 0 31   ALK PHOS U/L 215*   ALT U/L 12   AST U/L 14   GLUCOSE RANDOM mg/dL 101     Results from last 7 days   Lab Units 08/02/22  0018   INR  1 26*     Results from last 7 days   Lab Units 22  1111 22  0731 22  2106 22  1607 22  1058 22  0722   POC GLUCOSE mg/dl 99 83 138 160* 110 98         Results from last 7 days   Lab Units 22  1946 22  0544   LACTIC ACID mmol/L 1 4  --    PROCALCITONIN ng/ml 0 80* 0 32*       Imaging: Reviewed radiology reports from this admission including: {Radiology Studies:38166}  CT soft tissue neck   Final Result by Mariela Sorensen MD ([id number])      Occlusive filling defect within the right internal jugular vein compatible with deep venous thrombus  Findings were discussed with Dr Lyndon Moreno at 9:45 PM, 2022      Workstation performed: [id number]         XR chest 1 view portable   ED Interpretation by Jana Robb DO ([id number])   Improved opacifications in the right lung field  No acute cardiopulmonary disease noted  Unchanged from chest x-ray on   EKG and Other Studies Reviewed on Admission:   · EKG: {Interpretation of EK}    ** Please Note: This note has been constructed using a voice recognition system   **

## 2022-08-02 NOTE — ASSESSMENT & PLAN NOTE
Patient currently undergoing chemotherapy  Last session was 7/22  Tentatively scheduled for PET and next session of chemo in September  Patient following with both Oncology and palliative as outpatient  Pt has portocath placement on right   Placement is beneath area of tenderness and site of thrombus; monitor for acute changes

## 2022-08-02 NOTE — PROGRESS NOTES
[facility]  Progress Note - Jimenez Barnett 1965, 62 y o  male MRN: [id number]  Unit/Bed#: S -01 Encounter: [id number]  Primary Care Provider: Jourdan Doherty MD   Date and time admitted to hospital: 8/1/2022  6:17 PM    * Internal jugular (IJ) vein thromboembolism, acute, right [facility])  Assessment & Plan  Patient presented with right-sided neck pain and tenderness to palpation along distribution of right IJ   CT soft tissue neck revealed result below:   Result Date: 8/1/2022  Impression: Occlusive filling defect within the right internal jugular vein compatible with deep venous thrombus  Findings were discussed with Dr Petty Hernandez at 9:45 PM, 8/1/2022    Plan:  Continue Heparin gtt and plan to switch to [address]  Vascular surgery consulted and does not recommend surgical intervention  Ice packs to reduce swelling and pain  Pt is having significant pain at site; continue 10mg oxycodone for moderate pain and add dilaudid 0 2mg for severe pain  Monitor Vitals     SIRS (systemic inflammatory response syndrome) (HCC)  Assessment & Plan  POA tachycardia 91, leukocytosis 40 w/ bandemia  Likely due to leukemoid reaction, unclear source at this time meeting 2/4 SIRS qsofa 0    Recent Labs     08/01/22 1946 08/02/22  0526   WBC 40 93* 34 45*     Recent Labs     08/01/22 1946   LACTICACID 1 4     Recent Labs     08/01/22 1946 08/02/22  0526   PROCALCITONI 0 80* 0 68*         BP 97/67   Pulse 92   Temp 97 9 °F (36 6 °C)   Resp 18   Wt 66 kg (145 lb 8 1 oz)   SpO2 96%   BMI 22 79 kg/m²     CT soft tissue neck  Result Date: 8/1/2022  Impression: Occlusive filling defect within the right internal jugular vein compatible with deep venous thrombus   Findings were discussed with Dr Petty Hernandez at 9:45 PM, 8/1/2022 Workstation performed: [id number]     CXR:     Plan:  Clx:  Pending  Procalcitonin rpt improved but remains elevated  Abx:  Received 1 time dose of Zosyn, will continue remaining PO abx keflex  IVF:  Gentle hydration w/ normal saline at 75 mL/hour      Obstructive pneumonia  Assessment & Plan  Lab Results   Component Value Date    PROCALCITONI 0 68 (H) 08/02/2022    PROCALCITONI 0 80 (H) 08/01/2022    PROCALCITONI 0 32 (H) 07/26/2022       Recent admission 7/25-7/28, likely postobstructive w/ hx of Klebsiella pneumonia  Treated with IV ceftriaxone and transitioned to keflex on discharge for to complete 7 day total course abx  Procal at time of discharge (0 3) 7/28 ; today 0 8  Afebrile, CXR appears w/ improving RUL     Plan:  Continue keflex that was given upon last d/c to complete course of abx (2 remaining days)   Given 1x dose of zosyn in ED  Repeat procal improved  Monitor CBC  Monitor vitals / resp status   PRN robitussin if cough   Xopinex and atrovent nebs TID      Hyponatremia  Assessment & Plan  Recent Labs     08/01/22  1946 08/02/22  0526   SODIUM 133* 134*     Chronic, stable  Gentle hydration with NS @ 75mL/hr    Palliative care patient  Assessment & Plan  Follows with palliative as outpatient    Plan:   Regimen as follows, will continue  Ativan 0 5mg for sleep  Oxycodone 10 mg q 6 p r n  Chronic obstructive pulmonary disease (HCC)  Assessment & Plan  Not in acute exacerbation    Plan:  Continue Robitussin p r n  Atrovent/Xopenex for home inhaler while inpatient    Adenocarcinoma of right lung [facility])  Assessment & Plan  Patient currently undergoing chemotherapy  Last session was 7/22  Tentatively scheduled for PET and next session of chemo in September  Patient following with both Oncology and palliative as outpatient  Pt has portocath placement on right  Placement is beneath area of tenderness and site of thrombus; monitor for acute changes      Brain mass  Assessment & Plan  Continue Keppra 500 mg b i d      Type 2 diabetes mellitus without complication, with long-term current use of insulin [facility])  Assessment & Plan  Lab Results   Component Value Date HGBA1C 6 5 (H) 2022     Outpatient metformin 1000 mg daily + occasional use of insulin pen when needed  Blood Sugar Average: Last 72 hrs:  (P) 107   SSI algo 2 while inpatient   POC glucose checks  Hypoglycemia protocol  Adjust regimen as needed          VTE Pharmacologic Prophylaxis:   VTE Score: 3 Moderate Risk (Score 3-4) - Pharmacological DVT Prophylaxis Ordered: Pt is currently on High does heparin drip due to IJ thrombus, further VTE prophylaxis not indicated  Mechanical VTE Prophylaxis in Place: Yes    Patient Centered Rounds: I have performed bedside rounds with nursing staff today  Discussions with Specialists or Other Care Team Provider: IM  Team, CM, vascular surgery team    Education and Discussions with Family / Patient: Updated  (wife and son) at bedside  (son was on phone)    Current Length of Stay: 1 day(s)    Current Patient Status: Inpatient     Discharge Plan / Estimated Discharge Date: Anticipate discharge in 48-72 hrs to home  Code Status: Level 1 - Full Code      Subjective:   Acute overnight events: none  Pt states that symptoms of neck pain have mildy improved since time of presentation    They currently feel similar to presentation  Pain is described as a squeezing sensation on the right side of his neck and is moderate to severe on the pain scale  Aggravating factors are: any movement of his neck  Releiving factors are: oxycodone helped mildly this AM, holding his neck straight helps  He denies headaches, lightheadedness, nausea, vomiting, chest pain, SOB,  palpitations, pain with urination  Objective:     Vitals:   Temp (24hrs), Av 4 °F (36 9 °C), Min:97 9 °F (36 6 °C), Max:98 8 °F (37 1 °C)    Temp:  [97 9 °F (36 6 °C)-98 8 °F (37 1 °C)] 97 9 °F (36 6 °C)  HR:  [88-92] 92  Resp:  [16-18] 18  BP: ()/(67-78) 97/67  SpO2:  [93 %-100 %] 96 %  Body mass index is 22 79 kg/m²  Input and Output Summary (last 24 hours):        Intake/Output Summary (Last 24 hours) at 8/2/2022 1403  Last data filed at 8/2/2022 1003  Gross per 24 hour   Intake 420 ml   Output --   Net 420 ml       Physical Exam:     Physical Exam  Vitals and nursing note reviewed  Constitutional:       General: He is not in acute distress  Appearance: He is well-developed  He is ill-appearing  HENT:      Head: Normocephalic and atraumatic  Right Ear: External ear normal       Left Ear: External ear normal       Nose: No congestion  Mouth/Throat:      Mouth: Mucous membranes are moist       Pharynx: Oropharynx is clear  Eyes:      Extraocular Movements: Extraocular movements intact  Conjunctiva/sclera: Conjunctivae normal       Pupils: Pupils are equal, round, and reactive to light  Cardiovascular:      Rate and Rhythm: Normal rate and regular rhythm  Pulses: Normal pulses  Heart sounds: Normal heart sounds  No murmur heard  Pulmonary:      Effort: Pulmonary effort is normal  No respiratory distress  Breath sounds: No rhonchi or rales  Abdominal:      General: Bowel sounds are normal       Palpations: Abdomen is soft  Tenderness: There is no abdominal tenderness  There is no guarding or rebound  Musculoskeletal:      Cervical back: Neck supple  Tenderness (tender to light palpation in region surrounding IJ) present  Right lower leg: No edema  Left lower leg: No edema  Lymphadenopathy:      Cervical: No cervical adenopathy  Skin:     General: Skin is warm and dry  Capillary Refill: Capillary refill takes less than 2 seconds  Findings: No rash  Neurological:      Mental Status: He is alert and oriented to person, place, and time  Cranial Nerves: No cranial nerve deficit     Psychiatric:         Mood and Affect: Mood normal          Behavior: Behavior normal           Additional Data:     Labs:  Results from last 7 days   Lab Units 08/02/22  0526 08/01/22  1946   WBC Thousand/uL 34 45* 40 93*   HEMOGLOBIN g/dL 9 7* 10 1*   HEMATOCRIT % 30 0* 31 6*   PLATELETS Thousands/uL 290 292   BANDS PCT %  --  11*   NEUTROS PCT % 68  --    LYMPHS PCT % 12*  --    LYMPHO PCT %  --  8*   MONOS PCT % 10  --    MONO PCT %  --  9   EOS PCT % 0 1     Results from last 7 days   Lab Units 08/02/22  0526 08/01/22  1946   SODIUM mmol/L 134* 133*   POTASSIUM mmol/L 4 1 4 4   CHLORIDE mmol/L 98 96   CO2 mmol/L 30 31   BUN mg/dL 16 20   CREATININE mg/dL 0 92 0 94   ANION GAP mmol/L 6 6   CALCIUM mg/dL 9 3 9 7   ALBUMIN g/dL  --  3 4*   TOTAL BILIRUBIN mg/dL  --  0 31   ALK PHOS U/L  --  215*   ALT U/L  --  12   AST U/L  --  14   GLUCOSE RANDOM mg/dL 114 101     Results from last 7 days   Lab Units 08/02/22  0018   INR  1 26*     Results from last 7 days   Lab Units 08/02/22  1125 08/02/22  0809 07/27/22  1111 07/27/22  0731 07/26/22  2106 07/26/22  1607   POC GLUCOSE mg/dl 100 114 99 83 138 160*         Results from last 7 days   Lab Units 08/02/22  0526 08/01/22  1946   LACTIC ACID mmol/L  --  1 4   PROCALCITONIN ng/ml 0 68* 0 80*       Imaging: Reviewed radiology reports from this admission including: xray(s) and CT neck    Recent Cultures (last 7 days):     Results from last 7 days   Lab Units 08/01/22  1946   BLOOD CULTURE  Received in Microbiology Lab  Culture in Progress  Received in Microbiology Lab  Culture in Progress         Lines/Drains:  Invasive Devices  Report    Central Venous Catheter Line  Duration           Port A Cath 05/10/22 Right Chest 84 days          Peripheral Intravenous Line  Duration           Peripheral IV 08/01/22 Right;Ventral (anterior) Forearm <1 day    Peripheral IV 08/02/22 Proximal;Right;Ventral (anterior) Forearm <1 day                Telemetry:   Telemetry Orders (From admission, onward)             24 Hour Telemetry Monitoring  Continuous x 24 Hours (Telem)        Comments: RIJ thrombus, on heparin gtt   References:    Telemetry Guidelines   Question:  Reason for 24 Hour Telemetry  Answer:  Pulmonary Embolism - 24 hours without resp  compromise, dysrhythmias, hemodynamically stable                    Last 24 Hours Medication List:   Current Facility-Administered Medications   Medication Dose Route Frequency Provider Last Rate    cephalexin  500 mg Oral Q6H Levi Vieyra MD      dextromethorphan-guaiFENesin  10 mL Oral Q4H PRN Ricky Hinojosa MD      heparin (porcine)  3-30 Units/kg/hr (Order-Specific) Intravenous Titrated Ricky Hinojosa MD 15 Units/kg/hr (08/02/22 0715)    heparin (porcine)  2,600 Units Intravenous Q1H PRN Ricky Hinojosa MD      heparin (porcine)  5,200 Units Intravenous Q1H PRN Ricky Hinojosa MD      HYDROmorphone  0 2 mg Intravenous Q4H PRN Coral Jiang DO      insulin lispro  1-5 Units Subcutaneous TID [facility] Ricky Hinojosa MD      ipratropium  0 5 mg Nebulization TID Corrinne Manns, MD      levalbuterol  1 25 mg Nebulization TID Corrinne Manns, MD      levETIRAcetam  500 mg Oral Q12H Levi Vieyra MD      LORazepam  0 5 mg Oral HS PRN Ricky Hinojosa MD      melatonin  3 mg Oral HS Ricky Hinojosa MD      menthol-methyl salicylate   Apply externally 4x Daily PRN Ricky Hinojosa MD      ondansetron  8 mg Oral Q8H PRN Ricky Hinojosa MD      oxyCODONE  10 mg Oral Q6H PRN Ricky Hinojosa MD      pantoprazole  40 mg Oral Early Morning Ricky Hinojosa MD      polyethylene glycol  17 g Oral Daily Ricky Hinojosa MD      senna  1 tablet Oral HS PRN Ricky Hinojosa MD          Today, Patient Was Seen By: Coral Jiang DO    ** Please Note: This note has been constructed using a voice recognition system   **

## 2022-08-02 NOTE — ASSESSMENT & PLAN NOTE
POA tachycardia 91, leukocytosis 40 w/ bandemia  Likely reactive, unclear source at this time meeting 2/4 SIRS qsofa 0    Recent Labs     08/01/22 1946   WBC 40 93*     Recent Labs     08/01/22 1946   LACTICACID 1 4     Recent Labs     08/01/22 1946   PROCALCITONI 0 80*         /78 (BP Location: Right arm)   Pulse 88   Temp 98 8 °F (37 1 °C) (Oral)   Resp 18   Wt 66 kg (145 lb 8 1 oz)   SpO2 94%   BMI 22 79 kg/m²     CT soft tissue neck  Result Date: 8/1/2022  Impression: Occlusive filling defect within the right internal jugular vein compatible with deep venous thrombus   Findings were discussed with Dr Del Cid Later at 9:45 PM, 8/1/2022 Workstation performed: [id number]     CXR:     Plan:  Clx:  Pending  Procalcitonin rpt pending   Abx:  Received 1 time dose of Zosyn, will continue remaining PO abx keflex, will consider escalating antibiotics if procal continues to trend upward  IVF:  Gentle hydration w/ normal saline at 75 mL/hour

## 2022-08-02 NOTE — ASSESSMENT & PLAN NOTE
Patient currently undergoing chemotherapy  Last session was 7/22  Tentatively scheduled for PET + next session in September  Patient following with both Oncology and palliative as outpatient

## 2022-08-03 NOTE — ASSESSMENT & PLAN NOTE
Lab Results   Component Value Date    PROCALCITONI 0 56 (H) 08/03/2022    PROCALCITONI 0 68 (H) 08/02/2022    PROCALCITONI 0 80 (H) 08/01/2022       Recent admission 7/25-7/28, likely postobstructive w/ hx of Klebsiella pneumonia  Treated with IV ceftriaxone and transitioned to keflex on discharge for to complete 7 day total course abx  Procal at time of discharge (0 3) 7/28 ; today 0 8  Afebrile, CXR appears w/ improving RUL     Plan:  Continue keflex that was given upon last d/c to complete course of abx; Pt received 3/4  dose of final day of course while admitted was instructed to take a final dose tonight and to stop taking the keflex after that dose because he had been given the earlier required doses (that he would have otherwise taken at home) while admitted     Given 1x dose of zosyn in ED  Repeat procal improved  WBC improved   PRN robitussin if cough

## 2022-08-03 NOTE — ASSESSMENT & PLAN NOTE
Patient currently undergoing chemotherapy  Last session was 7/22  Tentatively scheduled for PET and next session of chemo in September  Patient following with both Oncology and palliative as outpatient  Pt has portocath placement on right   Placement is beneath area of tenderness and site of thrombus; monitored for acute changes

## 2022-08-03 NOTE — DISCHARGE SUMMARY
[facility]  Discharge- Shireen Dupont 1965, 62 y o  male MRN: [id number]  Unit/Bed#: S -01 Encounter: [id number]  Primary Care Provider: Haydee Parham MD   Date and time admitted to hospital: 8/1/2022  6:17 PM    * Internal jugular (IJ) vein thromboembolism, acute, right [facility])  Assessment & Plan  Patient presented with right-sided neck pain and tenderness to palpation along distribution of right IJ   CT soft tissue neck revealed result below:   Result Date: 8/1/2022  Impression: Occlusive filling defect within the right internal jugular vein compatible with deep venous thrombus  Findings were discussed with Dr Altagracia Felix at 9:45 PM, 8/1/2022    Plan:  Continue Heparin gtt and transition to [address] today- Pt's insurance will pay for eliquis with copay and patient is agreeable to starting  Eliquis starter pack ordered for pt  Take 10mg BID for 7 days then 5mg BID  F/U with vascular surgery outpatient to determine duration he will need to continue blood thinners  Vascular surgery: does not recommend surgical intervention  Can use Ice packs to reduce swelling and pain, tylenol for pain as needed      SIRS (systemic inflammatory response syndrome) (HCC)  Assessment & Plan  POA tachycardia 91, leukocytosis 40 w/ bandemia  Likely due to leukemoid reaction, had met 2/4 SIRS qsofa 0    Recent Labs     08/01/22  1946 08/02/22  0526 08/03/22  1157   WBC 40 93* 34 45* 31 72*     Recent Labs     08/01/22 1946   LACTICACID 1 4     Recent Labs     08/01/22 1946 08/02/22  0526 08/03/22  0600   PROCALCITONI 0 80* 0 68* 0 56*         /77   Pulse 80   Temp 98 4 °F (36 9 °C)   Resp 17   Wt 66 kg (145 lb 8 1 oz)   SpO2 96%   BMI 22 79 kg/m²     CT soft tissue neck  Result Date: 8/1/2022  Impression: Occlusive filling defect within the right internal jugular vein compatible with deep venous thrombus   Findings were discussed with Dr Altagracia Felix at 9:45 PM, 8/1/2022 Workstation performed: [id number]     CXR:     Plan:  Clx:  Pending, no growth at 24hr  Procalcitonin rpt improved  Abx:  Received 1 time dose of Zosyn, finish keflex course  IVF:  Received Gentle hydration w/ normal saline at 75 mL/hour      Obstructive pneumonia  Assessment & Plan  Lab Results   Component Value Date    PROCALCITONI 0 56 (H) 08/03/2022    PROCALCITONI 0 68 (H) 08/02/2022    PROCALCITONI 0 80 (H) 08/01/2022       Recent admission 7/25-7/28, likely postobstructive w/ hx of Klebsiella pneumonia  Treated with IV ceftriaxone and transitioned to keflex on discharge for to complete 7 day total course abx  Procal at time of discharge (0 3) 7/28 ; today 0 8  Afebrile, CXR appears w/ improving RUL     Plan:  Continue keflex that was given upon last d/c to complete course of abx; Pt received 3/4  dose of final day of course while admitted was instructed to take a final dose tonight and to stop taking the keflex after that dose because he had been given the earlier required doses (that he would have otherwise taken at home) while admitted  Given 1x dose of zosyn in ED  Repeat procal improved  WBC improved   PRN robitussin if cough       Hyponatremia  Assessment & Plan  Recent Labs     08/01/22  1946 08/02/22  0526 08/03/22  0600   SODIUM 133* 134* 133*     Chronic, stable  Received Gentle hydration with NS @ 75mL/hr    Palliative care patient  Assessment & Plan  Follows with palliative as outpatient    Plan: resume home regimen    Chronic obstructive pulmonary disease ([address] )  Assessment & Plan  Not in acute exacerbation    Plan:  Continue Robitussin p r n  Resume home inhaler    Adenocarcinoma of right lung [facility])  Assessment & Plan  Patient currently undergoing chemotherapy  Last session was 7/22  Tentatively scheduled for PET and next session of chemo in September  Patient following with both Oncology and palliative as outpatient  Pt has portocath placement on right   Placement is beneath area of tenderness and site of thrombus; monitored for acute changes      Brain mass  Assessment & Plan  Continue home med    Type 2 diabetes mellitus without complication, with long-term current use of insulin ([facility])  Assessment & Plan  Lab Results   Component Value Date    HGBA1C 6 5 (H) 06/05/2022     Outpatient metformin 1000 mg daily + occasional use of insulin pen when needed  Blood Sugar Average: Last 72 hrs:  (P) 494 [id number]   Resume home meds      Discharging Resident Physician: Gutierrez Her DO  Attending: No att  providers found  PCP: Riddhi Washburn MD  Admission Date: 8/1/2022  Discharge Date: 08/03/22    Disposition:     Home    Reason for Admission: DVT (deep venous thrombosis) ([facility] ) [I82 409]  Fever [R50 9]  Sepsis without acute organ dysfunction, due to unspecified organism [facility]) [A41 9]      Consultations During Hospital Stay:  · Vascular surgery    Procedures Performed:     · none    Significant Findings / Test Results:     XR chest 1 view portable, Result Date: 8/2/2022  Impression: No change right upper lung mass  No acute cardiopulmonary disease  Workstation performed: [id number]     CT soft tissue neck, Result Date: 8/1/2022  Impression: Occlusive filling defect within the right internal jugular vein compatible with deep venous thrombus  Findings were discussed with Dr Miles Cobian at 9:45 PM, 8/1/2022 Workstation performed: [id number]     XR chest 1 view portable, Result Date: 8/2/2022  Impression No change right upper lung mass  No acute cardiopulmonary disease  Incidental Findings:   · none     Test Results Pending at Discharge (will require follow up): · Blood cultures     Outpatient Tests Requested:  · none    Complications:  none    Hospital Course:     Mitch Davis is a 62 y o  male patient who originally presented to the hospital on 8/1/2022 due to severe neck pain  CT was performed and he was found to have an internal jugular DVT   He was also found to have elevated WBC count of >40 and elevated procalcitonin in setting of recent obstructive pneumonia that he was taking keflex for after recent discharge from the hospital  He was tachycardic and met SIRS criteria but not SOFA criteria  He received IV zosyn in the emergency department and IVF  Lactic acid was not elevated  Vascular surgery was consulted and determined that pt was not candidate for acute surgical intervention and would benefit from anticoagulation  He was on a high dose heparin drip with APTT checks to titrate effective rate  He was able to be transitioned to a DOAC Eliquis on 8/3  Condition at Discharge: stable     Discharge Day Visit / Exam:     Subjective:  No acute overnight events  Pt says he has no complaints and his pain is improved from yesterday  Vitals: Blood Pressure: 121/77 (08/03/22 0817)  Pulse: 80 (08/03/22 1105)  Temperature: 98 4 °F (36 9 °C) (08/03/22 1105)  Temp Source: Oral (08/02/22 2037)  Respirations: 17 (08/02/22 2051)  Weight - Scale: 66 kg (145 lb 8 1 oz) (08/01/22 2200)  SpO2: 96 % (08/03/22 1407)  Exam:   Physical Exam  Vitals and nursing note reviewed  Constitutional:       General: He is not in acute distress  Appearance: He is ill-appearing  HENT:      Head: Normocephalic and atraumatic  Nose: No congestion  Mouth/Throat:      Mouth: Mucous membranes are moist       Pharynx: Oropharynx is clear  Eyes:      Pupils: Pupils are equal, round, and reactive to light  Cardiovascular:      Rate and Rhythm: Normal rate and regular rhythm  Pulses: Normal pulses  Heart sounds: Normal heart sounds  No murmur heard  Pulmonary:      Effort: Pulmonary effort is normal  No respiratory distress  Breath sounds: No rhonchi or rales  Abdominal:      General: Bowel sounds are normal       Palpations: Abdomen is soft  Tenderness: There is no abdominal tenderness     Musculoskeletal:         General: Tenderness (tenderness to light palpation over tissue region surrounding right internal jugular vein) present  Cervical back: Neck supple  No rigidity  Right lower leg: No edema  Left lower leg: No edema  Skin:     General: Skin is warm and dry  Capillary Refill: Capillary refill takes less than 2 seconds  Comments: portocath on right side present   Neurological:      Mental Status: He is alert and oriented to person, place, and time  Psychiatric:         Mood and Affect: Mood normal          Behavior: Behavior normal        Discussion with Family: at bedside with wife    Discharge instructions/Information to patient and family:   See after visit summary for information provided to patient and family  Provisions for Follow-Up Care:  See after visit summary for information related to follow-up care and any pertinent home health orders  Planned Readmission: none     Discharge Medications:  See after visit summary for reconciled discharge medications provided to patient and family        ** Please Note: This note has been constructed using a voice recognition system **

## 2022-08-03 NOTE — ASSESSMENT & PLAN NOTE
POA tachycardia 91, leukocytosis 40 w/ bandemia  Likely due to leukemoid reaction, had met 2/4 SIRS qsofa 0    Recent Labs     08/01/22 1946 08/02/22  0526 08/03/22  1157   WBC 40 93* 34 45* 31 72*     Recent Labs     08/01/22 1946   LACTICACID 1 4     Recent Labs     08/01/22 1946 08/02/22  0526 08/03/22  0600   PROCALCITONI 0 80* 0 68* 0 56*         /77   Pulse 80   Temp 98 4 °F (36 9 °C)   Resp 17   Wt 66 kg (145 lb 8 1 oz)   SpO2 96%   BMI 22 79 kg/m²     CT soft tissue neck  Result Date: 8/1/2022  Impression: Occlusive filling defect within the right internal jugular vein compatible with deep venous thrombus   Findings were discussed with Dr Altagracia Felix at 9:45 PM, 8/1/2022 Workstation performed: [id number]     CXR:     Plan:  Clx:  Pending, no growth at 24hr  Procalcitonin rpt improved  Abx:  Received 1 time dose of Zosyn, finish keflex course  IVF:  Received Gentle hydration w/ normal saline at 75 mL/hour

## 2022-08-03 NOTE — ASSESSMENT & PLAN NOTE
Recent Labs     08/01/22  1946 08/02/22  0526 08/03/22  0600   SODIUM 133* 134* 133*     Chronic, stable  Received Gentle hydration with NS @ 75mL/hr

## 2022-08-03 NOTE — ASSESSMENT & PLAN NOTE
Patient presented with right-sided neck pain and tenderness to palpation along distribution of right IJ   CT soft tissue neck revealed result below:   Result Date: 8/1/2022  Impression: Occlusive filling defect within the right internal jugular vein compatible with deep venous thrombus  Findings were discussed with Dr Marrian Severe at 9:45 PM, 8/1/2022    Plan:  Continue Heparin gtt and transition to [address] today- Pt's insurance will pay for eliquis with copay and patient is agreeable to starting  Eliquis starter pack ordered for pt  Take 10mg BID for 7 days then 5mg BID  F/U with vascular surgery outpatient to determine duration he will need to continue blood thinners     Vascular surgery: does not recommend surgical intervention  Can use Ice packs to reduce swelling and pain, tylenol for pain as needed

## 2022-08-03 NOTE — ASSESSMENT & PLAN NOTE
Lab Results   Component Value Date    HGBA1C 6 5 (H) 06/05/2022     Outpatient metformin 1000 mg daily + occasional use of insulin pen when needed  Blood Sugar Average: Last 72 hrs:  (P) 207 [id number]   Resume home meds

## 2022-08-03 NOTE — PLAN OF CARE
Problem: Potential for Falls  Goal: Patient will remain free of falls  Description: INTERVENTIONS:  - Educate patient/family on patient safety including physical limitations  - Instruct patient to call for assistance with activity   - Consult OT/PT to assist with strengthening/mobility   - Keep Call bell within reach  - Keep bed low and locked with side rails adjusted as appropriate  - Keep care items and personal belongings within reach  - Initiate and maintain comfort rounds  - Make Fall Risk Sign visible to staff  - Offer Toileting every  Hours, in advance of need  - Initiate/Maintain alarm  - Obtain necessary fall risk management equ  - Apply yellow socks and bracelet for high fall risk patients  - Consider moving patient to room near nurses station  Outcome: Progressing     Problem: PAIN - ADULT  Goal: Verbalizes/displays adequate comfort level or baseline comfort level  Description: Interventions:  - Encourage patient to monitor pain and request assistance  - Assess pain using appropriate pain scale  - Administer analgesics based on type and severity of pain and evaluate response  - Implement non-pharmacological measures as appropriate and evaluate response  - Consider cultural and social influences on pain and pain management  - Notify physician/advanced practitioner if interventions unsuccessful or patient reports new pain  Outcome: Progressing     Problem: INFECTION - ADULT  Goal: Absence or prevention of progression during hospitalization  Description: INTERVENTIONS:  - Assess and monitor for signs and symptoms of infection  - Monitor lab/diagnostic results  - Monitor all insertion sites, i e  indwelling lines, tubes, and drains  - Monitor endotracheal if appropriate and nasal secretions for changes in amount and color  - [place] appropriate cooling/warming therapies per order  - Administer medications as ordered  - Instruct and encourage patient and family to use good hand hygiene technique  - Identify and instruct in appropriate isolation precautions for identified infection/condition  Outcome: Progressing     Problem: DISCHARGE PLANNING  Goal: Discharge to home or other facility with appropriate resources  Description: INTERVENTIONS:  - Identify barriers to discharge w/patient and caregiver  - Arrange for needed discharge resources and transportation as appropriate  - Identify discharge learning needs (meds, wound care, etc )  - Arrange for interpretive services to assist at discharge as needed  - Refer to Case Management Department for coordinating discharge planning if the patient needs post-hospital services based on physician/advanced practitioner order or complex needs related to functional status, cognitive ability, or social support system  Outcome: Progressing     Problem: HEMATOLOGIC - ADULT  Goal: Maintains hematologic stability  Description: INTERVENTIONS  - Assess for signs and symptoms of bleeding or hemorrhage  - Monitor labs  - Administer supportive blood products/factors as ordered and appropriate  Outcome: Progressing

## 2022-08-03 NOTE — DISCHARGE INSTR - AVS FIRST PAGE
Dear Jimenez Barnett,     It was our pleasure to care for you here at [facility]  It is our hope that we were always able to exceed the expected standards for your care during your stay  You were hospitalized due to Internal Jugular thrombus  You were cared for on the med-surg floor by Gabby Monroy DO under the service of Lana Soto MD with the [facility] who covers for your primary care physician (PCP), Nilsa Coronel MD, while you were hospitalized  If you have any questions or concerns related to this hospitalization, you may contact us at [phone]  For follow up as well as any medication refills, we recommend that you follow up with your primary care physician  A registered nurse will reach out to you by phone within a few days after your discharge to answer any additional questions that you may have after going home  However, at this time we provide for you here, the most important instructions / recommendations at discharge:     Notable Medication Adjustments -   Please begin taking your new blood thinner medicine Eliquis  For the first 7 days, you should take two 5mg tablets twice a day as directed on your prescription  Starting on day 8, you should take one 5mg tablet twice a day as directed on your prescription  You should continue to take it as prescribed and should follow up with vascular surgery to determine the length of time you will need to be on a blood thinner  Important follow up information -   Please follow up with outpatient vascular surgery  Please follow up with your primary care provider within 3 days of discharge  Please review this entire after visit summary as additional general instructions including medication list, appointments, activity, diet, any pertinent wound care, and other additional recommendations from your care team that may be provided for you        Sincerely,     Gabby Monroy DO

## 2022-08-04 NOTE — TELEPHONE ENCOUNTER
Son called patient was discharged from the hospital  He had a nose bleed this morning   And now he is having numbness in his hand?   Difficult to hold anything   Per dr kelly patient needs to go to er to be checked     Son aware

## 2022-08-05 NOTE — PROGRESS NOTES
TCM Call     Date and time call was made  8/4/2022 11:34 AM    Hospital care reviewed  Records reviewed    Patient was hospitialized at  [facility]    Date of Admission  08/01/22    Date of discharge  08/03/22    Diagnosis  Internal jugular (IJ) vein thromboembolism, acute, right    Disposition  Home    Were the patients medications reviewed and updated  Yes    Current Symptoms  --  Nosebleed      TCM Call     Post hospital issues  None    Scheduled for follow up? Yes    Did you obtain your prescribed medications  Yes    Do you need help managing your prescriptions or medications  No    Is transportation to your appointment needed  No    I have advised the patient to call PCP with any new or worsening symptoms  Bimal Garcia LPN        Assessment/Plan:       Discussed with patient and family record from the ER imaging findings in great detail  Due to his current cancer he is at risk for DVT long-term Eliquis would be good choice however with brain Mets he is at risk for intracranial hemorrhage being on coagulation long-term will monitor  will notify patient's oncologist neurosurgeon and vascular surgeon about this  Usual DVT recommendation duration is 3 months PE would be 6 months however patient's risk factors high for DVT and hemorrhage we need team effort to come up with a game plan for patient  Due to recurrent pneumonia with the same bacteria that grew Klebsiella due to his risk factor immunosuppressed will put him on long-term antibiotic cephalexin 250 twice a day for the next 3 months at least     Risk of C diff infection discussed with patient and family  Advised probiotics and close monitor patient  B12 injection given to him today to decrease risk of neuropathy  Will see him back in 2 weeks for monitor  No problem-specific Assessment & Plan notes found for this encounter           Problem List Items Addressed This Visit        Endocrine    Type 2 diabetes mellitus without complication, with long-term current use of insulin ([address] ) - Primary       Respiratory    Adenocarcinoma of right lung (HCC)    Relevant Medications    umeclidinium bromide (Incruse Ellipta) 62 5 mcg/inh AEPB inhaler    Pneumonia due to Klebsiella pneumoniae (HCC)    Relevant Medications    umeclidinium bromide (Incruse Ellipta) 62 5 mcg/inh AEPB inhaler    cephalexin (KEFLEX) 250 mg capsule    Chronic obstructive pulmonary disease (HCC)    Relevant Medications    umeclidinium bromide (Incruse Ellipta) 62 5 mcg/inh AEPB inhaler       Cardiovascular and Mediastinum    Internal jugular (IJ) vein thromboembolism, acute, right (HCC)       Other    Anemia (Chronic)    Cyanocobalamin deficiency    Brain mass           Subjective:     Patient ID: Kathe Doshi is a 62 y o  male  14-year-old male follow-up transition care management  July 25th to try 27 he was admitted to hospital for right upper lobe pneumonia sputum culture grew Klebsiella pneumonia  He was treated with Zosyn and sent home on cephalexin  Again this is a recurrent pneumonia each time sputum culture will grew Klebsiella  Did well until August 1st he was admitted to the hospital for severe right-sided neck pain and CT soft tissue neck show right internal jugular vein thrombosis  He was placed on heparin drip and transition to Eliquis  He has an appointment outpatient vascular surgeon for recommendation of duration of treatment  He tolerated medication well except for developed nose bleeds and numbness tingling in his right arm worse than before  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 and now it seems to metastatic to other organs  He follow-up with neurosurgeon for brain mass on Keppra for seizure prevention  He follow-up with oncologist for lung cancer treatment he is on chemo therapy  He is frustrated because frequent hospitalization for fever chills cough recurrent pneumonia it seems like nothing he can do to prevent that  And now he had DVT in his right neck from the port that was placed  He has type 2 diabetes on metformin  Due to chemo treatment he has blood counts drop to 9 is stable  He follow-up with palliative Care on oxycodone and lorazepam     He missed appointment with pulmonologist for recurrent sputum production and pneumonia due to being hospitalized  Review of Systems      Objective:     Physical Exam  Vitals and nursing note reviewed  Constitutional:       Appearance: He is well-developed  Comments: Thin, weak male, losing hair   HENT:      Head: Normocephalic and atraumatic  Right Ear: Tympanic membrane, ear canal and external ear normal       Left Ear: Tympanic membrane, ear canal and external ear normal       Nose: Nose normal       Mouth/Throat:      Mouth: Mucous membranes are moist       Pharynx: Oropharynx is clear  Eyes:      Extraocular Movements: Extraocular movements intact  Pupils: Pupils are equal, round, and reactive to light  Cardiovascular:      Rate and Rhythm: Normal rate and regular rhythm  Pulses: Normal pulses  Heart sounds: Normal heart sounds  Pulmonary:      Effort: Pulmonary effort is normal       Breath sounds: Normal breath sounds  Abdominal:      General: Bowel sounds are normal       Palpations: Abdomen is soft  Musculoskeletal:         General: Normal range of motion  Cervical back: Normal range of motion and neck supple  Skin:     General: Skin is warm and dry  Capillary Refill: Capillary refill takes less than 2 seconds  Neurological:      General: No focal deficit present  Mental Status: He is alert and oriented to person, place, and time  Mental status is at baseline  Psychiatric:         Mood and Affect: Mood normal          Behavior: Behavior normal          Thought Content:  Thought content normal          Judgment: Judgment normal            Vitals:    08/05/22 1413   BP: 110/80   BP Location: Right arm   Patient Position: Sitting   Cuff Size: Standard   Pulse: 90   Resp: 16   Temp: 97 8 °F (36 6 °C)   TempSrc: Temporal   SpO2: 98%   Weight: 61 8 kg (136 lb 3 2 oz)   Height: 5' 7" (1 702 m)       Transitional Care Management Review:  Tre Augustine is a 62 y o  male here for TCM follow up  During the TCM phone call patient stated:    TCM Call     Date and time call was made  8/4/2022 11:34 AM    Hospital care reviewed  Records reviewed    Patient was hospitialized at  [address] [place] [address]    Date of Admission  08/01/22    Date of discharge  08/03/22    Diagnosis  Internal jugular (IJ) vein thromboembolism, acute, right    Disposition  Home    Were the patients medications reviewed and updated  Yes    Current Symptoms  --  Nosebleed      TCM Call     Post hospital issues  None    Scheduled for follow up?   Yes    Did you obtain your prescribed medications  Yes    Do you need help managing your prescriptions or medications  No    Is transportation to your appointment needed  No    I have advised the patient to call PCP with any new or worsening symptoms  ENIO Raymond MD

## 2022-08-05 NOTE — LETTER
August 5, 2022     Patient: Enoc Mitchell  YOB: 1965  Date of Visit: 8/5/2022      To Whom it May Concern:    Francisco Javier Gerard is under my professional care  Patrice Rodriguez was seen in my office on 8/5/2022  Ru needs to be out of work for the next 6 months due to medical treatments and frequent hospitalization for illness and complications from treatments  If you have any questions or concerns, please don't hesitate to call           Sincerely,          Nilsa Brower MD

## 2022-08-08 NOTE — TELEPHONE ENCOUNTER
Ru Terry  Justus Ordonez is scheduled for a PET scan on 8/19/22 however the authorization is currently denied  Reason: It is not supported for any of these reasons  -To monitor the amounts of avidity (motion that suggests disease)  -To measure the amounts of avidity in parts of a mass that remain at the end of planned treatment  -To monitor parts of a mass that remain after treatment when the mass has not changed in size since the last imaging  -You are not currently receiving anti-tumor treatment  -You are currently receiving maintenance treatment  -Surveillance after complete surgical removal of primary disease  Imaging may be supported for one of the following reasons  -Results of standard imaging found an issue confined to the chest that was new and was not normal  -It is needed to follow up unclear results on a standard study  -Your doctor needs the study to decide between a tumor (growth) that persists or recurs, and excess fiber tissue (scarring or fibrosis) in an organ or tissue due to treatment  Your records do not show that one of these applies to you  Most recent office note, labs, pathology, imaging have been sent and reviewed to make this determination  Peer to Peer  # [id number]  Opt 2  Case # [id number]    An approved Veatrice Fruits must be on file by 8/17/2022 @ 2pm for the PET to remain on the schedule  Please let us know if you will be performing the peer to peer or canceling the exam     Thank you

## 2022-08-09 NOTE — TELEPHONE ENCOUNTER
I spoke with the patient's son  The PET has been cancelled and the CT scheduled  I went over the instructions and he will be coming to the office to  the barium for his father

## 2022-08-10 PROBLEM — Z71.89 GOALS OF CARE, COUNSELING/DISCUSSION: Status: ACTIVE | Noted: 2022-01-01

## 2022-08-10 PROBLEM — I61.9 CEREBRAL BRAIN HEMORRHAGE (HCC): Status: ACTIVE | Noted: 2022-01-01

## 2022-08-10 PROBLEM — K92.2 GI BLEEDING: Status: ACTIVE | Noted: 2022-01-01

## 2022-08-10 NOTE — ASSESSMENT & PLAN NOTE
Lab Results   Component Value Date    HGBA1C 6 5 (H) 06/05/2022       Recent Labs     08/10/22  0301 08/10/22  1202   POCGLU 120 134       Blood Sugar Average: Last 72 hrs:  (P) 127   § SSI   § Blood glucose goal 140-180   § Comfort care only no intervention

## 2022-08-10 NOTE — LETTER
[address] [facility] [address]  [address]  [place] [address]  Dept: [phone]    August 12, 2022     Patient: Guerda Brizuela   YOB: 1965   Date of Visit: 8/9/2022       To Whom it May Concern:    Lázaro Bowen is under my professional care  He was seen in the hospital from 8/9/2022   to 08/12/22  He has known history of metastatic cancer with brain metastasis  At this point time his prognosis is poor and he will be sent home on hospice/comfort measures  If you have any questions or concerns, please don't hesitate to call           Sincerely,                Christiane Zee, DO

## 2022-08-10 NOTE — PROGRESS NOTES
Attending documentation- [address] 62 y o  male MRN: [id number]  Unit/Bed#: ICU 10 Encounter: [id number]    Patient is a 59-year-old gentleman with metastatic adenocarcinoma from the lung with known metastatic disease to liver, bone and brain  Recently patient was admitted secondary to right internal jugular thrombus and initiated on Eliquis  Patient was reported to have vomiting of bright red blood with clot for 5 events  He became dizzy and presented to [facility]  CT scan of the head revealed no murmurs hemorrhagic lesions throughout the supratentorial and infratentorial brain  Patient was transferred to [facility]  Patient was administered vitamin K       Visit Vitals  /74   Pulse 88   Temp 98 7 °F (37 1 °C) (Oral)   Resp (!) 11   Ht 5' 7" (1 702 m)   Wt 62 kg (136 lb 11 oz)   SpO2 98%   BMI 21 41 kg/m²   Smoking Status Former Smoker   BSA 1 72 m²     GEN: NAD, awake and alert  HEENT: EOMI, PERRLA, MMM, patient keeps his eyes closed but will open to verbal request  CV: RRR, no  Murmur  Resp:  CTA, no R/R/W  GI: soft,NT/ND  Neuro:  When spoken tube in [place] patient does follow commands with all 4 extremities, sensory appears to be grossly intact, he does not appear to be aphasic, no nystagmus appreciated, face appears to be symmetric  Skin: warm, dry    Goal systolic blood pressure  275-251    IO +200ml  UO 300ml  Normal saline 125 mL/hour    Brain mass interventions  Decadron 2 mg IV q 6 our  Keppra 750 mg IV q 12 hours    Infectious workup  08/09/2022-blood culture-pending  08/10/2022-blood culture pending  08/09/2022-urinalysis-not consistent with infection  07/29/2022 sputum culture Klebsiella pneumoniae and Staph aureus    Antibiotics  Ceftriaxone 1 g IV Q 24 hour    Diabetic interventions  Insulin sliding scale      08/09/2022 CT chest abdomen pelvis-findings consistent with progression of disease and increased size of cavitary right upper lobe mass and new extensive hepatic metastatic disease  08/09/2022-CT brain  2 4 cm lesion at the left high parietal lobule   8 mm lesion at the right superior frontal gyrus gray-white matter junction   1 2 cm lesion at the right posterior cingulate gyrus (series 2, image 28)   6 mm lesion at the left posterior inferior frontal region (series 2, image 12)   1 9 cm and a 1 cm lesion at the medial right occipital region (series 2 image 17 and 19   1 1 cm lesion at the left occipital region (series 2, image 19)   1 3 cm lesion at the left superior cerebellum (series 6 image 14)   2 9 cm lesion in the central cerebral white matter  Multiple brain masses with hemorrhagic conversion eliciting brain compression and mass effect both supratentorial infratentorial  Metastatic lung cancer  Hematemesis versus hemoptysis  Eliquis coagulopathy  Acute blood loss anemia  Intravascular volume depletion/dehydration urine specific gravity 1 042  Right IJ thrombus  Diabetes mellitus  Hyponatremia  Leukocytosis  COPD without exceprbation      Vital signs appear to be stable overnight  Continue to monitor neurologic exam q 2 hours  Neurosurgery consultation  Patient has extensive metastatic disease  He likely was not any kind of surgical candidate and may not tolerate palliative interventions such as whole brain radiation  Palliative care consultation  Continue with current supportive measures  Patient was initiated on low-dose Decadron due to concern of possible GI etiology of his bleeding  Cannot exclude the possibility of hemoptysis with increasing size of lung mass  Maintain off anticoagulation  With patient's history of right IJ thrombus there is a risk that patient could have progression of thrombus leading to worsening neurologic symptoms  Patient is hyponatremia which is possibly a combination of volume depletion/dehydration as well as the possibility SIADH    Continue to monitor patient's sodium  Gentle hydration  Leukocytosis possibly secondary to infectious etiology versus stress response  Empiric antibiotics initiated  Cultures pending  Patient did have a sputum culture that was positive in the end of July that was treated with antibiotics  Check procalcitonin  Continue PPI q 12 h  GI consult  Discontinue q 4 hr h/h     Update:  A conversation was had with patient's brother Pastora Alcantara regarding current clinical situation and progression of disease process  He understands that initial period of time there has been significant increase in patient's metastatic disease burden  He understands that there is no surgical intervention at this time for hemorrhagic metastatic lesions  It was explained that the vomiting is likely associated with the dizziness patient is having secondary to the metastatic lesions in his brain  It was also explained to both the brother as well as the patient's wife listening that the vomiting may have induced bleeding due to an injury to the esophagus  Patient is at risk for having bleeding events with the chemotherapeutic regimen he is on  We discussed aggressive interventions such as moving forward with an EGD as well as the risks involved with sedation and the possibility of patient having airway compromise or breathing abnormalities related to sedation and the need for respiratory support  Patient's brother had very appropriate questions regarding the progression of the underlying disease and the risk versus benefit of proceeding with aggressive interventions  It was related to his brother that patient has an end-stage disease process and his brother stated he would prefer to focus on the patient's comfort  Patient's brother then asked what within next steps be and how do we proceed moving forward with his brother's care  He also stated that he would like to have further conversation in-person along with the care team that assist with comfort measures    He is likely going to transition to comfort measures but would like to have an in-person meeting to discuss further details and any other questions he may have  Palliative care/hospice consulted to assist with family conversations  Patient's brother will be in approximately 2:00 p m     Family is in agreement to DNR 3 status  Critical care time 48min, critical care time does not include family update or procedures

## 2022-08-10 NOTE — CONSULTS
Patient seen in conjunction with CCM team  There was a lengthy discussion between Dr Poornima Keyes, the patient, his wife, and his son  No further aggressive intervention at this time  Plan for palliative and hospice consults  Patient will likely go comfort care  Neurosurgery will sign off; please don't hesitate to reach out with any questions or concerns

## 2022-08-10 NOTE — ASSESSMENT & PLAN NOTE
§ CTH: Numerous hemorrhagic lesions throughout the supratentorial and infratentorial brain as detailed above compatible with progression of patient's known metastatic disease    § Hold all ACPC  § Continue Keppra   § Continue decadron   § S/p vitamin K  § Comfort care only

## 2022-08-10 NOTE — PLAN OF CARE
Problem: PHYSICAL THERAPY ADULT  Goal: Performs mobility at highest level of function for planned discharge setting  See evaluation for individualized goals  Description: Treatment/Interventions: LE strengthening/ROM, Functional transfer training, Elevations, Therapeutic exercise, Endurance training, Bed mobility, Gait training, Equipment eval/education, OT  Equipment Recommended: Walker       See flowsheet documentation for full assessment, interventions and recommendations  Note: Prognosis: Good  Problem List: Decreased strength, Decreased endurance, Impaired balance, Decreased mobility (dizziness)  Assessment: Pt is a 63 yo male admitted to [place] [address] on 8/9/2022 s/p vomiting blood  DX: hemorrhagic brain mets, insomnia, RIJ thromboembolism, adenocarcinoma of R lung, pneumonia, COPD, GI bleed, DM, anemia  Two patient identifiers were used to confirm  Pt lives in a [facility] with [address] with his wife  Pt's wife works 7-3 daily  Pt required A with ADL's and IADL's prior  Pt did not use any DME  Pt does not drive  Pt denies falls  Pt is able to ambulate with out assistance  Pt's impairments include reduced mobility, dizziness when he turns his head to the L, high risk of falling, reduced BLE strength  These impairments limit the ability of the patient to perform mobility without increased assistance, return to PLOF and participate in everyday life activities  Pt would benefit from continued skilled therapy while in the hospital to improve overall mobility and work towards a safe d/c  Recommend discharge to home with home PT pending progress  At the end of the session the patient was left in seated position with call bell and phone within reach  The patient's AM-PAC Basic Mobility Inpatient Short Form Raw Score is 17  A Raw score of greater than 16 suggests the patient may benefit from discharge to home  Please also refer to the recommendation of the Physical Therapist for safe discharge planning    Barriers to Discharge: Inaccessible home environment, Decreased caregiver support     PT Discharge Recommendation: Home with home health rehabilitation (pending progress)    See flowsheet documentation for full assessment

## 2022-08-10 NOTE — SPEECH THERAPY NOTE
Speech-Language Pathology Bedside Swallow Evaluation      Patient Name: Tre Augustine    SQVPN'K Date: 8/10/2022     Problem List  Principal Problem:    Brain mass  Active Problems:    Type 2 diabetes mellitus without complication, with long-term current use of insulin ([place] [address] )    History of tobacco use    Lung mass    Cerebral edema (HCC)    Obstructive pneumonia    Adenocarcinoma of right lung ([facility] )    Pneumonia due to Klebsiella pneumoniae (HCC)    Continuous opioid dependence ([place] [address] )    Chronic obstructive pulmonary disease (HCC)    Therapeutic opioid-induced constipation (OIC)    Hyponatremia    Anemia      Past Medical History  Past Medical History:   Diagnosis Date    Diabetes mellitus ([place] [address] )     Elevated PSA 3/11/2021    Fatty liver 3/11/2021    Gall bladder polyp 3/11/2021    Lung cancer ([place] [address] )     Nonimmune to hepatitis B virus 3/11/2021       Past Surgical History  Past Surgical History:   Procedure Laterality Date    FL GUIDED CENTRAL VENOUS ACCESS DEVICE INSERTION  5/10/2022    LUNG BIOPSY      OK INSJ TUNNELED CTR VAD W/SUBQ PORT AGE 5 YR/> N/A 5/10/2022    Procedure: INSERTION VENOUS PORT ( PORT-A-CATH) IR;  Surgeon: Lyndon Díaz DO;  [facility];  Service: Interventional Radiology       Summary   Assessment is limited as patient is only cleared for thin liquids  Unsure of plan of care for today, but RN cleared for sips of clears  The patient takes 3 sips of water via straw  Oral and pharyngeal stages of swallowing appears adequate  No overt s/s aspiration observed  Wife is present at bedside  She denies concerns for aspiration at home  Reports a few vomiting episodes yesterday       Risk/s for Aspiration: low      Recommended Diet: per medical team  however, there is no concern for aspiration at this time    Recommended Form of Meds: whole with liquid   Aspiration precautions and swallowing strategies: upright posture, small bites/sips and alternating bites and sips  Other Recommendations: Continue frequent oral care    Current Medical Status  Mitch Davis is a 62 y o  male who presents with hematemesis in the setting of Eliquis  In short, patient placed on Eliquis due to recent RIJ thrombosis  Subsequently patient with hematemesis and hemorrhagic brain mets  Patient has known adenocarcinoma of the lung with mets to brain, bone, and liver  PMH includes anemia, COPD, Vitamin B12 deficiency, tobacco abuse, RIJ thromboembolism, obstructive pneumonia, T2DM, chronic opioid dependence due to cancer related pain     Per chart, patient vomited bright red blood with clots  5 episodes  Subsequently patient became dizzy and presented to [facility]  CTH revealed Numerous hemorrhagic lesions throughout the supratentorial and infratentorial brain as detailed above compatible with progression of patient's known metastatic disease  Patient received vitamin K  Hemoglobin and vitals remained stable  Allergies:  No known food allergies  Past medical history:  Please see H&P for details    Special Studies:  CT chest 8/9/2022:   LUNGS:  Large cavitary right upper lobe lung mass measuring up to 8 1 cm and extending to the right lateral pleural surface and the right paramediastinum and hilum encasing the right upper lobe bronchus and numerous pulmonary branches supplying the right   upper lobe    Comparing to 6/5/2022 mass has increased growth inferiorly and now abuts the right minor fissure as well as posteriorly abutting the superior major fissure  Mass likely invades the SVC and extends into the mediastinum along the posterior   margin the aorta into the AP window  Is no tracheal or endobronchial lesion      Social/Education/Vocational Hx:  Pt lives with family    Swallow Information   Current Risks for Dysphagia & Aspiration: none   Current Symptoms/Concerns: none   Current Diet: NPO   Baseline Diet: regular diet and thin liquids    Baseline Assessment   Behavior/Cognition: lethargic  Speech/Language Status: able to participate in basic conversation; wife is present who translates  Patient Positioning: upright in bed  Pain Status/Interventions/Response to Interventions: No report of or nonverbal indications of pain  Swallow Mechanism Exam  Not formally assessed  Patient is very fatigued  He wakes for PO trials, but immediately falls back asleep  Consistencies Assessed and Performance   Consistencies Administered: thin liquids  Materials administered included water    Oral Stage: WFL  Bolus formation and transfer were functional with no significant oral residue noted  No overt s/s reduced oral control  Pharyngeal Stage: WFL  Swallow Mechanics:  Swallowing initiation appeared prompt  Laryngeal rise was palpated and judged to be within functional limits  No coughing, throat clearing, change in vocal quality or respiratory status noted today  Esophageal Concerns: vomitting episodes at home    Summary and Recommendations (see above)    Results Reviewed with: RN and family     Treatment Recommended: evaluation only  Please re-consult if concerns for aspiration arise  At this time, the patient tolerated thin liquids well and there are no concerns for aspiration (per wife) as he was eating a regular diet at home  Chest CT also clear for aspiration concerns

## 2022-08-10 NOTE — ASSESSMENT & PLAN NOTE
§ Eliquis starter pack ordered for pt last hospitalization 8/1-8/3   Take 10mg BID for 7 days then 5mg BID      § Vascular surgery: does not recommend surgical intervention  § Hold all ACPC   § S/p Vit K   § Comfort care only

## 2022-08-10 NOTE — OCCUPATIONAL THERAPY NOTE
Occupational Therapy Evaluation     Patient Name: Gaudencio Jamil  UQGQG'P Date: 8/10/2022  Problem List  Principal Problem:    Brain mass  Active Problems:    Type 2 diabetes mellitus without complication, with long-term current use of insulin ([place] [address] )    History of tobacco use    Lung mass    Cerebral edema (HCC)    Obstructive pneumonia    Adenocarcinoma of right lung ([place] [address] )    Pneumonia due to Klebsiella pneumoniae (HCC)    Continuous opioid dependence ([place] [address] )    Chronic obstructive pulmonary disease ([place] [address] )    Therapeutic opioid-induced constipation (OIC)    Hyponatremia    Anemia    Past Medical History  Past Medical History:   Diagnosis Date    Diabetes mellitus ([place] [address] )     Elevated PSA 3/11/2021    Fatty liver 3/11/2021    Gall bladder polyp 3/11/2021    Lung cancer ([place] [address] )     Nonimmune to hepatitis B virus 3/11/2021     Past Surgical History  Past Surgical History:   Procedure Laterality Date    FL GUIDED CENTRAL VENOUS ACCESS DEVICE INSERTION  5/10/2022    LUNG BIOPSY      [place] INSJ TUNNELED CTR VAD W/SUBQ PORT AGE 5 YR/> N/A 5/10/2022    Procedure: INSERTION VENOUS PORT ( PORT-A-CATH) IR;  Surgeon: Selvin Menchaca DO;  [facility];  Service: Interventional Radiology             08/10/22 1412   OT Last Visit   OT Visit Date 08/10/22   Note Type   Note type Evaluation   Restrictions/Precautions   Weight Bearing Precautions Per Order No   Other Precautions Multiple lines;Telemetry; Bed Alarm; Chair Alarm; Fall Risk;Pain  ([place] speaking)   Pain Assessment   Pain Assessment Tool 0-10   Pain Score No Pain   Home Living   Type of [address] [place] [address] One level; Other (Comment); Laundry in basement  (w/ basement; 12-14 ЕКАТЕРИНА)   Bathroom Shower/Tub Tub/shower unit   Bathroom Toilet Standard   Bathroom Equipment Other (Comment)  (denies any)   Home Equipment Other (Comment)  (denies any)   Additional Comments Pt and pt's spouse report they live in 1 SH w/ basement, 12-14 ЕКАТЕРИНА   Prior Function   Level of [place] Needs assistance with IADLs; Needs assistance with ADLs and functional mobility   Lives With Spouse   Receives Help From Family   ADL Assistance Needs assistance   IADLs Needs assistance   Falls in the last 6 months 0   Vocational On disability   Comments Pt reports he is I w/ LB ADLS but spouse assist w/ UB ADLS; has assist for IADLS (sometimes shares w/ spouse); I w/ transfers and functional mobility; (-)    Lifestyle   Autonomy Assist ADLS/IADLS, I w/ transfers and functional mobility PTA   Reciprocal Relationships Pt lives w/ his spouse (she works [employer])   Service to [employer] Not currently working; worked for [employer] to [address] ([place]) [address]  5  [facility] 5  [address] [facility] 4  [address] 4  Minimal Assistance   [address] 4  [facility] 7045; Increased time to complete   Bed Mobility   Supine to Sit 5  Supervision   Additional items HOB elevated; Increased time required;Verbal cues   Sit to Supine Unable to assess   Additional Comments Pt sat EOB w/ Fair sitting balance/trunk control   Transfers   Sit to Stand 4  Minimal assistance   Additional items Assist x 1; Increased time required;Verbal cues   Stand to Sit 4  Minimal assistance   Additional items Assist x 1; Increased time required;Verbal cues   Additional Comments HHA used into standing   Functional Mobility   Functional Mobility 4  Minimal assistance   Additional Comments Pt took few small steps from EOB to chair w/ Min A x1; HHA used   Additional items Hand hold assistance   Balance   Static Sitting Fair   Dynamic Sitting Fair -   Static Standing Poor + Dynamic Standing Poor +   Ambulatory Poor +   Activity Tolerance   Activity Tolerance Patient limited by fatigue   Medical Staff Made Aware PT   Nurse Made Aware yes, Kym   RUE Assessment   RUE Assessment WFL   LUE Assessment   LUE Assessment WFL   Hand Function   Gross Motor Coordination Functional   Fine Motor Coordination Functional   Sensation   Light Touch Partial deficits in the RUE;Partial deficits in the LUE  (numbness in fingertips)   Proprioception   Proprioception No apparent deficits   Vision-Basic Assessment   Current Vision Wears glasses all the time   Cognition   Overall Cognitive Status WFL   Arousal/Participation Responsive; Cooperative   Attention Within functional limits   Orientation Level Oriented X4   Memory Within functional limits   Following Commands Follows all commands and directions without difficulty   Comments Pt is pleasant and cooperative   Assessment   Limitation Decreased ADL status; Decreased endurance;Decreased self-care trans;Decreased high-level ADLs   Prognosis Fair   Assessment Pt is a 61 y/o male seen for OT eval s/p adm to [facility] w/ hematemesis and hemorrhagic brain mets  Pt  has a past medical history of Diabetes mellitus ([facility] ), Elevated PSA (3/11/2021), Fatty liver (3/11/2021), Gall bladder polyp (3/11/2021), Lung cancer ([facility] ), and Nonimmune to hepatitis B virus (3/11/2021)  Pt with active OT orders and up with assistance  orders  Pt lives with his spouse in 1 SH, 12-14 ЕКАТЕРИНА, laundry in basement w/ FFOS down  Pt was I w/ LB ADLS, has assist for UB ADLS and assist for IADLS, does not drive, & required no use of DME PTA  Pt is currently demonstrating the following occupational deficits: Min A UB/LB ADLS, Min A transfers and functional mobility w/ HHA   These deficits that are impacting pt's baseline areas of occupation are a result of the following impairments: endurance, activity tolerance, functional mobility, forward functional reach, balance, functional standing tolerance and decreased I w/ ADLS/IADLS  The following Occupational Performance Areas to address include: bathing/shower, toilet hygiene, dressing, health maintenance, functional mobility and clothing management  Recommend home OT upon D/C, when medically stable  Pt to continue to benefit from acute immediate OT services to address the following goals 3-5x/week to  w/in 10-14 days:   Goals   Patient Goals too have his spouse stay overnight   LTG Time Frame 10-14   Long Term Goal #1 see below listed goals   Plan   Treatment Interventions ADL retraining;Functional transfer training; Endurance training;Cognitive reorientation;Patient/family training;Equipment evaluation/education; Compensatory technique education;Continued evaluation; Energy conservation; Activityengagement   Goal Expiration Date 22   OT Frequency 3-5x/wk   Recommendation   OT Discharge Recommendation Home with home health rehabilitation   Additional Comments  The patient's raw score on the AM-PAC Daily Activity inpatient short form is 20, standardized score is 42 03, greater than 39 4  Patients at this level are likely to benefit from discharge to home  Please refer to the recommendation of the Occupational Therapist for safe discharge planning     Additional Comments 2 Pt seen as a co-evaluation due to the patient's co-morbidities, clinically unstable presentation, and present impairments which are a regression from the patient's baseline   AM-[facility] Daily Activity Inpatient   Lower Body Dressing 3   Bathing 3   Toileting 3   Upper Body Dressing 3   Grooming 4   Eating 4   Daily Activity Raw Score 20   Daily Activity Standardized Score (Calc for Raw Score >=11) 42 03   AM-PAC Applied Cognition Inpatient   Following a Speech/Presentation 3   Understanding Ordinary Conversation 4   Taking Medications 4   Remembering Where Things Are Placed or Put Away 4   Remembering List of 4-5 Errands 4   Taking Care of Complicated Tasks 3   Applied Cognition Raw Score 22 Applied Cognition Standardized Score 47 83      GOALS    1) Pt will improve activity tolerance to G for 30 min txment sessions for increase engagement in functional tasks  2) Pt will complete UB/LB dressing/self care w/ mod I using adaptive device and DME as needed  3) Pt will complete bathing w/ Mod I w/ use of AE and DME as needed  4) Pt will complete toileting w/ mod I w/ G hygiene/thoroughness using DME as needed  5) Pt will improve functional transfers to Mod I on/off all surfaces using DME as needed w/ G balance/safety   6) Pt will improve functional mobility during ADL/IADL/leisure tasks to Mod I using DME as needed w/ G balance/safety   7) Pt will be attentive 100% of the time during ongoing cognitive assessment w/ G participation to assist w/ safe d/c planning/recommendations  8) Pt will demonstrate G carryover of pt/caregiver education and training as appropriate w/o cues w/ good tolerance to increase safety during functional tasks  9) Pt will demonstrate 100% carryover of energy conservation techniques t/o functional I/ADL/leisure tasks w/o cues s/p skilled education to increase endurance during functional tasks       Niki Alvarez MS, OTR/L

## 2022-08-10 NOTE — CASE MANAGEMENT
Case Management Assessment & Discharge Planning Note    Patient name Jimenez Barnett  Location ICU 10/ICU 10 MRN [id number]  : 1965 Date 8/10/2022       Current Admission Date: 8/10/2022  Current Admission Diagnosis:Brain mass   Patient Active Problem List    Diagnosis Date Noted    Internal jugular (IJ) vein thromboembolism, acute, right ([facility] ) 2022    Leukocytosis 2022    SIRS (systemic inflammatory response syndrome) ([facility] ) 2022    Pneumonia of right upper lobe due to infectious organism 2022    Hyponatremia 2022    Anemia 2022    Therapeutic opioid-induced constipation (OIC) 2022    Acute right-sided thoracic back pain 2022    Lung cancer metastatic to brain ([facility] ) 2022    Dehydration 2022    [facility] 2022    Palliative care patient 2022    Chronic obstructive pulmonary disease [facility] ) 2022    Adenocarcinoma of right lung ([facility] ) 2022    Pneumonia due to Klebsiella pneumoniae ([facility] ) 2022    Cancer associated pain 2022    Adjustment insomnia 2022    Continuous opioid dependence [facility] ) 2022    Sepsis due to pneumonia 2022    Obstructive pneumonia 2022    History of tobacco use 2022    Lung mass 2022    Brain mass 2022    Cerebral edema ([facility] ) 2022    Vitamin D deficiency 2021    Cyanocobalamin deficiency 2021    Nonimmune to hepatitis B virus 2021    Elevated PSA 2021    Gall bladder polyp 2021    Type 2 diabetes mellitus without complication, with long-term current use of insulin ([facility] ) 2021      LOS (days): 0  Geometric Mean LOS (GMLOS) (days):   Days to GMLOS:     OBJECTIVE:  PATIENT READMITTED TO HOSPITAL  Risk of Unplanned Readmission Score: 56 3         Current admission status: Inpatient       Preferred Pharmacy:   [facility]/pharmacy #[id number]- [address]  [address] [place] [address]  Phone: [phone] Fax: [phone] [address]) - [place], [place] - [facility]  [address] [place] [address]  Phone: [phone] Fax: [phone]    Primary Care Provider: Declan Esposito MD    Primary Insurance: [address] SHIELD  Secondary Insurance:     ASSESSMENT:  Active Health Care Proxies     [address] Representative - Son   Primary Phone: [phone] (Mobile)                         Readmission Root Cause  30 Day Readmission: Yes  During previous admission, was a post-acute recommendation made?: No  Patient was readmitted due to: hematemesis and hemorrhagic brain mets  Action Plan: possibly hospice/home health    Patient Information  Admitted from[de-identified] Home  Mental Status: Alert  During Assessment patient was accompanied by: Not accompanied during assessment  Assessment information provided by[de-identified] Other - please comment (chart review; left message for patient's son)  Primary Caregiver: Family  Support Systems: Self, Spouse/significant other, Family members  [place] of Residence: [address] [facility] do you live in?: [place] entry access options   Select all that apply : No steps to enter home  Type of Current Residence: 2 story home  Upon entering residence, is there a bedroom on the main floor (no further steps)?: No  A bedroom is located on the following floor levels of residence (select all that apply):: 2nd Floor  Number of steps to 2nd floor from main floor: One Flight  In the last 12 months, was there a time when you were not able to pay the mortgage or rent on time?: No  In the last 12 months, was there a time when you did not have a steady place to sleep or slept in a shelter (including now)?: No  Homeless/housing insecurity resource given?: N/A  Living Arrangements: Lives w/ Spouse/significant other, Lives w/ Son    Activities of Daily Living Prior to Admission  Functional Status: Independent  Completes ADLs independently?: Yes  Ambulates independently?: Yes  Does patient use assisted devices?: No  Does patient currently own DME?: No  Does patient have a history of Outpatient Therapy (PT/OT)?: No  Does the patient have a history of Short-Term Rehab?: No  Does patient have a history of HHC?: Yes ([facility])         Patient Information Continued  Income Source: SSI/SSD  Does patient have prescription coverage?: Yes  Within the past 12 months, you worried that your food would run out before you got the money to buy more : Never true  Within the past 12 months, the food you bought just didn't last and you didn't have money to get more : Never true  Food insecurity resource given?: N/A  Does patient receive dialysis treatments?: No  Does patient have a history of substance abuse?: No  Does patient have a history of Mental Health Diagnosis?: No         Means of Transportation  Means of Transport to Appts[de-identified] Family transport  In the past 12 months, has lack of transportation kept you from medical appointments or from getting medications?: No  In the past 12 months, has lack of transportation kept you from meetings, work, or from getting things needed for daily living?: No  Was application for public transport provided?: N/A        DISCHARGE DETAILS:    Discharge planning discussed with[de-identified] left message for son  Freedom of Choice: Yes     CM contacted family/caregiver?: Yes (left message)             Contacts  Patient Contacts: patient's son Niall Benedict  Relationship to Patient[de-identified] Family  Contact Method: Phone  Phone Number: [phone]  Reason/Outcome: Continuity of Care, Emergency Contact, Discharge Planning    [facility]         Is the patient interested in [facility] at discharge?: Yes  [address] requested[de-identified] Nursing, Occupational Therapy, Physical [address] Name[de-identified] [facility] Provider[de-identified] PCP  Home Health Services Needed[de-identified] Evaluate Functional Status and Safety, Gait/ADL Training, Strengthening/Theraputic Exercises to Improve Function  Homebound Criteria Met[de-identified] Requires the Assistance of Another Person for Safe Ambulation or to Leave the Home  Supporting Clincal Findings[de-identified] Fatigues Eas[place]y in [employer], Limited Endurance                   Treatment Team Recommendation: Home with [facility], Hospice  Discharge Destination Plan[de-identified] Other (TBD)  Transport at Discharge : Family               Additional Comments: Patient is a readmission from 8/1, palliative consult placed  As per chart, discussions held with patient's brother about comfort care/hospice  PT/OT recommendation home health  Referral to [facility] placed  Left message for patient's son to discuss discharge planning  ADDENDUM:  Received return call from patient's son Nae Sheehan, discussed that family has decided to pursue hospice but are unsure of level of care (home v  IPU)  Family agrees to referral to [facility], will discuss level of care with hospice liaison  CM will follow

## 2022-08-10 NOTE — PROGRESS NOTES
[address] [facility]  Progress Note - Guerda Brizuela 1965, 62 y o  male MRN: [id number]  Unit/Bed#: [facility] 730-01 Encounter: [id number]  Primary Care Provider: Brittany Julien MD   Date and time admitted to hospital: 8/10/2022  1:52 AM    * Brain mass  Assessment & Plan  § CTH: Numerous hemorrhagic lesions throughout the supratentorial and infratentorial brain as detailed above compatible with progression of patient's known metastatic disease  § Hold all ACPC  § Continue Keppra   § Continue decadron   § S/p vitamin K  § Comfort care only     Goals of care, counseling/discussion  Assessment & Plan  After discussion with ICU team, Palliative care team with family has discussion regarding end of life care as family and patient opted for comfort measures only  Family chose to go with hospice care  Hospice care will meet with family and make appropriate arrangements for comfort  GI bleeding  Assessment & Plan  § GI consulted, holding off further procedures  § Zofran PRN   § Hold ACPC  § S/p Vit K      Internal jugular (IJ) vein thromboembolism, acute, right (HCC)  Assessment & Plan  § Eliquis starter pack ordered for pt last hospitalization 8/1-8/3  Take 10mg BID for 7 days then 5mg BID      § Vascular surgery: does not recommend surgical intervention  § Hold all ACPC   § S/p Vit K   § Comfort care only     Anemia  Assessment & Plan  § Family decided to stop blood draws   § Baseline Hgb ~9       COPD (chronic obstructive pulmonary disease) ([facility] [address] )  Assessment & Plan  § Not in acute exacerbation   § Continue home nebs   § For comfort only        Continuous opioid dependence ([facility] [address] )  Assessment & Plan  Oxycodone/diluadid as needed       Pneumonia due to Klebsiella pneumoniae [facility])  Assessment & Plan  Patient was on keflex at home   Received ceftriaxone  Comfort care only at this point, per family stop antibiotics     Adenocarcinoma of right lung [facility])  Assessment & Plan  · Adenocarcinoma of right lung, patient has mets to brain, bone and liver   ? Plan:   § Patient currently undergoing chemotherapy   Last session was 7/22   Tentatively scheduled for PET and next session of chemo in September  § Patient following with both Oncology and palliative as outpatient  § Pt has portocath placement on right  § CT C/A/P: Findings consistent with progression of disease with increase in size of cavitary right upper lobe mass, extensive new hepatic metastasis, enlarging adrenal and splenic metastasis and worsening osseous metastasis    § Family decided to make patient comfort measures only     Obstructive pneumonia  Assessment & Plan  § Continue ceftriaxone   § Patient recently hospitalized 7/25-7/27 and 8/1-8/3 with Klebsiella pneumonia  § Placed on Cephalexin 250mg BID for 3 months   § Continue robitussin   § After discussion with family, he was made comfort care only     Type 2 diabetes mellitus without complication, with long-term current use of insulin ([facility] [address] )  Assessment & Plan  Lab Results   Component Value Date    HGBA1C 6 5 (H) 06/05/2022       Recent Labs     08/10/22  0301 08/10/22  1202   POCGLU 120 134       Blood Sugar Average: Last 72 hrs:  (P) 80   § SSI   § Blood glucose goal 140-180   § Comfort care only no intervention          Code Status: Level 4 - Comfort Care  POA:    POLST:       Reason for ICU admission:   Cerebral bleeding with mets      Active problems:   Principal Problem:    Brain mass  Active Problems:    Type 2 diabetes mellitus without complication, with long-term current use of insulin (HCC)    History of tobacco use    Cerebral edema (HCC)    Obstructive pneumonia    Adenocarcinoma of right lung (HCC)    Pneumonia due to Klebsiella pneumoniae (HCC)    Continuous opioid dependence (HCC)    COPD (chronic obstructive pulmonary disease) (HCC)    Therapeutic opioid-induced constipation ([facility])    Hyponatremia    Anemia    Internal jugular (IJ) vein thromboembolism, acute, right ([facility] [address] ) GI bleeding    Goals of care, counseling/discussion  Resolved Problems:    * No resolved hospital problems  *        Consultants:   Neurosurgery, GI, palliative care      History of Present Illness:   HX and PE limited by: Language barrier      Ru Castle is a 62 y  o  male who presents with hematemesis in the setting of Eliquis  In short, patient placed on Eliquis due to recent RIJ thrombosis  Subsequently patient with hematemesis and hemorrhagic brain mets  Patient has known adenocarcinoma of the lung with mets to brain, bone, and liver  PMH includes anemia, COPD, Vitamin B12 deficiency, tobacco abuse, RIJ thromboembolism, obstructive pneumonia, T2DM, chronic opioid dependence due to cancer related pain       Per chart, patient vomited bright red blood with clots  5 episodes  Subsequently patient became dizzy and [facility] [place]  CTH revealed Numerous hemorrhagic lesions throughout the supratentorial and infratentorial brain as detailed above compatible with progression of patient's known metastatic disease  Patient received vitamin K  Hemoglobin and vitals remained stable          Summary of clinical course:   Patient was planned for EGD as he has presented with vomiting blood and GI thought patient has ivan harris tears  Made him NPO  Neurosurgery patient has numerous brain mets for intervention  He was off of anticoagulation, hg stable overnight  Did not require any transfusions  With progression of his disease on radiation treatments and chemotherapy  Having brain bleed/penumonia on going/GI bleeding/ Lung carcinoma with mets to lungs, bone and brain, prognosis is poor  After discussion with ICU team, Palliative care team with family has discussion regarding end of life care as family and patient opted for comfort measures only  Family chose to go with hospice care   Hospice care will meet with family and make appropriate arrangements for comfort       Recent or scheduled procedures:   None as patient is comfort care  Outstanding/pending diagnostics:   None      Cultures:   Blood cultures pending, patient is comfort care,disregard results                                         Mobilization Plan:   PT/OT      Nutrition Plan:   Pleasure feeds      Invasive Devices Review       Invasive Devices  Report             Central Venous Catheter Line  Duration                     Port A Cath 05/10/22 Right Chest 92 days                     Peripheral Intravenous Line  Duration                     Peripheral IV 08/09/22 Left Antecubital <1 day      Peripheral IV 08/10/22 Distal;Dorsal (posterior); Right Forearm <1 day                     Rationale for remaining devices: no devices      VTE Pharmacologic Prophylaxis: comfort care only   VTE Mechanical Prophylaxis: sequential compression device    Discharge Plan:   Patient should be ready for discharge to hospice/snf on 8/10/2022         Home medications that are not reordered and reason why:   Comfort care only      Spoke with Shahnaz Madden  regarding transfer  Please contact critical care via [employer] with any questions or concerns       Portions of the record may have been created with voice recognition software  Occasional wrong word or "sound a like" substitutions may have occurred due to the inherent limitations of voice recognition software    Read the chart carefully and recognize, using context, where substitutions have occurred

## 2022-08-10 NOTE — ASSESSMENT & PLAN NOTE
In the setting of Eliquis patient bleed into brain where metastasis exists  Family decided to stop all interventions and made him comfort care only at this point  Comfort measures pain control, anxiety medications

## 2022-08-10 NOTE — PLAN OF CARE
Problem: INFECTION - ADULT  Goal: Absence or prevention of progression during hospitalization  Description: INTERVENTIONS:  - Assess and monitor for signs and symptoms of infection  - Monitor lab/diagnostic results  - Monitor all insertion sites, i e  indwelling lines, tubes, and drains  - Monitor endotracheal if appropriate and nasal secretions for changes in amount and color  - [place] appropriate cooling/warming therapies per order  - Administer medications as ordered  - Instruct and encourage patient and family to use good hand hygiene technique  - Identify and instruct in appropriate isolation precautions for identified infection/condition  Outcome: Progressing  Goal: Absence of fever/infection during neutropenic period  Description: INTERVENTIONS:  - Monitor WBC    Outcome: Progressing     Problem: Knowledge Deficit  Goal: Patient/family/caregiver demonstrates understanding of disease process, treatment plan, medications, and discharge instructions  Description: Complete learning assessment and assess knowledge base    Interventions:  - Provide teaching at level of understanding  - Provide teaching via preferred learning methods  Outcome: Progressing     Problem: DISCHARGE PLANNING  Goal: Discharge to home or other facility with appropriate resources  Description: INTERVENTIONS:  - Identify barriers to discharge w/patient and caregiver  - Arrange for needed discharge resources and transportation as appropriate  - Identify discharge learning needs (meds, wound care, etc )  - Arrange for interpretive services to assist at discharge as needed  - Refer to Case Management Department for coordinating discharge planning if the patient needs post-hospital services based on physician/advanced practitioner order or complex needs related to functional status, cognitive ability, or social support system  Outcome: Progressing     Problem: Neurological Deficit  Goal: Neurological status is stable or improving  Description: Interventions:  - Monitor and assess patient's level of consciousness, motor function, sensory function, and level of assistance needed for ADLs  - Monitor and report changes from baseline  Collaborate with interdisciplinary team to initiate plan and implement interventions as ordered  - Provide and maintain a safe environment  - Consider seizure precautions  - Consider fall precautions  - Consider aspiration precautions  - Consider bleeding precautions    Outcome: Progressing

## 2022-08-10 NOTE — ASSESSMENT & PLAN NOTE
After discussion with ICU team, Palliative care team with family has discussion regarding end of life care as family and patient opted for comfort measures only  Family chose to go with hospice care  Hospice care will meet with family and make appropriate arrangements for comfort

## 2022-08-10 NOTE — H&P
H&P Exam - [address] [facility] 62 y o  male MRN: [id number]  Unit/Bed#: ICU 10 Encounter: [id number]      -------------------------------------------------------------------------------------------------------------  Chief Complaint: Vomiting blood     History of Present Illness   HX and PE limited by: Language barrier     Salo Valentine is a 62 y o  male who presents with hematemesis in the setting of Eliquis  In short, patient placed on Eliquis due to recent RIJ thrombosis  Subsequently patient with hematemesis and hemorrhagic brain mets  Patient has known adenocarcinoma of the lung with mets to brain, bone, and liver  PMH includes anemia, COPD, Vitamin B12 deficiency, tobacco abuse, RIJ thromboembolism, obstructive pneumonia, T2DM, chronic opioid dependence due to cancer related pain  Per chart, patient vomited bright red blood with clots  5 episodes  Subsequently patient became dizzy and presented to [facility]  CTH revealed Numerous hemorrhagic lesions throughout the supratentorial and infratentorial brain as detailed above compatible with progression of patient's known metastatic disease  Patient received vitamin K  Hemoglobin and vitals remained stable       History obtained from chart review and the patient   -------------------------------------------------------------------------------------------------------------  Assessment and Plan:    Neuro:    Diagnosis: Hemorrhagic brain mets   o Plan:   - CTH: Numerous hemorrhagic lesions throughout the supratentorial and infratentorial brain as detailed above compatible with progression of patient's known metastatic disease   - MRI pending   - Hold all ACPC  - Continue q1hr neuro exams   - Neurosurgery consulted   - Continue Keppra   - Continue decadron   - STAT CTH if GCS drops >2pts in <1hr   - S/p vitamin K  - Palliative care consult    Diagnosis: Insomina  o Plan:   - Hold on all sedating medications in the setting of frequent neuro exams   Diagnosis: Chronic opioid dependence due to cancer related pain   o Plan:   - Restart oxy once able to tolerate PO   - Continue Dilaudid 0 5mg IV PRN    Diagnosis: Vitamin B12 deficiency   o Plan:   - Restart PO supplementation once able to tolerate PO       CV:    Diagnosis: RIJ thromboembolism   o Plan:   - Eliquis starter pack ordered for pt last hospitalization 8/1-8/3  Take 10mg BID for 7 days then 5mg BID  F/U with vascular surgery outpatient to determine duration he will need to continue blood thinners  - Vascular surgery: does not recommend surgical intervention  - Hold all ACPC   - S/p Vit K       Pulm:   Diagnosis: Adenocarcinoma of right lung   o Plan:   - Patient currently undergoing chemotherapy  Last session was 7/22  Tentatively scheduled for PET and next session of chemo in September   - Patient following with both Oncology and palliative as outpatient  - Pt has portocath placement on right  - CT C/A/P: Findings consistent with progression of disease with increase in size of cavitary right upper lobe mass, extensive new hepatic metastasis, enlarging adrenal and splenic metastasis and worsening osseous metastasis     Diagnosis: Tobacco abuse   o Plan:   - 40 ppy history   - Not current everyday smoker    Diagnosis: Obstructive pneumonia   o Plan:   - Continue ceftriaxone   - Patient recently hospitalized 7/25-7/27 and 8/1-8/3 with Klebsiella pneumonia  - Placed on Cephalexin 250mg BID for 3 months   - Continue robitussin    Diagnosis: COPD   o Plan:   - Not in acute exacerbation   - Continue home nebs       GI:    Diagnosis: GI bleed  o Plan:   - GI consult   - Continue Protonix 40mg BID  - T&S pending   - Continue q4hr H&H   - Zofran PRN   - Hold ACPC  - S/p Vit K     Diagnosis: Hx of Hep B  o Plan:   - Unknown if patient received treatment       :    Diagnosis: Hyponatremia   o Plan:   - Chronic, continue to trend   - Strict I/O       F/E/N:    Plan:   o Fluids: Continue maintenance fluids   o Electrolytes: Will replete as necessary to maintain potassium > 4 0, magnesium > 2 0, and phosphrous > 3 0    o Nutrition: NPO       Heme/Onc:    Diagnosis: Chronic anemia   o Plan:   - Continue to trend blood counts  - Baseline Hgb ~9   - Transfuse as needed for Hgb<7    Diagnosis: Adenocarcinoma of lung  o Plan:   - See above plan       Endo:    Diagnosis: T2DM  o Plan:   - SSI   - Blood glucose goal 140-180       ID:    Diagnosis: PNA  o Plan:   - Continue ceftriaxone   - On Cephalexin as outpatient 250mg BID x 3 months initiated 7/25  - Blood cultures pending   - UA pending   - Continue to trend fever curve and WBC count       MSK/Skin:    Diagnosis: No acute issues   o Plan:   - PT/OT        Disposition: Admit to Critical Care   Code Status: Level 1 - Full Code  --------------------------------------------------------------------------------------------------------------  Review of Systems   Constitutional: Positive for fatigue  Chills   Eyes: Negative for visual disturbance  Gastrointestinal: Positive for nausea  Negative for abdominal pain and vomiting  Neurological: Positive for dizziness and light-headedness  Negative for facial asymmetry, speech difficulty, weakness, numbness and headaches  Psychiatric/Behavioral: Negative for confusion  A 12-point, complete review of systems was reviewed and negative except as stated above     Physical Exam  Constitutional:       General: He is not in acute distress  Appearance: He is normal weight  He is ill-appearing  He is not toxic-appearing or diaphoretic  HENT:      Head: Normocephalic and atraumatic  Nose: Nose normal       Mouth/Throat:      Mouth: Mucous membranes are moist       Pharynx: Oropharynx is clear  Eyes:      General: No scleral icterus  Conjunctiva/sclera: Conjunctivae normal       Pupils: Pupils are equal, round, and reactive to light        Comments: Wearing glasses   Cardiovascular: Rate and Rhythm: Normal rate and regular rhythm  Pulmonary:      Effort: Pulmonary effort is normal  No respiratory distress  Abdominal:      Palpations: Abdomen is soft  Musculoskeletal:         General: No swelling or deformity  Normal range of motion  Cervical back: Normal range of motion and neck supple  No rigidity or tenderness  Right lower leg: No edema  Left lower leg: No edema  Skin:     General: Skin is warm and dry  Coloration: Skin is jaundiced  Skin is not pale  Neurological:      General: No focal deficit present  Mental Status: He is alert and oriented to person, place, and time  Cranial Nerves: No cranial nerve deficit  Sensory: No sensory deficit  Motor: No weakness  Coordination: Coordination normal        --------------------------------------------------------------------------------------------------------------  Vitals: There were no vitals filed for this visit  Temp  Min: 98 8 °F (37 1 °C)  Max: 98 8 °F (37 1 °C)        There is no height or weight on file to calculate BMI    N/A    Laboratory and Diagnostics:  Results from last 7 days   Lab Units 08/09/22 2026 08/03/22  1157   WBC Thousand/uL 30 80* 31 72*   HEMOGLOBIN g/dL 9 6* 9 2*   HEMATOCRIT % 29 9* 28 4*   PLATELETS Thousands/uL 494* 307   BANDS PCT % 4 16*   MONO PCT % 5 6     Results from last 7 days   Lab Units 08/09/22 2026 08/03/22  0600   SODIUM mmol/L 133* 133*   POTASSIUM mmol/L 4 5 4 4   CHLORIDE mmol/L 95* 98   CO2 mmol/L 28 21   ANION GAP mmol/L 10 14*   BUN mg/dL 14 13   CREATININE mg/dL 0 82 0 73   CALCIUM mg/dL 9 7 9 4   GLUCOSE RANDOM mg/dL 148* 90   ALT U/L 11  --    AST U/L 19  --    ALK PHOS U/L 231*  --    ALBUMIN g/dL 3 4*  --    TOTAL BILIRUBIN mg/dL 0 50  --           Results from last 7 days   Lab Units 08/09/22 2026 08/03/22  0734   INR  1 66*  --    PTT seconds 36 89*          Results from last 7 days   Lab Units 08/09/22 2026   LACTIC ACID mmol/L 1 0 ABG:    VBG:    Results from last 7 days   Lab Units 22  0600   PROCALCITONIN ng/ml 0 56*       Micro:  Results from last 7 days   Lab Units 22   BLOOD CULTURE  Received in Microbiology Lab  Culture in Progress  Received in Microbiology Lab  Culture in Progress         EKG: Reviewed    Imaging: I have personally reviewed pertinent films in PACS    Historical Information   Past Medical History:   Diagnosis Date    Diabetes mellitus ([facility] )     Elevated PSA 3/11/2021    Fatty liver 3/11/2021    Gall bladder polyp 3/11/2021    Lung cancer ([facility] )     Nonimmune to hepatitis B virus 3/11/2021     Past Surgical History:   Procedure Laterality Date    FL GUIDED CENTRAL VENOUS ACCESS DEVICE INSERTION  5/10/2022    LUNG BIOPSY      WI INSJ TUNNELED CTR VAD W/SUBQ PORT AGE 5 YR/> N/A 5/10/2022    Procedure: INSERTION VENOUS PORT ( PORT-A-CATH) IR;  Surgeon: Jessika Barr DO;  Location: [facility] MAIN OR;  Service: Interventional Radiology     Social History   Social History     Substance and Sexual Activity   Alcohol Use Not Currently    Comment: quit drinking 7 years ago     Social History     Substance and Sexual Activity   Drug Use Never     Social History     Tobacco Use   Smoking Status Former Smoker    Packs/day: 0 50    Years: 40 00    Pack years: 20 00    Types: Cigarettes    Quit date: 2022    Years since quittin 5   Smokeless Tobacco Never Used       Family History:   Family History   Problem Relation Age of Onset    No Known Problems Mother     No Known Problems Father      I have reviewed this patient's family history and commented on sigificant items within the HPI      Medications:  Current Facility-Administered Medications   Medication Dose Route Frequency    cefTRIAXone (ROCEPHIN) 1,000 mg in dextrose 5 % 50 mL IVPB  1,000 mg Intravenous Q24H    dexamethasone (DECADRON) injection 4 mg  4 mg Intravenous Q6H [address]    insulin lispro (HumaLOG) 100 units/mL subcutaneous injection 1-5 Units  1-5 Units Subcutaneous Q6H [address]    ipratropium-albuterol (DUO-NEB) 0 5-2 5 mg/3 mL inhalation solution 3 mL  3 mL Nebulization Q6H    levETIRAcetam (KEPPRA) 750 mg in sodium chloride 0 9 % 100 mL IVPB  750 mg Intravenous Q12H [address]    ondansetron (ZOFRAN) injection 4 mg  4 mg Intravenous Q6H PRN    pantoprazole (PROTONIX) injection 40 mg  40 mg Intravenous BID    sodium chloride 0 9 % infusion  125 mL/hr Intravenous Continuous    umeclidinium bromide (INCRUSE ELLIPTA) 62 5 mcg/inh inhaler AEPB 1 puff  1 puff Inhalation Daily     Home medications:  Prior to Admission Medications   Prescriptions Last Dose Informant Patient Reported? Taking? BD Pen Needle Jeaneth 2nd Gen 32G X 4 MM MISC  Spouse/Significant Other Yes No   Sig: USE ONCE DAILY WITH LANTUS   Blood Glucose Monitoring Suppl (FreeStyle Lite) FIDELIA  Spouse/Significant Other Yes No   Cholecalciferol (Vitamin D3) 125 MCG (5000 UT) CAPS  Spouse/Significant Other No No   Sig: Take 1 capsule (5,000 Units total) by mouth daily   Cholecalciferol (Vitamin D3) 125 MCG (5000 UT) TABS  Spouse/Significant Other Yes No   Sig: Take 5,000 Units by mouth daily   FREESTYLE LITE test strip  Spouse/Significant Other No No   Sig: Check blood sugar  daily   Insulin Pen Needle (BD Pen Needle Jeaneth U/F) 32G X 4 MM MISC  Spouse/Significant Other No No   Sig: Use daily as directed with insulin pen   LORazepam (ATIVAN) 0 5 mg tablet   No No   Sig: For sleep 1-2 tablets at night if you wake and have trouble returning to sleep (Max 1 mg)   Lancets (freestyle) lancets  Spouse/Significant Other No No   Sig: Check blood sugar daily   apixaban (Eliquis) 5 mg   No No   Sig: Take 2 tablets (10 mg total) by mouth 2 (two) times a day for 7 days, THEN 1 tablet (5 mg total) 2 (two) times a day for 23 days     cephalexin (KEFLEX) 250 mg capsule   No No   Sig: Take 1 capsule (250 mg total) by mouth every 12 (twelve) hours   dextromethorphan-guaiFENesin (ROBITUSSIN DM)  mg/5 mL syrup   No No   Sig: Take 10 mL by mouth every 4 (four) hours as needed for cough or congestion (mucus production)   ipratropium-albuterol (Combivent Respimat) inhaler  Spouse/Significant Other No No   Sig: Inhale 1 puff 4 (four) times a day   levETIRAcetam (KEPPRA) 500 mg tablet  Spouse/Significant Other No No   Sig: Take 1 tablet (500 mg total) by mouth every 12 (twelve) hours   magnesium gluconate (MAGONATE) 500 mg tablet  Spouse/Significant Other No No   Sig: Take 1 tablet (500 mg total) by mouth daily   melatonin 3 mg   No No   Sig: Take 1 tablet (3 mg total) by mouth daily at bedtime   menthol-methyl salicylate (BENGAY) 46-33 % cream   No No   Sig: Apply topically 4 (four) times a day as needed (chest and back pain)   metFORMIN (GLUCOPHAGE-XR) 500 mg 24 hr tablet  Spouse/Significant Other No No   Sig: Take 2 tablets (1,000 mg total) by mouth daily with dinner   naloxone (NARCAN) 4 mg/0 1 mL nasal spray   No No   Sig: Administer 1 spray into a nostril  If no response after 2-3 minutes, give another dose in the other nostril using a new spray   It has to be on stand by use with opioid use - in case of overdose   Patient not taking: No sig reported   ondansetron (Zofran ODT) 8 mg disintegrating tablet   No No   Sig: Take 1 tablet (8 mg total) by mouth every 8 (eight) hours as needed for nausea or vomiting   oxyCODONE (ROXICODONE) 10 MG TABS   No No   Sig: Take 1 tablet (10 mg total) by mouth every 6 (six) hours as needed for moderate pain Max Daily Amount: 40 mg   pantoprazole (PROTONIX) 40 mg tablet  Spouse/Significant Other No No   Sig: Take 1 tablet (40 mg total) by mouth daily in the early morning   umeclidinium bromide (Incruse Ellipta) 62 5 mcg/inh AEPB inhaler   No No   Sig: Inhale 1 puff daily      Facility-Administered Medications Last Administration Doses Remaining   cyanocobalamin injection 1,000 mcg 8/5/2022  3:24 PM         Allergies:  No Known Allergies  ------------------------------------------------------------------------------------------------------------  Advance Directive and Living Will:      Power of :    POLST:    ------------------------------------------------------------------------------------------------------------  Anticipated Length of Stay is > 2 midnights    Care Time Delivered:   Upon my evaluation, this patient had a high probability of imminent or life-threatening deterioration due to hemorrhagic brain mets, GI bleed, which required my direct attention, intervention, and personal management  I have personally provided 45 minutes (0200 [phone]) of critical care time, exclusive of procedures, teaching, family meetings, and any prior time recorded by providers other than myself         Gayle Donaldson PA-C

## 2022-08-10 NOTE — ASSESSMENT & PLAN NOTE
· Adenocarcinoma of right lung, patient has mets to brain, bone and liver   ? Plan:   § Patient currently undergoing chemotherapy   Last session was 7/22   Tentatively scheduled for PET and next session of chemo in September  § Patient following with both Oncology and palliative as outpatient  § Pt has portocath placement on right  § CT C/A/P: Findings consistent with progression of disease with increase in size of cavitary right upper lobe mass, extensive new hepatic metastasis, enlarging adrenal and splenic metastasis and worsening osseous metastasis    § Family decided to make patient comfort measures only

## 2022-08-10 NOTE — ED NOTES
Transportation information:  Accepted by Dr Tulio Quezada to [facility] ICU 10  [facility]  at [address]  Report: [phone]      Primary nurse updated     Eileen Juarez RN  08/09/22 0334

## 2022-08-10 NOTE — RESPIRATORY THERAPY NOTE
RT Protocol Note  Jaswant Guzman 62 y o  male MRN: [id number]  Unit/Bed#: ICU 10 Encounter: [id number]    Assessment    Active Problems:    * No active hospital problems  *      Home Pulmonary Medications:  Combivent prn, Incruse Ellipta QD       Past Medical History:   Diagnosis Date    Diabetes mellitus ([facility] )     Elevated PSA 3/11/2021    Fatty liver 3/11/2021    Gall bladder polyp 3/11/2021    Lung cancer ([facility] )     Nonimmune to hepatitis B virus 3/11/2021     Social History     Socioeconomic History    Marital status: /Civil Union     Spouse name: Not on file    Number of children: Not on file    Years of education: Not on file    Highest education level: Not on file   Occupational History    Not on file   Tobacco Use    Smoking status: Former Smoker     Packs/day: 0 50     Years: 40 00     Pack years: 20 00     Types: Cigarettes     Quit date: 2022     Years since quittin 5    Smokeless tobacco: Never Used   Vaping Use    Vaping Use: Never used   Substance and Sexual Activity    Alcohol use: Not Currently     Comment: quit drinking 7 years ago    Drug use: Never    Sexual activity: Not on file   Other Topics Concern    Not on file   Social History Narrative    Patient lives independently with his wife and son  He also has a brother and sister who are very active in his life  Vikram Bianchi is the only person in the home who is employed so not only is there stress regarding cancer diagnosis but also the financial stability of his family  Social Determinants of Health     Financial Resource Strain: Not on file   Food Insecurity: No Food Insecurity    Worried About [address] [employer] in the Last Year: Never true    Ran Out of Food in the Last Year: Never true   Transportation Needs: No Transportation Needs    Lack of Transportation (Medical): No    Lack of Transportation (Non-Medical):  No   Physical Activity: Not on file   Stress: Not on file   Social Connections: Not on file   Intimate Partner Violence: Not on file   Housing Stability: Low Risk     Unable to Pay for Housing in the Last Year: No    Number of Places Lived in the Last Year: 1    Unstable Housing in the Last Year: No       Subjective         Objective    Physical Exam:   Assessment Type: Assess only  General Appearance: Drowsy  Respiratory Pattern: Normal  Chest Assessment: Chest expansion symmetrical  Bilateral Breath Sounds: Diminished, Clear  Cough: None  O2 Device: RA    Vitals:  SpO2 97 %  Imaging and other studies: I have personally reviewed pertinent reports  O2 Device: RA     Plan    Respiratory Plan: Home Bronchodilator Patient pathway  Airway Clearance Plan: Incentive Spirometer     Resp Comments: (P) Pt  evaluated at bedside per Respiratory/Airway Clearance protocols  Pt  [facility] with [facility] with mets to the brain  Pt is currently on RA and breath sounds are diminished and fairly clear at this time  Pt  takes Combivent prn at home and Incruse Ellipta QD  Will continue Duoneb prn as ordered per Respiratory protocol  Pt  instructed on IS and demonstrated use of the device well with assistance  Will continue IS W/A per Airway Clearance protocol

## 2022-08-10 NOTE — PHYSICAL THERAPY NOTE
Physical Therapy evaluation note     Patient Name: Sirisha Correa    PKABB'Q Date: 8/10/2022     Problem List  Principal Problem:    Brain mass  Active Problems:    Type 2 diabetes mellitus without complication, with long-term current use of insulin ([facility] )    History of tobacco use    Lung mass    Cerebral edema (HCC)    Obstructive pneumonia    Adenocarcinoma of right lung (HCC)    Pneumonia due to Klebsiella pneumoniae (HCC)    Continuous opioid dependence (HCC)    Chronic obstructive pulmonary disease (HCC)    Therapeutic opioid-induced constipation (OIC)    Hyponatremia    Anemia       Past Medical History  Past Medical History:   Diagnosis Date    Diabetes mellitus ([facility] )     Elevated PSA 3/11/2021    Fatty liver 3/11/2021    Gall bladder polyp 3/11/2021    Lung cancer ([facility] )     Nonimmune to hepatitis B virus 3/11/2021        Past Surgical History  Past Surgical History:   Procedure Laterality Date    FL GUIDED CENTRAL VENOUS ACCESS DEVICE INSERTION  5/10/2022    LUNG BIOPSY      MO INSJ TUNNELED CTR VAD W/SUBQ PORT AGE 5 YR/> N/A 5/10/2022    Procedure: INSERTION VENOUS PORT ( PORT-A-CATH) IR;  Surgeon: Luli Jackson DO;  Location: [facility] MAIN OR;  Service: Interventional Radiology      08/10/22 1421   PT Last Visit   PT Visit Date 08/10/22   Note Type   Note type Evaluation   Pain Assessment   Pain Assessment Tool 0-10   Restrictions/Precautions   Weight Bearing Precautions Per Order No   Other Precautions Fall Risk;Multiple lines;Telemetry  (dizziness when looking to L)   Home Living   Type of [address] One level   Additional Comments Pt lives with his wife in a [facility] with 12[address]14 [address]  Pt was I for mobility prior  Pt has assistance for ADL's and IADL's Pt did not use any DME  Pt's wife works 7-3 M-F  Pt does not drive but his wife can assist  Pt does not work  PT denies falls     Prior Function   Level of [place] Needs assistance with ADLs and functional mobility   Lives With Spouse   Receives Help From Family   ADL Assistance Independent   IADLs Independent   Falls in the last 6 months 0   Comments does not drive   General   Family/Caregiver Present Yes   Cognition   Overall Cognitive Status WFL   Arousal/Participation Alert   Orientation Level Oriented X4   Memory Within functional limits   Following Commands Follows all commands and directions without difficulty   RLE Assessment   RLE Assessment X   Strength RLE   R Hip Flexion 3/5   R Knee Extension 3/5   R Ankle Dorsiflexion 3/5   LLE Assessment   LLE Assessment X   Strength LLE   L Hip Flexion 3/5   L Knee Extension 3/5   L Ankle Dorsiflexion 3/5   Coordination   Sensation WFL   Light Touch   RLE Light Touch Grossly intact   LLE Light Touch Grossly intact   Bed Mobility   Supine to Sit 5  Supervision   Additional items HOB elevated; Increased time required   Additional Comments sitting EOB S level   Transfers   Sit to Stand 4  Minimal assistance   Additional items Assist x 1   Stand to Sit 4  Minimal assistance   Additional items Assist x 1   Ambulation/Elevation   Gait pattern Excessively slow; Step to; Foward flexed; Shuffling   Gait Assistance 4  Minimal assist   Additional items Assist x 1   Assistive Device Other (Comment)  (HHA)   Distance 3ft to chair   Balance   Static Sitting Fair +   Dynamic Sitting Fair   Static Standing Fair -   Dynamic Standing Fair -   Ambulatory Poor +   Endurance Deficit   Endurance Deficit Yes   Activity Tolerance   Activity Tolerance Patient limited by fatigue   Medical Staff Made Aware OT   Nurse Made Aware nurse approved therapy session   Assessment   Prognosis Good   Problem List Decreased strength;Decreased endurance; Impaired balance;Decreased mobility  (dizziness)   Assessment Pt is a 63 yo male admitted to [place] [address] on 8/9/2022 s/p vomiting blood   DX: hemorrhagic brain mets, insomnia, RIJ thromboembolism, adenocarcinoma of R lung, pneumonia, COPD, GI bleed, DM, anemia  Two patient identifiers were used to confirm  Pt lives in a [facility] with [address] with his wife  Pt's wife works 7-3 daily  Pt required A with ADL's and IADL's prior  Pt did not use any DME  Pt does not drive  Pt denies falls  Pt is able to ambulate with out assistance  Pt's impairments include reduced mobility, dizziness when he turns his head to the L, high risk of falling, reduced BLE strength  These impairments limit the ability of the patient to perform mobility without increased assistance, return to PLOF and participate in everyday life activities  Pt would benefit from continued skilled therapy while in the hospital to improve overall mobility and work towards a safe d/c  Recommend discharge to home with home PT pending progress  At the end of the session the patient was left in seated position with call bell and phone within reach  The patient's AM-PAC Basic Mobility Inpatient Short Form Raw Score is 17  A Raw score of greater than 16 suggests the patient may benefit from discharge to home  Please also refer to the recommendation of the Physical Therapist for safe discharge planning  Barriers to Discharge Inaccessible home environment;Decreased caregiver support   Goals   STG Expiration Date 08/24/22   Short Term Goal #1 STG 1: Pt will perform transfers at a MI/I level to return to baseline of function  STG 2: Pt will ambulate 300ft with RW/least restrictive device at a MI/I level to reduce the level of assistance needed upon d/c home  STG 3: Pt will negotiate 14 steps with HR at a I level to ensure safety with activity when able to ambulate 50ft at [address] level  STG 4: Pt will perform bed mobility at a I to safety return to PLOF  PT Treatment Day 0   Plan   Treatment/Interventions LE strengthening/ROM; Functional transfer training;Elevations; Therapeutic exercise; Endurance training;Bed mobility;Gait training;Equipment eval/education;OT   PT Frequency 3-5x/wk   Recommendation   PT Discharge Recommendation Home with home health rehabilitation  (pending progress)   Equipment Recommended [facility] Recommended Wheeled walker   Change/add to [place]?  No   AM-PAC Basic Mobility Inpatient   Turning in Bed Without Bedrails 3   Lying on Back to Sitting on Edge of Flat Bed 3   Moving Bed to Chair 3   Standing Up From Chair 3   Walk in Room 3   Climb 3-5 Stairs 2   Basic Mobility Inpatient Raw Score 17   Basic Mobility Standardized Score 39 67   Highest Level Of Mobility   [facility] Goal 5: Stand one or more mins   Toshia Wade, PT, DPT

## 2022-08-10 NOTE — ASSESSMENT & PLAN NOTE
Patient was on keflex at home   Received ceftriaxone  Comfort care only at this point, per family stop antibiotics

## 2022-08-10 NOTE — CONSULTS
Consultation - Palliative and [address] 62 y o  male [phone]    Patient Active Problem List   Diagnosis    Type 2 diabetes mellitus without complication, with long-term current use of insulin (HCC)    Nonimmune to hepatitis B virus    Elevated PSA    Gall bladder polyp    Vitamin D deficiency    Cyanocobalamin deficiency    History of tobacco use    Lung mass    Brain mass    Cerebral edema (HCC)    Sepsis due to pneumonia    Obstructive pneumonia    [facility]    Pneumonia due to Klebsiella pneumoniae ([facility] )    Cancer associated pain    Adjustment insomnia    Continuous opioid dependence (HCC)    Chronic obstructive pulmonary disease (HCC)    Palliative care patient    Chemotherapy induced neutropenia (HCC)    Dehydration    Acute right-sided thoracic back pain    Lung cancer metastatic to brain ([facility] )    Therapeutic opioid-induced constipation (OIC)    Pneumonia of right upper lobe due to infectious organism    Hyponatremia    Anemia    Leukocytosis    SIRS (systemic inflammatory response syndrome) (HCC)    Internal jugular (IJ) vein thromboembolism, acute, right (HCC)     Active issues specifically addressed today include:   Metastatic lung cancer  COPD  Palliative care patient    Plan:  1  Symptom management   -comfort medications     2  Goals -  -  Goals of care discussion with patient's brother and wife  With critical care team   -  Transition to comfort care  Code Status: Comfort care  - Level 4   Decisional apparatus:  Patient is not competent on my exam today  If competence is lost, patient's substitute decision maker would default to spouse by [place] 169  Advance Directive / Living Will / POLST:  None      I have reviewed the patient's controlled substance dispensing history in the Prescription Drug Monitoring Program in compliance with the [facility] regulations before prescribing any controlled substances           We appreciate the invitation to be involved in this patient's care  We will continue to follow   Please do not hesitate to reach our on call provider through our clinic answering service at  should you have acute symptom control concerns  JONATHAN Ceballos  Palliative and Supportive Care  Clinic/Answering Service: [phone]  You can find me on [employer]! IDENTIFICATION:  Inpatient consult to Palliative Care  Consult performed by: JONATHAN Sterling  Consult ordered by: Ami randolph PA-C        Physician Requesting Consult: Sally Payan MD  Reason for Consult / Principal Problem: goals of care  Hx and PE limited by: language barrier    HISTORY OF PRESENT ILLNESS:       Tsering Tracy is a 62 y o  male with metastatic adenocarcinoma of the lung, R IJ thrombus,  who presents with hematemesis and dizziness  The patient was on Eliquis in setting of R IJ thrombus  CT of the head was completed which revealed hemorrhagic brain mets  CT imaging head revealed multiple brain lesions with hemorrhagic foci throughout the supratentorial and infratentorial brain  CT chest revealed disease progression in the right upper lobe, adreanl and splenic metastasis as well as new extensive hepatic metastasis  He follows with Dr Arnie White, Dr Gisel Plunkett  He was completed  frameless stereotactic radiotherapy to the left cerebral lesion and radio surgery for the right frontal lesion  Patient has received 3 cycles of Paclitaxel, carboplatin and bevacizumab  The patient follow with Dr Miryam Mcconnell of the palliative team as an outpatient  Prior to admission the patient was taking oxycodone 5 mg -10 mg as needed  For insomnia he was using melatonin and  Lorazepam          Family present for goals of care conversation  Imaging reviewed by critical care team with family  Despite chemotherapy the patient's cancer has progressed significantly    Patient's family understands that options are limited  Given clinical decline and advanced disease  Hospice cares explained to the family  Family is clear they want to pursue comfort cares  Hospice; home vs SNF vs hospice house explained  At this time patient concepcion not a hospice house candidate  It is possible that with transition to comfort care the patient's status may decline  Rebecca Uziel is clear comfort is goal      Review of Systems   Unable to perform ROS: acuity of condition       Past Medical History:   Diagnosis Date    Diabetes mellitus ([facility] )     Elevated PSA 3/11/2021    Fatty liver 3/11/2021    Gall bladder polyp 3/11/2021    Lung cancer ([facility] )     Nonimmune to hepatitis B virus 3/11/2021     Past Surgical History:   Procedure Laterality Date    FL GUIDED CENTRAL VENOUS ACCESS DEVICE INSERTION  5/10/2022    LUNG BIOPSY      IA INSJ TUNNELED CTR VAD W/SUBQ PORT AGE 5 YR/> N/A 5/10/2022    Procedure: INSERTION VENOUS PORT ( PORT-A-CATH) IR;  Surgeon: Joe Tovar DO;  [facility]: [facility] MAIN OR;  Service: Interventional Radiology     Social History     Socioeconomic History    Marital status: /Civil Union     Spouse name: Not on file    Number of children: Not on file    Years of education: Not on file    Highest education level: Not on file   Occupational History    Not on file   Tobacco Use    Smoking status: Former Smoker     Packs/day: 0 50     Years: 40 00     Pack years: 20 00     Types: Cigarettes     Quit date: 2022     Years since quittin 5    Smokeless tobacco: Never Used   Vaping Use    Vaping Use: Never used   Substance and Sexual Activity    Alcohol use: Not Currently     Comment: quit drinking 7 years ago    Drug use: Never    Sexual activity: Not on file   Other Topics Concern    Not on file   Social History Narrative    Patient lives independently with his wife and son  He also has a brother and sister who are very active in his life    Lynda Garcia is the only person in the home who is employed so not only is there stress regarding cancer diagnosis but also the financial stability of his family  Social Determinants of Health     Financial Resource Strain: Not on file   Food Insecurity: No Food Insecurity    Worried About Running Out of Food in the Last Year: Never true    Brittany of Food in the Last Year: Never true   Transportation Needs: No Transportation Needs    Lack of Transportation (Medical): No    Lack of Transportation (Non-Medical): No   Physical Activity: Not on file   Stress: Not on file   Social Connections: Not on file   Intimate Partner Violence: Not on file   Housing Stability: Low Risk     Unable to Pay for Housing in the Last Year: No    Number of Places Lived in the Last Year: 1    Unstable Housing in the Last Year: No     Family History   Problem Relation Age of Onset    No Known Problems Mother     No Known Problems Father        MEDICATIONS / ALLERGIES:    current meds:   Current Facility-Administered Medications   Medication Dose Route Frequency    cefTRIAXone (ROCEPHIN) 1,000 mg in dextrose 5 % 50 mL IVPB  1,000 mg Intravenous Q24H    dexamethasone (DECADRON) injection 2 mg  2 mg Intravenous Q6H [address]    insulin lispro (HumaLOG) 100 units/mL subcutaneous injection 1-5 Units  1-5 Units Subcutaneous Q6H [address]    ipratropium-albuterol (DUO-NEB) 0 5-2 5 mg/3 mL inhalation solution 3 mL  3 mL Nebulization Q4H PRN    levETIRAcetam (KEPPRA) 750 mg in sodium chloride 0 9 % 100 mL IVPB  750 mg Intravenous Q12H [address]    ondansetron (ZOFRAN) injection 4 mg  4 mg Intravenous Q6H PRN    pantoprazole (PROTONIX) injection 40 mg  40 mg Intravenous BID    sodium chloride 0 9 % infusion  125 mL/hr Intravenous Continuous    umeclidinium bromide (INCRUSE ELLIPTA) 62 5 mcg/inh inhaler AEPB 1 puff  1 puff Inhalation Daily       No Known Allergies    OBJECTIVE:    Physical Exam  Physical Exam  Vitals and nursing note reviewed     Constitutional:       Appearance: He is well-developed  He is ill-appearing  Interventions: Nasal cannula in place  HENT:      Head: Normocephalic and atraumatic  Eyes:      Conjunctiva/sclera: Conjunctivae normal    Cardiovascular:      Rate and Rhythm: Normal rate and regular rhythm  Heart sounds: No murmur heard  Pulmonary:      Effort: Pulmonary effort is normal  No respiratory distress or retractions  Abdominal:      Tenderness: There is abdominal tenderness  Musculoskeletal:      Cervical back: Neck supple  Comments: Equal extremity strength   Skin:     General: Skin is warm and dry  Neurological:      General: No focal deficit present  Mental Status: He is alert  Psychiatric:         Attention and Perception: He is inattentive  Cognition and Memory: Cognition is impaired  Vitals:    08/10/22 1500   BP: 115/74   Pulse: 88   Resp: 19   Temp:    SpO2: 99%       Lab Results:   CBC:   Lab Results   Component Value Date    WBC 27 06 (H) 08/10/2022    HGB 8 5 (L) 08/10/2022    HCT 26 7 (L) 08/10/2022    MCV 97 08/10/2022     (H) 08/10/2022    MCH 30 8 08/10/2022    MCHC 31 8 08/10/2022    RDW 15 4 (H) 08/10/2022    MPV 7 8 (L) 08/10/2022   , CMP:   Lab Results   Component Value Date    SODIUM 133 (L) 08/10/2022    K 4 3 08/10/2022     08/10/2022    CO2 27 08/10/2022    BUN 12 08/10/2022    CREATININE 0 85 08/10/2022    CALCIUM 9 3 08/10/2022    AST 19 08/09/2022    ALT 11 08/09/2022    ALKPHOS 231 (H) 08/09/2022    EGFR 96 08/10/2022   , PT/PTT:  Lab Results   Component Value Date    PTT 35 08/10/2022     Imaging Studies: reviwed CT head and chest and abd      Counseling / Coordination of Care    Total floor / unit time spent today 60+  minutes  Greater than 50% of total time was spent with the patient and / or family counseling and / or coordination of care   A description of the counseling / coordination of care: family meeting as above, review of chart, review of symptoms, supportive listening, offered information

## 2022-08-10 NOTE — QUICK NOTE
Gastroenterology Interval Update     Discussed patient with critical care resident Dr Riddhi Stafford and after lengthy goals of care discussion between Dr Borrero Meghan, the patient, his wife, and his son the decision was made not to pursue any further aggressive intervention and transition focus to patient's comfort only  At this time, plan for palliative and hospice consults per chart review  Will cancel endoscopic evaluation planned for today, and GI will sign off  Please do not hesitate to reach out to on-call GI fellow with any further questions or concerns      Lisa Terrazas, PGY-3  Internal Medicine Resident  [facility]

## 2022-08-10 NOTE — LETTER
[address] [facility] [address]  [address]  [address] [facility] [address]  Dept: [phone]    August 13, 2022     Patient: Sirisha Correa   YOB: 1965   Date of Visit: 8/9/2022       To Whom it May Concern:    Heather Price is under my professional care  He was seen in the hospital from 8/9/2022   to 08/13/22  He has been diagnosed with metastatic cancer  His condition is terminal   He will be discharged with home hospice services  If you have any questions or concerns, please don't hesitate to call           Sincerely,                Christiane Ayala, DO

## 2022-08-10 NOTE — PLAN OF CARE
Problem: MOBILITY - ADULT  Goal: Maintain or return to baseline ADL function  Description: INTERVENTIONS:  -  Assess patient's ability to carry out ADLs; assess patient's baseline for ADL function and identify physical deficits which impact ability to perform ADLs (bathing, care of mouth/teeth, toileting, grooming, dressing, etc )  - Assess/evaluate cause of self-care deficits   - Assess range of motion  - Assess patient's mobility; develop plan if impaired  - Assess patient's need for assistive devices and provide as appropriate  - Encourage maximum independence but intervene and supervise when necessary  - Involve family in performance of ADLs  - Assess for home care needs following discharge   - Consider OT consult to assist with ADL evaluation and planning for discharge  - Provide patient education as appropriate  Outcome: Progressing  Goal: Maintains/Returns to pre admission functional level  Description: INTERVENTIONS:  - Perform BMAT or MOVE assessment daily    - Set and communicate daily mobility goal to care team and patient/family/caregiver  - Collaborate with rehabilitation services on mobility goals if consulted  - Perform Range of Motion multiple times a day  - Reposition patient every 2 hours    - Record patient progress and toleration of activity level   Outcome: Progressing     Problem: PAIN - ADULT  Goal: Verbalizes/displays adequate comfort level or baseline comfort level  Description: Interventions:  - Encourage patient to monitor pain and request assistance  - Assess pain using appropriate pain scale  - Administer analgesics based on type and severity of pain and evaluate response  - Implement non-pharmacological measures as appropriate and evaluate response  - Consider cultural and social influences on pain and pain management  - Notify physician/advanced practitioner if interventions unsuccessful or patient reports new pain  Outcome: Progressing     Problem: INFECTION - ADULT  Goal: Absence or prevention of progression during hospitalization  Description: INTERVENTIONS:  - Assess and monitor for signs and symptoms of infection  - Monitor lab/diagnostic results  - Monitor all insertion sites, i e  indwelling lines, tubes, and drains  - Monitor endotracheal if appropriate and nasal secretions for changes in amount and color  - [place] appropriate cooling/warming therapies per order  - Administer medications as ordered  - Instruct and encourage patient and family to use good hand hygiene technique  - Identify and instruct in appropriate isolation precautions for identified infection/condition  Outcome: Progressing  Goal: Absence of fever/infection during neutropenic period  Description: INTERVENTIONS:  - Monitor WBC    Outcome: Progressing     Problem: SAFETY ADULT  Goal: Maintain or return to baseline ADL function  Description: INTERVENTIONS:  -  Assess patient's ability to carry out ADLs; assess patient's baseline for ADL function and identify physical deficits which impact ability to perform ADLs (bathing, care of mouth/teeth, toileting, grooming, dressing, etc )  - Assess/evaluate cause of self-care deficits   - Assess range of motion  - Assess patient's mobility; develop plan if impaired  - Assess patient's need for assistive devices and provide as appropriate  - Encourage maximum independence but intervene and supervise when necessary  - Involve family in performance of ADLs  - Assess for home care needs following discharge   - Consider OT consult to assist with ADL evaluation and planning for discharge  - Provide patient education as appropriate  Outcome: Progressing  Goal: Maintains/Returns to pre admission functional level  Description: INTERVENTIONS:  - Perform BMAT or MOVE assessment daily    - Set and communicate daily mobility goal to care team and patient/family/caregiver     - Collaborate with rehabilitation services on mobility goals if consulted  - Record patient progress and toleration of activity level   Outcome: Progressing  Goal: Patient will remain free of falls  Description: INTERVENTIONS:  - Educate patient/family on patient safety including physical limitations  - Instruct patient to call for assistance with activity   - Consult OT/PT to assist with strengthening/mobility   - Keep Call bell within reach  - Keep bed low and locked with side rails adjusted as appropriate  - Keep care items and personal belongings within reach  - Initiate and maintain comfort rounds  - Make Fall Risk Sign visible to staff  - Offer Toileting every 2 Hours, in advance of need  - Initiate/Maintain bed alarm  - Obtain necessary fall risk management equipment  - Apply yellow socks and bracelet for high fall risk patients  - Consider moving patient to room near nurses station  Outcome: Progressing     Problem: DISCHARGE PLANNING  Goal: Discharge to home or other facility with appropriate resources  Description: INTERVENTIONS:  - Identify barriers to discharge w/patient and caregiver  - Arrange for needed discharge resources and transportation as appropriate  - Identify discharge learning needs (meds, wound care, etc )  - Arrange for interpretive services to assist at discharge as needed  - Refer to Case Management Department for coordinating discharge planning if the patient needs post-hospital services based on physician/advanced practitioner order or complex needs related to functional status, cognitive ability, or social support system  Outcome: Progressing     Problem: Knowledge Deficit  Goal: Patient/family/caregiver demonstrates understanding of disease process, treatment plan, medications, and discharge instructions  Description: Complete learning assessment and assess knowledge base    Interventions:  - Provide teaching at level of understanding  - Provide teaching via preferred learning methods  Outcome: Progressing

## 2022-08-10 NOTE — ED PROVIDER NOTES
History  Chief Complaint   Patient presents with    Vomiting Blood     Pt with lung ca reports vomiting multiple times today with blood clots     Patient is a 51-year-old male presenting to the emergency room for evaluation  Patient has a history of adenocarcinoma to the lung with extensive metastases  Wife states that since 4:30 p m  This evening he has vomited about 5 times  She states the vomit was yellow in color and had dime-sized blood clots within the vomit  The patient is complaining of dizziness since this afternoon  Patient denies any other symptoms at this time  Patient was recently admitted to the hospital for an internal jugular VTE and was started on Eliquis on 8/3  History provided by:  Patient   used: No        Prior to Admission Medications   Prescriptions Last Dose Informant Patient Reported? Taking?    BD Pen Needle Jeaneth 2nd Gen 32G X 4 MM MISC  Spouse/Significant Other Yes No   Sig: USE ONCE DAILY WITH LANTUS   Blood Glucose Monitoring Suppl (FreeStyle Lite) FIDELIA  Spouse/Significant Other Yes No   Cholecalciferol (Vitamin D3) 125 MCG (5000 UT) CAPS  Spouse/Significant Other No No   Sig: Take 1 capsule (5,000 Units total) by mouth daily   Cholecalciferol (Vitamin D3) 125 MCG (5000 UT) TABS  Spouse/Significant Other Yes No   Sig: Take 5,000 Units by mouth daily   FREESTYLE LITE test strip  Spouse/Significant Other No No   Sig: Check blood sugar  daily   Insulin Pen Needle (BD Pen Needle Jeaneth U/F) 32G X 4 MM MISC  Spouse/Significant Other No No   Sig: Use daily as directed with insulin pen   LORazepam (ATIVAN) 0 5 mg tablet   No No   Sig: For sleep 1-2 tablets at night if you wake and have trouble returning to sleep (Max 1 mg)   Lancets (freestyle) lancets  Spouse/Significant Other No No   Sig: Check blood sugar daily   apixaban (Eliquis) 5 mg   No No   Sig: Take 2 tablets (10 mg total) by mouth 2 (two) times a day for 7 days, THEN 1 tablet (5 mg total) 2 (two) times a day for 23 days  cephalexin (KEFLEX) 250 mg capsule   No No   Sig: Take 1 capsule (250 mg total) by mouth every 12 (twelve) hours   dextromethorphan-guaiFENesin (ROBITUSSIN DM)  mg/5 mL syrup   No No   Sig: Take 10 mL by mouth every 4 (four) hours as needed for cough or congestion (mucus production)   ipratropium-albuterol (Combivent Respimat) inhaler  Spouse/Significant Other No No   Sig: Inhale 1 puff 4 (four) times a day   levETIRAcetam (KEPPRA) 500 mg tablet  Spouse/Significant Other No No   Sig: Take 1 tablet (500 mg total) by mouth every 12 (twelve) hours   magnesium gluconate (MAGONATE) 500 mg tablet  Spouse/Significant Other No No   Sig: Take 1 tablet (500 mg total) by mouth daily   melatonin 3 mg   No No   Sig: Take 1 tablet (3 mg total) by mouth daily at bedtime   menthol-methyl salicylate (BENGAY) 04-52 % cream   No No   Sig: Apply topically 4 (four) times a day as needed (chest and back pain)   metFORMIN (GLUCOPHAGE-XR) 500 mg 24 hr tablet  Spouse/Significant Other No No   Sig: Take 2 tablets (1,000 mg total) by mouth daily with dinner   naloxone (NARCAN) 4 mg/0 1 mL nasal spray   No No   Sig: Administer 1 spray into a nostril  If no response after 2-3 minutes, give another dose in the other nostril using a new spray   It has to be on stand by use with opioid use - in case of overdose   Patient not taking: No sig reported   ondansetron (Zofran ODT) 8 mg disintegrating tablet   No No   Sig: Take 1 tablet (8 mg total) by mouth every 8 (eight) hours as needed for nausea or vomiting   oxyCODONE (ROXICODONE) 10 MG TABS   No No   Sig: Take 1 tablet (10 mg total) by mouth every 6 (six) hours as needed for moderate pain Max Daily Amount: 40 mg   pantoprazole (PROTONIX) 40 mg tablet  Spouse/Significant Other No No   Sig: Take 1 tablet (40 mg total) by mouth daily in the early morning   umeclidinium bromide (Incruse Ellipta) 62 5 mcg/inh AEPB inhaler   No No   Sig: Inhale 1 puff daily Facility-Administered Medications Last Administration Doses Remaining   cyanocobalamin injection 1,000 mcg 2022  3:24 PM           Past Medical History:   Diagnosis Date    Diabetes mellitus ([facility] [address] )     Elevated PSA 3/11/2021    Fatty liver 3/11/2021    Gall bladder polyp 3/11/2021    Lung cancer ([facility] [address] )     Nonimmune to hepatitis B virus 3/11/2021       Past Surgical History:   Procedure Laterality Date    FL GUIDED CENTRAL VENOUS ACCESS DEVICE INSERTION  5/10/2022    LUNG BIOPSY      TN INSJ TUNNELED CTR VAD W/SUBQ PORT AGE 5 YR/> N/A 5/10/2022    Procedure: INSERTION VENOUS PORT ( PORT-A-CATH) IR;  Surgeon: Emile Villeda DO;  Location: [facility] MAIN OR;  Service: Interventional Radiology       Family History   Problem Relation Age of Onset    No Known Problems Mother     No Known Problems Father      I have reviewed and agree with the history as documented  E-Cigarette/Vaping    E-Cigarette Use Never User      E-Cigarette/Vaping Substances    Nicotine No     THC No     CBD No     Flavoring No     Other No     Unknown No      Social History     Tobacco Use    Smoking status: Former Smoker     Packs/day: 0 50     Years: 40 00     Pack years: 20 00     Types: Cigarettes     Quit date: 2022     Years since quittin 5    Smokeless tobacco: Never Used   Vaping Use    Vaping Use: Never used   Substance Use Topics    Alcohol use: Not Currently     Comment: quit drinking 7 years ago    Drug use: Never       Review of Systems   Constitutional: Negative for chills and fever  HENT: Negative for ear pain and sore throat  Eyes: Negative for pain and visual disturbance  Respiratory: Negative for cough and shortness of breath  Cardiovascular: Negative for chest pain and palpitations  Gastrointestinal: Positive for vomiting  Negative for abdominal pain and diarrhea  Genitourinary: Negative for dysuria and hematuria  Musculoskeletal: Negative for arthralgias and back pain  Skin: Negative for color change and rash  Neurological: Positive for dizziness  Negative for seizures and syncope  All other systems reviewed and are negative  Physical Exam  Physical Exam  Vitals and nursing note reviewed  Constitutional:       Appearance: He is well-developed  He is ill-appearing  HENT:      Head: Normocephalic and atraumatic  Eyes:      Conjunctiva/sclera: Conjunctivae normal    Cardiovascular:      Rate and Rhythm: Normal rate and regular rhythm  Heart sounds: No murmur heard  Pulmonary:      Effort: Pulmonary effort is normal  No respiratory distress  Breath sounds: Normal breath sounds  Abdominal:      Palpations: Abdomen is soft  Tenderness: There is no abdominal tenderness  Musculoskeletal:      Cervical back: Neck supple  Skin:     General: Skin is warm and dry  Neurological:      General: No focal deficit present  Mental Status: He is alert  Cranial Nerves: No cranial nerve deficit  Sensory: No sensory deficit  Motor: No weakness        Coordination: Coordination normal          Vital Signs  ED Triage Vitals   Temperature Pulse Respirations Blood Pressure SpO2   08/09/22 2031 08/09/22 1945 08/09/22 1945 08/09/22 1945 08/09/22 1945   98 8 °F (37 1 °C) 90 18 132/75 96 %      Temp Source Heart Rate Source Patient Position - Orthostatic VS BP Location FiO2 (%)   08/09/22 2031 08/09/22 1945 08/09/22 1945 08/09/22 1945 --   Oral Monitor Lying Left arm       Pain Score       08/09/22 1945       No Pain           Vitals:    08/09/22 2143 08/09/22 2330 08/10/22 0000 08/10/22 0056   BP: 134/83 125/81 124/81 136/88   Pulse: 90 84 86 90   Patient Position - Orthostatic VS:  Lying Lying Lying         ED Medications  Medications   pantoprazole (PROTONIX) 80 mg in sodium chloride 0 9 % 100 mL IVPB (0 mg Intravenous Stopped 8/9/22 2141)     Followed by   pantoprazole (PROTONIX) 80 mg in sodium chloride 0 9 % 100 mL infusion (8 mg/hr Intravenous New Bag 8/9/22 2107)   iohexol (OMNIPAQUE) 350 MG/ML injection (SINGLE-DOSE) 70 mL (70 mL Intravenous Given 8/9/22 2122)   prothrombin complex concentrate (human) (Kcentra) 2,000 Units (2,000 Units Intravenous Given 8/9/22 2246)       Diagnostic Studies  Results Reviewed     Procedure Component Value Units Date/Time    Blood culture #1 [[id number]] Collected: 08/09/22 2026    Lab Status: Preliminary result Specimen: Blood from Line, Venous Updated: 08/10/22 0103     Blood Culture Received in Microbiology Lab  Culture in Progress  Blood culture #2 [[id number]] Collected: 08/09/22 2026    Lab Status: Preliminary result Specimen: Blood from Arm, Right Updated: 08/10/22 0103     Blood Culture Received in Microbiology Lab  Culture in Progress  UA (URINE) with reflex to Scope [[id number]]  (Abnormal) Collected: 08/09/22 2345    Lab Status: Final result Specimen: Urine, Clean Catch Updated: 08/09/22 2359     Color, UA Light Yellow     Clarity, UA Clear     Specific Gravity, UA 1 042     pH, UA 7 0     Leukocytes, UA Negative     Nitrite, UA Negative     Protein, UA Negative mg/dl      Glucose, UA Negative mg/dl      Ketones, UA 10 (1+) mg/dl      Urobilinogen, UA <2 0 mg/dl      Bilirubin, UA Negative     Occult Blood, UA Negative    Urine culture [[id number]] Collected: 08/09/22 2345    Lab Status:  In process Specimen: Urine, Clean Catch Updated: 08/09/22 2350    HS Troponin I 2hr [[id number]]  (Normal) Collected: 08/09/22 2247    Lab Status: Final result Specimen: Blood from Arm, Left Updated: 08/09/22 2331     hs TnI 2hr 13 ng/L      Delta 2hr hsTnI 0 ng/L     Manual Differential(PHLEBS Do Not Order) [[id number]]  (Abnormal) Collected: 08/09/22 2026    Lab Status: Final result Specimen: Blood from Arm, Right Updated: 08/09/22 2120     Segmented % 81 %      Bands % 4 %      Lymphocytes % 9 %      Monocytes % 5 %      Eosinophils, % 0 %      Basophils % 0 %      Atypical Lymphocytes % 1 %      Absolute Neutrophils 26 18 Thousand/uL      Lymphocytes Absolute 2 77 Thousand/uL      Monocytes Absolute 1 54 Thousand/uL      Eosinophils Absolute 0 00 Thousand/uL      Basophils Absolute 0 00 Thousand/uL      Total Counted --     RBC Morphology Present     Anisocytosis Present     Microcytes Present     Polychromasia Present     Platelet Estimate Increased    CBC and differential [[id number]]  (Abnormal) Collected: 08/09/22 2026    Lab Status: Final result Specimen: Blood from Arm, Right Updated: 08/09/22 2120     WBC 30 80 Thousand/uL      RBC 3 07 Million/uL      Hemoglobin 9 6 g/dL      Hematocrit 29 9 %      MCV 97 fL      MCH 31 3 pg      MCHC 32 1 g/dL      RDW 15 6 %      MPV 8 1 fL      Platelets 405 Thousands/uL     TSH, 3rd generation with Free T4 reflex [[id number]]  (Normal) Collected: 08/09/22 2026    Lab Status: Final result Specimen: Blood from Arm, Right Updated: 08/09/22 2112     TSH 3RD GENERATON 1 173 uIU/mL     Narrative:      Patients undergoing fluorescein dye angiography may retain small amounts of fluorescein in the body for 48-72 hours post procedure  Samples containing fluorescein can produce falsely depressed TSH values  If the patient had this procedure,a specimen should be resubmitted post fluorescein clearance        Comprehensive metabolic panel [[id number]]  (Abnormal) Collected: 08/09/22 2026    Lab Status: Final result Specimen: Blood from Arm, Right Updated: 08/09/22 2105     Sodium 133 mmol/L      Potassium 4 5 mmol/L      Chloride 95 mmol/L      CO2 28 mmol/L      ANION GAP 10 mmol/L      BUN 14 mg/dL      Creatinine 0 82 mg/dL      Glucose 148 mg/dL      Calcium 9 7 mg/dL      Corrected Calcium 10 2 mg/dL      AST 19 U/L      ALT 11 U/L      Alkaline Phosphatase 231 U/L      Total Protein 7 4 g/dL      Albumin 3 4 g/dL      Total Bilirubin 0 50 mg/dL      eGFR 98 ml/min/1 73sq m     Narrative:      Latoya guidelines for Chronic Kidney Disease (CKD):     Stage 1 with normal or high GFR (GFR > 90 mL/min/1 73 square meters)    Stage 2 Mild CKD (GFR = 60-89 mL/min/1 73 square meters)    Stage 3A Moderate CKD (GFR = 45-59 mL/min/1 73 square meters)    Stage 3B Moderate CKD (GFR = 30-44 mL/min/1 73 square meters)    Stage 4 Severe CKD (GFR = 15-29 mL/min/1 73 square meters)    Stage 5 End Stage CKD (GFR <15 mL/min/1 73 square meters)  Note: GFR calculation is accurate only with a steady state creatinine    Lipase [[id number]]  (Abnormal) Collected: 08/09/22 2026    Lab Status: Final result Specimen: Blood from Arm, Right Updated: 08/09/22 2105     Lipase <6 u/L     HS Troponin 0hr (reflex protocol) [[id number]]  (Normal) Collected: 08/09/22 2026    Lab Status: Final result Specimen: Blood from Arm, Right Updated: 08/09/22 2105     hs TnI 0hr 13 ng/L     Lactic acid, plasma [[id number]]  (Normal) Collected: 08/09/22 2026    Lab Status: Final result Specimen: Blood from Arm, Right Updated: 08/09/22 2057     LACTIC ACID 1 0 mmol/L     Narrative:      Result may be elevated if tourniquet was used during collection  Protime-INR [[id number]]  (Abnormal) Collected: 08/09/22 2026    Lab Status: Final result Specimen: Blood from Arm, Right Updated: 08/09/22 2050     Protime 19 9 seconds      INR 1 66    APTT [[id number]]  (Normal) Collected: 08/09/22 2026    Lab Status: Final result Specimen: Blood from Arm, Right Updated: 08/09/22 2050     PTT 36 seconds                  CT chest abdomen pelvis w contrast   Final Result by Brittnee Reid MD (08/09 2155)      Findings consistent with progression of disease with increase in size of cavitary right upper lobe mass, extensive new hepatic metastasis, enlarging adrenal and splenic metastasis and worsening osseous metastasis              Workstation performed: [id number]         CT head without contrast   Final Result by Brittnee Reid MD (08/09 2138)      Numerous hemorrhagic lesions throughout the supratentorial and infratentorial brain as detailed above compatible with progression of patient's known metastatic disease  The study was marked in [facility] for immediate notification                      Workstation performed: [id number]                    Procedures  ECG 12 Lead Documentation Only    Date/Time: 8/9/2022 11:12 PM  Performed by: Cristina Barajas PA-C  Authorized by: Cristina Barajas PA-C     ECG reviewed by me, the ED Provider: yes    Patient location:  ED  Rate:     ECG rate:  88    ECG rate assessment: normal    Rhythm:     Rhythm: sinus rhythm    QRS:     QRS axis:  Normal  Conduction:     Conduction: normal    ST segments:     ST segments:  Normal  T waves:     T waves: normal               ED Course  ED Course as of 08/10/22 0109   Tue Aug 09, 2022   2234 Dr Sylwia Vidal spoke with Dr Isael Carrillo at [facility] who accepts the case                 MDM  Number of Diagnoses or Management Options  Adenocarcinoma of lung, unspecified laterality [facility]): established and worsening  Bone metastases ([facility] [address] ): established and worsening  Brain metastasis ([facility] [address] ): established and worsening  Dizziness: new and requires workup  Hematemesis, unspecified whether nausea present: new and requires workup  Leukocytosis: established and worsening  Liver metastases ([address] ): established and worsening     Amount and/or Complexity of Data Reviewed  Clinical lab tests: ordered and reviewed  Tests in the radiology section of CPT®: ordered and reviewed    Risk of Complications, Morbidity, and/or Mortality  Presenting problems: high  Diagnostic procedures: high  Management options: high    Patient Progress  Patient progress: stable      Disposition  Final diagnoses:   Hematemesis, unspecified whether nausea present   Dizziness   Leukocytosis   Brain metastasis ([facility] [address] )   Adenocarcinoma of lung, unspecified laterality ([facility] [address] )   Liver metastases ([address] )   Bone metastases ([address] )     Time reflects when diagnosis was documented in both MDM as applicable and the Disposition within this note     Time User Action Codes Description Comment    8/9/2022  9:58 PM Norval Money Add [K92 0] Hematemesis, unspecified whether nausea present     8/9/2022  9:58 PM Norval Money Add [R42] Dizziness     8/9/2022  9:59 PM Norval Money Add [D72 829] Leukocytosis     8/9/2022  9:59 PM Norval Money Add [I63 89] Acute hemorrhagic infarction [facility] brain ([facility] )     8/9/2022  9:59 PM Joanette Dan[place] [I63 89] Acute hemorrhagic infarction of brain ([facility] )     8/9/2022 10:00 PM Velvet Platts [C79 31] Brain metastasis ([facility] )     8/9/2022 10:24 PM Norval Money Add [C34 90] Adenocarcinoma of lung, unspecified laterality ([facility] )     8/9/2022 10:24 PM Norval Money Add [C78 7] Liver metastases ([facility] )     8/9/2022 10:25 PM Norval Money Add [C79 51] Bone metastases [facility])       ED Disposition     ED Disposition   Transfer to Another Facility-In Network    Condition   --    Date/Time   Tue Aug 9, 2022 10:36 PM    Comment   Enoc Mitchell should be transferred out to [facility]             MD Documentation    [address] Most Recent Value   Patient Condition The patient has been stabilized such that within reasonable medical probability, no material deterioration of the patient condition or the condition of the unborn child(wilbert) is likely to result from the transfer   Reason for Transfer Level of Care needed not available at this facility   Benefits of Transfer Specialized equipment and/or services available at the receiving facility (Include comment)________________________  [facility]]   Risks of Transfer Potential for delay in receiving treatment, Potential deterioration of medical condition, Loss of IV, Increased discomfort during transfer, Possible worsening of condition or death during transfer   Accepting Physician Dr Juli Rodriguez Name, Sunny Overcast   Sending MD Diane Bentley PAIsaíasC   Provider Certification General risk, such as traffic hazards, adverse weather conditions, rough terrain or turbulence, possible failure of equipment (including vehicle or aircraft), or consequences of actions of persons outside the control of the transport personnel, Unanticipated needs of medical equipment and personnel during transport, Risk of worsening condition, The possibility of a transport vehicle being unavailable, Consent was not obtained as patient is committed to psychiatric facility and transfer is mandated      RN Documentation    Flowsheet Row [address] [facility] Name, Coretta Guajardo      Follow-up Information    None         Patient's Medications   Discharge Prescriptions    No medications on file       No discharge procedures on file      PDMP Review       Value Time User    PDMP Reviewed  Yes 8/3/2022  8:45 AM Maria Eugenia Males, DO          ED Provider  Electronically Signed by           Misael Benjamin PA-C  08/10/22 0110

## 2022-08-10 NOTE — CONSULTS
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, hemorrhagic brain Mets who presents with hematemesis     Gastroenterology has been consulted for assistance with management of hematemesis    Hematemesis  · Differential diagnosis includes Xiomy harris tear, esophagitis, gastritis, PUD less likely mets   · Hemodynamics are stable   · Eliquis has been held  · Will plan for upper endoscopy to evaluate further  · Keep patient NPO   · PPI drip/ iv BID  · IV fluid resuscitation    RIJ thrombus  · On eliquis  · Hold due to GI bleed and hemorrhagic brain mets    Lung cancer  · With hemorrhagic mets to the brain  · On systemic chemotherapy as an outpatient     HISTORY OF PRESENT ILLNESS      Sanaz Euceda is a 62 y o  male who is originally admitted for hematemesis and is being consulted for the same  Patient is a poor historian but reports that he had 6 episodes of vomiting yesterday  He reports that he only some episodes had the bloody tinge to it but does not remember which ones and how many  Currently denies any nausea or vomiting  He currently denies abdominal pain  REVIEW OF SYSTEMS     A thorough 12-point review of systems has been conducted  Pertinent positives and negatives are mentioned in the history of present illness         PAST MEDICAL & SURGICAL HISTORY      Past Medical History:   Diagnosis Date    Diabetes mellitus ([facility] [address] )     Elevated PSA 3/11/2021    Fatty liver 3/11/2021    Gall bladder polyp 3/11/2021    Lung cancer ([facility] [address] )     Nonimmune to hepatitis B virus 3/11/2021       Past Surgical History:   Procedure Laterality Date    FL GUIDED CENTRAL VENOUS ACCESS DEVICE INSERTION  5/10/2022    LUNG BIOPSY      SC INSJ TUNNELED CTR VAD W/SUBQ PORT AGE 5 YR/> N/A 5/10/2022    Procedure: INSERTION VENOUS PORT ( PORT-A-CATH) IR;  Surgeon: Ngoc Alvarez DO;  Location: [facility] MA[facility];  Service: Interventional Radiology         MEDICATIONS & ALLERGIES       Medications:   Prior to Admission medications    Medication Sig Start Date End Date Taking? Authorizing Provider   apixaban (Eliquis) 5 mg Take 2 tablets (10 mg total) by mouth 2 (two) times a day for 7 days, THEN 1 tablet (5 mg total) 2 (two) times a day for 23 days   8/3/22 9/2/22  Belen Murillo DO   BD Pen Needle Jeaneth 2nd Gen 32G X 4 MM MISC USE ONCE DAILY WITH LANTUS 1/7/21   Historical Provider, MD   Blood Glucose Monitoring Suppl (FreeStyle Lite) FIDELIA  1/7/21   Historical Provider, MD   cephalexin (KEFLEX) 250 mg capsule Take 1 capsule (250 mg total) by mouth every 12 (twelve) hours 8/5/22 9/4/22  Nilsa Casey MD   Cholecalciferol (Vitamin D3) 125 MCG (5000 UT) CAPS Take 1 capsule (5,000 Units total) by mouth daily 11/17/21 8/5/22  Nilsa Casey MD   Cholecalciferol (Vitamin D3) 125 MCG (5000 UT) TABS Take 5,000 Units by mouth daily    Historical Provider, MD   dextromethorphan-guaiFENesin (ROBITUSSIN DM)  mg/5 mL syrup Take 10 mL by mouth every 4 (four) hours as needed for cough or congestion (mucus production) 7/27/22   Sinai Pichardo PA-C   FREESTYLE LITE test strip Check blood sugar  daily 11/17/21   Nilsa Casey MD   Insulin Pen Needle (BD Pen Needle Jeaneth U/F) 32G X 4 MM MISC Use daily as directed with insulin pen 3/28/22   Nilsa Casey MD   ipratropium-albuterol (Combivent Respimat) inhaler Inhale 1 puff 4 (four) times a day 4/15/22   Nilsa Casey MD   Lancets (freestyle) lancets Check blood sugar daily 11/17/21   [facility]   levETIRAcetam (KEPPRA) 500 mg tablet Take 1 tablet (500 mg total) by mouth every 12 (twelve) hours 3/28/22   Nilsa Hunt MD   LORazepam (ATIVAN) 0 5 mg tablet For sleep 1-2 tablets at night if you wake and have trouble returning to sleep (Max 1 mg) 7/22/22   Bar Bernabe MD   magnesium gluconate (MAGONATE) 500 mg tablet Take 1 tablet (500 mg total) by mouth daily 5/9/22 8/5/22  Nilsa Hunt MD   melatonin 3 mg Take 1 tablet (3 mg total) by mouth daily at bedtime 6/24/22   Bar Bernabe MD   menthol-methyl salicylate (BENGAY) 33-76 % cream Apply topically 4 (four) times a day as needed (chest and back pain) 7/27/22   Katelynn Cardona PA-C   metFORMIN (GLUCOPHAGE-XR) 500 mg 24 hr tablet Take 2 tablets (1,000 mg total) by mouth daily with dinner 3/28/22 8/5/22  Nilsa Hunt MD   naloxone [facility]) 4 mg/0 1 mL nasal spray Administer 1 spray into a nostril  If no response after 2-3 minutes, give another dose in the other nostril using a new spray   It has to be on stand by use with opioid use - in case of overdose  Patient not taking: No sig reported 4/26/22   JONATHAN Hinds   ondansetron (Zofran ODT) 8 mg disintegrating tablet Take 1 tablet (8 mg total) by mouth every 8 (eight) hours as needed for nausea or vomiting 7/12/22   Yvonne Sellers MD   oxyCODONE (ROXICODONE) 10 MG TABS Take 1 tablet (10 mg total) by mouth every 6 (six) hours as needed for moderate pain Max Daily Amount: 40 mg 7/6/22   Bar Bernabe MD   pantoprazole (PROTONIX) 40 mg tablet Take 1 tablet (40 mg total) by mouth daily in the early morning 4/20/22   Nilsa Hunt MD   umeclidinium bromide (Incruse Ellipta) 62 5 mcg/inh AEPB inhaler Inhale 1 puff daily 8/5/22   Nilsa Hunt MD       Allergies: No Known Allergies      SOCIAL HISTORY      Marital Status: /Civil Union    Substance Use History:   Social History     Substance and Sexual Activity   Alcohol Use Not Currently    Comment: quit drinking 7 years ago     Social History     Tobacco Use   Smoking Status Former Smoker    Packs/day: 0 50    Years: 40 00    Pack years: 20 00    Types: Cigarettes    Quit date: 2022    Years since quittin 5   Smokeless Tobacco Never Used     Social History     Substance and Sexual Activity   Drug Use Never         FAMILY HISTORY      Non-Contributory      PHYSICAL EXAM     Vitals:   Blood Pressure: 128/74 (08/10/22 0700)  Pulse: 88 (08/10/22 0700)  Temperature: 98 7 °F (37 1 °C) (08/10/22 0400)  Temp Source: Oral (08/10/22 0400)  Respirations: (!) 11 (08/10/22 0700)  Height: 5' 7" (170 2 cm) (08/10/22 0300)  Weight - Scale: 62 kg (136 lb 11 oz) (08/10/22 0300)  SpO2: 98 % (08/10/22 0700)    Physical Exam:   GENERAL: NAD  HEENT:  NC/AT, MMM  CARDIAC:  RRR, +S1/S2, no S3/S4 heard  PULMONARY:  CTA B/L, no wheezing/rales/rhonci, non-labored breathing  ABDOMEN:  Soft, NT/ND, +BS, no rebound/guarding/rigidity  DIGITAL RECTAL EXAM: deferred  NEUROLOGIC:  Alert/oriented x3  SKIN:  No rashes or erythema       ADDITIONAL DATA     Lab Results:     Results from last 7 days   Lab Units 08/10/22  0527 08/10/22  0233 08/09/22  2026   WBC Thousand/uL 27 06*  --  30 80*   HEMOGLOBIN g/dL 8 5*   < > 9 6*   HEMATOCRIT % 26 7*   < > 29 9*   PLATELETS Thousands/uL 434*  --  494*   LYMPHO PCT %  --   --  9*   MONO PCT %  --   --  5   EOS PCT %  --   --  0    < > = values in this interval not displayed  Results from last 7 days   Lab Units 08/10/22  0527 08/09/22  2026   POTASSIUM mmol/L 4 3 4 5   CHLORIDE mmol/L 100 95*   CO2 mmol/L 27 28   BUN mg/dL 12 14   CREATININE mg/dL 0 85 0 82   CALCIUM mg/dL 9 3 9 7   ALK PHOS U/L  --  231*   ALT U/L  --  11   AST U/L  --  19     Results from last 7 days   Lab Units 08/10/22  0233   INR  1 29*       Imaging:    XR chest 1 view portable    Result Date: 2022  Narrative: CHEST INDICATION:   decreased breath sounds   COMPARISON:  2022 EXAM PERFORMED/VIEWS:  XR CHEST PORTABLE FINDINGS: Right upper lung mass and right Mediport are unchanged Cardiomediastinal silhouette appears unremarkable  The lungs are clear  No pneumothorax or pleural effusion  Osseous structures appear within normal limits for patient age  Impression: No change right upper lung mass  No acute cardiopulmonary disease  Workstation performed: [id number]     XR chest portable    Result Date: 7/25/2022  Narrative: CHEST INDICATION:   fever, cough  Lung cancer  COMPARISON:  Chest radiograph and CT from 6/30/2022  EXAM PERFORMED/VIEWS:  XR CHEST PORTABLE FINDINGS:  Right port at cavoatrial junction  Cardiomediastinal silhouette appears unremarkable  Large right upper lobe tumor with increased opacity in the right upper lobe since June 2022  Left lung clear  No effusion or pneumothorax  Osseous structures appear within normal limits for patient age  Impression: Redemonstration of large right upper lobe tumor  It cannot be determined if the additional increased opacity in the right upper lobe when compared with 6/30/2022 is due to tumor progression or superimposed pneumonia  Workstation performed: [id number]     CT head without contrast    Result Date: 8/9/2022  Narrative: CT BRAIN - WITHOUT CONTRAST INDICATION:   dizziness, on eliquis, mets to brain  COMPARISON:  3/22/2022 TECHNIQUE:  CT examination of the brain was performed  In addition to axial images, sagittal and coronal 2D reformatted images were created and submitted for interpretation  Radiation dose length product (DLP) for this visit:  954 mGy-cm   This examination, like all CT scans performed in the [facility], was performed utilizing techniques to minimize radiation dose exposure, including the use of iterative reconstruction and automated exposure control  IMAGE QUALITY:  Diagnostic   FINDINGS: PARENCHYMA:  Numerous hemorrhagic foci throughout the supratentorial and infratentorial brain including; 2 4 cm lesion at the left high parietal lobule 8 mm lesion at the right superior frontal gyrus gray-white matter junction 1 2 cm lesion at the right posterior cingulate gyrus (series 2, image 28) 6 mm lesion at the left posterior inferior frontal region (series 2, image 12) 1 9 cm and a 1 cm lesion at the medial right occipital region (series 2 image 17 and 19 1 1 cm lesion at the left occipital region (series 2, image 19) 1 3 cm lesion at the left superior cerebellum (series 6 image 14) 2 9 cm lesion in the central cerebral white matter  VENTRICLES AND EXTRA-AXIAL SPACES:  Normal for the patient's age  VISUALIZED ORBITS AND PARANASAL SINUSES:  Unremarkable  CALVARIUM AND EXTRACRANIAL SOFT TISSUES:  Normal      Impression: Numerous hemorrhagic lesions throughout the supratentorial and infratentorial brain as detailed above compatible with progression of patient's known metastatic disease  The study was marked in [facility] for immediate notification    Workstation performed: [id number]     CT soft tissue neck    Result Date: 8/1/2022  Narrative: CT NECK WITH CONTRAST INDICATION:   severe neck pain, questionable clot to R IJ? Port in place to R chest wall  COMPARISON:  None  TECHNIQUE:  Axial, sagittal, and coronal 2D reformatted images were created from the axial source data and submitted for interpretation  Radiation dose length product (DLP) for this visit:  580 mGy-cm   This examination, like all CT scans performed in the [facility], was performed utilizing techniques to minimize radiation dose exposure, including the use of iterative reconstruction and automated exposure control  IV Contrast:  70 mL of iohexol (OMNIPAQUE) IMAGE QUALITY:  Diagnostic  FINDINGS: VISUALIZED BRAIN PARENCHYMA:  No acute intracranial pathology of the visualized brain parenchyma  VISUALIZED ORBITS AND PARANASAL SINUSES:  Normal  NASAL CAVITY AND NASOPHARYNX:  Normal  SUPRAHYOID NECK:  Normal oral cavity, tongue base, tonsillar fossa and epiglottis   INFRAHYOID NECK:  Aryepiglottic folds and piriform sinuses are normal   Normal glottis and subglottic airway  THYROID GLAND: 1 6 cm right thyroid lobe nodule for which thyroid ultrasound was recommended on CT examination of 6/5/2022  PAROTID AND SUBMANDIBULAR GLANDS:  Normal  LYMPH NODES:  No pathologic or enlarged adenopathy  VASCULAR STRUCTURES:  Occlusive filling defect within the right internal jugular vein was from the C2-C3 level  Right internal jugular chemotherapy port    THORACIC INLET:  Large right upper lobe mass extending towards the right hilum/mediastinum was evaluated on prior CT examinations  BONY STRUCTURES: No acute fracture or destructive osseous lesion  Impression: Occlusive filling defect within the right internal jugular vein compatible with deep venous thrombus  Findings were discussed with Dr Petty Hernandez at 9:45 PM, 8/1/2022 Workstation performed: [id number]     CT chest abdomen pelvis w contrast    Result Date: 8/9/2022  Narrative: CT CHEST, ABDOMEN AND PELVIS WITH IV CONTRAST INDICATION:   Vomiting blood, history lung CA  COMPARISON:  None  TECHNIQUE: CT examination of the chest, abdomen and pelvis was performed  Axial, sagittal, and coronal 2D reformatted images were created from the source data and submitted for interpretation  Radiation dose length product (DLP) for this visit:  477 mGy-cm   This examination, like all CT scans performed in the [facility], was performed utilizing techniques to minimize radiation dose exposure, including the use of iterative reconstruction and automated exposure control  IV Contrast:  70 mL of iohexol (OMNIPAQUE) Enteric Contrast: Enteric contrast was administered  FINDINGS: CHEST LUNGS:  Large cavitary right upper lobe lung mass measuring up to 8 1 cm and extending to the right lateral pleural surface and the right paramediastinum and hilum encasing the right upper lobe bronchus and numerous pulmonary branches supplying the right  upper lobe    Comparing to 6/5/2022 mass has increased growth inferiorly and now abuts the right minor fissure as well as posteriorly abutting the superior major fissure  Mass likely invades the SVC and extends into the mediastinum along the posterior margin the aorta into the AP window  Is no tracheal or endobronchial lesion  PLEURA:  Unremarkable  HEART/GREAT VESSELS: Heart is unremarkable for patient's age  No thoracic aortic aneurysm  MEDIASTINUM AND NAS:  1 2 cm subcarinal lymph node    CHEST WALL AND LOWER NECK:  Unremarkable  ABDOMEN LIVER/BILIARY TREE:  Innumerable new hepatic metastasis  Monia Le GALLBLADDER:  No calcified gallstones  No pericholecystic inflammatory change  SPLEEN:  Increase in size of 5 1 cm splenic mass density measuring 2 1 cm  PANCREAS:  Unremarkable  ADRENAL GLANDS:  New 2 7 cm right adrenal lesion and a 3 cm left adrenal lesion  KIDNEYS/URETERS:  Unremarkable  No hydronephrosis  STOMACH AND BOWEL:  Unremarkable  APPENDIX:  No findings to suggest appendicitis  ABDOMINOPELVIC CAVITY:  No ascites  No pneumoperitoneum  No lymphadenopathy  VESSELS:  Unremarkable for patient's age  PELVIS REPRODUCTIVE ORGANS:  Unremarkable for patient's age  URINARY BLADDER:  Unremarkable  ABDOMINAL WALL/INGUINAL REGIONS:  Unremarkable  OSSEOUS STRUCTURES:  Worsening osseous metastatic disease, for instance a new 2 cm lytic lesion in the left acetabulum, a 2 2 cm right L5 vertebral body lesion and am 1 7 cm lytic lesion at the T10 vertebral body extending to the superior endplate        Impression: Findings consistent with progression of disease with increase in size of cavitary right upper lobe mass, extensive new hepatic metastasis, enlarging adrenal and splenic metastasis and worsening osseous metastasis  Workstation performed: [id number]       EKG, Pathology, and Other Studies Reviewed on Admission:   · EKG: Reviewed      Counseling / Coordination of Care Time: 30 total mins spent n consult  Greater than 50% of total time spent on patient counseling and coordination of care      Mary Rojas MD PGY-5,   Gastroenterology         ** Please Note: This note is constructed using a voice recognition dictation system   **

## 2022-08-10 NOTE — PLAN OF CARE
Problem: OCCUPATIONAL THERAPY ADULT  Goal: Performs self-care activities at highest level of function for planned discharge setting  See evaluation for individualized goals  Description: Treatment Interventions: ADL retraining, Functional transfer training, Endurance training, Cognitive reorientation, Patient/family training, Equipment evaluation/education, Compensatory technique education, Continued evaluation, Energy conservation, Activityengagement          See flowsheet documentation for full assessment, interventions and recommendations  Note: Limitation: Decreased ADL status, Decreased endurance, Decreased self-care trans, Decreased high-level ADLs  Prognosis: Fair  Assessment: Pt is a 63 y/o male seen for OT eval s/p adm to [facility] w/ hematemesis and hemorrhagic brain mets  Pt  has a past medical history of Diabetes mellitus ([facility] [address] ), Elevated PSA (3/11/2021), Fatty liver (3/11/2021), Gall bladder polyp (3/11/2021), Lung cancer ([facility] ), and Nonimmune to hepatitis B virus (3/11/2021)  Pt with active OT orders and up with assistance  orders  Pt lives with his spouse in 1 , [place] [place], laundry in basement w/ FFOS down  Pt was I w/ LB ADLS, has assist for UB ADLS and assist for IADLS, does not drive, & required no use of DME PTA  Pt is currently demonstrating the following occupational deficits: Min A UB/LB ADLS, Min A transfers and functional mobility w/ HHA  These deficits that are impacting pt's baseline areas of occupation are a result of the following impairments: endurance, activity tolerance, functional mobility, forward functional reach, balance, functional standing tolerance and decreased I w/ ADLS/IADLS  The following Occupational Performance Areas to address include: bathing/shower, toilet hygiene, dressing, health maintenance, functional mobility and clothing management  Recommend home OT upon D/C, when medically stable   Pt to continue to benefit from acute immediate OT services to address the following goals 3-5x/week to  w/in 10-14 days:     OT Discharge Recommendation: Home with home health rehabilitation     Rupert Sterling MS, OTR/L

## 2022-08-10 NOTE — ASSESSMENT & PLAN NOTE
§ Continue ceftriaxone   § Patient recently hospitalized 7/25-7/27 and 8/1-8/3 with Klebsiella pneumonia  § Placed on Cephalexin 250mg BID for 3 months   § Continue robitussin   § After discussion with family, he was made comfort care only

## 2022-08-10 NOTE — TELEPHONE ENCOUNTER
Call from patient's son Sherlene Dakins  Patient was admitted to hospital last night  Patient had CT scan showing progression  Will cancel upcoming scheduled Ct scan  Son will keep us updated on decision regarding care  Son aware that Dr Tenisha Singh is away until 8/19/2022

## 2022-08-10 NOTE — ED ATTENDING ATTESTATION
8/9/2022  IVasquez MD, saw and evaluated the patient  I have discussed the patient with the resident/non-physician practitioner and agree with the resident's/non-physician practitioner's findings, Plan of Care, and MDM as documented in the resident's/non-physician practitioner's note, except where noted  All available labs and Radiology studies were reviewed  I was present for key portions of any procedure(s) performed by the resident/non-physician practitioner and I was immediately available to provide assistance  At this point I agree with the current assessment done in the Emergency Department  I have conducted an independent evaluation of this patient a history and physical is as follows: This is a 62 y o  old male who presents to the ED for evaluation of hematemesis  Known history of adenocarcinoma of the lung with extensive metastasis, recent diagnosis of a internal jugular vein thrombosis for which he is on Eliquis  Reported that he had a about 5 episodes of vomiting blood with clots  He did complain of feeling dizzy this afternoon that is new for him  Known history of metastatic cancer in the brain  Patient appears weak and lethargic  VS and nursing notes reviewed  General: Appears in mild distress, eyes closed but does respond to verbal stimuli, speaking normally in full sentences but is slow to respond  Well-nourished, underweight, temporal wasting  Appears older than stated age  Head: Normocephalic, atraumatic  Eyes: EOMI  Vision grossly normal  No subconjunctival hemorrhages or occular discharge noted  Symmetrical lids  ENT: Atraumatic external nose and ears  No stridor  Normal phonation  No drooling  Normal swallowing  Neck: No JVD  FROM  No goiter  CV: No pallor  Normal rate  Lungs: No tachypnea  No respiratory distress  MSK: Moving all extremities equally, no peripheral edema  Skin: Dry, intact   No cyanosis  Neuro:  Responsive to verbal stimuli, lethargic, GCS14  CN II-XII grossly intact  Equal strength bilaterally, normal reflexes, normal finger to nose gait not tested  Psychiatric/Behavioral:  Depressed mood and normal affect  A/P: This is a 62 y o  male who presents to the ED for evaluation of hematemesis  Was brought in to see the patient due to his multiple comorbidities  Patient's CT scan also shows multiple hemorrhagic metastasis to the brain  I personally discussed the case with Neuro Critical Care  He is currently already on Keppra so will not repeat bolus  Lengthy discussion was had the neuro critical care team   Decision was made to reverse the patient due to his high dose of Eliquis and the hemorrhagic lesion seen in the cerebellum which places him at high risk  Patient will be transferred to the neuro critical care service at [facility] for further management  At this time his BP is stable  He is on protonix bolus an drip  GI is aware of the patient      ED Course         Critical Care Time  CriticalCare Time  Performed by: Hyacinth Orozco MD  Authorized by: Hyacinth Orozco MD     Critical care provider statement:     Critical care time (minutes):  32    Critical care was necessary to treat or prevent imminent or life-threatening deterioration of the following conditions:  CNS failure or compromise    Critical care was time spent personally by me on the following activities:  Obtaining history from patient or surrogate, development of treatment plan with patient or surrogate, discussions with consultants, evaluation of patient's response to treatment, examination of patient, review of old charts, re-evaluation of patient's condition, ordering and review of radiographic studies, ordering and performing treatments and interventions and ordering and review of laboratory studies

## 2022-08-10 NOTE — DISCHARGE SUMMARY
[address] [facility]  Discharge- Maloney Sunil 1965, 62 y o  male MRN: [id number]  Unit/Bed#: ICU 10 Encounter: [id number]  Primary Care Provider: Sis Israel MD   Date and time admitted to hospital: 8/10/2022  1:52 AM    * Brain mass  Assessment & Plan  § CTH: Numerous hemorrhagic lesions throughout the supratentorial and infratentorial brain as detailed above compatible with progression of patient's known metastatic disease  § Hold all ACPC  § Continue Keppra   § Continue decadron   § S/p vitamin K  § Comfort care only     Goals of care, counseling/discussion  Assessment & Plan  After discussion with ICU team, Palliative care team with family has discussion regarding end of life care as family and patient opted for comfort measures only  Family chose to go with hospice care  Hospice care will meet with family and make appropriate arrangements for comfort  Cerebral brain hemorrhage [facility])  Assessment & Plan  In the setting of Eliquis patient bleed into brain where metastasis exists  Family decided to stop all interventions and made him comfort care only at this point  Comfort measures pain control, anxiety medications  GI bleeding  Assessment & Plan  § GI consulted, holding off further procedures  § Zofran PRN   § Hold ACPC  § S/p Vit K      Internal jugular (IJ) vein thromboembolism, acute, right (HCC)  Assessment & Plan  § Eliquis starter pack ordered for pt last hospitalization 8/1-8/3   Take 10mg BID for 7 days then 5mg BID      § Vascular surgery: does not recommend surgical intervention  § Hold all ACPC   § S/p Vit K   § Comfort care only     Anemia  Assessment & Plan  § Family decided to stop blood draws   § Baseline Hgb ~9       COPD (chronic obstructive pulmonary disease) ([facility] )  Assessment & Plan  § Not in acute exacerbation   § Continue home nebs   § For comfort only        Continuous opioid dependence ([facility] )  Assessment & Plan  Oxycodone/diluadid as needed  Pneumonia due to Klebsiella pneumoniae [facility])  Assessment & Plan  Patient was on keflex at home   Received ceftriaxone  Comfort care only at this point, per family stop antibiotics     Adenocarcinoma of right lung [facility])  Assessment & Plan  · Adenocarcinoma of right lung, patient has mets to brain, bone and liver   ? Plan:   § Patient currently undergoing chemotherapy   Last session was 7/22   Tentatively scheduled for PET and next session of chemo in September  § Patient following with both Oncology and palliative as outpatient  § Pt has portocath placement on right  § CT C/A/P: Findings consistent with progression of disease with increase in size of cavitary right upper lobe mass, extensive new hepatic metastasis, enlarging adrenal and splenic metastasis and worsening osseous metastasis    § Family decided to make patient comfort measures only     Obstructive pneumonia  Assessment & Plan  § Continue ceftriaxone   § Patient recently hospitalized 7/25-7/27 and 8/1-8/3 with Klebsiella pneumonia  § Placed on Cephalexin 250mg BID for 3 months   § Continue robitussin   § After discussion with family, he was made comfort care only     Type 2 diabetes mellitus without complication, with long-term current use of insulin ([facility] )  Assessment & Plan  Lab Results   Component Value Date    HGBA1C 6 5 (H) 06/05/2022       Recent Labs     08/10/22  0301 08/10/22  1202   POCGLU 120 134       Blood Sugar Average: Last 72 hrs:  (P) 80   § SSI   § Blood glucose goal 140-180   § Comfort care only no intervention          Code Status: Level 4 - Comfort Care  POA:    POLST:      Reason for ICU admission:   Cerebral bleeding with mets     Active problems:   Principal Problem:    Brain mass  Active Problems:    Type 2 diabetes mellitus without complication, with long-term current use of insulin (HCC)    History of tobacco use    Cerebral edema (HCC)    Obstructive pneumonia    Adenocarcinoma of right lung (HCC)    Pneumonia due to Klebsiella pneumoniae (HCC)    Continuous opioid dependence (HCC)    COPD (chronic obstructive pulmonary disease) (HCC)    Therapeutic opioid-induced constipation (OIC)    Hyponatremia    Anemia    Internal jugular (IJ) vein thromboembolism, acute, right (HCC)    GI bleeding    Goals of care, counseling/discussion  Resolved Problems:    * No resolved hospital problems  *      Consultants:   Neurosurgery, GI, palliative care     History of Present Illness:   HX and PE limited by: Language barrier      Diana Amaya is a 62 y o  male who presents with hematemesis in the setting of Eliquis  In short, patient placed on Eliquis due to recent RIJ thrombosis  Subsequently patient with hematemesis and hemorrhagic brain mets  Patient has known adenocarcinoma of the lung with mets to brain, bone, and liver  PMH includes anemia, COPD, Vitamin B12 deficiency, tobacco abuse, RIJ thromboembolism, obstructive pneumonia, T2DM, chronic opioid dependence due to cancer related pain       Per chart, patient vomited bright red blood with clots  5 episodes  Subsequently patient became dizzy and [facility]  CTH revealed Numerous hemorrhagic lesions throughout the supratentorial and infratentorial brain as detailed above compatible with progression of patient's known metastatic disease  Patient received vitamin K  Hemoglobin and vitals remained stable         Summary of clinical course:   Patient was planned for EGD as he has presented with vomiting blood and GI thought patient has ivan harris tears  Made him NPO  Neurosurgery patient has numerous brain mets for intervention  He was off of anticoagulation, hg stable overnight  Did not require any transfusions  With progression of his disease on radiation treatments and chemotherapy  Having brain bleed/penumonia on going/GI bleeding/ Lung carcinoma with mets to lungs, bone and brain, prognosis is poor   After discussion with ICU team, Palliative care team with family has discussion regarding end of life care as family and patient opted for comfort measures only  Family chose to go with hospice care  Hospice care will meet with family and make appropriate arrangements for comfort  Recent or scheduled procedures:   None as patient is comfort care  Outstanding/pending diagnostics:   None     Cultures:   Blood cultures pending, patient is comfort care,disregard results        Mobilization Plan:   PT/OT     Nutrition Plan:   Pleasure feeds     Invasive Devices Review  Invasive Devices  Report    Central Venous Catheter Line  Duration           Port A Cath 05/10/22 Right Chest 92 days          Peripheral Intravenous Line  Duration           Peripheral IV 08/09/22 Left Antecubital <1 day    Peripheral IV 08/10/22 Distal;Dorsal (posterior); Right Forearm <1 day                Rationale for remaining devices: no devices     VTE Pharmacologic Prophylaxis: comfort care only   VTE Mechanical Prophylaxis: sequential compression device    Discharge Plan:   Patient should be ready for discharge to hospice/snf on 8/10/2022        Home medications that are not reordered and reason why:   Comfort care only     Spoke with [place]  regarding transfer  Please contact critical care via [employer] with any questions or concerns  Portions of the record may have been created with voice recognition software  Occasional wrong word or "sound a like" substitutions may have occurred due to the inherent limitations of voice recognition software  Read the chart carefully and recognize, using context, where substitutions have occurred

## 2022-08-10 NOTE — EMTALA/ACUTE CARE TRANSFER
[address] [address]  Dept: [phone]      EMTALA TRANSFER CONSENT    NAME Shireen Dupont                                         1965                              MRN [id number]    I have been informed of my rights regarding examination, treatment, and transfer   by Dr Manuel Pizano MD    Benefits: Specialized equipment and/or services available at the receiving facility (Include comment)________________________ (neuro critical care)    Risks: Potential for delay in receiving treatment, Potential deterioration of medical condition, Loss of IV, Increased discomfort during transfer, Possible worsening of condition or death during transfer      Consent for Transfer:  I acknowledge that my medical condition has been evaluated and explained to me by the emergency department physician or other qualified medical person and/or my attending physician, who has recommended that I be transferred to the service of  Accepting Physician: Dr Lacie Romero at [address], [address] : [address] [place]  The above potential benefits of such transfer, the potential risks associated with such transfer, and the probable risks of not being transferred have been explained to me, and I fully understand them  The doctor has explained that, in my case, the benefits of transfer outweigh the risks  I agree to be transferred  I authorize the performance of emergency medical procedures and treatments upon me in both transit and upon arrival at the receiving facility  Additionally, I authorize the release of any and all medical records to the receiving facility and request they be transported with me, if possible  I understand that the safest mode of transportation during a medical emergency is an ambulance and that the Hospital advocates the use of this mode of transport   Risks of traveling to the receiving facility by car, including absence of medical control, life sustaining equipment, such as oxygen, and medical personnel has been explained to me and I fully understand them  (FARHAT CORRECT BOX BELOW)  [ X ]  I consent to the stated transfer and to be transported by ambulance/helicopter  [  ]  I consent to the stated transfer, but refuse transportation by ambulance and accept full responsibility for my transportation by car  I understand the risks of non-ambulance transfers and I exonerate the Hospital and its staff from any deterioration in my condition that results from this refusal     X___________________________________________    DATE  22  TIME________  Signature of patient or legally responsible individual signing on patient behalf           RELATIONSHIP TO PATIENT_________________________          Provider Certification    NAME Tera Zambrano                                        [facility] 1965                              MRN [id number]    A medical screening exam was performed on the above named patient  Based on the examination:    Condition Necessitating Transfer The primary encounter diagnosis was Hematemesis, unspecified whether nausea present  Diagnoses of Dizziness, Leukocytosis, Brain metastasis ([facility] ), Adenocarcinoma of lung, unspecified laterality [facility]), Liver metastases ([facility] ), and Bone metastases ([facility] ) were also pertinent to this visit      Patient Condition: The patient has been stabilized such that within reasonable medical probability, no material deterioration of the patient condition or the condition of the unborn child(wilbert) is likely to result from the transfer    Reason for Transfer: Level of Care needed not available at this facility    Transfer Requirements: [address] [facility]   · Space available and qualified personnel available for treatment as acknowledged by    · Agreed to accept transfer and to provide appropriate medical treatment as acknowledged by       Dr Brewer   · Appropriate medical records of the examination and treatment of the patient are provided at the time of transfer   [address] [place] [address], [address] _______  · Transfer will be performed by qualified personnel from    and appropriate transfer equipment as required, including the use of necessary and appropriate life support measures  Provider Certification: I have examined the patient and explained the following risks and benefits of being transferred/refusing transfer to the patient/family:  General risk, such as traffic hazards, adverse weather conditions, rough terrain or turbulence, possible failure of equipment (including vehicle or aircraft), or consequences of actions of persons outside the control of the transport personnel, Unanticipated needs of medical equipment and personnel during transport, Risk of worsening condition, The possibility of a transport vehicle being unavailable, Consent was not obtained as patient is committed to psychiatric facility and transfer is mandated      Based on these reasonable risks and benefits to the patient and/or the unborn child(wilbert), and based upon the information available at the time of the patients examination, I certify that the medical benefits reasonably to be expected from the provision of appropriate medical treatments at another medical facility outweigh the increasing risks, if any, to the individuals medical condition, and in the case of labor to the unborn child, from effecting the transfer      X____________________________________________ DATE 08/09/22        TIME_______      ORIGINAL - SEND TO MEDICAL RECORDS   COPY - SEND WITH PATIENT DURING TRANSFER

## 2022-08-11 NOTE — HOSPICE NOTE
Received hospice referral   I met with pt's wife along with his son and brother on the phone to discuss hospice services  Hospice benefits reviewed per insurance guidelines and all questions answered  Pt's brother had questions regarding insurance coverage, informed him hospice will be covered completely  Pt's wife will sign consents, his brother will be in tomorrow to assist with translation  Discussed equipment need at home  Equipment ordered from [place] [address] to be delivered tomorrow  April from [address] will send comfort meds to [facility] and will arrange transport for am Saturday with hospice open scheduled for Saturday  Will continue to follow until discharge

## 2022-08-11 NOTE — PROGRESS NOTES
Progress note - Palliative and Supportive Care   [place] Hair 62 y o  male [phone]    Patient Active Problem List   Diagnosis    Type 2 diabetes mellitus without complication, with long-term current use of insulin (HCC)    Nonimmune to hepatitis B virus    Elevated PSA    Gall bladder polyp    Vitamin D deficiency    Cyanocobalamin deficiency    History of tobacco use    Lung mass    Brain mass    Cerebral edema (HCC)    Sepsis due to pneumonia    Obstructive pneumonia    Adenocarcinoma of right lung ([facility] )    Pneumonia due to Klebsiella pneumoniae ([facility] )    Cancer associated pain    Adjustment insomnia    [facility]    COPD (chronic obstructive pulmonary disease) (HCC)    Palliative care patient    Chemotherapy induced neutropenia (HCC)    Dehydration    Acute right-sided thoracic back pain    Lung cancer metastatic to brain ([facility] )    Therapeutic opioid-induced constipation (OIC)    Pneumonia of right upper lobe due to infectious organism    Hyponatremia    Anemia    Leukocytosis    SIRS (systemic inflammatory response syndrome) (HCC)    Internal jugular (IJ) vein thromboembolism, acute, right (HCC)    GI bleeding    Cerebral brain hemorrhage (HCC)    Goals of care, counseling/discussion     Active issues specifically addressed today include:   Metastatic lung cancer  COPD  Palliative care patient  R IJ thrombus  Upper gi bleed  hemorrhagic brain lesions  Goals of care  Comfort care     Plan:  1  Symptom management -  -  Comfort medications ordered  -  Patient comfortable with use of prn oxycodone     2  Goals  -  Comfort care  -  Comfort medications sent to [facility] pharmacy     Code Status: Comfort care - Level 4   Decisional apparatus:  Patient is marginally competent on my exam today  If competence is lost, patient's substitute decision maker would default to spouse by [place]     Advance Directive / Living Will / POLST:   None on file     Interval history:      Patient resting comfortable with wife at bedside  Hospice liaison met and spoke with patient and family  Plan is home with hospice on Saturday  Comfort medications sent to pharmacy  Comfort medication regimen reviewed and modified  MEDICATIONS / ALLERGIES:     all current active meds have been reviewed    No Known Allergies    OBJECTIVE:    Physical Exam  Physical Exam  Vitals and nursing note reviewed  Constitutional:       Appearance: He is well-developed  He is ill-appearing  HENT:      Head: Normocephalic and atraumatic  Jaw: There is normal jaw occlusion  Mouth/Throat:      Lips: Pink  Mouth: Mucous membranes are moist       Pharynx: Oropharynx is clear  Eyes:      General: Lids are normal       Conjunctiva/sclera: Conjunctivae normal    Cardiovascular:      Rate and Rhythm: Normal rate and regular rhythm  Heart sounds: No murmur heard  Pulmonary:      Effort: Pulmonary effort is normal  No respiratory distress or retractions  Breath sounds: Normal breath sounds  Abdominal:      Palpations: Abdomen is soft  Tenderness: There is no abdominal tenderness  Musculoskeletal:      Cervical back: Neck supple  Skin:     General: Skin is warm and dry  Coloration: Skin is pale  Neurological:      Mental Status: He is alert  Psychiatric:         Mood and Affect: Affect is flat  Behavior: Behavior is withdrawn  Behavior is cooperative  Lab Results: CBC: No results found for: WBC, HGB, HCT, MCV, PLT, ADJUSTEDWBC, MCH, MCHC, RDW, MPV, NRBC, CMP: No results found for: SODIUM, K, CL, CO2, ANIONGAP, BUN, CREATININE, GLUCOSE, CALCIUM, AST, ALT, ALKPHOS, PROT, BILITOT, EGFR, PT/PTT:No results found for: PT, PTT      Counseling / Coordination of Care    Total floor / unit time spent today 35+  minutes  Greater than 50% of total time was spent with the patient and / or family counseling and / or coordination of care   A description of the counseling / coordination of care: review of symptoms, modifcation of pain regimen, supportive listening

## 2022-08-11 NOTE — UTILIZATION REVIEW
Initial Clinical Review    Admission: Date/Time/Statement:   Admission Orders (From admission, onward)     Ordered        08/10/22 0157  Inpatient Admission  Once                      Orders Placed This Encounter   Procedures    Inpatient Admission     Standing Status:   Standing     Number of Occurrences:   1     Order Specific Question:   Level of Care     Answer:   Critical Care [15]     Order Specific Question:   Estimated length of stay     Answer:   More than 2 Midnights     Order Specific Question:   Certification     Answer:   I certify that inpatient services are medically necessary for this patient for a duration of greater than two midnights  See H&P and MD Progress Notes for additional information about the patient's course of treatment  8/9/2022 - 8/10/2022 (5 hours) ED   [facility] Emergency Department  Hematemesis, nausea, dizziness, brain metastasis, liver metastases, bone metastases, adenocarcinoma of lung  Transfer to [facility] for higher level of care  Initially treated with iv protonix gtt, iv kcentra      Initial Presentation: 62 y o  male received from [facility] for inpatient critical care admission to continue evaluation and treatment of hematemesis and multiple new hemorrhagic supratentorial and infratentorial mets s/p treatment of RIJ thrombus with Eliquis  WBC 30 80,  HB 8 6  Plan to initiate iv  decadron for mild vasogenic edema 2 mg q6 hr, continue iv protonix, continue iv fluids, continue iv  keppra  Iv ceftriaxone given x1 then discontinued    Date: 8-11-22   Day 2: inpatient critical care  Iv decadron changed to oral liquid  Iv keppra changed to po  Oxycodone po given at [address]  Hospice referral for home hospice  Anticipate transport 8- 13        ED Triage Vitals [08/10/22 0200]   98 9 °F (37 2 °C) 90 12 133/89 98 %      Oral Monitor         No Pain          08/10/22 62 kg (136 lb 11 oz)     Additional Vital Signs:     Date/Time Temp Pulse Resp BP MAP (mmHg) SpO2 O2 Device   08/10/22 1600 -- 86 18 120/78 99 99 % --   08/10/22 1500 -- 88 19 115/74 92 99 % --   08/10/22 1300 -- 90 16 116/74 93 98 % --   08/10/22 1200 97 9 °F (36 6 °C) 92 18 120/74 94 98 % --   08/10/22 1100 -- 88 14 116/76 93 98 % --   08/10/22 1000 -- 90 12 119/72 94 98 % --   08/10/22 0900 -- 86 17 134/70 96 98 % --   08/10/22 0800 98 4 °F (36 9 °C) 88 12 120/75 96 99 % --   08/10/22 0700 -- 88 11 Abnormal  128/74 96 98 % --   08/10/22 0600 -- 88 10 Abnormal  129/73 96 99 % --   08/10/22 0500 -- 90 17 128/73 97 98 % --   08/10/22 0400 98 7 °F (37 1 °C) 90 17 130/71 96 97 % None (Room air)   08/10/22 0300 -- 88 17 121/74 96 98 % --   08/10/22 0258 -- 88 16 117/76 95 97 % --   08/10/22 0237 -- -- -- -- -- 97 % None (Room air)   08/10/22 0200 98 9 °F (37 2 °C) 90 12 133/89 90 98 % None (Room air)       Date and Time Eye Opening Best Verbal Response Best Motor Response Destin Coma Scale Score   08/11/22 0800 4 5 6 15   08/10/22 2012 4 5 6 15   08/10/22 1600 4 5 6 15   08/10/22 0800 4 5 6 15   08/10/22 0700 4 5 6 15   08/10/22 0600 4 5 6 15   08/10/22 0500 4 5 6 15   08/10/22 0400 4 5 6 15   08/10/22 0300 4 5 6 15   08/10/22 0200 4 5 6 15           Pertinent Labs/Diagnostic Test Results:     CT CAP  8-11-22 ED   Findings consistent with progression of disease with increase in size of cavitary right upper lobe mass, extensive new hepatic metastasis, enlarging adrenal and splenic metastasis and worsening osseous metastasis    CT BRAIN - WITHOUT CONTRAST   8-11-22   INDICATION:   dizziness, on eliquis, mets to brain    Numerous hemorrhagic lesions throughout the supratentorial and infratentorial brain as detailed above compatible with progression of patient's known metastatic disease        Results from last 7 days   Lab Units 08/10/22  0527 08/10/22  0233 08/09/22 2026   WBC Thousand/uL 27 06*  --  30 80*   HEMOGLOBIN g/dL 8 5* 8 6* 9 6*   HEMATOCRIT % 26 7* 26 8* 29 9* PLATELETS Thousands/uL 434*  --  494*   BANDS PCT %  --   --  4         Results from last 7 days   Lab Units 08/10/22  0527 08/09/22 2026   SODIUM mmol/L 133* 133*   POTASSIUM mmol/L 4 3 4 5   CHLORIDE mmol/L 100 95*   CO2 mmol/L 27 28   ANION GAP mmol/L 6 10   BUN mg/dL 12 14   CREATININE mg/dL 0 85 0 82   EGFR ml/min/1 73sq m 96 98   CALCIUM mg/dL 9 3 9 7   CALCIUM, IONIZED mmol/L 1 15  --    PHOSPHORUS mg/dL 4 2  --      Results from last 7 days   Lab Units 08/09/22 2026   AST U/L 19   ALT U/L 11   ALK PHOS U/L 231*   TOTAL PROTEIN g/dL 7 4   ALBUMIN g/dL 3 4*   TOTAL BILIRUBIN mg/dL 0 50     Results from last 7 days   Lab Units 08/10/22  1202 08/10/22  0301   POC GLUCOSE mg/dl 134 120     Results from last 7 days   Lab Units 08/10/22  0527 08/09/22  2026   GLUCOSE RANDOM mg/dL 127 148*       Results from last 7 days   Lab Units 08/09/22  2247 08/09/22 2026   HS TNI 0HR ng/L  --  13   HS TNI 2HR ng/L 13  --    HSTNI D2 ng/L 0  --          Results from last 7 days   Lab Units 08/10/22  0233 08/09/22  2026   PROTIME seconds 16 4* 19 9*   INR  1 29* 1 66*   PTT seconds 35 36     Results from last 7 days   Lab Units 08/09/22 2026   TSH 3RD GENERATON uIU/mL 1 173         Results from last 7 days   Lab Units 08/09/22 2026   LACTIC ACID mmol/L 1 0       Results from last 7 days   Lab Units 08/09/22 2026   LIPASE u/L <6*     Results from last 7 days   Lab Units 08/09/22 2345   CLARITY UA  Clear   COLOR UA  Light Yellow   SPEC GRAV UA  1 042*   PH UA  7 0   GLUCOSE UA mg/dl Negative   KETONES UA mg/dl 10 (1+)*   BLOOD UA  Negative   PROTEIN UA mg/dl Negative   NITRITE UA  Negative   BILIRUBIN UA  Negative   UROBILINOGEN UA (BE) mg/dl <2 0   LEUKOCYTES UA  Negative       Results from last 7 days   Lab Units 08/10/22  0234 08/09/22  2345 08/09/22 2026   BLOOD CULTURE  No Growth at 24 hrs  No Growth at 24 hrs   --  No Growth at 24 hrs  No Growth at 24 hrs     URINE CULTURE   --  No Growth <1000 cfu/mL  -- ED Treatment:   Medication Administration - No Administrations Displayed (No Start Event Found)     None        Past Medical History:   Diagnosis Date    Diabetes mellitus ([address] )     Elevated PSA 3/11/2021    Fatty liver 3/11/2021    Gall bladder polyp 3/11/2021    Lung cancer ([facility] )     Nonimmune to hepatitis B virus 3/11/2021     Present on Admission:   Anemia   Therapeutic opioid-induced constipation (OIC)   Obstructive pneumonia   Brain mass   Cerebral edema (HCC)   Adenocarcinoma of right lung (HCC)   Pneumonia due to Klebsiella pneumoniae (HCC)   Continuous opioid dependence (HCC)   Hyponatremia   Internal jugular (IJ) vein thromboembolism, acute, right (HCC)   GI bleeding      Admitting Diagnosis: Hematemesis [K92 0]     Age/Sex: 62 y o  male    Scheduled Medications:    dexamethasone, 6 mg, Oral, Daily  levETIRAcetam, 750 mg, Oral, Q12H LISY  omeprazole (PRILOSEC) suspension 2 mg/mL, 10 mg, Oral, BID  umeclidinium bromide, 1 puff, Inhalation, Daily    sodium chloride 0 9 % infusion  Rate: 125 mL/hr Dose: 125 mL/hr  Freq: Continuous Route: IV  Last Dose: Stopped (08/10/22 1550)  Start: 08/10/22 0200 End: 08/10/22 1648    pantoprazole (PROTONIX) injection 40 mg  Dose: 40 mg  Freq: 2 times daily Route: IV  Start: 08/10/22 0215 End: 08/10/22 1648  levETIRAcetam (KEPPRA) 750 mg in sodium chloride 0 9 % 100 mL IVPB  Dose: 750 mg  Freq: Every 12 hours scheduled Route: IV  Last Dose: Stopped (08/10/22 1550)  Start: 08/10/22 0230 End: 08/10/22   dexamethasone (DECADRON) injection 4 mg  Dose: 4 mg  Freq: Every 6 hours scheduled Route: IV  Start: 08/10/22 0200 End: 08/10/22 0513    dexamethasone (DECADRON) injection 2 mg  Dose: 2 mg  Freq: Every 6 hours scheduled Route: IV  Start: 08/10/22 0600 End: 08/10/22 1648      cefTRIAXone (ROCEPHIN) 1,000 mg in dextrose 5 % 50 mL IVPB  Dose: 1,000 mg  Freq: Every 24 hours Route: IV  Last Dose: Stopped (08/10/22 0400)  Start: 08/10/22 0200 End: 08/10/22 1648      Continuous IV Infusions:  pantoprazole (PROTONIX) 80 mg in sodium chloride 0 9 % 100 mL IVPB  Dose: 80 mg  Freq: Once Route: IV  Last Dose: Stopped (08/09/22 2141)  Start: 08/09/22 2045 End: 08/09/22 2141    pantoprazole (PROTONIX) 80 mg in sodium chloride 0 9 % 100 mL infusion  Rate: 10 mL/hr Dose: 8 mg/hr  Freq: Continuous Route: IV  Last Dose: 8 mg/hr (08/09/22 2107)  Start: 08/09/22 2045 End: 08/10/22          PRN Meds:  bisacodyl, 10 mg, Rectal, Daily PRN  fentanyl citrate (PF), 50 mcg, Intravenous, Q1H PRN  glycopyrrolate, 0 1 mg, Intravenous, Q4H PRN  haloperidol lactate, 0 5 mg, Intravenous, Q2H PRN  ipratropium-albuterol, 3 mL, Nebulization, Q4H PRN  LORazepam, 1 mg, Intravenous, Q10 Min PRN  morphine injection, 2 mg, Intravenous, Q1H PRN  ondansetron, 4 mg, Intravenous, Q6H PRN  oxyCODONE, 5 mg, Oral, Q6H PRN        IP CONSULT TO CASE MANAGEMENT  IP CONSULT TO GASTROENTEROLOGY  IP CONSULT TO NEUROSURGERY  IP CONSULT TO PALLIATIVE CARE  IP CONSULT TO HOSPICE  IP CONSULT TO HOSPICE    Network Utilization Review Department  ATTENTION: Please call with any questions or concerns to [phone] and carefully listen to the prompts so that you are directed to the right person  All voicemails are confidential   Katherine Fenton all requests for admission clinical reviews, approved or denied determinations and any other requests to dedicated fax number below belonging to the campus where the patient is receiving treatment   List of dedicated fax numbers for the Facilities:  [address] [place] [address] DENIALS (Administrative/Medical Necessity) [phone]   [address] (Maternity/NICU/Pediatrics) [phone]   [address] [place] [address] [facility] [place] [address] [facility] [address] [place] [address] [address] [place] [address] [phone]

## 2022-08-11 NOTE — PROGRESS NOTES
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  Unfortunately patient has known history of numerous brain Mets that is not amenable to surgical intervention  Unfortunately he presents with progression of his disease despite being on on radiation treatments and chemotherapy  Having brain bleed/penumonia on going/GI bleeding/ Lung carcinoma with mets to lungs, bone and brain, it was felt by Critical Care that his long-term prognosis is poor  Patient was evaluated by palliative care in the ICU  After an extensive discussion with the family regarding end of life care , family opted to focus on comfort measures only   Family chose to go with hospice care  Hospice care will meet with family and make appropriate arrangements for comfort      § CTH: Numerous hemorrhagic lesions throughout the supratentorial and infratentorial brain as detailed above compatible with progression of patient's known metastatic disease  § Hold all ACPC  § Continue Keppra   § Continue decadron   § S/p vitamin K  § Comfort care only       COPD (chronic obstructive pulmonary disease) ([facility] )  Assessment & Plan  Continue with inhalers    Adenocarcinoma of right lung [facility])  Assessment & Plan  § Patient previously has been on chemotherapy   Last session was 7/22     § Family decided to make patient comfort measures only going forward         Obstructive pneumonia  Assessment & Plan  Focus will be on comfort with focus on respiratory symptoms      VTE Pharmacologic Prophylaxis:       Time Spent for Care: 15 minutes    More than 50% of total time spent on counseling and coordination of care as described above  Current Length of Stay: 1 day(s)    Current Patient Status: Inpatient       Code Status: Level 4 - Comfort Care      Subjective:   nad    Objective:     Vitals:   Temp (24hrs), Av 9 °F (36 6 °C), Min:97 9 °F (36 6 °C), Max:97 9 °F (36 6 °C)    Temp:  [97 9 °F (36 6 °C)] 97 9 °F (36 6 °C)  HR:  [86-92] 86  Resp:  [14-19] 18  BP: (115-120)/(74-78) 120/78  SpO2:  [98 %-99 %] 99 %  Body mass index is 21 41 kg/m²  Input and Output Summary (last 24 hours): Intake/Output Summary (Last 24 hours) at 2022 1009  Last data filed at 8/10/2022 1550  Gross per 24 hour   Intake 925 ml   Output 450 ml   Net 475 ml       Physical Exam:     Physical Exam  Constitutional:       Appearance: Normal appearance  HENT:      Head: Normocephalic and atraumatic  Cardiovascular:      Rate and Rhythm: Normal rate and regular rhythm  Pulses: Normal pulses  Heart sounds: Normal heart sounds  No murmur heard  Pulmonary:      Effort: Pulmonary effort is normal       Breath sounds: Normal breath sounds  Abdominal:      General: Abdomen is flat  Palpations: Abdomen is soft  Musculoskeletal:         General: No swelling or tenderness  Normal range of motion  Neurological:      General: No focal deficit present  Mental Status: He is alert and oriented to person, place, and time  Psychiatric:         Mood and Affect: Mood normal          Additional Data:     Labs:    Results from last 7 days   Lab Units 08/10/22  0527 08/10/22  0233 08/09/22  2026   WBC Thousand/uL 27 06*  --  30 80*   HEMOGLOBIN g/dL 8 5*   < > 9 6*   HEMATOCRIT % 26 7*   < > 29 9*   PLATELETS Thousands/uL 434*  --  494*   LYMPHO PCT %  --   --  9*   MONO PCT %  --   --  5   EOS PCT %  --   --  0    < > = values in this interval not displayed       Results from last 7 days   Lab Units 08/10/22  0527 08/09/22  2026   POTASSIUM mmol/L 4 3 4 5   CHLORIDE mmol/L 100 95*   CO2 mmol/L 27 28   BUN mg/dL 12 14   CREATININE mg/dL 0 85 0 82   CALCIUM mg/dL 9 3 9 7   ALK PHOS U/L  --  231*   ALT U/L  --  11   AST U/L  --  19     Results from last 7 days   Lab Units 08/10/22  0233   INR  1 29*         Recent Cultures (last 7 days):     Results from last 7 days   Lab Units 08/10/22  0234 08/09/22  2345 08/09/22 2026   BLOOD CULTURE  No Growth at 24 hrs  No Growth at 24 hrs   --  No Growth at 24 hrs  No Growth at 24 hrs  URINE CULTURE   --  No Growth <1000 cfu/mL  --        Last 24 Hours Medication List:   Current Facility-Administered Medications   Medication Dose Route Frequency Provider Last Rate    bisacodyl  10 mg Rectal Daily PRN Haily Mejia DO      dexamethasone  6 mg Oral Daily Haily Mejia DO      fentanyl citrate (PF)  50 mcg Intravenous Q1H PRN Arnav Lopes PA-C      glycopyrrolate  0 1 mg Intravenous Q4H PRN Haily Mejia,       haloperidol lactate  0 5 mg Intravenous Q2H PRN Haily Mejia DO      ipratropium-albuterol  3 mL Nebulization Q4H PRN Haily Mejia DO      levETIRAcetam  750 mg Oral Q12H [address] Arnav Lopes PA-C      LORazepam  1 mg Intravenous Q10 Min PRN Haily Mejia,       morphine injection  2 mg Intravenous Q1H PRN Haily Mejia DO      omeprazole (PRILOSEC) suspension 2 mg/mL  10 mg Oral BID Arnav Lopes PA-C      ondansetron  4 mg Intravenous Q6H PRN Haily Mejia DO      oxyCODONE  5 mg Oral Q6H PRN Arnav Lopes PA-C      umeclidinium bromide  1 puff Inhalation Daily Haily Mejia DO          Today, Patient Was Seen By: Colleen Davidson DO    ** Please Note: Dictation voice to text software may have been used in the creation of this document   **

## 2022-08-11 NOTE — PLAN OF CARE
Problem: MOBILITY - ADULT  Goal: Maintain or return to baseline ADL function  Description: INTERVENTIONS:  -  Assess patient's ability to carry out ADLs; assess patient's baseline for ADL function and identify physical deficits which impact ability to perform ADLs (bathing, care of mouth/teeth, toileting, grooming, dressing, etc )  - Assess/evaluate cause of self-care deficits   - Assess range of motion  - Assess patient's mobility; develop plan if impaired  - Assess patient's need for assistive devices and provide as appropriate  - Encourage maximum independence but intervene and supervise when necessary  - Involve family in performance of ADLs  - Assess for home care needs following discharge   - Consider OT consult to assist with ADL evaluation and planning for discharge  - Provide patient education as appropriate  Outcome: Progressing     Problem: PAIN - ADULT  Goal: Verbalizes/displays adequate comfort level or baseline comfort level  Description: Interventions:  - Encourage patient to monitor pain and request assistance  - Assess pain using appropriate pain scale  - Administer analgesics based on type and severity of pain and evaluate response  - Implement non-pharmacological measures as appropriate and evaluate response  - Consider cultural and social influences on pain and pain management  - Notify physician/advanced practitioner if interventions unsuccessful or patient reports new pain  Outcome: Progressing     Problem: SAFETY ADULT  Goal: Maintain or return to baseline ADL function  Description: INTERVENTIONS:  -  Assess patient's ability to carry out ADLs; assess patient's baseline for ADL function and identify physical deficits which impact ability to perform ADLs (bathing, care of mouth/teeth, toileting, grooming, dressing, etc )  - Assess/evaluate cause of self-care deficits   - Assess range of motion  - Assess patient's mobility; develop plan if impaired  - Assess patient's need for assistive devices and provide as appropriate  - Encourage maximum independence but intervene and supervise when necessary  - Involve family in performance of ADLs  - Assess for home care needs following discharge   - Consider OT consult to assist with ADL evaluation and planning for discharge  - Provide patient education as appropriate  Outcome: Progressing     Problem: DISCHARGE PLANNING  Goal: Discharge to home or other facility with appropriate resources  Description: INTERVENTIONS:  - Identify barriers to discharge w/patient and caregiver  - Arrange for needed discharge resources and transportation as appropriate  - Identify discharge learning needs (meds, wound care, etc )  - Arrange for interpretive services to assist at discharge as needed  - Refer to Case Management Department for coordinating discharge planning if the patient needs post-hospital services based on physician/advanced practitioner order or complex needs related to functional status, cognitive ability, or social support system  Outcome: Progressing     Problem: Potential for Aspiration  Goal: Non-ventilated patient's risk of aspiration is minimized  Description: Assess and monitor vital signs, respiratory status, and labs (WBC)  Monitor for signs of aspiration (tachypnea, cough, rales, wheezing, cyanosis, fever)  - Assess and monitor patient's ability to swallow  - Place patient up in chair to eat if possible  - HOB up at 90 degrees to eat if unable to get patient up into chair   - Supervise patient during oral intake  - Instruct patient/ family to take small bites  - Instruct patient/ family to take small single sips when taking liquids    - Follow patient-specific strategies generated by speech pathologist   Outcome: Progressing     Problem: Nutrition  Goal: Nutrition/Hydration status is improving  Description: Monitor and assess patient's nutrition/hydration status for malnutrition (ex- brittle hair, bruises, dry skin, pale skin and conjunctiva, muscle wasting, smooth red tongue, and disorientation)  Collaborate with interdisciplinary team and initiate plan and interventions as ordered  Monitor patient's weight and dietary intake as ordered or per policy  Utilize nutrition screening tool and intervene per policy  Determine patient's food preferences and provide high-protein, high-caloric foods as appropriate  - Assist patient with eating   - Allow adequate time for meals   - Encourage patient to take dietary supplement as ordered  - Collaborate with clinical nutritionist   - Include patient/family/caregiver in decisions related to nutrition    Outcome: Progressing

## 2022-08-11 NOTE — ASSESSMENT & PLAN NOTE
§ Patient previously has been on chemotherapy   Last session was 7/22     § Family decided to make patient comfort measures only going forward

## 2022-08-11 NOTE — ASSESSMENT & PLAN NOTE
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  Unfortunately patient has known history of numerous brain Mets that is not amenable to surgical intervention  Unfortunately he presents with progression of his disease despite being on on radiation treatments and chemotherapy  Having brain bleed/penumonia on going/GI bleeding/ Lung carcinoma with mets to lungs, bone and brain, it was felt by Critical Care that his long-term prognosis is poor  Patient was evaluated by palliative care in the ICU  After an extensive discussion with the family regarding end of life care , family opted to focus on comfort measures only   Family chose to go with hospice care  Hospice care will meet with family and make appropriate arrangements for comfort      § CTH: Numerous hemorrhagic lesions throughout the supratentorial and infratentorial brain as detailed above compatible with progression of patient's known metastatic disease    § Hold all ACPC  § Continue Keppra   § Continue decadron   § S/p vitamin K  § Comfort care only

## 2022-08-11 NOTE — CASE MANAGEMENT
Case Management Discharge Planning Note    Patient name Sanaz Euceda  Location [facility] 730/[facility] 730-01 MRN [id number]  : 1965 Date 2022       Current Admission Date: 8/10/2022  Current Admission Diagnosis:Brain mass   Patient Active Problem List    Diagnosis Date Noted    GI bleeding 08/10/2022    C[facility] hemorrhage ([facility] ) 08/10/2022    Goals of care, counseling/discussion 08/10/2022    Internal jugular (IJ) vein thromboembolism, acute, right ([facility] ) 2022    Leukocytosis 2022    SIRS (systemic inflammatory response syndrome) ([facility] ) 2022    Pneumonia of right upper lobe due to infectious organism 2022    Hyponatremia 2022    Anemia 2022    Therapeutic opioid-induced constipation (OIC) 2022    Acute right-sided thoracic back pain 2022    Lung cancer metastatic to brain ([facility] ) 2022    Dehydration 2022    Chemotherapy induced neutropenia ([facility] ) 2022    Palliative care patient 2022    COPD (chronic obstructive pulmonary disease) ([facility] ) 2022    Adenocarcinoma of right lung ([facility] ) 2022    Pneumonia due to Klebsiella pneumoniae ([facility] ) 2022    Cancer associated pain 2022    Adjustment insomnia 2022    Continuous opioid dependence ([facility] ) 2022    Sepsis due to pneumonia 2022    Obstructive pneumonia 2022    History of tobacco use 2022    Lung mass 2022    Brain mass 2022    Cerebral edema ([facility] ) 2022    Vitamin D deficiency 2021    Cyanocobalamin deficiency 2021    Nonimmune to hepatitis B virus 2021    Elevated PSA 2021    Gall bladder polyp 2021    Type 2 diabetes mellitus without complication, with long-term current use of insulin ([facility] ) 2021      LOS (days): 1  Geometric Mean LOS (GMLOS) (days): 4 40  Days to GMLOS:3     OBJECTIVE:  Risk of Unplanned Readmission Score: 59 4         Current admission status: Inpatient   Preferred Pharmacy:   [id number] [address] - [address] [place] [address] Drive [facility]  Andry Love [address]  Phone: [phone] Fax: [phone] [address] ([facility]) - [facility] [place] [address]  [address] [address]  Phone: [phone] Fax: [phone]    Primary Care Provider: Fausto Sanchez MD    Primary Insurance: [address] [place], [place] [address]  Secondary Insurance:     DISCHARGE DETAILS:    Additional Comments: Pt home with hospitce  Equiptment being ordered and shold arrive at home Friday  Transport has been requested

## 2022-08-12 NOTE — PROGRESS NOTES
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  Unfortunately patient has known history of numerous brain Mets that is not amenable to surgical intervention  Unfortunately he presents with progression of his disease despite being on on radiation treatments and chemotherapy  Having brain bleed/penumonia on going/GI bleeding/ Lung carcinoma with mets to lungs, bone and brain, it was felt by Critical Care that his long-term prognosis is poor  Patient was evaluated by palliative care in the ICU  After an extensive discussion with the family regarding end of life care , family opted to focus on comfort measures only   Family chose to go with hospice care  Hospice care will meet with family and make appropriate arrangements for comfort      § CTH: Numerous hemorrhagic lesions throughout the supratentorial and infratentorial brain as detailed above compatible with progression of patient's known metastatic disease  § Hold all ACPC  § Continue Keppra   § Continue decadron   § S/p vitamin K  § Comfort care only       Adenocarcinoma of right lung [facility])  Assessment & Plan  § Patient previously has been on chemotherapy   Last session was 7/22     § Family decided to make patient comfort measures only going forward           VTE Pharmacologic Prophylaxis:     Patient Centered Rounds: I have performed bedside rounds with nursing staff today  Time Spent for Care: 15 minutes  More than 50% of total time spent on counseling and coordination of care as described above      Current Length of Stay: 2 day(s)    Current Patient Status: Inpatient       Code Status: Level 4 - Comfort Care      Subjective:   nad    Objective:     Vitals:   No data recorded  Body mass index is 21 41 kg/m²  Input and Output Summary (last 24 hours): Intake/Output Summary (Last 24 hours) at 8/12/2022 1046  Last data filed at 8/12/2022 0700  Gross per 24 hour   Intake 0 ml   Output --   Net 0 ml       Physical Exam:     Physical Exam  HENT:      Head: Normocephalic and atraumatic  Mouth/Throat:      Mouth: Mucous membranes are dry  Cardiovascular:      Pulses: Normal pulses  Abdominal:      General: Abdomen is flat  Palpations: Abdomen is soft  Neurological:      General: No focal deficit present  Mental Status: He is oriented to person, place, and time  Psychiatric:         Mood and Affect: Mood normal          Behavior: Behavior normal          Additional Data:     Labs:    Results from last 7 days   Lab Units 08/10/22  0527 08/10/22  0233 08/09/22  2026   WBC Thousand/uL 27 06*  --  30 80*   HEMOGLOBIN g/dL 8 5*   < > 9 6*   HEMATOCRIT % 26 7*   < > 29 9*   PLATELETS Thousands/uL 434*  --  494*   LYMPHO PCT %  --   --  9*   MONO PCT %  --   --  5   EOS PCT %  --   --  0    < > = values in this interval not displayed  Results from last 7 days   Lab Units 08/10/22  0527 08/09/22  2026   POTASSIUM mmol/L 4 3 4 5   CHLORIDE mmol/L 100 95*   CO2 mmol/L 27 28   BUN mg/dL 12 14   CREATININE mg/dL 0 85 0 82   CALCIUM mg/dL 9 3 9 7   ALK PHOS U/L  --  231*   ALT U/L  --  11   AST U/L  --  19     Results from last 7 days   Lab Units 08/10/22  0233   INR  1 29*       * I Have Reviewed All Lab Data Listed Above  * Additional Pertinent Lab Tests Reviewed: All Labs Within Last 24 Hours Reviewed      Recent Cultures (last 7 days):     Results from last 7 days   Lab Units 08/10/22  0234 08/09/22  2345 08/09/22 2026   BLOOD CULTURE  No Growth at 48 hrs  No Growth at 48 hrs  --  No Growth at 48 hrs  No Growth at 48 hrs     URINE CULTURE   --  No Growth <1000 cfu/mL  --        Last 24 Hours Medication List:   Current Facility-Administered Medications   Medication Dose Route Frequency Provider Last Rate    bisacodyl  10 mg Rectal Daily PRN Haily Mejia, DO      dexamethasone  6 mg Oral Daily Haily Mejia, DO      glycopyrrolate  0 1 mg Intravenous Q4H PRN Haily Mejia, DO      haloperidol lactate  0 5 mg Intravenous Q2H PRN Haily Mejia, DO      ipratropium-albuterol  3 mL Nebulization Q4H PRN Haily Mejia, DO      levETIRAcetam  750 mg Oral Q12H [facility] April D JONATHAN Swann      LORazepam  1 mg Intravenous Q10 Min PRN Haily Mejia, DO      melatonin  6 mg Oral HS Arnav Lopes PA-C      morphine injection  2 mg Intravenous Q1H PRN Haily Mejia,       omeprazole (PRILOSEC) suspension 2 mg/mL  10 mg Oral BID Arnav Lopes PA-C      ondansetron  4 mg Intravenous Q6H PRN Haily Mejia, DO      oxyCODONE  10 mg Oral Q3H PRN April JONATHAN Chou      oxyCODONE  5 mg Oral Q3H PRN April JONATHAN Chou      umeclidinium bromide  1 puff Inhalation Daily Sharon Jordan DO          Today, Patient Was Seen By: Zoraida Jean DO    ** Please Note: Dictation voice to text software may have been used in the creation of this document   **

## 2022-08-12 NOTE — PROGRESS NOTES
8/12/2022 11:21 AM -  Joelle Castle's chart and case were reviewed by Mounika Christianson  Mode of review included electronic chart check, and brief check in  Per review, symptoms remain controlled on current regimen and no changes are made at this time  Please continue the regimen in place, and review our last note for details  For dispo plan, please review Case Management notes  Palliative care will chart check over weekend, if symptoms are not controlled please reach out to oncall team            For urgent issues or any questions/concerns, please notify on-call provider via [employer]  You may also call our answering service 24/7 at   JONATHAN Ceballos  Palliative and Supportive Care  Clinic/Answering Service: [phone]  You can find me on [employer]!

## 2022-08-12 NOTE — PLAN OF CARE
Problem: MOBILITY - ADULT  Goal: Maintain or return to baseline ADL function  Description: INTERVENTIONS:  -  Assess patient's ability to carry out ADLs; assess patient's baseline for ADL function and identify physical deficits which impact ability to perform ADLs (bathing, care of mouth/teeth, toileting, grooming, dressing, etc )  - Assess/evaluate cause of self-care deficits   - Assess range of motion  - Assess patient's mobility; develop plan if impaired  - Assess patient's need for assistive devices and provide as appropriate  - Encourage maximum independence but intervene and supervise when necessary  - Involve family in performance of ADLs  - Assess for home care needs following discharge   - Consider OT consult to assist with ADL evaluation and planning for discharge  - Provide patient education as appropriate  Outcome: Progressing     Problem: PAIN - ADULT  Goal: Verbalizes/displays adequate comfort level or baseline comfort level  Description: Interventions:  - Encourage patient to monitor pain and request assistance  - Assess pain using appropriate pain scale  - Administer analgesics based on type and severity of pain and evaluate response  - Implement non-pharmacological measures as appropriate and evaluate response  - Consider cultural and social influences on pain and pain management  - Notify physician/advanced practitioner if interventions unsuccessful or patient reports new pain  Outcome: Progressing     Problem: INFECTION - ADULT  Goal: Absence or prevention of progression during hospitalization  Description: INTERVENTIONS:  - Assess and monitor for signs and symptoms of infection  - Monitor lab/diagnostic results  - Monitor all insertion sites, i e  indwelling lines, tubes, and drains  - Monitor endotracheal if appropriate and nasal secretions for changes in amount and color  - [place] appropriate cooling/warming therapies per order  - Administer medications as ordered  - Instruct and encourage patient and family to use good hand hygiene technique  - Identify and instruct in appropriate isolation precautions for identified infection/condition  Outcome: Progressing     Problem: SAFETY ADULT  Goal: Maintain or return to baseline ADL function  Description: INTERVENTIONS:  -  Assess patient's ability to carry out ADLs; assess patient's baseline for ADL function and identify physical deficits which impact ability to perform ADLs (bathing, care of mouth/teeth, toileting, grooming, dressing, etc )  - Assess/evaluate cause of self-care deficits   - Assess range of motion  - Assess patient's mobility; develop plan if impaired  - Assess patient's need for assistive devices and provide as appropriate  - Encourage maximum independence but intervene and supervise when necessary  - Involve family in performance of ADLs  - Assess for home care needs following discharge   - Consider OT consult to assist with ADL evaluation and planning for discharge  - Provide patient education as appropriate  Outcome: Progressing     Problem: DISCHARGE PLANNING  Goal: Discharge to home or other facility with appropriate resources  Description: INTERVENTIONS:  - Identify barriers to discharge w/patient and caregiver  - Arrange for needed discharge resources and transportation as appropriate  - Identify discharge learning needs (meds, wound care, etc )  - Arrange for interpretive services to assist at discharge as needed  - Refer to Case Management Department for coordinating discharge planning if the patient needs post-hospital services based on physician/advanced practitioner order or complex needs related to functional status, cognitive ability, or social support system  Outcome: Progressing

## 2022-08-12 NOTE — HOSPICE NOTE
Met with pt's wife and brother to review hospice consents  Pt's wife agreed to use pt's brother to translate consents  Consents reviewed and signed, all questions answered  Family aware comfort meds are at [facility] to be picked up  Son is at home waiting for equipment delivery  JUAN lopes, still waiting for transport time for the morning  Will follow until discharge

## 2022-08-12 NOTE — CASE MANAGEMENT
Case Management Discharge Planning Note    Patient name Jackson Fung  [facility] [id number]  : 1965 Date 2022       Current Admission Date: 8/10/2022  Current Admission Diagnosis:Brain mass   Patient Active Problem List    Diagnosis Date Noted    GI bleeding 08/10/2022    Cerebral brain hemorrhage ([facility] ) 08/10/2022    Goals of care, counseling/discussion 08/10/2022    Internal jugular (IJ) vein thromboembolism, acute, right ([facility] ) 2022    Leukocytosis 2022    SIRS (systemic inflammatory response syndrome) ([place] [address] ) 2022    Pneumonia of right upper lobe due to infectious organism 2022    Hyponatremia 2022    Anemia 2022    Therapeutic opioid-induced constipation (OIC) 2022    Acute right-sided thoracic back pain 2022    Lung cancer metastatic to brain ([facility] ) 2022    Dehydration 2022    Chemotherapy induced neutropenia ([facility] ) 2022    Palliative care patient 2022    COPD (chronic obstructive pulmonary disease) [facility] 2022    Adenocarcinoma of right lung [facility] 2022    Pneumonia due to Klebsiella pneumoniae ([facility] ) 2022    Cancer associated pain 2022    Adjustment insomnia 2022    Continuous opioid dependence [facility] 2022    Sepsis due to pneumonia 2022    Obstructive pneumonia 2022    History of tobacco use 2022    Lung mass 2022    Brain mass 2022    Cerebral edema [facility] 2022    Vitamin D deficiency 2021    Cyanocobalamin deficiency 2021    Nonimmune to hepatitis B virus 2021    Elevated PSA 2021    Gall bladder polyp 2021    Type 2 diabetes mellitus without complication, with long-term current use of insulin ([facility] ) 2021      LOS (days): 2  Geometric Mean LOS (GMLOS) (days): 4 40  Days to GMLOS:1 8     OBJECTIVE:  Risk of Unplanned Readmission Score: 61 08         Current admission status: Inpatient   Preferred Pharmacy:   [id number] [place] [facility]  [address] [address]  Phone: [phone] Fax: [phone] [address] ([place]) - [place], [place] - [address]  [address] [address]  Phone: [phone] Fax: [phone] [address] [place] [facility] [address] [address] [address]  Phone: [phone] Fax: [phone]    Primary Care Provider: Samantha Kaur MD    Primary Insurance: [address] [place] [address]  Secondary Insurance:     DISCHARGE DETAILS:  Other Referral/Resources/Interventions Provided:  Interventions: Transportation  Referral Comments: Phone call to Fior Noble [phone] to determine if transport has been accepted yet  Spoke with Joe Deng who reported that 4 companies have declined transport  CM told Joe Deng that pt is hospice, Joe Deng will send another request and communicate this   CM will have CM follow up tomorrow

## 2022-08-13 NOTE — PLAN OF CARE
Problem: MOBILITY - ADULT  Goal: Maintain or return to baseline ADL function  Description: INTERVENTIONS:  -  Assess patient's ability to carry out ADLs; assess patient's baseline for ADL function and identify physical deficits which impact ability to perform ADLs (bathing, care of mouth/teeth, toileting, grooming, dressing, etc )  - Assess/evaluate cause of self-care deficits   - Assess range of motion  - Assess patient's mobility; develop plan if impaired  - Assess patient's need for assistive devices and provide as appropriate  - Encourage maximum independence but intervene and supervise when necessary  - Involve family in performance of ADLs  - Assess for home care needs following discharge   - Consider OT consult to assist with ADL evaluation and planning for discharge  - Provide patient education as appropriate  Outcome: Progressing  Goal: Maintains/Returns to pre admission functional level  Description: INTERVENTIONS:  - Perform BMAT or MOVE assessment daily    - Set and communicate daily mobility goal to care team and patient/family/caregiver     - Collaborate with rehabilitation services on mobility goals if consulted  - Out of bed for toileting  - Record patient progress and toleration of activity level   Outcome: Progressing     Problem: PAIN - ADULT  Goal: Verbalizes/displays adequate comfort level or baseline comfort level  Description: Interventions:  - Encourage patient to monitor pain and request assistance  - Assess pain using appropriate pain scale  - Administer analgesics based on type and severity of pain and evaluate response  - Implement non-pharmacological measures as appropriate and evaluate response  - Consider cultural and social influences on pain and pain management  - Notify physician/advanced practitioner if interventions unsuccessful or patient reports new pain  Outcome: Progressing     Problem: INFECTION - ADULT  Goal: Absence or prevention of progression during hospitalization  Description: INTERVENTIONS:  - Assess and monitor for signs and symptoms of infection  - Monitor lab/diagnostic results  - Monitor all insertion sites, i e  indwelling lines, tubes, and drains  - Monitor endotracheal if appropriate and nasal secretions for changes in amount and color  - Walden appropriate cooling/warming therapies per order  - Administer medications as ordered  - Instruct and encourage patient and family to use good hand hygiene technique  - Identify and instruct in appropriate isolation precautions for identified infection/condition  Outcome: Progressing  Goal: Absence of fever/infection during neutropenic period  Description: INTERVENTIONS:  - Monitor WBC    Outcome: Progressing     Problem: SAFETY ADULT  Goal: Maintain or return to baseline ADL function  Description: INTERVENTIONS:  -  Assess patient's ability to carry out ADLs; assess patient's baseline for ADL function and identify physical deficits which impact ability to perform ADLs (bathing, care of mouth/teeth, toileting, grooming, dressing, etc )  - Assess/evaluate cause of self-care deficits   - Assess range of motion  - Assess patient's mobility; develop plan if impaired  - Assess patient's need for assistive devices and provide as appropriate  - Encourage maximum independence but intervene and supervise when necessary  - Involve family in performance of ADLs  - Assess for home care needs following discharge   - Consider OT consult to assist with ADL evaluation and planning for discharge  - Provide patient education as appropriate  Outcome: Progressing  Goal: Maintains/Returns to pre admission functional level  Description: INTERVENTIONS:  - Perform BMAT or MOVE assessment daily    - Set and communicate daily mobility goal to care team and patient/family/caregiver     - Collaborate with rehabilitation services on mobility goals if consulted  - Out of bed for toileting  - Record patient progress and toleration of activity level   Outcome: Progressing  Goal: Patient will remain free of falls  Description: INTERVENTIONS:  - Educate patient/family on patient safety including physical limitations  - Instruct patient to call for assistance with activity   - Consult OT/PT to assist with strengthening/mobility   - Keep Call bell within reach  - Keep bed low and locked with side rails adjusted as appropriate  - Keep care items and personal belongings within reach  - Initiate and maintain comfort rounds  - Make Fall Risk Sign visible to staff     Problem: DISCHARGE PLANNING  Goal: Discharge to home or other facility with appropriate resources  Description: INTERVENTIONS:  - Identify barriers to discharge w/patient and caregiver  - Arrange for needed discharge resources and transportation as appropriate  - Identify discharge learning needs (meds, wound care, etc )  - Arrange for interpretive services to assist at discharge as needed  - Refer to Case Management Department for coordinating discharge planning if the patient needs post-hospital services based on physician/advanced practitioner order or complex needs related to functional status, cognitive ability, or social support system  Outcome: Progressing     Problem: Knowledge Deficit  Goal: Patient/family/caregiver demonstrates understanding of disease process, treatment plan, medications, and discharge instructions  Description: Complete learning assessment and assess knowledge base  Interventions:  - Provide teaching at level of understanding  - Provide teaching via preferred learning methods  Outcome: Progressing     Problem: Neurological Deficit  Goal: Neurological status is stable or improving  Description: Interventions:  - Monitor and assess patient's level of consciousness, motor function, sensory function, and level of assistance needed for ADLs  - Monitor and report changes from baseline   Collaborate with interdisciplinary team to initiate plan and implement interventions as ordered  - Provide and maintain a safe environment  - Consider seizure precautions  - Consider fall precautions  - Consider aspiration precautions  - Consider bleeding precautions  Outcome: Progressing     Problem: Activity Intolerance/Impaired Mobility  Goal: Mobility/activity is maintained at optimum level for patient  Description: Interventions:  - Assess and monitor patient  barriers to mobility and need for assistive/adaptive devices  - Assess patient's emotional response to limitations  - Collaborate with interdisciplinary team and initiate plans and interventions as ordered  - Encourage independent activity per ability   - Maintain proper body alignment  - Perform active/passive rom as tolerated/ordered  - Plan activities to conserve energy   - Turn patient as appropriate  Outcome: Progressing     Problem: Communication Impairment  Goal: Ability to express needs and understand communication  Description: Assess patient's communication skills and ability to understand information  Patient will demonstrate use of effective communication techniques, alternative methods of communication and understanding even if not able to speak  - Encourage communication and provide alternate methods of communication as needed  - Collaborate with case management/ for discharge needs  - Include patient/family/caregiver in decisions related to communication  Outcome: Progressing     Problem: Potential for Aspiration  Goal: Non-ventilated patient's risk of aspiration is minimized  Description: Assess and monitor vital signs, respiratory status, and labs (WBC)  Monitor for signs of aspiration (tachypnea, cough, rales, wheezing, cyanosis, fever)  - Assess and monitor patient's ability to swallow  - Place patient up in chair to eat if possible  - HOB up at 90 degrees to eat if unable to get patient up into chair   - Supervise patient during oral intake     - Instruct patient/ family to take small bites  - Instruct patient/ family to take small single sips when taking liquids  - Follow patient-specific strategies generated by speech pathologist   Outcome: Progressing     Problem: Nutrition  Goal: Nutrition/Hydration status is improving  Description: Monitor and assess patient's nutrition/hydration status for malnutrition (ex- brittle hair, bruises, dry skin, pale skin and conjunctiva, muscle wasting, smooth red tongue, and disorientation)  Collaborate with interdisciplinary team and initiate plan and interventions as ordered  Monitor patient's weight and dietary intake as ordered or per policy  Utilize nutrition screening tool and intervene per policy  Determine patient's food preferences and provide high-protein, high-caloric foods as appropriate  - Assist patient with eating   - Allow adequate time for meals   - Encourage patient to take dietary supplement as ordered  - Collaborate with clinical nutritionist   - Include patient/family/caregiver in decisions related to nutrition  Outcome: Progressing     Problem: Nutrition/Hydration-ADULT  Goal: Nutrient/Hydration intake appropriate for improving, restoring or maintaining nutritional needs  Description: Monitor and assess patient's nutrition/hydration status for malnutrition  Collaborate with interdisciplinary team and initiate plan and interventions as ordered  Monitor patient's weight and dietary intake as ordered or per policy  Utilize nutrition screening tool and intervene as necessary  Determine patient's food preferences and provide high-protein, high-caloric foods as appropriate       INTERVENTIONS:  - Monitor oral intake, urinary output, labs, and treatment plans  - Assess nutrition and hydration status and recommend course of action  - Evaluate amount of meals eaten  - Assist patient with eating if necessary   - Allow adequate time for meals  - Recommend/ encourage appropriate diets, oral nutritional supplements, and vitamin/mineral supplements  - Order, calculate, and assess calorie counts as needed  - Recommend, monitor, and adjust tube feedings and TPN/PPN based on assessed needs  - Assess need for intravenous fluids  - Provide specific nutrition/hydration education as appropriate  - Include patient/family/caregiver in decisions related to nutrition  Outcome: Progressing     Problem: Potential for Falls  Goal: Patient will remain free of falls  Description: INTERVENTIONS:  - Educate patient/family on patient safety including physical limitations  - Instruct patient to call for assistance with activity   - Consult OT/PT to assist with strengthening/mobility   - Keep Call bell within reach  - Keep bed low and locked with side rails adjusted as appropriate  - Keep care items and personal belongings within reach  - Initiate and maintain comfort rounds  - Make Fall Risk Sign visible to staff  - Consider moving patient to room near nurses station  Outcome: Progressing     Problem: Prexisting or High Potential for Compromised Skin Integrity  Goal: Skin integrity is maintained or improved  Description: INTERVENTIONS:  - Identify patients at risk for skin breakdown  - Assess and monitor skin integrity  - Assess and monitor nutrition and hydration status  - Monitor labs   - Assess for incontinence   - Turn and reposition patient  - Assist with mobility/ambulation  - Relieve pressure over bony prominences  - Avoid friction and shearing  - Provide appropriate hygiene as needed including keeping skin clean and dry  - Evaluate need for skin moisturizer/barrier cream  - Collaborate with interdisciplinary team   - Patient/family teaching  - Consider wound care consult   Outcome: Progressing

## 2022-08-13 NOTE — DISCHARGE SUMMARY
[address] [facility]  Discharge- Lizabeth Seo 1965, 62 y o  male MRN: [id number]  Unit/Bed#: [facility] 730-01 Encounter: [id number]  Primary Care Provider: Connell Heimlich, MD   Date and time admitted to hospital: 8/10/2022  1:52 AM    * Brain mass  Assessment & Plan  Chart reviewed  Mr Braden Rubio initially presented with hematemesis to the emergency room  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  Unfortunately, he has a complex medical history including a known history of numerous brain Mets that is not amenable to surgical intervention  His malignancy status was noteworthy for progression of his disease despite being on on radiation treatments and chemotherapy  Given his presentation of a brain bleed/penumonia with on going GI bleeding in conjunction with an established history of Lung carcinoma with mets to lungs, bone and brain, it was felt that his long-term prognosis is poor  Patient was evaluated by palliative care initially in the ICU and subsequently followed on the medical-surgical floor     After an extensive discussion with the family regarding end of life care , family opted to focus on comfort measures only   Family chose to go with hospice care  Will discharge home with hospice services    § CTH: Numerous hemorrhagic lesions throughout the supratentorial and infratentorial brain as detailed above compatible with progression of patient's known metastatic disease    § Hold all ACPC  § Continue Keppra   § Continue decadron   § S/p vitamin K  § Comfort care only               Medical Problems             Resolved Problems  Date Reviewed: 8/10/2022   None                 Admission Date:   Admission Orders (From admission, onward)     Ordered        08/10/22 0157  Inpatient Admission  Once                        Admitting Diagnosis: Hematemesis [K92 0]    HPI:  This very pleasant 66-year-old gentleman with known history 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 who presents to hospital with reported hematemesis and dizziness  The patient was placed on Eliquis therapy a right IJ thrombus  CT scan of the head was completed which revealed hemorrhagic brain Mets  Patient was admitted for further evaluation  Procedures Performed: No orders of the defined types were placed in this encounter  Summary of Hospital Course:     After extensive discussion with family with the ICU service the patient was decided to be made comfort care only  Plan will be to discharge on home hospice  Condition at Discharge: fair         Discharge instructions/Information to patient and family:   See after visit summary for information provided to patient and family  Provisions for Follow-Up Care:  See after visit summary for information related to follow-up care and any pertinent home health orders  PCP: Nilsa Brower MD    Disposition: Home    Planned Readmission: No    Discharge Statement   I spent 35 minutes discharging the patient  This time was spent on the day of discharge  I had direct contact with the patient on the day of discharge  Additional documentation is required if more than 30 minutes were spent on discharge  Discharge Medications:  See after visit summary for reconciled discharge medications provided to patient and family

## 2022-08-13 NOTE — ASSESSMENT & PLAN NOTE
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  Unfortunately, he has a complex medical history including a known history of numerous brain Mets that is not amenable to surgical intervention  His malignancy status was noteworthy for progression of his disease despite being on on radiation treatments and chemotherapy  Given his presentation of a brain bleed/penumonia with on going GI bleeding in conjunction with an established history of Lung carcinoma with mets to lungs, bone and brain, it was felt that his long-term prognosis is poor  Patient was evaluated by palliative care initially in the ICU and subsequently followed on the medical-surgical floor     After an extensive discussion with the family regarding end of life care , family opted to focus on comfort measures only   Family chose to go with hospice care  Will discharge home with hospice services    § CTH: Numerous hemorrhagic lesions throughout the supratentorial and infratentorial brain as detailed above compatible with progression of patient's known metastatic disease    § Hold all ACPC  § Continue Keppra   § Continue decadron   § S/p vitamin K  § Comfort care only

## 2022-08-15 NOTE — UTILIZATION REVIEW
Notification of Discharge   This is a Notification of Discharge from our facility [address]  Please be advised that this patient has been discharge from our facility  Below you will find the admission and discharge date and time including the patients disposition  UTILIZATION REVIEW CONTACT:  Bryants Storechristiana Marion  Utilization   Network Utilization Review Department  Phone: [phone] x carefully listen to the prompts  All voicemails are confidential   Email: [email or web address]     PHYSICIAN ADVISORY SERVICES:  FOR [id number] REVIEW - MEDICAL NECESSITY DENIAL  Phone: [phone]  Fax: [phone]  Email: [email or web address]     PRESENTATION DATE: 8/10/2022  1:52 AM  OBERVATION ADMISSION DATE:   INPATIENT ADMISSION DATE: 8/10/22  1:52 AM   DISCHARGE DATE: 8/13/2022 11:00 AM  DISPOSITION: Home with [address] with Hospice Care      IMPORTANT INFORMATION:  Send all requests for admission clinical reviews, approved or denied determinations and any other requests to dedicated fax number below belonging to the campus where the patient is receiving treatment   List of dedicated fax numbers:  [address] DENIALS (Administrative/Medical Necessity) [phone]   1000  [facility] (Maternity/NICU/Pediatrics) [phone]   Zen Seen [phone]   130 [facility] [phone]   [address] [place] [address] [phone]   2000 [facility] [address] [place] [address],[address] [place] [address] [phone]   [facility]  [phone]   [address] [facility] [address]  [address] [phone]

## 2022-08-18 DIAGNOSIS — Z51.5 PALLIATIVE CARE PATIENT: ICD-10-CM

## 2022-08-18 DIAGNOSIS — C34.91 ADENOCARCINOMA OF RIGHT LUNG (HCC): ICD-10-CM

## 2022-08-18 DIAGNOSIS — C79.31 LUNG CANCER METASTATIC TO BRAIN (HCC): ICD-10-CM

## 2022-08-18 DIAGNOSIS — F51.02 ADJUSTMENT INSOMNIA: ICD-10-CM

## 2022-08-18 DIAGNOSIS — C34.90 LUNG CANCER METASTATIC TO BRAIN (HCC): ICD-10-CM

## 2022-08-18 RX ORDER — OMEPRAZOLE MAGNESIUM 20 MG
CAPSULE,DELAYED RELEASE (ENTERIC COATED) ORAL
Qty: 90 TABLET | Refills: 1 | OUTPATIENT
Start: 2022-08-18

## 2022-09-01 ENCOUNTER — HOME CARE VISIT (OUTPATIENT)
Dept: HOME HOSPICE | Facility: HOSPICE | Age: 57
End: 2022-09-01
Payer: COMMERCIAL

## 2022-09-01 NOTE — HOSPICE
Asif Brasher Lucille Charlotte Final IDG 22 (1LR) Due: 22  : 1965  SOC: 22  DOD: 22  Diagnosis: Lung Cancer  Primary: Son - Cristian Calero was a 62year old man who resided with his spouse of 35 years Steve and only son Gray White, and brother César Verduzco were present at admission  Danie does not speak [place] and would defer to Gray Vences emigrated from [place] in [facility] with Steve and only Gray Chao was still employed at a [employer] when he signed onto hospice  It was known he would not be returning to work  Dunmohsen and Peter were Ru's primary caregivers and work full time  Family very supportive of one another  Ru identified as Rastafarian and was affiliated with [address] [place] [address] of [email or web address] [place] [facility] [place]   visited and family arranged SOS  Family voiced accepting Ru's prognosis and wished for him to be comfortable  Family voiced support from one another and their laureen community  At this time the only family member to follow in bereavement is aurea Castellano at [facility]  Call Assignments:  Conor Mccullough to reassess aurea Schuler at [facility]   (Due: 22)

## 2022-10-11 ENCOUNTER — TELEPHONE (OUTPATIENT)
Dept: HEMATOLOGY ONCOLOGY | Facility: CLINIC | Age: 57
End: 2022-10-11

## 2022-10-11 NOTE — TELEPHONE ENCOUNTER
Hi, my name is Benton Rodriguez from [employer] on behalf of [employer] [facility] on a recorded line I am calling here about a patient  And the patient 's name is FATOU Small  Last name is KEILA castellanos [address] [facility] [employer] may [de-identified] [birth year] [de-identified] [age] is the date of birth  Patient received a dose of a drug name Avastin on May the eleventh and June the first both of two thousand and twenty two  I just wanted to speak with someone to know the total dose patient received in milligrams on these two dates of services  Please give me a call on two zero three, two nine five two nine three zero  If I'm not available, please leave voicemail with name number, department and reference number for the call in [facility] five one nine two  I repeat  And [facility] four five one nine two  And you can also fax the details to me on two zero three seven eight, zero zero seven four two  Thank you      5/11/2022 and 6/1/2022 avastin dose 900mg   faxed to number provided

## 2023-01-17 NOTE — TELEPHONE ENCOUNTER
Spoke with patient's son  If there is no availability in [facility] next week  Pt is willing to go to [place]  170.2

## 2023-01-30 NOTE — ED NOTES
LVM for pt   Providers made aware of patient blood pressure and auditory crackles  Providers came to bedside  Per provider Dae Campbell give order Lactated Ringer's       Candy Anne RN  03/22/22 9734

## 2023-09-06 NOTE — ASSESSMENT & PLAN NOTE
· Continue with the supplementation Show Applicator Variable?: Yes Consent: The patient's consent was obtained and risks, benefits, and alternatives were reviewed. Render Note In Bullet Format When Appropriate: No Detail Level: Detailed Post-Care Instructions: Patient provided with post-care instructions. Patient is to wear sun protection. Patient may apply Vaseline to crusted or scabbing areas. Duration Of Freeze Thaw-Cycle (Seconds): 0 Spray Paint Text: The liquid nitrogen was applied to the skin utilizing a spray paint frosting technique. Medical Necessity Information: It is in your best interest to select a reason for this procedure from the list below. All of these items fulfill various CMS LCD requirements except the new and changing color options. Consent: The patient's or parent’s consent was obtained and risks and benefits were reviewed. Medical Necessity Clause: This procedure was medically necessary because the lesions that were treated were: Post-Care Instructions: I reviewed with the patient in detail post-care instructions. Patient is to wear sunprotection, and avoid picking at any of the treated lesions. Pt may apply Vaseline to crusted or scabbing areas.

## (undated) DEVICE — GLOVE SRG BIOGEL 6.5

## (undated) DEVICE — GLOVE INDICATOR PI UNDERGLOVE SZ 7 BLUE

## (undated) DEVICE — VIAL DECANTER

## (undated) DEVICE — SPONGE 4 X 4 XRAY 16 PLY STRL LF RFD

## (undated) DEVICE — STERILE ICS MINOR PACK: Brand: CARDINAL HEALTH

## (undated) DEVICE — COVER PROBE INTRAOPERATIVE 6 X 96 IN

## (undated) DEVICE — CHLORAPREP HI-LITE 26ML ORANGE

## (undated) DEVICE — ULTRASOUND GEL STERILE FOIL PK

## (undated) DEVICE — MINOR PROCEDURE DRAPE: Brand: CONVERTORS

## (undated) DEVICE — SYRINGE 5ML LL

## (undated) DEVICE — BAG DECANTER

## (undated) DEVICE — ADHESIVE SKIN HIGH VISCOSITY EXOFIN 1ML

## (undated) DEVICE — INTENDED FOR TISSUE SEPARATION, AND OTHER PROCEDURES THAT REQUIRE A SHARP SURGICAL BLADE TO PUNCTURE OR CUT.: Brand: BARD-PARKER SAFETY BLADES SIZE 15, STERILE

## (undated) DEVICE — SUT VICRYL 3-0 SH 27 IN J416H

## (undated) DEVICE — DRAPE TOWEL: Brand: CONVERTORS